# Patient Record
Sex: FEMALE | Race: WHITE | NOT HISPANIC OR LATINO | Employment: OTHER | URBAN - METROPOLITAN AREA
[De-identification: names, ages, dates, MRNs, and addresses within clinical notes are randomized per-mention and may not be internally consistent; named-entity substitution may affect disease eponyms.]

---

## 2018-10-02 ENCOUNTER — TELEPHONE (OUTPATIENT)
Dept: UROLOGY | Facility: MEDICAL CENTER | Age: 63
End: 2018-10-02

## 2019-01-10 RX ORDER — HYDROCHLOROTHIAZIDE 12.5 MG/1
12.5 TABLET ORAL EVERY MORNING
Status: ON HOLD | COMMUNITY
End: 2020-02-04 | Stop reason: CLARIF

## 2019-01-10 RX ORDER — PHENOL 1.4 %
600 AEROSOL, SPRAY (ML) MUCOUS MEMBRANE
Status: ON HOLD | COMMUNITY
End: 2020-02-04 | Stop reason: CLARIF

## 2019-01-10 RX ORDER — ERGOCALCIFEROL (VITAMIN D2) 1250 MCG
50000 CAPSULE ORAL WEEKLY
COMMUNITY
End: 2019-11-05 | Stop reason: ALTCHOICE

## 2019-01-11 ENCOUNTER — ANESTHESIA EVENT (OUTPATIENT)
Dept: PERIOP | Facility: HOSPITAL | Age: 64
End: 2019-01-11
Payer: COMMERCIAL

## 2019-01-14 ENCOUNTER — ANESTHESIA (OUTPATIENT)
Dept: PERIOP | Facility: HOSPITAL | Age: 64
End: 2019-01-14
Payer: COMMERCIAL

## 2019-01-14 ENCOUNTER — HOSPITAL ENCOUNTER (OUTPATIENT)
Facility: HOSPITAL | Age: 64
Setting detail: OUTPATIENT SURGERY
Discharge: HOME | End: 2019-01-14
Attending: PODIATRIST | Admitting: PODIATRIST
Payer: COMMERCIAL

## 2019-01-14 VITALS
RESPIRATION RATE: 13 BRPM | SYSTOLIC BLOOD PRESSURE: 133 MMHG | TEMPERATURE: 97.6 F | OXYGEN SATURATION: 98 % | WEIGHT: 270 LBS | HEART RATE: 63 BPM | BODY MASS INDEX: 47.84 KG/M2 | DIASTOLIC BLOOD PRESSURE: 83 MMHG | HEIGHT: 63 IN

## 2019-01-14 DIAGNOSIS — G57.61 LESION OF RIGHT PLANTAR NERVE: ICD-10-CM

## 2019-01-14 DIAGNOSIS — Z98.890 POST-OPERATIVE STATE: Primary | ICD-10-CM

## 2019-01-14 PROCEDURE — 88304 TISSUE EXAM BY PATHOLOGIST: CPT | Performed by: PATHOLOGY

## 2019-01-14 RX ORDER — FENTANYL CITRATE 50 UG/ML
INJECTION, SOLUTION INTRAMUSCULAR; INTRAVENOUS AS NEEDED
Status: DISCONTINUED | OUTPATIENT
Start: 2019-01-14 | End: 2019-01-14 | Stop reason: SURG

## 2019-01-14 RX ORDER — MAGNESIUM HYDROXIDE 1200 MG/15ML
LIQUID ORAL AS NEEDED
Status: DISCONTINUED | OUTPATIENT
Start: 2019-01-14 | End: 2019-01-14 | Stop reason: HOSPADM

## 2019-01-14 RX ORDER — HYDROCODONE BITARTRATE AND ACETAMINOPHEN 5; 325 MG/1; MG/1
1 TABLET ORAL EVERY 6 HOURS PRN
Status: DISCONTINUED | OUTPATIENT
Start: 2019-01-14 | End: 2019-01-14 | Stop reason: HOSPADM

## 2019-01-14 RX ORDER — BUPIVACAINE HYDROCHLORIDE 5 MG/ML
INJECTION, SOLUTION PERINEURAL AS NEEDED
Status: DISCONTINUED | OUTPATIENT
Start: 2019-01-14 | End: 2019-01-14 | Stop reason: HOSPADM

## 2019-01-14 RX ORDER — ONDANSETRON 2 MG/ML
4 INJECTION INTRAMUSCULAR; INTRAVENOUS ONCE AS NEEDED
Status: DISCONTINUED | OUTPATIENT
Start: 2019-01-14 | End: 2019-01-14 | Stop reason: HOSPADM

## 2019-01-14 RX ORDER — PROMETHAZINE HYDROCHLORIDE 25 MG/ML
12.5 INJECTION, SOLUTION INTRAMUSCULAR; INTRAVENOUS ONCE AS NEEDED
Status: DISCONTINUED | OUTPATIENT
Start: 2019-01-14 | End: 2019-01-14 | Stop reason: HOSPADM

## 2019-01-14 RX ORDER — PROPOFOL 10 MG/ML
INJECTION, EMULSION INTRAVENOUS CONTINUOUS PRN
Status: DISCONTINUED | OUTPATIENT
Start: 2019-01-14 | End: 2019-01-14 | Stop reason: SURG

## 2019-01-14 RX ORDER — MEPERIDINE HYDROCHLORIDE 25 MG/ML
12.5 INJECTION INTRAMUSCULAR; INTRAVENOUS; SUBCUTANEOUS
Status: DISCONTINUED | OUTPATIENT
Start: 2019-01-14 | End: 2019-01-14 | Stop reason: HOSPADM

## 2019-01-14 RX ORDER — HYDROMORPHONE HCL/PF 1 MG/ML
0.5 SYRINGE (ML) INJECTION
Status: DISCONTINUED | OUTPATIENT
Start: 2019-01-14 | End: 2019-01-14 | Stop reason: HOSPADM

## 2019-01-14 RX ORDER — HYDROCODONE BITARTRATE AND ACETAMINOPHEN 5; 325 MG/1; MG/1
1 TABLET ORAL EVERY 6 HOURS PRN
Qty: 20 TABLET | Refills: 0 | Status: SHIPPED | OUTPATIENT
Start: 2019-01-14 | End: 2019-01-24

## 2019-01-14 RX ORDER — SODIUM CHLORIDE, SODIUM LACTATE, POTASSIUM CHLORIDE, CALCIUM CHLORIDE 600; 310; 30; 20 MG/100ML; MG/100ML; MG/100ML; MG/100ML
50 INJECTION, SOLUTION INTRAVENOUS CONTINUOUS
Status: DISCONTINUED | OUTPATIENT
Start: 2019-01-14 | End: 2019-01-14 | Stop reason: HOSPADM

## 2019-01-14 RX ORDER — DEXAMETHASONE SODIUM PHOSPHATE 4 MG/ML
INJECTION, SOLUTION INTRA-ARTICULAR; INTRALESIONAL; INTRAMUSCULAR; INTRAVENOUS; SOFT TISSUE AS NEEDED
Status: DISCONTINUED | OUTPATIENT
Start: 2019-01-14 | End: 2019-01-14 | Stop reason: HOSPADM

## 2019-01-14 RX ORDER — LIDOCAINE HYDROCHLORIDE 10 MG/ML
INJECTION, SOLUTION INFILTRATION; PERINEURAL AS NEEDED
Status: DISCONTINUED | OUTPATIENT
Start: 2019-01-14 | End: 2019-01-14 | Stop reason: SURG

## 2019-01-14 RX ORDER — CEFAZOLIN SODIUM 1 G/50ML
1000 SOLUTION INTRAVENOUS ONCE
Status: COMPLETED | OUTPATIENT
Start: 2019-01-14 | End: 2019-01-14

## 2019-01-14 RX ORDER — CEFAZOLIN SODIUM 2 G/50ML
2000 SOLUTION INTRAVENOUS ONCE
Status: COMPLETED | OUTPATIENT
Start: 2019-01-14 | End: 2019-01-14

## 2019-01-14 RX ORDER — PROPOFOL 10 MG/ML
INJECTION, EMULSION INTRAVENOUS AS NEEDED
Status: DISCONTINUED | OUTPATIENT
Start: 2019-01-14 | End: 2019-01-14 | Stop reason: SURG

## 2019-01-14 RX ORDER — MIDAZOLAM HYDROCHLORIDE 1 MG/ML
INJECTION INTRAMUSCULAR; INTRAVENOUS AS NEEDED
Status: DISCONTINUED | OUTPATIENT
Start: 2019-01-14 | End: 2019-01-14 | Stop reason: SURG

## 2019-01-14 RX ADMIN — FENTANYL CITRATE 50 MCG: 50 INJECTION INTRAMUSCULAR; INTRAVENOUS at 12:16

## 2019-01-14 RX ADMIN — FENTANYL CITRATE 50 MCG: 50 INJECTION INTRAMUSCULAR; INTRAVENOUS at 12:23

## 2019-01-14 RX ADMIN — MIDAZOLAM HYDROCHLORIDE 2 MG: 1 INJECTION, SOLUTION INTRAMUSCULAR; INTRAVENOUS at 12:16

## 2019-01-14 RX ADMIN — HYDROCODONE BITARTRATE AND ACETAMINOPHEN 1 TABLET: 5; 325 TABLET ORAL at 14:22

## 2019-01-14 RX ADMIN — FENTANYL CITRATE 25 MCG: 50 INJECTION INTRAMUSCULAR; INTRAVENOUS at 12:51

## 2019-01-14 RX ADMIN — PROPOFOL 70 MG: 10 INJECTION, EMULSION INTRAVENOUS at 12:16

## 2019-01-14 RX ADMIN — CEFAZOLIN SODIUM 2000 MG: 2 SOLUTION INTRAVENOUS at 12:25

## 2019-01-14 RX ADMIN — LIDOCAINE HYDROCHLORIDE 40 MG: 10 INJECTION, SOLUTION INFILTRATION; PERINEURAL at 12:16

## 2019-01-14 RX ADMIN — SODIUM CHLORIDE, POTASSIUM CHLORIDE, SODIUM LACTATE AND CALCIUM CHLORIDE 50 ML/HR: 600; 310; 30; 20 INJECTION, SOLUTION INTRAVENOUS at 12:09

## 2019-01-14 RX ADMIN — PROPOFOL 70 MG: 10 INJECTION, EMULSION INTRAVENOUS at 12:46

## 2019-01-14 RX ADMIN — CEFAZOLIN SODIUM 1000 MG: 1 SOLUTION INTRAVENOUS at 12:25

## 2019-01-14 RX ADMIN — PROPOFOL 160 MCG/KG/MIN: 10 INJECTION, EMULSION INTRAVENOUS at 12:16

## 2019-01-14 NOTE — DISCHARGE SUMMARY
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

## 2019-01-14 NOTE — DISCHARGE INSTRUCTIONS
Dr Sheryle Bernard  Post-Operative Instructions    1  Take your prescribed medication as directed  2  Upon arrival at home, lie down and elevate your surgical foot on 2 pillows  3  Remain quiet, off your feet as much as possible, for the first 24-48 hours  This is when your feet first swell and may become painful  After 48 hours you may begin limited walking following these restrictions:   Weightbear as tolerated to surgical foot  4  Drink large quantities of water  Consume no alcohol  Continue a well-balanced diet  5  Report any unusual discomfort or fever to this office  6  A limited amount of discomfort and swelling is to be expected  In some cases the skin may take on a bruised appearance  The surgical solution that was applied to your foot prior to the operation is dark in color and the operation site may appear to be oozing when it actually is not  7  A slight amount of blood is to be expected, and is no cause for alarm  Do not remove the dressings  If there is active bleeding and if the bleeding persists, add additional gauze to the bandage, apply direct pressure, elevate your feet and call this office  8  Do not get the dressings wet  As regular bathing may be inconvenient, sponge baths are recommended  9  When anesthesia wears off and if any discomfort should be present, apply an ice pack directly over the operated area for 15 minute intervals for several hours or until the pain leaves  (USE IN EXCESS OF 15 MINUTES COULD CAUSE FROSTBITE)  Do not use hot water bags or electric pads  A convenient icepack can be made by placing ice cubes in a plastic bag and covering this with a towel  10  If necessary, take a mild laxative before retiring  11  Wear your special open shoes anytime you put weight on your foot, even if it is just to walk to the bathroom and back  It will probably be 2 or 3 weeks before you will be permitted to try regular shoes    12  Having performed the operation, we are interested in a prompt recovery  Please cooperate by following the above instructions  13  Please call to confirm your post-op appointment or call with any other questions

## 2019-01-14 NOTE — ANESTHESIA PREPROCEDURE EVALUATION
Review of Systems/Medical History      No history of anesthetic complications     Cardiovascular  Exercise tolerance (METS): >4,  Hypertension controlled,    Pulmonary  Negative pulmonary ROS        GI/Hepatic  Negative GI/hepatic ROS          Negative  ROS        Endo/Other  Negative endo/other ROS No diabetes ,   Obesity    GYN       Hematology  Negative hematology ROS      Musculoskeletal  Negative musculoskeletal ROS        Neurology  Negative neurology ROS      Psychology   Negative psychology ROS              Physical Exam    Airway    Mallampati score: II  TM Distance: >3 FB  Neck ROM: full     Dental       Cardiovascular  Cardiovascular exam normal    Pulmonary  Pulmonary exam normal     Other Findings        Anesthesia Plan  ASA Score- 2     Anesthesia Type- IV sedation with anesthesia with ASA Monitors  Additional Monitors:   Airway Plan:         Plan Factors-Patient not instructed to abstain from smoking on day of procedure  Patient did not smoke on day of surgery  Induction-     Postoperative Plan- Plan for postoperative opioid use  Informed Consent- Anesthetic plan and risks discussed with patient  I personally reviewed this patient with the CRNA  Discussed and agreed on the Anesthesia Plan with the CRNA             PT and INR and BMP ok

## 2019-01-14 NOTE — OP NOTE
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]    Procedure(s) (LRB):  EXCISION 2ND INTERSPACE NEUROMA (Right)    Specimen(s):  ID Type Source Tests Collected by Time Destination   1 : Neuroma Right Foot Tissue Neuroma TISSUE EXAM Peri Capone DPM 2019 1252        Estimated Blood Loss:   Minimal    Anesthesia Type:   Choice with 8 ml of 0 5% Bupivacaine and a post-op injection of 1cc of dexamethasone sodium phosphate, and 7 cc of 0 5%bupivicaine plain  Hemostasis:  Right pneumatic ankle tourniquet at 250 mm per Hg for 37 min  Materials:  4-0 Vicryl  4-0 Nylon    Operative Findings:  Consistent with diagnosis  Complications:   None    Procedure and Technique:     Under mild sedation, the patient was brought into the operating room and placed on the operating room table in the supine position  A pneumatic ankle tourniquet was then placed around the patient's right ankle with ample webril padding  A time out was performed to confirm the correct patient, procedure and site with all parties in agreement  Following IV sedation, a intermetatarsal block was performed consisting of 8 ml of 0 5% Bupivacaine  The foot was then scrubbed, prepped and draped in the usual aseptic manner  An esmarch bandage was utilized to exsangunate the patients foot and the pneumatic ankle tourniquet was then inflated  The esmarch bandage was removed and the foot was placed on the operating room table      Attention was directed to the 2nd interspace of the Right foot where a 3cm linear longitudinal incision was made beginning distally at the webspace of the intermetatarsal area and extending proximally  The incision was deepened through the subcutaneous tissues  All vital structures were identified and retracted  Dissection was then carried down into the 2nd interspace using sharp and blunt dissection  The deep transverse intermetatarsal ligament was then identified and transected  Dissection was continued until visualization of the neural mass was identified beneath the deep transverse intermetatarsal ligament  The hypertrophied neural tissue was dissected distally from the distal digital branches which were freed from soft tissue attachments and hemostats were attached distally  The distal digital branches were then transected  Using the hemostats, the neuroma was then dissected proximally and the neuroma was transected at the most proximal portion, while tension was maintained on the nerve  The entire neural mass was resected, passed from the operative field, and sent to pathology for further evaluation  The wound was then flushed with copious amounts of sterile saline  Subcutaneous closure was obtained utilizing 4-0 Vicryl in an interrupted suture technique  Skin edges were reapproximated and closure was obtained utilizing interrupted retention sutures utilizing 4-0 Nylon  The foot was then cleansed and dried  A postoperative injection consisting of 1cc of dexamethasone sodium phosphate and 7cc of 0 5% Bupivacaine was performed  The incision site was dressed with Xeroform, Dry sterile dressing  This was then covered with a Sally, and Coban  The pneumatic ankle tourniquet was deflated and normal hyperemic flush was noted to all digits  The patient tolerated the procedure and anesthesia well and was transported to the PACU with vital signs stable         SIGNATURE: Alejandra Harding DPM  DATE: January 14, 2019  TIME: 1:30 PM

## 2019-01-19 ENCOUNTER — OFFICE VISIT (OUTPATIENT)
Dept: URGENT CARE | Facility: CLINIC | Age: 64
End: 2019-01-19
Payer: COMMERCIAL

## 2019-01-19 VITALS
HEART RATE: 71 BPM | TEMPERATURE: 98 F | DIASTOLIC BLOOD PRESSURE: 80 MMHG | OXYGEN SATURATION: 99 % | WEIGHT: 270 LBS | HEIGHT: 63 IN | RESPIRATION RATE: 16 BRPM | SYSTOLIC BLOOD PRESSURE: 154 MMHG | BODY MASS INDEX: 47.84 KG/M2

## 2019-01-19 DIAGNOSIS — T81.49XA POSTOPERATIVE WOUND INFECTION: Primary | ICD-10-CM

## 2019-01-19 PROCEDURE — 99213 OFFICE O/P EST LOW 20 MIN: CPT | Performed by: NURSE PRACTITIONER

## 2019-01-19 RX ORDER — OXYCODONE HYDROCHLORIDE AND ACETAMINOPHEN 5; 325 MG/1; MG/1
1 TABLET ORAL EVERY 6 HOURS PRN
COMMUNITY
Start: 2018-12-13 | End: 2019-11-05 | Stop reason: ALTCHOICE

## 2019-01-19 RX ORDER — AMOXICILLIN 250 MG
1 CAPSULE ORAL
COMMUNITY
Start: 2018-12-13 | End: 2019-11-05 | Stop reason: ALTCHOICE

## 2019-01-19 RX ORDER — CEPHALEXIN 500 MG/1
500 CAPSULE ORAL EVERY 6 HOURS SCHEDULED
Qty: 28 CAPSULE | Refills: 0 | Status: SHIPPED | OUTPATIENT
Start: 2019-01-19 | End: 2019-01-26

## 2019-01-19 NOTE — PROGRESS NOTES
[facility]        NAME: Connor Singh is a 66 y o  female  : 1955    MRN: [id number]  DATE: 2019  TIME: 11:18 AM    Assessment and Plan   Postoperative wound infection [T81 49XA]  1  Postoperative wound infection  cephalexin (KEFLEX) 500 mg capsule         Patient Instructions       Follow up with surgeon next week  Proceed to  ER if symptoms worsen  Chief Complaint     Chief Complaint   Patient presents with    Surgical Problem Re-Evaluation     right, foot          History of Present Illness       59-year-old female with a chief complaint possible postop wound infection  She had surgery 5 days ago to remove a neuroma between her 2nd and 3rd toes  She states that since she woke up this morning she has been having some increased pain and redness at the incision site  She called her podiatrist (Dr Rusty Roy) and was told to come here to be checked  Review of Systems   Review of Systems   Constitutional: Negative  HENT: Negative  Eyes: Negative  Respiratory: Negative  Cardiovascular: Negative  Gastrointestinal: Negative  Genitourinary: Negative  Skin: Positive for wound           Current Medications       Current Outpatient Prescriptions:     oxyCODONE-acetaminophen (PERCOCET) 5-325 mg per tablet, Take 1 tablet by mouth every 6 (six) hours as needed, Disp: , Rfl:     senna-docusate sodium (MONIK-COLACE) 8 6-50 mg per tablet, Take 1 tablet by mouth, Disp: , Rfl:     calcium carbonate (OS-VENTURA) 600 MG tablet, Take 600 mg by mouth 2 (two) times a day with meals, Disp: , Rfl:     cephalexin (KEFLEX) 500 mg capsule, Take 1 capsule (500 mg total) by mouth every 6 (six) hours for 7 days, Disp: 28 capsule, Rfl: 0    ergocalciferol (ERGOCALCIFEROL) 55236 units capsule, Take 50,000 Units by mouth once a week, Disp: , Rfl:     hydrochlorothiazide (HYDRODIURIL) 12 5 mg tablet, Take 12 5 mg by mouth daily, Disp: , Rfl:     HYDROcodone-acetaminophen (Lana Camper) 5-325 mg per tablet, Take 1 tablet by mouth every 6 (six) hours as needed for pain for up to 10 days Max Daily Amount: 4 tablets, Disp: 20 tablet, Rfl: 0    Current Allergies     Allergies as of 2019    (No Known Allergies)            The following portions of the patient's history were reviewed and updated as appropriate: allergies, current medications, past family history, past medical history, past social history, past surgical history and problem list      Past Medical History:   Diagnosis Date    Hypertension     Vitamin D deficiency        Past Surgical History:   Procedure Laterality Date    ABDOMINOPLASTY      revision gastric bypass     SECTION      CHOLECYSTECTOMY      COLONOSCOPY      GASTRIC BYPASS      HERNIA REPAIR      NEUROMA EXCISION Right 2019    Procedure: EXCISION 2ND INTERSPACE NEUROMA;  Surgeon: Leilani Connor DPM;  Location: [address] [place] [address];  Service: Podiatry    ROTATOR CUFF REPAIR Left        Family History   Problem Relation Age of Onset    Stroke Mother     Heart disease Father          Medications have been verified  Objective   /80   Pulse 71   Temp 98 °F (36 7 °C)   Resp 16   Ht 5' 3" (1 6 m)   Wt 122 kg (270 lb)   SpO2 99%   BMI 47 83 kg/m²        Physical Exam     Physical Exam   Constitutional: She is oriented to person, place, and time  She appears well-developed and well-nourished  No distress  HENT:   Head: Normocephalic and atraumatic  Right Ear: External ear normal    Left Ear: External ear normal    Nose: Nose normal    Mouth/Throat: Oropharynx is clear and moist    Eyes: Pupils are equal, round, and reactive to light  Conjunctivae and EOM are normal    Neck: Normal range of motion  Neck supple  Cardiovascular: Normal rate, regular rhythm and normal heart sounds  Pulmonary/Chest: Effort normal and breath sounds normal  No respiratory distress  She has no wheezes  She has no rales  Abdominal: Soft   Bowel sounds are normal  Lymphadenopathy:     She has no cervical adenopathy  Neurological: She is alert and oriented to person, place, and time  She has normal reflexes  Skin: She is not diaphoretic  There is an incision between the 2nd and 3rd toes on the right measuring approximately 2 5 cm in length  The edges are nicely approximated  There is no drainage noted  There is minimal erythema and warmth at the incision site  Patient reports pain with palpation over the incision  Psychiatric: She has a normal mood and affect  Her behavior is normal  Judgment and thought content normal    Vitals reviewed

## 2019-01-19 NOTE — PATIENT INSTRUCTIONS
Acute Wound Care   AMBULATORY CARE:   An acute wound  is an injury that causes a break in the skin  An acute wound can happen suddenly, last a short time, and may heal on its own  Common signs and symptoms of an acute wound:   · A cut, tear, or gash in your skin    · Bleeding, swelling, pain, or trouble moving the affected area    · Dirt or foreign objects inside the wound     · Milky, yellow, green, or brown pus in the wound     · Red, tender, or warm area around the pus    · Fever  Seek care immediately if:   · You have pus or a foul odor coming from the wound  · You have sudden trouble breathing or chest pain  · Blood soaks through your bandage  Contact your healthcare provider if:   · You have muscle, joint, or body aches, sweating, or a fever  · You have more swelling, redness, or bleeding in your wound  · Your skin is itchy, swollen, or you have a rash  · You have questions or concerns about your condition or care  Treatment for an acute wound  may include any of the following:  · Cleansing  is done with soap and water to wash away germs and decrease the risk of infection  Sterile water further cleans the wound  The cleaning is done under high pressure with a catheter tip and large syringe  A solution that kills germs may also be used  · Debridement  is done to clean and remove objects, dirt, or dead tissues from the open wound  Healthcare providers may also drain the wound to clean out pus  · Closure of the wound  is done with stitches, staples, skin adhesive, or other treatments  This may be done if the wound is wide or deep  Stitches may be needed if the wound is in an area that moves a lot, such as the hands, feet, and joints  Stitches may help to keep the wound from getting infected  They may also decrease the amount of scarring you have  Some wounds may heal better without stitches    Wound care:   · If your wound was closed with thin strips of medical tape, keep them clean and dry  The strips of medical tape will fall off on their own  Do not pull them off  · Keep the bandage clean and dry  Do not remove the bandage over your wound unless your healthcare provider says it is okay  · Wash your hands before and after you take care of your wound to prevent infection  · Clean the wound as directed  If you cannot reach the wound, have someone help you  · If you have packing, make sure all the gauze used to pack the wound is taken out and replaced as directed  Keep track of how many gauze dressings are placed inside the wound  Follow up with your healthcare provider as directed:  Write down your questions so you remember to ask them during your visits  © 2016 [employer] [employer] is for End User's use only and may not be sold, redistributed or otherwise used for commercial purposes  All illustrations and images included in CareNotes® are the copyrighted property of A [employer] M [employer]  or Ashkan Paez  The above information is an  only  It is not intended as medical advice for individual conditions or treatments  Talk to your doctor, nurse or pharmacist before following any medical regimen to see if it is safe and effective for you

## 2019-05-22 ENCOUNTER — EVALUATION (OUTPATIENT)
Dept: PHYSICAL THERAPY | Facility: CLINIC | Age: 64
End: 2019-05-22
Payer: OTHER MISCELLANEOUS

## 2019-05-22 DIAGNOSIS — M54.50 LUMBAR PAIN: Primary | ICD-10-CM

## 2019-05-22 PROCEDURE — 97163 PT EVAL HIGH COMPLEX 45 MIN: CPT | Performed by: PHYSICAL THERAPIST

## 2019-05-23 ENCOUNTER — TRANSCRIBE ORDERS (OUTPATIENT)
Dept: PHYSICAL THERAPY | Facility: CLINIC | Age: 64
End: 2019-05-23

## 2019-05-23 DIAGNOSIS — M54.50 LUMBAR PAIN: Primary | ICD-10-CM

## 2019-05-28 ENCOUNTER — OFFICE VISIT (OUTPATIENT)
Dept: PHYSICAL THERAPY | Age: 64
End: 2019-05-28
Payer: OTHER MISCELLANEOUS

## 2019-05-28 VITALS — SYSTOLIC BLOOD PRESSURE: 153 MMHG | DIASTOLIC BLOOD PRESSURE: 96 MMHG | HEART RATE: 90 BPM

## 2019-05-28 DIAGNOSIS — M54.50 LUMBAR PAIN: Primary | ICD-10-CM

## 2019-05-28 PROCEDURE — 97113 AQUATIC THERAPY/EXERCISES: CPT | Performed by: PHYSICAL THERAPIST

## 2019-05-30 ENCOUNTER — OFFICE VISIT (OUTPATIENT)
Dept: PHYSICAL THERAPY | Age: 64
End: 2019-05-30
Payer: OTHER MISCELLANEOUS

## 2019-05-30 VITALS — SYSTOLIC BLOOD PRESSURE: 154 MMHG | HEART RATE: 72 BPM | DIASTOLIC BLOOD PRESSURE: 85 MMHG

## 2019-05-30 DIAGNOSIS — M54.50 LUMBAR PAIN: Primary | ICD-10-CM

## 2019-05-30 PROCEDURE — 97113 AQUATIC THERAPY/EXERCISES: CPT | Performed by: PHYSICAL THERAPIST

## 2019-06-03 ENCOUNTER — OFFICE VISIT (OUTPATIENT)
Dept: PHYSICAL THERAPY | Age: 64
End: 2019-06-03
Payer: OTHER MISCELLANEOUS

## 2019-06-03 ENCOUNTER — OFFICE VISIT (OUTPATIENT)
Dept: URGENT CARE | Facility: CLINIC | Age: 64
End: 2019-06-03
Payer: COMMERCIAL

## 2019-06-03 VITALS
TEMPERATURE: 98 F | SYSTOLIC BLOOD PRESSURE: 137 MMHG | HEART RATE: 83 BPM | DIASTOLIC BLOOD PRESSURE: 89 MMHG | BODY MASS INDEX: 47.84 KG/M2 | HEIGHT: 63 IN | RESPIRATION RATE: 20 BRPM | WEIGHT: 270 LBS | OXYGEN SATURATION: 98 %

## 2019-06-03 DIAGNOSIS — T75.3XXA MOTION SICKNESS, INITIAL ENCOUNTER: Primary | ICD-10-CM

## 2019-06-03 DIAGNOSIS — I10 ESSENTIAL HYPERTENSION: ICD-10-CM

## 2019-06-03 DIAGNOSIS — I49.9 IRREGULAR HEART BEAT: ICD-10-CM

## 2019-06-03 DIAGNOSIS — M54.50 LUMBAR PAIN: Primary | ICD-10-CM

## 2019-06-03 PROCEDURE — 99284 EMERGENCY DEPT VISIT MOD MDM: CPT | Performed by: NURSE PRACTITIONER

## 2019-06-03 PROCEDURE — 93005 ELECTROCARDIOGRAM TRACING: CPT | Performed by: NURSE PRACTITIONER

## 2019-06-03 PROCEDURE — 99214 OFFICE O/P EST MOD 30 MIN: CPT | Performed by: NURSE PRACTITIONER

## 2019-06-03 PROCEDURE — 97113 AQUATIC THERAPY/EXERCISES: CPT

## 2019-06-03 PROCEDURE — G0383 LEV 4 HOSP TYPE B ED VISIT: HCPCS | Performed by: NURSE PRACTITIONER

## 2019-06-03 RX ORDER — TIMOLOL MALEATE 2.5 MG/ML
1 SOLUTION OPHTHALMIC 2 TIMES DAILY
Status: ON HOLD | COMMUNITY
End: 2020-02-04 | Stop reason: CLARIF

## 2019-06-03 RX ORDER — TRAMADOL HYDROCHLORIDE 50 MG/1
50 TABLET ORAL 2 TIMES DAILY PRN
Status: ON HOLD | COMMUNITY
Start: 2019-02-15 | End: 2020-02-04 | Stop reason: CLARIF

## 2019-06-03 RX ORDER — MECLIZINE HCL 12.5 MG/1
12.5 TABLET ORAL EVERY 8 HOURS PRN
Qty: 30 TABLET | Refills: 0 | Status: SHIPPED | OUTPATIENT
Start: 2019-06-03 | End: 2019-11-05 | Stop reason: ALTCHOICE

## 2019-06-05 LAB
ATRIAL RATE: 77 BPM
P AXIS: 35 DEGREES
PR INTERVAL: 190 MS
QRS AXIS: -11 DEGREES
QRSD INTERVAL: 106 MS
QT INTERVAL: 396 MS
QTC INTERVAL: 448 MS
T WAVE AXIS: 24 DEGREES
VENTRICULAR RATE: 77 BPM

## 2019-06-05 PROCEDURE — 93010 ELECTROCARDIOGRAM REPORT: CPT | Performed by: INTERNAL MEDICINE

## 2019-06-06 ENCOUNTER — APPOINTMENT (OUTPATIENT)
Dept: PHYSICAL THERAPY | Age: 64
End: 2019-06-06
Payer: OTHER MISCELLANEOUS

## 2019-06-10 ENCOUNTER — OFFICE VISIT (OUTPATIENT)
Dept: PHYSICAL THERAPY | Age: 64
End: 2019-06-10
Payer: OTHER MISCELLANEOUS

## 2019-06-10 DIAGNOSIS — M54.50 LUMBAR PAIN: Primary | ICD-10-CM

## 2019-06-10 PROCEDURE — 97113 AQUATIC THERAPY/EXERCISES: CPT

## 2019-06-13 ENCOUNTER — OFFICE VISIT (OUTPATIENT)
Dept: PHYSICAL THERAPY | Age: 64
End: 2019-06-13
Payer: OTHER MISCELLANEOUS

## 2019-06-13 DIAGNOSIS — M54.50 LUMBAR PAIN: Primary | ICD-10-CM

## 2019-06-13 PROCEDURE — 97113 AQUATIC THERAPY/EXERCISES: CPT

## 2019-06-18 ENCOUNTER — OFFICE VISIT (OUTPATIENT)
Dept: PHYSICAL THERAPY | Age: 64
End: 2019-06-18
Payer: OTHER MISCELLANEOUS

## 2019-06-18 DIAGNOSIS — M54.50 LUMBAR PAIN: Primary | ICD-10-CM

## 2019-06-18 PROCEDURE — 97113 AQUATIC THERAPY/EXERCISES: CPT | Performed by: PHYSICAL THERAPIST

## 2019-06-20 ENCOUNTER — OFFICE VISIT (OUTPATIENT)
Dept: PHYSICAL THERAPY | Age: 64
End: 2019-06-20
Payer: OTHER MISCELLANEOUS

## 2019-06-20 DIAGNOSIS — M54.50 LUMBAR PAIN: Primary | ICD-10-CM

## 2019-06-20 PROCEDURE — 97113 AQUATIC THERAPY/EXERCISES: CPT

## 2019-06-24 ENCOUNTER — OFFICE VISIT (OUTPATIENT)
Dept: PHYSICAL THERAPY | Age: 64
End: 2019-06-24
Payer: OTHER MISCELLANEOUS

## 2019-06-24 DIAGNOSIS — M54.50 LUMBAR PAIN: Primary | ICD-10-CM

## 2019-06-24 PROCEDURE — 97113 AQUATIC THERAPY/EXERCISES: CPT

## 2019-06-25 ENCOUNTER — APPOINTMENT (OUTPATIENT)
Dept: PHYSICAL THERAPY | Age: 64
End: 2019-06-25
Payer: OTHER MISCELLANEOUS

## 2019-07-01 ENCOUNTER — OFFICE VISIT (OUTPATIENT)
Dept: PHYSICAL THERAPY | Age: 64
End: 2019-07-01
Payer: OTHER MISCELLANEOUS

## 2019-07-01 DIAGNOSIS — M54.50 LUMBAR PAIN: Primary | ICD-10-CM

## 2019-07-01 PROCEDURE — 97113 AQUATIC THERAPY/EXERCISES: CPT

## 2019-07-01 NOTE — PROGRESS NOTES
Daily Note     Today's date: 2019  Patient name: Valentine Villasenor  : 1955  MRN: [id number]  Referring provider: Kay Jernigan PA-C  Dx:   Encounter Diagnosis     ICD-10-CM    1  Lumbar pain M54 5        Start Time: 1000  Stop Time: 1100  Total time in clinic (min): 60 minutes    Subjective:  Pt reports LBP today 5/10  She notes the spasm in her LB is better but she continues w/ pain in L LB and L LE  She complains of difficulty w/ steps, getting in and out of the car and states she can't sleep on L side yet  Objective: See treatment diary below      Assessment: Tolerated treatment well  Slow gains overall w/ LE and core strengthening  Added ball press downs today working on core strengthening  Also added DW bike for 2 minutes  Patient exhibited good technique with therapeutic exercises      Plan: Continue per plan of care       Precautions: OA, Fear (Non Swimmer), Left Shoulder RTC repair,     Daily Treatment Diary         Exercise Diary  5/28 5/30 6/3 6/10 6/13 6/18 6/20 6/24 7    Water walking 20 20 20 15 15 15 10 10 10    Postural training 2 2 2          Gait training 3 2 2 5 5 5 5 5     Home exercise pgm/patient education             Wall: t/h raises 1 1 1 1 1 1 1 1 1    Hip abd/add 1 1 1 2 2 2 2 2 2    Marching 1 1 1 1 1 1 1 1 1    squats 1 1 1 1 1 1 1 1 1    Knee flex/ext 1 1 1 1 1 1 1 1 1    Step-ups (fwd/bkwd/ss)             SLS (eyes open/closed)       3 3 3    SLS w UE mvmt  AROM/ball toss             Weight shifting 5 3 2          UE Noodle work x 4  4 4 4 4 4 4 4 4 4    UE AROM   1 4' 4 4 4 4 nt    Resistive UE work (paddles, bells, TB)         4 open blue    Core work ball press        2 2    Sit on noodle with movement             Seated on pool bench w proper posture   1 1 1 1 1 1 1    Ankle df/pf 1 1 1 1 1 1 1 1 1    marching 1 1 1 1 1 1 1 1 1    Hip Ab/add 1 1 1 1 1 1 1 1 1    Knee flex/ext 1 1 1 1 1 1 1 1 1    Deep water mvmt        2 5    Deep water tx/stretching    10 10 10 10 10 10    Specific self - stretches wall/steps 3 3 3 3 3 3 3 3 3        Modalities  5/28 5/30 6/3 6/10 6/13 6/18 6/20 6/24 7/1    whirlpool NT 5 10 10 10 10 10 10 10

## 2019-07-03 ENCOUNTER — APPOINTMENT (OUTPATIENT)
Dept: PHYSICAL THERAPY | Age: 64
End: 2019-07-03
Payer: OTHER MISCELLANEOUS

## 2019-07-07 ENCOUNTER — HOSPITAL ENCOUNTER (EMERGENCY)
Facility: HOSPITAL | Age: 64
Discharge: HOME | End: 2019-07-07
Attending: EMERGENCY MEDICINE | Admitting: EMERGENCY MEDICINE
Payer: COMMERCIAL

## 2019-07-07 ENCOUNTER — APPOINTMENT (EMERGENCY)
Dept: RADIOLOGY | Facility: HOSPITAL | Age: 64
End: 2019-07-07
Payer: COMMERCIAL

## 2019-07-07 DIAGNOSIS — S91.209A AVULSION OF TOENAIL, INITIAL ENCOUNTER: Primary | ICD-10-CM

## 2019-07-07 PROCEDURE — 73660 X-RAY EXAM OF TOE(S): CPT

## 2019-07-07 PROCEDURE — 99283 EMERGENCY DEPT VISIT LOW MDM: CPT

## 2019-07-07 RX ORDER — HYDROCODONE BITARTRATE AND ACETAMINOPHEN 5; 325 MG/1; MG/1
1 TABLET ORAL ONCE
Status: COMPLETED | OUTPATIENT
Start: 2019-07-07 | End: 2019-07-07

## 2019-07-07 RX ORDER — HYDROCODONE BITARTRATE AND ACETAMINOPHEN 5; 325 MG/1; MG/1
1 TABLET ORAL 3 TIMES DAILY PRN
Qty: 12 TABLET | Refills: 0 | Status: SHIPPED | OUTPATIENT
Start: 2019-07-07 | End: 2019-07-17

## 2019-07-07 RX ORDER — NAPROXEN 500 MG/1
500 TABLET ORAL ONCE
Status: DISCONTINUED | OUTPATIENT
Start: 2019-07-07 | End: 2019-07-07

## 2019-07-07 RX ADMIN — HYDROCODONE BITARTRATE AND ACETAMINOPHEN 1 TABLET: 5; 325 TABLET ORAL at 07:24

## 2019-07-07 NOTE — ED PROVIDER NOTES
History  Chief Complaint   Patient presents with    Toe Injury     left great toe     Patient is a 82-year-old female stubbed her toe this morning while walking  Patient states she injured the toenail of her left great toe  She said the toenail was loosened  Patient denies any other injury  Pain is worse with walking          Prior to Admission Medications   Prescriptions Last Dose Informant Patient Reported? Taking?    Ergocalciferol (VITAMIN D2) 2000 units TABS   Yes Yes   Sig: Take 50,000 Units by mouth   calcium carbonate (OS-VENTURA) 600 MG tablet   Yes Yes   Sig: Take 600 mg by mouth 2 (two) times a day with meals   ergocalciferol (ERGOCALCIFEROL) 18627 units capsule   Yes Yes   Sig: Take 50,000 Units by mouth once a week   hydrochlorothiazide (HYDRODIURIL) 12 5 mg tablet   Yes Yes   Sig: Take 12 5 mg by mouth daily   meclizine (ANTIVERT) 12 5 MG tablet   No No   Sig: Take 1 tablet (12 5 mg total) by mouth every 8 (eight) hours as needed for dizziness or nausea for up to 10 days   oxyCODONE-acetaminophen (PERCOCET) 5-325 mg per tablet   Yes Yes   Sig: Take 1 tablet by mouth every 6 (six) hours as needed   senna-docusate sodium (MONIK-COLACE) 8 6-50 mg per tablet   Yes Yes   Sig: Take 1 tablet by mouth   timolol (TIMOPTIC-XE) 0 25 % ophthalmic gel-forming   Yes Yes   Sig: timolol maleate 0 5 % eye drops   traMADol (ULTRAM) 50 mg tablet   Yes Yes   Sig: Take 50 mg by mouth 2 (two) times a day      Facility-Administered Medications: None       Past Medical History:   Diagnosis Date    Hypertension     Vitamin D deficiency        Past Surgical History:   Procedure Laterality Date    ABDOMINOPLASTY      revision gastric bypass     SECTION      CHOLECYSTECTOMY      COLONOSCOPY      GASTRIC BYPASS      HERNIA REPAIR      NEUROMA EXCISION Right 2019    Procedure: EXCISION 2ND INTERSPACE NEUROMA;  Surgeon: Kajal Bobo DPM;  Location: [address] [place] [address];  Service: Podiatry    ROTATOR CUFF REPAIR Left Family History   Problem Relation Age of Onset    Stroke Mother     Heart disease Father      I have reviewed and agree with the history as documented  Social History     Tobacco Use    Smoking status: Never Smoker    Smokeless tobacco: Never Used   Substance Use Topics    Alcohol use: No    Drug use: No        Review of Systems   Constitutional: Negative  Musculoskeletal: Positive for arthralgias  Skin: Positive for wound  Partial nail avulsion   Allergic/Immunologic: Negative for immunocompromised state  Neurological: Negative  Hematological: Does not bruise/bleed easily  Physical Exam  Physical Exam   Constitutional: She is oriented to person, place, and time  She appears well-developed and well-nourished  HENT:   Head: Normocephalic and atraumatic  Pulmonary/Chest: Effort normal    Musculoskeletal: She exhibits tenderness  She exhibits no deformity  Neurological: She is alert and oriented to person, place, and time  Skin: Skin is warm and dry  Left 1st toenail partially raised off the nail bed  Intact in cuticle   Psychiatric: She has a normal mood and affect  Her behavior is normal    Nursing note and vitals reviewed        Vital Signs  ED Triage Vitals   Temperature Pulse Respirations Blood Pressure SpO2   07/07/19 0645 07/07/19 0625 07/07/19 0625 07/07/19 0645 07/07/19 0625   (!) 96 6 °F (35 9 °C) 85 18 148/72 99 %      Temp Source Heart Rate Source Patient Position - Orthostatic VS BP Location FiO2 (%)   07/07/19 0645 07/07/19 0625 07/07/19 0645 07/07/19 0645 --   Temporal Monitor Sitting Left arm       Pain Score       07/07/19 0625       8           Vitals:    07/07/19 0625 07/07/19 0645   BP:  148/72   Pulse: 85    Patient Position - Orthostatic VS:  Sitting         Visual Acuity      ED Medications  Medications - No data to display    Diagnostic Studies  Results Reviewed     None                 XR toe great min 2 views LEFT   ED Interpretation by Marjorie Reinoso Terry Lisa MD (07/07 0715)   neg                 Procedures  Procedures       ED Course                               MDM  Number of Diagnoses or Management Options  Diagnosis management comments: No fracture  Will bandaged toe and referred to podiatry       Amount and/or Complexity of Data Reviewed  Tests in the radiology section of CPT®: reviewed    Risk of Complications, Morbidity, and/or Mortality  Presenting problems: low  Diagnostic procedures: low  Management options: low    Patient Progress  Patient progress: stable      Disposition  Final diagnoses:   None     ED Disposition     None      Follow-up Information    None         Patient's Medications   Discharge Prescriptions    No medications on file     No discharge procedures on file      ED Provider  Electronically Signed by           Mukesh Alarcon MD  07/07/19 4811

## 2019-07-08 ENCOUNTER — APPOINTMENT (OUTPATIENT)
Dept: PHYSICAL THERAPY | Age: 64
End: 2019-07-08
Payer: OTHER MISCELLANEOUS

## 2019-07-08 VITALS
WEIGHT: 268.96 LBS | BODY MASS INDEX: 47.64 KG/M2 | TEMPERATURE: 96.6 F | HEART RATE: 85 BPM | OXYGEN SATURATION: 99 % | DIASTOLIC BLOOD PRESSURE: 72 MMHG | RESPIRATION RATE: 18 BRPM | SYSTOLIC BLOOD PRESSURE: 148 MMHG

## 2019-07-11 ENCOUNTER — APPOINTMENT (OUTPATIENT)
Dept: PHYSICAL THERAPY | Age: 64
End: 2019-07-11
Payer: OTHER MISCELLANEOUS

## 2019-07-13 ENCOUNTER — OFFICE VISIT (OUTPATIENT)
Dept: URGENT CARE | Facility: CLINIC | Age: 64
End: 2019-07-13
Payer: COMMERCIAL

## 2019-07-13 VITALS
BODY MASS INDEX: 47.48 KG/M2 | OXYGEN SATURATION: 97 % | SYSTOLIC BLOOD PRESSURE: 140 MMHG | RESPIRATION RATE: 16 BRPM | DIASTOLIC BLOOD PRESSURE: 83 MMHG | WEIGHT: 268 LBS | HEIGHT: 63 IN | HEART RATE: 91 BPM | TEMPERATURE: 98 F

## 2019-07-13 DIAGNOSIS — L03.032 INFECTION OF NAIL BED OF TOE OF LEFT FOOT: Primary | ICD-10-CM

## 2019-07-13 PROCEDURE — 99214 OFFICE O/P EST MOD 30 MIN: CPT | Performed by: NURSE PRACTITIONER

## 2019-07-13 PROCEDURE — G0383 LEV 4 HOSP TYPE B ED VISIT: HCPCS | Performed by: NURSE PRACTITIONER

## 2019-07-13 PROCEDURE — 99284 EMERGENCY DEPT VISIT MOD MDM: CPT | Performed by: NURSE PRACTITIONER

## 2019-07-13 RX ORDER — WARFARIN SODIUM 5 MG/1
TABLET ORAL
COMMUNITY
End: 2019-11-05 | Stop reason: ALTCHOICE

## 2019-07-13 RX ORDER — MELOXICAM 7.5 MG/1
TABLET ORAL
COMMUNITY
End: 2019-11-05 | Stop reason: ALTCHOICE

## 2019-07-13 RX ORDER — CEPHALEXIN 500 MG/1
500 CAPSULE ORAL 4 TIMES DAILY
Qty: 40 CAPSULE | Refills: 0 | Status: SHIPPED | OUTPATIENT
Start: 2019-07-13 | End: 2019-07-23

## 2019-07-13 NOTE — PATIENT INSTRUCTIONS
Take the keflex as ordered until completed  While on the antibiotic, it is recommended to either eat yogurt or take a probiotic to put good bacteria back in your gut  Follow up with podiatry this week as already scheduled (7/18)  Wound Infection   AMBULATORY CARE:   A wound infection  occurs when bacteria enters a break in the skin  The infection may involve just the skin, or affect deeper tissues or organs close to the wound  Signs and symptoms of a wound infection:  Your symptoms may start a few days after you get the wound, or may not occur for a month or two after the wound happens:  · Fever    · Warm, red, painful, or swollen skin near the wound    · Blood or pus coming from the wound     · A foul odor coming from the wound  Seek care immediately if:   · You feel short of breath  · Your heart is beating faster than usual      · You feel confused  · Blood soaks through your bandages  · Your wound comes apart or feels like it is ripping  · You have severe pain  · You see red streaks coming from the infected area  Contact your healthcare provider if:   · You have a fever or chills  · You have more pain, redness, or swelling near your wound  · Your symptoms do not improve  · The skin around your wound feels numb  · You have questions or concerns about your condition or care  Treatment for a wound infection  will depend on how severe the wound is, its location, and whether other areas are affected  It may also depend on your health and the length of time you have had the wound  Ask your healthcare provider about these and other treatments you may need:  · Medicine  will be given to treat the infection and decrease pain and swelling       · Wound care  may be done to clean your wound and help it heal  A wound vacuum may also be placed over your wound to help it heal      · Hyperbaric oxygen therapy  (HBO) may be used to get more oxygen to your tissues to help them heal  The pressurized oxygen is given as you sit in a pressure chamber  · Surgery  may be needed to clean the wound or remove infected or dead tissue  Surgery may also be needed to remove a foreign object  Care for your wound as directed:  Keep your wound clean and dry  You may need to cover your wound when you bathe so it does not get wet  Clean your wound as directed with soap and water or wound   Put on new, clean bandages as directed  Change your bandages when they get wet or dirty  Help your wound heal:   · Eat a variety of healthy foods  Examples include fruits, vegetables, whole-grain breads, low-fat dairy products, beans, lean meats, and fish  Healthy foods may help you heal faster  You may also need to take vitamins and minerals  Ask if you need to be on a special diet  · Manage other health conditions  Follow your healthcare provider's directions to manage health conditions that can cause slow wound healing  Examples include high blood pressure and diabetes  · Do not smoke  Nicotine and other chemicals in cigarettes and cigars can cause slow wound healing  Ask your healthcare provider for information if you currently smoke and need help to quit  E-cigarettes or smokeless tobacco still contain nicotine  Talk to your healthcare provider before you use these products  Follow up with your healthcare provider in 1 to 2 days:  Write down your questions so you remember to ask them during your visits  © 2017 [facility] Information is for End User's use only and may not be sold, redistributed or otherwise used for commercial purposes  All illustrations and images included in CareNotes® are the copyrighted property of [employer]  or Ashkan Paez  The above information is an  only  It is not intended as medical advice for individual conditions or treatments   Talk to your doctor, nurse or pharmacist before following any medical regimen to see if it is safe and effective for you

## 2019-07-13 NOTE — PROGRESS NOTES
[facility]        NAME: Zachariah Chi is a 66 y o  female  : 1955    MRN: [id number]  DATE: 2019  TIME: 4:36 PM    Assessment and Plan   Infection of nail bed of toe of left foot [L03 032]  1  Infection of nail bed of toe of left foot  cephalexin (KEFLEX) 500 mg capsule         Patient Instructions     Patient Instructions   Take the keflex as ordered until completed  While on the antibiotic, it is recommended to either eat yogurt or take a probiotic to put good bacteria back in your gut  Follow up with podiatry this week as already scheduled ()  Wound Infection   AMBULATORY CARE:   A wound infection  occurs when bacteria enters a break in the skin  The infection may involve just the skin, or affect deeper tissues or organs close to the wound  Signs and symptoms of a wound infection:  Your symptoms may start a few days after you get the wound, or may not occur for a month or two after the wound happens:  · Fever    · Warm, red, painful, or swollen skin near the wound    · Blood or pus coming from the wound     · A foul odor coming from the wound  Seek care immediately if:   · You feel short of breath  · Your heart is beating faster than usual      · You feel confused  · Blood soaks through your bandages  · Your wound comes apart or feels like it is ripping  · You have severe pain  · You see red streaks coming from the infected area  Contact your healthcare provider if:   · You have a fever or chills  · You have more pain, redness, or swelling near your wound  · Your symptoms do not improve  · The skin around your wound feels numb  · You have questions or concerns about your condition or care  Treatment for a wound infection  will depend on how severe the wound is, its location, and whether other areas are affected  It may also depend on your health and the length of time you have had the wound   Ask your healthcare provider about these and other treatments you may need:  · Medicine  will be given to treat the infection and decrease pain and swelling  · Wound care  may be done to clean your wound and help it heal  A wound vacuum may also be placed over your wound to help it heal      · Hyperbaric oxygen therapy  (HBO) may be used to get more oxygen to your tissues to help them heal  The pressurized oxygen is given as you sit in a pressure chamber  · Surgery  may be needed to clean the wound or remove infected or dead tissue  Surgery may also be needed to remove a foreign object  Care for your wound as directed:  Keep your wound clean and dry  You may need to cover your wound when you bathe so it does not get wet  Clean your wound as directed with soap and water or wound   Put on new, clean bandages as directed  Change your bandages when they get wet or dirty  Help your wound heal:   · Eat a variety of healthy foods  Examples include fruits, vegetables, whole-grain breads, low-fat dairy products, beans, lean meats, and fish  Healthy foods may help you heal faster  You may also need to take vitamins and minerals  Ask if you need to be on a special diet  · Manage other health conditions  Follow your healthcare provider's directions to manage health conditions that can cause slow wound healing  Examples include high blood pressure and diabetes  · Do not smoke  Nicotine and other chemicals in cigarettes and cigars can cause slow wound healing  Ask your healthcare provider for information if you currently smoke and need help to quit  E-cigarettes or smokeless tobacco still contain nicotine  Talk to your healthcare provider before you use these products  Follow up with your healthcare provider in 1 to 2 days:  Write down your questions so you remember to ask them during your visits  © 2017 [employer] Information is for End User's use only and may not be sold, redistributed or otherwise used for commercial purposes  All illustrations and images included in CareNotes® are the copyrighted property of [employer]  or Ashkan Paez  The above information is an  only  It is not intended as medical advice for individual conditions or treatments  Talk to your doctor, nurse or pharmacist before following any medical regimen to see if it is safe and effective for you  Follow up with PCP in 3-5 days  Proceed to  ER if symptoms worsen  Chief Complaint     Chief Complaint   Patient presents with    Toe Pain     left great         History of Present Illness       Patient sustained of left toenail injury of the great toe on July 7th and was seen in the ER  She did follow up with the podiatrist on Monday  He told her to do Epson salt soaks and to return this coming Thursday July 18th  He told her he would likely removed the nail at that time  Patient states that she has been doing the soaks as directed  She states she began experiencing a throbbing stabbing pain in the toe last night and that this morning when she removed the Band-Aid to do her morning soak, she saw purulent drainage  She notes that the skin right at the tip of the toe and around the nail is slightly red and warm to touch  Review of Systems   Review of Systems   Constitutional: Negative for chills, fatigue and fever  Musculoskeletal: Negative for myalgias  Skin: Positive for wound  All other systems reviewed and are negative          Current Medications       Current Outpatient Medications:     calcium carbonate (OS-VENTURA) 600 MG tablet, Take 600 mg by mouth 2 (two) times a day with meals, Disp: , Rfl:     ergocalciferol (ERGOCALCIFEROL) 55648 units capsule, Take 50,000 Units by mouth once a week, Disp: , Rfl:     Ergocalciferol (VITAMIN D2) 2000 units TABS, Take 50,000 Units by mouth, Disp: , Rfl:     hydrochlorothiazide (HYDRODIURIL) 12 5 mg tablet, Take 12 5 mg by mouth daily, Disp: , Rfl:     meloxicam (MOBIC) 7 5 mg tablet, meloxicam 7 5 mg tablet, Disp: , Rfl:     senna-docusate sodium (MONIK-COLACE) 8 6-50 mg per tablet, Take 1 tablet by mouth, Disp: , Rfl:     timolol (TIMOPTIC-XE) 0 25 % ophthalmic gel-forming, timolol maleate 0 5 % eye drops, Disp: , Rfl:     traMADol (ULTRAM) 50 mg tablet, Take 50 mg by mouth 2 (two) times a day, Disp: , Rfl:     cephalexin (KEFLEX) 500 mg capsule, Take 1 capsule (500 mg total) by mouth 4 (four) times a day for 10 days, Disp: 40 capsule, Rfl: 0    HYDROcodone-acetaminophen (NORCO) 5-325 mg per tablet, Take 1 tablet by mouth 3 (three) times a day as needed for pain for up to 10 daysMax Daily Amount: 3 tablets (Patient not taking: Reported on 2019), Disp: 12 tablet, Rfl: 0    meclizine (ANTIVERT) 12 5 MG tablet, Take 1 tablet (12 5 mg total) by mouth every 8 (eight) hours as needed for dizziness or nausea for up to 10 days, Disp: 30 tablet, Rfl: 0    oxyCODONE-acetaminophen (PERCOCET) 5-325 mg per tablet, Take 1 tablet by mouth every 6 (six) hours as needed, Disp: , Rfl:     warfarin (COUMADIN) 5 mg tablet, warfarin 5 mg tablet, Disp: , Rfl:     Current Allergies     Allergies as of 2019 - Reviewed 2019   Allergen Reaction Noted    Nsaids  2019            The following portions of the patient's history were reviewed and updated as appropriate: allergies, current medications, past family history, past medical history, past social history, past surgical history and problem list      Past Medical History:   Diagnosis Date    Hypertension     Vitamin D deficiency        Past Surgical History:   Procedure Laterality Date    ABDOMINOPLASTY      revision gastric bypass     SECTION      CHOLECYSTECTOMY      COLONOSCOPY      GASTRIC BYPASS      HERNIA REPAIR      NEUROMA EXCISION Right 2019    Procedure: EXCISION 2ND INTERSPACE NEUROMA;  Surgeon: Abigail Reilly DPM;  Location: [address] [place] [address];  Service: Podiatry    ROTATOR CUFF REPAIR Left Family History   Problem Relation Age of Onset    Stroke Mother     Heart disease Father          Medications have been verified  Objective   /83   Pulse 91   Temp 98 °F (36 7 °C)   Resp 16   Ht 5' 3" (1 6 m)   Wt 122 kg (268 lb)   SpO2 97%   BMI 47 47 kg/m²        Physical Exam     Physical Exam   Constitutional: She is oriented to person, place, and time  She appears well-developed and well-nourished  No distress  HENT:   Head: Normocephalic and atraumatic  Eyes: Pupils are equal, round, and reactive to light  Neck: Normal range of motion  Neck supple  Pulmonary/Chest: Effort normal  No respiratory distress  Abdominal: Soft  She exhibits no distension  Musculoskeletal: Normal range of motion  Neurological: She is alert and oriented to person, place, and time  Skin: Skin is warm and dry  Capillary refill takes less than 2 seconds  She is not diaphoretic  There is erythema  Psychiatric: She has a normal mood and affect  Her behavior is normal  Judgment and thought content normal    Nursing note and vitals reviewed

## 2019-07-16 ENCOUNTER — TRANSCRIBE ORDERS (OUTPATIENT)
Dept: PHYSICAL THERAPY | Age: 64
End: 2019-07-16

## 2019-07-16 ENCOUNTER — EVALUATION (OUTPATIENT)
Dept: PHYSICAL THERAPY | Age: 64
End: 2019-07-16
Payer: OTHER MISCELLANEOUS

## 2019-07-16 DIAGNOSIS — M54.50 LUMBAR PAIN: Primary | ICD-10-CM

## 2019-07-16 PROCEDURE — G8978 MOBILITY CURRENT STATUS: HCPCS | Performed by: PHYSICAL THERAPIST

## 2019-07-16 PROCEDURE — G8979 MOBILITY GOAL STATUS: HCPCS | Performed by: PHYSICAL THERAPIST

## 2019-07-16 PROCEDURE — 97113 AQUATIC THERAPY/EXERCISES: CPT | Performed by: PHYSICAL THERAPIST

## 2019-07-16 NOTE — PROGRESS NOTES
PT Re-Evaluation     Today's date: 2019  Patient name: Prabha Castañeda  : 1955  MRN: [id number]  Referring provider: Omaira Stringer PA-C  Dx:   Encounter Diagnosis     ICD-10-CM    1  Lumbar pain M54 5        Start Time: 0800  Stop Time: 0900  Total time in clinic (min): 60 minutes    Assessment  Assessment details: Patient making slow steady changes with increased ROM and strength noted as well as improved FOTO scores  Patient demonstrates increased standing tolerance and increased forward flexion with less pain now  Impairments: abnormal gait, abnormal muscle tone, abnormal or restricted ROM, activity intolerance, impaired balance, impaired physical strength, lacks appropriate home exercise program, pain with function, weight-bearing intolerance, poor posture  and poor body mechanics  Understanding of Dx/Px/POC: good   Prognosis: fair  Prognosis details: Increased BMI  Increased pain  Chronic condition/pain    Goals  STG  1 ;Decrease low back pain and eliminate radicular pains in 2 weeks  2  Increase [facility] ROM [facility] in 2 weeks  2  Increase trunk ROM 50% to [facility] in 2 weeks  LTG  1  Increase Core strength one grade in 4 weeks  2  Increase sitting, standing, and bending tolerance    Plan  Plan details: Continue with aquatic program and progress to land program 1x/week  Patient would benefit from: skilled physical therapy  Referral necessary: Yes  Planned modality interventions: cryotherapy, electrical stimulation/Russian stimulation, thermotherapy: hydrocollator packs and unattended electrical stimulation  Planned therapy interventions: activity modification, behavior modification, body mechanics training, aquatic therapy, flexibility, functional ROM exercises, home exercise program, IADL retraining, joint mobilization, manual therapy, neuromuscular re-education, patient education, postural training, strengthening, stretching, therapeutic activities and therapeutic exercise  Frequency: 2-3x week   Duration in weeks: 6  Plan of Care beginning date: 2019  Plan of Care expiration date: 2019  Treatment plan discussed with: patient        Subjective Evaluation    History of Present Illness  Date of onset: 2018  Mechanism of injury: Patient reports decreased pain and increased mobility with pool program  Quality of life: good    Pain  Current pain ratin  At best pain rating: 3  At worst pain ratin  Location: Left LB, hip, front of leg  Quality: sharp and knife-like (Stabbing pain)  Relieving factors: medications, heat, rest and change in position  Aggravating factors: walking, standing and stair climbing  Progression: improved    Social Support  Steps to enter house: yes  1  Lives in: [facility]  Lives with: alone    Hand dominance: right    Treatments  Previous treatment: physical therapy, injection treatment and medication  Current treatment: injection treatment and medication  Patient Goals  Patient goals for therapy: decreased pain, improved balance, increased motion, increased strength, independence with ADLs/IADLs and return to sport/leisure activities          Objective     Concurrent Complaints  Positive for history of trauma  Negative for disturbed sleep, bladder dysfunction, bowel dysfunction, cardiac problem, kidney problem, gallbladder problem, stomach problem, ulcer, spleen problem, pancreas problem, history of cancer and infection    Additional Special Questions  Unable to sleep on left side  Trauma for fall as noted above    Postural Observations  Seated posture: poor  Standing posture: poor    Additional Postural Observation Details  Increased lumbar lordosis    Tenderness     Left Hip   Tenderness in the ASIS and PSIS       Additional Tenderness Details  TTP throughout left SI joint  TTP throughout left greater trochanter  TTP left ischial tub  TTP throughout left anterior hip joint  TTP along left ITB  TTP anterior thigh/quad area with burning, aching, stabbing pain  TTP left QL  Unable to tolerate palpation to psoas or iliacus due to pain    Neurological Testing     Sensation     Lumbar   Left   Intact: light touch    Right   Intact: light touch    Active Range of Motion     Lumbar   Flexion: 60 degrees   Extension: 15 degrees   Left lateral flexion: 10 degrees    with pain  Right lateral flexion: 15 degrees  with pain  Left rotation:  with pain Restriction level: maximal  Right rotation:  with pain Restriction level: maximal    Additional Active Range of Motion Details  Requires bilateral UE support while performing lumbar AROM due to instability in standing    Strength/Myotome Testing     Left Hip   Planes of Motion   Flexion: 3+  Extension: 3  Abduction: 3  Adduction: 3    Right Hip   Planes of Motion   Flexion: 3  Extension: 3-  Abduction: 3  Adduction: 3    Left Knee   Flexion: 4-  Extension: 4    Right Knee   Flexion: 3+  Extension: 4-    Left Ankle/Foot   Dorsiflexion: 4  Plantar flexion: 4+  Inversion: 4+  Eversion: 4+    Right Ankle/Foot   Dorsiflexion: 4-  Plantar flexion: 4-  Inversion: 4  Eversion: 4-    Additional Strength Details  CORE is 3+/5    General Comments:      Lumbar Comments  Trendelenburg gait pattern bilaterally  Decreased knee flexion bilaterally with swing  Decreased stance left       Flowsheet Rows      Most Recent Value   PT/OT G-Codes   Current Score  59   Projected Score  47   Assessment Type  Re-evaluation   G code set  Mobility: Walking & Moving Around   Mobility: Walking and Moving Around Current Status ()  CJ   Mobility: Walking and Moving Around Goal Status ()  CI          Precautions: OA, Fear (Non Swimmer), Left Shoulder RTC repair,     Daily Treatment Diary         Exercise Diary  5/28 5/30 6/3 6/10 6/13 6/18 6/20 6/24 7/1 7/16   Water walking 20 20 20 15 15 15 10 10 10 10   Postural training 2 2 2          Gait training 3 2 2 5 5 5 5 5  5   Home exercise pgm/patient education             Wall: t/h raises 1 1 1 1 1 1 1 1 1 1   Hip abd/add 1 1 1 2 2 2 2 2 2 2   Marching 1 1 1 1 1 1 1 1 1 1   squats 1 1 1 1 1 1 1 1 1 1   Knee flex/ext 1 1 1 1 1 1 1 1 1 1   Step-ups (fwd/bkwd/ss)             SLS (eyes open/closed)       3 3 3 3   SLS w UE mvmt  AROM/ball toss             Weight shifting 5 3 2          UE Noodle work x 4  4 4 4 4 4 4 4 4 4 4   UE AROM   1 4' 4 4 4 4 nt    Resistive UE work (paddles, bells, TB)         4 open blue    Core work ball press        2 2 2   Sit on noodle with movement             Seated on pool bench w proper posture   1 1 1 1 1 1 1 1   Ankle df/pf 1 1 1 1 1 1 1 1 1 1   marching 1 1 1 1 1 1 1 1 1 1   Hip Ab/add 1 1 1 1 1 1 1 1 1 1   Knee flex/ext 1 1 1 1 1 1 1 1 1 1   Deep water mvmt        2 5 5   Deep water tx/stretching    10 10 10 10 10 10 10   Specific self - stretches wall/steps 3 3 3 3 3 3 3 3 3 5       Modalities  5/28 5/30 6/3 6/10 6/13 6/18 6/20 6/24 7/1 7/16   whirlpool NT 5 10 10 10 10 10 10 10 10

## 2019-07-16 NOTE — LETTER
2019    Conrado Ford PA-C  [address] [facility]  [address] [facility] [address]    Patient: Tyrone Greco   YOB: 1955   Date of Visit: 2019     Encounter Diagnosis     ICD-10-CM    1  Lumbar pain M54 5        Dear Dr Sarah Stout:    Please review the attached Plan of Care from [address] recent visit  Please verify that you agree therapy should continue by signing the attached document and sending it back to our office  If you have any questions or concerns, please don't hesitate to call  Sincerely,    Janna Lennox, PT      Referring Provider:      I certify that I have read the below Plan of Care and certify the need for these services furnished under this plan of treatment while under my care  Conrado Ford PA-C  [address] [facility]  [address]  [address] [place] [id number]  VIA Facsimile: [phone]          PT Re-Evaluation     Today's date: 2019  Patient name: Tyrone Greco  : 1955  MRN: [id number]  Referring provider: Radha Devine PA-C  Dx:   Encounter Diagnosis     ICD-10-CM    1  Lumbar pain M54 5        Start Time: 0800  Stop Time: 0900  Total time in clinic (min): 60 minutes    Assessment  Assessment details: Patient making slow steady changes with increased ROM and strength noted as well as improved FOTO scores  Patient demonstrates increased standing tolerance and increased forward flexion with less pain now  Impairments: abnormal gait, abnormal muscle tone, abnormal or restricted ROM, activity intolerance, impaired balance, impaired physical strength, lacks appropriate home exercise program, pain with function, weight-bearing intolerance, poor posture  and poor body mechanics  Understanding of Dx/Px/POC: good   Prognosis: fair  Prognosis details: Increased BMI  Increased pain  Chronic condition/pain    Goals  STG  1 ;Decrease low back pain and eliminate radicular pains in 2 weeks  2  Increase trunk ROM 75% to [facility] in 2 weeks  2  Increase trunk ROM 50% to [facility] in 2 weeks  LTG  1  Increase Core strength one grade in 4 weeks  2  Increase sitting, standing, and bending tolerance    Plan  Plan details: Continue with aquatic program and progress to land program 1x/week  Patient would benefit from: skilled physical therapy  Referral necessary: Yes  Planned modality interventions: cryotherapy, electrical stimulation/Russian stimulation, thermotherapy: hydrocollator packs and unattended electrical stimulation  Planned therapy interventions: activity modification, behavior modification, body mechanics training, aquatic therapy, flexibility, functional ROM exercises, home exercise program, IADL retraining, joint mobilization, manual therapy, neuromuscular re-education, patient education, postural training, strengthening, stretching, therapeutic activities and therapeutic exercise  Frequency: 2-3x week    Duration in weeks: 6  Plan of Care beginning date: 2019  Plan of Care expiration date: 2019  Treatment plan discussed with: patient        Subjective Evaluation    History of Present Illness  Date of onset: 2018  Mechanism of injury: Patient reports decreased pain and increased mobility with pool program  Quality of life: good    Pain  Current pain ratin  At best pain rating: 3  At worst pain ratin  Location: Left LB, hip, front of leg  Quality: sharp and knife-like (Stabbing pain)  Relieving factors: medications, heat, rest and change in position  Aggravating factors: walking, standing and stair climbing  Progression: improved    Social Support  Steps to enter house: yes  1  Lives in: [facility]  Lives with: alone    Hand dominance: right    Treatments  Previous treatment: physical therapy, injection treatment and medication  Current treatment: injection treatment and medication  Patient Goals  Patient goals for therapy: decreased pain, improved balance, increased motion, increased strength, independence with ADLs/IADLs and return to sport/leisure activities          Objective     Concurrent Complaints  Positive for history of trauma  Negative for disturbed sleep, bladder dysfunction, bowel dysfunction, cardiac problem, kidney problem, gallbladder problem, stomach problem, ulcer, spleen problem, pancreas problem, history of cancer and infection    Additional Special Questions  Unable to sleep on left side  Trauma for fall as noted above    Postural Observations  Seated posture: poor  Standing posture: poor    Additional Postural Observation Details  Increased lumbar lordosis    Tenderness     Left Hip   Tenderness in the ASIS and PSIS       Additional Tenderness Details  TTP throughout left SI joint  TTP throughout left greater trochanter  TTP left ischial tub  TTP throughout left anterior hip joint  TTP along left ITB  TTP anterior thigh/quad area with burning, aching, stabbing pain  TTP left QL  Unable to tolerate palpation to psoas or iliacus due to pain    Neurological Testing     Sensation     Lumbar   Left   Intact: light touch    Right   Intact: light touch    Active Range of Motion     Lumbar   Flexion: 60 degrees   Extension: 15 degrees   Left lateral flexion: 10 degrees    with pain  Right lateral flexion: 15 degrees  with pain  Left rotation:  with pain Restriction level: maximal  Right rotation:  with pain Restriction level: maximal    Additional Active Range of Motion Details  Requires bilateral UE support while performing lumbar AROM due to instability in standing    Strength/Myotome Testing     Left Hip   Planes of Motion   Flexion: 3+  Extension: 3  Abduction: 3  Adduction: 3    Right Hip   Planes of Motion   Flexion: 3  Extension: 3-  Abduction: 3  Adduction: 3    Left Knee   Flexion: 4-  Extension: 4    Right Knee   Flexion: 3+  Extension: 4-    Left Ankle/Foot   Dorsiflexion: 4  Plantar flexion: 4+  Inversion: 4+  Eversion: 4+    Right Ankle/Foot   Dorsiflexion: 4-  Plantar flexion: 4-  Inversion: 4  Eversion: 4-    Additional Strength Details  CORE is 3+/5    General Comments:      Lumbar Comments  Trendelenburg gait pattern bilaterally  Decreased knee flexion bilaterally with swing  Decreased stance left       Flowsheet Rows      Most Recent Value   PT/OT G-Codes   Current Score  59   Projected Score  47   Assessment Type  Re-evaluation   G code set  Mobility: Walking & Moving Around   Mobility: Walking and Moving Around Current Status ()  CJ   Mobility: Walking and Moving Around Goal Status ()  CI          Precautions: OA, Fear (Non Swimmer), Left Shoulder RTC repair,     Daily Treatment Diary         Exercise Diary  5/28 5/30 6/3 6/10 6/13 6/18 6/20 6/24 7/1 7/16   Water walking 20 20 20 15 15 15 10 10 10 10   Postural training 2 2 2          Gait training 3 2 2 5 5 5 5 5  5   Home exercise pgm/patient education             Wall: t/h raises 1 1 1 1 1 1 1 1 1 1   Hip abd/add 1 1 1 2 2 2 2 2 2 2   Marching 1 1 1 1 1 1 1 1 1 1   squats 1 1 1 1 1 1 1 1 1 1   Knee flex/ext 1 1 1 1 1 1 1 1 1 1   Step-ups (fwd/bkwd/ss)             SLS (eyes open/closed)       3 3 3 3   SLS w UE mvmt  AROM/ball toss             Weight shifting 5 3 2          UE Noodle work x 4  4 4 4 4 4 4 4 4 4 4   UE AROM   1 4' 4 4 4 4 nt    Resistive UE work (paddles, bells, TB)         4 open blue    Core work ball press        2 2 2   Sit on noodle with movement             Seated on pool bench w proper posture   1 1 1 1 1 1 1 1   Ankle df/pf 1 1 1 1 1 1 1 1 1 1   marching 1 1 1 1 1 1 1 1 1 1   Hip Ab/add 1 1 1 1 1 1 1 1 1 1   Knee flex/ext 1 1 1 1 1 1 1 1 1 1   Deep water mvmt        2 5 5   Deep water tx/stretching    10 10 10 10 10 10 10   Specific self - stretches wall/steps 3 3 3 3 3 3 3 3 3 5       Modalities  5/28 5/30 6/3 6/10 6/13 6/18 6/20 6/24 7/1 7/16   whirlpool NT 5 10 10 10 10 10 10 10 10

## 2019-07-22 ENCOUNTER — OFFICE VISIT (OUTPATIENT)
Dept: PHYSICAL THERAPY | Age: 64
End: 2019-07-22
Payer: OTHER MISCELLANEOUS

## 2019-07-22 DIAGNOSIS — M54.50 LUMBAR PAIN: Primary | ICD-10-CM

## 2019-07-22 PROCEDURE — 97113 AQUATIC THERAPY/EXERCISES: CPT

## 2019-07-22 NOTE — PROGRESS NOTES
Daily Note     Today's date: 2019  Patient name: Qing aGrza  : 1955  MRN: [id number]  Referring provider: Agueda Doyle PA-C  Dx:   Encounter Diagnosis     ICD-10-CM    1  Lumbar pain M54 5        Start Time: 1000  Stop Time: 1100  Total time in clinic (min): 60 minutes    Subjective: pt notes LBP today 4-5/10  She feels better in the water  Objective: See treatment diary below      Assessment: Tolerated treatment well  Pt progressing well w/ aquatic therapy  Progresssing pt to one land PT this week  Pt showing strong movements while in the water  Difficulty getting out of the pool still  Pt showing increased confidence while in the water  Patient would benefit from continued PT      Plan: Continue per plan of care        Precautions: OA, Fear (Non Swimmer), Left Shoulder RTC repair,     Daily Treatment Diary         Exercise Diary  7/22  6/3 6/10 6/13 6/18 6/20 6/24 7/1 7/16   Water walking 10 20 20 15 15 15 10 10 10 10   Postural training  2 2          Gait training  2 2 5 5 5 5 5  5   Home exercise pgm/patient education             Wall: t/h raises 1 1 1 1 1 1 1 1 1 1   Hip abd/add 2 1 1 2 2 2 2 2 2 2   Marching 1 1 1 1 1 1 1 1 1 1   squats 1 1 1 1 1 1 1 1 1 1   Knee flex/ext 1 1 1 1 1 1 1 1 1 1   Step-ups (fwd/bkwd/ss)             SLS (eyes open/closed) 3      3 3 3 3   SLS w UE mvmt  AROM/ball toss             Weight shifting  3 2          UE Noodle work x 4  4 4 4 4 4 4 4 4 4 4   UE AROM   1 4' 4 4 4 4 nt    Resistive UE work (paddles, bells, TB) 4        4 open blue    Core work ball press 2       2 2 2   Sit on noodle with movement             Seated on pool bench w proper posture 1  1 1 1 1 1 1 1 1   Ankle df/pf 1 1 1 1 1 1 1 1 1 1   marching 1 1 1 1 1 1 1 1 1 1   Hip Ab/add 1 1 1 1 1 1 1 1 1 1   Knee flex/ext 1 1 1 1 1 1 1 1 1 1   Deep water mvmt 5       2 5 5   Deep water tx/stretching 10   10 10 10 10 10 10 10   Specific self - stretches wall/steps 3 3 3 3 3 3 3 3 3 5 Modalities  7/22  6/3 6/10 6/13 6/18 6/20 6/24 7/1 7/16   whirlpool 10 5 10 10 10 10 10 10 10 10

## 2019-07-25 ENCOUNTER — APPOINTMENT (OUTPATIENT)
Dept: PHYSICAL THERAPY | Age: 64
End: 2019-07-25
Payer: OTHER MISCELLANEOUS

## 2019-07-26 ENCOUNTER — OFFICE VISIT (OUTPATIENT)
Dept: PHYSICAL THERAPY | Age: 64
End: 2019-07-26
Payer: OTHER MISCELLANEOUS

## 2019-07-26 DIAGNOSIS — M54.50 LUMBAR PAIN: Primary | ICD-10-CM

## 2019-07-26 PROCEDURE — 97140 MANUAL THERAPY 1/> REGIONS: CPT | Performed by: PHYSICAL THERAPIST

## 2019-07-26 PROCEDURE — 97110 THERAPEUTIC EXERCISES: CPT | Performed by: PHYSICAL THERAPIST

## 2019-07-26 NOTE — PROGRESS NOTES
Daily Note     Today's date: 2019  Patient name: Giovanni Garcia  : 1955  MRN: [id number]  Referring provider: Cony Rashid PA-C  Dx:   Encounter Diagnosis     ICD-10-CM    1  Lumbar pain M54 5                   Subjective: still sore      Objective: See treatment diary below  Precautions:     Daily Treatment Diary     Manual              MOBS GR2/3             STRETCHING LE'S/LB 10                                                       Exercise Diary              HEEL SLIDES 30x            SLANT 30"/4x            CYBEX:             HIP AB 30/30            HIP ADD ball squeeze 30x            LEG PRESS             CALF PRESS             BACK EXT             AB CRUNCH             LEG EXT             SAQ 2/30                         NITRO AB             ISOLATOR QU             ISOLATOR HM             TOTAL GYM             BIKE             NUSTEP 5 min            ELLIPTICAL                              Modalities              E/STIM             HEAT / HT 10'                             Assessment: Tolerated treatment fair  Patient demonstrated fatigue post treatment, exhibited good technique with therapeutic exercises and would benefit from continued PT      Plan: Progress treatment as tolerated

## 2019-07-29 ENCOUNTER — OFFICE VISIT (OUTPATIENT)
Dept: PHYSICAL THERAPY | Age: 64
End: 2019-07-29
Payer: OTHER MISCELLANEOUS

## 2019-07-29 DIAGNOSIS — M54.50 LUMBAR PAIN: Primary | ICD-10-CM

## 2019-07-29 PROCEDURE — 97113 AQUATIC THERAPY/EXERCISES: CPT

## 2019-07-29 NOTE — PROGRESS NOTES
Daily Note     Today's date: 2019  Patient name: Amado Martin  : 1955  MRN: [id number]  Referring provider: Mabel Feldman PA-C  Dx:   Encounter Diagnosis     ICD-10-CM    1  Lumbar pain M54 5        Start Time: 1000  Stop Time: 1100  Total time in clinic (min): 60 minutes    Subjective: pt notes LBP continues -5/10  She states the water feels good and she is able to do a lot more in the water than on land  Objective: See treatment diary below      Assessment: Tolerated treatment well  Added step ups today and  Posture rows using TB  Pt did note discomfort in L shoulder and R knee w/ new ex's  Limited reps due to pain  Patient would benefit from continued PT      Plan: Continue per plan of care        Precautions: OA, Fear (Non Swimmer), Left Shoulder RTC repair,     Daily Treatment Diary         Exercise Diary  7/22 7/29  6/10 6/13 6/18 6/20 6/24 7/1 7/16   Water walking 10 10 20 15 15 15 10 10 10 10   Postural training   2          Gait training   2 5 5 5 5 5  5   Home exercise pgm/patient education             Wall: t/h raises 1 1 1 1 1 1 1 1 1 1   Hip abd/add 2 1 1 2 2 2 2 2 2 2   Marching 1 1 1 1 1 1 1 1 1 1   squats 1 1 1 1 1 1 1 1 1 1   Knee flex/ext 1 1 1 1 1 1 1 1 1 1   Step-ups (fwd/bkwd/ss)  2           SLS (eyes open/closed) 3 3     3 3 3 3   SLS w UE mvmt  AROM/ball toss             Weight shifting   2          UE Noodle work x 4  4 4 4 4 4 4 4 4 4 4   UE AROM  1' TB red 1 4' 4 4 4 4 nt    Resistive UE work (paddles, bells, TB) 4 4       4 open blue    Core work ball press 2 2      2 2 2   Sit on noodle with movement             Seated on pool bench w proper posture 1 1 1 1 1 1 1 1 1 1   Ankle df/pf 1 1 1 1 1 1 1 1 1 1   marching 1 1 1 1 1 1 1 1 1 1   Hip Ab/add 1 1 1 1 1 1 1 1 1 1   Knee flex/ext 1 1 1 1 1 1 1 1 1 1   Deep water mvmt 5 5      2 5 5   Deep water tx/stretching 10 10  10 10 10 10 10 10 10   Specific self - stretches wall/steps 3 3 3 3 3 3 3 3 3 5       Modalities 7/22 7/29  6/10 6/13 6/18 6/20 6/24 7/1 7/16   whirlpool 10 10 10 10 10 10 10 10 10 10

## 2019-08-01 ENCOUNTER — APPOINTMENT (OUTPATIENT)
Dept: PHYSICAL THERAPY | Age: 64
End: 2019-08-01
Payer: COMMERCIAL

## 2019-08-01 NOTE — PROGRESS NOTES
Daily Note     Today's date: 2019  Patient name: Lisa Batres  : 1955  MRN: [id number]  Referring provider: Nagi Ford PA-C  Dx:   Encounter Diagnosis     ICD-10-CM    1  Lumbar pain M54 5        Start Time: 1010  Stop Time: 1100  Total time in clinic (min): 50 minutes    Subjective: patient report 5-6/10 pain left upper thigh and lower back      Objective: See treatment diary below      Assessment: Tolerated treatment fair, guarded with left LE, needed min/modA with transfers on/off table  Patient demonstrated fatigue post treatment and would benefit from continued PT      Plan: Continue per plan of care        Precautions: OA, Fear (Non Swimmer), Left Shoulder RTC repair,     Precautions:     Daily Treatment Diary     Manual             MOBS GR2/3             STRETCHING LE'S/LB 10 10                                                      Exercise Diary             HEEL SLIDES 30x 20x           SLANT 30"/4x 30"x4           CYBEX:             HIP AB 30/30 30/30           HIP ADD ball squeeze 30x 30x           LEG PRESS             CALF PRESS             BACK EXT             AB CRUNCH             LEG EXT             SAQ 2/30 2/30                        Standing abd  10x ea           ISOLATOR QU             ISOLATOR HM             TOTAL GYM             BIKE             NUSTEP 5 min 6'           ELLIPTICAL                              Modalities             E/STIM             HEAT / HT 10' 10'

## 2019-08-02 ENCOUNTER — OFFICE VISIT (OUTPATIENT)
Dept: PHYSICAL THERAPY | Age: 64
End: 2019-08-02
Payer: COMMERCIAL

## 2019-08-02 DIAGNOSIS — M54.50 LUMBAR PAIN: Primary | ICD-10-CM

## 2019-08-02 PROCEDURE — 97140 MANUAL THERAPY 1/> REGIONS: CPT

## 2019-08-02 PROCEDURE — 97530 THERAPEUTIC ACTIVITIES: CPT

## 2019-08-02 PROCEDURE — 97110 THERAPEUTIC EXERCISES: CPT

## 2019-08-05 ENCOUNTER — OFFICE VISIT (OUTPATIENT)
Dept: PHYSICAL THERAPY | Age: 64
End: 2019-08-05
Payer: COMMERCIAL

## 2019-08-05 DIAGNOSIS — M54.50 LUMBAR PAIN: Primary | ICD-10-CM

## 2019-08-05 PROCEDURE — 97113 AQUATIC THERAPY/EXERCISES: CPT | Performed by: PHYSICAL THERAPIST

## 2019-08-05 NOTE — PROGRESS NOTES
Daily Note     Today's date: 2019  Patient name: Jose Daniel Robbins  : 1955  MRN: [id number]  Referring provider: Andrés Luis PA-C  Dx:   Encounter Diagnosis     ICD-10-CM    1  Lumbar pain M54 5        Start Time: 1000  Stop Time: 1100  Total time in clinic (min): 60 minutes    Subjective: pt notes LBP continues 4-5/10  No sig change overall w/ pain level  Pt to see the Dr  On Wednesday this week  Pt states she does have decreased pain while in the water  Objective: See treatment diary below      Assessment: Tolerated treatment well  Pt showing continued confidence in the water during ex's  Slow gains w/ overall LE strength and mobility  Plan: Continue per plan of care        Precautions: OA, Fear (Non Swimmer), Left Shoulder RTC repair,     Daily Treatment Diary         Exercise Diary     Water walking 10 10 10 15 15 15 10 10 10 10   Postural training             Gait training    5 5 5 5 5  5   Home exercise pgm/patient education             Wall: t/h raises 1 1 1 1 1 1 1 1 1 1   Hip abd/add 2 1 1 2 2 2 2 2 2 2   Marching 1 1 1 1 1 1 1 1 1 1   squats 1 1 1 1 1 1 1 1 1 1   Knee flex/ext 1 1 1 1 1 1 1 1 1 1   Step-ups (fwd/bkwd/ss)  2 2          SLS (eyes open/closed) 3 3 3    3 3 3 3   SLS w UE mvmt  AROM/ball toss             Weight shifting             UE Noodle work x 4  4 4 4 4 4 4 4 4 4 4   UE AROM  1' TB red 1 4' 4 4 4 4 nt    Resistive UE work (paddles, bells, TB) 4 4 4      4 open blue    Core work ball press 2 2 2     2 2 2   Sit on noodle with movement             Seated on pool bench w proper posture 1 1 1 1 1 1 1 1 1 1   Ankle df/pf 1 1 1 1 1 1 1 1 1 1   marching 1 1 1 1 1 1 1 1 1 1   Hip Ab/add 1 1 1 1 1 1 1 1 1 1   Knee flex/ext 1 1 1 1 1 1 1 1 1 1   Deep water mvmt 5 5 5     2 5 5   Deep water tx/stretching 10 10 10 10 10 10 10 10 10 10   Specific self - stretches wall/steps 3 3 3 3 3 3 3 3 3 5       Modalities   6/18 6/20 6/24 7/1 7/16   whirlpool 10 10 10 10 10 10 10 10 10 10

## 2019-08-15 NOTE — PROGRESS NOTES
Daily Note     Today's date: 8/15/2019  Patient name: Mago Briones  : 1955  MRN: [id number]  Referring provider: Chrystal Gregg PA-C  Dx:   Encounter Diagnosis     ICD-10-CM    1  Lumbar pain M54 5                   Subjective: Patient reports sore today in lower back  Objective: See treatment diary below      Assessment: Tolerated treatment fair, guarded during manual therapy ml with left LE, occ "catching" with prom  Patient demonstrated fatigue post treatment      Plan: Continue per plan of care           Precautions: OA, Fear (Non Swimmer), Left Shoulder RTC repair,     Daily Treatment Diary     Manual            MOBS GR2/3             STRETCHING LE'S/LB 10 10 10          Left leg pull   5'                                        Exercise Diary            HEEL SLIDES 30x 20x 2x10          Heel raises    20x                       SLANT 30"/4x 30"x4 30x4          CYBEX:             HIP AB 30/30 30/30 30/30          HIP ADD ball squeeze 30x 30x 30x          LEG PRESS             CALF PRESS             BACK EXT             AB CRUNCH             LEG EXT             SAQ 2/30 2/30 2/30                       Standing abd  10x ea 10X          ISOLATOR QU             ISOLATOR HM             TOTAL GYM             BIKE             NUSTEP 5 min 6' 6'          ELLIPTICAL                              Modalities            E/STIM             HEAT / HT 10' 10' 10'

## 2019-08-16 ENCOUNTER — OFFICE VISIT (OUTPATIENT)
Dept: PHYSICAL THERAPY | Age: 64
End: 2019-08-16
Payer: COMMERCIAL

## 2019-08-16 DIAGNOSIS — M54.50 LUMBAR PAIN: Primary | ICD-10-CM

## 2019-08-16 PROCEDURE — 97140 MANUAL THERAPY 1/> REGIONS: CPT

## 2019-08-16 PROCEDURE — 97110 THERAPEUTIC EXERCISES: CPT

## 2019-08-19 ENCOUNTER — OFFICE VISIT (OUTPATIENT)
Dept: PHYSICAL THERAPY | Age: 64
End: 2019-08-19
Payer: COMMERCIAL

## 2019-08-19 DIAGNOSIS — M54.50 LUMBAR PAIN: Primary | ICD-10-CM

## 2019-08-19 PROCEDURE — 97113 AQUATIC THERAPY/EXERCISES: CPT

## 2019-08-19 NOTE — PROGRESS NOTES
Daily Note     Today's date: 2019  Patient name: Mago Briones  : 1955  MRN: [id number]  Referring provider: Chrystal Gregg PA-C  Dx:   Encounter Diagnosis     ICD-10-CM    1  Lumbar pain M54 5        Start Time:   Stop Time: 49  Total time in clinic (min): 62 minutes    Subjective: Reports L sided LB pain that travels into her L hip and thigh  States her pain is a 5/10  Objective: See treatment diary below      Assessment: Tolerated treatment fair, did not go in whirlpool today due to an upset stomach  No changes in pain post session  Patient would benefit from continued PT      Plan: Continue per plan of care        Precautions: OA, Fear (Non Swimmer), Left Shoulder RTC repair,     Daily Treatment Diary         Exercise Diary   8   Water walking 10 10 10 10 15 15 10 10 10 10   Postural training             Gait training     5 5 5 5  5   Home exercise pgm/patient education             Wall: t/h raises 1 1 1 1 1 1 1 1 1 1   Hip abd/add 2 1 1 2 2 2 2 2 2 2   Marching 1 1 1 1 1 1 1 1 1 1   squats 1 1 1 1 1 1 1 1 1 1   Knee flex/ext 1 1 1 1 1 1 1 1 1 1   Step-ups (fwd/bkwd/ss)  2 2 2         SLS (eyes open/closed) 3 3 3 3   3 3 3 3   SLS w UE mvmt  AROM/ball toss             Weight shifting             UE Noodle work x 4  4 4 4 4 4 4 4 4 4 4   UE AROM  1' TB red 1 1' 4 4 4 4 nt    Resistive UE work (paddles, bells, TB) 4 4 4 4     4 open blue    Core work ball press 2 2 2 2    2 2 2   Sit on noodle with movement             Seated on pool bench w proper posture 1 1 1 1 1 1 1 1 1 1   Ankle df/pf 1 1 1 1 1 1 1 1 1 1   marching 1 1 1 1 1 1 1 1 1 1   Hip Ab/add 1 1 1 1 1 1 1 1 1 1   Knee flex/ext 1 1 1 1 1 1 1 1 1 1   Deep water mvmt 5 5 5 5    2 5 5   Deep water tx/stretching 10 10 10 10 10 10 10 10 10 10   Specific self - stretches wall/steps 3 3 3 3 3 3 3 3 3 5       Modalities     whirlpool 10 10 10 Not feeling well 10 10 10 10 10 10

## 2019-08-23 ENCOUNTER — OFFICE VISIT (OUTPATIENT)
Dept: PHYSICAL THERAPY | Age: 64
End: 2019-08-23
Payer: COMMERCIAL

## 2019-08-23 DIAGNOSIS — M54.50 LUMBAR PAIN: Primary | ICD-10-CM

## 2019-08-23 PROCEDURE — 97110 THERAPEUTIC EXERCISES: CPT | Performed by: PHYSICAL THERAPIST

## 2019-08-23 PROCEDURE — 97140 MANUAL THERAPY 1/> REGIONS: CPT | Performed by: PHYSICAL THERAPIST

## 2019-08-23 NOTE — PROGRESS NOTES
Daily Note     Today's date: 2019  Patient name: Rashawn Su  : 1955  MRN: [id number]  Referring provider: Kami Mills PA-C  Dx:   Encounter Diagnosis     ICD-10-CM    1  Lumbar pain M54 5                   Subjective: Still sore       Objective: See treatment diary below      Precautions: OA, Fear (Non Swimmer), Left Shoulder RTC repair,     Daily Treatment Diary     Manual           MOBS GR2/3             STRETCHING LE'S/LB 10 10 10 10         Left leg pull   5' 5                                       Exercise Diary           HEEL SLIDES 30x 20x 2x10 30x         Heel raises    20x 30x                      SLANT 30"/4x 30"x4 30x4 4x         CYBEX:             HIP AB 30/30 30/30 30/30 30/30         HIP ADD ball squeeze 30x 30x 30x 30x         LEG PRESS             CALF PRESS             BACK EXT             AB CRUNCH             LEG EXT             SAQ                       Standing abd  10x ea 10X 10x         ISOLATOR QU             ISOLATOR HM             TOTAL GYM             BIKE             NUSTEP 5 min 6' 6' 6'         ELLIPTICAL                              Modalities           E/STIM             HEAT / HT 10' 10' 10' 10'                                   Assessment: Tolerated treatment well  Patient demonstrated fatigue post treatment, exhibited good technique with therapeutic exercises and would benefit from continued PT      Plan: Progress treatment as tolerated

## 2019-08-26 ENCOUNTER — OFFICE VISIT (OUTPATIENT)
Dept: PHYSICAL THERAPY | Age: 64
End: 2019-08-26
Payer: COMMERCIAL

## 2019-08-26 DIAGNOSIS — M54.50 LUMBAR PAIN: Primary | ICD-10-CM

## 2019-08-26 PROCEDURE — 97113 AQUATIC THERAPY/EXERCISES: CPT

## 2019-08-26 NOTE — PROGRESS NOTES
Daily Note     Today's date: 2019  Patient name: Rashawn Su  : 1955  MRN: [id number]  Referring provider: Kami Mills PA-C  Dx:   Encounter Diagnosis     ICD-10-CM    1  Lumbar pain M54 5        Start Time: 1030  Stop Time: 1130  Total time in clinic (min): 60 minutes    Subjective: Pt c/o LBP today 5/10  She notes it's her last day for aquatic therapy and she has one more land PT session  Objective: See treatment diary below      Assessment: Tolerated treatment well  Pt has a HEP w/ TB and has been given the handout for fitness  Pt has progressed well w/ all ex's  Pt showing improved gait, improved LE and core strength  Patient would benefit from continued PT      Plan: Potential discharge next visit   pt has one more visit then D/C to HEP and independent fitness program           Precautions: OA, Fear (Non Swimmer), Left Shoulder RTC repair,     Daily Treatment Diary         Exercise Diary     Water walking 10 10 10 10 10 15 10 10 10 10   Postural training             Gait training      5 5 5  5   Home exercise pgm/patient education     3' reviewed        Wall: t/h raises 1 1 1 1 1 1 1 1 1 1   Hip abd/add 2 1 1 2 2 2 2 2 2 2   Marching 1 1 1 1 1 1 1 1 1 1   squats 1 1 1 1 1 1 1 1 1 1   Knee flex/ext 1 1 1 1 1 1 1 1 1 1   Step-ups (fwd/bkwd/ss)  2 2 2 2        SLS (eyes open/closed) 3 3 3 3 3  3 3 3 3   SLS w UE mvmt  AROM/ball toss             Weight shifting             UE Noodle work x 4  4 4 4 4 4 4 4 4 4 4   UE AROM  1' TB red 1 1' 2 4 4 4 nt    Resistive UE work (paddles, bells, TB) 4 4 4 4 4    4 open blue    Core work ball press 2 2 2 2 2   2 2 2   Sit on noodle with movement             Seated on pool bench w proper posture 1 1 1 1 1 1 1 1 1 1   Ankle df/pf 1 1 1 1 1 1 1 1 1 1   marching 1 1 1 1 1 1 1 1 1 1   Hip Ab/add 1 1 1 1 1 1 1 1 1 1   Knee flex/ext 1 1 1 1 1 1 1 1 1 1   Deep water mvmt 5 5 5 5 5   2 5 5   Deep water tx/stretching 10 10 10 10 10 10 10 10 10 10   Specific self - stretches wall/steps 3 3 3 3 3 3 3 3 3 5       Modalities  7/22 7/29 8/5 8/19 8/26 6/20 6/24 7/1 7/16   whirlpool 10 10 10 Not feeling well 10 10 10 10 10 10

## 2019-08-29 ENCOUNTER — OFFICE VISIT (OUTPATIENT)
Dept: URGENT CARE | Facility: CLINIC | Age: 64
End: 2019-08-29
Payer: COMMERCIAL

## 2019-08-29 VITALS
OXYGEN SATURATION: 98 % | RESPIRATION RATE: 18 BRPM | WEIGHT: 268 LBS | DIASTOLIC BLOOD PRESSURE: 82 MMHG | BODY MASS INDEX: 47.48 KG/M2 | TEMPERATURE: 97.7 F | HEIGHT: 63 IN | SYSTOLIC BLOOD PRESSURE: 144 MMHG | HEART RATE: 76 BPM

## 2019-08-29 DIAGNOSIS — H93.11 TINNITUS OF RIGHT EAR: Primary | ICD-10-CM

## 2019-08-29 LAB — GLUCOSE SERPL-MCNC: 119 MG/DL (ref 65–140)

## 2019-08-29 PROCEDURE — 99213 OFFICE O/P EST LOW 20 MIN: CPT | Performed by: NURSE PRACTITIONER

## 2019-08-29 PROCEDURE — G0382 LEV 3 HOSP TYPE B ED VISIT: HCPCS | Performed by: NURSE PRACTITIONER

## 2019-08-29 PROCEDURE — 99283 EMERGENCY DEPT VISIT LOW MDM: CPT | Performed by: NURSE PRACTITIONER

## 2019-08-29 PROCEDURE — 82948 REAGENT STRIP/BLOOD GLUCOSE: CPT | Performed by: NURSE PRACTITIONER

## 2019-08-29 NOTE — PATIENT INSTRUCTIONS
There is no sign of infection ear  Your blood sugar is normal   I would recommend he follow up with family doctor or ENT for evaluation of ringing in her ears  If he develops any dizziness, lightheadedness, headaches, nausea, vomiting, shortness of breath or chest pain recommend he go directly to the ER for full evaluation

## 2019-08-29 NOTE — PROGRESS NOTES
[address] [facility]        NAME: Kalyn Villegas is a 66 y o  female  : 1955    MRN: [id number]  DATE: 2019  TIME: 9:50 AM    Assessment and Plan   Tinnitus of right ear [H93 11]  1  Tinnitus of right ear           Patient Instructions     Patient Instructions   There is no sign of infection ear  Your blood sugar is normal   I would recommend he follow up with family doctor or ENT for evaluation of ringing in her ears  If he develops any dizziness, lightheadedness, headaches, nausea, vomiting, shortness of breath or chest pain recommend he go directly to the ER for full evaluation  Chief Complaint     Chief Complaint   Patient presents with    Earache     Pt c/o right ear ringing that started last night  History of Present Illness   Kalyn Villegas presents to the clinic c/o    This is a 59-year-old female here today with complaints ringing in her ears  She states primarily on her right side  She states this started last night  She describes this as a humming  She denies any headache, nausea, vomiting  She denies any change in her hearing  She denies any URI symptoms such as cough or congestion  No fevers body aches or chills  She does state while sitting in exam room she felt slightly lightheaded  No dizziness or lightheaded at home  She states this feeling was resolved  She is requesting to have her blood sugar checked  At end of visit ringing in ears was resolved  Review of Systems   Review of Systems   Constitutional: Negative  Negative for chills, fatigue and fever  HENT:        Ringing in ear  Respiratory: Negative  Cardiovascular: Negative  Gastrointestinal: Negative  Neurological: Positive for light-headedness  Negative for dizziness, weakness and headaches  Psychiatric/Behavioral: Negative            Current Medications     Long-Term Medications   Medication Sig Dispense Refill    calcium carbonate (OS-VENTURA) 600 MG tablet Take 600 mg by mouth 2 (two) times a day with meals      ergocalciferol (ERGOCALCIFEROL) 13448 units capsule Take 50,000 Units by mouth once a week      Ergocalciferol (VITAMIN D2) 2000 units TABS Take 50,000 Units by mouth      hydrochlorothiazide (HYDRODIURIL) 12 5 mg tablet Take 12 5 mg by mouth daily      meclizine (ANTIVERT) 12 5 MG tablet Take 1 tablet (12 5 mg total) by mouth every 8 (eight) hours as needed for dizziness or nausea for up to 10 days 30 tablet 0    meloxicam (MOBIC) 7 5 mg tablet meloxicam 7 5 mg tablet      senna-docusate sodium (MONIK-COLACE) 8 6-50 mg per tablet Take 1 tablet by mouth      timolol (TIMOPTIC-XE) 0 25 % ophthalmic gel-forming timolol maleate 0 5 % eye drops      warfarin (COUMADIN) 5 mg tablet warfarin 5 mg tablet         Current Allergies     Allergies as of 08/29/2019 - Reviewed 08/29/2019   Allergen Reaction Noted    Nsaids  07/07/2019            The following portions of the patient's history were reviewed and updated as appropriate: allergies, current medications, past family history, past medical history, past social history, past surgical history and problem list     Objective   /82   Pulse 76   Temp 97 7 °F (36 5 °C)   Resp 18   Ht 5' 3" (1 6 m)   Wt 122 kg (268 lb)   SpO2 98%   BMI 47 47 kg/m²        Physical Exam     Physical Exam   Constitutional: She appears well-developed and well-nourished  HENT:   Right Ear: External ear normal    Left Ear: External ear normal    Neck: Normal range of motion  Neck supple  Cardiovascular: Normal rate and regular rhythm  Pulmonary/Chest: Effort normal and breath sounds normal    Psychiatric: She has a normal mood and affect  Her behavior is normal    Nursing note and vitals reviewed

## 2019-08-30 ENCOUNTER — EVALUATION (OUTPATIENT)
Dept: PHYSICAL THERAPY | Age: 64
End: 2019-08-30
Payer: COMMERCIAL

## 2019-08-30 DIAGNOSIS — M54.50 LUMBAR PAIN: Primary | ICD-10-CM

## 2019-08-30 PROCEDURE — G8979 MOBILITY GOAL STATUS: HCPCS | Performed by: PHYSICAL THERAPIST

## 2019-08-30 PROCEDURE — G8980 MOBILITY D/C STATUS: HCPCS | Performed by: PHYSICAL THERAPIST

## 2019-08-30 PROCEDURE — 97140 MANUAL THERAPY 1/> REGIONS: CPT | Performed by: PHYSICAL THERAPIST

## 2019-08-30 PROCEDURE — 97110 THERAPEUTIC EXERCISES: CPT | Performed by: PHYSICAL THERAPIST

## 2019-08-30 NOTE — PROGRESS NOTES
DISCHARGE    Today's date: 2019  Patient name: Moriah Waterman  : 1955  MRN: [id number]  Referring provider: Padmini Todd PA-C  Dx:   Encounter Diagnosis     ICD-10-CM    1  Lumbar pain M54 5        Start Time: 1110  Stop Time: 1205  Total time in clinic (min): 55 minutes    Subjective: Some improvement but still limping      Objective: See treatment diary below      Precautions: OA, Fear (Non Swimmer), Left Shoulder RTC repair,     Daily Treatment Diary     Manual          MOBS GR2/3             STRETCHING LE'S/LB 10 10 10 10 10        Left leg pull   5' 5 5                                      Exercise Diary          HEEL SLIDES 30x 20x 2x10 30x 30x        Heel raises    20x 30x 30x                     SLANT 30"/4x 30"x4 30x4 4x 4x        CYBEX:             HIP AB 30/ 30/30 30/30 30/30 30/30        HIP ADD ball squeeze 30x 30x 30x 30x 30x        LEG PRESS             CALF PRESS             BACK EXT             AB CRUNCH             LEG EXT             SAQ                      Standing abd  10x ea 10X 10x 10x        ISOLATOR QU             ISOLATOR HM             TOTAL GYM             BIKE             NUSTEP 5 min 6' 6' 6' 6'        ELLIPTICAL                              Modalities          E/STIM             HEAT / HT 10' 10' 10' 10' 10                           Assessment: Tolerated treatment fair  Patient exhibited good technique with therapeutic exercises  At this time, patient has achieved their maximum functional benefit from skilled physical therapy services and will be discharged to their [facility]  Patient is in agreement with the plan of care  As a result, patient is discharged from physical therapy    Patient continues to ambulate with left antalgic Gait pattern but reports temporary relief at best       Plan: D/C to [facility]

## 2019-09-17 ENCOUNTER — OFFICE VISIT (OUTPATIENT)
Dept: OTOLARYNGOLOGY | Facility: CLINIC | Age: 64
End: 2019-09-17
Payer: COMMERCIAL

## 2019-09-17 VITALS
SYSTOLIC BLOOD PRESSURE: 140 MMHG | DIASTOLIC BLOOD PRESSURE: 82 MMHG | HEART RATE: 66 BPM | OXYGEN SATURATION: 98 % | WEIGHT: 278 LBS | BODY MASS INDEX: 49.25 KG/M2

## 2019-09-17 DIAGNOSIS — H91.93 BILATERAL HEARING LOSS, UNSPECIFIED HEARING LOSS TYPE: ICD-10-CM

## 2019-09-17 DIAGNOSIS — H93.13 TINNITUS OF BOTH EARS: Primary | ICD-10-CM

## 2019-09-17 PROCEDURE — 99243 OFF/OP CNSLTJ NEW/EST LOW 30: CPT | Performed by: OTOLARYNGOLOGY

## 2019-09-17 NOTE — PROGRESS NOTES
Consultation - Otolaryngology - Head and Neck Surgery  Facial Plastic and Reconstructive Surgery  Alin Leblanc 66 y o  female MRN: [id number]  Encounter: [id number]        Assessment/Plan:  1  Tinnitus of both ears  Ambulatory referral to Audiology   2  Bilateral hearing loss, unspecified hearing loss type         We discussed that her symptoms of intermittent tinnitus may have been due to some sudden sensorineural hearing loss  However, she feels her hearing has returned to baseline at this time  On exam, the tuning fork did not lateralize to either side  Bilateral air conduction is greater than bone conduction  I recommend she have an audiogram to evaluate for any hearing loss  I recommend she have this done as soon as possible  Follow-up after audiogram to review results  History of Present Illness   Physician Requesting Consult: Austen Berry DO  Reason for Consult / Principal Problem:  Tinnitus  HPI: Alin Leblanc is a 66y o  year old female who presents with bilateral tenderness  She states this initially began a few weeks ago without any inciting events including noise tremor, head trauma, ear infections  Was seen at an urgent care though while being seen there at tinnitus had resolved  Since then, she states she had another episode of right-sided tenderness which she described as a humming  This did not last very long and resolved again  At this time, she feels her hearing has returned to baseline  She denies any humming today  She denies any history of recurrent ear infections, or ear surgeries  She denies any head trauma  She has a history of loud noise exposure of the work place though she is not currently employed  No recent audiogram   She denies any associated vertigo or dizziness with the episodes of tinnitus  Review of systems:  ROS was performed by the MA and documented in the attached note  This was reviewed personally      Historical Information   Past Medical History: Diagnosis Date    Hypertension     Vitamin D deficiency      Past Surgical History:   Procedure Laterality Date    ABDOMINOPLASTY      revision gastric bypass     SECTION      CHOLECYSTECTOMY      COLONOSCOPY      GASTRIC BYPASS      HERNIA REPAIR      NEUROMA EXCISION Right 2019    Procedure: EXCISION 2ND INTERSPACE NEUROMA;  Surgeon: Ekta Hernandez DPM;  Location: [address] [place] [address] OR;  Service: Podiatry    ROTATOR CUFF REPAIR Left      Social History   Social History     Substance and Sexual Activity   Alcohol Use No     Social History     Substance and Sexual Activity   Drug Use No     Social History     Tobacco Use   Smoking Status Never Smoker   Smokeless Tobacco Never Used     Family History:   Family History   Problem Relation Age of Onset    Stroke Mother     Heart disease Father        Current Outpatient Medications on File Prior to Visit   Medication Sig    calcium carbonate (OS-VENTURA) 600 MG tablet Take 600 mg by mouth 2 (two) times a day with meals    ergocalciferol (ERGOCALCIFEROL) 52404 units capsule Take 50,000 Units by mouth once a week    hydrochlorothiazide (HYDRODIURIL) 12 5 mg tablet Take 12 5 mg by mouth daily    timolol (TIMOPTIC-XE) 0 25 % ophthalmic gel-forming timolol maleate 0 5 % eye drops    traMADol (ULTRAM) 50 mg tablet Take 50 mg by mouth 2 (two) times a day    Ergocalciferol (VITAMIN D2) 2000 units TABS Take 50,000 Units by mouth    meclizine (ANTIVERT) 12 5 MG tablet Take 1 tablet (12 5 mg total) by mouth every 8 (eight) hours as needed for dizziness or nausea for up to 10 days    meloxicam (MOBIC) 7 5 mg tablet meloxicam 7 5 mg tablet    oxyCODONE-acetaminophen (PERCOCET) 5-325 mg per tablet Take 1 tablet by mouth every 6 (six) hours as needed    senna-docusate sodium (MONIK-COLACE) 8 6-50 mg per tablet Take 1 tablet by mouth    warfarin (COUMADIN) 5 mg tablet warfarin 5 mg tablet     No current facility-administered medications on file prior to visit  Allergies   Allergen Reactions    Nsaids        Vitals:    09/17/19 0800   BP: 140/82   Pulse: 66   SpO2: 98%       Physical Exam   Constitutional: Oriented to person, place, and time  Well-developed and well-nourished, no apparent distress, non-toxic appearance  Cooperative, able to hear and answer questions without difficulty  Voice: Normal voice quality  Head: Normocephalic, atraumatic  No scars, masses or lesions  Face: Symmetric, no edema, no sinus tenderness  Eyes: Vision grossly intact, extra-ocular movement intact  Ears: External ears normal  Tympanic membranes intact with intact normal landmarks  No post-auricular erythema or tenderness  512 hertz tuning fork does not lateralize  Bilateral air conduction greater than bone conduction  Nose: Septum intact, nares clear  Mucosa moist, turbinates well appearing  No crusting, polyps or discharge evident  Oral cavity:  Full upper dentures  Mucosa moist, lips without lesions or masses  Tongue mobile, floor of mouth soft and flat  Hard palate intact  No masses or lesions  Oropharynx: Uvula is midline, soft palate intact without lesion or mass  Oropharyngeal inlet without obstruction  Tonsils unremarkable  Posterior pharyngeal wall clear  No masses or lesions  Salivary glands:  Parotid glands and submandibular glands symmetric, no enlargement or tenderness  Neck: Normal laryngeal elevation with swallow  Trachea midline  No masses or lesions  No palpable adenopathy  Thyroid: Without tenderness or palpable nodules  Pulmonary/Chest: Normal effort and rate  No respiratory distress  No stertor or stridor  Musculoskeletal: Normal range of motion  Neurological: Cranial nerves 2-12 intact  Skin: Skin is warm and dry  Psychiatric: Normal mood and affect  Imaging Studies: I have personally reviewed pertinent reports  Lab Results: I have personally reviewed pertinent lab results        Greater than 40 minutes were spent in consultation  More than 1/2 of that time was spent in counselling and coordination of care

## 2019-09-17 NOTE — PROGRESS NOTES
Review of Systems   Constitutional: Negative  HENT: Positive for sinus pressure and tinnitus  Eyes: Negative  Respiratory: Negative  Cardiovascular: Negative  Gastrointestinal: Negative  Endocrine: Negative  Genitourinary: Negative  Musculoskeletal: Positive for arthralgias, back pain and neck pain  Skin: Negative  Allergic/Immunologic: Negative  Neurological: Negative  Hematological: Bruises/bleeds easily  Psychiatric/Behavioral: Negative

## 2019-09-25 ENCOUNTER — EVALUATION (OUTPATIENT)
Dept: PHYSICAL THERAPY | Facility: CLINIC | Age: 64
End: 2019-09-25
Payer: OTHER MISCELLANEOUS

## 2019-09-25 ENCOUNTER — TRANSCRIBE ORDERS (OUTPATIENT)
Dept: PHYSICAL THERAPY | Facility: CLINIC | Age: 64
End: 2019-09-25

## 2019-09-25 DIAGNOSIS — M25.552 LEFT HIP PAIN: Primary | ICD-10-CM

## 2019-09-25 PROCEDURE — 97163 PT EVAL HIGH COMPLEX 45 MIN: CPT

## 2019-09-25 NOTE — PROGRESS NOTES
PT Evaluation     Today's date: 2019  Patient name: Vanessa Eubanks  : 1955  MRN: [id number]  Referring provider: Josephine Gayle PA-C  Dx:   Encounter Diagnosis     ICD-10-CM    1  Left hip pain M25 552                   Assessment  Assessment details: Vanessa Eubanks is an obese  60 y o  female presenting to outpatient physical therapy with diagnosis of Left hip pain  (primary encounter diagnosis)  Patient's current impairments include L low back and L hip  pain, impaired soft tissue mobility, reduced L hip and trunk  range of motion, reduced LLE and trunk  strength, reduced postural awareness, and reduced activity tolerance  Patient's present functional limitations include difficulty with ADLs, reliance on medication and/or modalities for pain relief, poor tolerance for functional mobility and activity, and difficulty completing HH/community responsibilities  Patient to benefit from skilled outpatient physical therapy 2x/week for  weeks in order to reduce pain, maximize pain free range of motion, increase strength and stability, and improve functional mobility/functional activity in order to maximize return to prior level of function with reduced limitations  Thank you for your referral     Impairments: abnormal gait, abnormal or restricted ROM, activity intolerance, impaired balance, impaired physical strength, lacks appropriate home exercise program and pain with function  Barriers to therapy: Obesity,chronicity of pain  Understanding of Dx/Px/POC: good   Prognosis: fair    Goals  STGs to be achieved in 4 weeks:  1  Pt to demonstrate reduced subjective pain rating "at worst" by at least 2-3 points from Initial Eval in order to allow for reduced pain with ADLs and improved functional activity tolerance     2  Pt to demonstrate increased AROM of L hip  by at least 5-10 degrees and trunk ROM restriction of min for flex and lat flex  in order to allow for greater ease and independence with ADLs and functional mobility  3  Pt to demonstrate full PROM of L hip in order to maximize joint mobility and function and allow for progression of exercise program and achievement of goals  4  Pt to demonstrate increased MMT of LLE  by at least 1/2-1 grade in order to improve safety and stability with ADLs and functional mobility  LTGs to be achieved in 6-8 weeks:  1  Pt will be I with HEP in order to continue to improve quality of life and independence and reduce risk for re-injury  2  Pt to demonstrate return to  [facility] chores and entering/exiting shower  without limitations or restrictions  3  Pt to demonstrate improved function as noted by achieving or exceeding predicted score on FOTO outcomes assessment tool  Plan  Patient would benefit from: skilled physical therapy  Planned therapy interventions: home exercise program, therapeutic exercise, stretching, strengthening and manual therapy  Frequency: 2x week  Duration in weeks: 6  Plan of Care beginning date: 2019  Treatment plan discussed with: patient        Subjective Evaluation    History of Present Illness  Mechanism of injury: Pt fell at work  and fell backwd onto L side and has not been right ever since  Current sx LLBP, L hip pain, L upper thigh pain, L rot cuff tear w/repair Aug 2018  Pt attended PT -2019 for aquatic therapy and land therapy  Pt states this helped build her standing juan alberto to approx  15 min  Current c/o pain in L  low back, L hip and upper thigh  Pt reports diff climbing stairs, walking and getting up from low seated position  Diff getting in and out of shower, in and out of car  Must assist LLE to lift it into car  Recurrent probem    Quality of life: fair    Pain  Current pain ratin  At best pain ratin  At worst pain ratin  Location: LLB and L hip  Quality: dull ache and sharp  Aggravating factors: walking and standing  Symptom course: overall improved somewhat      Social Support  Steps to enter house: yes (2)  Stairs in house: no   Lives in: apartment    Employment status: not working  Hand dominance: right    Treatments  Previous treatment: physical therapy, medication and injection treatment  Current treatment: medication and physical therapy  Patient Goals  Patient goals for therapy: decreased pain, improved balance, increased motion and increased strength  Patient goal: improve gait        Objective     Concurrent Complaints  Positive for disturbed sleep and history of trauma  Negative for bladder dysfunction, bowel dysfunction, cardiac problem, kidney problem, gallbladder problem and stomach problem    Postural Observations  Seated posture: poor  Standing posture: poor    Additional Postural Observation Details  Pt standing with 20* of trunk flex, atrophy L shld musculature, R shld elevated    Palpation   Left   Tenderness of the erector spinae, iliopsoas, lumbar paraspinals and quadratus lumborum  Right   Tenderness of the erector spinae and lumbar paraspinals       Neurological Testing     Sensation     Lumbar   Left   Intact: light touch and proprioception    Right   Intact: light touch and proprioception    Active Range of Motion     Lumbar   Flexion:  Restriction level: moderate  Extension: -15 degrees  Restriction level: maximal  Left lateral flexion:  Restriction level: moderate  Right lateral flexion:  Restriction level: moderate  Left rotation:  Restriction level: maximal  Right rotation:  Restriction level: maximal  Left Hip   Flexion: 60 degrees   Abduction: 18 degrees     Right Hip   Flexion: 90 degrees   Abduction: 25 degrees     Passive Range of Motion   Left Hip   Flexion: 80 degrees   Abduction: 20 degrees     Strength/Myotome Testing     Left Hip   Planes of Motion   Flexion: 3  Abduction: 4-  Adduction: 4+    Right Hip   Planes of Motion   Flexion: 4  Abduction: 4+  Adduction: 4+    Left Knee   Flexion: 4+  Extension: 4    Right Knee   Flexion: 5  Extension: 4+    Left Ankle/Foot   Dorsiflexion: 4    Right Ankle/Foot   Dorsiflexion: 5    Tests     Lumbar     Left   Positive passive SLR (back pain but no rad pain)  Ambulation     Ambulation: Level Surfaces   Ambulation without assistive device: independent    Ambulation: Stairs   Ascend stairs: independent  Pattern: non-reciprocal  Railings: two rails  Descend stairs: independent  Pattern: non-reciprocal  Railings: two rails    Observational Gait   Gait: asymmetric   Decreased walking speed, stride length, left swing time, right swing time, left step length and right step length  Left foot contact pattern: foot flat  Right foot contact pattern: heel to toe    Additional Observational Gait Details  Pt amb with waddle gt more pronounced to the R    Functional Assessment      Squat    Sitting toward right side       General Comments:    Lower quarter screen   Foot/ankle: unremarkable    Hip Comments   Limited flex in sitting L>R    Knee Comments  Cortisone inj R kneee every 3-4 mo             Precautions: fall risk due to impaired gt and balance, R knee pain    Re-eval Date: 11/6/19    Date 9/25       Visit Count 1       FOTO completed       Pain In        Pain Out                Manual  9/25            L hip PROM             L ham stretch             L SI oscillations                                           Exercise Diary  9/25            Nustep             SKTC  L             LTRs             Bridges w/ ball             Bridge w/ TB             Knee ext mach             Knee flex mach             Leg press             Standing march,Abd,ext             Step ups   Up L, down R              Mini squats                                                                                                                                      Modalities

## 2019-09-25 NOTE — LETTER
2019    Vickey Hardy PA-C  [facility]  [address]    Patient: Prabha Castañeda   YOB: 1955   Date of Visit: 2019     Encounter Diagnosis     ICD-10-CM    1  Left hip pain M25 552        Dear Dr Mott Plant: Thank you for your recent referral of Prabha Castañeda  Please review the attached evaluation summary from Amrita's recent visit  Please verify that you agree with the plan of care by signing the attached order  If you have any questions or concerns, please do not hesitate to call  I sincerely appreciate the opportunity to share in the care of one of your patients and hope to have another opportunity to work with you in the near future  Sincerely,    Heather Rogers, PT      Referring Provider:      I certify that I have read the below Plan of Care and certify the need for these services furnished under this plan of treatment while under my care  Vickey Hardy PA-C  [facility]  [address]  [address] [place] [address]  VIA Facsimile: [phone]          PT Evaluation     Today's date: 2019  Patient name: Prabha Castañeda  : 1955  MRN: [id number]  Referring provider: Omaira Stringer PA-C  Dx:   Encounter Diagnosis     ICD-10-CM    1  Left hip pain M25 552                   Assessment  Assessment details: Prabha Castañeda is an obese  60 y o  female presenting to outpatient physical therapy with diagnosis of Left hip pain  (primary encounter diagnosis)  Patient's current impairments include L low back and L hip  pain, impaired soft tissue mobility, reduced L hip and trunk  range of motion, reduced LLE and trunk  strength, reduced postural awareness, and reduced activity tolerance   Patient's present functional limitations include difficulty with ADLs, reliance on medication and/or modalities for pain relief, poor tolerance for functional mobility and activity, and difficulty completing /community responsibilities  Patient to benefit from skilled outpatient physical therapy 2x/week for  weeks in order to reduce pain, maximize pain free range of motion, increase strength and stability, and improve functional mobility/functional activity in order to maximize return to prior level of function with reduced limitations  Thank you for your referral     Impairments: abnormal gait, abnormal or restricted ROM, activity intolerance, impaired balance, impaired physical strength, lacks appropriate home exercise program and pain with function  Barriers to therapy: Obesity,chronicity of pain  Understanding of Dx/Px/POC: good   Prognosis: fair    Goals  STGs to be achieved in 4 weeks:  1  Pt to demonstrate reduced subjective pain rating "at worst" by at least 2-3 points from Initial Eval in order to allow for reduced pain with ADLs and improved functional activity tolerance  2  Pt to demonstrate increased AROM of L hip  by at least 5-10 degrees and trunk ROM restriction of min for flex and lat flex  in order to allow for greater ease and independence with ADLs and functional mobility  3  Pt to demonstrate full PROM of L hip in order to maximize joint mobility and function and allow for progression of exercise program and achievement of goals  4  Pt to demonstrate increased MMT of LLE  by at least 1/2-1 grade in order to improve safety and stability with ADLs and functional mobility  LTGs to be achieved in 6-8 weeks:  1  Pt will be I with HEP in order to continue to improve quality of life and independence and reduce risk for re-injury  2  Pt to demonstrate return to  [facility] chores and entering/exiting shower  without limitations or restrictions  3  Pt to demonstrate improved function as noted by achieving or exceeding predicted score on FOTO outcomes assessment tool         Plan  Patient would benefit from: skilled physical therapy  Planned therapy interventions: home exercise program, therapeutic exercise, stretching, strengthening and manual therapy  Frequency: 2x week  Duration in weeks: 6  Plan of Care beginning date: 2019  Treatment plan discussed with: patient        Subjective Evaluation    History of Present Illness  Mechanism of injury: Pt fell at work  and fell backwd onto L side and has not been right ever since  Current sx LLBP, L hip pain, L upper thigh pain, L rot cuff tear w/repair Aug 2018  Pt attended PT -2019 for aquatic therapy and land therapy  Pt states this helped build her standing juan alberto to approx  15 min  Current c/o pain in L  low back, L hip and upper thigh  Pt reports diff climbing stairs, walking and getting up from low seated position  Diff getting in and out of shower, in and out of car  Must assist LLE to lift it into car  Recurrent probem    Quality of life: fair    Pain  Current pain ratin  At best pain ratin  At worst pain ratin  Location: LLB and L hip  Quality: dull ache and sharp  Aggravating factors: walking and standing  Symptom course: overall improved somewhat  Social Support  Steps to enter house: yes (2)  Stairs in house: no   Lives in: apartment    Employment status: not working  Hand dominance: right    Treatments  Previous treatment: physical therapy, medication and injection treatment  Current treatment: medication and physical therapy  Patient Goals  Patient goals for therapy: decreased pain, improved balance, increased motion and increased strength  Patient goal: improve gait        Objective     Concurrent Complaints  Positive for disturbed sleep and history of trauma   Negative for bladder dysfunction, bowel dysfunction, cardiac problem, kidney problem, gallbladder problem and stomach problem    Postural Observations  Seated posture: poor  Standing posture: poor    Additional Postural Observation Details  Pt standing with 20* of trunk flex, atrophy L shld musculature, R shld elevated    Palpation   Left   Tenderness of the erector spinae, iliopsoas, lumbar paraspinals and quadratus lumborum  Right   Tenderness of the erector spinae and lumbar paraspinals  Neurological Testing     Sensation     Lumbar   Left   Intact: light touch and proprioception    Right   Intact: light touch and proprioception    Active Range of Motion     Lumbar   Flexion:  Restriction level: moderate  Extension: -15 degrees  Restriction level: maximal  Left lateral flexion:  Restriction level: moderate  Right lateral flexion:  Restriction level: moderate  Left rotation:  Restriction level: maximal  Right rotation:  Restriction level: maximal  Left Hip   Flexion: 60 degrees   Abduction: 18 degrees     Right Hip   Flexion: 90 degrees   Abduction: 25 degrees     Passive Range of Motion   Left Hip   Flexion: 80 degrees   Abduction: 20 degrees     Strength/Myotome Testing     Left Hip   Planes of Motion   Flexion: 3  Abduction: 4-  Adduction: 4+    Right Hip   Planes of Motion   Flexion: 4  Abduction: 4+  Adduction: 4+    Left Knee   Flexion: 4+  Extension: 4    Right Knee   Flexion: 5  Extension: 4+    Left Ankle/Foot   Dorsiflexion: 4    Right Ankle/Foot   Dorsiflexion: 5    Tests     Lumbar     Left   Positive passive SLR (back pain but no rad pain)  Ambulation     Ambulation: Level Surfaces   Ambulation without assistive device: independent    Ambulation: Stairs   Ascend stairs: independent  Pattern: non-reciprocal  Railings: two rails  Descend stairs: independent  Pattern: non-reciprocal  Railings: two rails    Observational Gait   Gait: asymmetric   Decreased walking speed, stride length, left swing time, right swing time, left step length and right step length  Left foot contact pattern: foot flat  Right foot contact pattern: heel to toe    Additional Observational Gait Details  Pt amb with waddle gt more pronounced to the R    Functional Assessment      Squat    Sitting toward right side       General Comments:    Lower quarter screen Foot/ankle: unremarkable    Hip Comments   Limited flex in sitting L>R    Knee Comments  Cortisone inj R kneee every 3-4 mo             Precautions: fall risk due to impaired gt and balance, R knee pain    Re-eval Date: 11/6/19    Date 9/25       Visit Count 1       FOTO completed       Pain In        Pain Out                Manual  9/25            L hip PROM             L ham stretch             L SI oscillations                                           Exercise Diary  9/25            Nustep             SKTC  L             LTRs             Bridges w/ ball             Bridge w/ TB             Knee ext mach             Knee flex mach             Leg press             Standing march,Abd,ext             Step ups   Up L, down R              Mini squats                                                                                                                                      Modalities

## 2019-10-02 ENCOUNTER — OFFICE VISIT (OUTPATIENT)
Dept: PHYSICAL THERAPY | Facility: CLINIC | Age: 64
End: 2019-10-02
Payer: COMMERCIAL

## 2019-10-02 DIAGNOSIS — M25.552 LEFT HIP PAIN: Primary | ICD-10-CM

## 2019-10-02 PROCEDURE — 97110 THERAPEUTIC EXERCISES: CPT

## 2019-10-02 PROCEDURE — 97140 MANUAL THERAPY 1/> REGIONS: CPT

## 2019-10-02 NOTE — PROGRESS NOTES
Daily Note     Today's date: 10/2/2019  Patient name: Mihir Schwarz  : 1955  MRN: [id number]  Referring provider: Ines Rebollar PA-C  Dx:   Encounter Diagnosis     ICD-10-CM    1  Left hip pain M25 552                   Subjective:  Pt  States her LB, L Hip, and L Quad are the most bothersome areas  Rates the discomfort/pain to = "5"/10 for all stated areas  Objective: See treatment diary below      Assessment: Tolerated treatment fair  Patient would benefit from continued PT      Plan: Progress treatment as tolerated  Con't services 2x/week  Precautions: fall risk due to impaired gt and balance, R knee pain    Re-eval Date: 19    Date  10 2 19      Visit Count 1 2      FOTO completed       Pain In  5/10   LB, L Hip, L Quad      Pain Out  "Same"              Manual   10 2 19      L hip PROM  *Light, gentle   10 min      L ham stretch  *Light , gentle   3x/20"      L SI oscillations  *5 min      L Quad stretch  *Light , gentle   3x/20"        20 min total MT          Exercise Diary   10 2 19      Nustep  *L1 x 6 min        SKTC  L  *3x/10"        LTRs  *5 reps,   5 sec hold ea side        Bridges w/ ball        Bridge w/ TB          Knee ext mach        Knee flex mach        Leg press          Standing march,Abd,ext        Step ups   Up L, down R         Mini squats          Seated -March  -LAQ   -TR/HR    *-30x  *-20x short range  *-30x                                                                          Modalities   10  2 19      Moist heat  LB x 10 min @ end of treatment  **extra toweling applied      CP  L hip x 10 min @ end of treatment

## 2019-10-09 ENCOUNTER — OFFICE VISIT (OUTPATIENT)
Dept: PHYSICAL THERAPY | Facility: CLINIC | Age: 64
End: 2019-10-09
Payer: COMMERCIAL

## 2019-10-09 DIAGNOSIS — M25.552 LEFT HIP PAIN: Primary | ICD-10-CM

## 2019-10-09 PROCEDURE — 97110 THERAPEUTIC EXERCISES: CPT

## 2019-10-09 PROCEDURE — 97140 MANUAL THERAPY 1/> REGIONS: CPT

## 2019-10-09 NOTE — PROGRESS NOTES
Daily Note     Today's date: 10/9/2019  Patient name: Gagan Simms  : 1955  MRN: [id number]  Referring provider: Elis Barrios PA-C  Dx:   Encounter Diagnosis     ICD-10-CM    1  Left hip pain M25 552                   Subjective: Pt states she is affected by the damp cool weather and rates pain at 5/10 in R hip and L low back  Objective: See treatment diary below      Assessment: Tolerated treatment fair- at best  Patient demonstrated fatigue post treatment and would benefit from continued PT  Low motivation for treatment  Unable to tolerate any pressure to L low back   Low juan alberto to ex  Plan: Progress treatment as tolerated  Precautions: fall risk due to impaired gt and balance, R knee pain    Re-eval Date: 19    Date  10 2 19 10/9/19     Visit Count 1 2 3     FOTO completed       Pain In  5/10   LB, L Hip, L Quad 5/10     Pain Out  "Same"              Manual   10 2 19 10/9     L hip PROM  *Light, gentle   10 min Gentle 5 min     L ham stretch  *Light , gentle   3x/20" 4x30"     L SI oscillations  *5 min Unable to tolerate     L Quad stretch  *Light , gentle   3x/20"        20 min total MT 10 min total         Exercise Diary   10 2 19 10/9/19     Nustep  *L1 x 6 min   L2 x 10 min     SKTC  L  *3x/10"   5x10"   w/asst     LTRs  *5 reps,   5 sec hold ea side   10 x 5"     Bridges w/ ball   10x5"     Bridge w/ TB     Green 10x5'     Knee ext mach        Knee flex mach        Leg press          Standing march,Abd,ext        Step ups   Up L, down R         Mini squats     20     Seated -March  -LAQ   -TR/HR    *-30x  *-20x short range  *-30x LAQ    20  TRs/HRs  20     L heel slides   10     L sup abd/add   10                                                         Modalities   10  2 19 10/9     Moist heat  LB x 10 min @ end of treatment  **extra toweling applied Declined HP or CP today     CP  L hip x 10 min @ end of treatment

## 2019-10-11 ENCOUNTER — OFFICE VISIT (OUTPATIENT)
Dept: PHYSICAL THERAPY | Facility: CLINIC | Age: 64
End: 2019-10-11
Payer: COMMERCIAL

## 2019-10-11 DIAGNOSIS — M25.552 LEFT HIP PAIN: Primary | ICD-10-CM

## 2019-10-11 PROCEDURE — 97140 MANUAL THERAPY 1/> REGIONS: CPT

## 2019-10-11 PROCEDURE — 97110 THERAPEUTIC EXERCISES: CPT

## 2019-10-11 NOTE — PROGRESS NOTES
Daily Note     Today's date: 10/11/2019  Patient name: Ramiro Brewster  : 1955  MRN: [id number]  Referring provider: Lu Valencia PA-C  Dx:   Encounter Diagnosis     ICD-10-CM    1  Left hip pain M25 552                   Subjective:  Pt  States her L hip, L quad region, and LB are sore again today  Objective: See treatment diary below      Assessment: Tolerated treatment Fair+ to Fairly Well overall with performance and tolerance with ther exer and Manual therapy    Patient would benefit from continued PT      Plan: Progress treatment as tolerated  Con't services 2x/week  Precautions: fall risk due to impaired gt and balance, R knee pain    Re-eval Date: 19    Date  10 2 19 10/9/19 10 11 19    Visit Count 1 2 3 4    FOTO completed       Pain In  5/10   LB, L Hip, L Quad 5/10 4/10    Pain Out  "Same"  3-4/10            Manual   10 2 19 10/9 10  19    L hip PROM  *Light, gentle   10 min Gentle 5 min Light, gentle 5 min    L ham stretch  *Light , gentle   3x/20" 4x30" 5x30"  Light, gentle    L SI oscillations  *5 min Unable to tolerate n/a    L Quad stretch  *Light , gentle   3x/20"  Light , gentle   4x/20"      20 min total MT 10 min total 10 min total        Exercise Diary   10 2 19 10/9/19 10 11 19    Nustep  *L1 x 6 min   L2 x 10 min L2  x10'    SKTC  L  *3x/10"   5x10"   w/asst 5 x10"   w/asst    LTRs  *5 reps,   5 sec hold ea side   10 x 5" 15 x 5"    Bridges w/ ball   10x5" 15x5"    Bridge w/ TB     Green 10x5' Green 15x5"    Knee ext mach        Knee flex mach        Leg press          Standing march,Abd,ext     **   Step ups   Up L, down R      **   Mini squats     20 Cont NV    Seated -March  -LAQ   -TR/HR    *-30x  *-20x short range  *-30x LAQ    TRs/HRs  20   -30x  -25x short range  -30x        L heel slides   10   15    L sup abd/add   10 15    L SLRs    *5 indep  *10 w/asst                                                Modalities   10  2 19 10/9 10 11 19    Moist heat  LB x 10 min @ end of treatment  **extra toweling applied Declined HP or CP today LB x 10 min @ beginning  of treatment while on NuStep  **extra toweling applied    CP  L hip x 10 min @ end of treatment

## 2019-10-14 ENCOUNTER — OFFICE VISIT (OUTPATIENT)
Dept: PHYSICAL THERAPY | Facility: CLINIC | Age: 64
End: 2019-10-14
Payer: COMMERCIAL

## 2019-10-14 DIAGNOSIS — M25.552 LEFT HIP PAIN: Primary | ICD-10-CM

## 2019-10-14 PROCEDURE — 97110 THERAPEUTIC EXERCISES: CPT

## 2019-10-14 PROCEDURE — 97140 MANUAL THERAPY 1/> REGIONS: CPT

## 2019-10-14 NOTE — PROGRESS NOTES
Daily Note     Today's date: 10/14/2019  Patient name: Jossy Ryan  : 1955  MRN: [id number]  Referring provider: Dheeraj Downs PA-C  Dx:   Encounter Diagnosis     ICD-10-CM    1  Left hip pain M25 552                   Subjective:  No new c/o voiced by pt  today re: L hip/LB regions  Pt  only says she wants to get her B Legs strong  Objective: See treatment diary below      Assessment: Tolerated treatment Fairly Well overall with performance and tolerance to ther  exer  Plan: Progress treatment as tolerated  Con't services 2x/week as per POC/Goals  Precautions: fall risk due to impaired gt and balance, R knee pain    Re-eval Date: 19    Date  10 2 19 10/9/19 10 11 19 10 14 19   Visit Count 1 2 3 4 5   FOTO completed       Pain In  5/10   LB, L Hip, L Quad 5/10 4/10 4/10   Pain Out  "Same"  3-4/10 4/10           Manual   10 2 19 10/9 10 11 19 10 14 19   L hip PROM  *Light, gentle   10 min Gentle 5 min Light, gentle 5 min Light, gentle 5 min   L ham stretch  *Light , gentle   3x/20" 4x30" 5x30"  Light, gentle 5x30"  Light, gentle  seated   L SI oscillations  *5 min Unable to tolerate n/a    L Quad stretch  *Light , gentle   3x/20"  Light , gentle   4x/20" Light , gentle   4x/20"     20 min total MT 10 min total 10 min total 10 min       Exercise Diary   10 2 19 10/9/19 10 11 19 10 14 19   Nustep  *L1 x 6 min   L2 x 10 min L2  x10' L2  x11'  w/MHP to LB   SKTC  L  *3x/10"   5x10"   w/asst 5 x10"   w/asst 6 x10"   w/asst   LTRs  *5 reps,   5 sec hold ea side   10 x 5" 15 x 5" 15 x 5"   Bridges w/ ball   10x5" 15x5" 15x5"   Bridge w/ TB     Green 10x5' Green 15x5" Green 15x5"   Knee ext mach        Knee flex mach        Leg press          Standing march,Abd,ext     **1x15 ea   Step ups   Up L, down R      **1x10 Alt     Mini squats     20 Cont NV 20x   Seated -March  -LAQ   -TR/HR    *-30x  *-20x short range  *-30x LAQ    20  TRs/HRs  20   -30x  -25x short range  -30x   -30x  -25x short range  -40x     L heel slides   10   15 Seated 20x   L sup abd/add   10 15 Seated 20x   L SLRs    *5 indep  *10 w/asst *5 indep  *10 w/asst                                               Modalities   10  2 19 10/9 10 11 19 10 14 19   Moist heat  LB x 10 min @ end of treatment  **extra toweling applied Declined HP or CP today LB x 10 min @ beginning  of treatment while on NuStep  **extra toweling applied LB x 10 min @ beginning  of treatment while on NuStep  **extra toweling applied   CP  L hip x 10 min @ end of treatment

## 2019-10-15 ENCOUNTER — OFFICE VISIT (OUTPATIENT)
Dept: OTOLARYNGOLOGY | Facility: CLINIC | Age: 64
End: 2019-10-15
Payer: COMMERCIAL

## 2019-10-15 VITALS
SYSTOLIC BLOOD PRESSURE: 142 MMHG | BODY MASS INDEX: 49.08 KG/M2 | WEIGHT: 277 LBS | HEART RATE: 77 BPM | DIASTOLIC BLOOD PRESSURE: 84 MMHG | HEIGHT: 63 IN

## 2019-10-15 DIAGNOSIS — Z01.10 NORMAL HEARING EXAM: ICD-10-CM

## 2019-10-15 DIAGNOSIS — H93.13 TINNITUS OF BOTH EARS: Primary | ICD-10-CM

## 2019-10-15 PROCEDURE — 99213 OFFICE O/P EST LOW 20 MIN: CPT | Performed by: OTOLARYNGOLOGY

## 2019-10-15 NOTE — PROGRESS NOTES
Waldo Silva is a 60 y  o female who presents for re-evaluation of tinnitus  She states that since her prior visit she did not have any further episodes of tinnitus and hearing loss  She was able to have a hearing test done which showed bilateral normal hearing with bilateral type a tympanograms  No ear pain or drainage  She does feel that her right ear is sometimes itchy  She admits to using Q-Tips regularly  Past Medical History:   Diagnosis Date    Hypertension     Vitamin D deficiency        /84 (BP Location: Left arm, Patient Position: Sitting)   Pulse 77   Ht 5' 3" (1 6 m)   Wt 126 kg (277 lb)   BMI 49 07 kg/m²       Physical Exam   Constitutional: Oriented to person, place, and time  Well-developed and well-nourished, no apparent distress, non-toxic appearance  Cooperative, able to hear and answer questions without difficulty  Voice: Normal voice quality  Head: Normocephalic, atraumatic  No scars, masses or lesions  Face: Symmetric, no edema, no sinus tenderness  Eyes: Vision grossly intact, extra-ocular movement intact  Ears: External ear normal   Dried wax in distal right EAC  Bilateral tympanic membranes are intact with intact normal landmarks  No post-auricular erythema or tenderness  Nose: Septum midline, nares clear  Mucosa moist, turbinates well appearing  No crusting, polyps or discharge evident  Oral cavity: Dentition intact  Mucosa moist, lips normal   Tongue mobile, floor of mouth normal   Hard palate unremarkable  No masses or lesions  Oropharynx: Uvula is midline, soft palate normal   Unremarkable oropharyngeal inlet  Tonsils unremarkable  Posterior pharyngeal wall clear  No masses or lesions  Salivary glands:  Parotid glands and submandibular glands symmetric, no enlargement or tenderness  Neck: Normal laryngeal elevation with swallow  Trachea midline  No masses or lesions  No palpable adenopathy    Thyroid: normal in size, unremarkable without tenderness or palpable nodules  Pulmonary/Chest: Normal effort and rate  No respiratory distress  Musculoskeletal: Normal range of motion  Neurological: Cranial nerves 2-12 intact  Skin: Skin is warm and dry  Psychiatric: Normal mood and affect  A/P:  Reviewed hearing test showing bilateral normal hearing with bilateral type a tympanograms  I suspect that she may have been experiencing some brief episodes of sudden sensorineural hearing loss which she described as right-sided tenderness  However, her hearing has returned to baseline and is confirmed by audiogram   We discussed the nature of sudden sensorineural hearing loss  Discussed importance of follow-up if she notices a decrease in her hearing that does not resolve within 24 hours  She agrees to follow up as needed if symptoms worsen

## 2019-10-18 ENCOUNTER — APPOINTMENT (OUTPATIENT)
Dept: PHYSICAL THERAPY | Facility: CLINIC | Age: 64
End: 2019-10-18
Payer: COMMERCIAL

## 2019-10-21 ENCOUNTER — OFFICE VISIT (OUTPATIENT)
Dept: PHYSICAL THERAPY | Facility: CLINIC | Age: 64
End: 2019-10-21
Payer: COMMERCIAL

## 2019-10-21 DIAGNOSIS — M25.552 LEFT HIP PAIN: Primary | ICD-10-CM

## 2019-10-21 PROCEDURE — 97110 THERAPEUTIC EXERCISES: CPT

## 2019-10-21 NOTE — PROGRESS NOTES
Daily Note     Today's date: 10/21/2019  Patient name: Arina Bowles  : 1955  MRN: [id number]  Referring provider: Joseph Smith PA-C  Dx:   Encounter Diagnosis     ICD-10-CM    1  Left hip pain M25 552                   Subjective:  Pt  States she's feeling much better today as compared to last week  Says she had pretty bad stomach pain, which is why she cancelled appt  last week  Objective: See treatment diary below      Assessment: Tolerated treatment Fair to Fair+ overall with performance and tolerance to ther exer    Patient would benefit from continued PT  Pain level persists @ "4" re: L Hip  Plan: Progress treatment as tolerated  Con't services 2x/week as per POC/Goals         Precautions: fall risk due to impaired gt and balance, R knee pain    Re-eval Date: 19    Date 10 21 19 10 2 19 10/9/19 10 11 19 10 14 19   Visit Count 6 2 3 4 5   FOTO Due NV **      Pain In 4/10 5/10   LB, L Hip, L Quad 5/10 4/10 4/10   Pain Out 3-4/10 "Same"  3-4/10 4/10           Manual  10  10 2 19 10/9 10 11 19 10 14 19   L hip PROM Light, gentle 5 min *Light, gentle   10 min Gentle 5 min Light, gentle 5 min Light, gentle 5 min   L ham stretch 5x30"  Light, gentle  seated *Light , gentle   3x/20" 4x30" 5x30"  Light, gentle 5x30"  Light, gentle  seated   L SI oscillations  *5 min Unable to tolerate n/a    L Quad stretch Light , gentle   4x/20" *Light , gentle   3x/20"  Light , gentle   4x/20" Light , gentle   4x/20"    10 min total 20 min total MT 10 min total 10 min total 10 min       Exercise Diary  10 21 19 10 2 19 10/9/19 10 11 19 10 14 19   Nustep L2  x11' *L1 x 6 min   L2 x 10 min L2  x10' L2  x11'  w/MHP to LB   SKTC  L 6 x10"   w/asst *3x/10"   5x10"   w/asst 5 x10"   w/asst 6 x10"   w/asst   LTRs 2x10/ 5" *5 reps,   5 sec hold ea side   10 x 5" 15 x 5" 15 x 5"   Bridges w/ ball 2x10/3"  10x5" 15x5" 15x5"   Bridge w/ TB Green 20x5"    Green 10x5' Green 15x5" Green 15x5"   Knee ext [facility] Knee flex mach        Leg press          Standing march,Abd,ext 1x20 ea    **1x15 ea   Step ups   Up L, down R  1x10 Alt    **1x10 Alt     Mini squats 25x    20 Cont NV 20x   Seated -March  -LAQ   -TR/HR   -30x  -30x short range  -40x     *-30x  *-20x short range  *-30x LAQ  20  March  20  TRs/HRs  20   -30x  -25x short range  -30x   -30x  -25x short range  -40x     L heel slides Supine   3x10  10   15 Seated 20x   L sup abd/add Not able  10 15 Seated 20x   L SLRs 2x10 w/asst   *5 indep  *10 w/asst *5 indep  *10 w/asst                                               Modalities  10 21 19 10 2 19 10/9 10 11 19 10 14 19   Moist heat *Deferred LB x 10 min @ end of treatment  **extra toweling applied Declined HP or CP today LB x 10 min @ beginning  of treatment while on NuStep  **extra toweling applied LB x 10 min @ beginning  of treatment while on NuStep  **extra toweling applied   CP *Deferred L hip x 10 min @ end of treatment

## 2019-10-25 ENCOUNTER — OFFICE VISIT (OUTPATIENT)
Dept: PHYSICAL THERAPY | Facility: CLINIC | Age: 64
End: 2019-10-25
Payer: COMMERCIAL

## 2019-10-25 DIAGNOSIS — M25.552 LEFT HIP PAIN: Primary | ICD-10-CM

## 2019-10-25 PROCEDURE — 97140 MANUAL THERAPY 1/> REGIONS: CPT

## 2019-10-25 PROCEDURE — 97110 THERAPEUTIC EXERCISES: CPT

## 2019-10-25 NOTE — PROGRESS NOTES
Daily Note     Today's date: 10/25/2019  Patient name: Shante Clayton  : 1955  MRN: [id number]  Referring provider: Jazmin Villavicencio PA-C  Dx:   Encounter Diagnosis     ICD-10-CM    1  Left hip pain M25 552                   Subjective:  Pt  states her LB and L hip is hurting today, which she attributes to having had a terrible night's sleep last night  Says she was tossing and turning  Objective: See treatment diary below      Assessment: Tolerated treatment Fair+ overall with performance and tolerance to ther exer  Plan: Progress treatment as tolerated  Con't services 2x/week            Precautions: fall risk due to impaired gt and balance, R knee pain    Re-eval Date: 19    Date 10 21 19 10 25 19 10/9/19 10  10 14 19   Visit Count 6 7 3 4 5   FOTO Due NV **completed      Pain In 4/10 5/10   LB, L Hip, L Quad 5/10 4/10 4/10   Pain Out 3-4/10 "Same"  3-4/10 4/10           Manual  10 21 19 10 25 19 10/9 10  10 14 19   L hip PROM Light, gentle 5 min Light, gentle   5 min  5x/30" Gentle 5 min Light, gentle 5 min Light, gentle 5 min   L ham stretch 5x30"  Light, gentle  seated Light , gentle   5x/30" 4x30" 5x30"  Light, gentle 5x30"  Light, gentle  seated   L SI oscillations   Unable to tolerate n/a    L Quad stretch Light , gentle   4x/20" *Light , gentle   4x/20"  Light , gentle   4x/20" Light , gentle   4x/20"    10 min total 15 min total MT 10 min total 10 min total 10 min       Exercise Diary  10 21 19 10 25 19 10/9/19 10 11 19 10 14 19   Nustep L2  x11' L2 x 12 min   L2 x 10 min L2  x10' L2  x11'  w/MHP to LB   SKTC  L 6 x10"   w/asst 7x/10"  w/sheet asst 5x10"   w/asst 5 x10"   w/asst 6 x10"   w/asst   LTRs 2x10/ 5" 2x10/ 5"   10 x 5" 15 x 5" 15 x 5"   Bridges w/ ball 2x10/3" 2x10/ 5" 10x5" 15x5" 15x5"   Bridge w/ TB Green 20x5" Green 20x5"   Green 10x5' Green 15x5" Green 15x5"   Knee ext mach        Knee flex mach        Leg press          Standing march,Abd,ext 1x20 ea 1x20 ea   **1x15 ea   Step ups   Up L, down R  1x10 Alt 1x15 Alt   **1x10 Alt     Mini squats 25x 25x   20 Cont NV 20x   Seated -March  -LAQ   -TR/HR   -30x  -30x short range  -40x     -40x  -40x short range  -45x LAQ  20 March  20  TRs/HRs  20   -30x  -25x short range  -30x   -30x  -25x short range  -40x     L heel slides Supine   3x10 Supine   4x10 10   15 Seated 20x   L sup abd/add Not able  10 15 Seated 20x   L SLRs 2x10 w/asst 2x10 w/asst  *5 indep  *10 w/asst *5 indep  *10 w/asst                                               Modalities  10 21 19 10 25 19 10/9 10 11 19 10 14 19   Moist heat *Deferred LB x 10 min @ beginning  of treatment while on NuStep  **extra toweling applied Declined HP or CP today LB x 10 min @ beginning  of treatment while on NuStep  **extra toweling applied LB x 10 min @ beginning  of treatment while on NuStep  **extra toweling applied   CP *Deferred

## 2019-10-28 ENCOUNTER — OFFICE VISIT (OUTPATIENT)
Dept: PHYSICAL THERAPY | Facility: CLINIC | Age: 64
End: 2019-10-28
Payer: COMMERCIAL

## 2019-10-28 DIAGNOSIS — M25.552 LEFT HIP PAIN: Primary | ICD-10-CM

## 2019-10-28 PROCEDURE — 97140 MANUAL THERAPY 1/> REGIONS: CPT

## 2019-10-28 PROCEDURE — 97110 THERAPEUTIC EXERCISES: CPT

## 2019-10-28 NOTE — PROGRESS NOTES
Daily Note     Today's date: 10/28/2019  Patient name: Rigo Hemphill  : 1955  MRN: [id number]  Referring provider: Brianna Alvarenga PA-C  Dx:   Encounter Diagnosis     ICD-10-CM    1  Left hip pain M25 552                   Subjective:  "Pretty decent" is pt's  response to her current status today  Objective: See treatment diary below      Assessment: Tolerated treatment Fairly Well overall with performance and tolerance to ther exer  , and Manual Stretch  Patient would benefit from continued PT    Pt  Able to get up from armless chair better than she had before, as per her feedback  Plan: Progress treatment as tolerated  Con't services 2x/week          Precautions: fall risk due to impaired gt and balance, R knee pain    Re-eval Date: 19    Date 10 21 19 10 25 19 10 28 19 10 11 19 10 14 19   Visit Count 6 7 8 4 5   FOTO Due NV **completed      Pain In 4/10 5/10   LB, L Hip, L Quad 4/10 4/10 4/10   Pain Out 3-4/10 "Same" 3/10 3-4/10 4/10           Manual  10  10 25 19 10 28 19 10 11 19 10 14 19   L hip PROM Light, gentle 5 min Light, gentle   5 min  5x/30"  5 min Light, gentle 5 min Light, gentle 5 min   L ham stretch 5x30"  Light, gentle  seated Light , gentle   5x/30" 5x30" 5x30"  Light, gentle 5x30"  Light, gentle  seated   L SI oscillations    n/a    L Quad stretch Light , gentle   4x/20" *Light , gentle   4x/20" 5x/20" Light , gentle   4x/20" Light , gentle   4x/20"    10 min total 15 min total MT 10 min total 10 min total 10 min       Exercise Diary  10 21 19 10 25 19 10 28 19 10 11 19 10 14 19   Nustep L2  x11' L2 x 12 min   L3 x 12 min L2  x10' L2  x11'  w/MHP to LB   SKTC  L 6 x10"   w/asst 7x/10"  w/sheet asst 7x10"   w/asst 5 x10"   w/asst 6 x10"   w/asst   LTRs 2x10/ 5" 2x10/ 5"   2x10/5" 15 x 5" 15 x 5"   Bridges w/ ball 2x10/3" 2x10/ 5" 25x/5" 15x5" 15x5"   Bridge w/ TB Green 20x5" Green 20x5"   Green 25x5" Green 15x5" Green 15x5"   Knee ext mach        Knee flex [facility] Leg press          Standing march,Abd,ext 1x20 ea 1x20 ea 1x30 ea  **1x15 ea   Step ups   Up L, down R  1x10 Alt 1x15 Alt 1x15 Alt  **1x10 Alt     Mini squats 25x 25x   30 Cont NV 20x   Seated -March  -LAQ   -TR/HR   -30x  -30x short range  -40x     -40x  -40x short range  -45x   -40x  -40x short range  -45x   -30x  -25x short range  -30x   -30x  -25x short range  -40x     L heel slides Supine   3x10 Supine   4x10 Supine   4x10   15 Seated 20x   L sup abd/add Not able   15 Seated 20x   L SLRs 2x10 w/asst 2x10 w/asst 2x10 w/asst *5 indep  *10 w/asst *5 indep  *10 w/asst   Sit-stand transfers from chair (No arms)   *2x 5 trials from armless chair                                         Modalities  10 21 19 10 25 19 10 28 19 10 11 19 10 14 19   Moist heat *Deferred LB x 10 min @ beginning  of treatment while on NuStep  **extra toweling applied Declined HP or CP today LB x 10 min @ beginning  of treatment while on NuStep  **extra toweling applied LB x 10 min @ beginning  of treatment while on NuStep  **extra toweling applied   CP *Deferred

## 2019-10-29 ENCOUNTER — OFFICE VISIT (OUTPATIENT)
Dept: URGENT CARE | Facility: CLINIC | Age: 64
End: 2019-10-29
Payer: COMMERCIAL

## 2019-10-29 VITALS
RESPIRATION RATE: 16 BRPM | TEMPERATURE: 97.7 F | OXYGEN SATURATION: 98 % | SYSTOLIC BLOOD PRESSURE: 160 MMHG | DIASTOLIC BLOOD PRESSURE: 92 MMHG | HEART RATE: 81 BPM

## 2019-10-29 DIAGNOSIS — R51.9 ACUTE NONINTRACTABLE HEADACHE, UNSPECIFIED HEADACHE TYPE: Primary | ICD-10-CM

## 2019-10-29 PROCEDURE — G0382 LEV 3 HOSP TYPE B ED VISIT: HCPCS | Performed by: PHYSICIAN ASSISTANT

## 2019-10-29 PROCEDURE — 99213 OFFICE O/P EST LOW 20 MIN: CPT | Performed by: PHYSICIAN ASSISTANT

## 2019-10-29 PROCEDURE — 99283 EMERGENCY DEPT VISIT LOW MDM: CPT | Performed by: PHYSICIAN ASSISTANT

## 2019-10-29 NOTE — PROGRESS NOTES
[facility]        NAME: Bogdan Anne is a 60 y o  female  : 1955    MRN: [id number]  DATE: 2019  TIME: 11:13 AM    Assessment and Plan   No primary diagnosis found  No diagnosis found  Patient Instructions     Go directly to the emergency room for further evaluation  Chief Complaint     Chief Complaint   Patient presents with    Headache     Pt c/o a headache for three days  History of Present Illness       Patient presents with the worst headache of her life  She states she never has headaches until present  Headache is located in the frontal aspect  She does admit to sinus congestion  No paresthesias slurred speech muscle weakness  Review of Systems   Review of Systems   Constitutional: Negative for chills and fever  HENT: Positive for congestion  Neurological: Positive for headaches  Negative for dizziness, facial asymmetry, speech difficulty, weakness, light-headedness and numbness           Current Medications       Current Outpatient Medications:     calcium carbonate (OS-VENTURA) 600 MG tablet, Take 600 mg by mouth 2 (two) times a day with meals, Disp: , Rfl:     ergocalciferol (ERGOCALCIFEROL) 28177 units capsule, Take 50,000 Units by mouth once a week, Disp: , Rfl:     Ergocalciferol (VITAMIN D2) 2000 units TABS, Take 50,000 Units by mouth, Disp: , Rfl:     hydrochlorothiazide (HYDRODIURIL) 12 5 mg tablet, Take 12 5 mg by mouth daily, Disp: , Rfl:     meclizine (ANTIVERT) 12 5 MG tablet, Take 1 tablet (12 5 mg total) by mouth every 8 (eight) hours as needed for dizziness or nausea for up to 10 days (Patient not taking: Reported on 10/15/2019), Disp: 30 tablet, Rfl: 0    meloxicam (MOBIC) 7 5 mg tablet, meloxicam 7 5 mg tablet, Disp: , Rfl:     oxyCODONE-acetaminophen (PERCOCET) 5-325 mg per tablet, Take 1 tablet by mouth every 6 (six) hours as needed, Disp: , Rfl:     senna-docusate sodium (MONIK-COLACE) 8 6-50 mg per tablet, Take 1 tablet by mouth, Disp: , Rfl:     timolol (TIMOPTIC-XE) 0 25 % ophthalmic gel-forming, timolol maleate 0 5 % eye drops, Disp: , Rfl:     traMADol (ULTRAM) 50 mg tablet, Take 50 mg by mouth 2 (two) times a day, Disp: , Rfl:     warfarin (COUMADIN) 5 mg tablet, warfarin 5 mg tablet, Disp: , Rfl:     Current Allergies     Allergies as of 10/29/2019 - Reviewed 10/29/2019   Allergen Reaction Noted    Nsaids  2019            The following portions of the patient's history were reviewed and updated as appropriate: allergies, current medications, past family history, past medical history, past social history, past surgical history and problem list      Past Medical History:   Diagnosis Date    Hypertension     Vitamin D deficiency        Past Surgical History:   Procedure Laterality Date    ABDOMINOPLASTY      revision gastric bypass     SECTION      CHOLECYSTECTOMY      COLONOSCOPY      GASTRIC BYPASS      HERNIA REPAIR      NEUROMA EXCISION Right 2019    Procedure: EXCISION 2ND INTERSPACE NEUROMA;  Surgeon: Sim Meyer DPM;  Location: [facility] MAIN OR;  Service: Podiatry    ROTATOR CUFF REPAIR Left        Family History   Problem Relation Age of Onset    Stroke Mother     Heart disease Father          Medications have been verified  Objective   /92   Pulse 81   Temp 97 7 °F (36 5 °C)   Resp 16   SpO2 98%        Physical Exam     Physical Exam   Constitutional: She is oriented to person, place, and time  She appears well-developed and well-nourished  HENT:   Head: Normocephalic and atraumatic  Neurological: She is alert and oriented to person, place, and time  Nursing note and vitals reviewed

## 2019-11-01 ENCOUNTER — OFFICE VISIT (OUTPATIENT)
Dept: PHYSICAL THERAPY | Facility: CLINIC | Age: 64
End: 2019-11-01
Payer: OTHER MISCELLANEOUS

## 2019-11-01 DIAGNOSIS — M25.552 LEFT HIP PAIN: Primary | ICD-10-CM

## 2019-11-01 PROCEDURE — 97140 MANUAL THERAPY 1/> REGIONS: CPT

## 2019-11-01 PROCEDURE — 97110 THERAPEUTIC EXERCISES: CPT

## 2019-11-01 NOTE — PROGRESS NOTES
Daily Note     Today's date: 2019  Patient name: Frank Espinal  : 1955  MRN: [id number]  Referring provider: Kindra Zamudio PA-C  Dx:   Encounter Diagnosis     ICD-10-CM    1  Left hip pain M25 552                   Subjective:  Pt  States her L hip and LB is very bothersome today, which she attributes to "the weather", and also from sitting in a small metal chair during [id number] last night  Objective: See treatment diary below      Assessment: Tolerated treatment Fair+ to Fairly Well overall with performance and tolerance to ther exer and manual stretch  Patient progressing consistently thus far  Plan: Re-Eval due upon next treatment session on 19   Continue per plan of care             Precautions: fall risk due to impaired gt and balance, R knee pain    Re-eval Date: 19    Date 10  19 10 25 19 10 28 19 11 1 19 10 14 19   Visit Count 6 7 8 9 5   FOTO Due NV **completed      Pain In 4/10 5/10   LB, L Hip, L Quad 4/10 5/10 4/10   Pain Out 3-4/10 "Same" 3/10 3-/10 4/10           Manual  10 21 19 10 25 19 10 28 19 11 1 19 10 14 19   L hip PROM Light, gentle 5 min Light, gentle   5 min  5x/30"  5 min  5 min Light, gentle 5 min   L ham stretch 5x30"  Light, gentle  seated Light , gentle   5x/30" 5x30" 5x30"   5x30"  Light, gentle  seated   L SI oscillations    n/a    L Quad stretch Light , gentle   4x/20" *Light , gentle   4x/20" 5x/20" 5x/20" Light , gentle   4x/20"    10 min total 15 min total MT 10 min total 10 min total 10 min       Exercise Diary  10  19 10 25 19 10 28 19 11 1 19 10 14 19   Nustep L2  x11' L2 x 12 min   L3 x 12 min L2  x12' L2  x11'  w/MHP to LB   SKTC  L 6 x10"   w/asst 7x/10"  w/sheet asst 7x10"   w/asst 7 x10"   w/asst 6 x10"   w/asst   LTRs 2x10/ 5" 2x10/ 5"   2x10/5" 25 x 5" 15 x 5"   Bridges w/ ball 2x10/3" 2x10/ 5" 25x/5" 25x5" 15x5"   Bridge w/ TB Green 20x5" Green 20x5"   Green 25x5" Green 25x5" Green 15x5"   Knee ext mach        Knee flex [facility] Leg press          Standing march,Abd,ext 1x20 ea 1x20 ea 1x30 ea 1x30 ea **1x15 ea   Step ups   Up L, down R  1x10 Alt 1x15 Alt 1x15 Alt 1x20 ea **1x10 Alt     Mini squats 25x 25x   30 30x 20x   Seated -March  -LAQ   -TR/HR   -30x  -30x short range  -40x     -40x  -40x short range  -45x   -40x  -40x short range  -45x   -40x  -40x short range  -45x   -30x  -25x short range  -40x     L heel slides Supine   3x10 Supine   4x10 Supine   4x10   3x15 Seated 20x   L sup abd/add Not able    Seated 20x   L SLRs 2x10 w/asst 2x10 w/asst 2x10 w/asst 2x15 w/asst *5 indep  *10 w/asst   Sit-stand transfers from chair (No arms)   *2x 5 trials from armless chair 2x 5 trials from armless chair                                        Modalities  10 21 19 10 25 19 10 28 19 11 1 19 10 14 19   Moist heat *Deferred LB x 10 min @ beginning  of treatment while on NuStep  **extra toweling applied Declined HP or CP today LB x 10 min @ beginning  of treatment while on NuStep  **extra toweling applied LB x 10 min @ beginning  of treatment while on NuStep  **extra toweling applied   CP *Deferred

## 2019-11-05 ENCOUNTER — EVALUATION (OUTPATIENT)
Dept: PHYSICAL THERAPY | Facility: CLINIC | Age: 64
End: 2019-11-05
Payer: OTHER MISCELLANEOUS

## 2019-11-05 DIAGNOSIS — M25.552 LEFT HIP PAIN: Primary | ICD-10-CM

## 2019-11-05 PROCEDURE — 97110 THERAPEUTIC EXERCISES: CPT

## 2019-11-05 PROCEDURE — 97164 PT RE-EVAL EST PLAN CARE: CPT

## 2019-11-05 NOTE — PROGRESS NOTES
PT Evaluation     Today's date: 2019  Patient name: Charly Tello  : 1955  MRN: [id number]  Referring provider: Tico Harrison PA-C  Dx:   Encounter Diagnosis     ICD-10-CM    1  Left hip pain M25 552                   Assessment  Assessment details: Charly Tello is an obese  60 y o  female presenting to outpatient physical therapy with diagnosis of Left hip pain  (primary encounter diagnosis)  Patient's current impairments include L low back and L hip  pain, impaired soft tissue mobility, reduced L hip and trunk  range of motion, reduced LLE and trunk  strength, reduced postural awareness, and reduced activity tolerance  Patient's present functional limitations include difficulty with ADLs, reliance on medication and/or modalities for pain relief, poor tolerance for functional mobility and activity, and difficulty completing HH/community responsibilities  Patient to benefit from skilled outpatient physical therapy 2x/week for  weeks in order to reduce pain, maximize pain free range of motion, increase strength and stability, and improve functional mobility/functional activity in order to maximize return to prior level of function with reduced limitations  Thank you for your referral    UPDATE: Pt notes 20% overall  improvement since IE  Pt has shown gains in LLE strength and flexibility and improved ability to amb  Pt cont to exhibit impaired trunk and LLE strength with diff completing [facility] activities  Pt should cont to benefit from PT to progress toward achievement of goals  Impairments: abnormal gait, abnormal or restricted ROM, activity intolerance, impaired balance, impaired physical strength, lacks appropriate home exercise program and pain with function  Barriers to therapy: Obesity,chronicity of pain  Understanding of Dx/Px/POC: good   Prognosis: fair    Goals  STGs to be achieved in 4 weeks:  1   Pt to demonstrate reduced subjective pain rating "at worst" by at least 2-3 points from Initial Eval in order to allow for reduced pain with ADLs and improved functional activity tolerance  Goal cont'd  2  Pt to demonstrate increased AROM of L hip  by at least 5-10 degrees and trunk ROM restriction of min for flex and lat flex  in order to allow for greater ease and independence with ADLs and functional mobility  Goal cont'd  3  Pt to demonstrate full PROM of L hip in order to maximize joint mobility and function and allow for progression of exercise program and achievement of goals  Goal met  4  Pt to demonstrate increased MMT of LLE  by at least 1/2-1 grade in order to improve safety and stability with ADLs and functional mobility  Goal partially met and ongoing  LTGs to be achieved in 6-8 weeks:  1  Pt will be I with HEP in order to continue to improve quality of life and independence and reduce risk for re-injury  Goal cont'd  2  Pt to demonstrate return to  [facility] chores and entering/exiting shower  without limitations or restrictions  Goal cont'd  3  Pt to demonstrate improved function as noted by achieving or exceeding predicted score on FOTO outcomes assessment tool  Goal cont'd      Plan  Patient would benefit from: skilled physical therapy  Planned therapy interventions: home exercise program, therapeutic exercise, stretching, strengthening and manual therapy  Frequency: 2x week  Duration in weeks: 6  Plan of Care beginning date: 11/5/2019  Plan of Care expiration date: 12/17/2019  Treatment plan discussed with: patient        Subjective Evaluation    History of Present Illness  Mechanism of injury: Pt fell at work April 24,2018 and fell backwd onto L side and has not been right ever since  Current sx LLBP, L hip pain, L upper thigh pain, L rot cuff tear w/repair Aug 2018  Pt attended PT 5/22-8/30/2019 for aquatic therapy and land therapy  Pt states this helped build her standing juan alberto to approx  15 min  Current c/o pain in L  low back, L hip and upper thigh   Pt reports diff climbing stairs, walking and getting up from low seated position  Diff getting in and out of shower, in and out of car  Must assist LLE to lift it into car  19 UPDATE: Pt states she feels more comfortable rising from armless chairs and improved flexibility in LLE  Pt states she feels approx 20% improved from IE and notes improved strength  Pain persists, ml noted w/o pain meds  Pt states  she didn't realize how much pain she had until she stopped her meds for 3 days for a procedure  Pain escalated significantly w/o meds  Recurrent probem    Quality of life: fair    Pain  Current pain ratin  At best pain ratin  At worst pain ratin (w/o pain meds)  Location: LLB and L hip/thigh  Quality: dull ache and sharp  Relieving factors: medications and change in position  Aggravating factors: walking and standing (prolonged sitting)  Progression: improved (overall improved somewhat)    Social Support  Steps to enter house: yes (2)  Stairs in house: no   Lives in: apartment    Employment status: not working  Hand dominance: right    Treatments  Previous treatment: physical therapy, medication and injection treatment  Current treatment: medication and physical therapy  Patient Goals  Patient goals for therapy: decreased pain, improved balance, increased motion and increased strength  Patient goal: improve gait        Objective     Concurrent Complaints  Positive for disturbed sleep (cannot sleep on L side or back) and history of trauma  Negative for bladder dysfunction, bowel dysfunction, cardiac problem, kidney problem, gallbladder problem and stomach problem    Postural Observations  Seated posture: poor  Standing posture: poor    Additional Postural Observation Details  Pt standing with 20* of trunk flex, atrophy L shld musculature, R shld elevated    Palpation   Left   Tenderness of the erector spinae, iliopsoas, lumbar paraspinals and quadratus lumborum  Right   Tenderness of the erector spinae  Neurological Testing     Sensation     Lumbar   Left   Intact: light touch and proprioception    Right   Intact: light touch and proprioception    Active Range of Motion     Lumbar   Flexion:  Restriction level: minimal  Extension: -15 degrees  Restriction level: maximal  Left lateral flexion:  Restriction level: moderate  Right lateral flexion:  Restriction level: moderate  Left rotation:  Restriction level: maximal  Right rotation:  Restriction level: maximal  Left Hip   Flexion: 60 degrees   Abduction: 18 degrees     Right Hip   Flexion: 90 degrees   Abduction: 25 degrees     Passive Range of Motion   Left Hip   Flexion: 80 degrees   Abduction: 20 degrees     Strength/Myotome Testing     Left Hip   Planes of Motion   Flexion: 3  Abduction: 4  Adduction: 4+    Right Hip   Planes of Motion   Flexion: 4  Abduction: 4+  Adduction: 4+    Left Knee   Flexion: 4+  Extension: 4    Right Knee   Flexion: 5  Extension: 5    Left Ankle/Foot   Dorsiflexion: 4+    Right Ankle/Foot   Dorsiflexion: 5    Tests     Lumbar     Left   Positive passive SLR (back pain but no rad pain)  Ambulation     Ambulation: Level Surfaces   Ambulation without assistive device: independent    Ambulation: Stairs   Ascend stairs: independent  Pattern: non-reciprocal  Railings: two rails  Descend stairs: independent  Pattern: non-reciprocal  Railings: two rails    Observational Gait   Gait: asymmetric   Decreased walking speed, stride length, left swing time, right swing time, left step length and right step length  Left foot contact pattern: foot flat  Right foot contact pattern: heel to toe    Additional Observational Gait Details  Pt amb with waddle gt more pronounced to the R    Functional Assessment      Squat    Sitting toward right side       General Comments:    Lower quarter screen   Foot/ankle: unremarkable    Hip Comments   Limited flex in sitting L>R    Knee Comments  Cortisone inj R kneee every 3-4 mo             Precautions: fall risk due to impaired gt and balance, R knee pain    Re-eval Date: 12/17/19        Date 10 21 19 10 25 19 10 28 19 11 1 19 11/5   Visit Count 6 7 8 9 10   FOTO Due NV **completed      Pain In 4/10 5/10   LB, L Hip, L Quad 4/10 5/10 4   Pain Out 3-4/10 "Same" 3/10 3-/10 3           Manual  10 21 19 10 25 19 10 28 19 11 1 19 11/5   L hip PROM Light, gentle 5 min Light, gentle   5 min  5x/30"  5 min  5 min    L ham stretch 5x30"  Light, gentle  seated Light , gentle   5x/30" 5x30" 5x30"      L SI oscillations    n/a    L Quad stretch Light , gentle   4x/20" *Light , gentle   4x/20" 5x/20" 5x/20"     10 min total 15 min total MT 10 min total 10 min total        Exercise Diary  10 21 19 10 25 19 10 28 19 11 1 19 11/5/19   Nustep L2  x11' L2 x 12 min   L3 x 12 min L2  x12' L3  x12'  w/MHP to LB   SKTC  L 6 x10"   w/asst 7x/10"  w/sheet asst 7x10"   w/asst 7 x10"   w/asst 7 x10"   w/asst   LTRs 2x10/ 5" 2x10/ 5"   2x10/5" 25 x 5" 25x5"   Bridges w/ ball 2x10/3" 2x10/ 5" 25x/5" 25x5" 25x5"   Bridge w/ TB Green 20x5" Green 20x5"   Green 25x5" Green 25x5"    Knee ext mach     11#  20x   Knee flex mach     11#  20x   Leg press       50# 30x   Standing march,Abd,ext 1x20 ea 1x20 ea 1x30 ea 1x30 ea 30 ea   Step ups   Up L, down R  1x10 Alt 1x15 Alt 1x15 Alt 1x20 ea    Mini squats 25x 25x   30 30x    Seated -March  -LAQ   -TR/HR   -30x  -30x short range  -40x     -40x  -40x short range  -45x   -40x  -40x short range  -45x   -40x  -40x short range  -45x    L heel slides Supine   3x10 Supine   4x10 Supine   4x10   3x15    L sup abd/add Not able       L SLRs 2x10 w/asst 2x10 w/asst 2x10 w/asst 2x15 w/asst    Sit-stand transfers from chair (No arms)   *2x 5 trials from armless chair 2x 5 trials from armless chair                                        Modalities  10 21 19 10 25 19 10 28 19 11 1 19 11/5   Moist heat *Deferred LB x 10 min @ beginning  of treatment while on NuStep  **extra toweling applied Declined HP or CP today LB x 10 min @ beginning  of treatment while on NuStep  **extra toweling applied LB x 10 min @ beginning  of treatment while on NuStep   CP *Deferred

## 2019-11-05 NOTE — LETTER
2019    Adan Hamilton PA-C  [facility]  [address]    Patient: Elvira Espinoza   YOB: 1955   Date of Visit: 2019     Encounter Diagnosis     ICD-10-CM    1  Left hip pain M25 552        Dear Dr Montemayor Kinds: Thank you for your recent referral of Elvira Espinoza  Please review the attached evaluation summary from Amrita's recent visit  Please verify that you agree with the plan of care by signing the attached order  If you have any questions or concerns, please do not hesitate to call  I sincerely appreciate the opportunity to share in the care of one of your patients and hope to have another opportunity to work with you in the near future  Sincerely,    Selam Matt, PT      Referring Provider:      I certify that I have read the below Plan of Care and certify the need for these services furnished under this plan of treatment while under my care  Adan Hamilton PA-C  [facility]  [address] [place] [address]  VIA Facsimile: [phone]          PT Evaluation     Today's date: 2019  Patient name: Elvira Espinoza  : 1955  MRN: [id number]  Referring provider: Robbie Brewster PA-C  Dx:   Encounter Diagnosis     ICD-10-CM    1  Left hip pain M25 552                   Assessment  Assessment details: Elvira Espinoza is an obese  60 y o  female presenting to outpatient physical therapy with diagnosis of Left hip pain  (primary encounter diagnosis)  Patient's current impairments include L low back and L hip  pain, impaired soft tissue mobility, reduced L hip and trunk  range of motion, reduced LLE and trunk  strength, reduced postural awareness, and reduced activity tolerance   Patient's present functional limitations include difficulty with ADLs, reliance on medication and/or modalities for pain relief, poor tolerance for functional mobility and activity, and difficulty completing /community responsibilities  Patient to benefit from skilled outpatient physical therapy 2x/week for  weeks in order to reduce pain, maximize pain free range of motion, increase strength and stability, and improve functional mobility/functional activity in order to maximize return to prior level of function with reduced limitations  Thank you for your referral   11/5 UPDATE: Pt notes 20% overall  improvement since IE  Pt has shown gains in LLE strength and flexibility and improved ability to amb  Pt cont to exhibit impaired trunk and LLE strength with diff completing [place] activities  Pt should cont to benefit from PT to progress toward achievement of goals  Impairments: abnormal gait, abnormal or restricted ROM, activity intolerance, impaired balance, impaired physical strength, lacks appropriate home exercise program and pain with function  Barriers to therapy: Obesity,chronicity of pain  Understanding of Dx/Px/POC: good   Prognosis: fair    Goals  STGs to be achieved in 4 weeks:  1  Pt to demonstrate reduced subjective pain rating "at worst" by at least 2-3 points from Initial Eval in order to allow for reduced pain with ADLs and improved functional activity tolerance  Goal cont'd  2  Pt to demonstrate increased AROM of L hip  by at least 5-10 degrees and trunk ROM restriction of min for flex and lat flex  in order to allow for greater ease and independence with ADLs and functional mobility  Goal cont'd  3  Pt to demonstrate full PROM of L hip in order to maximize joint mobility and function and allow for progression of exercise program and achievement of goals  Goal met  4  Pt to demonstrate increased MMT of LLE  by at least 1/2-1 grade in order to improve safety and stability with ADLs and functional mobility  Goal partially met and ongoing  LTGs to be achieved in 6-8 weeks:  1  Pt will be I with HEP in order to continue to improve quality of life and independence and reduce risk for re-injury  Goal cont'd  2   Pt to demonstrate return to  [facility] chores and entering/exiting shower  without limitations or restrictions  Goal cont'd  3  Pt to demonstrate improved function as noted by achieving or exceeding predicted score on FOTO outcomes assessment tool  Goal cont'd      Plan  Patient would benefit from: skilled physical therapy  Planned therapy interventions: home exercise program, therapeutic exercise, stretching, strengthening and manual therapy  Frequency: 2x week  Duration in weeks: 6  Plan of Care beginning date: 2019  Plan of Care expiration date: 2019  Treatment plan discussed with: patient        Subjective Evaluation    History of Present Illness  Mechanism of injury: Pt fell at work  and fell backwd onto L side and has not been right ever since  Current sx LLBP, L hip pain, L upper thigh pain, L rot cuff tear w/repair Aug 2018  Pt attended PT -2019 for aquatic therapy and land therapy  Pt states this helped build her standing juan alberto to approx  15 min  Current c/o pain in L  low back, L hip and upper thigh  Pt reports diff climbing stairs, walking and getting up from low seated position  Diff getting in and out of shower, in and out of car  Must assist LLE to lift it into car  19 UPDATE: Pt states she feels more comfortable rising from armless chairs and improved flexibility in LLE  Pt states she feels approx 20% improved from IE and notes improved strength  Pain persists, ml noted w/o pain meds  Pt states  she didn't realize how much pain she had until she stopped her meds for 3 days for a procedure  Pain escalated significantly w/o meds            Recurrent probem    Quality of life: fair    Pain  Current pain ratin  At best pain ratin  At worst pain ratin (w/o pain meds)  Location: LLB and L hip/thigh  Quality: dull ache and sharp  Relieving factors: medications and change in position  Aggravating factors: walking and standing (prolonged sitting)  Progression: improved (overall improved somewhat)    Social Support  Steps to enter house: yes (2)  Stairs in house: no   Lives in: apartment    Employment status: not working  Hand dominance: right    Treatments  Previous treatment: physical therapy, medication and injection treatment  Current treatment: medication and physical therapy  Patient Goals  Patient goals for therapy: decreased pain, improved balance, increased motion and increased strength  Patient goal: improve gait        Objective     Concurrent Complaints  Positive for disturbed sleep (cannot sleep on L side or back) and history of trauma  Negative for bladder dysfunction, bowel dysfunction, cardiac problem, kidney problem, gallbladder problem and stomach problem    Postural Observations  Seated posture: poor  Standing posture: poor    Additional Postural Observation Details  Pt standing with 20* of trunk flex, atrophy L shld musculature, R shld elevated    Palpation   Left   Tenderness of the erector spinae, iliopsoas, lumbar paraspinals and quadratus lumborum  Right   Tenderness of the erector spinae       Neurological Testing     Sensation     Lumbar   Left   Intact: light touch and proprioception    Right   Intact: light touch and proprioception    Active Range of Motion     Lumbar   Flexion:  Restriction level: minimal  Extension: -15 degrees  Restriction level: maximal  Left lateral flexion:  Restriction level: moderate  Right lateral flexion:  Restriction level: moderate  Left rotation:  Restriction level: maximal  Right rotation:  Restriction level: maximal  Left Hip   Flexion: 60 degrees   Abduction: 18 degrees     Right Hip   Flexion: 90 degrees   Abduction: 25 degrees     Passive Range of Motion   Left Hip   Flexion: 80 degrees   Abduction: 20 degrees     Strength/Myotome Testing     Left Hip   Planes of Motion   Flexion: 3  Abduction: 4  Adduction: 4+    Right Hip   Planes of Motion   Flexion: 4  Abduction: 4+  Adduction: 4+    Left Knee   Flexion: 4+  Extension: 4    Right Knee   Flexion: 5  Extension: 5    Left Ankle/Foot   Dorsiflexion: 4+    Right Ankle/Foot   Dorsiflexion: 5    Tests     Lumbar     Left   Positive passive SLR (back pain but no rad pain)  Ambulation     Ambulation: Level Surfaces   Ambulation without assistive device: independent    Ambulation: Stairs   Ascend stairs: independent  Pattern: non-reciprocal  Railings: two rails  Descend stairs: independent  Pattern: non-reciprocal  Railings: two rails    Observational Gait   Gait: asymmetric   Decreased walking speed, stride length, left swing time, right swing time, left step length and right step length  Left foot contact pattern: foot flat  Right foot contact pattern: heel to toe    Additional Observational Gait Details  Pt amb with waddle gt more pronounced to the R    Functional Assessment      Squat    Sitting toward right side       General Comments:    Lower quarter screen   Foot/ankle: unremarkable    Hip Comments   Limited flex in sitting L>R    Knee Comments  Cortisone inj R kneee every 3-4 mo             Precautions: fall risk due to impaired gt and balance, R knee pain    Re-eval Date: 12/17/19        Date 10 21 19 10 25 19 10 28 19 11 1 19 11/5   Visit Count 6 7 8 9 10   FOTO Due NV **completed      Pain In 4/10 5/10   LB, L Hip, L Quad 4/10 5/10 4   Pain Out 3-4/10 "Same" 3/10 3-/10 3           Manual  10 21 19 10 25 19 10 28 19 11 1 19 11/5   L hip PROM Light, gentle 5 min Light, gentle   5 min  5x/30"  5 min  5 min    L ham stretch 5x30"  Light, gentle  seated Light , gentle   5x/30" 5x30" 5x30"      L SI oscillations    n/a    L Quad stretch Light , gentle   4x/20" *Light , gentle   4x/20" 5x/20" 5x/20"     10 min total 15 min total MT 10 min total 10 min total        Exercise Diary  10 21 19 10 25 19 10 28 19 11 1 19 11/5/19   Nustep L2  x11' L2 x 12 min   L3 x 12 min L2  x12' L3  x12'  w/MHP to LB   SKTC  L 6 x10"   w/asst 7x/10"  w/sheet asst 7x10"   w/asst 7 x10" w/asst 7 x10"   w/asst   LTRs 2x10/ 5" 2x10/ 5"   2x10/5" 25 x 5" 25x5"   Bridges w/ ball 2x10/3" 2x10/ 5" 25x/5" 25x5" 25x5"   Bridge w/ TB Green 20x5" Green 20x5"   Green 25x5" Green 25x5"    Knee ext mach     11#  20x   Knee flex mach     11#  20x   Leg press       50# 30x   Standing march,Abd,ext 1x20 ea 1x20 ea 1x30 ea 1x30 ea 30 ea   Step ups   Up L, down R  1x10 Alt 1x15 Alt 1x15 Alt 1x20 ea    Mini squats 25x 25x   30 30x    Seated -March  -LAQ   -TR/HR   -30x  -30x short range  -40x     -40x  -40x short range  -45x   -40x  -40x short range  -45x   -40x  -40x short range  -45x    L heel slides Supine   3x10 Supine   4x10 Supine   4x10   3x15    L sup abd/add Not able       L SLRs 2x10 w/asst 2x10 w/asst 2x10 w/asst 2x15 w/asst    Sit-stand transfers from chair (No arms)   *2x 5 trials from armless chair 2x 5 trials from armless chair                                        Modalities  10 21 19 10 25 19 10 28 19 11 1 19 11/5   Moist heat *Deferred LB x 10 min @ beginning  of treatment while on NuStep  **extra toweling applied Declined HP or CP today LB x 10 min @ beginning  of treatment while on NuStep  **extra toweling applied LB x 10 min @ beginning  of treatment while on NuStep   CP *Deferred

## 2019-11-06 ENCOUNTER — ANESTHESIA EVENT (OUTPATIENT)
Dept: GASTROENTEROLOGY | Facility: HOSPITAL | Age: 64
End: 2019-11-06

## 2019-11-06 ENCOUNTER — ANESTHESIA (OUTPATIENT)
Dept: GASTROENTEROLOGY | Facility: HOSPITAL | Age: 64
End: 2019-11-06

## 2019-11-06 ENCOUNTER — HOSPITAL ENCOUNTER (OUTPATIENT)
Dept: GASTROENTEROLOGY | Facility: HOSPITAL | Age: 64
Setting detail: OUTPATIENT SURGERY
Discharge: HOME | End: 2019-11-06
Attending: INTERNAL MEDICINE | Admitting: INTERNAL MEDICINE
Payer: COMMERCIAL

## 2019-11-06 VITALS
TEMPERATURE: 97.8 F | SYSTOLIC BLOOD PRESSURE: 145 MMHG | DIASTOLIC BLOOD PRESSURE: 83 MMHG | RESPIRATION RATE: 18 BRPM | OXYGEN SATURATION: 96 % | HEART RATE: 82 BPM

## 2019-11-06 DIAGNOSIS — D64.9 ANEMIA, UNSPECIFIED: ICD-10-CM

## 2019-11-06 DIAGNOSIS — R10.11 RIGHT UPPER QUADRANT PAIN: ICD-10-CM

## 2019-11-06 RX ORDER — PROPOFOL 10 MG/ML
INJECTION, EMULSION INTRAVENOUS AS NEEDED
Status: DISCONTINUED | OUTPATIENT
Start: 2019-11-06 | End: 2019-11-06 | Stop reason: SURG

## 2019-11-06 RX ORDER — LIDOCAINE HYDROCHLORIDE 20 MG/ML
INJECTION, SOLUTION EPIDURAL; INFILTRATION; INTRACAUDAL; PERINEURAL AS NEEDED
Status: DISCONTINUED | OUTPATIENT
Start: 2019-11-06 | End: 2019-11-06 | Stop reason: SURG

## 2019-11-06 RX ORDER — SODIUM CHLORIDE, SODIUM LACTATE, POTASSIUM CHLORIDE, CALCIUM CHLORIDE 600; 310; 30; 20 MG/100ML; MG/100ML; MG/100ML; MG/100ML
125 INJECTION, SOLUTION INTRAVENOUS CONTINUOUS
Status: DISCONTINUED | OUTPATIENT
Start: 2019-11-06 | End: 2019-11-10 | Stop reason: HOSPADM

## 2019-11-06 RX ADMIN — PROPOFOL 140 MG: 10 INJECTION, EMULSION INTRAVENOUS at 10:06

## 2019-11-06 RX ADMIN — SODIUM CHLORIDE, POTASSIUM CHLORIDE, SODIUM LACTATE AND CALCIUM CHLORIDE 125 ML/HR: 600; 310; 30; 20 INJECTION, SOLUTION INTRAVENOUS at 08:33

## 2019-11-06 RX ADMIN — LIDOCAINE HYDROCHLORIDE 100 MG: 20 INJECTION, SOLUTION EPIDURAL; INFILTRATION; INTRACAUDAL; PERINEURAL at 10:06

## 2019-11-06 NOTE — H&P
History and Physical -  [facility]  Perfecto De La Rosa 60 y o  female MRN: [id number]                  HPI: Perfecto De La Rosa is a 60y o  year old female who presents for EGD for anemia and history of ulcer  REVIEW OF SYSTEMS: Per the HPI, and otherwise unremarkable  Historical Information   Past Medical History:   Diagnosis Date    Hypertension     PUD (peptic ulcer disease)     Vitamin D deficiency      Past Surgical History:   Procedure Laterality Date    ABDOMINOPLASTY      revision gastric bypass     SECTION      CHOLECYSTECTOMY      COLONOSCOPY      GASTRIC BYPASS      HERNIA REPAIR      NEUROMA EXCISION Right 2019    Procedure: EXCISION 2ND INTERSPACE NEUROMA;  Surgeon: Angie Birch DPM;  Location[facility] MAIN OR;  Service: Podiatry    ROTATOR CUFF REPAIR Left      Social History   Social History     Substance and Sexual Activity   Alcohol Use No     Social History     Substance and Sexual Activity   Drug Use No     Social History     Tobacco Use   Smoking Status Never Smoker   Smokeless Tobacco Never Used     Family History   Problem Relation Age of Onset    Stroke Mother     Heart disease Father        Meds/Allergies       (Not in a hospital admission)    Allergies   Allergen Reactions    Nsaids      Due to hx gastric bypass         Objective     BP (!) 182/84   Pulse 80   Temp (!) 96 8 °F (36 °C) (Temporal)   Resp 18   SpO2 97%       PHYSICAL EXAM    Gen: NAD  CV: RRR  CHEST: Clear  ABD: soft, NT/ND  EXT: no edema      ASSESSMENT/PLAN:  This is a 60y o  year old female here for EGD, and she is stable and optimized for her procedure

## 2019-11-06 NOTE — ANESTHESIA PREPROCEDURE EVALUATION
Review of Systems/Medical History  Patient summary reviewed  Chart reviewed      Cardiovascular  Hypertension ,    Pulmonary       GI/Hepatic    PUD,             Endo/Other     GYN       Hematology   Musculoskeletal       Neurology   Psychology           Physical Exam    Airway    Mallampati score: III  TM Distance: >3 FB  Neck ROM: full     Dental   upper dentures,     Cardiovascular  Cardiovascular exam normal    Pulmonary  Pulmonary exam normal     Other Findings        Anesthesia Plan  ASA Score- 3     Anesthesia Type- IV sedation with anesthesia with ASA Monitors  Additional Monitors:   Airway Plan:     Comment: Plan discussed is MAC with GA as backup  I personally discussed risks and benefits to this anesthetic  All patient questions were answered  Pt agrees with anesthesia plan        Plan Factors-    Induction- intravenous  Postoperative Plan-     Informed Consent- Anesthetic plan and risks discussed with patient

## 2019-11-06 NOTE — DISCHARGE INSTRUCTIONS
Upper Endoscopy   WHAT YOU NEED TO KNOW:   An upper endoscopy is also called an upper gastrointestinal (GI) endoscopy, or an esophagogastroduodenoscopy (EGD)  You may feel bloated, gassy, or have some abdominal discomfort after your procedure  Your throat may be sore for 24 to 36 hours  You may burp or pass gas from air that is still inside your body  DISCHARGE INSTRUCTIONS:   Call 911 if:   · You have sudden chest pain or trouble breathing  Seek care immediately if:   · You feel dizzy or faint  · You have trouble swallowing  · You have severe throat pain  · Your bowel movements are very dark or black  · Your abdomen is hard and firm and you have severe pain  · You vomit blood  Contact your healthcare provider if:   · You feel full or bloated and cannot burp or pass gas  · You have not had a bowel movement for 3 days after your procedure  · You have neck pain  · You have a fever or chills  · You have nausea or are vomiting  · You have a rash or hives  · You have questions or concerns about your endoscopy  Relieve a sore throat:  Suck on throat lozenges or crushed ice  Gargle with a small amount of warm salt water  Mix 1 teaspoon of salt and 1 cup of warm water to make salt water  Relieve gas and discomfort from bloating:  Lie on your right side with a heating pad on your abdomen  Take short walks to help pass gas  Eat small meals until bloating is relieved  Rest after your procedure:  Do not drive or make important decisions until the day after your procedure  Return to your normal activity as directed  You can usually return to work the day after your procedure  Follow up with your healthcare provider as directed:  Write down your questions so you remember to ask them during your visits  © 2017 [employer]  Information is for End User's use only and may not be sold, redistributed or otherwise used for commercial purposes   All illustrations and images included in [employer] are the copyrighted property of A D A "[employer] , [employer]  or Ashkan Paez  The above information is an  only  It is not intended as medical advice for individual conditions or treatments  Talk to your doctor, nurse or pharmacist before following any medical regimen to see if it is safe and effective for you  yes

## 2019-11-06 NOTE — ANESTHESIA POSTPROCEDURE EVALUATION
Post-Op Assessment Note    CV Status:  Stable  Pain Score: 0    Pain management: adequate     Mental Status:  Alert   Hydration Status:  Stable   PONV Controlled:  None   Airway Patency:  Patent   Post Op Vitals Reviewed: Yes      Staff: Anesthesiologist           BP  145/83   Temp  97 ,8   Pulse  74   Resp   18   SpO2   97

## 2019-11-08 ENCOUNTER — OFFICE VISIT (OUTPATIENT)
Dept: PHYSICAL THERAPY | Facility: CLINIC | Age: 64
End: 2019-11-08
Payer: OTHER MISCELLANEOUS

## 2019-11-08 DIAGNOSIS — M25.552 LEFT HIP PAIN: Primary | ICD-10-CM

## 2019-11-08 PROCEDURE — 97110 THERAPEUTIC EXERCISES: CPT

## 2019-11-08 NOTE — PROGRESS NOTES
Daily Note     Today's date: 2019  Patient name: Shante Clayton  : 1955  MRN: [id number]  Referring provider: Jazmin Villavicencio PA-C  Dx:   Encounter Diagnosis     ICD-10-CM    1  Left hip pain M25 552                   Subjective:  Pt  States she is able to stand a little longer, and she is also continuing to improve with sit-stand transfers  Objective: See treatment diary below      Assessment: Tolerated treatment Fair+ overall with performance and tolerance to Ther  Exer  and Manual Stretch          Plan: Progress treatment as tolerated  Con't services 2x/week           Precautions: fall risk due to impaired gt and balance, R knee pain    Re-eval Date: 19        Date 11 8  10 25 19 10 28 19 11 1 19    Visit Count 11 7 8 9 10   FOTO  **completed      Pain In 4/10 5/10   LB, L Hip, L Quad 4/10 5/10 4   Pain Out 3/10 "Same" 3/10 3-/10 3           Manual  11 8 19 10 25 19 10 28 19 11 1 19    L hip PROM  5 min Light, gentle   5 min  5x/30"  5 min  5 min    L ham stretch 5x30"   Light , gentle   5x/30" 5x30" 5x30"      L SI oscillations    n/a    L Quad stretch   4x/20" *Light , gentle   4x/20" 5x/20" 5x/20"     10 min total 15 min total MT 10 min total 10 min total        Exercise Diary  11 8 19 10 25 19 10 28 19 11 1 19 19   Nustep L3  x12'  w/MHP to LB L2 x 12 min   L3 x 12 min L2  x12' L3  x12'  w/MHP to LB   SKTC  L 7 x10"   w/asst 7x/10"  w/sheet asst 7x10"   w/asst 7 x10"   w/asst 7 x10"   w/asst   LTRs 3x10/ 5" 2x10/ 5"   2x10/5" 25 x 5" 25x5"   Bridges w/ ball 3x10/3" 2x10/ 5" 25x/5" 25x5" 25x5"   Bridge w/ TB Green 30x5" Green 20x5"   Green 25x5" Green 25x5"    Knee ext mach     11#  20x   Knee flex mach     11#  20x   Leg press       50# 30x   Standing march,Abd,ext 1x40 ea 1x20 ea 1x30 ea 1x30 ea 30 ea   Step ups   Up L, down R  1x20 Alt 1x15 Alt 1x15 Alt 1x20 ea    Mini squats 2x20 25x   30 30x    Seated -March  -LAQ   -TR/HR   -45x  -45x short range  -50x -40x  -40x short range  -45x   -40x  -40x short range  -45x   -40x  -40x short range  -45x    L heel slides Supine   3x15 Supine   4x10 Supine   4x10   3x15    L sup abd/add        L SLRs 3x10 Indep on R  *w/asst on L LE   2x10 w/asst 2x10 w/asst 2x15 w/asst    Sit-stand transfers from chair (No arms) 3x 5 trials from armless ruben  *2x 5 trials from armless chair 2x 5 trials from armless chair                                        Modalities  11 8 19 10 25 19 10 28 19 11 1 19 11/5   Moist heat LB x 10 min @ beginning  of treatment while on NuStep  **extra toweling applied LB x 10 min @ beginning  of treatment while on NuStep  **extra toweling applied Declined HP or CP today LB x 10 min @ beginning  of treatment while on NuStep  **extra toweling applied LB x 10 min @ beginning  of treatment while on NuStep   CP *Deferred

## 2019-11-11 ENCOUNTER — APPOINTMENT (OUTPATIENT)
Dept: PHYSICAL THERAPY | Facility: CLINIC | Age: 64
End: 2019-11-11
Payer: OTHER MISCELLANEOUS

## 2019-11-11 ENCOUNTER — TRANSCRIBE ORDERS (OUTPATIENT)
Dept: PHYSICAL THERAPY | Facility: CLINIC | Age: 64
End: 2019-11-11

## 2019-11-11 DIAGNOSIS — M25.552 LEFT HIP PAIN: Primary | ICD-10-CM

## 2019-11-12 ENCOUNTER — OFFICE VISIT (OUTPATIENT)
Dept: PHYSICAL THERAPY | Facility: CLINIC | Age: 64
End: 2019-11-12
Payer: OTHER MISCELLANEOUS

## 2019-11-12 DIAGNOSIS — M25.552 LEFT HIP PAIN: Primary | ICD-10-CM

## 2019-11-12 PROCEDURE — 97140 MANUAL THERAPY 1/> REGIONS: CPT

## 2019-11-12 PROCEDURE — 97110 THERAPEUTIC EXERCISES: CPT

## 2019-11-12 NOTE — PROGRESS NOTES
Daily Note     Today's date: 2019  Patient name: Waldo Silva  : 1955  MRN: [id number]  Referring provider: Sangeeta Escobedo PA-C  Dx:   Encounter Diagnosis     ICD-10-CM    1  Left hip pain M25 552                   Subjective:  Pt  States her pain level is down a notch today from /10 to 10  Objective: See treatment diary below      Assessment: Tolerated treatment Fair+ overall with performance and tolerance to Ther Exer and manual therapy    Patient able to walk for longer distance, and stand for more prolonged time period, however only more minimal for both activities  Plan: Progress treatment as tolerated  Con't services 2x/week            Precautions: fall risk due to impaired gt and balance, R knee pain    Re-eval Date: 19        Date 11 8 19 11 12 19 10 28 19 11 1 19    Visit Count 11 12 8 9 10   FOTO  **completed      Pain In 4/10 4/10   LB, L Hip, L Quad 4/10 5/10 4   Pain Out 3/10 3/10 3/10 3-/10 3     Nov 27              Manual  11 8 19 11 12 19 10 28 19 11 1 19 11   L hip PROM  5 min 5 min    5 min  5 min    L ham stretch 5x30"    5x/30" 5x30" 5x30"      L SI oscillations    n/a    L Quad stretch   4x/20" Light , gentle   4x/20" 5x/20" 5x/20"     10 min total 15 min total MT 10 min total 10 min total        Exercise Diary  11 8 19 11 12 19 10 28 19 11 1 19 19   Nustep L3  x12'  w/MHP to LB L3 x 13 min  w/MHP to LB   L3 x 12 min L2  x12' L3  x12'  w/MHP to LB   SKTC  L 7 x10"   w/asst 8x/10"  w/sheet asst 7x10"   w/asst 7 x10"   w/asst 7 x10"   w/asst   LTRs 3x10/ 5" 3x10/ 5"   2x10/5" 25 x 5" 25x5"   Bridges w/ ball 3x10/3" 3x10/ 5" 25x/5" 25x5" 25x5"   Bridge w/ TB Green 30x5" Green 30x5"   Green 25x5" Green 25x5"    Knee ext mach  *deferred   11#  20x   Knee flex mach  *deferred   11#  20x   Leg press  *deferred     50# 30x   Standing march,Abd,ext 1x40 ea 1x40 ea 1x30 ea 1x30 ea 30 ea   Step ups   Up L, down R  1x20 Alt 1x25 Alt 1x15 Alt 1x20 ea    Mini squats 2x20 2x20   30 30x    Seated -March  -LAQ   -TR/HR   -45x  -45x short range  -50x     -45x  -45x short range  -50x   -40x  -40x short range  -45x   -40x  -40x short range  -45x    L heel slides Supine   3x15 Supine   4x10 Supine   4x10   3x15    L sup abd/add        L SLRs 3x10 Indep on R  *w/asst on L LE   3x10 Indep on R  *w/asst on L LE 2x10 w/asst 2x15 w/asst    Sit-stand transfers from chair (No arms) 3x 5 trials from armless chair 3x 5 trials from armless chair *2x 5 trials from armless chair 2x 5 trials from armless chair                                        Modalities  11 8 19 11 12 19 10 28 19 11 1 19 11/5   Moist heat LB x 10 min @ beginning  of treatment while on NuStep  **extra toweling applied LB x 10 min @ beginning  of treatment while on NuStep  **extra toweling applied Declined HP or CP today LB x 10 min @ beginning  of treatment while on NuStep  **extra toweling applied LB x 10 min @ beginning  of treatment while on NuStep   CP *Deferred

## 2019-11-15 ENCOUNTER — OFFICE VISIT (OUTPATIENT)
Dept: PHYSICAL THERAPY | Facility: CLINIC | Age: 64
End: 2019-11-15
Payer: OTHER MISCELLANEOUS

## 2019-11-15 DIAGNOSIS — M25.552 LEFT HIP PAIN: Primary | ICD-10-CM

## 2019-11-15 PROCEDURE — 97110 THERAPEUTIC EXERCISES: CPT

## 2019-11-15 NOTE — PROGRESS NOTES
Daily Note     Today's date: 11/15/2019  Patient name: Dayanna Stallings  : 1955  MRN: [id number]  Referring provider: Lisa Garces PA-C  Dx:   Encounter Diagnosis     ICD-10-CM    1  Left hip pain M25 552                   Subjective:  No c/o initially upon arriving for therapy, however while on NuStep, pt  stated she didnt feel well with an upset stomach  Says she had a procedure (endoscopy) last week and feels everything may still be irritated in her esophagus and abdomen  Objective: See treatment diary below      Assessment: Pt  decided to leave therapy early and not con't with full exercise program, due to not feeling well  (See S: above)  Plan:  Will continue services next week with plan to resume full program         Precautions: fall risk due to impaired gt and balance, R knee pain    Re-eval Date: 19        Date 11 8 19 11 12 19 11 15 19 11 1 19    Visit Count 11 12 13 9 10   FOTO  **completed      Pain In /10 4/10   LB, L Hip, L Quad 3-4/10   LB, L Hip, L Quad 5/10 4   Pain Out 3/10 3/10 3/10 3-/10 3     Nov 27 Nov 27             Manual  11 8 19 11 12 19 11 15 19 11 1 19    L hip PROM  5 min 5 min   **Hold this visit   5 min    L ham stretch 5x30"    5x/30" **Hold this visit  5x30"      L SI oscillations    n/a    L Quad stretch   4x/20" Light , gentle   4x/20" **Hold this visit  5x/20"     10 min total 15 min total MT  10 min total        Exercise Diary  11 8 19 11 12 19 11 15 19 11 1 19 19   Nustep L3  x12'  w/MHP to LB L3 x 13 min  w/MHP to LB   L3 x 13 min L2  x12' L3  x12'  w/MHP to LB   SKTC  L 7 x10"   w/asst 8x/10"  w/sheet asst **Hold this visit 7 x10"   w/asst 7 x10"   w/asst   LTRs 3x10/ 5" 3x10/ 5"   **Hold this visit 25 x 5" 25x5"   Bridges w/ ball 3x10/3" 3x10/ 5" **Hold this visit 25x5" 25x5"   Bridge w/ TB Green 30x5" Green 30x5"   **Hold this visit Green 25x5"    Knee ext mach  *deferred **Hold this visit  11#  20x   Knee flex mach  *deferred **Hold this visit  11#  20x   Leg press  *deferred   **Hold this visit  50# 30x   Standing march,Abd,ext 1x40 ea 1x40 ea **Hold this visit 1x30 ea 30 ea   Step ups   Up L, down R  1x20 Alt 1x25 Alt **Hold this visit 1x20 ea    Mini squats 2x20 2x20   **Hold this visit 30x    Seated -March  -LAQ   -TR/HR   -45x  -45x short range  -50x     -45x  -45x short range  -50x **Hold this visit   -40x  -40x short range  -45x    L heel slides Supine   3x15 Supine   4x10 **Hold this visit   3x15    L sup abd/add        L SLRs 3x10 Indep on R  *w/asst on L LE   3x10 Indep on R  *w/asst on L LE **Hold this visit 2x15 w/asst    Sit-stand transfers from chair (No arms) 3x 5 trials from armless chair 3x 5 trials from armless chair **Hold this visit 2x 5 trials from armless chair                                        Modalities  11 8 19 11 12 19 11 15 19 11 1 19 11/5   Moist heat LB x 10 min @ beginning  of treatment while on NuStep  **extra toweling applied LB x 10 min @ beginning  of treatment while on NuStep  **extra toweling applied Declined HP  LB x 10 min @ beginning  of treatment while on NuStep  **extra toweling applied LB x 10 min @ beginning  of treatment while on NuStep   CP *Deferred

## 2019-11-18 ENCOUNTER — OFFICE VISIT (OUTPATIENT)
Dept: PHYSICAL THERAPY | Facility: CLINIC | Age: 64
End: 2019-11-18
Payer: OTHER MISCELLANEOUS

## 2019-11-18 DIAGNOSIS — M25.552 LEFT HIP PAIN: Primary | ICD-10-CM

## 2019-11-18 PROCEDURE — 97140 MANUAL THERAPY 1/> REGIONS: CPT

## 2019-11-18 PROCEDURE — 97110 THERAPEUTIC EXERCISES: CPT

## 2019-11-18 NOTE — PROGRESS NOTES
Daily Note     Today's date: 2019  Patient name: Shante Clayton  : 1955  MRN: [id number]  Referring provider: Jazmin Villavicencio PA-C  Dx:   Encounter Diagnosis     ICD-10-CM    1  Left hip pain M25 552                   Subjective:  Pt  States she feels much better today than she did last Friday  Says she believes she had an upset stomach due to taking meds  on an empty stomach  Objective: See treatment diary below      Assessment: Tolerated treatment Fair+ to Fairly Well overall with performance and tolerance to Ther Exer and Manual Therapy    Patient able to complete full exercise program today  Plan: Progress treatment as tolerated  Con't services 2x/week            Precautions: fall risk due to impaired gt and balance, R knee pain    Re-eval Date: 19        Date 11 8 19 11 12 19 11 15 19 11 18 19    Visit Count 11 12 13 14 10   FOTO  **completed      Pain In /10 4/10   LB, L Hip, L Quad 3-4/10   LB, L Hip, L Quad 4/10 LB 4   Pain Out 3/10 310 3/10 3 5/10 3     Nov 27 Nov 27 Nov 27            Manual  11 8 19 11 12 19 11 15 19 11 18 19    L hip PROM  5 min 5 min   **Hold this visit   5 min    L ham stretch 5x30"    5x/30" **Hold this visit  5x30"      L SI oscillations    n/a    L Quad stretch   4x/20" Light , gentle   4x/20" **Hold this visit  5x/20"     10 min total 15 min total MT  15 min total        Exercise Diary  11 8 19 11 12 19 11 15 19 11 18 19 19   Nustep L3  x12'  w/MHP to LB L3 x 13 min  w/MHP to LB   L3 x 13 min L3  x13'  w/MHP to LB L3  x12'  w/MHP to LB   SKTC  L 7 x10"   w/asst 8x/10"  w/sheet asst **Hold this visit 8 x10"   w/asst 7 x10"   w/asst   LTRs 3x10/ 5" 3x10/ 5"   **Hold this visit 3x10/5" 25x5"   Bridges w/ ball 3x10/3" 3x10/ 5" **Hold this visit 3x10/5" 25x5"   Bridge w/ TB Green 30x5" Green 30x5"   **Hold this visit Green 30x5"    Knee ext mach  *deferred **Hold this visit *deferred 11#  20x   Knee flex mach  *deferred **Hold this visit *deferred 11#  20x   Leg press  *deferred   **Hold this visit *40# 30x 50# 30x   Standing march,Abd,ext 1x40 ea 1x40 ea **Hold this visit 1x45 ea 30 ea   Step ups   Up L, down R  1x20 Alt 1x25 Alt **Hold this visit 1x30 ea    Mini squats 2x20 2x20   **Hold this visit 40x    Seated -March  -LAQ   -TR/HR   -45x  -45x short range  -50x     -45x  -45x short range  -50x **Hold this visit   -45x  -45x short range  -50x    L heel slides Supine   3x15 Supine   4x10 **Hold this visit   4x10    L sup abd/add        L SLRs 3x10 Indep on R  *w/asst on L LE   3x10 Indep on R  *w/asst on L LE **Hold this visit 3x10 Indep on R  *w/asst on L LE    Sit-stand transfers from chair (No arms) 3x 5 trials from armless chair 3x 5 trials from armless chair **Hold this visit 3 x 5 trials from armless chair                                        Modalities  11 8 19 11 12 19 11 15 19 11 18 19 11/5   Moist heat LB x 10 min @ beginning  of treatment while on NuStep  **extra toweling applied LB x 10 min @ beginning  of treatment while on NuStep  **extra toweling applied Declined HP  LB x 10 min @ beginning  of treatment while on NuStep  **extra toweling applied LB x 10 min @ beginning  of treatment while on NuStep   CP *Deferred

## 2019-11-22 ENCOUNTER — OFFICE VISIT (OUTPATIENT)
Dept: PHYSICAL THERAPY | Facility: CLINIC | Age: 64
End: 2019-11-22
Payer: OTHER MISCELLANEOUS

## 2019-11-22 DIAGNOSIS — M25.552 LEFT HIP PAIN: Primary | ICD-10-CM

## 2019-11-22 PROCEDURE — 97110 THERAPEUTIC EXERCISES: CPT

## 2019-11-22 PROCEDURE — 97140 MANUAL THERAPY 1/> REGIONS: CPT

## 2019-11-22 NOTE — PROGRESS NOTES
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 yesterday      Objective: See treatment diary below      Assessment: Tolerated treatment Fair+ to Fairly Well overall  Plan: Continue per plan of care             Precautions: fall risk due to impaired gt and balance, R knee pain    Re-eval Date: 19        Date 11 8 19 11 12 19 11 15 19 11 18 19 11 22 19   Visit Count 11 12 13 14 15   FOTO  **completed      Pain In 4/10 4/10   LB, L Hip, L Quad 3-4/10   LB, L Hip, L Quad 4/10 LB 4/10   Pain Out 3/10 3/10 3/10 3 5/10 3/10                Manual  11 8 19 11 12 19 11 15 19 11 18 19 11 22 19   L hip PROM  5 min 5 min   **Hold this visit   5 min 5 min   L ham stretch 5x30"    5x/30" **Hold this visit  5x30"   5x30"   L SI oscillations    n/a    L Quad stretch   4x/20" Light , gentle   4x/20" **Hold this visit  5x/20" 5x20"    10 min total 15 min total MT  15 min total 15 min total       Exercise Diary  11 8 19 11 12 19 11 15 19 11 18 19 11 22 19   Nustep L3  x12'  w/MHP to LB L3 x 13 min  w/MHP to LB   L3 x 13 min L3  x13'  w/MHP to LB L3 x12'  w/MHP to LB   SKTC  L 7 x10"   w/asst 8x/10"  w/sheet asst **Hold this visit 8 x10"   w/asst 8 x10"   w/asst   LTRs 3x10/ 5" 3x10/ 5"   **Hold this visit 3x10/5" 3x15/5"   Bridges w/ ball 3x10/3" 3x10/ 5" **Hold this visit 3x10/5"    Bridge w/ TB Green 30x5" Green 30x5"   **Hold this visit Green 30x5"    Knee ext mach  *deferred **Hold this visit *deferred 11#  20x   Knee flex mach  *deferred **Hold this visit *deferred 11#  20x   Leg press  *deferred   **Hold this visit *40# 30x 40# 30x   Standing march,Abd,ext 1x40 ea 1x40 ea **Hold this visit 1x45 ea 45x ea   Step ups   Up L, down R  1x20 Alt 1x25 Alt **Hold this visit 1x30 ea 1x30 ea   Mini squats 2x20 2x20   **Hold this visit 40x 40x   Seated -March  -LAQ   -TR/HR   -45x  -45x short range  -50x     -45x  -45x short range  -50x **Hold this visit   -45x  -45x short range  -50x   -45x  -45x short range  -50x   L heel slides Supine   3x15 Supine   4x10 **Hold this visit   4x10 4x10   L sup abd/add        L SLRs 3x10 Indep on R  *w/asst on L LE   3x10 Indep on R  *w/asst on L LE **Hold this visit 3x10 Indep on R  *w/asst on L LE 3x15 Indep on R  *w/asst on L LE   Sit-stand transfers from chair (No arms) 3x 5 trials from armless chair 3x 5 trials from armless chair **Hold this visit 3 x 5 trials from armless chair 4 x 5 trials from armless chair                   +                    Modalities  11 8 19 11 12 19 11 15 19 11 18 19 11 22 19   Moist heat LB x 10 min @ beginning  of treatment while on NuStep  **extra toweling applied LB x 10 min @ beginning  of treatment while on NuStep  **extra toweling applied Declined HP  LB x 10 min @ beginning  of treatment while on NuStep  **extra toweling applied LB x 10 min @ beginning  of treatment while on NuStep  **extra toweling applied     CP *Deferred

## 2019-11-25 ENCOUNTER — APPOINTMENT (OUTPATIENT)
Dept: PHYSICAL THERAPY | Facility: CLINIC | Age: 64
End: 2019-11-25
Payer: OTHER MISCELLANEOUS

## 2019-12-11 ENCOUNTER — APPOINTMENT (OUTPATIENT)
Dept: PHYSICAL THERAPY | Facility: CLINIC | Age: 64
End: 2019-12-11
Payer: OTHER MISCELLANEOUS

## 2019-12-13 ENCOUNTER — OFFICE VISIT (OUTPATIENT)
Dept: PHYSICAL THERAPY | Facility: CLINIC | Age: 64
End: 2019-12-13
Payer: OTHER MISCELLANEOUS

## 2019-12-13 DIAGNOSIS — M25.552 LEFT HIP PAIN: Primary | ICD-10-CM

## 2019-12-13 PROCEDURE — 97110 THERAPEUTIC EXERCISES: CPT

## 2019-12-13 PROCEDURE — 97140 MANUAL THERAPY 1/> REGIONS: CPT

## 2019-12-13 NOTE — PROGRESS NOTES
Daily Note     Today's date: 2019  Patient name: Dayanna Stallings  : 1955  MRN: [id number]  Referring provider: Lisa Garces PA-C  Dx:   Encounter Diagnosis     ICD-10-CM    1  Left hip pain M25 552                   Subjective:  Pt  States her pain level is still about a "4" for her LB/L Hip regions  Says she's a little bit more loose and she can do some things better, but that's not always the case  Says also, she needs to look into getting a knee replacement, because her L knee is "shot"  Objective: See treatment diary below      Assessment: Tolerated treatment Fair to Fair+ overall with performance and tolerance to ther exer  and manual stretch  Patient would benefit from continued PT      Plan: Continue per plan of care             Precautions: fall risk due to impaired gt and balance, R knee pain    Re-eval Date: 19        Date 12 13 19 11 12 19 11 15 19 11 18 19 11 22 19   Visit Count 16 12 13 14 15   FOTO  **completed      Pain In 4/10   LB, L Hip, L Quad 4/10   LB, L Hip, L Quad 3-4/10   LB, L Hip, L Quad 4/10 LB 4/10   Pain Out 3-4/10 3/10 3/10 3 5/10 3/10    Dec 24 Nov 27 Nov 27 Nov 27 Nov 27           Manual  12 13 19 11 12 19 11 15 19 11 18 19 11 22 19   L hip PROM  5 min 5 min   **Hold this visit   5 min 5 min   L ham stretch 5x30"    5x/30" **Hold this visit  5x30"   5x30"   L SI oscillations    n/a    L Quad stretch 4x/20" Light , gentle   4x/20" **Hold this visit  5x/20" 5x20"    10 min total 15 min total MT  15 min total 15 min total       Exercise Diary  12 13 19 11 12 19 11 15 19 11 18 19 11 22 19   Nustep L3  x13'   L3 x 13 min  w/MHP to LB   L3 x 13 min L3  x13'  w/MHP to LB L3 x12'  w/MHP to LB   SKTC  L 5 x10"   w/asst 8x/10"  w/sheet asst **Hold this visit 8 x10"   w/asst 8 x10"   w/asst   LTRs 3x10/ 5" 3x10/ 5"   **Hold this visit 3x10/5" 3x15/5"   Bridges w/ ball  3x10/ 5" **Hold this visit 3x10/5"    Bridge w/ TB  Green 30x5"   **Hold this visit Green 30x5" Knee ext mach  *deferred **Hold this visit *deferred 11#  20x   Knee flex mach  *deferred **Hold this visit *deferred 11#  20x   Leg press  *deferred   **Hold this visit *40# 30x 40# 30x   Standing march,Abd,ext 1x30 ea 1x40 ea **Hold this visit 1x45 ea 45x ea   Step ups   Up L, down R  1x30 Alt 1x25 Alt **Hold this visit 1x30 ea 1x30 ea   Mini squats 2x20 2x20   **Hold this visit 40x 40x   Seated -March  -LAQ   -TR/HR   -45x  -45x short range  -45x     -45x  -45x short range  -50x **Hold this visit   -45x  -45x short range  -50x   -45x  -45x short range  -50x   L heel slides Supine   3x15 Supine   4x10 **Hold this visit   4x10 4x10   L sup abd/add        L SLRs 2x10 Indep on R  *w/asst on L LE 1x10   3x10 Indep on R  *w/asst on L LE **Hold this visit 3x10 Indep on R  *w/asst on L LE 3x15 Indep on R  *w/asst on L LE   Sit-stand transfers from chair (No arms)  3x 5 trials from armless chair **Hold this visit 3 x 5 trials from armless chair 4 x 5 trials from armless chair                   +                    Modalities  11 8 19 11 12 19 11 15 19 11 18 19 11 22 19   Moist heat *declined offer for MHP applic LB x 10 min @ beginning  of treatment while on NuStep  **extra toweling applied Declined HP  LB x 10 min @ beginning  of treatment while on NuStep  **extra toweling applied LB x 10 min @ beginning  of treatment while on NuStep  **extra toweling applied     CP *Declined

## 2019-12-16 ENCOUNTER — APPOINTMENT (OUTPATIENT)
Dept: PHYSICAL THERAPY | Facility: CLINIC | Age: 64
End: 2019-12-16
Payer: OTHER MISCELLANEOUS

## 2019-12-19 ENCOUNTER — OFFICE VISIT (OUTPATIENT)
Dept: PHYSICAL THERAPY | Facility: CLINIC | Age: 64
End: 2019-12-19
Payer: OTHER MISCELLANEOUS

## 2019-12-19 DIAGNOSIS — M25.552 LEFT HIP PAIN: Primary | ICD-10-CM

## 2019-12-19 PROCEDURE — 97110 THERAPEUTIC EXERCISES: CPT

## 2019-12-19 PROCEDURE — 97140 MANUAL THERAPY 1/> REGIONS: CPT

## 2019-12-19 NOTE — PROGRESS NOTES
Daily Note     Today's date: 2019  Patient name: Karis Lion  : 1955  MRN: [id number]  Referring provider: Gulshan Ramirez PA-C  Dx:   Encounter Diagnosis     ICD-10-CM    1  Left hip pain M25 552        Start Time: 1100  Stop Time: 1200  Total time in clinic (min): 60 minutes    Subjective:  Pt  States she feels "pretty decent" today  Objective: See treatment diary below      Assessment: Tolerated treatment Fairly Well to Well overall with performance of ther exer and tolerance to manual therapy  Plan: Pt  will follow-up with MD on 19  Says she will call to schedule more therapy visits after she talk with the Doctor  Will continue as per MD recommendation       Precautions: fall risk due to impaired gt and balance, R knee pain    Re-eval Date: 19        Date 12 13 19 12 19 19 11 15 19 11 18 19 11 22 19   Visit Count 16 17 13 14 15   FOTO        Pain In 4/10   LB, L Hip, L Quad 2/10   LB, L Hip, L Quad 3-4/10   LB, L Hip, L Quad 4/10 LB 4/10   Pain Out 3-4/10 2/10 3/10 3 5/10 3/10    Dec 24 Dec 24 Nov 27 Nov 27 Nov 27           Manual  12 13 19 12 19 19 11 15 19 11 18 19 11 22 19   L hip PROM  5 min 5 min   **Hold this visit   5 min 5 min   L ham stretch 5x30"    5x/30" **Hold this visit  5x30"   5x30"   L SI oscillations    n/a    L Quad stretch 4x/20" Light , gentle   5x/20" **Hold this visit  5x/20" 5x20"    10 min total 10 min total MT  15 min total 15 min total       Exercise Diary  12 13 19 12 19 19 11 15 19 11 18 19 11 22 19   Nustep L3  x13'   L3 x 13 min     L3 x 13 min L3  x13'  w/MHP to LB L3 x12'  w/MHP to LB   SKTC  L 5 x10"   w/asst 6x/10"  w/sheet asst **Hold this visit 8 x10"   w/asst 8 x10"   w/asst   LTRs 3x10/ 5" 3x15/ 5"   **Hold this visit 3x10/5" 3x15/5"   Bridges w/ ball  3x10/ 5" **Hold this visit 3x10/5"    Bridge w/ TB  Green 30x5"   **Hold this visit Green 30x5"    Knee ext mach  resume **Hold this visit *deferred 11#  20x   Knee flex mach  resume **Hold this visit *deferred 11#  20x   Leg press  resume   **Hold this visit *40# 30x 40# 30x   Standing march,Abd,ext 1x30 ea 1x40 ea **Hold this visit 1x45 ea 45x ea   Step ups   Up L, down R  1x30 Alt 1x40 Alt **Hold this visit 1x30 ea 1x30 ea   Mini squats 40x 2x20   **Hold this visit 40x 40x   Seated -March  -LAQ   -TR/HR   -45x  -45x short range  -45x     -45x  -45x short range  -50x **Hold this visit   -45x  -45x short range  -50x   -45x  -45x short range  -50x   L heel slides Supine   3x15 Supine   3x15 **Hold this visit   4x10 4x10   L sup abd/add        L SLRs 3x15 Indep on R  *w/asst on L LE   3x15 Indep on R  *w/asst on L LE **Hold this visit 3x10 Indep on R  *w/asst on L LE 3x15 Indep on R  *w/asst on L LE   Sit-stand transfers from chair (No arms)   **Hold this visit 3 x 5 trials from armless chair 4 x 5 trials from armless chair                   +                    Modalities  12 13 19 12 19 19 11 15 19 11 18 19 11 22 19   Moist heat *declined offer for MHP applic LB x 10 min @ beginning  of treatment while on NuStep  **extra toweling applied Declined HP  LB x 10 min @ beginning  of treatment while on NuStep  **extra toweling applied LB x 10 min @ beginning  of treatment while on NuStep  **extra toweling applied     CP *Declined

## 2019-12-27 ENCOUNTER — APPOINTMENT (OUTPATIENT)
Dept: PHYSICAL THERAPY | Facility: CLINIC | Age: 64
End: 2019-12-27
Payer: OTHER MISCELLANEOUS

## 2020-01-06 ENCOUNTER — OFFICE VISIT (OUTPATIENT)
Dept: PHYSICAL THERAPY | Facility: CLINIC | Age: 65
End: 2020-01-06
Payer: OTHER MISCELLANEOUS

## 2020-01-06 DIAGNOSIS — M25.552 LEFT HIP PAIN: Primary | ICD-10-CM

## 2020-01-06 PROCEDURE — 97140 MANUAL THERAPY 1/> REGIONS: CPT | Performed by: PHYSICAL THERAPIST

## 2020-01-06 PROCEDURE — 97110 THERAPEUTIC EXERCISES: CPT | Performed by: PHYSICAL THERAPIST

## 2020-01-06 NOTE — PROGRESS NOTES
Daily Note     Today's date: 2020  Patient name: Frank Espinal  : 1955  MRN: [id number]  Referring provider: Kindra Zamudio PA-C  Dx:   Encounter Diagnosis     ICD-10-CM    1  Left hip pain M25 552                   Subjective: Pt reporting that she feels pretty good today  Objective: See treatment diary below      Assessment: Pt continues to need assistance to lift the L LE from sitting position to supine position and during supine SLR exercise  Flexibility improving but remains with tightness and mild discomfort  Pt declined any modalities to end, stating no change in pain throughout session  Plan: Continue per plan of care        Precautions: fall risk due to impaired gt and balance, R knee pain  Re-eval Date: 19        Date 12 13 19 12 19 19 20 11 18 19 11 22 19   Visit Count 16 17 1 14 15   FOTO        Pain In 4/10   LB, L Hip, L Quad 2/10   LB, L Hip, L Quad 2/10   LB, L Hip, L Quad 4/10 LB 4/10   Pain Out 3-4/10 2/10  3 5/10 3/10    Dec 24 Dec 24  Nov 27 Nov 27           Manual  12 13 19 12 19 19 20 11 18 19 11 22 19   L hip PROM  5 min 5 min     5 min 5 min   L ham stretch 5x30"    5x/30" 8 minutes  5x30"   5x30"   L SI oscillations    n/a    L Quad stretch 4x/20" Light , gentle   5x/20"  5x/20" 5x20"    10 min total 10 min total MT  15 min total 15 min total       Exercise Diary  12 13 19 12 19 19 20 11 18 19 11 22 19   Nustep L3  x13'   L3 x 13 min     L3 x 12 min L3  x13'  w/MHP to LB L3 x12'  w/MHP to LB   SKTC  L 5 x10"   w/asst 6x/10"  w/sheet asst  8 x10"   w/asst 8 x10"   w/asst   LTRs 3x10/ 5" 3x15/ 5"   15" x 5 gabriel  3x10/5" 3x15/5"   Bridges w/ ball  3x10/ 5" 5" x 30  3x10/5"    Bridge w/ TB  Green 30x5"   Green   5" x 30  Green 30x5"    Knee ext mach  resume  *deferred 11#  20x   Knee flex mach  resume  *deferred 11#  20x   Leg press  resume    *40# 30x 40# 30x   Standing march,Abd,ext 1x30 ea 1x40 ea 1 x 40 ea  1x45 ea 45x ea   Step ups   Up L, down R 1x30 Alt 1x40 Alt 1 x 40 Alt  1x30 ea 1x30 ea   Mini squats 40x 2x20   2 x 20  40x 40x   Seated -March  -LAQ   -TR/HR   -45x  -45x short range  -45x     -45x  -45x short range  -50x Seated   - 45 x   - 45 x short range   - 45 x    -45x  -45x short range  -50x   -45x  -45x short range  -50x   L heel slides Supine   3x15 Supine   3x15  4x10 4x10   L sup abd/add        L SLRs 3x15 Indep on R  *w/asst on L LE   3x15 Indep on R  *w/asst on L LE 3 x 10 AROM R   2 x 10 AAROM L  3x10 Indep on R  *w/asst on L LE 3x15 Indep on R  *w/asst on L LE   Sit-stand transfers from chair (No arms)   3 x 5 trials from armless chair 3 x 5 trials from armless chair 4 x 5 trials from armless chair                   +                    Modalities  12 13 19 12 19 19 1/6/20 11 18 19 11 22 19   Moist heat *declined offer for MHP applic LB x 10 min @ beginning  of treatment while on NuStep  **extra toweling applied Declined HP  LB x 10 min @ beginning  of treatment while on NuStep  **extra toweling applied LB x 10 min @ beginning  of treatment while on NuStep  **extra toweling applied     CP *Declined

## 2020-01-10 ENCOUNTER — TRANSCRIBE ORDERS (OUTPATIENT)
Dept: RADIOLOGY | Facility: CLINIC | Age: 65
End: 2020-01-10

## 2020-01-10 ENCOUNTER — OFFICE VISIT (OUTPATIENT)
Dept: PHYSICAL THERAPY | Facility: CLINIC | Age: 65
End: 2020-01-10
Payer: OTHER MISCELLANEOUS

## 2020-01-10 ENCOUNTER — APPOINTMENT (OUTPATIENT)
Dept: RADIOLOGY | Facility: CLINIC | Age: 65
End: 2020-01-10
Payer: COMMERCIAL

## 2020-01-10 DIAGNOSIS — M79.671 RIGHT FOOT PAIN: Primary | ICD-10-CM

## 2020-01-10 DIAGNOSIS — M25.552 LEFT HIP PAIN: Primary | ICD-10-CM

## 2020-01-10 DIAGNOSIS — M79.671 RIGHT FOOT PAIN: ICD-10-CM

## 2020-01-10 PROCEDURE — 97110 THERAPEUTIC EXERCISES: CPT

## 2020-01-10 PROCEDURE — 73630 X-RAY EXAM OF FOOT: CPT

## 2020-01-10 PROCEDURE — 97140 MANUAL THERAPY 1/> REGIONS: CPT

## 2020-01-10 NOTE — PROGRESS NOTES
Daily Note     Today's date: 1/10/2020  Patient name: Dot Barrientos  : 1955  MRN: [id number]  Referring provider: Rigoberto Roa PA-C  Dx:   Encounter Diagnosis     ICD-10-CM    1  Left hip pain M25 552                   Subjective:  No new complaints or concerns voiced by pt re: status of L Hip/LE      Objective: See treatment diary below      Assessment: Tolerated treatment Fair+ with performance and tolerance to ther exer, and Fairly well with tolerance to manual therapy         Plan: Continue per plan of care           Precautions: fall risk due to impaired gt and balance, R knee pain  Re-eval Date: 19        Date 12 13 19 12 19 19 20 1 10 20 11 22 19   Visit Count 16 17 1 2 15   FOTO        Pain In 4/10   LB, L Hip, L Quad 2/10   LB, L Hip, L Quad 2/10   LB, L Hip, L Quad 4/10 LB/L hip 4/10   Pain Out 3-4/10 2/10  3/10 3/10    Dec 24 Dec 24   Nov 27           Manual  12 13 19 12 19 19 20 1 10 20 11 22 19   L hip PROM  5 min 5 min     5 min 5 min   L ham stretch 5x30"    5x/30" 8 minutes  6x30"   5x30"   L SI oscillations    n/a    L Quad stretch 4x/20" Light , gentle   5x/20"  5x/20" 5x20"    10 min total 10 min total MT  15 min total 15 min total       Exercise Diary  12 13 19 12 19 19 20 1 10 20 11 22 19   Nustep L3  x13'   L3 x 13 min     L3 x 12 min L3  x13'   L3 x12'  w/MHP to LB   SKTC  L 5 x10"   w/asst 6x/10"  w/sheet asst  8 x10"   W/sheet asst 8 x10"   w/asst   LTRs 3x10/ 5" 3x15/ 5"   15" x 5 gabriel  2x15/5" 3x15/5"   Bridges w/ ball  3x10/ 5" 5" x 30  3x10/5"    Bridge w/ TB  Green 30x5"   Green   5" x 30  Green 30x5"    Knee ext mach  resume  deferred 11#  20x   Knee flex mach  resume  *deferred 11#  20x   Leg press  resume    40# 30x 40# 30x   Standing march,Abd,ext 1x30 ea 1x40 ea 1 x 40 ea  1x45 ea 45x ea   Step ups   Up L, down R  1x30 Alt 1x40 Alt 1 x 40 Alt  1x40 ea 1x30 ea   Mini squats 40x 2x20   2 x 20  2x20 40x   Seated -March  -LAQ   -TR/HR   -45x  -45x short range  -45x     -45x  -45x short range  -50x Seated   - 45 x   - 45 x short range   - 45 x  seated  -45x  -45x short range  -50x   -45x  -45x short range  -50x   L heel slides Supine   3x15 Supine   3x15  Supine  4x10 4x10   L sup abd/add        L SLRs 3x15 Indep on R  *w/asst on L LE   3x15 Indep on R  *w/asst on L LE 3 x 10 AROM R   2 x 10 AAROM L  3x10 Indep on R  *w/asst on L LE 3x15 Indep on R  *w/asst on L LE   Sit-stand transfers from chair (No arms)   3 x 5 trials from armless chair  4 x 5 trials from armless chair                   +                    Modalities  12 13 19 12 19 19 1/6/20 1 10 20 11 22 19   Moist heat *declined offer for MHP applic LB x 10 min @ beginning  of treatment while on NuStep  **extra toweling applied Declined HP  To LB during 1st 10 min while on mat table performing supine exer  **extra toweling applied LB x 10 min @ beginning  of treatment while on NuStep  **extra toweling applied     CP *Declined

## 2020-01-13 ENCOUNTER — APPOINTMENT (OUTPATIENT)
Dept: PHYSICAL THERAPY | Facility: CLINIC | Age: 65
End: 2020-01-13
Payer: OTHER MISCELLANEOUS

## 2020-01-17 ENCOUNTER — APPOINTMENT (EMERGENCY)
Dept: CT IMAGING | Facility: HOSPITAL | Age: 65
DRG: 045 | End: 2020-01-17
Payer: COMMERCIAL

## 2020-01-17 ENCOUNTER — APPOINTMENT (INPATIENT)
Dept: NON INVASIVE DIAGNOSTICS | Facility: HOSPITAL | Age: 65
DRG: 045 | End: 2020-01-17
Payer: COMMERCIAL

## 2020-01-17 ENCOUNTER — APPOINTMENT (EMERGENCY)
Dept: RADIOLOGY | Facility: HOSPITAL | Age: 65
DRG: 045 | End: 2020-01-17
Payer: COMMERCIAL

## 2020-01-17 ENCOUNTER — HOSPITAL ENCOUNTER (INPATIENT)
Facility: HOSPITAL | Age: 65
LOS: 6 days | DRG: 045 | End: 2020-01-23
Attending: EMERGENCY MEDICINE | Admitting: HOSPITALIST
Payer: COMMERCIAL

## 2020-01-17 ENCOUNTER — APPOINTMENT (INPATIENT)
Dept: MRI IMAGING | Facility: HOSPITAL | Age: 65
DRG: 045 | End: 2020-01-17
Payer: COMMERCIAL

## 2020-01-17 DIAGNOSIS — I42.9 CARDIOMYOPATHY (HCC): ICD-10-CM

## 2020-01-17 DIAGNOSIS — I48.91 ATRIAL FIBRILLATION WITH RVR (HCC): ICD-10-CM

## 2020-01-17 DIAGNOSIS — I63.9 CVA (CEREBRAL VASCULAR ACCIDENT) (HCC): Primary | ICD-10-CM

## 2020-01-17 PROBLEM — N17.9 AKI (ACUTE KIDNEY INJURY) (HCC): Status: ACTIVE | Noted: 2020-01-17

## 2020-01-17 PROBLEM — R77.8 ELEVATED TROPONIN I LEVEL: Status: ACTIVE | Noted: 2020-01-17

## 2020-01-17 PROBLEM — D72.829 LEUKOCYTOSIS: Status: ACTIVE | Noted: 2020-01-17

## 2020-01-17 PROBLEM — R79.89 ELEVATED TROPONIN I LEVEL: Status: ACTIVE | Noted: 2020-01-17

## 2020-01-17 LAB
ANION GAP SERPL CALCULATED.3IONS-SCNC: 11 MMOL/L (ref 4–13)
ANISOCYTOSIS BLD QL SMEAR: PRESENT
APTT PPP: 28 SECONDS (ref 23–37)
BUN SERPL-MCNC: 34 MG/DL (ref 7–25)
BURR CELLS BLD QL SMEAR: PRESENT
CALCIUM SERPL-MCNC: 9.1 MG/DL (ref 8.6–10.5)
CHLORIDE SERPL-SCNC: 103 MMOL/L (ref 98–107)
CO2 SERPL-SCNC: 24 MMOL/L (ref 21–31)
CREAT SERPL-MCNC: 1.24 MG/DL (ref 0.6–1.2)
ERYTHROCYTE [DISTWIDTH] IN BLOOD BY AUTOMATED COUNT: 21.5 % (ref 11.5–14.5)
GFR SERPL CREATININE-BSD FRML MDRD: 47 ML/MIN/1.73SQ M
GLUCOSE SERPL-MCNC: 107 MG/DL (ref 65–140)
GLUCOSE SERPL-MCNC: 121 MG/DL (ref 65–140)
GLUCOSE SERPL-MCNC: 169 MG/DL (ref 65–99)
GLUCOSE SERPL-MCNC: 180 MG/DL (ref 65–140)
HCT VFR BLD AUTO: 39 % (ref 42–47)
HGB BLD-MCNC: 11.6 G/DL (ref 12–16)
HYPERCHROMIA BLD QL SMEAR: PRESENT
INR PPP: 1.02 (ref 0.9–1.5)
MCH RBC QN AUTO: 21.9 PG (ref 26–34)
MCHC RBC AUTO-ENTMCNC: 29.7 G/DL (ref 31–37)
MCV RBC AUTO: 74 FL (ref 81–99)
PLATELET # BLD AUTO: 356 THOUSANDS/UL (ref 149–390)
PLATELET BLD QL SMEAR: ADEQUATE
PMV BLD AUTO: 8.4 FL (ref 8.6–11.7)
POTASSIUM SERPL-SCNC: 3.6 MMOL/L (ref 3.5–5.5)
PROCALCITONIN SERPL-MCNC: <0.05 NG/ML
PROTHROMBIN TIME: 11.8 SECONDS (ref 10.2–13)
RBC # BLD AUTO: 5.29 MILLION/UL (ref 3.9–5.2)
RBC MORPH BLD: NORMAL
SODIUM SERPL-SCNC: 138 MMOL/L (ref 134–143)
TROPONIN I SERPL-MCNC: 0.06 NG/ML
TROPONIN I SERPL-MCNC: 0.06 NG/ML
TROPONIN I SERPL-MCNC: 0.07 NG/ML
WBC # BLD AUTO: 15.6 THOUSAND/UL (ref 4.8–10.8)

## 2020-01-17 PROCEDURE — 70498 CT ANGIOGRAPHY NECK: CPT

## 2020-01-17 PROCEDURE — 84484 ASSAY OF TROPONIN QUANT: CPT | Performed by: NURSE PRACTITIONER

## 2020-01-17 PROCEDURE — 93005 ELECTROCARDIOGRAM TRACING: CPT

## 2020-01-17 PROCEDURE — 36569 INSJ PICC 5 YR+ W/O IMAGING: CPT | Performed by: EMERGENCY MEDICINE

## 2020-01-17 PROCEDURE — 84484 ASSAY OF TROPONIN QUANT: CPT | Performed by: EMERGENCY MEDICINE

## 2020-01-17 PROCEDURE — 85730 THROMBOPLASTIN TIME PARTIAL: CPT | Performed by: EMERGENCY MEDICINE

## 2020-01-17 PROCEDURE — 70551 MRI BRAIN STEM W/O DYE: CPT

## 2020-01-17 PROCEDURE — G0425 INPT/ED TELECONSULT30: HCPCS | Performed by: PSYCHIATRY & NEUROLOGY

## 2020-01-17 PROCEDURE — 70496 CT ANGIOGRAPHY HEAD: CPT

## 2020-01-17 PROCEDURE — 82948 REAGENT STRIP/BLOOD GLUCOSE: CPT

## 2020-01-17 PROCEDURE — 84145 PROCALCITONIN (PCT): CPT | Performed by: NURSE PRACTITIONER

## 2020-01-17 PROCEDURE — 85610 PROTHROMBIN TIME: CPT | Performed by: EMERGENCY MEDICINE

## 2020-01-17 PROCEDURE — 93306 TTE W/DOPPLER COMPLETE: CPT | Performed by: INTERNAL MEDICINE

## 2020-01-17 PROCEDURE — 85027 COMPLETE CBC AUTOMATED: CPT | Performed by: EMERGENCY MEDICINE

## 2020-01-17 PROCEDURE — 99223 1ST HOSP IP/OBS HIGH 75: CPT | Performed by: STUDENT IN AN ORGANIZED HEALTH CARE EDUCATION/TRAINING PROGRAM

## 2020-01-17 PROCEDURE — 99285 EMERGENCY DEPT VISIT HI MDM: CPT | Performed by: EMERGENCY MEDICINE

## 2020-01-17 PROCEDURE — 99285 EMERGENCY DEPT VISIT HI MDM: CPT

## 2020-01-17 PROCEDURE — 71045 X-RAY EXAM CHEST 1 VIEW: CPT

## 2020-01-17 PROCEDURE — 80048 BASIC METABOLIC PNL TOTAL CA: CPT | Performed by: EMERGENCY MEDICINE

## 2020-01-17 PROCEDURE — 36415 COLL VENOUS BLD VENIPUNCTURE: CPT | Performed by: EMERGENCY MEDICINE

## 2020-01-17 PROCEDURE — 93306 TTE W/DOPPLER COMPLETE: CPT

## 2020-01-17 RX ORDER — ACETAMINOPHEN 325 MG/1
650 TABLET ORAL EVERY 4 HOURS PRN
Status: DISCONTINUED | OUTPATIENT
Start: 2020-01-17 | End: 2020-01-23 | Stop reason: HOSPADM

## 2020-01-17 RX ORDER — LORAZEPAM 2 MG/ML
0.5 INJECTION INTRAMUSCULAR ONCE
Status: COMPLETED | OUTPATIENT
Start: 2020-01-17 | End: 2020-01-17

## 2020-01-17 RX ORDER — SODIUM CHLORIDE 9 MG/ML
75 INJECTION, SOLUTION INTRAVENOUS CONTINUOUS
Status: DISCONTINUED | OUTPATIENT
Start: 2020-01-17 | End: 2020-01-17

## 2020-01-17 RX ORDER — SODIUM CHLORIDE 9 MG/ML
125 INJECTION, SOLUTION INTRAVENOUS CONTINUOUS
Status: DISCONTINUED | OUTPATIENT
Start: 2020-01-17 | End: 2020-01-18

## 2020-01-17 RX ORDER — DILTIAZEM HYDROCHLORIDE 5 MG/ML
15 INJECTION INTRAVENOUS ONCE
Status: COMPLETED | OUTPATIENT
Start: 2020-01-17 | End: 2020-01-17

## 2020-01-17 RX ORDER — METOPROLOL TARTRATE 5 MG/5ML
5 INJECTION INTRAVENOUS ONCE
Status: COMPLETED | OUTPATIENT
Start: 2020-01-17 | End: 2020-01-17

## 2020-01-17 RX ORDER — GLIMEPIRIDE 2 MG/1
1 TABLET ORAL 2 TIMES DAILY
Status: DISCONTINUED | OUTPATIENT
Start: 2020-01-17 | End: 2020-01-23 | Stop reason: HOSPADM

## 2020-01-17 RX ORDER — ASPIRIN 81 MG/1
81 TABLET, CHEWABLE ORAL DAILY
Status: DISCONTINUED | OUTPATIENT
Start: 2020-01-17 | End: 2020-01-18

## 2020-01-17 RX ORDER — SODIUM CHLORIDE 9 MG/ML
125 INJECTION, SOLUTION INTRAVENOUS CONTINUOUS
Status: DISCONTINUED | OUTPATIENT
Start: 2020-01-18 | End: 2020-01-18

## 2020-01-17 RX ORDER — ASPIRIN 300 MG/1
300 SUPPOSITORY RECTAL ONCE
Status: COMPLETED | OUTPATIENT
Start: 2020-01-17 | End: 2020-01-17

## 2020-01-17 RX ORDER — ATORVASTATIN CALCIUM 40 MG/1
40 TABLET, FILM COATED ORAL EVERY EVENING
Status: DISCONTINUED | OUTPATIENT
Start: 2020-01-17 | End: 2020-01-23 | Stop reason: HOSPADM

## 2020-01-17 RX ADMIN — METOPROLOL TARTRATE 5 MG: 5 INJECTION INTRAVENOUS at 15:18

## 2020-01-17 RX ADMIN — DILTIAZEM HYDROCHLORIDE 5 MG/HR: 5 INJECTION INTRAVENOUS at 13:33

## 2020-01-17 RX ADMIN — SODIUM CHLORIDE 125 ML/HR: 9 INJECTION, SOLUTION INTRAVENOUS at 17:26

## 2020-01-17 RX ADMIN — DILTIAZEM HYDROCHLORIDE 5 MG: 5 INJECTION INTRAVENOUS at 15:35

## 2020-01-17 RX ADMIN — IOHEXOL 85 ML: 350 INJECTION, SOLUTION INTRAVENOUS at 12:49

## 2020-01-17 RX ADMIN — LORAZEPAM 0.5 MG: 2 INJECTION INTRAMUSCULAR; INTRAVENOUS at 17:33

## 2020-01-17 RX ADMIN — SODIUM CHLORIDE 250 ML: 9 INJECTION, SOLUTION INTRAVENOUS at 19:30

## 2020-01-17 RX ADMIN — SODIUM CHLORIDE 125 ML/HR: 9 INJECTION, SOLUTION INTRAVENOUS at 20:22

## 2020-01-17 RX ADMIN — TIMOLOL MALEATE 1 DROP: 2.5 SOLUTION/ DROPS OPHTHALMIC at 20:12

## 2020-01-17 RX ADMIN — ASPIRIN 300 MG: 300 SUPPOSITORY RECTAL at 17:18

## 2020-01-17 NOTE — NURSING NOTE
This 72-year-old female received from ER via stretcher, to ICU, bed #6  Patient alert, awake, follows commands  Cries and moans at times  Expressive aphasia persists, though  Patient occasional says clear, appropriate words  Slight right-sided facial droop persists  Echo completed at bedside  Tele-Neuro consult completed at 1500  Hospitalist in to assess patient and spoke to family concerning patient  Patient in rapid A-Fib/ flutter upon arrival from ER  's  Cardizem drip continued from ER  (5mg/hr from ER, then titrated up) Lopressor 5 mg given , then Cardizem 5 mg IV push , as ordered by Mark Anthony Miranda NP  Patient noted to be in and out of A-Fib,  with SR noted at times,

## 2020-01-17 NOTE — TELEMEDICINE
TeleConsultation - Stroke   Lencho Stovall 60 y o  female MRN: [id number]   Encounter: [id number]      REQUIRED DOCUMENTATION:     1  This service was provided via Telemedicine  2  Provider located at home  3  TeleMed provider: Gal Engel MD   4  Identify all parties in room with patient during tele consult:  RN  5  After connecting through [employer], patient was identified by name and date of birth and assistant checked wristband  Patient was then informed that this was a Telemedicine visit and that the exam was being conducted confidentially over secure lines  My office door was closed  No one else was in the room  Patient could not consent due to aphasia       Assessment/Plan   Assessment: Acute left MCA infarct    TPA Decision: Patient not a TPA candidate  Unclear time of onset outside appropriate time window  CT head shows evolving hypo-densities in left MCA territory  CTA shows no LVO or significant stenosis    Plan:   Stroke work-up including MRI, TTE and lipid panel  A-fib was diagnosed on admission, and it is most definitely the etiology behind the infarct  We suggest rate control at this time, with augmentation of BP to SBP of 140-180 mmHg  No anticoagulation yet due to risk of hemorrhagic transformation  TTE as soon as possible to look for intra-atrial thrombi  Aspirin 362 mg once then 81 mg daily  PT/OT and speech therapy     History of Present Illness     Reason for Consult / Principal Problem: stroke  Hx and PE limited by: aphasia  Patient last known well: last night at 07:30 pm  Stroke alert called: 12:39 pm  Neurology time of arrival: same time  HPI: Lencho Stovall is a 60 y o  female who was last seen normal last night at 07:30 pm  This morning, she called her brother around 11:30 am and was talking incoherently  She was brought to ED where she was found to have complete aphasia and right hemiplegia  Symptoms gradually improved in regards to weakness, yet speech remained the same  Inpatient consult to Neurology  Consult performed by: Kemar Campbell MD  Consult ordered by: LEW Skaggs        Review of Systems  Complete ROS was done and is negative other than what is mentioned in HPI    Historical Information   Past Medical History:   Diagnosis Date    Hypertension     PUD (peptic ulcer disease)     Vitamin D deficiency      Past Surgical History:   Procedure Laterality Date    ABDOMINOPLASTY      revision gastric bypass     SECTION      CHOLECYSTECTOMY      COLONOSCOPY      GASTRIC BYPASS      HERNIA REPAIR      NEUROMA EXCISION Right 2019    Procedure: EXCISION 2ND INTERSPACE NEUROMA;  Surgeon: Lucas Aase, DPM;  Location: [address] [place] [address];  Service: Podiatry    ROTATOR CUFF REPAIR Left      Social History   Social History     Substance and Sexual Activity   Alcohol Use No     Social History     Substance and Sexual Activity   Drug Use No     Social History     Tobacco Use   Smoking Status Never Smoker   Smokeless Tobacco Never Used     Family History: Could not review due to aphasia    Review of previous medical records was completed  Meds/Allergies   PTA meds:   Prior to Admission Medications   Prescriptions Last Dose Informant Patient Reported? Taking?    Ergocalciferol (VITAMIN D2 PO)   Yes No   Sig: Take 50,000 Units by mouth once a week Takes on    calcium carbonate (OS-VENTURA) 600 MG tablet   Yes No   Sig: Take 600 mg by mouth 3 (three) times a day with meals    hydrochlorothiazide (HYDRODIURIL) 12 5 mg tablet   Yes No   Sig: Take 12 5 mg by mouth every morning    timolol (TIMOPTIC-XE) 0 25 % ophthalmic gel-forming   Yes No   Sig: Administer 1 drop to both eyes 2 (two) times a day    traMADol (ULTRAM) 50 mg tablet   Yes No   Sig: Take 50 mg by mouth 2 (two) times a day as needed       Facility-Administered Medications: None       Allergies   Allergen Reactions    Nsaids      Due to hx gastric bypass         Objective   Vitals:Blood pressure 116/80, pulse (!) 154, temperature 98 5 °F (36 9 °C), temperature source Temporal, resp  rate (!) 27, weight 124 kg (273 lb 1 6 oz), SpO2 99 %  ,Body mass index is 48 38 kg/m²  No intake or output data in the 24 hours ending 20 1505    Invasive Devices: Invasive Devices     Peripheral Intravenous Line            Peripheral IV 20 Left Antecubital less than 1 day                Physical Exam NAD  Skin: no seen lesions  HEENT: ATNC  Chest: breathing comfortably on room air  GI: no apparent distension  Neurologic Exam  Alert, and can follow commands by mimicking  Global aphasia  Maybe neglect to the right or right hemianopsia (difficult to ascertain on tele)  Right facial weakness moderate  Able to lift both arms and legs  There is pronator drift without fall of right arm  There is a drift of right leg  Can feel painful sensation bilaterally  NIHSS:  1a Level of Consciousness: 0 = Alert   1b  LOC Questions: 2 = Answers neither correctly   1c  LOC Commands: 2 = Obeys neither correctly   2  Best Gaze: 0 = Normal   3  Visual: 0 = No visual field loss   4  Facial Palsy: 2=Partial paralysis (total or near total paralysis of the lower face)   5a  Motor Right Arm: 0=No drift, limb holds 90 (or 45) degrees for full 10 seconds   5b  Motor Left Arm: 0=No drift, limb holds 90 (or 45) degrees for full 10 seconds   6a  Motor Right Le=Drift, limb holds 90 (or 45) degrees but drifts down before full 10 seconds: does not hit bed   6b  Motor Left Le=No drift, limb holds 90 (or 45) degrees for full 10 seconds   7  Limb Ataxia:  0=Absent   8  Sensory: 0=Normal; no sensory loss   9  Best Language:  3=Mute, global aphasia; no usable speech or auditory comprehension   10  Dysarthria: 2=Severe; patient speech is so slurred as to be unintelligible in the absence of or our of proportion to any dysphagia, or is mute/anarthric   11   Extinction and Inattention (formerly Neglect): 1=inattention   Total Score: 13     Time NIHSS was completed: 3:00 pm    Lab Results:   CBC:   Results from last 7 days   Lab Units 01/17/20  1247   WBC Thousand/uL 15 60*   RBC Million/uL 5 29*   HEMOGLOBIN g/dL 11 6*   HEMATOCRIT % 39 0*   MCV fL 74*   PLATELETS Thousands/uL 356   , BMP/CMP:   Results from last 7 days   Lab Units 01/17/20  1247   SODIUM mmol/L 138   POTASSIUM mmol/L 3 6   CHLORIDE mmol/L 103   CO2 mmol/L 24   BUN mg/dL 34*   CREATININE mg/dL 1 24*   CALCIUM mg/dL 9 1   EGFR ml/min/1 73sq m 47   , HgBA1C:   , TSH:   , Coagulation:   Results from last 7 days   Lab Units 01/17/20  1247   INR  1 02   , Lipid Profile:     Imaging Studies: I have personally reviewed pertinent films in PACS  EKG, Pathology, and Other Studies: I have personally reviewed pertinent reports      VTE Prophylaxis: Enoxaparin (Lovenox)    Code Status: Level 1 - Full Code  Advance Directive and Living Will:      Power of :    POLST:      Counseling / Coordination of Care  N/A

## 2020-01-17 NOTE — ASSESSMENT & PLAN NOTE
· The patient presented with aphasia, right-sided weakness  · Suspected to be related cardiac emboli due to afib  · Not a candidate for tPA due to last well time is unknown   · Neurology input appreciated   · Will continue with stroke pathway   · ASA and statin   · Obtain MRI brain   · Check echocardiogram

## 2020-01-17 NOTE — ASSESSMENT & PLAN NOTE
· With what appears to be SVT initially -160s   · Patient did not receive any Cardizem bolus in the ED due to concern for hypotension   · Upon arrival on the floor the patient, -180s- metoprolol 5 mg IV given followed by 5 mg cardizem  cardizem drip increased to 15 mg  HR appears much improved with periods of conversion to NSR  · Will continue with cardizem drip for now titrate to keep heart rate less than 100  · Will have to monitor blood pressure very closely- ideally to keep SBP between 140-180  · Cardiology input appreciated   · Recommend to continue with diltiazem drip for now; IVF   · Will hold off on starting anticoagulation until MRI obtained due to risk of hemorrhagic transformation

## 2020-01-17 NOTE — PLAN OF CARE
Problem: Communication Impairment  Goal: Ability to express needs and understand communication  Description  Assess patient's communication skills and ability to understand information  Patient will demonstrate use of effective communication techniques, alternative methods of communication and understanding even if not able to speak  - Encourage communication and provide alternate methods of communication as needed  - Collaborate with case management/ for discharge needs  - Include patient/family/caregiver in decisions related to communication  Outcome: Not Progressing     Problem: Neurological Deficit  Goal: Neurological status is stable or improving  Description  Interventions:  - Monitor and assess patient's level of consciousness, motor function, sensory function, and level of assistance needed for ADLs  - Monitor and report changes from baseline  Collaborate with interdisciplinary team to initiate plan and implement interventions as ordered  - Provide and maintain a safe environment  - Consider seizure precautions  - Consider fall precautions  - Consider aspiration precautions  - Consider bleeding precautions    Outcome: Progressing

## 2020-01-17 NOTE — ASSESSMENT & PLAN NOTE
· Unclear etiology suspected to be reactive  · The patient is afebrile  · Will check procalcitonin  · Chest x-ray shows no acute infiltrate  · Check urinalysis  · Monitor CBC closely

## 2020-01-17 NOTE — ASSESSMENT & PLAN NOTE
· Likely due to volume depletion   · Will give gentle IVF   · Check PVR   · If renal function dose not improve will consult nephrology

## 2020-01-17 NOTE — ED PROVIDER NOTES
History  Chief Complaint   Patient presents with   [address]     altered mental status, expressive aphasia, right sided drift     Last known well was at 7:30 p m  No one was at home with the patient  This morning she called her brother but he could understand what she was saying is her speech was garbled  Unable to communicate symptoms to EMS  When they 1st picked her up she was able to lift up both arms and both legs without drift  However she has progressed during transport and her time in the ED and is currently only inconsistently following commands and there is obvious right facial droop and right arm weakness          Prior to Admission Medications   Prescriptions Last Dose Informant Patient Reported? Taking?    Ergocalciferol (VITAMIN D2 PO)   Yes No   Sig: Take 50,000 Units by mouth once a week Takes on    calcium carbonate (OS-VENTURA) 600 MG tablet   Yes No   Sig: Take 600 mg by mouth 3 (three) times a day with meals    hydrochlorothiazide (HYDRODIURIL) 12 5 mg tablet   Yes No   Sig: Take 12 5 mg by mouth every morning    timolol (TIMOPTIC-XE) 0 25 % ophthalmic gel-forming   Yes No   Sig: Administer 1 drop to both eyes 2 (two) times a day    traMADol (ULTRAM) 50 mg tablet   Yes No   Sig: Take 50 mg by mouth 2 (two) times a day as needed       Facility-Administered Medications: None       Past Medical History:   Diagnosis Date    Diabetes mellitus ([address] )     Hypertension     PUD (peptic ulcer disease)     Vitamin D deficiency        Past Surgical History:   Procedure Laterality Date    ABDOMINOPLASTY      revision gastric bypass     SECTION      CHOLECYSTECTOMY      COLONOSCOPY      GASTRIC BYPASS      HERNIA REPAIR      NEUROMA EXCISION Right 2019    Procedure: EXCISION 2ND INTERSPACE NEUROMA;  Surgeon: Sim Meyer DPM;  Location: [address] [place] [address];  Service: Podiatry    ROTATOR CUFF REPAIR Left        Family History   Problem Relation Age of Onset    Stroke Mother    Shelia Hamilton Heart disease Father      I have reviewed and agree with the history as documented  Social History     Tobacco Use    Smoking status: Never Smoker    Smokeless tobacco: Never Used   Substance Use Topics    Alcohol use: No     Comment: none    Drug use: No     Comment: none        Review of Systems   Unable to perform ROS: Patient nonverbal       Physical Exam  Physical Exam   Constitutional: She appears well-nourished  No distress  HENT:   Head: Normocephalic and atraumatic  Eyes: Conjunctivae are normal    Neck: Normal range of motion  Neck supple  Cardiovascular: Regular rhythm and normal heart sounds  Pulmonary/Chest: Effort normal and breath sounds normal    Abdominal: Soft  Bowel sounds are normal  She exhibits no mass  There is no tenderness  There is no guarding  Musculoskeletal: She exhibits no edema  Neurological: She is alert  Skin: Skin is warm and dry         Vital Signs  ED Triage Vitals   Temperature Pulse Respirations Blood Pressure SpO2   01/17/20 1256 01/17/20 1255 01/17/20 1255 01/17/20 1256 01/17/20 1256   97 7 °F (36 5 °C) (!) 157 (!) 27 125/82 97 %      Temp Source Heart Rate Source Patient Position - Orthostatic VS BP Location FiO2 (%)   01/17/20 1256 01/17/20 1256 01/17/20 1256 01/17/20 1256 --   Tympanic Monitor Lying Right arm       Pain Score       01/17/20 1256       No Pain           Vitals:    01/17/20 1800 01/17/20 1815 01/17/20 1830 01/17/20 1845   BP: 111/55 (!) 86/51 108/55 108/57   Pulse: 68 69 70 72   Patient Position - Orthostatic VS:  Lying Lying          Visual Acuity  Visual Acuity      Most Recent Value   L Pupil Size (mm)  3   R Pupil Size (mm)  3   L Pupil Shape  Round   R Pupil Shape  Round          ED Medications  Medications   timolol (TIMOPTIC) 0 25 % ophthalmic solution 1 drop (has no administration in time range)   acetaminophen (TYLENOL) tablet 650 mg (has no administration in time range)   atorvastatin (LIPITOR) tablet 40 mg (40 mg Oral Not Given 1/17/20 1718)   aspirin chewable tablet 81 mg (81 mg Oral Not Given 1/17/20 1538)   diltiazem (CARDIZEM) 125 mg in sodium chloride 0 9 % 125 mL infusion (5 mg/hr Intravenous Rate/Dose Change 1/17/20 1737)   sodium chloride 0 9 % infusion (125 mL/hr Intravenous New Bag 1/17/20 1726)     Followed by   sodium chloride 0 9 % infusion (has no administration in time range)   iohexol (OMNIPAQUE) 350 MG/ML injection (MULTI-DOSE) 85 mL (85 mL Intravenous Given 1/17/20 1249)   diltiazem (CARDIZEM) 125 mg in sodium chloride 0 9 % 125 mL infusion (5 mg/hr Intravenous New Bag 1/17/20 1333)   metoprolol (LOPRESSOR) injection 5 mg (5 mg Intravenous Given 1/17/20 1518)   diltiazem (CARDIZEM) injection 15 mg (5 mg Intravenous Given 1/17/20 1535)   aspirin rectal suppository 300 mg (300 mg Rectal Given 1/17/20 1718)   LORazepam (ATIVAN) 2 mg/mL injection 0 5 mg (0 5 mg Intravenous Given 1/17/20 1733)       Diagnostic Studies  Results Reviewed     Procedure Component Value Units Date/Time    Troponin I [[id number]]  (Abnormal) Collected:  01/17/20 1247    Lab Status:  Final result Specimen:  Blood from Arm, Left Updated:  01/17/20 1326     Troponin I 0 06 ng/mL     Basic metabolic panel [[id number]]  (Abnormal) Collected:  01/17/20 1247    Lab Status:  Final result Specimen:  Blood from Arm, Left Updated:  01/17/20 1325     Sodium 138 mmol/L      Potassium 3 6 mmol/L      Chloride 103 mmol/L      CO2 24 mmol/L      ANION GAP 11 mmol/L      BUN 34 mg/dL      Creatinine 1 24 mg/dL      Glucose 169 mg/dL      Calcium 9 1 mg/dL      eGFR 47 ml/min/1 73sq m     Narrative:       Carisa guidelines for Chronic Kidney Disease (CKD):     Stage 1 with normal or high GFR (GFR > 90 mL/min/1 73 square meters)    Stage 2 Mild CKD (GFR = 60-89 mL/min/1 73 square meters)    Stage 3A Moderate CKD (GFR = 45-59 mL/min/1 73 square meters)    Stage 3B Moderate CKD (GFR = 30-44 mL/min/1 73 square meters)    Stage 4 Severe CKD (GFR = 15-29 mL/min/1 73 square meters)    Stage 5 End Stage CKD (GFR <15 mL/min/1 73 square meters)  Note: GFR calculation is accurate only with a steady state creatinine    Protime-INR [[id number]]  (Normal) Collected:  01/17/20 1247    Lab Status:  Final result Specimen:  Blood from Arm, Left Updated:  01/17/20 1320     Protime 11 8 seconds      INR 1 02    APTT [[id number]]  (Normal) Collected:  01/17/20 1247    Lab Status:  Final result Specimen:  Blood from Arm, Left Updated:  01/17/20 1320     PTT 28 seconds     CBC and Platelet [[id number]]  (Abnormal) Collected:  01/17/20 1247    Lab Status:  Final result Specimen:  Blood from Arm, Left Updated:  01/17/20 1310     WBC 15 60 Thousand/uL      RBC 5 29 Million/uL      Hemoglobin 11 6 g/dL      Hematocrit 39 0 %      MCV 74 fL      MCH 21 9 pg      MCHC 29 7 g/dL      RDW 21 5 %      Platelets 653 Thousands/uL      MPV 8 4 fL     Fingerstick Glucose (POCT) [[id number]]  (Abnormal) Collected:  01/17/20 1237    Lab Status:  Final result Updated:  01/17/20 1245     POC Glucose 180 mg/dl                  X-ray chest 1 view portable   Final Result by Rigo Cha MD (01/17 1817)      No acute cardiopulmonary disease  Findings are stable            Workstation performed: [id number]         CTA stroke alert (head/neck)   Final Result by Dustin Bryan MD (01/17 1881)      No large vessel flow restrictive disease within the head or neck  No large vessel intracranial occlusion  Findings were directly discussed with SYDNEE HERNANDEZ on 1/17/2020 1:08 PM                      Workstation performed: [id number]         CT stroke alert brain   Final Result by Dustin Bryan MD (01/17 5084)      Recent bland ischemic edema, multifocal within the left MCA distribution  Minimal local mass effect  Event estimated to be at least several hours of age        Findings were directly discussed with SYDNEE HERNANDEZ on 1/17/2020 12:46 PM       [employer] performed: [id number]         MRI brain wo contrast    (Results Pending)              Procedures  Procedures         ED Course             Stroke Assessment     Row Name 01/17/20 1238             NIH Stroke Scale    Interval  Baseline      Level of Consciousness (1a )  0      LOC Questions (1b )  0      LOC Commands (1c )  0      Best Gaze (2 )        Visual (3 )        Facial Palsy (4 )  2      Motor Arm, Left (5a )  0      Motor Arm, Right (5b )  1      Motor Leg, Left (6a )        Motor Leg, Right (6b )        Limb Ataxia (7 )        Sensory (8 )        Best Language (9 )        Dysarthria (10 )        Extinction and Inattention (11 ) (Formerly Neglect)        Total                            MDM      Disposition  Final diagnoses:   CVA (cerebral vascular accident) ([facility] )   Atrial fibrillation with RVR ([facility] )     Time reflects when diagnosis was documented in both MDM as applicable and the Disposition within this note     Time User Action Codes Description Comment    1/17/2020  1:23 PM Nannie Sings Add [I63 9] CVA (cerebral vascular accident) ([facility] )     1/17/2020  1:23 PM Nannie Sings Add [I48 91] Atrial fibrillation with RVR ([facility] )     1/17/2020  2:23 PM [place] Milka Modify [I63 9] CVA (cerebral vascular accident) ([facility] )     1/17/2020  2:23 PM Paulino Milka Modify [I48 91] Atrial fibrillation with RVR ([facility] )     1/17/2020  2:25 PM [place] Milka Modify [I63 9] CVA (cerebral vascular accident) ([facility] )     1/17/2020  2:25 PM Paulino Milka Modify [I48 91] Atrial fibrillation with RVR ([facility] )     1/17/2020  2:25 PM Paulino Milka Add [I10] Essential hypertension     1/17/2020  2:25 PM [place] Milka Modify [I63 9] CVA (cerebral vascular accident) ([facility] )     1/17/2020  2:25 PM Paulino Milka Modify [I48 91] Atrial fibrillation with RVR ([facility] )     1/17/2020  2:25 PM Paulino Milka Modify [I63 9] CVA (cerebral vascular accident) ([facility] )     1/17/2020  2:25 PM Chaim Smith [I48 91] Atrial fibrillation with RVR ([facility] )     1/17/2020  2:25 PM Sherral Cowper Remove [I10] Essential hypertension     1/17/2020  2:25 PM Sherral Cowper Modify [I63 9] CVA (cerebral vascular accident) ([facility] )     1/17/2020  2:25 PM Sherral Cowper Modify [I48 91] Atrial fibrillation with RVR ([facility] )     1/17/2020  2:25 PM Sherral Cowper Modify [I63 9] CVA (cerebral vascular accident) ([facility] )     1/17/2020  2:25 PM Sherral Cowper Modify [I48 91] Atrial fibrillation with RVR [facility])       ED Disposition     ED Disposition Condition Date/Time Comment    Admit Stable Fri Jan 17, 2020  1:23 PM Case was discussed with alejandro and the patient's admission status was agreed to be Admission Status: inpatient status to the service of Dr Nga Maxwell   Follow-up Information    None         Current Discharge Medication List      CONTINUE these medications which have NOT CHANGED    Details   calcium carbonate (OS-VENTURA) 600 MG tablet Take 600 mg by mouth 3 (three) times a day with meals       Ergocalciferol (VITAMIN D2 PO) Take 50,000 Units by mouth once a week Takes on Sunday      hydrochlorothiazide (HYDRODIURIL) 12 5 mg tablet Take 12 5 mg by mouth every morning       timolol (TIMOPTIC-XE) 0 25 % ophthalmic gel-forming Administer 1 drop to both eyes 2 (two) times a day       traMADol (ULTRAM) 50 mg tablet Take 50 mg by mouth 2 (two) times a day as needed            No discharge procedures on file      ED Provider  Electronically Signed by           Prabha Coker MD  01/17/20 0063

## 2020-01-17 NOTE — ASSESSMENT & PLAN NOTE
· Likely due to rapid rate   · Will continue to trend troponin   · echocardiogram   · Cardiology consult   · Monitor on telemetry

## 2020-01-17 NOTE — H&P
H&P- Dayanna Stallings 1955, 60 y o  female MRN: [id number]    Unit/Bed#: ICU 06 Encounter: [id number]    Primary Care Provider: Nilsa Farfan DO   Date and time admitted to hospital: 1/17/2020 12:37 PM        * CVA (cerebral vascular accident) [facility])  Assessment & Plan  · The patient presented with aphasia, right-sided weakness  · Suspected to be related cardiac emboli due to afib  · Not a candidate for tPA due to last well time is unknown   · Neurology input appreciated   · Will continue with stroke pathway   · ASA and statin   · Obtain MRI brain   · Check echocardiogram    Atrial fibrillation with RVR ([facility] [address] )  Assessment & Plan  · With what appears to be SVT initially -160s   · Patient did not receive any Cardizem bolus in the ED due to concern for hypotension   · Upon arrival on the floor the patient, -180s- metoprolol 5 mg IV given followed by 5 mg cardizem  cardizem drip increased to 15 mg  HR appears much improved with periods of conversion to NSR  · Will continue with cardizem drip for now titrate to keep heart rate less than 100  · Will have to monitor blood pressure very closely- ideally to keep SBP between 140-180  · Cardiology input appreciated   · Recommend to continue with diltiazem drip for now; IVF   · Will hold off on starting anticoagulation until MRI obtained due to risk of hemorrhagic transformation        Leukocytosis  Assessment & Plan  · Unclear etiology suspected to be reactive  · The patient is afebrile  · Will check procalcitonin  · Chest x-ray shows no acute infiltrate  · Check urinalysis  · Monitor CBC closely    Elevated troponin I level  Assessment & Plan  · Likely due to rapid rate   · Will continue to trend troponin   · echocardiogram   · Cardiology consult   · Monitor on telemetry    SANDRINE (acute kidney injury) ([facility] [address] )  Assessment & Plan  · Likely due to volume depletion   · Will give gentle IVF   · Check PVR   · If renal function dose not improve will consult nephrology    Essential hypertension  Assessment & Plan  · Patient was on HCTZ at home   · Will hold off for now   · Monitor BP closely     VTE Prophylaxis: SCDs  Code Status: Full Code   POLST: POLST is not applicable to this patient  Discussion with family: son, brother, and grandson     Anticipated Length of Stay:  Patient will be admitted on an Inpatient basis with an anticipated length of stay of  > 2 midnights  Justification for Hospital Stay: CVA     Chief Complaint:   Aphasia and right-sided weakness    History of Present Illness:    Arina Bowles is a 60 y o  female who presents with aphasia and right-sided weakness  The patient does have profound expressive aphasia and information was obtained from her family and ED staff  The patient was last seen normal last night at 7:30 p m  Viral Minor She called her brother around 11:30 a m  And was talking incoherently and subsequently was brought into the ED for further evaluation  It was noted that the patient has complete aphasia with right hemiplegia  Stroke alert was called in the ED and neurology evaluation was done  Additionally the patient was noted to have atrial fibrillation with rapid ventricular response  Review of Systems:  Review of Systems   Unable to perform ROS: Acuity of condition       Past Medical and Surgical History:   Past Medical History:   Diagnosis Date    Hypertension     PUD (peptic ulcer disease)     Vitamin D deficiency        Past Surgical History:   Procedure Laterality Date    ABDOMINOPLASTY      revision gastric bypass     SECTION      CHOLECYSTECTOMY      COLONOSCOPY      GASTRIC BYPASS      HERNIA REPAIR      NEUROMA EXCISION Right 2019    Procedure: EXCISION 2ND INTERSPACE NEUROMA;  Surgeon: Lucas Aase, DPM;  Location: [address] [place] [address];  Service: Podiatry    ROTATOR CUFF REPAIR Left        Meds/Allergies:  Prior to Admission medications    Medication Sig Start Date End Date Taking?  Authorizing Provider calcium carbonate (OS-VENTURA) 600 MG tablet Take 600 mg by mouth 3 (three) times a day with meals     Historical Provider, MD   Ergocalciferol (VITAMIN D2 PO) Take 50,000 Units by mouth once a week Takes on Sunday 5/28/19   Historical Provider, MD   hydrochlorothiazide (HYDRODIURIL) 12 5 mg tablet Take 12 5 mg by mouth every morning     Historical Provider, MD   timolol (TIMOPTIC-XE) 0 25 % ophthalmic gel-forming Administer 1 drop to both eyes 2 (two) times a day     Historical Provider, MD   traMADol (ULTRAM) 50 mg tablet Take 50 mg by mouth 2 (two) times a day as needed  2/15/19   Historical Provider, MD     I have reviewed home medications with patient family member  Allergies: Allergies   Allergen Reactions    Nsaids      Due to hx gastric bypass         Social History:  Marital Status: Single   Occupation: n/a   Patient Pre-hospital Living Situation: alone   Patient Pre-hospital Level of Mobility: independent   Patient Pre-hospital Diet Restrictions: none   Substance Use History:     Social History     Substance and Sexual Activity   Alcohol Use No     Social History     Tobacco Use   Smoking Status Never Smoker   Smokeless Tobacco Never Used     Social History     Substance and Sexual Activity   Drug Use No       Family History:  I have reviewed the patients family history    Physical Exam:   Vitals:   Blood Pressure: 117/72 (01/17/20 1600)  Pulse: 105 (01/17/20 1600)  Temperature: 98 5 °F (36 9 °C) (01/17/20 1430)  Temp Source: Temporal (01/17/20 1430)  Respirations: (!) 23 (01/17/20 1600)  Height: 5' 6" (167 6 cm) (01/17/20 1430)  Weight - Scale: 119 kg (262 lb 5 6 oz) (01/17/20 1430)  SpO2: 96 % (01/17/20 1600)    Physical Exam   Constitutional: She appears well-developed and well-nourished  She appears distressed (mild distress due to unable to communicate)  HENT:   Head: Normocephalic and atraumatic     Mouth/Throat: Oropharynx is clear and moist    Eyes: Pupils are equal, round, and reactive to light  Conjunctivae and EOM are normal    Neck: Normal range of motion  Neck supple  No thyromegaly present  Cardiovascular: Normal heart sounds  Exam reveals no gallop and no friction rub  No murmur heard  Irregularly irregular    Pulmonary/Chest: Effort normal  She has no wheezes  She has no rales  Decreased in bases     Abdominal: Soft  Bowel sounds are normal  She exhibits no distension  There is no tenderness  There is no rebound  Musculoskeletal: Normal range of motion  She exhibits no edema, tenderness or deformity  Neurological: She is alert  No sensory deficit  GCS eye subscore is 4  GCS verbal subscore is 4  GCS motor subscore is 6    +right sided facial droop, +expressive and receptive aphasia, strength RUE +3/5, LUE +4/5, BLEs strength +4/5    Skin: Skin is warm and dry  No rash noted  No erythema  Psychiatric:   Unable assess     Vitals reviewed  Additional Data:   Lab Results: I have personally reviewed pertinent reports  Results from last 7 days   Lab Units 01/17/20  1247   WBC Thousand/uL 15 60*   HEMOGLOBIN g/dL 11 6*   HEMATOCRIT % 39 0*   PLATELETS Thousands/uL 356     Results from last 7 days   Lab Units 01/17/20  1247   POTASSIUM mmol/L 3 6   CHLORIDE mmol/L 103   CO2 mmol/L 24   BUN mg/dL 34*   CREATININE mg/dL 1 24*   CALCIUM mg/dL 9 1     Results from last 7 days   Lab Units 01/17/20  1247   INR  1 02     Results from last 7 days   Lab Units 01/17/20  1237   POC GLUCOSE mg/dl 180*           Imaging: I have personally reviewed pertinent reports  X-ray chest 1 view portable   Final Result by Dee Phelan MD (01/17 7756)      No acute cardiopulmonary disease  Findings are stable            Workstation performed: [id number]         CTA stroke alert (head/neck)   Final Result by Geraldo Bhatia MD (01/17 9880)      No large vessel flow restrictive disease within the head or neck  No large vessel intracranial occlusion              Findings were directly discussed with SYDNEE HERNANDEZ on 1/17/2020 1:08 PM                      Workstation performed: [id number]         CT stroke alert brain   Final Result by Ciarra Augustine MD (01/17 1248)      Recent bland ischemic edema, multifocal within the left MCA distribution  Minimal local mass effect  Event estimated to be at least several hours of age  Findings were directly discussed with SYDNEE HERNANDEZ on 1/17/2020 12:46 PM       Workstation performed: [id number]         MRI Inpatient Order    (Results Pending)       EKG, Pathology, and Other Studies Reviewed on Admission:   · EKG: SVT      NetAccess/Epic Records Reviewed: Yes     ** Please Note: This note has been constructed using a voice recognition system   **

## 2020-01-18 ENCOUNTER — APPOINTMENT (INPATIENT)
Dept: RADIOLOGY | Facility: HOSPITAL | Age: 65
DRG: 045 | End: 2020-01-18
Payer: COMMERCIAL

## 2020-01-18 PROBLEM — E66.01 MORBID OBESITY WITH BODY MASS INDEX (BMI) OF 40.0 TO 44.9 IN ADULT (HCC): Status: ACTIVE | Noted: 2020-01-18

## 2020-01-18 LAB
ALBUMIN SERPL BCP-MCNC: 3.2 G/DL (ref 3.5–5.7)
ALP SERPL-CCNC: 62 U/L (ref 55–165)
ALT SERPL W P-5'-P-CCNC: 11 U/L (ref 7–52)
ANION GAP SERPL CALCULATED.3IONS-SCNC: 9 MMOL/L (ref 4–13)
APTT PPP: 26 SECONDS (ref 23–37)
APTT PPP: 31 SECONDS (ref 23–37)
AST SERPL W P-5'-P-CCNC: 15 U/L (ref 13–39)
BILIRUB SERPL-MCNC: 0.6 MG/DL (ref 0.2–1)
BILIRUB UR QL STRIP: NEGATIVE
BUN SERPL-MCNC: 24 MG/DL (ref 7–25)
CALCIUM SERPL-MCNC: 7.7 MG/DL (ref 8.6–10.5)
CHLORIDE SERPL-SCNC: 109 MMOL/L (ref 98–107)
CHOLEST SERPL-MCNC: 172 MG/DL (ref 0–200)
CLARITY UR: CLEAR
CO2 SERPL-SCNC: 21 MMOL/L (ref 21–31)
COLOR UR: YELLOW
CREAT SERPL-MCNC: 0.9 MG/DL (ref 0.6–1.2)
ERYTHROCYTE [DISTWIDTH] IN BLOOD BY AUTOMATED COUNT: 21.2 % (ref 11.5–14.5)
EST. AVERAGE GLUCOSE BLD GHB EST-MCNC: 126 MG/DL
GFR SERPL CREATININE-BSD FRML MDRD: 69 ML/MIN/1.73SQ M
GLUCOSE SERPL-MCNC: 101 MG/DL (ref 65–99)
GLUCOSE SERPL-MCNC: 86 MG/DL (ref 65–140)
GLUCOSE UR STRIP-MCNC: NEGATIVE MG/DL
HBA1C MFR BLD: 6 % (ref 4.2–6.3)
HCT VFR BLD AUTO: 32.4 % (ref 42–47)
HDLC SERPL-MCNC: 44 MG/DL
HGB BLD-MCNC: 9.7 G/DL (ref 12–16)
HGB UR QL STRIP.AUTO: NEGATIVE
INR PPP: 1.08 (ref 0.9–1.5)
KETONES UR STRIP-MCNC: NEGATIVE MG/DL
LDLC SERPL CALC-MCNC: 109 MG/DL (ref 0–100)
LEUKOCYTE ESTERASE UR QL STRIP: NEGATIVE
MAGNESIUM SERPL-MCNC: 1.8 MG/DL (ref 1.9–2.7)
MCH RBC QN AUTO: 22.1 PG (ref 26–34)
MCHC RBC AUTO-ENTMCNC: 29.9 G/DL (ref 31–37)
MCV RBC AUTO: 74 FL (ref 81–99)
NITRITE UR QL STRIP: NEGATIVE
PH UR STRIP.AUTO: 6.5 [PH]
PLATELET # BLD AUTO: 293 THOUSANDS/UL (ref 149–390)
PMV BLD AUTO: 8.2 FL (ref 8.6–11.7)
POTASSIUM SERPL-SCNC: 3.8 MMOL/L (ref 3.5–5.5)
PROT SERPL-MCNC: 5.8 G/DL (ref 6.4–8.9)
PROT UR STRIP-MCNC: NEGATIVE MG/DL
PROTHROMBIN TIME: 12.5 SECONDS (ref 10.2–13)
RBC # BLD AUTO: 4.38 MILLION/UL (ref 3.9–5.2)
SODIUM SERPL-SCNC: 139 MMOL/L (ref 134–143)
SP GR UR STRIP.AUTO: 1.01 (ref 1–1.03)
TRIGL SERPL-MCNC: 93 MG/DL (ref 44–166)
UROBILINOGEN UR QL STRIP.AUTO: 0.2 E.U./DL
WBC # BLD AUTO: 8.4 THOUSAND/UL (ref 4.8–10.8)

## 2020-01-18 PROCEDURE — 85610 PROTHROMBIN TIME: CPT | Performed by: INTERNAL MEDICINE

## 2020-01-18 PROCEDURE — 99255 IP/OBS CONSLTJ NEW/EST HI 80: CPT | Performed by: PHYSICAL MEDICINE & REHABILITATION

## 2020-01-18 PROCEDURE — 80053 COMPREHEN METABOLIC PANEL: CPT | Performed by: NURSE PRACTITIONER

## 2020-01-18 PROCEDURE — 99233 SBSQ HOSP IP/OBS HIGH 50: CPT | Performed by: HOSPITALIST

## 2020-01-18 PROCEDURE — 82948 REAGENT STRIP/BLOOD GLUCOSE: CPT

## 2020-01-18 PROCEDURE — 02HV33Z INSERTION OF INFUSION DEVICE INTO SUPERIOR VENA CAVA, PERCUTANEOUS APPROACH: ICD-10-PCS | Performed by: EMERGENCY MEDICINE

## 2020-01-18 PROCEDURE — 97167 OT EVAL HIGH COMPLEX 60 MIN: CPT

## 2020-01-18 PROCEDURE — 97530 THERAPEUTIC ACTIVITIES: CPT

## 2020-01-18 PROCEDURE — 80061 LIPID PANEL: CPT | Performed by: NURSE PRACTITIONER

## 2020-01-18 PROCEDURE — 85730 THROMBOPLASTIN TIME PARTIAL: CPT | Performed by: HOSPITALIST

## 2020-01-18 PROCEDURE — 85730 THROMBOPLASTIN TIME PARTIAL: CPT | Performed by: INTERNAL MEDICINE

## 2020-01-18 PROCEDURE — 83735 ASSAY OF MAGNESIUM: CPT | Performed by: NURSE PRACTITIONER

## 2020-01-18 PROCEDURE — 71045 X-RAY EXAM CHEST 1 VIEW: CPT

## 2020-01-18 PROCEDURE — 83036 HEMOGLOBIN GLYCOSYLATED A1C: CPT | Performed by: NURSE PRACTITIONER

## 2020-01-18 PROCEDURE — 85027 COMPLETE CBC AUTOMATED: CPT | Performed by: NURSE PRACTITIONER

## 2020-01-18 PROCEDURE — 97163 PT EVAL HIGH COMPLEX 45 MIN: CPT

## 2020-01-18 PROCEDURE — 92610 EVALUATE SWALLOWING FUNCTION: CPT

## 2020-01-18 PROCEDURE — 99253 IP/OBS CNSLTJ NEW/EST LOW 45: CPT | Performed by: INTERNAL MEDICINE

## 2020-01-18 PROCEDURE — 93005 ELECTROCARDIOGRAM TRACING: CPT

## 2020-01-18 PROCEDURE — 81003 URINALYSIS AUTO W/O SCOPE: CPT | Performed by: NURSE PRACTITIONER

## 2020-01-18 RX ORDER — LORAZEPAM 0.5 MG/1
0.5 TABLET ORAL EVERY 8 HOURS PRN
Status: DISCONTINUED | OUTPATIENT
Start: 2020-01-18 | End: 2020-01-23 | Stop reason: HOSPADM

## 2020-01-18 RX ORDER — METOPROLOL TARTRATE 5 MG/5ML
5 INJECTION INTRAVENOUS EVERY 6 HOURS
Status: DISCONTINUED | OUTPATIENT
Start: 2020-01-18 | End: 2020-01-19

## 2020-01-18 RX ORDER — HEPARIN SODIUM 1000 [USP'U]/ML
4000 INJECTION, SOLUTION INTRAVENOUS; SUBCUTANEOUS AS NEEDED
Status: DISCONTINUED | OUTPATIENT
Start: 2020-01-18 | End: 2020-01-20

## 2020-01-18 RX ORDER — ASPIRIN 300 MG/1
300 SUPPOSITORY RECTAL ONCE
Status: COMPLETED | OUTPATIENT
Start: 2020-01-18 | End: 2020-01-18

## 2020-01-18 RX ORDER — HEPARIN SODIUM 1000 [USP'U]/ML
2000 INJECTION, SOLUTION INTRAVENOUS; SUBCUTANEOUS AS NEEDED
Status: DISCONTINUED | OUTPATIENT
Start: 2020-01-18 | End: 2020-01-20

## 2020-01-18 RX ORDER — HEPARIN SODIUM 10000 [USP'U]/100ML
3-20 INJECTION, SOLUTION INTRAVENOUS
Status: DISCONTINUED | OUTPATIENT
Start: 2020-01-18 | End: 2020-01-20

## 2020-01-18 RX ADMIN — TIMOLOL MALEATE 1 DROP: 2.5 SOLUTION/ DROPS OPHTHALMIC at 17:06

## 2020-01-18 RX ADMIN — METOPROLOL TARTRATE 5 MG: 5 INJECTION, SOLUTION INTRAVENOUS at 23:01

## 2020-01-18 RX ADMIN — LORAZEPAM 0.5 MG: 0.5 TABLET ORAL at 14:34

## 2020-01-18 RX ADMIN — TIMOLOL MALEATE 1 DROP: 2.5 SOLUTION/ DROPS OPHTHALMIC at 08:36

## 2020-01-18 RX ADMIN — METOPROLOL TARTRATE 5 MG: 5 INJECTION, SOLUTION INTRAVENOUS at 11:12

## 2020-01-18 RX ADMIN — HEPARIN SODIUM 4000 UNITS: 1000 INJECTION INTRAVENOUS; SUBCUTANEOUS at 21:53

## 2020-01-18 RX ADMIN — DEXTROSE 150 MG: 50 INJECTION, SOLUTION INTRAVENOUS at 13:22

## 2020-01-18 RX ADMIN — DILTIAZEM HYDROCHLORIDE 2.5 MG/HR: 5 INJECTION INTRAVENOUS at 10:54

## 2020-01-18 RX ADMIN — HEPARIN SODIUM 4000 UNITS: 1000 INJECTION INTRAVENOUS; SUBCUTANEOUS at 15:00

## 2020-01-18 RX ADMIN — AMIODARONE HYDROCHLORIDE 1 MG/MIN: 50 INJECTION, SOLUTION INTRAVENOUS at 13:33

## 2020-01-18 RX ADMIN — ATORVASTATIN CALCIUM 40 MG: 40 TABLET, FILM COATED ORAL at 17:06

## 2020-01-18 RX ADMIN — SODIUM CHLORIDE 75 ML/HR: 9 INJECTION, SOLUTION INTRAVENOUS at 02:04

## 2020-01-18 RX ADMIN — ASPIRIN 300 MG: 300 SUPPOSITORY RECTAL at 10:54

## 2020-01-18 RX ADMIN — HEPARIN SODIUM AND DEXTROSE 15.1 UNITS/KG/HR: 10000; 5 INJECTION INTRAVENOUS at 21:15

## 2020-01-18 RX ADMIN — SODIUM CHLORIDE 125 ML/HR: 9 INJECTION, SOLUTION INTRAVENOUS at 10:54

## 2020-01-18 RX ADMIN — LORAZEPAM 0.5 MG: 0.5 TABLET ORAL at 23:46

## 2020-01-18 RX ADMIN — AMIODARONE HYDROCHLORIDE 1 MG/MIN: 50 INJECTION, SOLUTION INTRAVENOUS at 21:59

## 2020-01-18 RX ADMIN — METOPROLOL TARTRATE 5 MG: 5 INJECTION, SOLUTION INTRAVENOUS at 16:37

## 2020-01-18 RX ADMIN — HEPARIN SODIUM AND DEXTROSE 15.1 UNITS/KG/HR: 10000; 5 INJECTION INTRAVENOUS at 15:00

## 2020-01-18 NOTE — NURSING NOTE
#24 iv placed to left hand with much difficulty  Iv placement was attempted 4x  Dr made aware of same and nurse requested central line placement  ptt drawn and result = 26  Heparin bolus given per protocol and heparin gtt started at 15 1 u/kg/hr  Verified with Yady Flor in pharmacy and Kd Starkey RN  Next ptt at 2100 tonight  NIH scale from 1423= 11  Pt remains alert and awake  Assessment as noted in computer charting  Follows commands  Right arm and leg weakness remain  Right facial droop remains  Speech aphasic/garbled  Pt frustrated and tearful  Angry at times  Dr made aware of same  Medicated for anxiety, and will observe closely

## 2020-01-18 NOTE — SOCIAL WORK
CM spoke with pt brother Mazin Jean Baptiste regarding discharge planing  Pt lives alone in a 1st floor apartment; [address]  She does not use any DME  Pt was completely independent with ADLs prior to admit  She was active and continued to drive  Pt PCP is Dr Deb Munoz  Pt uses [facility] for medications and no barrriers to obtaining medications were reported  Pt has no history of HHC, STR, MH or DA& treatment  STR recommended  CM discussed same with pt brother Devante Love  A post acute care recommendation was made by your care team for STR  Discussed Freedom of Choice with caregiver  List of facilities given to caregiver via in person  caregiver aware the list is custom filtered for them by preference  and that [facility] post acute providers are designated  [facility], [place] and [facility] were chosen  Referrals sent to same Cm will continue to follow  CM reviewed d/c planning process including the following: identifying help at home, patient preference for d/c planning needs, availability of treatment team to discuss questions or concerns patient and/or family may have regarding understanding medications and recognizing signs and symptoms once discharged  CM also encouraged patient to follow up with all recommended appointments after discharge  Patient advised of importance for patient and family to participate in managing patients medical well being

## 2020-01-18 NOTE — SPEECH THERAPY NOTE
Speech-Language Pathology Bedside Swallow Evaluation    Patient Name: Burt Baires    EHDHA'T Date: 2020     Problem List  Principal Problem:    CVA (cerebral vascular accident) [facility])  Active Problems:    Essential hypertension    Atrial fibrillation with RVR ([facility] [address] )    [facility]    Elevated troponin I level    Leukocytosis    Morbid obesity with body mass index (BMI) of 40 0 to 44 9 in adult [facility])      Past Medical History  Past Medical History:   Diagnosis Date    Diabetes mellitus ([facility] )     Hypertension     PUD (peptic ulcer disease)     Vitamin D deficiency        Past Surgical History  Past Surgical History:   Procedure Laterality Date    ABDOMINOPLASTY      revision gastric bypass     SECTION      CHOLECYSTECTOMY      COLONOSCOPY      GASTRIC BYPASS      HERNIA REPAIR      NEUROMA EXCISION Right 2019    Procedure: EXCISION 2ND INTERSPACE NEUROMA;  Surgeon: Yaneli Townsend DPM;  Location: [address] [place] [address];  Service: Podiatry    ROTATOR CUFF REPAIR Left        Summary   Pt presented with s/s suggestive of moderate-severe oral and suspected mild pharyngeal dysphagia  Symptoms or concerns included decreased mastication of soft solid, decreased bolus formation, suspected mildly decreased control of liquids and R sided pocketing with soft solids as well as suspected reduced coordination of pharyngeal swallow  Additionally, pt presented with severe aphasia (suspect expressive worse than receptive) as well as possible apraxia  Occasional spontaneous phrases elicited (I e  "I don't like it") however limited volitional verbal responsiveness noted  ST will f/u for dysphagia tx, and plan for complete speech/language evaluation Monday  Pt may benefit from communication board  Risk/s for Aspiration: Suspect low, deficits are primarily in oral phase of swallow      Recommended Diet: puree/level 1 diet and thin liquids   Recommended Form of Meds: crushed with puree   Aspiration precautions and swallowing strategies: upright posture, only feed when fully alert, slow rate of feeding, small bites/sips and alternating bites and sips:   Other Recommendations/Considerations: Simple, direct cues due to language deficits      Current Medical Status per Sixto Clink H&P 1/17/2020  Abbe Seymour is a 60 y o  female who presents with aphasia and right-sided weakness  The patient does have profound expressive aphasia and information was obtained from her family and ED staff  The patient was last seen normal last night at 7:30 p m  Renetta Jones She called her brother around 11:30 a m  And was talking incoherently and subsequently was brought into the ED for further evaluation  It was noted that the patient has complete aphasia with right hemiplegia  Stroke alert was called in the ED and neurology evaluation was done  Additionally the patient was noted to have atrial fibrillation with rapid ventricular response  Current Precautions:  Fall      Special Studies:  MRI 1/17/2020 IMPRESSION:  Stable late acute to early subacute left MCA territory infarct  No evidence of increase in extent of the infarct, hemorrhagic conversion or significant mass effect  CXR 1/17/2020 IMPRESSION:  No acute cardiopulmonary disease  Findings are stable    Social/Education/Vocational Hx:  Pt lives alone    Swallow Information   Current Risks for Dysphagia & Aspiration: CVA  Current Diet: NPO   Baseline Diet: regular diet and thin liquids      Baseline Assessment   Behavior/Cognition: alert and tearful  Speech/Language Status: limited verbal output and limited following commands  Patient Positioning: upright in bed  Pain Status/Interventions/Response to Interventions:  No report of or nonverbal indications of pain         Swallow Mechanism Exam  Facial: symmetrical at rest  Labial: decreased ROM right side  Lingual: unable to test 2/2 limited command following, suspect limited volitional lingual movement/apraxia? Velum: unable to visualize  Mandible: adequate ROM  Dentition: upper dentures  Vocal quality:clear/adequate   Volitional Cough: unable to initiate volitional cough   Respiratory Status: on RA, stable O2 sats throughout PO trials    Consistencies Assessed and Performance   Consistencies Administered: ice chips, thin liquids, puree and mechanical soft solids  Materials administered included ice, water, soda, pudding, apple sauce, banana    Oral Stage: moderate-severe  Delayed oral initiation with ice chips, however she did eventually begin to slowly masticate the ice  Slightly decreased bolus acceptance for thin via cup and straw initially, suspect some apraxia, but pt was ultimately able to adequately retrieve bolus with both  Simple, direct cues provided as well as assistance  Adequate frontal bite with banana  Significant R side pocketing noted with ~ 1/2 each banana bolus (x2 trials) which had to be removed with spoon, suspect poor sensation to pocketed material  Slow but adequate transfer noted with puree, and no pocketing noted with puree trials  Pharyngeal Stage: mild-moderate  Swallow Mechanics: Swallowing initiation appeared mildly delayed, possible reduced coordination due to impaired oral phase  Laryngeal rise was palpated and judged to be within functional limits  No coughing, throat clearing, change in vocal quality or respiratory status noted today  Esophageal Concerns: none reported    Strategies and Efficacy: full feed for now    Summary and Recommendations (see above)    Results Reviewed with: patient, RN and MD     Treatment Recommended: ST f/u for dysphagia tx, as well as speech language assessment and treatment    Frequency of treatment: 5x/week    Dysphagia LTG per SLP  -Patient will demonstrate optimum safety and efficacy of oral intake and swallowing function without overt s/sx of penetration or aspiration for the highest appropriate diet level       Short Term Goals: -Pt will tolerate Dysphagia 1/pureed diet and thin liquid with no significant s/s oral or pharyngeal dysphagia across 1-3 diagnostic sessions     -Patient will tolerate trials of upgraded food texture with no significant s/s of oral or pharyngeal dysphagia including aspiration across 1-3 diagnostic sessions

## 2020-01-18 NOTE — ASSESSMENT & PLAN NOTE
· Patient was on HCTZ at home   · Will hold off for now - okay for permissive hypertension  · Monitor BP closely

## 2020-01-18 NOTE — NURSING NOTE
Pt hr remains elevated in 150's, more frequently  cardizem gtt restarted at 2 5 mg/hr  Then increased to 5 mg/hr at noon  Bladder scan complete at 4125=505 ml  Pt voided 400 ml yellow, foul urine after bladder scan   aware of same

## 2020-01-18 NOTE — ASSESSMENT & PLAN NOTE
· Likely due to rapid rate   · No true rise or fall in troponins, not indicative of ACS  · Status post 2D echocardiogram testing with the following results:Systolic function was markedly reduced  Ejection fraction was estimated in the range of 20 % to 30 %  There is global dysfunction but the apical portion of the inferior wall is akinetic  There is significant tachycardia which may be transiently depressing the ejection fraction    · Once again await further Cardiology input

## 2020-01-18 NOTE — DISCHARGE INSTR - DIET
Dysphagia 2/Mechanically altered diet (soft cooked, moist and minced foods), thin liquid  Crush meds  1:1 supervision/assist with meals

## 2020-01-18 NOTE — ASSESSMENT & PLAN NOTE
· With residual right-sided weakness and dysphasia  · MRI results as follows:Stable late acute to early subacute left MCA territory infarct   No evidence of increase in extent of the infarct, hemorrhagic conversion or significant mass effect  · Most likely secondary to atrial fibrillation rapid ventricular response  · Workup thus far has included CT scan of head as as 2D echocardiogram  · Status post a tele neurology evaluation in the ED  · Continue aspirin and Lipitor for secondary prevention  · Will check a lipid panel and a glycosylated hemoglobin level  · Formal speech eval pending  · Continue PT OT and speech rehab  · Will need rehab at time discharge

## 2020-01-18 NOTE — ASSESSMENT & PLAN NOTE
· Heart rate ranges between 70 and 150  · Continue Cardizem drip  · Will start Eliquis for anticoagulation  · Await formal cardiology input for further medication optimization

## 2020-01-18 NOTE — UTILIZATION REVIEW
Initial Clinical Review    Admission: Date/Time/Statement: Inpatient Admission Orders (From admission, onward)     Ordered        01/17/20 1324  Inpatient Admission  Once                   Orders Placed This Encounter   Procedures    Inpatient Admission     Standing Status:   Standing     Number of Occurrences:   1     Order Specific Question:   Admitting Physician     Answer:   Edyta Couch [[id number]]     Order Specific Question:   Level of Care     Answer:   Level 2 Stepdown / HOT [14]     Order Specific Question:   Estimated length of stay     Answer:   More than 2 Midnights     Order Specific Question:   Certification     Answer:   I certify that inpatient services are medically necessary for this patient for a duration of greater than two midnights  See H&P and MD Progress Notes for additional information about the patient's course of treatment  ED Arrival Information     Expected Arrival Acuity Means of Arrival Escorted By Service Admission Type    - 1/17/2020 12:30 Emergent Ambulance [address] [facility] Hospitalist Emergency    Arrival Complaint    Confused        Chief Complaint   Patient presents with   [address]     altered mental status, expressive aphasia, right sided drift     Assessment/Plan: 60year old female to the ED via EMS with complaints of change in mental status, expressive aphagia which started over 12 hours prior to her arrival   Admitted to inpatient ICU step down for CVA, afib with RVR, leukocytosis  En route, for EMS, patient was moving both arms and legs, but upon arrival, she is incosistently following commands, has right facial droop and right arm weakness  A stroke alert was called     Cards consult  Neuro consult in ED 1/17:  CT head shows evolving hypo-densities in left MCA territory  NO TPA due to unclear onset of symptoms  New onset afib found on EKG, which is most definitely the etiology behind the infarct  Rate not controlled  IV Cardizem initiated    Check MRI, TTE, PT/OT /Speech  NIHSS 13  UPdate 1/17:  Heart rate better controlled with intermittent episodes of NSR seen  Hold off on anticoag until MRI done  Suspect leukocytosiss is reactive  CXR normal   Check UA  VOD=957   +right sided facial droop, with expressive and receptive aphagia  Strength RUE +3/5, LUE +4/5, BLEs strength +4/5  Appears distressed due to the inability to communicate  UPdate 1/18: Will need rehab upon dc  Formal speech eval pending  MRI results as follows:Stable late acute to early subacute left MCA territory infarct   No evidence of increase in extent of the infarct, hemorrhagic conversion or significant mass effect  Continue cardizem     ED Triage Vitals   Temperature Pulse Respirations Blood Pressure SpO2   01/17/20 1256 01/17/20 1255 01/17/20 1255 01/17/20 1256 01/17/20 1256   97 7 °F (36 5 °C) (!) 157 (!) 27 125/82 97 %      Temp Source Heart Rate Source Patient Position - Orthostatic VS BP Location FiO2 (%)   01/17/20 1256 01/17/20 1256 01/17/20 1256 01/17/20 1256 --   Tympanic Monitor Lying Right arm       Pain Score       01/17/20 1256       No Pain        Wt Readings from Last 1 Encounters:   01/18/20 118 kg (259 lb 4 2 oz)     Additional Vital Signs:   Date/Time Temp Pulse Resp BP MAP (mmHg) SpO2 O2 Device Patient Position - Orthostatic VS   01/18/20 0800  126Abnormal  25Abnormal  144/95 113 98 %     01/18/20 0700  71 22 119/60 81 96 %     01/18/20 0600  67 22 119/58 83 98 %     01/18/20 0500  66 21 103/53 74 96 %     01/18/20 0430 98 3 °F (36 8 °C)  22    None (Room air)    01/18/20 0400  65 22 99/55 72 96 %  Lying   01/18/20 0300  145Abnormal  23Abnormal  76/42Abnormal  55 95 %     01/18/20 0200  143Abnormal  20 112/59 75 98 %     01/18/20 0100  70 22 104/35Abnormal  51 97 %     01/17/20 2000  75 24Abnormal  95/53 68 100 %     01/17/20 1930  77 24Abnormal  118/56 76 100 %     01/17/20 1915  81 24Abnormal  99/50 70 99 %  Lying   01/17/20 1900  81 23Abnormal  96/51 71 99 %     01/17/20 1845  72 24Abnormal  108/57 77 99 %     01/17/20 1830  70 24Abnormal  108/55 76 99 %  Lying   01/17/20 1815  69 22 86/51Abnormal  64 96 %  Lying   01/17/20 1800  68 24Abnormal  111/55 77 98 %     01/17/20 1745  69 24Abnormal  112/56 81 99 %     01/17/20 1730  69 25Abnormal  120/56 80 98 %     01/17/20 1715  71 23Abnormal  122/66 89 97 %     01/17/20 1545  116Abnormal  24Abnormal  122/56 78 96 %     01/17/20 1530  152Abnormal  26Abnormal  134/93 108 97 % None (Room air) Lying   01/17/20 1515  170Abnormal  26Abnormal  128/82 100 96 %  Lying   01/17/20 1500  159Abnormal  24Abnormal  136/98 107 99 %  Lying   01/17/20 1445  155Abnormal  26Abnormal  117/79 92 99 %     01/17/20 1310  155Abnormal  31Abnormal    98 %     01/17/20 1305  150Abnormal  28Abnormal    99 %     01/17/20 1300  153Abnormal  29Abnormal  122/83  97 %     01/17/20 1256 97 7 °F (36 5 °C) 155Abnormal  18 125/82         Pertinent Labs/Diagnostic Test Results:  EKG:  EKG: SVT    1/17 ECHO:  LEFT VENTRICLE: Size was normal  Systolic function was markedly reduced  Ejection fraction was estimated in the range of 20 % to 30 %  There is global dysfunction but the apical portion of the inferior wall is akinetic  There is significant tachycardia which may be transiently depressing the ejection fraction  Wall thickness was normal  No evidence of apical thrombus  DOPPLER: Transmitral flow pattern: atrial fibrillation  The study was not technically  sufficient to allow evaluation of LV diastolic function      RIGHT VENTRICLE: The size was normal  Systolic function was normal  Wall thickness was normal      LEFT ATRIUM: Size was normal      RIGHT ATRIUM: Size was normal      MITRAL VALVE: Valve structure was normal  There was normal leaflet separation  DOPPLER: The transmitral velocity was within the normal range  There was no evidence for stenosis   There was no significant regurgitation      AORTIC VALVE: The valve was possibly bicuspid  Leaflets exhibited mild to moderate calcification and normal cuspal separation  DOPPLER: Transaortic velocity was within the normal range  There was no evidence for stenosis  There was no  significant regurgitation      TRICUSPID VALVE: The valve structure was normal  There was normal leaflet separation  DOPPLER: The transtricuspid velocity was within the normal range  There was no evidence for stenosis  There was trace regurgitation      PULMONIC VALVE: Leaflets exhibited normal thickness, no calcification, and normal cuspal separation  DOPPLER: The transpulmonic velocity was within the normal range  There was no significant regurgitation      PERICARDIUM: There was no pericardial effusion  The pericardium was normal in appearance      AORTA: The root exhibited normal size      SYSTEMIC VEINS: IVC: The inferior vena cava was normal in size  1/17 MRI brain:  Stable late acute to early subacute left MCA territory infarct   No evidence of increase in extent of the infarct, hemorrhagic conversion or significant mass effect  1/17 CXR :No acute cardiopulmonary disease  1/17 CTA head/neck: No large vessel flow restrictive disease within the head or neck     No large vessel intracranial occlusion  1/17 CT head:  Recent bland ischemic edema, multifocal within the left MCA distribution   Minimal local mass effect   Event estimated to be at least several hours of age    Results from last 7 days   Lab Units 01/18/20  0528 01/17/20  1247   WBC Thousand/uL 8 40 15 60*   HEMOGLOBIN g/dL 9 7* 11 6*   HEMATOCRIT % 32 4* 39 0*   PLATELETS Thousands/uL 293 356         Results from last 7 days   Lab Units 01/17/20  1247   SODIUM mmol/L 138   POTASSIUM mmol/L 3 6   CHLORIDE mmol/L 103   CO2 mmol/L 24   ANION GAP mmol/L 11   BUN mg/dL 34*   CREATININE mg/dL 1 24*   EGFR ml/min/1 73sq m 47   CALCIUM mg/dL 9 1         Results from last 7 days   Lab Units 01/18/20  0728 01/17/20  2229 01/17/20  1715 01/17/20  1237   POC GLUCOSE mg/dl 86 107 121 180*     Results from last 7 days   Lab Units 01/17/20  1247   GLUCOSE RANDOM mg/dL 169*     Results from last 7 days   Lab Units 01/17/20  1955 01/17/20  1712 01/17/20  1247   TROPONIN I ng/mL 0 07* 0 06* 0 06*     Results from last 7 days   Lab Units 01/17/20  1247   PROTIME seconds 11 8   INR  1 02   PTT seconds 28         Results from last 7 days   Lab Units 01/17/20  1712   PROCALCITONIN ng/ml <0 05     Results from last 7 days   Lab Units 01/18/20  0034   CLARITY UA  Clear   COLOR UA  Yellow   SPEC GRAV UA  1 010   PH UA  6 5   GLUCOSE UA mg/dl Negative   KETONES UA mg/dl Negative   BLOOD UA  Negative   PROTEIN UA mg/dl Negative   NITRITE UA  Negative   BILIRUBIN UA  Negative   UROBILINOGEN UA E U /dl 0 2   LEUKOCYTES UA  Negative     ED Treatment:   Medication Administration from 01/17/2020 1230 to 01/17/2020 1413       Date/Time Order Dose Route Action     01/17/2020 1333 diltiazem (CARDIZEM) 125 mg in sodium chloride 0 9 % 125 mL infusion 5 mg/hr Intravenous New Bag        Past Medical History:   Diagnosis Date    Diabetes mellitus ([facility] )     Hypertension     PUD (peptic ulcer disease)     Vitamin D deficiency      Admitting Diagnosis: CVA (cerebral vascular accident) ([facility] ) [I63 9]  Atrial fibrillation with RVR ([facility] ) [I48 91]  [facility] ([facility]) Stroke Scale level of consciousness score 0, alert; keenly responsive [Z78 9]  Age/Sex: 60 y o  female  Admission Orders:    Scheduled Medications:    Medications:  aspirin 300 mg Rectal Once   atorvastatin 40 mg Oral QPM   timolol 1 drop Both Eyes BID     Continuous IV Infusions:    diltiazem 1-15 mg/hr Intravenous Titrated   sodium chloride 125 mL/hr Intravenous Continuous     PRN Meds:    acetaminophen 650 mg Oral Q4H PRN       IP CONSULT TO PHYSICAL MEDICINE REHAB  IP CONSULT TO NEUROLOGY  IP CONSULT TO CASE MANAGEMENT  IP CONSULT TO NUTRITION SERVICES  IP CONSULT TO CARDIOLOGY  IP CONSULT TO NEUROLOGY    Network Utilization Review Department  [email or web address]  ATTENTION: Please call with any questions or concerns to [phone] and carefully listen to the prompts so that you are directed to the right person  All voicemails are confidential   Mojgan Du all requests for admission clinical reviews, approved or denied determinations and any other requests to dedicated fax number below belonging to the campus where the patient is receiving treatment   List of dedicated fax numbers for the Facilities:  [address] DENIALS (Administrative/Medical Necessity) [phone]   [address] (Maternity/NICU/Pediatrics) [phone]   Daisha Sofia [phone]   Nickie [facility] [phone]   [facility] [phone]   [facility] [phone]   [address] [place] [address] [phone]   [facility]  [phone]   [address] [facility], [address]  [address] [facility] [address] [facility] [phone]

## 2020-01-18 NOTE — PLAN OF CARE
Problem: OCCUPATIONAL THERAPY ADULT  Goal: Performs self-care activities at highest level of function for planned discharge setting  See evaluation for individualized goals  Description  Treatment Interventions: Neuromuscular reeducation, Fine motor coordination activities, Compensatory technique education, Activityengagement, ADL retraining, Functional transfer training, UE strengthening/ROM, Endurance training, Patient/family training, Equipment evaluation/education, Energy conservation          See flowsheet documentation for full assessment, interventions and recommendations  Note:   Limitation: Decreased ADL status, Decreased UE ROM, Decreased UE strength, Decreased Safe judgement during ADL, Decreased endurance, Decreased self-care trans, Decreased high-level ADLs  Prognosis: Guarded  Assessment: Pt is a 60 y o  female seen for OT evaluation s/p admit to [facility] on 1/17/2020 w/ CVA (cerebral vascular accident) ([facility] [address] )  Comorbidities affecting pt's functional performance at time of assessment include: HTN and A Fib, SANDRINE, Leukocytosis, Elevated Troponin Level  Personal factors affecting pt at time of IE include:limited home support, difficulty performing ADLS and difficulty performing IADLS   Prior to admission, pt was I with self care ADL's, IADL's, Ambulation, Driving  Upon evaluation: Pt requires an increased  Level of assistance with self are ADL's, IADL's, functional transfers/toileting/mobility r/t the following deficits impacting occupational performance: weakness, decreased ROM, decreased strength, decreased balance, decreased tolerance and decreased safety awareness  Pt to benefit from continued skilled OT tx while in the hospital to address deficits as defined above and maximize level of functional independence w ADL's and functional mobility   Occupational Performance areas to address include: grooming, bathing/shower, toilet hygiene, dressing, functional mobility and possibly eating following ST eval and recommendations  From OT standpoint, recommendation at time of d/c would be STR         OT Discharge Recommendation: Short Term Rehab  OT - OK to Discharge: Yes(when medically stable)

## 2020-01-18 NOTE — PLAN OF CARE
Problem: PHYSICAL THERAPY ADULT  Goal: Performs mobility at highest level of function for planned discharge setting  See evaluation for individualized goals  Description  Treatment/Interventions: Functional transfer training, LE strengthening/ROM, Therapeutic exercise, Endurance training, Patient/family training, Equipment eval/education, Bed mobility, Gait training, Spoke to nursing, OT, Family  Equipment Recommended: Aron Mar       See flowsheet documentation for full assessment, interventions and recommendations  Note:   Prognosis: Good  Problem List: Decreased strength, Decreased endurance, Impaired balance, Decreased mobility, Decreased coordination, Impaired judgement, Decreased safety awareness, Obesity  Assessment: Pt is 60 y o  female seen for PT evaluation s/p admit to [address] [place] [address] on 1/17/2020 w/ CVA (cerebral vascular accident) ([facility] )  PT consulted to assess pt's functional mobility and d/c needs  Order placed for PT eval and tx, w/ activity as tolerated order  Comorbidities affecting pt's physical performance at time of assessment include: CVA w/ R sided weakness and expressive aphasia, HTN, A-fib w/ RVR, SANDRINE, leukocytosis  PTA, pt was independent w/ all functional mobility w/ o AD  Personal factors affecting pt at time of IE include: communication issues, inability to ambulate household distances, inability to navigate community distances, unable to perform dynamic tasks in community, unable to perform physical activity, inability to perform IADLs, inability to perform ADLs and inability to live alone  Please find objective findings from PT assessment regarding body systems outlined above with impairments and limitations including weakness, impaired balance, decreased endurance, impaired coordination, gait deviations, decreased activity tolerance, decreased functional mobility tolerance, altered sensation, decreased safety awareness, impaired judgement and fall risk   The following objective measures performed on IE also reveal limitations: Barthel Index: 15/100  Pt's clinical presentation is currently unstable/unpredictable  Pt to benefit from continued PT tx to address deficits as defined above and maximize level of functional independent mobility and consistency  From PT/mobility standpoint, recommendation at time of d/c would be STR pending progress in order to facilitate return to PLOF  Barriers to Discharge: Decreased caregiver support     Recommendation: Short-term skilled PT     PT - OK to Discharge: No    See flowsheet documentation for full assessment

## 2020-01-18 NOTE — NURSING NOTE
Pt wakes easily to voice  Continues to follow commands  Frustrated that she cannot express needs easily  Tearful and agitated at times  Refused dinner  Pt is taking po fluids  Reassurance and encouragement given  Safety in place

## 2020-01-18 NOTE — CONSULTS
PHYSICAL MEDICINE AND REHABILITATION CONSULT NOTE  Rosalba Maldonado 60 y o  female MRN: [id number]  Unit/Bed#: ICU 06 Encounter: [id number]    Requested by (Physician/Service): Paulette Hutson MD  Reason for Consultation:  Assessment of rehabilitation needs  Reason for Hospitalization:  CVA (cerebral vascular accident) [facility]) [I63 9]  Atrial fibrillation with RVR ([facility] ) [I48 91]  [facility] ([facility]) Stroke Scale level of consciousness score 0, alert; keenly responsive [Z78 9]  Rehabilitation Diagnosis: cva    CC: CVA  History of Present Illness:  Rosalba Maldonado is a 60 y o  female who  has a past medical history of Diabetes mellitus ([facility] ), Hypertension, PUD (peptic ulcer disease), and Vitamin D deficiency  who presented to the [address] [place] [address] with aphasia and right sided weakness and was found to have a L MCA territory infarct     Location: right body  Quality: weakness  Severity: see PE section   Duration: presented on   Timing: acute   Context: stroke   Modifying factors: none   Associating signs & symptoms: aphasia       PM&R consulted for rehabilitation recommendations          Hospital Complications/Comorbidities:   Complications: As above  Comorbidities: As above        Functional History:     Prior to Admission:     I PTA      Present:  Physical Therapy Occupational Therapy   Mod bed mobility, mod transfers (unable to assess gait per PT eval)  Mod grooming and UB bathing (unable to assess remainder of ADLs per OT eval)      Past Medical History:   Past Surgical History:   Family History:     Past Medical History:   Diagnosis Date    Diabetes mellitus ([facility] )     Hypertension     PUD (peptic ulcer disease)     Vitamin D deficiency     Past Surgical History:   Procedure Laterality Date    ABDOMINOPLASTY      revision gastric bypass     SECTION      CHOLECYSTECTOMY      COLONOSCOPY      GASTRIC BYPASS      HERNIA REPAIR      NEUROMA EXCISION Right 1/14/2019    Procedure: EXCISION 2ND INTERSPACE NEUROMA;  Surgeon: Carol Chawla DPM;  Location: [address] [place] [address] OR;  Service: Podiatry    ROTATOR CUFF REPAIR Left      Family History   Problem Relation Age of Onset    Stroke Mother     Heart disease Father            Medications:    Current Facility-Administered Medications:     acetaminophen (TYLENOL) tablet 650 mg, 650 mg, Oral, Q4H PRN, LEW Wilson    amiodarone (CORDARONE) 900 mg in dextrose 5 % 500 mL infusion, 1 mg/min, Intravenous, Continuous **FOLLOWED BY** amiodarone (CORDARONE) 900 mg in dextrose 5 % 500 mL infusion, 0 5 mg/min, Intravenous, Continuous, Pardeep Saenz Mai, MD    amiodarone 150 mg in dextrose 5 % 100 mL IV bolus, 150 mg, Intravenous, Once, Jono Nguyen MD    atorvastatin (LIPITOR) tablet 40 mg, 40 mg, Oral, QPM, LEW Wilson    heparin (porcine) 25,000 units in 250 mL infusion (premix), 3-20 Units/kg/hr (Order-Specific), Intravenous, Titrated, Jono Nguyen MD    heparin (porcine) injection 2,000 Units, 2,000 Units, Intravenous, PRN, KASSIEEXSHIRA Saenz Mai, MD    heparin (porcine) injection 4,000 Units, 4,000 Units, Intravenous, PRN, YEVGENIY Saenz Mai, MD    metoprolol (LOPRESSOR) injection 5 mg, 5 mg, Intravenous, Q6H, Pardeep Saenz Mai, MD, 5 mg at 01/18/20 1112    timolol (TIMOPTIC) 0 25 % ophthalmic solution 1 drop, 1 drop, Both Eyes, BID, LEW Wilson, 1 drop at 01/18/20 0836    Allergies:    Allergies   Allergen Reactions    Nsaids      Due to hx gastric bypass          Social History:    Social History     Socioeconomic History    Marital status: Single     Spouse name: None    Number of children: None    Years of education: None    Highest education level: None   Occupational History    None   Social Needs    Financial resource strain: None    Food insecurity:     Worry: None     Inability: None    Transportation needs:     Medical: None Non-medical: None   Tobacco Use    Smoking status: Never Smoker    Smokeless tobacco: Never Used   Substance and Sexual Activity    Alcohol use: No     Comment: none    Drug use: No     Comment: none    Sexual activity: Not Currently     Birth control/protection: Post-menopausal   Lifestyle    Physical activity:     Days per week: None     Minutes per session: None    Stress: None   Relationships    Social connections:     Talks on phone: None     Gets together: None     Attends Jew service: None     Active member of club or organization: None     Attends meetings of clubs or organizations: None     Relationship status: None    Intimate partner violence:     Fear of current or ex partner: None     Emotionally abused: None     Physically abused: None     Forced sexual activity: None   Other Topics Concern    None   Social History Narrative    None        Ramiro Brewster Lives with: lives alone  She lives in [facility]) single family home  The living area: can live on one level  There No steps to enter the home  The patient will not have 24 hour supervision available upon discharge  Review of Systems: A 10-point review of systems was performed  Negative except as listed above       Physical Exam:  Vital Signs:      Temp:  [97 7 °F (36 5 °C)-99 5 °F (37 5 °C)] 99 5 °F (37 5 °C)  HR:  [] 154  Resp:  [12-34] 27  BP: ()/() 123/75   Intake/Output Summary (Last 24 hours) at 1/18/2020 1238  Last data filed at 1/18/2020 1120  Gross per 24 hour   Intake 1594 59 ml   Output 1000 ml   Net 594 59 ml        Laboratory:      Lab Results   Component Value Date    HGB 9 7 (L) 01/18/2020    HCT 32 4 (L) 01/18/2020    WBC 8 40 01/18/2020     Lab Results   Component Value Date    BUN 24 01/18/2020    K 3 8 01/18/2020     (H) 01/18/2020    CREATININE 0 90 01/18/2020     Lab Results   Component Value Date    PROTIME 11 8 01/17/2020    INR 1 02 01/17/2020        General: no apparent distress and comfortable  HEENT: Normal, normocephalic, atraumatic  Pulmonary: respirations unlabored  Cardiovascular: +S1/2  Abdomen: soft NT  Extremity: volume status currently stable   Neurologic: significant aphasia, inconsistent yes/no responses, unable to name objects, inconsistently follows 1 step commands, EOMI, 4/5 LUE, 3 to 4-/5 RUE, 4-/5 B/L planter/dorsiflexion, 2/5 B/L hip flexion   Psych: patient aphasic but appears calm       Imaging: Reviewed  X-ray Chest 1 View Portable    Result Date: 1/17/2020  Impression: No acute cardiopulmonary disease  Findings are stable Workstation performed: [id number]     Mri Brain Wo Contrast    Result Date: 1/17/2020  Impression: Stable late acute to early subacute left MCA territory infarct  No evidence of increase in extent of the infarct, hemorrhagic conversion or significant mass effect  Workstation performed: [id number]     Ct Stroke Alert Brain    Result Date: 1/17/2020  Impression: Recent bland ischemic edema, multifocal within the left MCA distribution  Minimal local mass effect  Event estimated to be at least several hours of age  Findings were directly discussed with SYDNEE HERNANDEZ on 1/17/2020 12:46 PM  Workstation performed: [id number]     Cta Stroke Alert (head/neck)    Result Date: 1/17/2020  Impression: No large vessel flow restrictive disease within the head or neck  No large vessel intracranial occlusion  Findings were directly discussed with SYDNEE HERNANDEZ on 1/17/2020 1:08 PM  Workstation performed: [id number]       Assessment and Recommendations:  Patient is a 59 yo female who presented to the [facility] [address] with aphasia and right sided weakness and was found to have a L MCA territory infarct  PM&R consulted for rehabilitation recommendations  Impairments:  Impaired functional mobility and ability to perform ADL's      Recommendations:      - Continue PT/OT/SLP while inpatient      - inconsistent command following and aphasia will be a significant barrier to rehab   - will make final rehab recommendation as patient approaches dc from acute care       Thank you for allowing the PM&R service to participate in the care of this patient  We will continue to follow Siria Harper's progress with you   Please do not hesitate to call with questions or concerns

## 2020-01-18 NOTE — PHYSICAL THERAPY NOTE
Physical Therapy Evaluation     Patient's Name: Poonam Morfin    Admitting Diagnosis  CVA (cerebral vascular accident) [facility]) [I63 9]  Atrial fibrillation with RVR ([facility] ) [I48 91]  [facility] Stroke Scale level of consciousness score 0, alert; keenly responsive [Z78 9]    Problem List  Patient Active Problem List   Diagnosis    Essential hypertension    Atrial fibrillation with RVR ([facility] )    CVA (cerebral vascular accident) ([facility] )    SANDRINE (acute kidney injury) ([facility] )    Elevated troponin I level    Leukocytosis       Past Medical History  Past Medical History:   Diagnosis Date    Diabetes mellitus ([facility] )     Hypertension     PUD (peptic ulcer disease)     Vitamin D deficiency        Past Surgical History  Past Surgical History:   Procedure Laterality Date    ABDOMINOPLASTY      revision gastric bypass     SECTION      CHOLECYSTECTOMY      COLONOSCOPY      GASTRIC BYPASS      HERNIA REPAIR      NEUROMA EXCISION Right 2019    Procedure: EXCISION 2ND INTERSPACE NEUROMA;  Surgeon: Reyna Love DPM;  Location: [facility];  Service: Podiatry    ROTATOR CUFF REPAIR Left         20 0510   Note Type   Note type Eval/Treat   Pain Assessment   Pain Assessment FLACC   Pain Rating: FLACC (Rest) - Face 0   Pain Rating: FLACC (Rest) - Legs 0   Pain Rating: FLACC (Rest) - Activity 0   Pain Rating: FLACC (Rest) - Cry 0   Pain Rating: FLACC (Rest) - Consolability 1   Score: FLACC (Rest) 1   Pain Rating: FLACC (Activity) - Face 1   Pain Rating: FLACC (Activity) - Legs 0   Pain Rating: FLACC (Activity) - Activity 0   Pain Rating: FLACC (Activity) - Cry 1   Pain Rating: FLACC (Activity) - Consolability 0   Score: FLACC (Activity) 2   Home Living   Type of Home Apartment   Home Layout One level;Performs ADLs on one level; Able to live on main level with bedroom/bathroom; Access   Bathroom Shower/Tub Tub/shower unit   Bathroom Toilet Standard   Bathroom Accessibility Accessible Home Equipment   (PTA, pt did not utilize an AD or DME )   Prior Function   Level of [place] Independent with ADLs and functional mobility   Lives With Alone   ADL Assistance Independent   IADLs Independent   Falls in the last 6 months 0   Comments PLOF & home environment obtained from family at beside,pt  could not provide re:aphasia   Restrictions/Precautions   Weight Bearing Precautions Per Order No   Other Precautions Fall Risk;Telemetry;Multiple lines; Bed Alarm   General   Family/Caregiver Present Yes  (son, 2 brothers)   Cognition   Overall Cognitive Status Unable to assess  (2/2 expressive aphasia)   Arousal/Participation Alert   Orientation Level Unable to assess   Memory Unable to assess   Following Commands Follows one step commands with increased time or repetition   RUE Assessment   RUE Assessment   (Please see OT assessment )   LUE Assessment   LUE Assessment   (Please see OT assessment )   RLE Assessment   RLE Assessment X   Strength RLE   R Hip Flexion 3-/5   R Knee Extension 3-/5   R Ankle Dorsiflexion 2+/5   R Ankle Plantar Flexion 2+/5   LLE Assessment   LLE Assessment X  (3+/5 gross musculature)   Coordination   Movements are Fluid and Coordinated 0   Coordination and Movement Description Pt  required increased time & tactile cues of 2 throughout session, to process instruction  Light Touch   RLE Light Touch Grossly intact   LLE Light Touch Grossly intact   Sharp/Dull   RLE Sharp/Dull Impaired   LLE Sharp/Dull Grossly intact   Bed Mobility   Supine to Sit 3  Moderate assistance   Additional items Assist x 2;HOB elevated; Bedrails; Increased time required;LE management;Verbal cues   Sit to Supine 3  Moderate assistance   Additional items Assist x 2;Bedrails; Increased time required;Verbal cues;LE management   Transfers   Sit to Stand 3  Moderate assistance   Additional items Assist x 2;Bedrails; Increased time required;Verbal cues   Stand to Sit 3  Moderate assistance   Additional items Assist x 2;Bedrails; Increased time required;Verbal cues   Stand pivot Unable to assess  (2/2 safety concerns, inability to translate RLE toward [facility])   Ambulation/Elevation   Gait pattern Not tested; Not appropriate  (intiated toward [facility], however pt  could not mobilize RLE)   Balance   Static Sitting Fair   Dynamic Sitting Fair -   Static Standing Poor +   Dynamic Standing Poor +, w/noted R LAT lean requiring UE support   Ambulatory   (unable)   Endurance Deficit   Endurance Deficit Yes   Endurance Deficit Description /98->145/95   ->170 BPM   O2 at 97% RA   Activity Tolerance   Activity Tolerance Patient limited by fatigue;Treatment limited secondary to medical complications (Comment)   Nurse Made Aware yes, Marilu RN   Assessment   Prognosis Good   Problem List Decreased strength;Decreased endurance; Impaired balance;Decreased mobility; Decreased coordination; Impaired judgement;Decreased safety awareness; Obesity   Assessment Pt is 60 y o  female seen for PT evaluation s/p admit to [address] on 1/17/2020 w/ CVA (cerebral vascular accident) ([facility] )  PT consulted to assess pt's functional mobility and d/c needs  Order placed for PT eval and tx, w/ activity as tolerated order  Comorbidities affecting pt's physical performance at time of assessment include: CVA w/ R sided weakness and expressive aphasia, HTN, A-fib w/ RVR, SANDRINE, leukocytosis  PTA, pt was independent w/ all functional mobility w/ o AD  Personal factors affecting pt at time of IE include: communication issues, inability to ambulate household distances, inability to navigate community distances, unable to perform dynamic tasks in community, unable to perform physical activity, inability to perform IADLs, inability to perform ADLs and inability to live alone   Please find objective findings from PT assessment regarding body systems outlined above with impairments and limitations including weakness, impaired balance, decreased endurance, impaired coordination, gait deviations, decreased activity tolerance, decreased functional mobility tolerance, altered sensation, decreased safety awareness, impaired judgement and fall risk  The following objective measures performed on IE also reveal limitations: Barthel Index: 15/100  Pt's clinical presentation is currently unstable/unpredictable  Pt to benefit from continued PT tx to address deficits as defined above and maximize level of functional independent mobility and consistency  From PT/mobility standpoint, recommendation at time of d/c would be STR pending progress in order to facilitate return to PLOF  Barriers to Discharge Decreased caregiver support   Goals   Patient Goals pt  could not provide re:aphasia   LTG Expiration Date 01/28/20   Long Term Goal #1 1 )Patient will complete bed mobility with min A  of 1 for decrease need for caregiver assistance, decrease burden of care  2 ) Patient will complete transfers with min A of 1 to decrease risk of falls, facilitate upright standing posture  3 ) RLE strength to greater than/equal to 3/5 gross musculature to increase ability to safely transfer, control descent to chair  4 ) Patient will exhibit increase static standing balance to Fair , for 1-3 minutes without LOB and min A  of 1 to improve activity tolerance & endurance  5 ) Patient will exhibit increase dynamic ambulatory balance to Fair for 25-75 feet w/RW min A of 2 to improve ability to mobilize to toilet, chair and decrease risk for additional medical complications  6 ) Patient will exhibit good self monitoring and ability to follow 2 step commands to increase complexity of tasks and resume ADL's without LOB   PT Treatment Day 1   Plan   Treatment/Interventions Functional transfer training;LE strengthening/ROM; Therapeutic exercise; Endurance training;Patient/family training;Equipment eval/education; Bed mobility;Gait training;Spoke to nursing;OT;Family   PT Frequency 7x/wk   Recommendation Recommendation Short-term skilled PT   Equipment Recommended Walker   PT - OK to Discharge No   Additional Comments Upon conclusion, pt  was resting in bed w/family at bedside, SCD's active, & all needs within reach  Carrie Hale was informed of assessment outcome  Barthel Index   Feeding 0   Bathing 0   Grooming Score 0   Dressing Score 0   Bladder Score 5   Bowels Score 5   Toilet Use Score 0   Transfers (Bed/Chair) Score 5   Mobility (Level Surface) Score 0   Stairs Score 0   Barthel Index Score 15     Additional skilled interventions: Therapeutic Activity x 25 minutes    S: FLACC score of 2,pt  Nodded in agreement for assessment  O: therapeutic activity x 25 minutes including mobilization education: bed mobility, supine<->sit, static/dynamic sitting balance w/ postural facilitation, sit<->stand transfers, static/dynamic standing balance w/ postural facilitation, and continued safety training for increased awareness  A: Patient exhibited severe R sided weakness, impaired balance, gait dysfunction, decreased endurance, activity intolerance, and decline from PLOF to benefit from continued interventions  P: Continue PT tx with progression of functional tasks as patient can safely tolerate, 7x/week        Keyonna Shah, PT

## 2020-01-18 NOTE — NURSING NOTE
Pt conts with intermittent a-fib  Conts with expressive aphasia - can say some words that are understandable and does occasionally shake her head yes/no  Does follow commands but not all the time  Conts to deny pain  Does get weepy when trying to communicate and unable to do so

## 2020-01-18 NOTE — OCCUPATIONAL THERAPY NOTE
Occupational Therapy Evaluation      Lencho Daltones    2020    Principal Problem:    CVA (cerebral vascular accident) [facility])  Active Problems:    Essential hypertension    Atrial fibrillation with RVR (HCC)    SANDRINE (acute kidney injury) ([facility] [address] )    Elevated troponin I level    Leukocytosis    Morbid obesity with body mass index (BMI) of 40 0 to 44 9 in adult [facility])      Past Medical History:   Diagnosis Date    [facility]     Hypertension     PUD (peptic ulcer disease)     Vitamin D deficiency        Past Surgical History:   Procedure Laterality Date    ABDOMINOPLASTY      revision gastric bypass     SECTION      CHOLECYSTECTOMY      COLONOSCOPY      GASTRIC BYPASS      HERNIA REPAIR      NEUROMA EXCISION Right 2019    Procedure: EXCISION 2ND INTERSPACE NEUROMA;  Surgeon: Harley Alaniz DPM;  Location: [facility] MAIN OR;  Service: Podiatry    ROTATOR CUFF REPAIR Left         20 0751   Note Type   Note type Eval/Treat   Restrictions/Precautions   Weight Bearing Precautions Per Order No   Other Precautions Fall Risk;Telemetry;Multiple lines; Bed Alarm   Pain Assessment   Pain Assessment No/denies pain   Pain Score No Pain   Home Living   Type of Home Apartment   Home Layout One level;Performs ADLs on one level; Able to live on main level with bedroom/bathroom; Access  (no JEREL)   Bathroom Shower/Tub Tub/shower unit   Bathroom Toilet Standard   Bathroom Accessibility Accessible   Home Equipment   (n/a)   Additional Comments ambulatory w/o AD   Prior Function   Level of [place] Independent with ADLs and functional mobility   Lives With Alone   ADL Assistance Independent   IADLs Independent   Falls in the last 6 months 0   Comments family present and provided info (brothers/son)   Psychosocial   Psychosocial (WDL) X   Patient Behaviors/Mood Anxious; Labile   Subjective   Subjective expressive aphasia, shakes head yes/no, understands/follows directions   ADL   Eating Assistance Unable to assess   Eating Deficit NPO   Grooming Assistance 3  Moderate Assistance   UB Bathing Assistance 3  Moderate Assistance   LB Bathing Assistance Unable to assess   LB Dressing Assistance Unable to assess   Toileting Assistance  Unable to assess   Bed Mobility   Supine to Sit 3  Moderate assistance   Additional items Assist x 2;HOB elevated; Increased time required;Verbal cues;LE management   Sit to Supine 3  Moderate assistance   Additional items Assist x 2;Bedrails; Increased time required;Verbal cues;LE management   Transfers   Sit to Stand 3  Moderate assistance   Additional items Assist x 2;Bedrails; Increased time required;Verbal cues   Stand to Sit 3  Moderate assistance   Additional items Assist x 2; Increased time required;Verbal cues; Bedrails   Toilet transfer Unable to assess   Balance   Static Sitting Fair   Dynamic Sitting Fair -   Static Standing Poor +   Dynamic Standing Poor +   Ambulatory   (unable)   Activity Tolerance   Activity Tolerance Patient limited by fatigue;Treatment limited secondary to medical complications (Comment)   Nurse Made Aware yes   RUE Assessment   RUE Assessment   (AROM shld 1/4 range, elbow 1/2 range, initiating hand/wrist)   LUE Assessment   LUE Assessment WFL   Hand Function   Gross Motor Coordination   (RUE mod impaired)   Fine Motor Coordination   (Rt hand max impaired, initiating slight movement)   Cognition   Overall Cognitive Status Unable to assess   Arousal/Participation Alert; Cooperative  (labile at times)   Attention Attends with cues to redirect   Orientation Level Unable to assess   Memory Unable to assess   Following Commands Follows one step commands with increased time or repetition   Assessment   Limitation Decreased ADL status; Decreased UE ROM; Decreased UE strength;Decreased Safe judgement during ADL;Decreased endurance;Decreased self-care trans;Decreased high-level ADLs   Prognosis Guarded   Assessment Pt is a 60 y o  female seen for OT evaluation s/p admit to [facility] on 1/17/2020 w/ CVA (cerebral vascular accident) ([facility] [address] )  Comorbidities affecting pt's functional performance at time of assessment include: HTN and A Fib, SANDRINE, Leukocytosis, Elevated Troponin Level  Personal factors affecting pt at time of IE include:limited home support, difficulty performing ADLS and difficulty performing IADLS   Prior to admission, pt was I with self care ADL's, IADL's, Ambulation, Driving  Upon evaluation: Pt requires an increased  Level of assistance with self are ADL's, IADL's, functional transfers/toileting/mobility r/t the following deficits impacting occupational performance: weakness, decreased ROM, decreased strength, decreased balance, decreased tolerance and decreased safety awareness  Pt to benefit from continued skilled OT tx while in the hospital to address deficits as defined above and maximize level of functional independence w ADL's and functional mobility  Occupational Performance areas to address include: grooming, bathing/shower, toilet hygiene, dressing, functional mobility and possibly eating following ST eval and recommendations  From OT standpoint, recommendation at time of d/c would be STR  Goals   Patient Goals unable to state   STG Time Frame 3-5   Short Term Goal #1 increase RUE AROM to WFL's,   (pt will be able to complete self ROM)   Short Term Goal #2 increase light UB self care to SBA   Short Term Goal  increase knowledge re: adaptations to good  to increase ability to participate in self care    LTG Time Frame 7-10   Long Term Goal #1 increase RUE strength to F+   Long Term Goal #2 increase FM/GM coordination to Min    Long Term Goal increase bed mobility to min A X 1 for self care participation, progress with OOB functional transfers for self toileting   Plan   Treatment Interventions Neuromuscular reeducation; Fine motor coordination activities; Compensatory technique education; Activityengagement;ADL retraining;Functional transfer training;UE strengthening/ROM; Endurance training;Patient/family training;Equipment evaluation/education; Energy conservation   Goal Expiration Date 01/28/20   OT Treatment Day 0   OT Frequency 5x/wk   Additional Treatment Session   Start Time 0800   End Time 0814   Treatment Assessment Pt willing to participate in tx session  Pt s/w labile at times, frustrated re: inability to express self  Bed mobility with Mod A X 2, side sit EOB with Mod A X 2  Pt able to maintain sitting with use of RUE on bed to support self  Uses RUE as gross functional assist   Slight lean to right w/o support  Stood EOB w/ Mod A X 2   Educated pt/family re: POC/goals and RE: RUE movement/initial HEP  Pt returned to bed as HR increased while OOB to 157       Recommendation   OT Discharge Recommendation Short Term Rehab   OT - OK to Discharge Yes  (when medically stable)   Barthel Index   Feeding 0   Bathing 0   Grooming Score 0   Dressing Score 0   Bladder Score 5   Bowels Score 5   Toilet Use Score 0   Transfers (Bed/Chair) Score 5   Mobility (Level Surface) Score 0   Stairs Score 0   Barthel Index Score 15   Cheri Hilliard, OT

## 2020-01-18 NOTE — NURSING NOTE
Carla Gore continues to be elevated to the 150s intermittently  cardizem gtt d/cd, amio bolus given and amio gtt started per dr ardon

## 2020-01-18 NOTE — NURSING NOTE
Pt wakes easily to voice  Speech garbled, but will use yes/no answers  Tearful  Neuro assessment as noted in computer charting  Right facial droop and right sided weakness present  Pt unable to stick out tongue at this time  Remains npo at this time  Speech to see pt today  Accucheck = 86  Hr 70's to 160's intermittently   made aware of same  Repos for comfort, safety in place, call bell at side

## 2020-01-18 NOTE — ASSESSMENT & PLAN NOTE
· Resolved, procalcitonin also within normal limits  · In retrospect was most likely just reactive  · No further workup

## 2020-01-18 NOTE — CONSULTS
[address]  [place], [address]  [phone]     Tax ID: [id number]      NPI: [id number]                                                Cardiology Consult  Abbe Seymour 60 y o  female   YOB: 1955 MRN: [id number]  Unit/Bed#: ICU 06 Encounter: [id number]      Physician Requesting Consult: Chapincito Leslie MD  Reason for Consult / Principal Problem: Tachyarrhythmia    Assessment and Plan  1  Narrow complex tachycardia - SVT/Atrial tachycardia v atrial flutter  2  New-onset Cardiomyopathy - possibly takotsubo cardiomyopathy  3  Acute CVA  · MRI brain - subacute late acute to early subacute left MCA territory infarct  · Multifocal within left MCA distribution  4  Hypertension  5  Diabetes Mellitus Type 2   6  S/p gastric bypass  7  Obesity, Body mass index is 41 85 kg/m²       Plan  Narrow complex tachycardia  · Patient has been having multiple short runs of tachycardia in the 150s last night and this morning, which appear to be atrial tachycardia, but atypical atrial flutter cannot be ruled out  · Patient has stroke related dysphagia, and is currently unable to tolerate PO  · Initially started on cardizem drip, which was discontinued overnight and restarted this morning, but patient continues to have frequent runs with intermittent hypotension  · Start IV metoprolol 5 q6  · If able to receive anticoagulation from the stroke standpoint, then recommend starting IV heparin drip with IV amiodarone infusion, in order to further rhythm control these episodes  · Discussed with primary service Viridiana Barcenas, who confirms that patient is able to get heparin infusion from stroke standpoint    New-onset cardiomyopathy  · Likely related to tachyarrhythmia  · Troponin 0 06 --> 0 06 --> 0 07  · Echo done while patient's heart rate was 150s, and as a result the tachycardia itself is likely causing temporarily reduced LVEF  · There was additional clear apical akinesis, in takotsubo pattern, which maybe related to the stroke  · Repeat limited echo with echocontrast agent when heart rate is improved  · No evidence of chest pain or volume overload    Non-MI troponin elevation  · Secondary to stroke and tachyarrhythmia  · Repeat echo when HR controlled      History of Present Illness   HPI: Ramiro Brewster is a 60y o  year old female who presents with confusion, talking incoherently and right sided weakness  Patient is aphasic and unable to provide her own history currently  She was apparently in her usual state of health until 7:30 om the night before presentation  Next morning, she called her brother and was speaking incoherently  As a result, EMS was called and she was brought in to the ED for evaluation  She was not a candidate for tPA, and was found to have "afib" on presentation in the ED   She has continued to have multiple intermittent episodes of rapid heart rates overnight, and as a result cardiology was consulted for further evaluation       Past Medical History:   Diagnosis Date    Diabetes mellitus ([address] )     Hypertension     PUD (peptic ulcer disease)     Vitamin D deficiency      Past Surgical History:   Procedure Laterality Date    ABDOMINOPLASTY      revision gastric bypass     SECTION      CHOLECYSTECTOMY      COLONOSCOPY      GASTRIC BYPASS      HERNIA REPAIR      NEUROMA EXCISION Right 2019    Procedure: EXCISION 2ND INTERSPACE NEUROMA;  Surgeon: Marianne López DPM;  Location: [address] [place] [address];  Service: Podiatry    ROTATOR CUFF REPAIR Left      Family History   Problem Relation Age of Onset    Stroke Mother     Heart disease Father      Meds/Allergies   all current active meds have been reviewed and current meds:   Current Facility-Administered Medications   Medication Dose Route Frequency    acetaminophen (TYLENOL) tablet 650 mg  650 mg Oral Q4H PRN    aspirin rectal suppository 300 mg  300 mg Rectal Once    atorvastatin (LIPITOR) tablet 40 mg  40 mg Oral QPM    diltiazem (CARDIZEM) 125 mg in sodium chloride 0 9 % 125 mL infusion  1-15 mg/hr Intravenous Titrated    sodium chloride 0 9 % infusion  125 mL/hr Intravenous Continuous    timolol (TIMOPTIC) 0 25 % ophthalmic solution 1 drop  1 drop Both Eyes BID     Medications Prior to Admission   Medication    calcium carbonate (OS-VENTURA) 600 MG tablet    Ergocalciferol (VITAMIN D2 PO)    hydrochlorothiazide (HYDRODIURIL) 12 5 mg tablet    timolol (TIMOPTIC-XE) 0 25 % ophthalmic gel-forming    traMADol (ULTRAM) 50 mg tablet     Allergies   Allergen Reactions    Nsaids      Due to hx gastric bypass       Social History     Socioeconomic History    Marital status: Single     Spouse name: None    Number of children: None    Years of education: None    Highest education level: None   Occupational History    None   Social Needs    Financial resource strain: None    Food insecurity:     Worry: None     Inability: None    Transportation needs:     Medical: None     Non-medical: None   Tobacco Use    Smoking status: Never Smoker    Smokeless tobacco: Never Used   Substance and Sexual Activity    Alcohol use: No     Comment: none    Drug use: No     Comment: none    Sexual activity: Not Currently     Birth control/protection: Post-menopausal   Lifestyle    Physical activity:     Days per week: None     Minutes per session: None    Stress: None   Relationships    Social connections:     Talks on phone: None     Gets together: None     Attends Holiness service: None     Active member of club or organization: None     Attends meetings of clubs or organizations: None     Relationship status: None    Intimate partner violence:     Fear of current or ex partner: None     Emotionally abused: None     Physically abused: None     Forced sexual activity: None   Other Topics Concern    None   Social History Narrative    None         Review of Systems   Unable to perform ROS: Mental status change Vitals:    01/18/20 0500 01/18/20 0600 01/18/20 0700 01/18/20 0800   BP: 103/53 119/58 119/60 144/95   BP Location:       Pulse: 66 67 71 (!) 126   Resp: 21 22 22 (!) 25   Temp:       TempSrc:       SpO2: 96% 98% 96% 98%   Weight:  118 kg (259 lb 4 2 oz)     Height:         Orthostatic Blood Pressures      Most Recent Value   Blood Pressure  144/95 filed at 01/18/2020 0800   Patient Position - Orthostatic VS  Lying filed at 01/18/2020 0400        Body mass index is 41 85 kg/m²  Wt Readings from Last 5 Encounters:   01/18/20 118 kg (259 lb 4 2 oz)   01/17/20 119 kg (262 lb 5 6 oz)   10/15/19 126 kg (277 lb)   09/17/19 126 kg (278 lb)   08/29/19 122 kg (268 lb)     I/O last 3 completed shifts: In: 1594 6 [I V :1344 6; IV Piggyback:250]  Out: 600 [Urine:600]      Physical Exam   Constitutional: She appears well-developed and well-nourished  No distress  HENT:   Head: Normocephalic  Eyes: No scleral icterus  Neck: No JVD present  Cardiovascular: Normal rate, regular rhythm and normal heart sounds  Exam reveals no gallop and no friction rub  No murmur heard  Pulmonary/Chest: Effort normal and breath sounds normal  No respiratory distress  She has no rales  Abdominal: Soft  She exhibits no distension  There is no tenderness  Neurological:   Awake, aphasia+   Skin: Skin is warm  Nursing note and vitals reviewed              Labs:  Results from last 7 days   Lab Units 01/18/20  0528 01/17/20  1247   WBC Thousand/uL 8 40 15 60*   HEMOGLOBIN g/dL 9 7* 11 6*   HEMATOCRIT % 32 4* 39 0*   RDW % 21 2* 21 5*   PLATELETS Thousands/uL 293 356     Results from last 7 days   Lab Units 01/17/20  1247   POTASSIUM mmol/L 3 6   CHLORIDE mmol/L 103   CO2 mmol/L 24   BUN mg/dL 34*   CREATININE mg/dL 1 24*   CALCIUM mg/dL 9 1     Results from last 7 days   Lab Units 01/17/20  1955 01/17/20  1712 01/17/20  1247   TROPONIN I ng/mL 0 07* 0 06* 0 06*         Results from last 7 days   Lab Units 01/17/20  1247   INR  1 02 Telemetry: Frequent short episodes of atrial tachycardia/atrial flutter with HR of 150  Imaging: X-ray Chest 1 View Portable    Result Date: 1/17/2020  Narrative: CHEST INDICATION: Acute mental status change COMPARISON:  6/13/2017 EXAM PERFORMED/VIEWS:  XR CHEST PORTABLE Single portable view FINDINGS: Cardiomediastinal silhouette appears unremarkable  The lungs are clear  No pneumothorax or pleural effusion  Osseous structures appear within normal limits for patient age  Impression: No acute cardiopulmonary disease  Findings are stable Workstation performed: [id number]     Xr Foot 3+ Vw Right    Result Date: 1/15/2020  Narrative: RIGHT FOOT INDICATION:   M79 671: Pain in right foot  COMPARISON:  None VIEWS:  XR FOOT 3+ VW RIGHT FINDINGS: There is no acute fracture or dislocation  Medial bipartite sesamoid, 2nd and 3rd hammertoes, vascular calcifications and calcaneal spurs  No significant degenerative changes  No lytic or blastic lesions seen  Soft tissues are unremarkable  Impression: No acute osseous abnormality  Workstation performed: [id number]     Mri Brain Wo Contrast    Result Date: 1/17/2020  Narrative: MRI BRAIN WITHOUT CONTRAST INDICATION: Dysarthria, right facial droop and upper extremity weakness  COMPARISON:   1/17/2020  TECHNIQUE:  Sagittal T1, axial T2, axial FLAIR, axial T1, axial [place] and axial diffusion imaging  IMAGE QUALITY:  Patient motion artifact limits evaluation  FINDINGS: BRAIN PARENCHYMA: Foci of restricted diffusion corresponding to areas of hypoattenuation in the left MCA territory on the prior head CT, involving the left frontoparietal and temporal lobes  No interval increase in extent of the infarct  Stable mild gyral swelling within the areas of infarct  No evidence of hemorrhagic conversion  No midline shift  Intact basal cisterns  Periventricular and subcortical T2/FLAIR hyperintense foci consistent with microangiopathic disease   VENTRICLES:  No hydrocephalus or extra-axial collection  SELLA AND PITUITARY GLAND:  Normal  ORBITS:  Grossly intact globes and orbits  PARANASAL SINUSES:  No significant paranasal sinus disease  VASCULATURE:  Flow was not well evaluated due to motion artifact  CALVARIUM AND SKULL BASE:  Limited evaluation  EXTRACRANIAL SOFT TISSUES:  Limited evaluation  Impression: Stable late acute to early subacute left MCA territory infarct  No evidence of increase in extent of the infarct, hemorrhagic conversion or significant mass effect  Workstation performed: [id number]     Ct Stroke Alert Brain    Result Date: 1/17/2020  Narrative: CT BRAIN - STROKE ALERT PROTOCOL INDICATION:   Neuro deficit, acute, stroke suspected  COMPARISON:  None  TECHNIQUE:  CT examination of the brain was performed  In addition to axial images, coronal reformatted images were created and submitted for interpretation  Radiation dose length product (DLP) for this visit:  793 3 mGy-cm   This examination, like all CT scans performed in the [facility], was performed utilizing techniques to minimize radiation dose exposure, including the use of iterative reconstruction and automated exposure control  IMAGE QUALITY:  Diagnostic  FINDINGS:  PARENCHYMA:  Multifocal low density ischemic edema within the left MCA distribution with distortion of the gray-white junction  This is noted within the left anterolateral temporal lobe, posterior left parietal lobe, and also at the posterior left parietal vertex  Findings consistent with recent ischemic disease  No overt hemorrhage  Local mass effect  Chronic small vessel disease  Normal intracranial vasculature  VENTRICLES AND EXTRA-AXIAL SPACES:  Normal for patient's age  VISUALIZED ORBITS AND PARANASAL SINUSES:  Unremarkable  CALVARIUM AND EXTRACRANIAL SOFT TISSUES:   Normal      Impression: Recent bland ischemic edema, multifocal within the left MCA distribution  Minimal local mass effect    Event estimated to be at least several hours of age  Findings were directly discussed with SYDNEE HERNANDEZ on 1/17/2020 12:46 PM  Workstation performed: [id number]     Cta Stroke Alert (head/neck)    Result Date: 1/17/2020  Narrative: CTA NECK AND BRAIN WITH CONTRAST INDICATION: Neuro deficit, acute, stroke suspected COMPARISON:   Contemporary CT TECHNIQUE:   Post contrast imaging was performed after administration of iodinated contrast through the neck and brain  Post contrast axial 0 625 mm images timed to opacify the arterial system  3D rendering was performed on an independent workstation  MIP reconstructions performed  Coronal reconstructions were performed of the noncontrast portion of the brain  Radiation dose length product (DLP) for this visit:  292 8 mGy-cm   This examination, like all CT scans performed in the [facility], was performed utilizing techniques to minimize radiation dose exposure, including the use of iterative reconstruction and automated exposure control  IV Contrast:  IMAGE QUALITY:   Diagnostic FINDINGS: CERVICAL VASCULATURE AORTIC ARCH AND GREAT VESSELS:  Mild athersclerotic disease of the arch, proximal great vessels and visualized subclavian vessels  No significant stenosis  RIGHT VERTEBRAL ARTERY CERVICAL SEGMENT:  Normal origin  The vessel is normal in caliber throughout the neck  LEFT VERTEBRAL ARTERY CERVICAL SEGMENT:  Normal origin  The vessel is normal in caliber throughout the neck  RIGHT EXTRACRANIAL CAROTID SEGMENT:  Normal caliber common carotid artery  Normal bifurcation and cervical internal carotid artery  No stenosis or dissection  LEFT EXTRACRANIAL CAROTID SEGMENT:  Normal caliber common carotid artery  Normal bifurcation and cervical internal carotid artery  No stenosis or dissection  NASCET criteria was used to determine the degree of internal carotid artery diameter stenosis   INTRACRANIAL VASCULATURE INTERNAL CAROTID ARTERIES:  Normal enhancement of the intracranial portions of the internal carotid arteries  Normal ophthalmic artery origins  Normal ICA terminus  Cavernous carotid arteries are ectatic, contour distorted somewhat by venous contrast within the cavernous sinus  ANTERIOR CIRCULATION:  Symmetric A1 segments and anterior cerebral arteries with normal enhancement  Normal anterior communicating artery  MIDDLE CEREBRAL ARTERY CIRCULATION:  M1 segment and middle cerebral artery branches demonstrate normal enhancement bilaterally  DISTAL VERTEBRAL ARTERIES:  Normal distal vertebral arteries  Right Posterior inferior cerebellar artery origins are normal   Left PICA origin is not clearly seen     Normal vertebral basilar junction  BASILAR ARTERY:  Basilar artery is normal in caliber  Normal superior cerebellar arteries  POSTERIOR CEREBRAL ARTERIES: Both posterior cerebral arteries demonstrate normal enhancement  DURAL VENOUS SINUSES:  Normal  NON VASCULAR ANATOMY BONY STRUCTURES:  No acute osseous abnormality  SOFT TISSUES OF THE NECK:  Unremarkable  THORACIC INLET:  Scarring in the right upper lung field     Impression: No large vessel flow restrictive disease within the head or neck  No large vessel intracranial occlusion  Findings were directly discussed with SYDNEE HERNANDEZ on 2020 1:08 PM  Workstation performed: [id number]       Cardiac testing:   Results for orders placed during the hospital encounter of 20   Echo complete with contrast if indicated    Narrative [address] [place] [address]    Transthoracic Echocardiogram  2D, M-mode, Doppler, and Color Doppler    Study date:  2020    Patient: Josie Baum  MR number: [id number]  Account number: [de-identified]  : 1955  Age: 60 years  Gender: Female  Status: Inpatient  [facility]   Height: 63 in  Weight: 272 4 lb  BP: 116/ 80 mmHg    Indications: Atrial fibrillation  CVA      Diagnoses: I48 0 - Atrial fibrillation, I63 9 - Cerebral infarction, unspecified    Sonographer:  Clarissa Bonner RDCS  Referring Physician:  LEW Bernabe  Group:  [id number]  Interpreting Physician:  Moisés Sanchez MD    SUMMARY    LEFT VENTRICLE:  Systolic function was markedly reduced  Ejection fraction was estimated in the range of 20 % to 30 %  There is global dysfunction but the apical portion of the inferior wall is akinetic  There is significant tachycardia which may be transiently depressing the ejection fraction  HISTORY: Consistent with transient ischemic attack or stroke  PRIOR HISTORY: Risk factors: hypertension and morbid obesity  PROCEDURE: The study was performed in the [facility]  This was a routine study  The transthoracic approach was used  The study included complete 2D imaging, M-mode, complete spectral Doppler, and color Doppler  The  heart rate was 154 bpm, at the start of the study  Images were obtained from the parasternal, apical, subcostal, and suprasternal notch acoustic windows  Echocardiographic views were limited due to restricted patient mobility, poor  acoustic window availability, decreased penetration, and lung interference  This was a technically difficult study  LEFT VENTRICLE: Size was normal  Systolic function was markedly reduced  Ejection fraction was estimated in the range of 20 % to 30 %  There is global dysfunction but the apical portion of the inferior wall is akinetic  There is significant tachycardia which may be transiently depressing the ejection fraction  Wall thickness was normal  No evidence of apical thrombus  DOPPLER: Transmitral flow pattern: atrial fibrillation  The study was not technically  sufficient to allow evaluation of LV diastolic function      RIGHT VENTRICLE: The size was normal  Systolic function was normal  Wall thickness was normal     LEFT ATRIUM: Size was normal     RIGHT ATRIUM: Size was normal     MITRAL VALVE: Valve structure was normal  There was normal leaflet separation  DOPPLER: The transmitral velocity was within the normal range  There was no evidence for stenosis  There was no significant regurgitation  AORTIC VALVE: The valve was possibly bicuspid  Leaflets exhibited mild to moderate calcification and normal cuspal separation  DOPPLER: Transaortic velocity was within the normal range  There was no evidence for stenosis  There was no  significant regurgitation  TRICUSPID VALVE: The valve structure was normal  There was normal leaflet separation  DOPPLER: The transtricuspid velocity was within the normal range  There was no evidence for stenosis  There was trace regurgitation  PULMONIC VALVE: Leaflets exhibited normal thickness, no calcification, and normal cuspal separation  DOPPLER: The transpulmonic velocity was within the normal range  There was no significant regurgitation  PERICARDIUM: There was no pericardial effusion  The pericardium was normal in appearance  AORTA: The root exhibited normal size  SYSTEMIC VEINS: IVC: The inferior vena cava was normal in size  SYSTEM MEASUREMENT TABLES    2D  %FS: 12 42 %  AV Diam: 3 35 cm  Ao asc: 3 14 cm  EDV(Teich): 112 78 ml  EF(Teich): 26 72 %  ESV(Teich): 82 64 ml  IVSd: 1 28 cm  LA Area: 23 66 cm2  LA Diam: 3 86 cm  LVEDV MOD A4C: 88 01 ml  LVEF MOD A4C: 24 42 %  LVESV MOD A4C: 66 52 ml  LVIDd: 4 9 cm  LVIDs: 4 29 cm  LVLd A4C: 7 91 cm  LVLs A4C: 7 09 cm  LVOT Diam: 2 04 cm  LVPWd: 1 12 cm  RA Area: 10 28 cm2  RV Diam : 1 85 cm  SI(Teich): 13 64 ml/m2  SV MOD A4C: 21 49 ml  SV(Cube): 38 61 ml  SV(Teich): 30 14 ml    CW  AV Env  Ti: 184 84 ms  AV VTI: 26 2 cm  AV Vmax: 1 86 m/s  AV Vmean: 1 42 m/s  AV maxP 81 mmHg  AV meanP 6 mmHg  TR Vmax: 2 5 m/s  TR maxP 32 mmHg    MM  TAPSE: 1 99 cm    PW  ADORE (VTI): 1 96 cm2  ADORE Vmax: 1 66 cm2  LVOT Env  Ti: 266 17 ms  LVOT VTI: 15 72 cm  LVOT Vmax: 0 94 m/s  LVOT Vmean: 0 59 m/s  LVOT maxPG: 3 57 mmHg  LVOT meanP 8 mmHg    [facility] Accredited Echocardiography Laboratory    Prepared and electronically signed by    Dwight Barnett MD  Signed 2020 17:00:55       No results found for this or any previous visit  No results found for this or any previous visit  No results found for this or any previous visit        Late signature - patient seen for initial consultation on 20

## 2020-01-18 NOTE — PROGRESS NOTES
Progress Note - Arina Bowles 1955, 60 y o  female MRN: [id number]    Unit/Bed#: ICU 06 Encounter: [id number]    Primary Care Provider: Gerald Hodgkins, DO   Date and time admitted to hospital: 1/17/2020 12:37 PM        * CVA (cerebral vascular accident) [facility])  Assessment & Plan  · With residual right-sided weakness and dysphasia  · MRI results as follows:Stable late acute to early subacute left MCA territory infarct   No evidence of increase in extent of the infarct, hemorrhagic conversion or significant mass effect  · Most likely secondary to atrial fibrillation rapid ventricular response  · Workup thus far has included CT scan of head as as 2D echocardiogram  · Status post a tele neurology evaluation in the ED  · Continue aspirin and Lipitor for secondary prevention  · Will check a lipid panel and a glycosylated hemoglobin level  · Formal speech eval pending  · Continue PT OT and speech rehab  · Will need rehab at time discharge    Atrial fibrillation with RVR (HCC)  Assessment & Plan  · Heart rate ranges between 70 and 150  · Continue Cardizem drip  · Will start Eliquis for anticoagulation  · Await formal cardiology input for further medication optimization    Elevated troponin I level  Assessment & Plan  · Likely due to rapid rate   · No true rise or fall in troponins, not indicative of ACS  · Status post 2D echocardiogram testing with the following results:Systolic function was markedly reduced  Ejection fraction was estimated in the range of 20 % to 30 %  There is global dysfunction but the apical portion of the inferior wall is akinetic  There is significant tachycardia which may be transiently depressing the ejection fraction    · Once again await further Cardiology input    SANDRINE (acute kidney injury) ([facility] [address] )  Assessment & Plan  · Repeat BMP is pending  · Continue to monitor    Essential hypertension  Assessment & Plan  · Patient was on HCTZ at home   · Will hold off for now - okay for permissive hypertension  · Monitor BP closely     Leukocytosis  Assessment & Plan  · Resolved, procalcitonin also within normal limits  · In retrospect was most likely just reactive  · No further workup    Morbid obesity with body mass index (BMI) of 40 0 to 44 9 in adult [facility])  Assessment & Plan  · Dietary, lifestyle modification counseling be provided in future      VTE Pharmacologic Prophylaxis: Pharmacologic: Enoxaparin (Lovenox)    Patient Centered Rounds: I have performed bedside rounds with nursing staff today  Discussions with Specialists or Other Care Team Provider:  Cardiology, neurology, case management, nursing and pharmacy  Education and Discussions with Family / Patient:  The patient's brothers and son, Dalton Darnell and Jhonny Mosley were bedside at the time of my evaluation, all questions answered to their collective satisfaction    Current Length of Stay: 1 day(s)    Current Patient Status: Inpatient   Certification Statement: The patient will continue to require additional inpatient hospital stay due to Further management of an acute CVA    Discharge Plan:  Eventual discharge planning for rehab    Code Status: Level 1 - Full Code    Subjective:   Patient seen and examined  Appears comfortable  Dysphasia persists  Patient appears a little frustrated because she cannot get the words out that she wants to    Objective:     Vitals:   Temp (24hrs), Av °F (36 7 °C), Min:97 7 °F (36 5 °C), Max:98 5 °F (36 9 °C)    Temp:  [97 7 °F (36 5 °C)-98 5 °F (36 9 °C)] 98 3 °F (36 8 °C)  HR:  [] 126  Resp:  [18-34] 25  BP: ()/() 144/95  SpO2:  [95 %-100 %] 98 %  Body mass index is 41 85 kg/m²  Input and Output Summary (last 24 hours): Intake/Output Summary (Last 24 hours) at 2020 1008  Last data filed at 2020 0601  Gross per 24 hour   Intake 1594 59 ml   Output 600 ml   Net 994 59 ml       Physical Exam:     Physical Exam   Constitutional: She appears well-developed and well-nourished  HENT:   Head: Normocephalic and atraumatic  Nose: Nose normal    Mouth/Throat: Oropharynx is clear and moist    Eyes: Pupils are equal, round, and reactive to light  Conjunctivae and EOM are normal    Neck: Normal range of motion  Neck supple  No JVD present  No thyromegaly present  Cardiovascular: Intact distal pulses  Exam reveals no gallop and no friction rub  No murmur heard  S1 plus S2, irregularly irregular, tachycardic   Pulmonary/Chest: Effort normal and breath sounds normal  No respiratory distress  Abdominal: Soft  Bowel sounds are normal  She exhibits no distension and no mass  There is no tenderness  There is no guarding  Musculoskeletal: Normal range of motion  She exhibits no edema  Lymphadenopathy:     She has no cervical adenopathy  Neurological: She is alert  No cranial nerve deficit  Alert awake  Follow simple 1 step commands  Motor 5/5 in the left upper and left lower extremity  Motor 4/5 in the right upper and right lower extremity  Dysphasia persists   Skin: Skin is warm  No rash noted  No erythema  Psychiatric: She has a normal mood and affect  Her behavior is normal    Vitals reviewed  Additional Data:     Labs:    Results from last 7 days   Lab Units 01/18/20  0528   WBC Thousand/uL 8 40   HEMOGLOBIN g/dL 9 7*   HEMATOCRIT % 32 4*   PLATELETS Thousands/uL 293     Results from last 7 days   Lab Units 01/17/20  1247   POTASSIUM mmol/L 3 6   CHLORIDE mmol/L 103   CO2 mmol/L 24   BUN mg/dL 34*   CREATININE mg/dL 1 24*   CALCIUM mg/dL 9 1     Results from last 7 days   Lab Units 01/17/20  1247   INR  1 02     Results from last 7 days   Lab Units 01/18/20  0728 01/17/20  2229 01/17/20  1715 01/17/20  1237   POC GLUCOSE mg/dl 86 107 121 180*           * I Have Reviewed All Lab Data Listed Above  * Additional Pertinent Lab Tests Reviewed:  All Labs For Current Hospital Admission  Reviewed    Imaging:  Imaging Reports Reviewed Today Include:  MRI brain-see the results as outlined    Recent Cultures (last 7 days):           Last 24 Hours Medication List:     Current Facility-Administered Medications:  acetaminophen 650 mg Oral Q4H PRN LEW Cazares    aspirin 81 mg Oral Daily LEW Cazares    atorvastatin 40 mg Oral QPM LEW Cazares    diltiazem 1-15 mg/hr Intravenous Titrated LEW Cazares Last Rate: Stopped (01/17/20 1845)   sodium chloride 125 mL/hr Intravenous Continuous Gaviota RIANA Ramires Last Rate: 125 mL/hr (01/18/20 0601)   timolol 1 drop Both Eyes BID LEW Cazares         Today, Patient Was Seen By: Leti Schumacher MD    ** Please Note: Dictation voice to text software may have been used in the creation of this document   **

## 2020-01-19 ENCOUNTER — APPOINTMENT (INPATIENT)
Dept: CT IMAGING | Facility: HOSPITAL | Age: 65
DRG: 045 | End: 2020-01-19
Payer: COMMERCIAL

## 2020-01-19 LAB
ANION GAP SERPL CALCULATED.3IONS-SCNC: 8 MMOL/L (ref 4–13)
ANISOCYTOSIS BLD QL SMEAR: PRESENT
APTT PPP: 107 SECONDS (ref 23–37)
APTT PPP: 124 SECONDS (ref 23–37)
APTT PPP: >210 SECONDS (ref 23–37)
BASOPHILS # BLD AUTO: 0 THOUSANDS/ΜL (ref 0–0.1)
BASOPHILS NFR BLD AUTO: 0 % (ref 0–2)
BUN SERPL-MCNC: 18 MG/DL (ref 7–25)
CALCIUM SERPL-MCNC: 7.5 MG/DL (ref 8.6–10.5)
CHLORIDE SERPL-SCNC: 107 MMOL/L (ref 98–107)
CO2 SERPL-SCNC: 22 MMOL/L (ref 21–31)
CREAT SERPL-MCNC: 0.88 MG/DL (ref 0.6–1.2)
EOSINOPHIL # BLD AUTO: 0 THOUSAND/ΜL (ref 0–0.61)
EOSINOPHIL NFR BLD AUTO: 0 % (ref 0–5)
ERYTHROCYTE [DISTWIDTH] IN BLOOD BY AUTOMATED COUNT: 21.3 % (ref 11.5–14.5)
GFR SERPL CREATININE-BSD FRML MDRD: 71 ML/MIN/1.73SQ M
GLUCOSE SERPL-MCNC: 121 MG/DL (ref 65–99)
HCT VFR BLD AUTO: 33.9 % (ref 42–47)
HGB BLD-MCNC: 10 G/DL (ref 12–16)
LYMPHOCYTES # BLD AUTO: 0.9 THOUSANDS/ΜL (ref 0.6–4.47)
LYMPHOCYTES NFR BLD AUTO: 8 % (ref 21–51)
MCH RBC QN AUTO: 21.7 PG (ref 26–34)
MCHC RBC AUTO-ENTMCNC: 29.4 G/DL (ref 31–37)
MCV RBC AUTO: 74 FL (ref 81–99)
MICROCYTES BLD QL AUTO: PRESENT
MONOCYTES # BLD AUTO: 1.1 THOUSAND/ΜL (ref 0.17–1.22)
MONOCYTES NFR BLD AUTO: 10 % (ref 2–12)
NEUTROPHILS # BLD AUTO: 9.4 THOUSANDS/ΜL (ref 1.4–6.5)
NEUTS SEG NFR BLD AUTO: 82 % (ref 42–75)
PLATELET # BLD AUTO: 271 THOUSANDS/UL (ref 149–390)
PLATELET BLD QL SMEAR: ADEQUATE
PMV BLD AUTO: 8.8 FL (ref 8.6–11.7)
POTASSIUM SERPL-SCNC: 3.8 MMOL/L (ref 3.5–5.5)
RBC # BLD AUTO: 4.6 MILLION/UL (ref 3.9–5.2)
RBC MORPH BLD: NORMAL
SODIUM SERPL-SCNC: 137 MMOL/L (ref 134–143)
WBC # BLD AUTO: 11.6 THOUSAND/UL (ref 4.8–10.8)

## 2020-01-19 PROCEDURE — 85730 THROMBOPLASTIN TIME PARTIAL: CPT | Performed by: HOSPITALIST

## 2020-01-19 PROCEDURE — 97116 GAIT TRAINING THERAPY: CPT

## 2020-01-19 PROCEDURE — 85730 THROMBOPLASTIN TIME PARTIAL: CPT | Performed by: PHYSICIAN ASSISTANT

## 2020-01-19 PROCEDURE — 85025 COMPLETE CBC W/AUTO DIFF WBC: CPT | Performed by: HOSPITALIST

## 2020-01-19 PROCEDURE — 97530 THERAPEUTIC ACTIVITIES: CPT

## 2020-01-19 PROCEDURE — 70450 CT HEAD/BRAIN W/O DYE: CPT

## 2020-01-19 PROCEDURE — 99232 SBSQ HOSP IP/OBS MODERATE 35: CPT | Performed by: INTERNAL MEDICINE

## 2020-01-19 PROCEDURE — 99233 SBSQ HOSP IP/OBS HIGH 50: CPT | Performed by: HOSPITALIST

## 2020-01-19 PROCEDURE — 80048 BASIC METABOLIC PNL TOTAL CA: CPT | Performed by: HOSPITALIST

## 2020-01-19 PROCEDURE — 97535 SELF CARE MNGMENT TRAINING: CPT

## 2020-01-19 RX ORDER — AMIODARONE HYDROCHLORIDE 100 MG/1
200 TABLET ORAL
Status: DISCONTINUED | OUTPATIENT
Start: 2020-01-19 | End: 2020-01-23 | Stop reason: HOSPADM

## 2020-01-19 RX ORDER — AMIODARONE HYDROCHLORIDE 100 MG/1
200 TABLET ORAL
Status: DISCONTINUED | OUTPATIENT
Start: 2020-02-03 | End: 2020-01-23 | Stop reason: HOSPADM

## 2020-01-19 RX ORDER — METOPROLOL TARTRATE 5 MG/5ML
5 INJECTION INTRAVENOUS EVERY 6 HOURS PRN
Status: DISCONTINUED | OUTPATIENT
Start: 2020-01-19 | End: 2020-01-23 | Stop reason: HOSPADM

## 2020-01-19 RX ORDER — AMIODARONE HYDROCHLORIDE 100 MG/1
200 TABLET ORAL 2 TIMES DAILY WITH MEALS
Status: DISCONTINUED | OUTPATIENT
Start: 2020-01-26 | End: 2020-01-23 | Stop reason: HOSPADM

## 2020-01-19 RX ORDER — METOPROLOL SUCCINATE 25 MG/1
25 TABLET, EXTENDED RELEASE ORAL EVERY 12 HOURS
Status: DISCONTINUED | OUTPATIENT
Start: 2020-01-19 | End: 2020-01-20

## 2020-01-19 RX ADMIN — AMIODARONE HYDROCHLORIDE 200 MG: 100 TABLET ORAL at 16:32

## 2020-01-19 RX ADMIN — METOPROLOL TARTRATE 5 MG: 5 INJECTION, SOLUTION INTRAVENOUS at 11:29

## 2020-01-19 RX ADMIN — HEPARIN SODIUM AND DEXTROSE 16.1 UNITS/KG/HR: 10000; 5 INJECTION INTRAVENOUS at 11:31

## 2020-01-19 RX ADMIN — ATORVASTATIN CALCIUM 40 MG: 40 TABLET, FILM COATED ORAL at 17:07

## 2020-01-19 RX ADMIN — METOPROLOL SUCCINATE 25 MG: 25 TABLET, EXTENDED RELEASE ORAL at 16:32

## 2020-01-19 RX ADMIN — TIMOLOL MALEATE 1 DROP: 2.5 SOLUTION/ DROPS OPHTHALMIC at 08:40

## 2020-01-19 RX ADMIN — TIMOLOL MALEATE 1 DROP: 2.5 SOLUTION/ DROPS OPHTHALMIC at 17:07

## 2020-01-19 RX ADMIN — METOPROLOL TARTRATE 5 MG: 5 INJECTION, SOLUTION INTRAVENOUS at 05:14

## 2020-01-19 NOTE — PLAN OF CARE
Problem: OCCUPATIONAL THERAPY ADULT  Goal: Performs self-care activities at highest level of function for planned discharge setting  See evaluation for individualized goals  Description  Treatment Interventions: Neuromuscular reeducation, Fine motor coordination activities, Compensatory technique education, Activityengagement, ADL retraining, Functional transfer training, UE strengthening/ROM, Endurance training, Patient/family training, Equipment evaluation/education, Energy conservation          See flowsheet documentation for full assessment, interventions and recommendations  Outcome: Progressing  Note:   Limitation: Decreased ADL status, Decreased UE ROM, Decreased UE strength, Decreased Safe judgement during ADL, Decreased endurance, Decreased self-care trans, Decreased high-level ADLs  Prognosis: Guarded  Assessment: Patient participated in Skilled OT session this date with interventions consisting of ADL re training with the use of correct body mechnaics, Energy Conservation techniques, safety awareness and fall prevention techniques and increase OOB/ sitting tolerance   Patient agreeable to OT treatment session, upon arrival patient was found seated OOB to Recliner  In comparison to previous session, patient with improvements in functional mobility  Patient requiring frequent re direction, verbal cues for safety and verbal cues for pacing thru activity steps  Patient continues to be functioning below baseline level, occupational performance remains limited secondary to factors listed above and increased risk for falls and injury  From OT standpoint, recommendation at time of d/c would be Short Term Rehab  Patient to benefit from continued Occupational Therapy treatment while in the hospital to address deficits as defined above and maximize level of functional independence with ADLs and functional mobility       OT Discharge Recommendation: Short Term Rehab  OT - OK to Discharge: Yes(when medically stable)    Farooq Zelaya, MS, OTR/L

## 2020-01-19 NOTE — PROGRESS NOTES
Cardiology Progress Note - Rigo Hemphill 60 y o  female MRN: [id number]    Unit/Bed#: ICU 06 Encounter: [id number]      Assessment:  Assessment and Plan  1  Narrow complex tachycardia - SVT/Atrial tachycardia v atrial flutter  2  New-onset Cardiomyopathy - possibly takotsubo cardiomyopathy  3  Acute CVA  · MRI brain - subacute late acute to early subacute left MCA territory infarct  · Multifocal within left MCA distribution  4  Hypertension  5  Diabetes Mellitus Type 2   6  S/p gastric bypass  7  Obesity, Body mass index is 41 85 kg/m²       Plan  Narrow complex tachycardia  · Patient has been having multiple short runs of tachycardia in the 150s last night and this morning, which appear to be atrial tachycardia, but atypical atrial flutter cannot be ruled out  · Started on IV heparin infusion yesterday alongwith IV amiodarone infusion yesterday  · Has remained in NSR today morning  · Getting repeat CT head today to evaluate for hemorrhagic conversion  · If no concern for bleeding while on heparin, then can plan to switch over to eliquis for anticoagulation  · She has tolerated oral diet today, and is overall doing much better today  · Switch IV amiodarone to oral amiodarone 200 q8h x 7 days + 200 bid x 7 days + 200 daily  · Will plan to continue on amiodarone for short term 3-6 months, and this will be re-evaluated outpatient  · Switch  IV metoprolol to metoprolol XL 25 bid, and plan to uptitrate  · Keep IV metoprolol 5q6h PRN for persistent HR >130       New-onset cardiomyopathy  · Likely related to tachyarrhythmia  · Troponin 0 06 --> 0 06 --> 0 07  · Echo done while patient's heart rate was 150s, and as a result the tachycardia itself is likely causing temporarily reduced LVEF, with apical akinesis, in takotsubo pattern, which maybe related to the stroke  · Repeat limited echo tomorrow while HR is improved   Please use echocontrast agent to rule out apical thrombus  · On IV metoprolol 5 q6h --> changed to metoprolol XL 25 bid  · Eventually add ACEI/ARB, based on BP response     Non-MI troponin elevation  · Secondary to stroke and tachyarrhythmia  · Repeat echo when HR controlled      Subjective:   She is doing much better today  She has significant movement on the right side of her body, and is able to talk in few short words or sentences  No complains of chest pain, shortness of breath  She continued to have frequent tachyarrhythmia yesterday afternoon, which was initially improving with IV amiodarone, but then amiodarone needed to be held due to lack of an IV and then she had frequent recurrences at that time  Eventually her tachyarrhythmia has been controlled overnight and this morning on amiodarone  Review of Systems  All other systems negative, except as noted in HPI    Telemetry Review:  Normal sinus rhythm 60s to 80s  Multiple frequent short bursts of tachyarrhythmia narrow complex with heart rate in the 140s to 150s    Objective:   Vitals: Blood pressure 125/79, pulse 68, temperature 98 3 °F (36 8 °C), temperature source Tympanic, resp  rate (!) 31, height 5' 6" (1 676 m), weight 118 kg (259 lb 4 2 oz), SpO2 92 %  , Body mass index is 41 85 kg/m² , Orthostatic Blood Pressures      Most Recent Value   Blood Pressure  125/79 filed at 01/19/2020 1200   Patient Position - Orthostatic VS  Sitting filed at 01/19/2020 1266         Systolic (47VDO), GZA:833 , Min:102 , WKI:206     Diastolic (27YZU), VJS:28, Min:54, Max:96    Wt Readings from Last 5 Encounters:   01/18/20 118 kg (259 lb 4 2 oz)   01/17/20 119 kg (262 lb 5 6 oz)   10/15/19 126 kg (277 lb)   09/17/19 126 kg (278 lb)   08/29/19 122 kg (268 lb)     I/O       01/17 0701 - 01/18 0700 01/18 0701 - 01/19 0700 01/19 0701 - 01/20 0700    P  O   250 120    I V  (mL/kg) 1344 6 (11 4) 1403 8 (11 9)     IV Piggyback 250 980     Total Intake(mL/kg) 1594 6 (13 5) 2633 8 (22 3) 120 (1)    Urine (mL/kg/hr) 600 1200 (0 4) 175 (0 2)    Stool   0    Total Output 600 1200 175    Net +994 6 +1433 8 -55           Unmeasured Stool Occurrence   1 x              Physical Exam   Constitutional: She appears well-developed and well-nourished  No distress  HENT:   Head: Normocephalic  Eyes: No scleral icterus  Neck: No JVD present  Cardiovascular: Normal rate, regular rhythm and normal heart sounds  Exam reveals no gallop and no friction rub  No murmur heard  Pulmonary/Chest: Effort normal and breath sounds normal  No respiratory distress  She has no rales  Abdominal: Soft  She exhibits no distension  There is no tenderness  Musculoskeletal: She exhibits no edema  Neurological: She is alert  Awake, talks in short sentences, right-sided weakness is much improved, although still does have some weakness there  Skin: Skin is warm  Psychiatric: She has a normal mood and affect  Nursing note and vitals reviewed            Laboratory Results: personally reviewed  Results from last 7 days   Lab Units 01/17/20  1955 01/17/20  1712 01/17/20  1247   TROPONIN I ng/mL 0 07* 0 06* 0 06*       CBC with diff:   Results from last 7 days   Lab Units 01/19/20  1006 01/18/20  0528 01/17/20  1247   WBC Thousand/uL 11 60* 8 40 15 60*   HEMOGLOBIN g/dL 10 0* 9 7* 11 6*   HEMATOCRIT % 33 9* 32 4* 39 0*   MCV fL 74* 74* 74*   PLATELETS Thousands/uL 271 293 356   MCH pg 21 7* 22 1* 21 9*   MCHC g/dL 29 4* 29 9* 29 7*   RDW % 21 3* 21 2* 21 5*   MPV fL 8 8 8 2* 8 4*         CMP:  Results from last 7 days   Lab Units 01/19/20  1006 01/18/20  0956 01/17/20  1247   POTASSIUM mmol/L 3 8 3 8 3 6   CHLORIDE mmol/L 107 109* 103   CO2 mmol/L 22 21 24   BUN mg/dL 18 24 34*   CREATININE mg/dL 0 88 0 90 1 24*   CALCIUM mg/dL 7 5* 7 7* 9 1   AST U/L  --  15  --    ALT U/L  --  11  --    ALK PHOS U/L  --  62  --    EGFR ml/min/1 73sq m 71 69 47         BMP:  Results from last 7 days   Lab Units 01/19/20  1006 01/18/20  0956 01/17/20  1247   POTASSIUM mmol/L 3 8 3 8 3 6   CHLORIDE mmol/L 107 109* 103 CO2 mmol/L 22 21 24   BUN mg/dL 18 24 34*   CREATININE mg/dL 0 88 0 90 1 24*   CALCIUM mg/dL 7 5* 7 7* 9 1       BNP: No results for input(s): BNP in the last 72 hours      Magnesium:   Results from last 7 days   Lab Units 01/18/20  0956   MAGNESIUM mg/dL 1 8*       Coags:   Results from last 7 days   Lab Units 01/19/20  1135 01/19/20  0405 01/18/20  2124 01/18/20  1405 01/17/20  1247   PTT seconds 124* >210* 31 26 28   INR   --   --   --  1 08 1 02       TSH:        Hemoglobin A1C   Results from last 7 days   Lab Units 01/18/20  0528   HEMOGLOBIN A1C % 6 0       Lipid Profile: Results from last 7 days   Lab Units 01/18/20  0956   TRIGLYCERIDES mg/dL 93   HDL mg/dL 44       Meds/Allergies   all current active meds have been reviewed and current meds: Current Facility-Administered Medications   Medication Dose Route Frequency    acetaminophen (TYLENOL) tablet 650 mg  650 mg Oral Q4H PRN    amiodarone (CORDARONE) 900 mg in dextrose 5 % 500 mL infusion  0 5 mg/min Intravenous Continuous    atorvastatin (LIPITOR) tablet 40 mg  40 mg Oral QPM    heparin (porcine) 25,000 units in 250 mL infusion (premix)  3-20 Units/kg/hr (Order-Specific) Intravenous Titrated    heparin (porcine) injection 2,000 Units  2,000 Units Intravenous PRN    heparin (porcine) injection 4,000 Units  4,000 Units Intravenous PRN    LORazepam (ATIVAN) tablet 0 5 mg  0 5 mg Oral Q8H PRN    metoprolol (LOPRESSOR) injection 5 mg  5 mg Intravenous Q6H    timolol (TIMOPTIC) 0 25 % ophthalmic solution 1 drop  1 drop Both Eyes BID     Medications Prior to Admission   Medication    calcium carbonate (OS-VENTURA) 600 MG tablet    Ergocalciferol (VITAMIN D2 PO)    hydrochlorothiazide (HYDRODIURIL) 12 5 mg tablet    timolol (TIMOPTIC-XE) 0 25 % ophthalmic gel-forming    traMADol (ULTRAM) 50 mg tablet       amiodarone 0 5 mg/min Last Rate: 0 5 mg/min (01/19/20 0601)   heparin (porcine) 3-20 Units/kg/hr (Order-Specific) Last Rate: Stopped (01/19/20 1240)         Cardiac testing: reviewed  Results for orders placed during the hospital encounter of 20   Echo complete with contrast if indicated    Narrative [address]  [facility] [place], [address] [place] [address]    Transthoracic Echocardiogram  2D, M-mode, Doppler, and Color Doppler    Study date:  2020    Patient: Kavon Guthrie  MR number: [id number]  Account number: [de-identified]  : 1955  Age: 60 years  Gender: Female  Status: Inpatient  Location: [facility]   Height: 63 in  Weight: 272 4 lb  BP: 116/ 80 mmHg    Indications: Atrial fibrillation  CVA  Diagnoses: I48 0 - Atrial fibrillation, I63 9 - Cerebral infarction, unspecified    Sonographer:  Carol Naidu RDCS  Referring Physician:  LEW Baum  Group:  [facility]  Interpreting Physician:  Velasquez Stevenson MD    SUMMARY    LEFT VENTRICLE:  Systolic function was markedly reduced  Ejection fraction was estimated in the range of 20 % to 30 %  There is global dysfunction but the apical portion of the inferior wall is akinetic  There is significant tachycardia which may be transiently depressing the ejection fraction  HISTORY: Consistent with transient ischemic attack or stroke  PRIOR HISTORY: Risk factors: hypertension and morbid obesity  PROCEDURE: The study was performed in the [facility]  This was a routine study  The transthoracic approach was used  The study included complete 2D imaging, M-mode, complete spectral Doppler, and color Doppler  The  heart rate was 154 bpm, at the start of the study  Images were obtained from the parasternal, apical, subcostal, and suprasternal notch acoustic windows  Echocardiographic views were limited due to restricted patient mobility, poor  acoustic window availability, decreased penetration, and lung interference  This was a technically difficult study      LEFT VENTRICLE: Size was normal  Systolic function was markedly reduced  Ejection fraction was estimated in the range of 20 % to 30 %  There is global dysfunction but the apical portion of the inferior wall is akinetic  There is significant tachycardia which may be transiently depressing the ejection fraction  Wall thickness was normal  No evidence of apical thrombus  DOPPLER: Transmitral flow pattern: atrial fibrillation  The study was not technically  sufficient to allow evaluation of LV diastolic function  RIGHT VENTRICLE: The size was normal  Systolic function was normal  Wall thickness was normal     LEFT ATRIUM: Size was normal     RIGHT ATRIUM: Size was normal     MITRAL VALVE: Valve structure was normal  There was normal leaflet separation  DOPPLER: The transmitral velocity was within the normal range  There was no evidence for stenosis  There was no significant regurgitation  AORTIC VALVE: The valve was possibly bicuspid  Leaflets exhibited mild to moderate calcification and normal cuspal separation  DOPPLER: Transaortic velocity was within the normal range  There was no evidence for stenosis  There was no  significant regurgitation  TRICUSPID VALVE: The valve structure was normal  There was normal leaflet separation  DOPPLER: The transtricuspid velocity was within the normal range  There was no evidence for stenosis  There was trace regurgitation  PULMONIC VALVE: Leaflets exhibited normal thickness, no calcification, and normal cuspal separation  DOPPLER: The transpulmonic velocity was within the normal range  There was no significant regurgitation  PERICARDIUM: There was no pericardial effusion  The pericardium was normal in appearance  AORTA: The root exhibited normal size  SYSTEMIC VEINS: IVC: The inferior vena cava was normal in size      SYSTEM MEASUREMENT TABLES    2D  %FS: 12 42 %  AV Diam: 3 35 cm  Ao asc: 3 14 cm  EDV(Teich): 112 78 ml  EF(Teich): 26 72 %  ESV(Teich): 82 64 ml  IVSd: 1 28 cm  LA Area: 23 66 cm2  LA Diam: 3 86 cm  LVEDV MOD A4C: 88 01 ml  LVEF MOD A4C: 24 42 %  LVESV MOD A4C: 66 52 ml  LVIDd: 4 9 cm  LVIDs: 4 29 cm  LVLd A4C: 7 91 cm  LVLs A4C: 7 09 cm  LVOT Diam: 2 04 cm  LVPWd: 1 12 cm  RA Area: 10 28 cm2  RV Diam : 1 85 cm  SI(Teich): 13 64 ml/m2  SV MOD A4C: 21 49 ml  SV(Cube): 38 61 ml  SV(Teich): 30 14 ml    CW  AV Env  Ti: 184 84 ms  AV VTI: 26 2 cm  AV Vmax: 1 86 m/s  AV Vmean: 1 42 m/s  AV maxP 81 mmHg  AV meanP 6 mmHg  TR Vmax: 2 5 m/s  TR maxP 32 mmHg    MM  TAPSE: 1 99 cm    PW  ADORE (VTI): 1 96 cm2  ADORE Vmax: 1 66 cm2  LVOT Env  Ti: 266 17 ms  LVOT VTI: 15 72 cm  LVOT Vmax: 0 94 m/s  LVOT Vmean: 0 59 m/s  LVOT maxPG: 3 57 mmHg  LVOT meanP 8 mmHg    [facility]    Prepared and electronically signed by    Noah Sanders MD  Signed 2020 17:00:55       No results found for this or any previous visit  No results found for this or any previous visit  No results found for this or any previous visit

## 2020-01-19 NOTE — PROGRESS NOTES
Progress Note - Rosalba Maldonado 1955, 60 y o  female MRN: [id number]    Unit/Bed#: ICU 06 Encounter: [id number]    Primary Care Provider: Rolan Mukherjee DO   Date and time admitted to hospital: 1/17/2020 12:37 PM        * CVA (cerebral vascular accident) [facility])  Assessment & Plan  · With residual right-sided weakness and dysphasia - symptoms are slowly improving  · CT scan showed an acute left MCA distribution infarct which was confirmed on MRI testing, patient was never a candidate for tPA  · Status post a neurology and cardiology evaluation  · Continue aspirin and Lipitor for secondary prevention  · Patient is scheduled for a repeat CT scan of the brain today to exclude hemorrhagic conversion in view of the patient being started on a heparin drip because of her cardiac arrhythmias  · Status post an echocardiogram, status post PT and OT eval, status post physiatry evaluation  · Continue a pureed diet with thin liquids    Atrial fibrillation with RVR ([address] )  Assessment & Plan  · Heart rate ranges between 70 and 150  · Status post a Cardiology evaluation, patient with the following conditions  · Narrow complex tachycardia - SVT/Atrial tachycardia v atrial flutter - amiodarone drip has been started, also a heparin drip has been started - patient to go for repeat CT scan later today to assess for any type of hemorrhagic conversion  · Patient also has a New-onset Cardiomyopathy - possibly takotsubo cardiomyopathy  · Management as per Cardiology    Elevated troponin I level  Assessment & Plan  · Likely due to rapid rate   · No true rise or fall in troponins, not indicative of ACS  · Status post 2D echocardiogram testing with the following results:Systolic function was markedly reduced  Ejection fraction was estimated in the range of 20 % to 30 %  There is global dysfunction but the apical portion of the inferior wall is akinetic  There is significant tachycardia which may be transiently depressing the ejection fraction  · Further management as per Cardiology    SANDRINE (acute kidney injury) [facility])  Assessment & Plan  · Pre renal, resolved with IV fluids    Essential hypertension  Assessment & Plan  · Patient was on HCTZ at home   · Will hold off for now - okay for permissive hypertension  · Monitor BP closely - blood pressures are stable    Leukocytosis  Assessment & Plan  · Resolved, procalcitonin also within normal limits  · In retrospect was most likely just reactive  · No further workup    Morbid obesity with body mass index (BMI) of 40 0 to 44 9 in adult [facility])  Assessment & Plan  · Dietary, lifestyle modification counseling be provided in future        VTE Pharmacologic Prophylaxis: Pharmacologic: Currently on a heparin drip    Patient Centered Rounds: I have performed bedside rounds with nursing staff today  Discussions with Specialists or Other Care Team Provider:  Neurology, Cardiology, case management, nursing, pharmacy  Education and Discussions with Family / Patient:  Patient's brother was brought up to par at the time of my bedside evaluation, all questions answered to his satisfaction    Current Length of Stay: 2 day(s)    Current Patient Status: Inpatient   Certification Statement: The patient will continue to require additional inpatient hospital stay due to The need for the continued IV heparin drip as well as IV amiodarone drip    Discharge Plan:  Discharge planning to rehab once medically stable    Code Status: Level 1 - Full Code    Subjective:   Patient seen and examined, looks and feels a little bit better than yesterday  She is slowly regaining the ability to put some words together    She remains frustrated because she cannot get her thought process out into words for people to understand    Objective:     Vitals:   Temp (24hrs), Av 3 °F (37 4 °C), Min:98 9 °F (37 2 °C), Max:99 6 °F (37 6 °C)    Temp:  [98 9 °F (37 2 °C)-99 6 °F (37 6 °C)] 98 9 °F (37 2 °C)  HR:  [] 65  Resp:  [19-56] 22  BP: (102-162)/(54-96) 121/63  SpO2:  [94 %-99 %] 99 %  Body mass index is 41 85 kg/m²  Input and Output Summary (last 24 hours): Intake/Output Summary (Last 24 hours) at 1/19/2020 0835  Last data filed at 1/19/2020 0601  Gross per 24 hour   Intake 2633 76 ml   Output 1200 ml   Net 1433 76 ml       Physical Exam:     Physical Exam   Constitutional: She is oriented to person, place, and time  She appears well-developed and well-nourished  HENT:   Head: Normocephalic and atraumatic  Nose: Nose normal    Mouth/Throat: Oropharynx is clear and moist    Right-sided IJ line is in place   Eyes: Pupils are equal, round, and reactive to light  Conjunctivae and EOM are normal    Neck: Normal range of motion  Neck supple  No JVD present  No thyromegaly present  Cardiovascular: Normal rate, regular rhythm and intact distal pulses  Exam reveals no gallop and no friction rub  No murmur heard  Pulmonary/Chest: Effort normal and breath sounds normal  No respiratory distress  Abdominal: Soft  Bowel sounds are normal  She exhibits no distension and no mass  There is no tenderness  There is no guarding  Musculoskeletal: Normal range of motion  She exhibits no edema  Lymphadenopathy:     She has no cervical adenopathy  Neurological: She is alert and oriented to person, place, and time  No cranial nerve deficit  Patient remains dysphasic  Alert awake  Follow simple 1 step commands  Motor 5/5 in the left upper and left lower extremity  Motor 4/5 in the right lower and right upper extremity  Sensations are intact   Skin: Skin is warm  No rash noted  No erythema  Psychiatric: She has a normal mood and affect  Her behavior is normal    Vitals reviewed        Additional Data:     Labs:    Results from last 7 days   Lab Units 01/18/20  0528   WBC Thousand/uL 8 40   HEMOGLOBIN g/dL 9 7*   HEMATOCRIT % 32 4*   PLATELETS Thousands/uL 293     Results from last 7 days   Lab Units 01/18/20  0956   POTASSIUM mmol/L 3  8   CHLORIDE mmol/L 109*   CO2 mmol/L 21   BUN mg/dL 24   CREATININE mg/dL 0 90   CALCIUM mg/dL 7 7*   ALK PHOS U/L 62   ALT U/L 11   AST U/L 15     Results from last 7 days   Lab Units 01/18/20  1405   INR  1 08     Results from last 7 days   Lab Units 01/18/20  0728 01/17/20  2229 01/17/20  1715 01/17/20  1237   POC GLUCOSE mg/dl 86 107 121 180*     Results from last 7 days   Lab Units 01/18/20  0528   HEMOGLOBIN A1C % 6 0       * I Have Reviewed All Lab Data Listed Above  * Additional Pertinent Lab Tests Reviewed: Naif [address] Admission  Reviewed    Imaging:  Imaging Reports Reviewed Today Include:  None    Recent Cultures (last 7 days):           Last 24 Hours Medication List:     Current Facility-Administered Medications:  acetaminophen 650 mg Oral Q4H PRN LEW Patiño    amiodarone 0 5 mg/min Intravenous Continuous Pardeep Kincaid MD Last Rate: 0 5 mg/min (01/19/20 0601)   atorvastatin 40 mg Oral QPM LEW Redmond    heparin (porcine) 3-20 Units/kg/hr (Order-Specific) Intravenous Titrated Lyudmila Meza MD Last Rate: 16 1 Units/kg/hr (01/19/20 0601)   heparin (porcine) 2,000 Units Intravenous PRN Lyudmila Meza MD    heparin (porcine) 4,000 Units Intravenous PRN Bev Kincaid MD    LORazepam 0 5 mg Oral Q8H PRN LEW Patiño    metoprolol 5 mg Intravenous Q6H Pardeep Kincaid MD    timolol 1 drop Both Eyes BID LEW Patiño         Today, Patient Was Seen By: Chapincito Leslie MD    ** Please Note: Dictation voice to text software may have been used in the creation of this document   **

## 2020-01-19 NOTE — ASSESSMENT & PLAN NOTE
· Likely due to rapid rate   · No true rise or fall in troponins, not indicative of ACS  · Status post 2D echocardiogram testing with the following results:Systolic function was markedly reduced  Ejection fraction was estimated in the range of 20 % to 30 %  There is global dysfunction but the apical portion of the inferior wall is akinetic  There is significant tachycardia which may be transiently depressing the ejection fraction    · Further management as per Cardiology

## 2020-01-19 NOTE — PROCEDURES
Was called from the ER to the ICU to insert a central line for frequent blood draws as well as fluid and medication delivery  Performed a right IJ insertion  Full aseptic technique was used  A time-out was observed  Sterile prep chlorhexidine  Ultrasound guidance was used throughout the procedure  The IJ was cannulated on the 1st pass, however the wire would not thread so this was repeated  Each time blood was easily withdrawn from the syringe but the wire would not thread  On the 3rd possible however the wire passed easily with no resistance and after that the rest of the procedure was uncomplicated  The chest x-ray is pending for confirmation of placement  The triple-lumen was positive a depth of 17 cm based on surface anatomy

## 2020-01-19 NOTE — PLAN OF CARE
Pt indicates understanding of potential fall risk and importance of assistance to stand/walk at present  Pt does understand when spoken to but expressive aphasia conts    Becomes easily frustrated when unable to express needs/concerns - needs time and encouragement

## 2020-01-19 NOTE — NURSING NOTE
oob in chair for dinner  Awake and alert  Denies pain or sob  Assessment as noted in computer charting  Safety in place  Ct head complete

## 2020-01-19 NOTE — ASSESSMENT & PLAN NOTE
· With residual right-sided weakness and dysphasia - symptoms are slowly improving  · CT scan showed an acute left MCA distribution infarct which was confirmed on MRI testing, patient was never a candidate for tPA  · Status post a neurology and cardiology evaluation  · Continue aspirin and Lipitor for secondary prevention  · Patient is scheduled for a repeat CT scan of the brain today to exclude hemorrhagic conversion in view of the patient being started on a heparin drip because of her cardiac arrhythmias  · Status post an echocardiogram, status post PT and OT eval, status post physiatry evaluation  · Continue a pureed diet with thin liquids

## 2020-01-19 NOTE — ASSESSMENT & PLAN NOTE
· Patient was on HCTZ at home   · Will hold off for now - okay for permissive hypertension  · Monitor BP closely - blood pressures are stable

## 2020-01-19 NOTE — ASSESSMENT & PLAN NOTE
· Heart rate ranges between 70 and 150  · Status post a Cardiology evaluation, patient with the following conditions  · Narrow complex tachycardia - SVT/Atrial tachycardia v atrial flutter - amiodarone drip has been started, also a heparin drip has been started - patient to go for repeat CT scan later today to assess for any type of hemorrhagic conversion  · Patient also has a New-onset Cardiomyopathy - possibly takotsubo cardiomyopathy  · Management as per Cardiology

## 2020-01-19 NOTE — NURSING NOTE
Left message on case management's voicemail (sergei) in regard to questions family had in regard to poa and living will

## 2020-01-19 NOTE — NURSING NOTE
Pt now with IV access and Seng RENEE updated on VS and amiodarone and heparin off since 1830  Dr Laurita Narayanan Notified of same and discussed Amiodarone - to run the Amiodarone at 33 3cc/hr for 1 hr then to decrease to 16 7 cc/hr in keeping with the previous order

## 2020-01-19 NOTE — OCCUPATIONAL THERAPY NOTE
Occupational Therapy Treatment Note      Raeannkati Karishma    2020    Principal Problem:    CVA (cerebral vascular accident) [facility])  Active Problems:    Essential hypertension    Atrial fibrillation with RVR (HCC)    SANDRINE (acute kidney injury) ([facility] [address] )    Elevated troponin I level    Leukocytosis    Morbid obesity with body mass index (BMI) of 40 0 to 44 9 in adult [facility])      Past Medical History:   Diagnosis Date    Diabetes mellitus ([facility] )     Hypertension     PUD (peptic ulcer disease)     Vitamin D deficiency        Past Surgical History:   Procedure Laterality Date    ABDOMINOPLASTY      revision gastric bypass     SECTION      CHOLECYSTECTOMY      COLONOSCOPY      GASTRIC BYPASS      HERNIA REPAIR      NEUROMA EXCISION Right 2019    Procedure: EXCISION 2ND INTERSPACE NEUROMA;  Surgeon: Paulino Strickland DPM;  Location: [address] [place] [address];  Service: Podiatry    ROTATOR CUFF REPAIR Left         20 0825   Restrictions/Precautions   Weight Bearing Precautions Per Order No   Other Precautions Fall Risk;Telemetry;Multiple lines   Pain Assessment   Pain Assessment No/denies pain   Pain Score No Pain   ADL   Where Assessed Chair   Eating Assistance 3  Moderate Assistance   Eating Deficit Setup;Verbal cueing;Supervision/safety; Increased time to complete;Scoop assist;Pureed diet; Beverage management   Bed Mobility   Additional Comments DNT re:pt  was sitting OOB in chair prior / after session  Transfers   Sit to Stand 4  Minimal assistance   Additional items Assist x 2;Armrests; Increased time required;Verbal cues   Stand to Sit 4  Minimal assistance   Additional items Assist x 2;Armrests; Increased time required;Verbal cues   Stand pivot 4  Minimal assistance   Additional items Assist x 2; Increased time required;Verbal cues   Functional Mobility   Functional Mobility 4  Minimal assistance  (A x2)   Additional Comments 15ft x2   Additional items Rolling walker   Cognition   Overall Cognitive Status Unable to assess  (expressive aphasia)   Arousal/Participation Alert; Cooperative   Attention Attends with cues to redirect   Orientation Level Unable to assess   Memory Unable to assess   Following Commands Follows one step commands with increased time or repetition   Activity Tolerance   Activity Tolerance Patient tolerated treatment well   Medical Staff Made Aware yes, nurse   Assessment   Assessment Patient participated in Skilled OT session this date with interventions consisting of ADL re training with the use of correct body mechnaics, Energy Conservation techniques, safety awareness and fall prevention techniques and increase OOB/ sitting tolerance   Patient agreeable to OT treatment session, upon arrival patient was found seated OOB to Recliner  In comparison to previous session, patient with improvements in functional mobility  Patient requiring frequent re direction, verbal cues for safety and verbal cues for pacing thru activity steps  Patient continues to be functioning below baseline level, occupational performance remains limited secondary to factors listed above and increased risk for falls and injury  From OT standpoint, recommendation at time of d/c would be Short Term Rehab  Patient to benefit from continued Occupational Therapy treatment while in the hospital to address deficits as defined above and maximize level of functional independence with ADLs and functional mobility  Plan   Treatment Interventions Neuromuscular reeducation; Fine motor coordination activities; Compensatory technique education; Activityengagement;ADL retraining;Functional transfer training;UE strengthening/ROM; Endurance training;Patient/family training;Equipment evaluation/education; Energy conservation   Goal Expiration Date 02/28/20   OT Treatment Day 1   OT Frequency 5x/wk   Recommendation   OT Discharge Recommendation Short Term Rehab     Farooq Hull MS, OTR/L

## 2020-01-19 NOTE — NURSING NOTE
Pt awake on entering room  Pt able to say some words more clearly today, less anxious  Much encouragement given  Assessment as noted in computer charting  oob to chair with 2 assist, minimal  Amiodarone and heparin gtt continue  Neuro check as noted in charting  Safety in place  Assisted with breakfast  Poor appetite

## 2020-01-19 NOTE — NURSING NOTE
Both ivs infiltrated  4 unsuccessful attempts to place new iv  Amiodarone and heparin gtt off at 1830  Dr made aware of same  Er dr to place central line tonight

## 2020-01-19 NOTE — PLAN OF CARE
Problem: PHYSICAL THERAPY ADULT  Goal: Performs mobility at highest level of function for planned discharge setting  See evaluation for individualized goals  Description  Treatment/Interventions: Functional transfer training, LE strengthening/ROM, Therapeutic exercise, Endurance training, Patient/family training, Equipment eval/education, Bed mobility, Gait training, Spoke to nursing, OT, Family  Equipment Recommended: aCm Ortega       See flowsheet documentation for full assessment, interventions and recommendations  Outcome: Progressing  Note:   Prognosis: Good  Problem List: Decreased strength, Decreased endurance, Impaired balance, Decreased mobility, Decreased coordination, Impaired judgement, Decreased safety awareness, Obesity  Assessment: Pt seen for PT treatment session this date with interventions consisting of gait training w/ emphasis on improving pt's ability to ambulate level surfaces x 30 feet total with mod A provided by therapist with bariatric RW and therapeutic activity consisting of training: sit<>stand transfers, static standing tolerance for 5 minutes w/ B UE support, vc and tactile cues for static sitting posture faciliation, vc and tactile cues for static standing posture faciliation, stand pivot transfers towards B direction and safety/environmental awareness  Pt agreeable to PT treatment session upon arrival, pt found seated OOB in chair, in no apparent distress and cooperative  In comparison to previous session, pt with significant improvements in ambulatory distance, balance, session progression  Post session: all needs in reach and RN notified of session findings/recommendations Continue to recommend STR at time of d/c in order to maximize pt's functional independence and safety w/ mobility  Pt continues to be functioning below baseline level, and remains limited 2* factors listed above and including weakness, impaired balance, decreased endurance, gait dysfunction, decline from PLOF  PT will continue to see pt while here in order to address the deficits listed above and provide interventions consistent w/ POC in effort to achieve LTGs  Barriers to Discharge: Decreased caregiver support     Recommendation: Short-term skilled PT     PT - OK to Discharge: Yes(if medically stable to STR)    See flowsheet documentation for full assessment

## 2020-01-19 NOTE — PHYSICAL THERAPY NOTE
Physical Therapy Treatment Session Note    Patient's Name: Mihir Schwarz    Admitting Diagnosis  CVA (cerebral vascular accident) [facility]) [I63 9]  At[facility] fibrillation with RVR ([facility] ) [I48 91]  [facility] ([facility]) Stroke Scale level of consciousness score 0, alert; keenly responsive [Z78 9]    Problem List  Patient Active Problem List   Diagnosis    Essential hypertension    Atrial fibrillation with RVR ([facility] )    CVA (cerebral vascular accident) ([facility] )    SANDRINE (acute kidney injury) ([facility] )    Elevated troponin I level    Leukocytosis    Morbid obesity with body mass index (BMI) of 40 0 to 44 9 in adult [facility])       Past Medical History  Past Medical History:   Diagnosis Date    Diabetes mellitus ([place] [address] )     Hypertension     PUD (peptic ulcer disease)     Vitamin D deficiency        Past Surgical History  Past Surgical History:   Procedure Laterality Date    ABDOMINOPLASTY      revision gastric bypass     SECTION      CHOLECYSTECTOMY      COLONOSCOPY      GASTRIC BYPASS      HERNIA REPAIR      NEUROMA EXCISION Right 2019    Procedure: EXCISION 2ND INTERSPACE NEUROMA;  Surgeon: Paulino Strickland DPM;  Location: [facility];  Service: Podiatry    ROTATOR CUFF REPAIR Left         20 0815   Pain Assessment   Pain Assessment Giron-Baker FACES   Giron-Baker FACES Pain Rating 0   Pain Rating: FLACC (Rest) - Face 0   Pain Rating: FLACC (Rest) - Legs 0   Pain Rating: FLACC (Rest) - Activity 0   Pain Rating: FLACC (Rest) - Cry 0   Pain Rating: FLACC (Rest) - Consolability 1   Score: FLACC (Rest) 1   Pain Rating: FLACC (Activity) - Face 0   Pain Rating: FLACC (Activity) - Legs 0   Pain Rating: FLACC (Activity) - Activity 0   Pain Rating: FLACC (Activity) - Cry 1   Pain Rating: FLACC (Activity) - Consolability 0   Score: FLACC (Activity) 1   Restrictions/Precautions   Weight Bearing Precautions Per Order No   Other Precautions Fall Risk;Telemetry;Multiple lines   General   Chart Reviewed Yes Response to Previous Treatment Patient with no complaints from previous session  Family/Caregiver Present   (brother)   Cognition   Overall Cognitive Status Unable to assess  (2/2 expressive aphasia)   Arousal/Participation Alert; Cooperative   Attention Attends with cues to redirect   Orientation Level Unable to assess   Memory Unable to assess   Following Commands Follows one step commands with increased time or repetition   Subjective   Subjective Pt  could not provide complete sentences, however noted improvement in word forming today  (clearly expressed yes, no;was able to decline oatmeal)   Bed Mobility   Additional Comments DNT re:pt  was sitting OOB in chair prior / after session  Transfers   Sit to Stand 4  Minimal assistance   Additional items Assist x 2;Armrests; Increased time required;Verbal cues   Stand to Sit 4  Minimal assistance   Additional items Assist x 2;Armrests; Increased time required;Verbal cues   Stand pivot 4  Minimal assistance   Additional items Assist x 2; Increased time required;Verbal cues   Ambulation/Elevation   Gait pattern Decreased foot clearance;Decreased R stance; Inconsistent mikael; Short stride   Gait Assistance 3  Moderate assist   Additional items Assist x 1;Verbal cues; Tactile cues  (LARON adjustment, directional changes x 2, RW usage)   Assistive Device Bariatric Rolling walker   Distance 15 feet x 2   Stair Management Assistance Not tested   Balance   Static Sitting Fair +   Dynamic Sitting Fair   Static Standing Fair   Dynamic Standing Fair -   Ambulatory Fair -   Endurance Deficit   Endurance Deficit Yes   Activity Tolerance   Activity Tolerance Patient limited by fatigue   Nurse Made Aware yesMarilu RN   Assessment   Prognosis Good   Problem List Decreased strength;Decreased endurance; Impaired balance;Decreased mobility; Decreased coordination; Impaired judgement;Decreased safety awareness; Obesity   Assessment Pt seen for PT treatment session this date with interventions consisting of gait training w/ emphasis on improving pt's ability to ambulate level surfaces x 30 feet total with mod A provided by therapist with bariatric RW and therapeutic activity consisting of training: sit<>stand transfers, static standing tolerance for 5 minutes w/ B UE support, vc and tactile cues for static sitting posture faciliation, vc and tactile cues for static standing posture faciliation, stand pivot transfers towards B direction and safety/environmental awareness  Pt agreeable to PT treatment session upon arrival, pt found seated OOB in chair, in no apparent distress and cooperative  In comparison to previous session, pt with significant improvements in ambulatory distance, balance, session progression  Post session: all needs in reach and RN notified of session findings/recommendations Continue to recommend STR at time of d/c in order to maximize pt's functional independence and safety w/ mobility  Pt continues to be functioning below baseline level, and remains limited 2* factors listed above and including weakness, impaired balance, decreased endurance, gait dysfunction, decline from PLOF  PT will continue to see pt while here in order to address the deficits listed above and provide interventions consistent w/ POC in effort to achieve LTGs  Barriers to Discharge Decreased caregiver support   Goals   Patient Goals pt  could not provide 2/2 expressive aphasia   LTG Expiration Date 01/28/20   Long Term Goal #1 LTGs remain appropriate   PT Treatment Day 2   Plan   Treatment/Interventions Functional transfer training;LE strengthening/ROM; Therapeutic exercise; Endurance training;Patient/family training;Equipment eval/education; Bed mobility;Gait training;Spoke to nursing;OT;Family   Progress Progressing toward goals   PT Frequency 7x/wk   Recommendation   Recommendation Short-term skilled PT   Equipment Recommended Walker   PT - OK to Discharge Yes  (if medically stable to STR) Additional Comments Upon conclusion, pt  was sitting OOB in chair w/all needs within reach, nursing staff at bedside         Emi Gore, PT

## 2020-01-20 ENCOUNTER — APPOINTMENT (INPATIENT)
Dept: NON INVASIVE DIAGNOSTICS | Facility: HOSPITAL | Age: 65
DRG: 045 | End: 2020-01-20
Payer: COMMERCIAL

## 2020-01-20 LAB
ANION GAP SERPL CALCULATED.3IONS-SCNC: 9 MMOL/L (ref 4–13)
ANISOCYTOSIS BLD QL SMEAR: PRESENT
APTT PPP: 74 SECONDS (ref 23–37)
APTT PPP: 76 SECONDS (ref 23–37)
ATRIAL RATE: 150 BPM
ATRIAL RATE: 174 BPM
ATRIAL RATE: 85 BPM
BASOPHILS # BLD AUTO: 0.1 THOUSANDS/ΜL (ref 0–0.1)
BASOPHILS NFR BLD AUTO: 1 % (ref 0–2)
BUN SERPL-MCNC: 13 MG/DL (ref 7–25)
CALCIUM SERPL-MCNC: 7.8 MG/DL (ref 8.6–10.5)
CHLORIDE SERPL-SCNC: 109 MMOL/L (ref 98–107)
CO2 SERPL-SCNC: 22 MMOL/L (ref 21–31)
CREAT SERPL-MCNC: 0.89 MG/DL (ref 0.6–1.2)
EOSINOPHIL # BLD AUTO: 0 THOUSAND/ΜL (ref 0–0.61)
EOSINOPHIL NFR BLD AUTO: 0 % (ref 0–5)
ERYTHROCYTE [DISTWIDTH] IN BLOOD BY AUTOMATED COUNT: 21.8 % (ref 11.5–14.5)
GFR SERPL CREATININE-BSD FRML MDRD: 70 ML/MIN/1.73SQ M
GLUCOSE SERPL-MCNC: 94 MG/DL (ref 65–99)
HCT VFR BLD AUTO: 34.8 % (ref 42–47)
HGB BLD-MCNC: 10.7 G/DL (ref 12–16)
LG PLATELETS BLD QL SMEAR: PRESENT
LYMPHOCYTES # BLD AUTO: 1.5 THOUSANDS/ΜL (ref 0.6–4.47)
LYMPHOCYTES NFR BLD AUTO: 16 % (ref 21–51)
MCH RBC QN AUTO: 22.8 PG (ref 26–34)
MCHC RBC AUTO-ENTMCNC: 30.8 G/DL (ref 31–37)
MCV RBC AUTO: 74 FL (ref 81–99)
MONOCYTES # BLD AUTO: 1 THOUSAND/ΜL (ref 0.17–1.22)
MONOCYTES NFR BLD AUTO: 11 % (ref 2–12)
NEUTROPHILS # BLD AUTO: 6.7 THOUSANDS/ΜL (ref 1.4–6.5)
NEUTS SEG NFR BLD AUTO: 72 % (ref 42–75)
PLATELET # BLD AUTO: 263 THOUSANDS/UL (ref 149–390)
PLATELET BLD QL SMEAR: ADEQUATE
PMV BLD AUTO: 9.4 FL (ref 8.6–11.7)
POTASSIUM SERPL-SCNC: 3.9 MMOL/L (ref 3.5–5.5)
QRS AXIS: 11 DEGREES
QRS AXIS: 17 DEGREES
QRS AXIS: 20 DEGREES
QRSD INTERVAL: 100 MS
QRSD INTERVAL: 102 MS
QRSD INTERVAL: 106 MS
QT INTERVAL: 304 MS
QT INTERVAL: 318 MS
QT INTERVAL: 360 MS
QTC INTERVAL: 493 MS
QTC INTERVAL: 503 MS
QTC INTERVAL: 509 MS
RBC # BLD AUTO: 4.71 MILLION/UL (ref 3.9–5.2)
RBC MORPH BLD: NORMAL
SODIUM SERPL-SCNC: 140 MMOL/L (ref 134–143)
STOMATOCYTES BLD QL SMEAR: PRESENT
T WAVE AXIS: 151 DEGREES
T WAVE AXIS: 37 DEGREES
T WAVE AXIS: 5 DEGREES
VENTRICULAR RATE: 113 BPM
VENTRICULAR RATE: 154 BPM
VENTRICULAR RATE: 165 BPM
WBC # BLD AUTO: 9.3 THOUSAND/UL (ref 4.8–10.8)

## 2020-01-20 PROCEDURE — 85025 COMPLETE CBC W/AUTO DIFF WBC: CPT | Performed by: HOSPITALIST

## 2020-01-20 PROCEDURE — 97535 SELF CARE MNGMENT TRAINING: CPT

## 2020-01-20 PROCEDURE — 93308 TTE F-UP OR LMTD: CPT

## 2020-01-20 PROCEDURE — 97530 THERAPEUTIC ACTIVITIES: CPT

## 2020-01-20 PROCEDURE — 99232 SBSQ HOSP IP/OBS MODERATE 35: CPT | Performed by: INTERNAL MEDICINE

## 2020-01-20 PROCEDURE — 93010 ELECTROCARDIOGRAM REPORT: CPT | Performed by: INTERNAL MEDICINE

## 2020-01-20 PROCEDURE — 80048 BASIC METABOLIC PNL TOTAL CA: CPT | Performed by: HOSPITALIST

## 2020-01-20 PROCEDURE — 85730 THROMBOPLASTIN TIME PARTIAL: CPT | Performed by: HOSPITALIST

## 2020-01-20 PROCEDURE — 92523 SPEECH SOUND LANG COMPREHEN: CPT

## 2020-01-20 PROCEDURE — 93308 TTE F-UP OR LMTD: CPT | Performed by: INTERNAL MEDICINE

## 2020-01-20 PROCEDURE — 93325 DOPPLER ECHO COLOR FLOW MAPG: CPT | Performed by: INTERNAL MEDICINE

## 2020-01-20 PROCEDURE — 99232 SBSQ HOSP IP/OBS MODERATE 35: CPT | Performed by: PHYSICAL MEDICINE & REHABILITATION

## 2020-01-20 PROCEDURE — 97116 GAIT TRAINING THERAPY: CPT

## 2020-01-20 PROCEDURE — 93321 DOPPLER ECHO F-UP/LMTD STD: CPT | Performed by: INTERNAL MEDICINE

## 2020-01-20 RX ORDER — METOPROLOL TARTRATE 5 MG/5ML
2.5 INJECTION INTRAVENOUS ONCE
Status: DISCONTINUED | OUTPATIENT
Start: 2020-01-20 | End: 2020-01-20

## 2020-01-20 RX ORDER — METOPROLOL SUCCINATE 50 MG/1
50 TABLET, EXTENDED RELEASE ORAL 2 TIMES DAILY
Status: DISCONTINUED | OUTPATIENT
Start: 2020-01-20 | End: 2020-01-23 | Stop reason: HOSPADM

## 2020-01-20 RX ADMIN — METOPROLOL TARTRATE 5 MG: 5 INJECTION, SOLUTION INTRAVENOUS at 04:27

## 2020-01-20 RX ADMIN — TIMOLOL MALEATE 1 DROP: 2.5 SOLUTION/ DROPS OPHTHALMIC at 08:45

## 2020-01-20 RX ADMIN — METOPROLOL SUCCINATE 50 MG: 50 TABLET, EXTENDED RELEASE ORAL at 19:31

## 2020-01-20 RX ADMIN — AMIODARONE HYDROCHLORIDE 200 MG: 100 TABLET ORAL at 08:44

## 2020-01-20 RX ADMIN — METOPROLOL TARTRATE 5 MG: 5 INJECTION, SOLUTION INTRAVENOUS at 10:40

## 2020-01-20 RX ADMIN — AMIODARONE HYDROCHLORIDE 200 MG: 100 TABLET ORAL at 16:01

## 2020-01-20 RX ADMIN — APIXABAN 5 MG: 5 TABLET, FILM COATED ORAL at 17:29

## 2020-01-20 RX ADMIN — LORAZEPAM 0.5 MG: 0.5 TABLET ORAL at 20:50

## 2020-01-20 RX ADMIN — AMIODARONE HYDROCHLORIDE 200 MG: 100 TABLET ORAL at 12:24

## 2020-01-20 RX ADMIN — HEPARIN SODIUM AND DEXTROSE 11.1 UNITS/KG/HR: 10000; 5 INJECTION INTRAVENOUS at 12:49

## 2020-01-20 RX ADMIN — TIMOLOL MALEATE 1 DROP: 2.5 SOLUTION/ DROPS OPHTHALMIC at 17:29

## 2020-01-20 RX ADMIN — ATORVASTATIN CALCIUM 40 MG: 40 TABLET, FILM COATED ORAL at 17:29

## 2020-01-20 NOTE — QUICK NOTE
Neurology    Neurology paged in regards to Eliquis conversion  Pt already on heparin infusion per cardiology for at least two days given atrial tachycardia, reduced EF  CT head with no evidence of hemorrhagic conversion    - Okay to switch to Eliquis as patient already on heparin AC with no bleed noted  D/w Dr Aparicio

## 2020-01-20 NOTE — PROGRESS NOTES
Progress Note - Elvira  1955, 60 y o  female MRN: [id number]    Unit/Bed#: ICU 06 Encounter: [id number]    Primary Care Provider: Linnette Thao DO   Date and time admitted to hospital: 1/17/2020 12:37 PM        Morbid obesity with body mass index (BMI) of 40 0 to 44 9 in adult [facility])  Assessment & Plan  · Dietary, lifestyle modification counseling be provided in future    Leukocytosis  Assessment & Plan  · Resolved, procalcitonin also within normal limits  · In retrospect was most likely just reactive  · No further workup    Elevated troponin I level  Assessment & Plan  · Likely due to rapid rate   · No true rise or fall in troponins, not indicative of ACS  · Status post 2D echocardiogram testing with the following results:Systolic function was markedly reduced  Ejection fraction was estimated in the range of 20 % to 30 %  There is global dysfunction but the apical portion of the inferior wall is akinetic  There is significant tachycardia which may be transiently depressing the ejection fraction    · Further management as per Cardiology  · Cardiology recommending repeat limited echo today    SANDRINE (acute kidney injury) ([facility] )  Assessment & Plan  · Pre renal, resolved with IV fluids    Atrial fibrillation with RVR ([facility] )  Assessment & Plan  · Heart rate relatively controlled  · Status post a Cardiology evaluation, patient with the following conditions  · Narrow complex tachycardia - SVT/Atrial tachycardia v atrial flutter  · Started on amiodarone and heparin as per cardiology recommendations  · Patient also has a New-onset Cardiomyopathy - possibly takotsubo cardiomyopathy  · Cardiology recommending repeat echocardiogram today now that she has better heart rate control    Essential hypertension  Assessment & Plan  · Patient was on HCTZ at home   · Will hold off for now - okay for permissive hypertension  · Monitor BP closely - blood pressures are stable    * CVA (cerebral vascular accident) [facility])  Assessment & Plan  · With residual right-sided weakness and dysphasia - symptoms are slowly improving  · CT scan showed an acute left MCA distribution infarct which was confirmed on MRI testing, patient was never a candidate for tPA  · Status post a neurology and cardiology evaluation  · Continue aspirin and Lipitor for secondary prevention  · Thankfully no hemorrhagic conversion and repeat CT scan of head  · PT/OT, will likely need rehab        VTE Pharmacologic Prophylaxis: Pharmacologic: Heparin Drip    Patient Centered Rounds: I have performed bedside rounds with nursing staff today  Discussions with Specialists or Other Care Team Provider: n/a  Education and Discussions with Family / Patient: patient    Current Length of Stay: 3 day(s)    Current Patient Status: Inpatient   Certification Statement: The patient will continue to require additional inpatient hospital stay due to cva    Discharge Plan: pending likely placement    Code Status: Level 1 - Full Code    Subjective:   Patient seen examined  No acute events overnight  Feeling better    Objective:     Vitals:   Temp (24hrs), Av 1 °F (36 7 °C), Min:97 8 °F (36 6 °C), Max:98 5 °F (36 9 °C)    Temp:  [97 8 °F (36 6 °C)-98 5 °F (36 9 °C)] 97 8 °F (36 6 °C)  HR:  [] 68  Resp:  [14-51] 25  BP: ()/(61-90) 106/63  SpO2:  [92 %-99 %] 95 %  Body mass index is 41 85 kg/m²  Input and Output Summary (last 24 hours): Intake/Output Summary (Last 24 hours) at 2020 0755  Last data filed at 2020 0630  Gross per 24 hour   Intake 596 55 ml   Output 1200 ml   Net -603 45 ml       Physical Exam:     Physical Exam   Constitutional: She appears well-developed and well-nourished  HENT:   Head: Normocephalic and atraumatic  Eyes: Pupils are equal, round, and reactive to light  EOM are normal    Neck: Normal range of motion  Neck supple  Cardiovascular: Normal rate and regular rhythm     Pulmonary/Chest: Effort normal and breath sounds normal    Abdominal: Soft  Obese   Musculoskeletal: Normal range of motion  She exhibits no edema  Neurological: She is alert  Word-finding difficulty  Oriented to person and place   Skin: Skin is warm  Capillary refill takes less than 2 seconds  Psychiatric: She has a normal mood and affect  Her behavior is normal  Thought content normal    Nursing note and vitals reviewed  Additional Data:     Labs:    Results from last 7 days   Lab Units 01/19/20  1006   WBC Thousand/uL 11 60*   HEMOGLOBIN g/dL 10 0*   HEMATOCRIT % 33 9*   PLATELETS Thousands/uL 271   NEUTROS PCT % 82*   LYMPHS PCT % 8*   MONOS PCT % 10   EOS PCT % 0     Results from last 7 days   Lab Units 01/19/20  1006 01/18/20  0956   POTASSIUM mmol/L 3 8 3 8   CHLORIDE mmol/L 107 109*   CO2 mmol/L 22 21   BUN mg/dL 18 24   CREATININE mg/dL 0 88 0 90   CALCIUM mg/dL 7 5* 7 7*   ALK PHOS U/L  --  62   ALT U/L  --  11   AST U/L  --  15     Results from last 7 days   Lab Units 01/18/20  1405   INR  1 08     Results from last 7 days   Lab Units 01/18/20  0728 01/17/20  2229 01/17/20  1715 01/17/20  1237   POC GLUCOSE mg/dl 86 107 121 180*     Results from last 7 days   Lab Units 01/18/20  0528   HEMOGLOBIN A1C % 6 0       * I Have Reviewed All Lab Data Listed Above  * Additional Pertinent Lab Tests Reviewed:  [facility] Admission  Reviewed    Imaging:  Imaging Reports Reviewed Today Include: n/a    Recent Cultures (last 7 days):           Last 24 Hours Medication List:     Current Facility-Administered Medications:  acetaminophen 650 mg Oral Q4H PRN LEW Hou    amiodarone 200 mg Oral TID With Meals Tish Carroll MD    Followed by        Carlos Castro ON 1/26/2020] amiodarone 200 mg Oral BID With Meals Marisol Guidry MD    Followed by        Carlos Castro ON 2/3/2020] amiodarone 200 mg Oral Daily With Breakfast Pardeep Carroll MD    atorvastatin 40 mg Oral QPM LEW Hou heparin (porcine) 3-20 Units/kg/hr (Order-Specific) Intravenous Titrated Jeanne Knox MD Last Rate: 11 1 Units/kg/hr (01/19/20 2040)   heparin (porcine) 2,000 Units Intravenous PRN Jeanne Knox MD    heparin (porcine) 4,000 Units Intravenous PRN Jeanne Knox MD    influenza vaccine 0 5 mL Intramuscular Once [place]    LORazepam 0 5 mg Oral Q8H PRN LEW Falk    metoprolol 5 mg Intravenous Q6H PRN Jeanne Knox MD    metoprolol succinate 25 mg Oral Q12H Jeanne Knox MD    timolol 1 drop Both Eyes BID LEW Falk         Today, Patient Was Seen By: Kathy Sierra MD    ** Please Note: Dictation voice to text software may have been used in the creation of this document   **

## 2020-01-20 NOTE — NURSING NOTE
Patient assisted to bedside commode, had loose brown BM, then assisted back to bed at this time  Refuses to be OOB in chair  Transfer was slow and steady, moves all 4 extremities equally, denies any numbness or tingling in extremities  Complex physical assessment as noted at this time, see chart for details

## 2020-01-20 NOTE — CONSULTS
RDN consulted for Dx CVA/ischemic stroke  She is receiving a level 1 dysphagia diet thin liquids  No diet texture upgrades today per SLP documentation  Patient does not have history significant wt  change  Her BMI is 41 85 obese class 3  Recommend continue diet as ordered, patient declines oral nutrition supplements, obtained two food preferences from patient, will pass along same to dietary staff

## 2020-01-20 NOTE — SPEECH THERAPY NOTE
Speech/Language Assessment    Patient Name: Tyrese Iverson 60 y o  Today's Date: 2020      Problem List  Principal Problem:    CVA (cerebral vascular accident) [facility])  Active Problems:    Essential hypertension    Atrial fibrillation with RVR (HCC)    SANDRINE (acute kidney injury) ([facility] )    Elevated troponin I level    Leukocytosis    Morbid obesity with body mass index (BMI) of 40 0 to 44 9 in adult [facility])    Past Medical History  Past Medical History:   Diagnosis Date    [facility] mellitus ([facility] )     Hypertension     PUD (peptic ulcer disease)     Vitamin D deficiency      Past Surgical History  Past Surgical History:   Procedure Laterality Date    ABDOMINOPLASTY      revision gastric bypass     SECTION      CHOLECYSTECTOMY      COLONOSCOPY      GASTRIC BYPASS      HERNIA REPAIR      NEUROMA EXCISION Right 2019    Procedure: EXCISION 2ND INTERSPACE NEUROMA;  Surgeon: Dominga Rashid DPM;  Location: [facility] MAIN OR;  Service: Podiatry    ROTATOR CUFF REPAIR Left        CURRENT MEDICAL:  59 y/o female admitted w/ L MCA stroke presenting w/ aphasia and suspected apraxia of speech      CURRENT COGNITIVE: Awake and alert, aware she has had a stroke and is in the hospital     CURRENT PAIN & RESPONSE TO INTERVENTIONS:  No reports of any    AUDITORY COMPREHENSION:  Body part ID: 100%  Object/Picture ID  Personal yes/no ?'s: 100%  Simple yes/no ?'s: 100%  Complex y/n ?'s:  One step commands: 80%  Two step commands: 60%  Complex or 3 step commands:  Paragraph Comprehension:    READING COMPREHENSION: DNT today  Name recognition:  Single word comprehension/Match pic w/ a choice of 2 words:  Simple direction following:  Sentence comprehension:  Paragraph comprehension:  Functional comprehension:    VERBAL EXPRESSION/EXPR LANGUAGE:  Auto Sequences: delayed initiation, articulatory imprecision   SHADY: 100%   TEETEE: 100%   Counting to 21: 100%   Word Repetition: 90%,w/ articulatory imprecision, can repeat pharses and short sentences  Confrontation Namin% w/ delay and/or cue  Responsive Namin%  Phrase Completion:  DNT  Divergent Naming:DNT  Picture Description: DNT  Conversation: non-fluent, but functional for basic needs  Ability to make needs known: 50%, w/ multi-modal cues  WRITTEN EXPRESSION: DNT  Name:  Address:  Phone #:  Word level to dictation:  Word level (written naming)  Sentence to dictation:  Sentence formulation:    ORAL MOTOR/SPEECH MECHANISM EXAM:    MOTOR SPEECH:  Apraxia: suspected   Oral:   Verbal:  Needs further motor speech evaluation:    Cognitive -linguistic skills:   unable to fully assess d/t aphasia    Oriented to: Long term memory:  Short term memory:  Immediate memory:  Problem solving:  Organizational tasks:  Sequencing:    Further evaluation suggested    Symptoms noted:    Phonemic paraphasias  Aware of deficits in expression, not fully sure if aware of all phonemic paraphasiias    Summary/Impression:  Fabiana Moody presents w/ non-fluent expressive greater than receptive aphasia characaterized by halting speech pattern, short agramatical phrases and primarily phonemic paraphasia  Time is required to formulate simple thoughts  To  fully understand multi-step/complex directions both time and repetition are required  Treatment Recommended and Frequency:  Yes, at least 3x/week    Impression and Recommendations reviewed with:  Pt  And nursing at bedside  Patient Stated Goal:  Pt  Wants to go home  Treatment Goals:  1  Ongoing diagnostic assessment of written and verbal expression, and reading/auditory comprehension  2  Expression of full name, and biographics w/ 100%   3 improved comprehension w/ use of slower rate and repetition    Education Initiated with:  Patient at bedside  Therapy Prognosis: good  Prognosis considerations: age, new onset, awareness    Thor Townsend  [facility], CCC/SLP  [id number]  [email or web address]  Available via tiger text

## 2020-01-20 NOTE — SPEECH THERAPY NOTE
Speech Language/Pathology    Speech/Language Pathology Progress Note    Patient Name: Papito Dunne  [id number] Date: 2020     Problem List  Principal Problem:    CVA (cerebral vascular accident) [facility])  Active Problems:    Essential hypertension    Atrial fibrillation with RVR ([facility] )    [facility]    Elevated troponin I level    Leukocytosis    Morbid obesity with body mass index (BMI) of 40 0 to 44 9 in adult [facility])       Past Medical History  Past Medical History:   Diagnosis Date    Diabetes mellitus ([facility] )     Hypertension     PUD (peptic ulcer disease)     Vitamin D deficiency         Past Surgical History  Past Surgical History:   Procedure Laterality Date    ABDOMINOPLASTY      revision gastric bypass     SECTION      CHOLECYSTECTOMY      COLONOSCOPY      GASTRIC BYPASS      HERNIA REPAIR      NEUROMA EXCISION Right 2019    Procedure: EXCISION 2ND INTERSPACE NEUROMA;  Surgeon: Filiberto Chen DPM;  Location: [facility] MAIN OR;  Service: Podiatry    ROTATOR CUFF REPAIR Left          Subjective:  Awake and alert in chair  Objective:  Seen for swallow therapy  Notes from evaluation reviewed  Appetite poor for intake, but tolerating small amounts of pureed and thin  Today when asked if patient wanted to try soft foods, she stated"oh yes, please " Trialed soft pears and rice krispies in milk  Pt w/ anterior chew, but functional breakdown  Minimal  Right sided oral residue, cleared independently w/  2-3 swallows  No overt s/s of aspiration/choking w/ pears  With rice Krispies, pt w/ functional manipulation/transfer, slightly increased oral residue on right and chronic hacking /throat clearing sound noted (possible pharyngeal versus tongue base residue)  Po trials stopped after 4 small bites      Assessment:  Improved swallow functional, but not fully appropriate for diet advancement (assess mealtime management of dysphagia 2)    Plan/Recommendations:  Continue swallow therapy for trial upgrades and safer swallow strategy training (small bites/sips, alternation, dry swallows, check oral cavity and re-swallow)  Zoë Moore MA, CCC/SLP  [id number]  [email or web address]  Available via [employer]

## 2020-01-20 NOTE — NURSING NOTE
Patient's brother Manju Pearson) contacted and made aware of the change in status of his sister  Instructed him to present to the ER should anyone from the family decide to come in

## 2020-01-20 NOTE — NURSING NOTE
Patient still with only scant urinary output  Koroma irrigated with 50 ml of sterile water; with 50 ml return  Irrigant with a slight yellow tinge,; koroma catheter irrigated with ease  Soheila RENEE made aware of the same

## 2020-01-20 NOTE — NURSING NOTE
Patient in bed working with speech therapist   Having frequent bursts of heart rate 140-150's sinus tach on monitor  Denies any feelings of flutter or chest pain, denies lightheadedness  Medicated with PRN metoprolol as ordered  Will continue to monitor

## 2020-01-20 NOTE — NURSING NOTE
Patient written to receive the influenza vaccination  When went into patient's room to see if she was agreeable to vaccination, she stated that she already had the flu shot this year at her PCP's office  Will investigate further

## 2020-01-20 NOTE — OCCUPATIONAL THERAPY NOTE
Occupational Therapy Treatment Note      Williams Salome    2020    Principal Problem:    CVA (cerebral vascular accident) [facility])  Active Problems:    Essential hypertension    Atrial fibrillation with RVR (HCC)    SANDRINE (acute kidney injury) ([facility] [address] )    Elevated troponin I level    Leukocytosis    Morbid obesity with body mass index (BMI) of 40 0 to 44 9 in adult [facility])      Past Medical History:   Diagnosis Date    [facility]     Hypertension     PUD (peptic ulcer disease)     Vitamin D deficiency        Past Surgical History:   Procedure Laterality Date    ABDOMINOPLASTY      revision gastric bypass     SECTION      CHOLECYSTECTOMY      COLONOSCOPY      GASTRIC BYPASS      HERNIA REPAIR      NEUROMA EXCISION Right 2019    Procedure: EXCISION 2ND INTERSPACE NEUROMA;  Surgeon: Sofi Alvarado DPM;  Location: [facility] MAIN OR;  Service: Podiatry    ROTATOR CUFF REPAIR Left         20 0928   Restrictions/Precautions   Weight Bearing Precautions Per Order No   Other Precautions Fall Risk;Telemetry;Multiple lines   Pain Assessment   Pain Assessment No/denies pain   Pain Score No Pain   ADL   Eating Assistance 4  Minimal Assistance   Eating Deficit Supervision/safety  (beverage with straw)   Grooming Assistance 4  Minimal Assistance   Grooming Deficit Setup;Supervision/safety;Verbal cueing   UB Bathing Assistance 3  Moderate Assistance   UB Bathing Deficit Setup;Verbal cueing;Supervision/safety; Increased time to complete; Other (Comment)  (prescence of lines, D back, VC's for thoroughness)   UB Bathing Comments Pt cooperative and participative, completed in sections, c/o being cold and fatigued yet willing to participate   LB Bathing Assistance 2  Maximal Assistance   LB Bathing Deficit Setup;Verbal cueing;Supervision/safety  (Bathed to above knees, ventrally, D back of legs/below knees)   LB Bathing Comments pt assisted with LB by lifting/bending LE's upon request, D buttocks, Mod A (assistance with belly fold)   UB Dressing Assistance 3  Moderate Assistance   UB Dressing Deficit Setup;Supervision/safety; Increased time to complete;Verbal cueing   UB Dressing Comments assisted tountie/tie gown behing neck, min A to thread UE's r/t lines   LB Dressing Assistance Unable to assess   Bed Mobility   Supine to Sit 4  Minimal assistance   Additional items Assist x 1;HOB elevated; Bedrails; Increased time required;Verbal cues;LE management   Sit to Supine 4  Minimal assistance   Additional items Assist x 1;Bedrails; Increased time required;Verbal cues;LE management   Additional Comments side sit EOB w/o support   Transfers   Sit to Stand 4  Minimal assistance   Additional items Assist x 2;Bedrails; Increased time required;Verbal cues   Stand to Sit 4  Minimal assistance   Additional items Assist x 2;Armrests; Increased time required;Verbal cues   Additional Comments VC's for safety, hand position   Cognition   Overall Cognitive Status Unable to assess  (r/t expressive aphasis, however,verbal responses are improvi)   Arousal/Participation Alert; Cooperative   Attention Attends with cues to redirect   Orientation Level Oriented to person;Oriented to place   Memory Decreased recall of precautions   Following Commands Follows one step commands with increased time or repetition   Activity Tolerance   Activity Tolerance Patient tolerated treatment well  (pt noted poor sleep/being tired, yet participative in sessio)   Medical Staff Made Aware yes   Assessment   Assessment Pt agreeable and motivated to participate in tx session as approached  Overall improvement in performance (and verbal expression) this date  Pt agrees to needing further rehab prior to being d/c home  Perseveration at times noted  Pt completed UB self care with generally Mod -  assist (D back) Min to Mod with basic grooming  VC's/assistance re: presence of lines at this time  Pt bathes to knees (ventrally) D buttocks and below knees    See body of note for details of session  Continue OT with established POC to increase level of safety and independence  STR recommended  Plan   Treatment Interventions ADL retraining;Functional transfer training; Endurance training;Patient/family training; Activityengagement; Energy conservation; Compensatory technique education; Neuromuscular reeducation;Equipment evaluation/education;UE strengthening/ROM   Goal Expiration Date 01/28/20   OT Treatment Day 2   OT Frequency 5x/wk   Recommendation   OT Discharge Recommendation Short Term Rehab   OT - OK to Discharge Yes  (to STR)   Eli Bautista OT

## 2020-01-20 NOTE — SOCIAL WORK
Pt has been accepted by [facility] for STR upon dsicharge  Authorization for same has been started with pt insurance as per [facility]  Cm provided update to pt brother's Ginger Clarke and Concetta Deluna #[phone]  They are in agreement with pt going to [facility] for rehab on discharge  CM will also provide them with information regarding Living Will and POA  CM will continue to follow

## 2020-01-20 NOTE — PROGRESS NOTES
Cardiology Progress Note - Rigo Hemphill 60 y o  female MRN: [id number]    Unit/Bed#: ICU 06 Encounter: [id number]      Assessment:  1  Narrow complex tachycardia - SVT/Atrial tachycardia v atrial flutter  2  New-onset Cardiomyopathy - possibly takotsubo cardiomyopathy  3  Acute CVA  · MRI brain - subacute late acute to early subacute left MCA territory infarct  · Multifocal within left MCA distribution  4  Hypertension  5  Diabetes Mellitus Type 2   6  S/p gastric bypass  7  Obesity, Body mass index is 41 85 kg/m²       Plan  Narrow complex tachycardia  · Her tachyarrhythmia had subsided on IV amiodarone yesterday, but overnight she had more episodes of SVT after being switched to oral amiodarone  · CT head showed evolving MCA territory infarct , without any hemorrhagic conversion while on heparin drip  · If acceptable from neurological standpoint, can plan to switch over to eliquis for anticoagulation  · Continue oral amiodarone 200 q8h x 7 days + 200 bid x 7 days + 200 daily  · Will plan to use amiodarone for short term 3-6 months, and this will be re-evaluated outpatient  · Repeat echo today while in NSR, showed LVEF 30% with apical dyskinesis, no diagnostic evidence of apical thrombus  · Keep IV metoprolol 5q6h PRN for persistent HR >130       New-onset cardiomyopathy, Non-MI troponin elevation  · Likely related to tachyarrhythmia  · Troponin 0 06 --> 0 06 --> 0 07  · Repeat echo while in NSR showed similar LVEF 30%  · Increase metoprolol XL to 50 mg bid  · If BP remains elevated plan to add ACE/ARB tomorrow  · Troponin elevation 2/2 stroke and tachyarrhythmia        Subjective:   She continues to improve neurologically  She continues to frustrated at had inability to talk fluently  Her weakness has improved significantly  Able to tolerate oral meds and food    Review of Systems    All other systems negative, except as noted in HPI    Telemetry Review:  Normal sinus rhythm 60s to 80s, with brief episodes of SVT year atrial tachycardia with heart rate in the 140s to 150s  Objective:   Vitals: Blood pressure 121/67, pulse 81, temperature 98 °F (36 7 °C), temperature source Temporal, resp  rate (!) 26, height 5' 6" (1 676 m), weight 118 kg (259 lb 4 2 oz), SpO2 96 %  , Body mass index is 41 85 kg/m² ,   Orthostatic Blood Pressures      Most Recent Value   Blood Pressure  121/67 filed at 01/20/2020 0800   Patient Position - Orthostatic VS  Sitting filed at 01/20/2020 2094         Systolic (31RCN), OMK:566 , Min:94 , UYJ:499     Diastolic (78NBM), PIERRE:04, Min:61, Max:90    Wt Readings from Last 5 Encounters:   01/18/20 118 kg (259 lb 4 2 oz)   01/17/20 119 kg (262 lb 5 6 oz)   10/15/19 126 kg (277 lb)   09/17/19 126 kg (278 lb)   08/29/19 122 kg (268 lb)     I/O       01/17 0701 - 01/18 0700 01/18 0701 - 01/19 0700 01/19 0701 - 01/20 0700    P  O   250 120    I V  (mL/kg) 1344 6 (11 4) 1403 8 (11 9)     IV Piggyback 250 980     Total Intake(mL/kg) 1594 6 (13 5) 2633 8 (22 3) 120 (1)    Urine (mL/kg/hr) 600 1200 (0 4) 175 (0 2)    Stool   0    Total Output 600 1200 175    Net +994 6 +1433 8 -55           Unmeasured Stool Occurrence   1 x              Physical Exam   Constitutional: She appears well-developed and well-nourished  No distress  HENT:   Head: Normocephalic  Eyes: No scleral icterus  Neck: No JVD present  Cardiovascular: Normal rate, regular rhythm and normal heart sounds  Exam reveals no gallop and no friction rub  No murmur heard  Pulmonary/Chest: Effort normal and breath sounds normal  No respiratory distress  She has no rales  Abdominal: Soft  She exhibits no distension  There is no tenderness  Musculoskeletal: She exhibits no edema  Neurological: She is alert  Awake, talks in short sentences, right-sided weakness is much improved, although still does have some weakness there  Skin: Skin is warm  Psychiatric: She has a normal mood and affect     Nursing note and vitals reviewed  Laboratory Results: personally reviewed  Results from last 7 days   Lab Units 01/17/20  1955 01/17/20  1712 01/17/20  1247   TROPONIN I ng/mL 0 07* 0 06* 0 06*       CBC with diff:   Results from last 7 days   Lab Units 01/20/20  0743 01/19/20  1006 01/18/20  0528 01/17/20  1247   WBC Thousand/uL 9 30 11 60* 8 40 15 60*   HEMOGLOBIN g/dL 10 7* 10 0* 9 7* 11 6*   HEMATOCRIT % 34 8* 33 9* 32 4* 39 0*   MCV fL 74* 74* 74* 74*   PLATELETS Thousands/uL 263 271 293 356   MCH pg 22 8* 21 7* 22 1* 21 9*   MCHC g/dL 30 8* 29 4* 29 9* 29 7*   RDW % 21 8* 21 3* 21 2* 21 5*   MPV fL 9 4 8 8 8 2* 8 4*         CMP:  Results from last 7 days   Lab Units 01/20/20  0743 01/19/20  1006 01/18/20  0956 01/17/20  1247   POTASSIUM mmol/L 3 9 3 8 3 8 3 6   CHLORIDE mmol/L 109* 107 109* 103   CO2 mmol/L 22 22 21 24   BUN mg/dL 13 18 24 34*   CREATININE mg/dL 0 89 0 88 0 90 1 24*   CALCIUM mg/dL 7 8* 7 5* 7 7* 9 1   AST U/L  --   --  15  --    ALT U/L  --   --  11  --    ALK PHOS U/L  --   --  62  --    EGFR ml/min/1 73sq m 70 71 69 47         BMP:  Results from last 7 days   Lab Units 01/20/20  0743 01/19/20  1006 01/18/20  0956 01/17/20  1247   POTASSIUM mmol/L 3 9 3 8 3 8 3 6   CHLORIDE mmol/L 109* 107 109* 103   CO2 mmol/L 22 22 21 24   BUN mg/dL 13 18 24 34*   CREATININE mg/dL 0 89 0 88 0 90 1 24*   CALCIUM mg/dL 7 8* 7 5* 7 7* 9 1       BNP: No results for input(s): BNP in the last 72 hours      Magnesium:   Results from last 7 days   Lab Units 01/18/20  0956   MAGNESIUM mg/dL 1 8*       Coags:   Results from last 7 days   Lab Units 01/20/20  0743 01/20/20  0125 01/19/20 2014 01/19/20  1135 01/19/20  0405 01/18/20  2124 01/18/20  1405 01/17/20  1247   PTT seconds 76* 74* 107* 124* >210* 31 26 28   INR   --   --   --   --   --   --  1 08 1 02       TSH:        Hemoglobin A1C   Results from last 7 days   Lab Units 01/18/20  0528   HEMOGLOBIN A1C % 6 0       Lipid Profile:   Results from last 7 days   Lab Units 01/18/20  0956   TRIGLYCERIDES mg/dL 93   HDL mg/dL 44       Meds/Allergies   all current active meds have been reviewed and current meds:   Current Facility-Administered Medications   Medication Dose Route Frequency    acetaminophen (TYLENOL) tablet 650 mg  650 mg Oral Q4H PRN    amiodarone tablet 200 mg  200 mg Oral TID With Meals    Followed by   Devere Agent ON 1/26/2020] amiodarone tablet 200 mg  200 mg Oral BID With Meals    Followed by   Devere Agent ON 2/3/2020] amiodarone tablet 200 mg  200 mg Oral Daily With Breakfast    atorvastatin (LIPITOR) tablet 40 mg  40 mg Oral QPM    heparin (porcine) 25,000 units in 250 mL infusion (premix)  3-20 Units/kg/hr (Order-Specific) Intravenous Titrated    heparin (porcine) injection 2,000 Units  2,000 Units Intravenous PRN    heparin (porcine) injection 4,000 Units  4,000 Units Intravenous PRN    influenza vaccine, recombinant, quadrivalent (FLUBLOK) IM injection 0 5 mL  0 5 mL Intramuscular Once    LORazepam (ATIVAN) tablet 0 5 mg  0 5 mg Oral Q8H PRN    metoprolol (LOPRESSOR) injection 5 mg  5 mg Intravenous Q6H PRN    metoprolol succinate (TOPROL-XL) 24 hr tablet 25 mg  25 mg Oral Q12H    timolol (TIMOPTIC) 0 25 % ophthalmic solution 1 drop  1 drop Both Eyes BID     Medications Prior to Admission   Medication    calcium carbonate (OS-VENTURA) 600 MG tablet    Ergocalciferol (VITAMIN D2 PO)    hydrochlorothiazide (HYDRODIURIL) 12 5 mg tablet    timolol (TIMOPTIC-XE) 0 25 % ophthalmic gel-forming    traMADol (ULTRAM) 50 mg tablet       heparin (porcine) 3-20 Units/kg/hr (Order-Specific) Last Rate: 11 1 Units/kg/hr (01/19/20 2040)         Cardiac testing: reviewed  Results for orders placed during the hospital encounter of 01/17/20   Echo complete with contrast if indicated    [place] [id number]    Transthoracic Echocardiogram  2D, M-mode, Doppler, and Color Doppler    Study date: 2020    Patient: Brian Birch  MR number: [id number]  Account number: [de-identified]  : 1955  Age: 60 years  Gender: Female  Status: Inpatient  Location: [facility]   Height: 63 in  Weight: 272 4 lb  BP: 116/ 80 mmHg    Indications: Atrial fibrillation  CVA  Diagnoses: I48 0 - Atrial fibrillation, I63 9 - Cerebral infarction, unspecified    Sonographer:  Simon Man RDCS  Referring Physician:  LEW Webb  Group:  [id number]  Interpreting Physician:  Paulo Wilson MD    SUMMARY    LEFT VENTRICLE:  Systolic function was markedly reduced  Ejection fraction was estimated in the range of 20 % to 30 %  There is global dysfunction but the apical portion of the inferior wall is akinetic  There is significant tachycardia which may be transiently depressing the ejection fraction  HISTORY: Consistent with transient ischemic attack or stroke  PRIOR HISTORY: Risk factors: hypertension and morbid obesity  PROCEDURE: The study was performed in the [facility]  This was a routine study  The transthoracic approach was used  The study included complete 2D imaging, M-mode, complete spectral Doppler, and color Doppler  The  heart rate was 154 bpm, at the start of the study  Images were obtained from the parasternal, apical, subcostal, and suprasternal notch acoustic windows  Echocardiographic views were limited due to restricted patient mobility, poor  acoustic window availability, decreased penetration, and lung interference  This was a technically difficult study  LEFT VENTRICLE: Size was normal  Systolic function was markedly reduced  Ejection fraction was estimated in the range of 20 % to 30 %  There is global dysfunction but the apical portion of the inferior wall is akinetic  There is significant tachycardia which may be transiently depressing the ejection fraction   Wall thickness was normal  No evidence of apical thrombus  DOPPLER: Transmitral flow pattern: atrial fibrillation  The study was not technically  sufficient to allow evaluation of LV diastolic function  RIGHT VENTRICLE: The size was normal  Systolic function was normal  Wall thickness was normal     LEFT ATRIUM: Size was normal     RIGHT ATRIUM: Size was normal     MITRAL VALVE: Valve structure was normal  There was normal leaflet separation  DOPPLER: The transmitral velocity was within the normal range  There was no evidence for stenosis  There was no significant regurgitation  AORTIC VALVE: The valve was possibly bicuspid  Leaflets exhibited mild to moderate calcification and normal cuspal separation  DOPPLER: Transaortic velocity was within the normal range  There was no evidence for stenosis  There was no  significant regurgitation  TRICUSPID VALVE: The valve structure was normal  There was normal leaflet separation  DOPPLER: The transtricuspid velocity was within the normal range  There was no evidence for stenosis  There was trace regurgitation  PULMONIC VALVE: Leaflets exhibited normal thickness, no calcification, and normal cuspal separation  DOPPLER: The transpulmonic velocity was within the normal range  There was no significant regurgitation  PERICARDIUM: There was no pericardial effusion  The pericardium was normal in appearance  AORTA: The root exhibited normal size  SYSTEMIC VEINS: IVC: The inferior vena cava was normal in size      SYSTEM MEASUREMENT TABLES    2D  %FS: 12 42 %  AV Diam: 3 35 cm  Ao asc: 3 14 cm  EDV(Teich): 112 78 ml  EF(Teich): 26 72 %  ESV(Teich): 82 64 ml  IVSd: 1 28 cm  LA Area: 23 66 cm2  LA Diam: 3 86 cm  LVEDV MOD A4C: 88 01 ml  LVEF MOD A4C: 24 42 %  LVESV MOD A4C: 66 52 ml  LVIDd: 4 9 cm  LVIDs: 4 29 cm  LVLd A4C: 7 91 cm  LVLs A4C: 7 09 cm  LVOT Diam: 2 04 cm  LVPWd: 1 12 cm  RA Area: 10 28 cm2  RV Diam : 1 85 cm  SI(Teich): 13 64 ml/m2  SV MOD A4C: 21 49 ml  SV(Cube): 38 61 ml  SV(Teich): 30 14 ml    CW  AV Env  Ti: 184 84 ms  AV VTI: 26 2 cm  AV Vmax: 1 86 m/s  AV Vmean: 1 42 m/s  AV maxP 81 mmHg  AV meanP 6 mmHg  TR Vmax: 2 5 m/s  TR maxP 32 mmHg    MM  TAPSE: 1 99 cm    PW  ADORE (VTI): 1 96 cm2  ADORE Vmax: 1 66 cm2  LVOT Env  Ti: 266 17 ms  LVOT VTI: 15 72 cm  LVOT Vmax: 0 94 m/s  LVOT Vmean: 0 59 m/s  LVOT maxPG: 3 57 mmHg  LVOT meanP 8 mmHg    [facility]    Prepared and electronically signed by    Carlito Lion MD  Signed 2020 17:00:55       No results found for this or any previous visit  No results found for this or any previous visit  No results found for this or any previous visit

## 2020-01-20 NOTE — NURSING NOTE
Patient had been noted to have self-limiting bouts of tachycardia but for most part has been NSR with rates in the 70-80's  However since not receiving the po lopressor, patient has been noted to be tachycardic for longer periods and more frequently  Hold parameters prevent me from administering beta blocker  Bello mendoza and ok'd administration of IV lopressor  Will monitor for improved heart rate and blood pressure

## 2020-01-20 NOTE — PLAN OF CARE
Problem: PHYSICAL THERAPY ADULT  Goal: Performs mobility at highest level of function for planned discharge setting  See evaluation for individualized goals  Description  Treatment/Interventions: Functional transfer training, LE strengthening/ROM, Therapeutic exercise, Endurance training, Patient/family training, Equipment eval/education, Bed mobility, Gait training, Spoke to nursing, OT, Family  Equipment Recommended: Bella Fong       See flowsheet documentation for full assessment, interventions and recommendations  Outcome: Progressing  Note:   Prognosis: Good  Problem List: Decreased strength, Decreased endurance, Impaired balance, Decreased mobility, Decreased coordination, Impaired judgement, Decreased safety awareness, Obesity  Assessment: Pt seen for PT treatment session this date with interventions consisting of gait training w/ emphasis on improving pt's ability to ambulate level surfaces x 50 feet total with mod A provided by therapist with bariatric RW and therapeutic activity consisting of training: bed mobility, supine<>sit transfers, sit<>stand transfers, static sitting tolerance at EOB for 15 minutes w/ o UE support, static standing tolerance for 5 minutes w/ B UE support, vc and tactile cues for static sitting posture faciliation, vc and tactile cues for static standing posture faciliation and stand pivot transfers towards B direction  Pt agreeable to PT treatment session upon arrival, pt found supine in bed w/ HOB elevated, in no apparent distress and no c/o pain  In comparison to previous session, pt with improvements in ambulatory distance, balance  Post session: all needs in reach and RN notified of session findings/recommendations Continue to recommend STR at time of d/c in order to maximize pt's functional independence and safety w/ mobility   Pt continues to be functioning below baseline level, and remains limited 2* factors listed above and including weakness, aphasia, decreased endurance, decreased activity tolerance, gait dysfuncion, notable decline from PLOF  PT will continue to see pt while here in order to address the deficits listed above and provide interventions consistent w/ POC in effort to achieve LTGs  Barriers to Discharge: Decreased caregiver support     Recommendation: Short-term skilled PT     PT - OK to Discharge: Yes(if medically stable to STR)    See flowsheet documentation for full assessment

## 2020-01-20 NOTE — NURSING NOTE
Patient awake most of night  Frustrated, tearful, demanding  Bouts of tachycardia subsided with IV lopressor administration   @ present  Patient sleeping @ present

## 2020-01-20 NOTE — ASSESSMENT & PLAN NOTE
· With residual right-sided weakness and dysphasia - symptoms are slowly improving  · CT scan showed an acute left MCA distribution infarct which was confirmed on MRI testing, patient was never a candidate for tPA  · Status post a neurology and cardiology evaluation  · Continue aspirin and Lipitor for secondary prevention  · Thankfully no hemorrhagic conversion and repeat CT scan of head  · PT/OT, will likely need rehab

## 2020-01-20 NOTE — ASSESSMENT & PLAN NOTE
· Likely due to rapid rate   · No true rise or fall in troponins, not indicative of ACS  · Status post 2D echocardiogram testing with the following results:Systolic function was markedly reduced  Ejection fraction was estimated in the range of 20 % to 30 %  There is global dysfunction but the apical portion of the inferior wall is akinetic  There is significant tachycardia which may be transiently depressing the ejection fraction    · Further management as per Cardiology  · Cardiology recommending repeat limited echo today

## 2020-01-20 NOTE — ASSESSMENT & PLAN NOTE
· Heart rate relatively controlled  · Status post a Cardiology evaluation, patient with the following conditions  · Narrow complex tachycardia - SVT/Atrial tachycardia v atrial flutter  · Started on amiodarone and heparin as per cardiology recommendations  · Patient also has a New-onset Cardiomyopathy - possibly takotsubo cardiomyopathy  · Cardiology recommending repeat echocardiogram today now that she has better heart rate control

## 2020-01-20 NOTE — PHYSICAL THERAPY NOTE
Physical Therapy Treatment Session Note  Patient's Name: Charly Tello    Admitting Diagnosis  CVA (cerebral vascular accident) [facility]) [I63 9]  Atrial fibrillation with RVR ([facility] ) [I48 91]  [facility] ([facility]H) Stroke Scale level of consciousness score 0, alert; keenly responsive [Z78 9]    Problem List  Patient Active Problem List   Diagnosis    Essential hypertension    [facility]    CVA [facility]cere[facility] vascular accident) ([facility] )    SANDRINE (acute kidney injury) ([facility] )    Elevated troponin I level    Leukocytosis    Morbid obesity with body mass index (BMI) of 40 0 to 44 9 in adult [facility])       Past Medical History  Past Medical History:   Diagnosis Date    Diabetes mellitus ([place] [address] )     Hypertension     PUD (peptic ulcer disease)     Vitamin D deficiency        Past Surgical History  Past Surgical History:   Procedure Laterality Date    ABDOMINOPLASTY      revision gastric bypass     SECTION      CHOLECYSTECTOMY      COLONOSCOPY      GASTRIC BYPASS      HERNIA REPAIR      NEUROMA EXCISION Right 2019    Procedure: EXCISION 2ND INTERSPACE NEUROMA;  Surgeon: Sim Meyer DPM;  Location: [address] [place] [address];  Service: Podiatry    ROTATOR CUFF REPAIR Left         20 0732   Pain Assessment   Pain Assessment No/denies pain   Pain Score No Pain   Giron-Baker FACES Pain Rating 0   Restrictions/Precautions   Weight Bearing Precautions Per Order No   Other Precautions Fall Risk;Telemetry;Multiple lines   General   Chart Reviewed Yes   Response to Previous Treatment Patient unable to report, no changes reported from family or staff   Family/Caregiver Present No   Cognition   Overall Cognitive Status Unable to assess  (oriented to person, additional questions N/T 2/2 aphasia)   Arousal/Participation Alert; Cooperative   Attention Attends with cues to redirect   Orientation Level Oriented to person  (unable to further assess re:aphasia)   Memory Decreased recall of precautions Following Commands Follows one step commands with increased time or repetition   Comments Agreeable to particpate in PT session  Flakita Walker exhibits notable perseverance on tasks w/verbal cues to reiterate focus  Subjective   Subjective "I want to go home"   Bed Mobility   Supine to Sit 4  Minimal assistance   Additional items Assist x 1;HOB elevated; Bedrails; Increased time required;Verbal cues;LE management   Sit to Supine   (DNT re:pt  remained OOB in chair upon conclusion )   Additional Comments Pt  rested on bedside approx  15 minutes w/ supervision of 1 w/o UE support  Transfers   Sit to Stand 4  Minimal assistance   Additional items Assist x 2;Bedrails; Increased time required;Verbal cues   Stand to Sit 4  Minimal assistance   Additional items Assist x 2;Armrests; Increased time required;Verbal cues   Stand pivot 4  Minimal assistance   Additional items Assist x 2;Verbal cues; Increased time required   Ambulation/Elevation   Gait pattern Improper Weight shift;Decreased foot clearance;Decreased R stance; Short stride; Inconsistent mikael  (R lean present w/RLE advancement)   Gait Assistance 3  Moderate assist   Additional items Assist x 1;Verbal cues; Tactile cues   Assistive Device Bariatric Rolling walker   Distance 25 feet x 2   Stair Management Assistance Not tested   Balance   Static Sitting Good   Dynamic Sitting Fair +   Static Standing Fair   Dynamic Standing Fair   Ambulatory Fair   Endurance Deficit   Endurance Deficit Yes   Endurance Deficit Description Pt  easily fatigued w/WB tasks, ambulatory progression  Activity Tolerance   Activity Tolerance Patient limited by fatigue   Nurse Made Aware yes, Chhaya BENITO   Assessment   Prognosis Good   Problem List Decreased strength;Decreased endurance; Impaired balance;Decreased mobility; Decreased coordination; Impaired judgement;Decreased safety awareness; Obesity   Assessment Pt seen for PT treatment session this date with interventions consisting of gait training w/ emphasis on improving pt's ability to ambulate level surfaces x 50 feet total with mod A provided by therapist with bariatric RW and therapeutic activity consisting of training: bed mobility, supine<>sit transfers, sit<>stand transfers, static sitting tolerance at EOB for 15 minutes w/ o UE support, static standing tolerance for 5 minutes w/ B UE support, vc and tactile cues for static sitting posture faciliation, vc and tactile cues for static standing posture faciliation and stand pivot transfers towards B direction  Pt agreeable to PT treatment session upon arrival, pt found supine in bed w/ HOB elevated, in no apparent distress and no c/o pain  In comparison to previous session, pt with improvements in ambulatory distance, balance  Post session: all needs in reach and RN notified of session findings/recommendations Continue to recommend STR at time of d/c in order to maximize pt's functional independence and safety w/ mobility  Pt continues to be functioning below baseline level, and remains limited 2* factors listed above and including weakness, aphasia, decreased endurance, decreased activity tolerance, gait dysfuncion, notable decline from PLOF  PT will continue to see pt while here in order to address the deficits listed above and provide interventions consistent w/ POC in effort to achieve LTGs  Barriers to Discharge Decreased caregiver support   Goals   Patient Goals have some tissues   LTG Expiration Date 01/28/20   Long Term Goal #1 LTGs remain appropriate   PT Treatment Day 3   Plan   Treatment/Interventions Functional transfer training;LE strengthening/ROM; Therapeutic exercise; Endurance training;Patient/family training;Equipment eval/education; Bed mobility;Gait training;Spoke to nursing;OT   Progress Progressing toward goals   PT Frequency 7x/wk   Recommendation   Recommendation Short-term skilled PT   Equipment Recommended Walker   PT - OK to Discharge Yes  (if medically stable to STR)   Additional Comments Upon conclusion, pt  was sitting in recliner w/ BLE's elevated & all needs within reach       [facility], PT

## 2020-01-20 NOTE — PROGRESS NOTES
PM&R Consult Follow Up Note  Yasmni Duran 60 y o  female MRN: [id number]  Unit/Bed#: ICU 06 Encounter: [id number]     Assessment and Recommendations:  Ms  Yasmin Duran is a 60 y o  female who  has a past medical history of Diabetes mellitus ([address] ), Hypertension, PUD (peptic ulcer disease), and Vitamin D deficiency  who presented to the [address] [place] [address] with aphasia and right sided weakness and was found to have a L MCA territory infarct      Impairments:  Impaired functional mobility and ability to perform ADL's  Impaired cognition and speech    Recommendations:  - Continue PT/OT while inpatient  - insurance authorization initiated for acute inpt rehab     Thank you for allowing the PM&R service to participate in the care of Ms Yasmin Duran  We will continue to follow she progress with you  Please do not hesitate to call with questions or concerns  Interval History: patient in process of being transitioned to eliquis      Subjective: patient seen at bedside and is comfortable      Objective:    Physical Therapy:    min tx, mod amb  Occupational Therapy:    mod-max   Vital Signs:      Temp:  [97 8 °F (36 6 °C)-98 5 °F (36 9 °C)] 98 °F (36 7 °C)  HR:  [] 72  Resp:  [14-51] 28  BP: ()/(52-90) 134/89   Intake/Output Summary (Last 24 hours) at 1/20/2020 1318  Last data filed at 1/20/2020 1145  Gross per 24 hour   Intake 536 55 ml   Output 1550 ml   Net -1013 45 ml        Laboratory:      Lab Results   Component Value Date    HGB 10 7 (L) 01/20/2020    HCT 34 8 (L) 01/20/2020    WBC 9 30 01/20/2020     Lab Results   Component Value Date    BUN 13 01/20/2020    K 3 9 01/20/2020     (H) 01/20/2020    CREATININE 0 89 01/20/2020     Lab Results   Component Value Date    PROTIME 12 5 01/18/2020    INR 1 08 01/18/2020          General: alert, no apparent distress, cooperative and comfortable  Head: Normal, normocephalic, atraumatic    Pulmonary: respirations unlabored Cardiovascular: +S1/2  Abdomen: soft NT  Extremity: volume status currently stable    Neurologic: muscle tone and strength normal and symmetric and + aphasia  Psych: patient currently calm

## 2020-01-21 PROBLEM — R79.89 ELEVATED TROPONIN I LEVEL: Status: RESOLVED | Noted: 2020-01-17 | Resolved: 2020-01-21

## 2020-01-21 PROBLEM — N17.9 AKI (ACUTE KIDNEY INJURY) (HCC): Status: RESOLVED | Noted: 2020-01-17 | Resolved: 2020-01-21

## 2020-01-21 PROBLEM — D72.829 LEUKOCYTOSIS: Status: RESOLVED | Noted: 2020-01-17 | Resolved: 2020-01-21

## 2020-01-21 PROBLEM — R77.8 ELEVATED TROPONIN I LEVEL: Status: RESOLVED | Noted: 2020-01-17 | Resolved: 2020-01-21

## 2020-01-21 LAB
ANION GAP SERPL CALCULATED.3IONS-SCNC: 8 MMOL/L (ref 4–13)
ANISOCYTOSIS BLD QL SMEAR: PRESENT
BUN SERPL-MCNC: 11 MG/DL (ref 7–25)
CALCIUM SERPL-MCNC: 7.7 MG/DL (ref 8.6–10.5)
CHLORIDE SERPL-SCNC: 110 MMOL/L (ref 98–107)
CO2 SERPL-SCNC: 21 MMOL/L (ref 21–31)
CREAT SERPL-MCNC: 0.87 MG/DL (ref 0.6–1.2)
ERYTHROCYTE [DISTWIDTH] IN BLOOD BY AUTOMATED COUNT: 21.7 % (ref 11.5–14.5)
GFR SERPL CREATININE-BSD FRML MDRD: 72 ML/MIN/1.73SQ M
GLUCOSE SERPL-MCNC: 81 MG/DL (ref 65–99)
HCT VFR BLD AUTO: 34.3 % (ref 42–47)
HGB BLD-MCNC: 10 G/DL (ref 12–16)
HYPERCHROMIA BLD QL SMEAR: PRESENT
INR PPP: 1.13 (ref 0.9–1.5)
MCH RBC QN AUTO: 22.2 PG (ref 26–34)
MCHC RBC AUTO-ENTMCNC: 29.2 G/DL (ref 31–37)
MCV RBC AUTO: 76 FL (ref 81–99)
MICROCYTES BLD QL AUTO: PRESENT
PLATELET # BLD AUTO: 256 THOUSANDS/UL (ref 149–390)
PLATELET BLD QL SMEAR: ADEQUATE
PMV BLD AUTO: 8.8 FL (ref 8.6–11.7)
POTASSIUM SERPL-SCNC: 3.8 MMOL/L (ref 3.5–5.5)
PROTHROMBIN TIME: 13.1 SECONDS (ref 10.2–13)
RBC # BLD AUTO: 4.53 MILLION/UL (ref 3.9–5.2)
RBC MORPH BLD: NORMAL
SCHISTOCYTES BLD QL SMEAR: PRESENT
SODIUM SERPL-SCNC: 139 MMOL/L (ref 134–143)
WBC # BLD AUTO: 11 THOUSAND/UL (ref 4.8–10.8)

## 2020-01-21 PROCEDURE — 99239 HOSP IP/OBS DSCHRG MGMT >30: CPT | Performed by: INTERNAL MEDICINE

## 2020-01-21 PROCEDURE — 97530 THERAPEUTIC ACTIVITIES: CPT

## 2020-01-21 PROCEDURE — 80048 BASIC METABOLIC PNL TOTAL CA: CPT | Performed by: INTERNAL MEDICINE

## 2020-01-21 PROCEDURE — NC001 PR NO CHARGE

## 2020-01-21 PROCEDURE — 85610 PROTHROMBIN TIME: CPT | Performed by: INTERNAL MEDICINE

## 2020-01-21 PROCEDURE — 97535 SELF CARE MNGMENT TRAINING: CPT

## 2020-01-21 PROCEDURE — 97112 NEUROMUSCULAR REEDUCATION: CPT

## 2020-01-21 PROCEDURE — 99232 SBSQ HOSP IP/OBS MODERATE 35: CPT | Performed by: PHYSICAL MEDICINE & REHABILITATION

## 2020-01-21 PROCEDURE — 99232 SBSQ HOSP IP/OBS MODERATE 35: CPT | Performed by: INTERNAL MEDICINE

## 2020-01-21 PROCEDURE — 85027 COMPLETE CBC AUTOMATED: CPT | Performed by: INTERNAL MEDICINE

## 2020-01-21 PROCEDURE — 92526 ORAL FUNCTION THERAPY: CPT

## 2020-01-21 PROCEDURE — 97116 GAIT TRAINING THERAPY: CPT

## 2020-01-21 RX ORDER — ACETAMINOPHEN 325 MG/1
650 TABLET ORAL EVERY 4 HOURS PRN
Status: CANCELLED | OUTPATIENT
Start: 2020-01-21

## 2020-01-21 RX ORDER — LORAZEPAM 0.5 MG/1
0.5 TABLET ORAL EVERY 8 HOURS PRN
Status: CANCELLED | OUTPATIENT
Start: 2020-01-21

## 2020-01-21 RX ORDER — LISINOPRIL 5 MG/1
5 TABLET ORAL DAILY
Status: CANCELLED | OUTPATIENT
Start: 2020-01-22

## 2020-01-21 RX ORDER — ATORVASTATIN CALCIUM 40 MG/1
40 TABLET, FILM COATED ORAL EVERY EVENING
Status: CANCELLED | OUTPATIENT
Start: 2020-01-21

## 2020-01-21 RX ORDER — AMIODARONE HYDROCHLORIDE 100 MG/1
200 TABLET ORAL 2 TIMES DAILY WITH MEALS
Status: CANCELLED | OUTPATIENT
Start: 2020-01-26 | End: 2020-02-02

## 2020-01-21 RX ORDER — GLIMEPIRIDE 2 MG/1
1 TABLET ORAL 2 TIMES DAILY
Status: CANCELLED | OUTPATIENT
Start: 2020-01-21

## 2020-01-21 RX ORDER — AMIODARONE HYDROCHLORIDE 100 MG/1
200 TABLET ORAL
Status: CANCELLED | OUTPATIENT
Start: 2020-02-03

## 2020-01-21 RX ORDER — LISINOPRIL 5 MG/1
5 TABLET ORAL DAILY
Status: DISCONTINUED | OUTPATIENT
Start: 2020-01-21 | End: 2020-01-23 | Stop reason: HOSPADM

## 2020-01-21 RX ORDER — METOPROLOL SUCCINATE 50 MG/1
50 TABLET, EXTENDED RELEASE ORAL 2 TIMES DAILY
Status: CANCELLED | OUTPATIENT
Start: 2020-01-21

## 2020-01-21 RX ORDER — AMIODARONE HYDROCHLORIDE 100 MG/1
200 TABLET ORAL
Status: CANCELLED | OUTPATIENT
Start: 2020-01-21 | End: 2020-01-26

## 2020-01-21 RX ADMIN — APIXABAN 5 MG: 5 TABLET, FILM COATED ORAL at 17:21

## 2020-01-21 RX ADMIN — ATORVASTATIN CALCIUM 40 MG: 40 TABLET, FILM COATED ORAL at 17:21

## 2020-01-21 RX ADMIN — TIMOLOL MALEATE 1 DROP: 2.5 SOLUTION/ DROPS OPHTHALMIC at 17:21

## 2020-01-21 RX ADMIN — AMIODARONE HYDROCHLORIDE 200 MG: 100 TABLET ORAL at 16:08

## 2020-01-21 RX ADMIN — LISINOPRIL 5 MG: 5 TABLET ORAL at 10:37

## 2020-01-21 RX ADMIN — METOPROLOL SUCCINATE 50 MG: 50 TABLET, EXTENDED RELEASE ORAL at 17:21

## 2020-01-21 RX ADMIN — AMIODARONE HYDROCHLORIDE 200 MG: 100 TABLET ORAL at 13:06

## 2020-01-21 RX ADMIN — AMIODARONE HYDROCHLORIDE 200 MG: 100 TABLET ORAL at 08:18

## 2020-01-21 RX ADMIN — TIMOLOL MALEATE 1 DROP: 2.5 SOLUTION/ DROPS OPHTHALMIC at 08:17

## 2020-01-21 RX ADMIN — METOPROLOL SUCCINATE 50 MG: 50 TABLET, EXTENDED RELEASE ORAL at 08:18

## 2020-01-21 RX ADMIN — APIXABAN 5 MG: 5 TABLET, FILM COATED ORAL at 08:18

## 2020-01-21 NOTE — PHYSICAL THERAPY NOTE
Physical Therapy Treatment Session Note    Patient's Name: Chirag Gifford    Admitting Diagnosis  CVA (cerebral vascular accident) [facility]) [I63 9]  Atrial fibrillation with RVR ([facility] [address] ) [I48 91]  [facility] ([facility]H) Stroke Scale level of consciousness score 0, alert; keenly responsive [Z78 9]    Problem List  Patient Active Problem List   Diagnosis    Essential hypertension    Atrial fibrillation with RVR ([facility] [facility] )    CVA (cerebral vascular accident) ([facility] )    Morbid obesity with body mass index (BMI) of 40 0 to 44 9 in adult [facility])       Past Medical History  Past Medical History:   Diagnosis Date    Diabetes mellitus ([facility] )     Hypertension     PUD (peptic ulcer disease)     Vitamin D deficiency        Past Surgical History  Past Surgical History:   Procedure Laterality Date    ABDOMINOPLASTY      revision gastric bypass     SECTION      CHOLECYSTECTOMY      COLONOSCOPY      GASTRIC BYPASS      HERNIA REPAIR      NEUROMA EXCISION Right 2019    Procedure: EXCISION 2ND INTERSPACE NEUROMA;  Surgeon: Jung Funes DPM;  Location: [address] [place] [address];  Service: Podiatry    ROTATOR CUFF REPAIR Left         20 0816   Pain Assessment   Pain Assessment No/denies pain   Pain Score No Pain   Restrictions/Precautions   Weight Bearing Precautions Per Order No   Other Precautions Fall Risk;Telemetry;Multiple lines   General   Chart Reviewed Yes   Response to Previous Treatment Patient with no complaints from previous session  Family/Caregiver Present No   Cognition   Overall Cognitive Status WFL   Arousal/Participation Alert; Cooperative   Attention Attends with cues to redirect   Orientation Level Oriented X4   Memory Decreased recall of precautions   Following Commands Follows one step commands with increased time or repetition   Subjective   Subjective "I'm anxious to get to rehab"   Bed Mobility   Supine to Sit 4  Minimal assistance   Additional items Assist x 1;HOB elevated; Bedrails; Increased time required;Verbal cues;LE management   Sit to Supine   (DNT re:pt  was sitting OOB in recliner upon conclusion )   Transfers   Sit to Stand 4  Minimal assistance   Additional items Assist x 1;Bedrails; Increased time required;Verbal cues   Stand to Sit 4  Minimal assistance   Additional items Assist x 1; Armrests; Increased time required;Verbal cues   Stand pivot 3  Moderate assistance   Additional items Assist x 1; Increased time required;Verbal cues   Toilet transfer 4  Minimal assistance   Additional items Assist x 1; Armrests; Increased time required;Commode;Raised toilet seat   Additional Comments Reiterated verbal cues for safety of environment, RW usage w/directional changes x 4  Ambulation/Elevation   Gait pattern Improper Weight shift;Decreased foot clearance;Decreased R stance; Inconsistent mikael; Short stride   Gait Assistance 3  Moderate assist   Additional items Assist x 1;Verbal cues; Tactile cues   Assistive Device Bariatric Rolling walker   Distance 40 feet x 2   Stair Management Assistance Not tested   Balance   Static Sitting Good   Dynamic Sitting Fair +   Static Standing Fair +   Dynamic Standing Fair   Ambulatory Fair   Endurance Deficit   Endurance Deficit Yes   Endurance Deficit Description Pt  reported feeling "tired" throughout session w/fatigue present upon ambulatory conclusion,comfortable with BLE elevation on recliner  Activity Tolerance   Activity Tolerance Patient limited by fatigue   Nurse Made Aware yes, Chhaya BENITO   Exercises   Neuro re-ed X 10 minutes including: standing multi-directional head turns 15 reps each w/ mod A of 1 and BUE support on RW, sidestepping B x 15 reps w/ mod  A of 1 and BUE support of RW, BLE marching x 15 reps w/mod A of 1 and BUE support of RW, and continued postural facilitation to increase stability & safety  Assessment   Prognosis Good   Problem List Decreased strength;Decreased endurance; Impaired balance;Decreased mobility; Decreased coordination; Impaired judgement;Decreased safety awareness; Obesity   Assessment Pt seen for PT treatment session this date with interventions consisting of gait training w/ emphasis on improving pt's ability to ambulate level surfaces x 40 feet x 2 with mod A provided by therapist with bariatric RW, therapeutic activity consisting of training: bed mobility, supine<>sit transfers, sit<>stand transfers, static sitting tolerance at EOB for 5 minutes w/ B UE support, static standing tolerance for 5 minutes w/ B UE support, vc and tactile cues for static sitting posture faciliation, vc and tactile cues for static standing posture faciliation, stand pivot transfers towards B direction and toileting on [facility] w/ assistance for pericare and neuromuscular re-education: side stepping bilaterally, marching and ML weightshifting x 15 reps ft w/ B UE support  Pt agreeable to PT treatment session upon arrival, pt found supine in bed w/ HOB elevated, in no apparent distress and A&O x 4  In comparison to previous session, pt with improvements in ambulatory distance, balance, progression to more challenging tasks  Post session: all needs in reach and RN notified of session findings/recommendations Continue to recommend STR at time of d/c in order to maximize pt's functional independence and safety w/ mobility  Pt continues to be functioning below baseline level, and remains limited 2* factors listed above and including weakness, gait dysfunction, decreased endurance, impaired balance, aphasia, decline from PLOF  PT will continue to see pt while here in order to address the deficits listed above and provide interventions consistent w/ POC in effort to achieve LTGs     Barriers to Discharge Decreased caregiver support   Goals   Patient Goals get to rehab asap   LTG Expiration Date 01/28/20   Long Term Goal #1 LTGs remain appropriate   PT Treatment Day 4   Plan   Treatment/Interventions Functional transfer training;LE strengthening/ROM; Therapeutic exercise; Endurance training;Equipment eval/education; Bed mobility;Gait training;Spoke to nursing;OT  (&neuro re-education w/balance training tasks)   Progress Progressing toward goals   PT Frequency 7x/wk   Recommendation   Recommendation Short-term skilled PT   Equipment Recommended Walker   PT - OK to Discharge Yes  (if medically stable)   Additional Comments Upon conclusion, pt  was resting in recliner w/SCD's active and all needs within reach       Gena Sanna, PT

## 2020-01-21 NOTE — SPEECH THERAPY NOTE
Speech Language/Pathology    Speech/Language Pathology Progress Note    Patient Name: Lencho Stovall  LNZBH'Q Date: 2020     Problem List  Principal Problem:    CVA (cerebral vascular accident) [facility])  Active Problems:    Essential hypertension    Atrial fibrillation with RVR ([facility] )    Morbid obesity with body mass index (BMI) of 40 0 to 44 9 in adult [facility])       Past Medical History  Past Medical History:   Diagnosis Date    [facility] mellitus ([facility] )     Hypertension     PUD (peptic ulcer disease)     Vitamin D deficiency         Past Surgical History  Past Surgical History:   Procedure Laterality Date    ABDOMINOPLASTY      revision gastric bypass     SECTION      CHOLECYSTECTOMY      COLONOSCOPY      GASTRIC BYPASS      HERNIA REPAIR      NEUROMA EXCISION Right 2019    Procedure: EXCISION 2ND INTERSPACE NEUROMA;  Surgeon: Harley Alaniz DPM;  Location: [address] [place] [address];  Service: Podiatry    ROTATOR CUFF REPAIR Left          Subjective:  Pt seen for swallow tx  Pt sitting upright in chair  Pt cooperative, states she is anxious  Objective:  Spoke with RN and reviewed chart  Pt has been refusing to eat, due to dislike of the food  Pt was trialed during tx with more challenging foods  This included chopped pears, softened rice crispies in milk, and joe crackers  She drank water by straw  Bolus retrieval was adequate, as was draw from straw  Mastication with the soft foods was mildly prolonged, with mild anterior spill from the R side of the mouth but pt was aware and wiped mouth  Pt was encouraged to chew on the L, stronger side of the mouth  No significant pocketing or residue  Pharyngeal swallows appeared prompt, and there were no s/s of aspiration  With the joe cracker, mastication, bolus formation and transfer was more prolonged, with difficulty forming a cohesive bolus, possibly due to the cracker being dry   In addition, hacking cough was observed intermittently after the swallow of the cracker  There were no s/s of aspiration with the thin liquids  Discussed recommendation for diet change with patient and RN, new aspiration precautions sign was posted  Assessment:  Pt is appropriate for diet upgrade to [facility] soft  Pt verbalized understanding and demonstrated understanding of compensatory strategies  Plan/Recommendations:  Upgrade diet to [facility] soft/dysphagia 2 and continue thin liquids  Eat slowly, small bites/sips, check for pocketing, chew on L side of mouth, alternate solids and liquids, frequent supervision  ST to follow up for tx

## 2020-01-21 NOTE — PLAN OF CARE
Problem: PHYSICAL THERAPY ADULT  Goal: Performs mobility at highest level of function for planned discharge setting  See evaluation for individualized goals  Description  Treatment/Interventions: Functional transfer training, LE strengthening/ROM, Therapeutic exercise, Endurance training, Patient/family training, Equipment eval/education, Bed mobility, Gait training, Spoke to nursing, OT, Family  Equipment Recommended: Geovany Hicks       See flowsheet documentation for full assessment, interventions and recommendations  Outcome: Progressing  Note:   Prognosis: Good  Problem List: Decreased strength, Decreased endurance, Impaired balance, Decreased mobility, Decreased coordination, Impaired judgement, Decreased safety awareness, Obesity  Assessment: Pt seen for PT treatment session this date with interventions consisting of gait training w/ emphasis on improving pt's ability to ambulate level surfaces x 40 feet x 2 with mod A provided by therapist with bariatric RW, therapeutic activity consisting of training: bed mobility, supine<>sit transfers, sit<>stand transfers, static sitting tolerance at EOB for 5 minutes w/ B UE support, static standing tolerance for 5 minutes w/ B UE support, vc and tactile cues for static sitting posture faciliation, vc and tactile cues for static standing posture faciliation, stand pivot transfers towards B direction and toileting on [facility] w/ assistance for pericare and neuromuscular re-education: side stepping bilaterally, marching and ML weightshifting x 15 reps ft w/ B UE support  Pt agreeable to PT treatment session upon arrival, pt found supine in bed w/ HOB elevated, in no apparent distress and A&O x 4  In comparison to previous session, pt with improvements in ambulatory distance, balance, progression to more challenging tasks   Post session: all needs in reach and RN notified of session findings/recommendations Continue to recommend STR at time of d/c in order to maximize pt's functional independence and safety w/ mobility  Pt continues to be functioning below baseline level, and remains limited 2* factors listed above and including weakness, gait dysfunction, decreased endurance, impaired balance, aphasia, decline from PLOF  PT will continue to see pt while here in order to address the deficits listed above and provide interventions consistent w/ POC in effort to achieve LTGs  Barriers to Discharge: Decreased caregiver support     Recommendation: Short-term skilled PT     PT - OK to Discharge: Yes(if medically stable)    See flowsheet documentation for full assessment

## 2020-01-21 NOTE — DISCHARGE SUMMARY
Discharge- Karis Lion 1955, 60 y o  female MRN: [id number]    Unit/Bed#: ICU 06 Encounter: [id number]    Primary Care Provider: Tamiko Kunz, DO   Date and time admitted to hospital: 1/17/2020 12:37 PM    Addendum:  Patient's discharge yesterday delayed due to insurance authorization  Plan to discharge patient to acute rehab unit today, otherwise no change in clinical status      Morbid obesity with body mass index (BMI) of 40 0 to 44 9 in adult [facility])  Assessment & Plan  · Dietary, lifestyle modification counseling be provided in future    Atrial fibrillation with RVR ([facility])  Assessment & Plan  · Heart rate relatively controlled  · Status post a Cardiology evaluation, patient with the following conditions  · Narrow complex tachycardia - SVT/Atrial tachycardia v atrial flutter   · Started on amiodarone and Eliquis as per cardiology recommendations  · Patient also has a New-onset Cardiomyopathy - possibly takotsubo cardiomyopathy  · Repeat echo thankfully did not demonstrate any apical thrombus  · Follow-up with Cardiology as an outpatient    Essential hypertension  Assessment & Plan  · Patient was on HCTZ at home   · Will hold off for now - okay for permissive hypertension  · Monitor BP closely - blood pressures are stable    * CVA (cerebral vascular accident) [facility])  Assessment & Plan  · With residual right-sided weakness and dysphasia - symptoms are slowly improving  · CT scan showed an acute left MCA distribution infarct which was confirmed on MRI testing, patient was never a candidate for tPA  · Status post a neurology and cardiology evaluation  · Continue Eliquis and Lipitor for secondary prevention  · Thankfully no hemorrhagic conversion and repeat CT scan of head  · PT/OT, rehab placement    Leukocytosisresolved as of 1/21/2020  Assessment & Plan  · Resolved, procalcitonin also within normal limits  · In retrospect was most likely just reactive  · No further workup    Elevated troponin I levelresolved as of 1/21/2020  Assessment & Plan  · Likely due to rapid rate   · No true rise or fall in troponins, not indicative of ACS  · Status post 2D echocardiogram testing with the following results:Systolic function was markedly reduced  Ejection fraction was estimated in the range of 20 % to 30 %  There is global dysfunction but the apical portion of the inferior wall is akinetic  There is significant tachycardia which may be transiently depressing the ejection fraction  · Further management as per Cardiology    SANDRINE (acute kidney injury) ([facility])resolved as of 1/21/2020  Assessment & Plan  · Pre renal, resolved with IV fluids        Discharging Physician / Practitioner: Nella Hernandez MD  PCP: Elmer Hermosillo DO  Admission Date:   Admission Orders (From admission, onward)     Ordered        01/17/20 1324  Inpatient Admission  Once                   Discharge Date: 1/23/2020  Disposition:      [address]  at [address] [place] [address]    For Discharges to [facility] SNF:   · Not Applicable to this Patient - Not Applicable to this Patient    Reason for Admission:  Aphasia    Discharge Diagnoses:     Please see assessment and plan section above for further details regarding discharge diagnoses  Resolved Problems  Date Reviewed: 1/17/2020          Resolved    SANDRINE (acute kidney injury) ([facility] [address] ) 1/21/2020     Resolved by  Nella Hernandez MD    Elevated troponin I level 1/21/2020     Resolved by  Nella Hernandez MD    Leukocytosis 1/21/2020     Resolved by  Nella Hernandez MD          Consultations During Hospital Stay:  · Neurology  · Cardiology  · PM&R    Procedures Performed:   · n/a     Medication Adjustments and Discharge Medications:  · Summary of Medication Adjustments made as a result of this hospitalization: as above  · Medication Dosing Tapers - Please refer to Discharge Medication List for details on any medication dosing tapers (if applicable to patient)    · Medications being temporarily held (include recommended restart time): n/a  · Discharge Medication List: See after visit summary for reconciled discharge medications  Wound Care Recommendations:  When applicable, please see wound care section of After Visit Summary  Diet Recommendations at Discharge:  Diet -        Diet Orders   (From admission, onward)             Start     Ordered    01/18/20 1259  Diet Dysphagia/Modified Consistency; Dysphagia 1-Pureed; Thin Liquid  Diet effective now     Question Answer Comment   Diet Type Dysphagia/Modified Consistency    Dysphagia/Modified Consistency Dysphagia 1-Pureed    Liquid Modifier Thin Liquid    RD to adjust diet per protocol? Yes        01/18/20 1258                Instructions for any Catheters / Lines Present at Discharge (including removal date, if applicable): n/a    Significant Findings / Test Results:     CT head wo contrast   Final Result by Derick Bland MD (01/19 1921)      Expected evolution of left MCA territory infarct, without evidence of hemorrhagic conversion  Workstation performed: [id number]         XR chest 1 view portable   Final Result by Laura Welsh MD (01/19 1527)      No acute cardiopulmonary disease  Right jugular catheter at cavoatrial junction with no pneumothorax  Workstation performed: [id number]         MRI brain wo contrast   Final Result by Keith Shaw MD (01/17 0916)      Stable late acute to early subacute left MCA territory infarct  No evidence of increase in extent of the infarct, hemorrhagic conversion or significant mass effect  Workstation performed: [id number]         X-ray chest 1 view portable   Final Result by Carlota Abdul MD (01/17 1413)      No acute cardiopulmonary disease  Findings are stable            Workstation performed: [id number]         CTA stroke alert (head/neck)   Final Result by Joseph Holland MD (01/17 1330)      No large vessel flow restrictive disease within the head or neck          No large vessel intracranial occlusion  Findings were directly discussed with SYDNEE HERNANDEZ on 1/17/2020 1:08 PM                      Workstation performed: [id number]         CT stroke alert brain   Final Result by Dustin Bryan MD (01/17 1248)      Recent bland ischemic edema, multifocal within the left MCA distribution  Minimal local mass effect  Event estimated to be at least several hours of age  Findings were directly discussed with SYDNEE HERNANDEZ on 1/17/2020 12:46 PM       Workstation performed: [id number]             Incidental Findings:   · n/a     Test Results Pending at Discharge (will require follow up):   · n/a     Outpatient Tests Requested:  · n/a    Complications:    · none    Hospital Course:     Waldo Silva is a 60 y o  female patient who originally presented to the hospital on 1/17/2020 due to aphasia and right-sided weakness  She was noted to have CVA with left MCA distribution on admission  She was admitted to the intensive care unit  Diagnostic testing revealed severe cardiomyopathy with an ejection fraction of 20-30%, atrial flutter, for which she was seen by Cardiology  Her rates were controlled with metoprolol and patient was started on amiodarone  In terms of anticoagulation, she was started on heparin drip and transitioned to Eliquis  Repeat CT scan thankfully were negative any hemorrhagic conversion  Patient was seen by Physical and Occupational therapy felt that she may benefit from acute rehab  Plan is for patient be discharged to acute rehab today  She will be following Neurology and Cardiology as an outpatient    Condition at Discharge: fair     Discharge Day Visit / Exam:     Subjective:  Patient seen examined  No acute events overnight    Vitals: Blood Pressure: 143/70 (01/21/20 0700)  Pulse: 70 (01/21/20 0700)  Temperature: 97 6 °F (36 4 °C) (01/21/20 0700)  Temp Source: Temporal (01/21/20 0700)  Respirations: (!) 25 (01/21/20 0700)  Height: 5' 6" (167 6 cm) (01/17/20 1430)  Weight - Scale: 114 kg (250 lb 10 6 oz) (01/21/20 0600)  SpO2: 98 % (01/21/20 0700)  Exam:   Physical Exam   Constitutional: She is oriented to person, place, and time  She appears well-developed and well-nourished  HENT:   Head: Normocephalic and atraumatic  Eyes: Pupils are equal, round, and reactive to light  EOM are normal    Neck: Normal range of motion  Cardiovascular: Normal rate and regular rhythm  Pulmonary/Chest: Effort normal and breath sounds normal    Abdominal: Soft  Obese   Musculoskeletal: Normal range of motion  She exhibits no edema  Neurological: She is alert and oriented to person, place, and time  Word-finding difficulty   Skin: Skin is warm  Capillary refill takes less than 2 seconds  Psychiatric: She has a normal mood and affect  Her behavior is normal  Thought content normal    Nursing note and vitals reviewed  Discussion with Family: called brother, no answer    Goals of Care Discussions:  · Code Status at Discharge: Level 1 - Full Code  · Were there any Goals of Care Discussions during Hospitalization?: Yes  · Results of any General Goals of Care Discussions: full   · POLST Completed: No   · If POLST Completed, Summary of POLST Agreement Provided Here: n/a   · OK to Rehospitalize if Needed? Yes    Discharge instructions/Information to patient and family:   See after visit summary section titled Discharge Instructions for information provided to patient and family  Planned Readmission: aru      Discharge Statement:  I spent 35 minutes discharging the patient  This time was spent on the day of discharge  I had direct contact with the patient on the day of discharge  Greater than 50% of the total time was spent examining patient, answering all patient questions, arranging and discussing plan of care with patient as well as directly providing post-discharge instructions  Additional time then spent on discharge activities      ** Please Note: This note has been constructed using a voice recognition system **

## 2020-01-21 NOTE — SOCIAL WORK
Pt medically stable for discharge today  Pt has been accepted by [facility] for [facility]  They have submitted for auth and same is pending per Iraj Cast  CM will continue to follow

## 2020-01-21 NOTE — NURSING NOTE
Assumed care  Pt alert and oriented with expressive aphasia  She is able to communicate however needs time to say what she needs and reply to questions  Her vitals are stable  She is anxious regarding going to rehab medicated with ativan per orders  Will cont to monitor

## 2020-01-21 NOTE — ASSESSMENT & PLAN NOTE
· Heart rate relatively controlled  · Status post a Cardiology evaluation, patient with the following conditions  · Narrow complex tachycardia - SVT/Atrial tachycardia v atrial flutter   · Started on amiodarone and Eliquis as per cardiology recommendations  · Patient also has a New-onset Cardiomyopathy - possibly takotsubo cardiomyopathy  · Repeat echo thankfully did not demonstrate any apical thrombus  · Follow-up with Cardiology as an outpatient

## 2020-01-21 NOTE — PLAN OF CARE
Problem: OCCUPATIONAL THERAPY ADULT  Goal: Performs self-care activities at highest level of function for planned discharge setting  See evaluation for individualized goals  Description  Treatment Interventions: Neuromuscular reeducation, Fine motor coordination activities, Compensatory technique education, Activityengagement, ADL retraining, Functional transfer training, UE strengthening/ROM, Endurance training, Patient/family training, Equipment evaluation/education, Energy conservation          See flowsheet documentation for full assessment, interventions and recommendations  Outcome: Progressing  Note:   Limitation: Decreased ADL status, Decreased UE ROM, Decreased UE strength, Decreased Safe judgement during ADL, Decreased endurance, Decreased self-care trans, Decreased high-level ADLs  Prognosis: Guarded  Assessment: Patient participated in Skilled OT session this date with interventions consisting of ADL re training with the use of correct body mechnaics, safety awareness and fall prevention techniques and  therapeutic activities to: increase activity tolerance   Patient agreeable to OT treatment session, upon arrival patient was found seated OOB to Chair  Patient requiring verbal cues for correct technique and ocassional safety reminders  Patient continues to be functioning below baseline level, occupational performance remains limited secondary to factors listed above and increased risk for falls and injury  From OT standpoint, recommendation at time of d/c would be Short Term Rehab to assess safety follow-through in upgraded ADLS  Patient to benefit from continued Occupational Therapy treatment while in the hospital to address deficits as defined above and maximize level of functional independence with ADLs and functional mobility  --NOTE:  Patient does quite readily become frustrated with speech impairments        OT Discharge Recommendation: Short Term Rehab  OT - OK to Discharge: Yes(to STR)     HANSA Saenz/ELTON

## 2020-01-21 NOTE — NURSING NOTE
Patient is feeling better today, states she had a good nights sleep  Anxious to "go to rehab"  Complex physical assessment as noted, see chart for details  Verbalizing needs  Call bell in reach

## 2020-01-21 NOTE — PROGRESS NOTES
PHYSICAL MEDICINE AND REHABILITATION   PREADMISSION ASSESSMENT     Projected [facility] Diagnoses:  Impairment of mobility, safety, Activities of Daily Living (ADLs), and cognitive/communication skills due to Neurologic Conditions:  03 9  Other Neurologic Disorder left MCA territory infarct  Etiologic Diagnosis: cardiac emboli secondary to A-fib  Date of Onset: 1/17/20202   Date of surgery: n/a    PATIENT INFORMATION  Name: Gagan Simms Phone #: [phone] (home)   Address: [address] [place] [id number]  YOB: 1955 Age: 60 y o  SS# [id number]  Marital Status: Single  Ethnicity:   Employment Status: patient is not working  Extended Emergency Contact Information  Primary Emergency Contact: Juaquin Martino [id number] [place] [address] Phone: [phone]  Relation: Brother  Secondary Emergency Contact: Alix [facility] Phone: [phone]  Mobile Phone: [phone]  Relation: Brother  Advance Directive: has NO advanced directive  - add't info requested  Referral to SW: no    INSURANCE/COVERAGE:     Primary Payor: [employer] / Plan: Jass Wyman MA / Product Type: Medicaid HMO /   Secondary Payer: private pay    Payer Contact:  Payer Contact:   Contact Phone: [phone] [address]  Contact Phone:     Authorization #: [id number]  Coverage Dates: 7 days   LCD: 1/29/20  Medicare #:   Medicare Days: n/a   Medical Record #: [id number]    REFERRAL SOURCE:   Referring provider: Mara Stark MD  Referring facility: [address] [place] [address]   Room: ICU 06/ICU 06  PCP: Eliud Simmons DO PCP phone number: [phone]    MEDICAL INFORMATION  HPI:   Gagan Simms is a 60 y o  female patient who originally presented to the hospital on 1/17/2020 due to aphasia and right-sided weakness  She was noted to have CVA with left MCA distribution on admission  She was admitted to the intensive care unit    Diagnostic testing revealed severe cardiomyopathy with an ejection fraction of 20-30%, atrial flutter, for which she was seen by Cardiology  Her rates were controlled with metoprolol and patient was started on amiodarone  In terms of anticoagulation, she was started on heparin drip and transitioned to Eliquis  Repeat CT scan thankfully were negative any hemorrhagic conversion  Patient was seen by Physical and Occupational therapy felt that she may benefit from acute rehab  Plan is for patient be discharged to acute rehab today  She will be following Neurology and Cardiology as an outpatient       Past Medical History:   Past Surgical History:    Allergies:     Past Medical History:   Diagnosis Date    Diabetes mellitus ([facility] )     Hypertension     PUD (peptic ulcer disease)     Vitamin D deficiency     Past Surgical History:   Procedure Laterality Date    ABDOMINOPLASTY      revision gastric bypass     SECTION      CHOLECYSTECTOMY      COLONOSCOPY      GASTRIC BYPASS      HERNIA REPAIR      NEUROMA EXCISION Right 2019    Procedure: EXCISION 2ND INTERSPACE NEUROMA;  Surgeon: Brooke Rivers DPM;  Location: [facility] MAIN OR;  Service: Podiatry    ROTATOR CUFF REPAIR Left      Allergies   Allergen Reactions    Nsaids      Due to hx gastric bypass           Comorbidities:    Essential hypertension    [facility]    CVA (cerebral vascular accident) ([facility] )    SANDRINE [facility]acute kidney injury) ([facility]    Elevated troponin I level    Leukocytosis       Surgeries/Procedures in the last 100 days: none    CURRENT VITAL SIGNS:   Temp:  [97 5 °F (36 4 °C)-99 4 °F (37 4 °C)] 98 °F (36 7 °C)  HR:  [55-69] 69  Resp:  [20-32] 20  BP: (105-123)/(62-67) 113/65   Intake/Output Summary (Last 24 hours) at 2020 0855  Last data filed at 2020 0601  Gross per 24 hour   Intake 1440 ml   Output 750 ml   Net 690 ml        LABORATORY RESULTS:      Lab Results   Component Value Date    HGB 10 0 (L) 2020    HCT 34 3 (L) 2020    WBC 11 00 (H) 01/21/2020     Lab Results   Component Value Date    BUN 11 01/21/2020    K 3 8 01/21/2020     (H) 01/21/2020    CREATININE 0 87 01/21/2020     Lab Results   Component Value Date    PROTIME 13 1 (H) 01/21/2020    INR 1 13 01/21/2020        DIAGNOSTIC STUDIES:  Xr Chest 1 View Portable    Result Date: 1/19/2020  Impression: No acute cardiopulmonary disease  Right jugular catheter at cavoatrial junction with no pneumothorax  Workstation performed: [id number]     X-ray Chest 1 View Portable    Result Date: 1/17/2020  Impression: No acute cardiopulmonary disease  Findings are stable Workstation performed: [id number]     Ct Head Wo Contrast    Result Date: 1/19/2020  Impression: Expected evolution of left MCA territory infarct, without evidence of hemorrhagic conversion  Workstation performed: [id number]     Mri Brain Wo Contrast    Result Date: 1/17/2020  Impression: Stable late acute to early subacute left MCA territory infarct  No evidence of increase in extent of the infarct, hemorrhagic conversion or significant mass effect  Workstation performed: [id number]     Ct Stroke Alert Brain    Result Date: 1/17/2020  Impression: Recent bland ischemic edema, multifocal within the left MCA distribution  Minimal local mass effect  Event estimated to be at least several hours of age  Findings were directly discussed with SYDNEE HERNANDEZ on 1/17/2020 12:46 PM  Workstation performed: [id number]     Cta Stroke Alert (head/neck)    Result Date: 1/17/2020  Impression: No large vessel flow restrictive disease within the head or neck  No large vessel intracranial occlusion  Findings were directly discussed with SYDNEE HERNANDEZ on 1/17/2020 1:08 PM  Workstation performed: [id number]       PRECAUTIONS/SPECIAL NEEDS:  Anticoagulation:  Eliquis, Blood Sugar Management: as needed, Safety Concerns and Pain Management Aspiration precautions  Fall precautions, neuro checks       MEDICATIONS:     Current Facility-Administered Medications:     acetaminophen (TYLENOL) tablet 650 mg, 650 mg, Oral, Q4H PRN, YELITZA HogueNP    amiodarone tablet 200 mg, 200 mg, Oral, TID With Meals, 200 mg at 01/22/20 1748 **FOLLOWED BY** [START ON 1/26/2020] amiodarone tablet 200 mg, 200 mg, Oral, BID With Meals **FOLLOWED BY** [START ON 2/3/2020] amiodarone tablet 200 mg, 200 mg, Oral, Daily With Breakfast, Pardeep Latif MD    apixaban (ELIQUIS) tablet 5 mg, 5 mg, Oral, BID, Dieter Houser MD, 5 mg at 01/22/20 1746    atorvastatin (LIPITOR) tablet 40 mg, 40 mg, Oral, QPM, LEW Hogue, 40 mg at 01/22/20 1746    influenza vaccine, recombinant, quadrivalent (FLUBLOK) IM injection 0 5 mL, 0 5 mL, Intramuscular, Once, Miranda Reyes MD    lisinopril (ZESTRIL) tablet 5 mg, 5 mg, Oral, Daily, Pardeep Latif MD, 5 mg at 01/22/20 0931    LORazepam (ATIVAN) tablet 0 5 mg, 0 5 mg, Oral, Q8H PRN, LEW Hogue, 0 5 mg at 01/20/20 2050    metoprolol (LOPRESSOR) injection 5 mg, 5 mg, Intravenous, Q6H PRN, Efrain Narvaez MD, 5 mg at 01/20/20 1040    metoprolol succinate (TOPROL-XL) 24 hr tablet 50 mg, 50 mg, Oral, BID, Pardeep Latif MD, 50 mg at 01/22/20 1753    timolol (TIMOPTIC) 0 25 % ophthalmic solution 1 drop, 1 drop, Both Eyes, BID, LEW Hogue, 1 drop at 01/22/20 0931    SKIN INTEGRITY:   no rashes, no erythema, no peripheral edema, brusining to upper extremities    PRIOR LEVEL OF FUNCTION:  She lives in a(n) single family home  Kerry Gresham is single and lives alone  Prior to admission, pt was I with self care ADL's, IADL's, Ambulation, Driving   Upon evaluation: Pt requires an increased  Level of assistance with self are ADL's, IADL's, functional transfers/toileting/mobility r/t the following deficits impacting occupational performance: weakness, decreased ROM, decreased strength, decreased balance, decreased tolerance and decreased safety awareness  Pt to benefit from continued skilled OT tx while in the hospital to address deficits as defined above and maximize level of functional independence w ADL's and functional mobility  FALLS IN THE LAST 6 MONTHS: none    HOME ENVIRONMENT:  The living area: can live on one level  There are No steps to enter the home  The patient will not have 24 hour supervision/physical assistance available upon discharge  PREVIOUS DME:  Equipment in home (previous DME): None    FUNCTIONAL STATUS:  Physical Therapy Occupational Therapy Speech Therapy   01/21/20 0816    Pain Assessment   Pain Assessment No/denies pain   Pain Score No Pain   Restrictions/Precautions   Weight Bearing Precautions Per Order No   Other Precautions Fall Risk;Telemetry;Multiple lines   General   Chart Reviewed Yes   Response to Previous Treatment Patient with no complaints from previous session  Family/Caregiver Present No   Cognition   Overall Cognitive Status WFL   Arousal/Participation Alert; Cooperative   Attention Attends with cues to redirect   Orientation Level Oriented X4   Memory Decreased recall of precautions   Following Commands Follows one step commands with increased time or repetition   Subjective   Subjective "I'm anxious to get to rehab"   Bed Mobility   Supine to Sit 4  Minimal assistance   Additional items Assist x 1;HOB elevated; Bedrails; Increased time required;Verbal cues;LE management   Sit to Supine    (DNT re:pt  was sitting OOB in recliner upon conclusion )   Transfers   Sit to Stand 4  Minimal assistance   Additional items Assist x 1;Bedrails; Increased time required;Verbal cues   Stand to Sit 4  Minimal assistance   Additional items Assist x 1; Armrests; Increased time required;Verbal cues   Stand pivot 3  Moderate assistance   Additional items Assist x 1; Increased time required;Verbal cues   Toilet transfer 4  Minimal assistance   Additional items Assist x 1; Armrests; Increased time required;Commode;Raised toilet seat   Additional Comments Reiterated verbal cues for safety of environment, RW usage w/directional changes x 4  Ambulation/Elevation   Gait pattern Improper Weight shift;Decreased foot clearance;Decreased R stance; Inconsistent mikael; Short stride   Gait Assistance 3  Moderate assist   Additional items Assist x 1;Verbal cues; Tactile cues   Assistive Device Bariatric Rolling walker   Distance 40 feet x 2   Stair Management Assistance Not tested   Balance   Static Sitting Good   Dynamic Sitting Fair +   Static Standing Fair +   Dynamic Standing Fair   Ambulatory Fair   Endurance Deficit   Endurance Deficit Yes   Endurance Deficit Description Pt  reported feeling "tired" throughout session w/fatigue present upon ambulatory conclusion,comfortable with BLE elevation on recliner  Activity Tolerance   Activity Tolerance Patient limited by fatigue   Nurse Made Aware yes, Chhaya BENITO   Exercises   Neuro re-ed X 10 minutes including: standing multi-directional head turns 15 reps each w/ mod A of 1 and BUE support on RW, sidestepping B x 15 reps w/ mod  A of 1 and BUE support of RW, BLE marching x 15 reps w/mod A of 1 and BUE support of RW, and continued postural facilitation to increase stability & safety  Assessment   Prognosis Good   Problem List Decreased strength;Decreased endurance; Impaired balance;Decreased mobility; Decreased coordination; Impaired judgement;Decreased safety awareness; Obesity   Assessment Pt seen for PT treatment session this date with interventions consisting of gait training w/ emphasis on improving pt's ability to ambulate level surfaces x 40 feet x 2 with mod A provided by therapist with bariatric RW, therapeutic activity consisting of training: bed mobility, supine<>sit transfers, sit<>stand transfers, static sitting tolerance at EOB for 5 minutes w/ B UE support, static standing tolerance for 5 minutes w/ B UE support, vc and tactile cues for static sitting posture faciliation, vc and tactile cues for static standing posture faciliation, stand pivot transfers towards B direction and toileting on [facility] w/ assistance for pericare and neuromuscular re-education: side stepping bilaterally, marching and ML weightshifting x 15 reps ft w/ B UE support  Pt agreeable to PT treatment session upon arrival, pt found supine in bed w/ HOB elevated, in no apparent distress and A&O x 4  In comparison to previous session, pt with improvements in ambulatory distance, balance, progression to more challenging tasks  Post session: all needs in reach and RN notified of session findings/recommendations Continue to recommend STR at time of d/c in order to maximize pt's functional independence and safety w/ mobility  Pt continues to be functioning below baseline level, and remains limited 2* factors listed above and including weakness, gait dysfunction, decreased endurance, impaired balance, aphasia, decline from PLOF  PT will continue to see pt while here in order to address the deficits listed above and provide interventions consistent w/ POC in effort to achieve LTGs  Barriers to Discharge Decreased caregiver support   Goals   Patient Goals get to rehab asap   LTG Expiration Date 01/28/20   Long Term Goal #1 LTGs remain appropriate   PT Treatment Day 4   Plan   Treatment/Interventions Functional transfer training;LE strengthening/ROM; Therapeutic exercise; Endurance training;Equipment eval/education; Bed mobility;Gait training;Spoke to nursing;OT  (&neuro re-education w/balance training tasks)   Progress Progressing toward goals   PT Frequency 7x/wk   Recommendation   Recommendation Short-term skilled PT   Equipment Recommended Walker   PT - OK to Discharge Yes  (if medically stable)   Additional Comments Upon conclusion, pt  was resting in recliner w/SCD's active and all needs within reach  01/21/20 0932   Restrictions/Precautions   Weight Bearing Precautions Per Order No   Other Precautions Telemetry; Fall Risk  (aphasic --- per nurse report, labile)   Lifestyle   Reciprocal Relationships has neighbor who can provide some assistance   Intrinsic Gratification has a cat and did some childcare for a 3year-old that she considers a grandchild   Pain Assessment   Pain Assessment No/denies pain  (though reports some chronic (?2 year) back issue)   ADL   Where Assessed Chair  (and commode)   UB Bathing Assistance 5  Supervision/Setup   UB Bathing Deficit Setup;Supervision/safety   LB Bathing Assistance 5  Supervision/Setup   LB Bathing Deficit Setup;Supervision/safety   UB Dressing Comments requested hospital gown change to more 'fitting' size   LB Dressing Comments able to manage LB clothing    Toileting Assistance  Unable to assess   Toileting Comments patient demanded that I clean her post BM because she is Extremely bothered by the odor and wants to get it out of the room ASAP   Transfers   Sit to Stand 6  Modified independent   Additional items Armrests; Increased time required   Stand to Sit 6  Modified independent   Additional items Armrests  (somewhat impulsive)   Toilet transfer 5  Supervision   Additional items Armrests;Commode   Additional Comments has been practicing toilet transfers with nurse   Toilet Transfers   Toilet Transfers Comments does well    Coordination   Gross Motor [facility]   Dexterity WFL   Cognition   Overall Cognitive Status WFL   Arousal/Participation Responsive; Cooperative   Comments requires some re-direction and support for calming at times   Activity Tolerance   Activity Tolerance Patient tolerated treatment well   Assessment   Assessment Patient participated in Skilled OT session this date with interventions consisting of ADL re training with the use of correct body mechnaics, safety awareness and fall prevention techniques and  therapeutic activities to: increase activity tolerance   Patient agreeable to OT treatment session, upon arrival patient was found seated OOB to Chair    Patient requiring verbal cues for correct technique and ocassional safety reminders  Patient continues to be functioning below baseline level, occupational performance remains limited secondary to factors listed above and increased risk for falls and injury  From OT standpoint, recommendation at time of d/c would be Short Term Rehab to assess safety follow-through in upgraded ADLS  Patient to benefit from continued Occupational Therapy treatment while in the hospital to address deficits as defined above and maximize level of functional independence with ADLs and functional mobility  --NOTE:  Patient does quite readily become frustrated with speech impairments  Plan   Treatment Interventions ADL retraining;Functional transfer training;Patient/family training; Compensatory technique education   Goal Expiration Date 01/28/20   OT Treatment Day 3      Subjective:  Pt seen for dysphagia tx  Pt sitting upright in chair, flat affect but cooperative       Objective:  Mechanical soft lunch tray was delivered  This included ground meatloaf, cooked and finely chopped carrots, mashed potatoes, and pudding  Pt ate small amounts of each, and drank juice, soda and water by straw  Compensatory strategies were reviewed with the patient, including eat slowly, small bites/sips, check for pocketing, chew on L side, and alternate solids and liquids  With minimal cues, pt able to follow guidelines  Minimal pocketing/residue observed, pt cleared with liquid wash  There were no s/s of aspiration  Pt showed good insight when she reported that the carrots were too hard to chew, and did not eat any more  Pt stated "I like it, but I'm not very hungry", and ate only a small amount at lunch       Assessment:  Good toleration of [facility] soft foods and thin liquids, although pt only ate a small portion of lunch       Plan/Recommendations:  Continue with [facility] soft/dysphagia 2 diet and thin liquids  ST to see for dysphagia and speech tx             CURRENT GAP IN FUNCTION  Prior to Admission:     Functional Status: Patient was independent with mobility/ambulation, transfers, ADL's, IADL's  Estimated length of stay: 2 weeks    Anticipated Post-Discharge Disposition/Treatment  Disposition: Return to previous home/apartment  Outpatient Services: Physical Therapy (PT), Occupational Therapy (OT), Speech Therapy and Outpatient Psychology     BARRIERS TO DISCHARGE  limited home support, difficulty performing ADLS and difficulty performing IADLS   Patient requiring verbal cues for correct technique and ocassional safety reminders  Patient continues to be functioning below baseline level, occupational performance remains limited secondary to increased risk for falls and injury  Weakness, balance difficulty, fatigue, home accessibility, caregiver accessibility, financial resources, DME needs, home modification, medication changes per MD recommendations, therapy exercises  INTERVENTIONS FOR DISCHARGE  Adaptive equipment, Patient/Family/Caregiver Education, [employer], Home Evaluation, Support Group, Financial Assistance, Arrange DME needs, Home Modifcations, Therapy exercises, Center of balance support  and Energy conservation education     REQUIRED THERAPY:  Patient will require PT, OT and ST 60 minutes each per day, five days per week to achieve rehab goals  REQUIRED FUNCTIONAL AND MEDICAL MANAGEMENT FOR INPATIENT REHABILITATION:  Pain management moderately controlled  Diet control on [facility] soft/dysphagia 2 diet and thin liquids  Blood sugar monitoring and treatment as needed per MD recommendations  BP monitoring and treatment per MD recommendations; Skin integrity to avoid breakdown with nursing intervention of assessment, positioning, turning  Patient at risk for A-fib resulting in thrombus formation, PE, stroke, MI, death  Patient at risk for DVT resulting in MI, Stroke, pulmonary embolism, further stroke    Patient as risk for dysphagia resulting in aspiration, pneumonia, malnutrition, dehydration, respiratory distress, death  Patient at risk for falls resulting in fractures, head injury, trauma  Patient at risk for poor po intake resulting in dehydration, malnutrition, poor healing, weakness, metabolic imbalance  Patient at risk for poor safety awareness resulting in falls, injury  RECOMMENDED LEVEL OF CARE:    Pt is 60 y o  female seen s/p admit to [address] [place] [address] on 1/17/2020 w/ CVA (cerebral vascular accident) ([facility] )  PT/ OT/ ST consulted to assess pt's functional mobility and d/c needs  Comorbidities affecting pt's physical performance at time of assessment include: CVA w/ R sided weakness and expressive aphasia, HTN, A-fib w/ RVR, SANDRINE, leukocytosis  PTA, pt was independent w/ all functional mobility w/ o AD  Personal factors affecting pt at time of IE include: communication issues, inability to ambulate household distances, inability to navigate community distances, unable to perform dynamic tasks in community, unable to perform physical activity, inability to perform IADLs, inability to perform ADLs and inability to live alone  Impairments and limitations include weakness, impaired balance, decreased endurance, impaired coordination, gait deviations, decreased activity tolerance, decreased functional mobility tolerance, altered sensation, decreased safety awareness, impaired judgement and fall risk  The following objective measures performed on IE also reveal limitations: Barthel Index: 15/100  Pt's clinical presentation is currently unstable/unpredictable  Pt to benefit from continued PT/OT/ST tx to address deficits as defined above and maximize level of functional independent mobility and consistency  Recommendation at time of d/c would be STR pending progress in order to facilitate return to PLOF   Patient will participate in stroke education series, close medication management and PM&R management recommended - monitor labs and other medical co-morbidities  Nursing to manage bowel/bladder function, skin integrity, pain management and medication education  Inpatient acute rehab is recommended for the patient to maximize overall strength, endurance, self care, and mobility upon discharge to home with the support of family

## 2020-01-21 NOTE — PROGRESS NOTES
PM&R Consult Follow Up Note  Frank Espinal 60 y o  female MRN: [id number]  Unit/Bed#: ICU 06 Encounter: [id number]     Assessment and Recommendations:  Ms Frank Espinal is a 60 y o  female who  has a past medical history of Diabetes mellitus ([address] ), Hypertension, PUD (peptic ulcer disease), and Vitamin D deficiency  who presented to the [facility] [address] with aphasia and right sided weakness and was found to have a L MCA territory infarct      Impairments:  Impaired functional mobility and ability to perform ADL's  Impaired cognition and speech    Recommendations:  - continue PT/OT/ST   - insurance auth for [facility] pending     Thank you for allowing the PM&R service to participate in the care of Ms Frank Espinal  We will continue to follow she progress with you  Please do not hesitate to call with questions or concerns  Interval History: cleared for dc by IM       Subjective: d/w patient procedure for ARC admission and that insurance authorization is required      Objective:    Physical Therapy:    min-mod tx, mod amb   Occupational Therapy:    sup   Vital Signs:      Temp:  [97 6 °F (36 4 °C)-98 6 °F (37 °C)] 97 9 °F (36 6 °C)  HR:  [60-95] 72  Resp:  [22-34] 22  BP: (104-145)/(59-84) 128/78   Intake/Output Summary (Last 24 hours) at 1/21/2020 1734  Last data filed at 1/21/2020 0500  Gross per 24 hour   Intake    Output 750 ml   Net -750 ml        Laboratory:      Lab Results   Component Value Date    HGB 10 0 (L) 01/21/2020    HCT 34 3 (L) 01/21/2020    WBC 11 00 (H) 01/21/2020     Lab Results   Component Value Date    BUN 11 01/21/2020    K 3 8 01/21/2020     (H) 01/21/2020    CREATININE 0 87 01/21/2020     Lab Results   Component Value Date    PROTIME 13 1 (H) 01/21/2020    INR 1 13 01/21/2020          General: alert, no apparent distress, cooperative and comfortable  Head: Normal, normocephalic, atraumatic    Pulmonary: respirations unlabored   Cardiovascular: +S1/2  Abdomen: soft NT  Extremity: volume status currently stable    Neurologic: muscle tone and strength normal and symmetric and + aphasia  Psych: patient currently calm

## 2020-01-21 NOTE — OCCUPATIONAL THERAPY NOTE
01/21/20 0932   Restrictions/Precautions   Weight Bearing Precautions Per Order No   Other Precautions Telemetry; Fall Risk  (aphasic --- per nurse report, labile)   Lifestyle   Reciprocal Relationships has neighbor who can provide some assistance   Intrinsic Gratification has a cat and did some childcare for a 3year-old that she considers a grandchild   Pain Assessment   Pain Assessment No/denies pain  (though reports some chronic (?2 year) back issue)   ADL   Where Assessed Chair  (and commode)   UB Bathing Assistance 5  Supervision/Setup   UB Bathing Deficit Setup;Supervision/safety   LB Bathing Assistance 5  Supervision/Setup   LB Bathing Deficit Setup;Supervision/safety   UB Dressing Comments requested hospital gown change to more 'fitting' size   LB Dressing Comments able to manage LB clothing    Toileting Assistance  Unable to assess   Toileting Comments patient demanded that I clean her post BM because she is Extremely bothered by the odor and wants to get it out of the room ASAP   Transfers   Sit to Stand 6  Modified independent   Additional items Armrests; Increased time required   Stand to Sit 6  Modified independent   Additional items Armrests  (somewhat impulsive)   Toilet transfer 5  Supervision   Additional items Armrests;Commode   Additional Comments has been practicing toilet transfers with nurse   Toilet Transfers   Toilet Transfers Comments does well    Coordination   Gross Motor [facility]   Dexterity WFL   Cognition   Overall Cognitive Status WFL   Arousal/Participation Responsive; Cooperative   Comments requires some re-direction and support for calming at times   Activity Tolerance   Activity Tolerance Patient tolerated treatment well   Assessment   Assessment Patient participated in Skilled OT session this date with interventions consisting of ADL re training with the use of correct body mechnaics, safety awareness and fall prevention techniques and  therapeutic activities to: increase activity tolerance   Patient agreeable to OT treatment session, upon arrival patient was found seated OOB to Chair  Patient requiring verbal cues for correct technique and ocassional safety reminders  Patient continues to be functioning below baseline level, occupational performance remains limited secondary to factors listed above and increased risk for falls and injury  From OT standpoint, recommendation at time of d/c would be Short Term Rehab to assess safety follow-through in upgraded ADLS  Patient to benefit from continued Occupational Therapy treatment while in the hospital to address deficits as defined above and maximize level of functional independence with ADLs and functional mobility  --NOTE:  Patient does quite readily become frustrated with speech impairments  Plan   Treatment Interventions ADL retraining;Functional transfer training;Patient/family training; Compensatory technique education   Goal Expiration Date 01/28/20   OT Treatment Day [address], [place]

## 2020-01-21 NOTE — SPEECH THERAPY NOTE
Speech Language/Pathology    Speech/Language Pathology Progress Note    Patient Name: Tyrese BERMUDEZ Date: 2020     Problem List  Principal Problem:    CVA (cerebral vascular accident) [facility])  Active Problems:    Essential hypertension    Atrial fibrillation with RVR ([facility] [address] )    Morbid obesity with body mass index (BMI) of 40 0 to 44 9 in adult [facility])       Past Medical History  Past Medical History:   Diagnosis Date    Diabetes mellitus ([facility] )     Hypertension     PUD (peptic ulcer disease)     Vitamin D deficiency         Past Surgical History  Past Surgical History:   Procedure Laterality Date    ABDOMINOPLASTY      revision gastric bypass     SECTION      CHOLECYSTECTOMY      COLONOSCOPY      GASTRIC BYPASS      HERNIA REPAIR      NEUROMA EXCISION Right 2019    Procedure: EXCISION 2ND INTERSPACE NEUROMA;  Surgeon: Dominga Rashid DPM;  Location[facility] MAIN OR;  Service: Podiatry    ROTATOR CUFF REPAIR Left          Subjective:  Pt seen for dysphagia tx  Pt sitting upright in chair, flat affect but cooperative  Objective:  Mechanical soft lunch tray was delivered  This included ground meatloaf, cooked and finely chopped carrots, mashed potatoes, and pudding  Pt ate small amounts of each, and drank juice, soda and water by straw  Compensatory strategies were reviewed with the patient, including eat slowly, small bites/sips, check for pocketing, chew on L side, and alternate solids and liquids  With minimal cues, pt able to follow guidelines  Minimal pocketing/residue observed, pt cleared with liquid wash  There were no s/s of aspiration  Pt showed good insight when she reported that the carrots were too hard to chew, and did not eat any more  Pt stated "I like it, but I'm not very hungry", and ate only a small amount at lunch  Assessment:  Good toleration of [facility] soft foods and thin liquids, although pt only ate a small portion of lunch       Plan/Recommendations:  Continue with mech soft/dysphagia 2 diet and thin liquids  ST to see for dysphagia and speech tx

## 2020-01-21 NOTE — ASSESSMENT & PLAN NOTE
· With residual right-sided weakness and dysphasia - symptoms are slowly improving  · CT scan showed an acute left MCA distribution infarct which was confirmed on MRI testing, patient was never a candidate for tPA  · Status post a neurology and cardiology evaluation  · Continue Eliquis and Lipitor for secondary prevention  · Thankfully no hemorrhagic conversion and repeat CT scan of head  · PT/OT, rehab placement

## 2020-01-21 NOTE — NURSING NOTE
Patient is OOB in chair, family visiting  Questioning why we "didn't send me for rehab yet "  Explained we are still waiting for authorization from the insurance company  She is upset about same but understands the process, as I have explained it to her again

## 2020-01-21 NOTE — PROGRESS NOTES
Cardiology Progress Note - Dot Barrientos 60 y o  female MRN: [id number]    Unit/Bed#: ICU 06 Encounter: [id number]      Assessment:  1  Narrow complex tachycardia - PSVT/atrial tachycardia with suspected initial atrial fibrillation/flutter  2  New-onset Cardiomyopathy - possibly takotsubo cardiomyopathy  3  Acute CVA  · MRI brain - subacute late acute to early subacute left MCA territory infarct  · Multifocal within left MCA distribution  4  Hypertension  5  Diabetes Mellitus Type 2   6  S/p gastric bypass  7  Obesity, Body mass index is 41 85 kg/m²       Plan  Narrow complex tachycardia - PSVT with suspected initial atrial fibrillation/flutter  · SVT much better now on amiodarone + increased dose of metoprolol XL 50 bid  · Continue oral amiodarone 200 q8h x 7 days + 200 bid x 7 days + 200 daily  · Will plan to use amiodarone for short term 3-6 months, and this will be re-evaluated outpatient  · On eliquis for anticoagulation     New-onset cardiomyopathy, Non-MI troponin elevation  · Cardiomyopathy likely related to tachyarrhythmia  · Troponin 0 06 --> 0 06 --> 0 07  · No complaints of chest pain or ischemic changes on ECG  · Repeat echo while in NSR showed similar LVEF 30%  · Increase metoprolol XL to 50 mg bid  · BP remains in 120-150/60-75  · Add Lisinopril 5 mg  · Will need eventual outpatient nuclear lexiscan stress test to further evaluate for ischemia given new cardiomyopathy  · Office will help arrange same      Subjective:   Continues to feel well with improvement in weakness and slight improvement in speech she is able to speak full sentences when talking slowly, but has difficulty when trying to talk fast   Her arrhythmia seems to have decreased substantially, but she continues to have very short burst of atrial tachycardia    Review of Systems  All other systems negative, except as noted in HPI    Telemetry Review:  Normal sinus rhythm 60s to 80s    Much decreased frequency of atrial tachycardia    Objective:   Vitals: Blood pressure 129/65, pulse 78, temperature 97 6 °F (36 4 °C), temperature source Temporal, resp  rate 22, height 5' 6" (1 676 m), weight 114 kg (250 lb 10 6 oz), SpO2 99 %  , Body mass index is 40 46 kg/m² ,   Orthostatic Blood Pressures      Most Recent Value   Blood Pressure  129/65 filed at 01/21/2020 0800   Patient Position - Orthostatic VS  Sitting filed at 01/21/2020 0775         Systolic (82GTX), RAMA:494 , Min:90 , OLT:955     Diastolic (98FIY), XNA:66, Min:52, Max:89    Wt Readings from Last 5 Encounters:   01/21/20 114 kg (250 lb 10 6 oz)   01/17/20 119 kg (262 lb 5 6 oz)   10/15/19 126 kg (277 lb)   09/17/19 126 kg (278 lb)   08/29/19 122 kg (268 lb)     I/O       01/17 0701 - 01/18 0700 01/18 0701 - 01/19 0700 01/19 0701 - 01/20 0700    P  O   250 120    I V  (mL/kg) 1344 6 (11 4) 1403 8 (11 9)     IV Piggyback 250 980     Total Intake(mL/kg) 1594 6 (13 5) 2633 8 (22 3) 120 (1)    Urine (mL/kg/hr) 600 1200 (0 4) 175 (0 2)    Stool   0    Total Output 600 1200 175    Net +994 6 +1433 8 -55           Unmeasured Stool Occurrence   1 x              Physical Exam   Constitutional: She appears well-developed and well-nourished  No distress  HENT:   Head: Normocephalic  Eyes: No scleral icterus  Neck: No JVD present  Cardiovascular: Normal rate, regular rhythm and normal heart sounds  Exam reveals no gallop and no friction rub  No murmur heard  Pulmonary/Chest: Effort normal and breath sounds normal  No respiratory distress  She has no rales  Abdominal: Soft  She exhibits no distension  There is no tenderness  Musculoskeletal: She exhibits no edema  Neurological: She is alert  Awake, talks in short sentences, has speech difficulty when talking faster, right-sided weakness is much improved  Skin: Skin is warm  Psychiatric: She has a normal mood and affect  Nursing note and vitals reviewed            Laboratory Results: personally reviewed  Results from last 7 days   Lab Units 01/17/20  1955 01/17/20  1712 01/17/20  1247   TROPONIN I ng/mL 0 07* 0 06* 0 06*       CBC with diff:   Results from last 7 days   Lab Units 01/21/20  0544 01/20/20  0743 01/19/20  1006 01/18/20  0528 01/17/20  1247   WBC Thousand/uL 11 00* 9 30 11 60* 8 40 15 60*   HEMOGLOBIN g/dL 10 0* 10 7* 10 0* 9 7* 11 6*   HEMATOCRIT % 34 3* 34 8* 33 9* 32 4* 39 0*   MCV fL 76* 74* 74* 74* 74*   PLATELETS Thousands/uL 256 263 271 293 356   MCH pg 22 2* 22 8* 21 7* 22 1* 21 9*   MCHC g/dL 29 2* 30 8* 29 4* 29 9* 29 7*   RDW % 21 7* 21 8* 21 3* 21 2* 21 5*   MPV fL 8 8 9 4 8 8 8 2* 8 4*         CMP:  Results from last 7 days   Lab Units 01/21/20  0544 01/20/20  0743 01/19/20  1006 01/18/20  0956 01/17/20  1247   POTASSIUM mmol/L 3 8 3 9 3 8 3 8 3 6   CHLORIDE mmol/L 110* 109* 107 109* 103   CO2 mmol/L 21 22 22 21 24   BUN mg/dL 11 13 18 24 34*   CREATININE mg/dL 0 87 0 89 0 88 0 90 1 24*   CALCIUM mg/dL 7 7* 7 8* 7 5* 7 7* 9 1   AST U/L  --   --   --  15  --    ALT U/L  --   --   --  11  --    ALK PHOS U/L  --   --   --  62  --    EGFR ml/min/1 73sq m 72 70 71 69 47         BMP:  Results from last 7 days   Lab Units 01/21/20  0544 01/20/20  0743 01/19/20  1006 01/18/20  0956 01/17/20  1247   POTASSIUM mmol/L 3 8 3 9 3 8 3 8 3 6   CHLORIDE mmol/L 110* 109* 107 109* 103   CO2 mmol/L 21 22 22 21 24   BUN mg/dL 11 13 18 24 34*   CREATININE mg/dL 0 87 0 89 0 88 0 90 1 24*   CALCIUM mg/dL 7 7* 7 8* 7 5* 7 7* 9 1       BNP: No results for input(s): BNP in the last 72 hours      Magnesium:   Results from last 7 days   Lab Units 01/18/20  0956   MAGNESIUM mg/dL 1 8*       Coags:   Results from last 7 days   Lab Units 01/21/20  0544 01/20/20  0743 01/20/20  0125 01/19/20 2014 01/19/20  1135 01/19/20  0405 01/18/20  2124 01/18/20  1405 01/17/20  1247   PTT seconds  --  76* 74* 107* 124* >210* 31 26 28   INR  1 13  --   --   --   --   --   --  1 08 1 02       TSH:        Hemoglobin A1C   Results from last 7 days Lab Units 20  0528   HEMOGLOBIN A1C % 6 0       Lipid Profile:   Results from last 7 days   Lab Units 20  0956   TRIGLYCERIDES mg/dL 93   HDL mg/dL 44       Meds/Allergies   all current active meds have been reviewed and current meds:   Current Facility-Administered Medications   Medication Dose Route Frequency    acetaminophen (TYLENOL) tablet 650 mg  650 mg Oral Q4H PRN    amiodarone tablet 200 mg  200 mg Oral TID With Meals    Followed by   Juaquin Coello ON 2020] amiodarone tablet 200 mg  200 mg Oral BID With Meals    Followed by   Juaquin Coello ON 2/3/2020] amiodarone tablet 200 mg  200 mg Oral Daily With Breakfast    apixaban (ELIQUIS) tablet 5 mg  5 mg Oral BID    atorvastatin (LIPITOR) tablet 40 mg  40 mg Oral QPM    influenza vaccine, recombinant, quadrivalent (FLUBLOK) IM injection 0 5 mL  0 5 mL Intramuscular Once    LORazepam (ATIVAN) tablet 0 5 mg  0 5 mg Oral Q8H PRN    metoprolol (LOPRESSOR) injection 5 mg  5 mg Intravenous Q6H PRN    metoprolol succinate (TOPROL-XL) 24 hr tablet 50 mg  50 mg Oral BID    timolol (TIMOPTIC) 0 25 % ophthalmic solution 1 drop  1 drop Both Eyes BID     Medications Prior to Admission   Medication    calcium carbonate (OS-VENTURA) 600 MG tablet    Ergocalciferol (VITAMIN D2 PO)    hydrochlorothiazide (HYDRODIURIL) 12 5 mg tablet    timolol (TIMOPTIC-XE) 0 25 % ophthalmic gel-forming    traMADol (ULTRAM) 50 mg tablet            Cardiac testing: reviewed  Results for orders placed during the hospital encounter of 20   Echo complete with contrast if indicated    [place] [id number]    Transthoracic Echocardiogram  2D, M-mode, Doppler, and Color Doppler    Study date:  2020    Patient: Whitney Barone  MR number: [id number]  Account number: [de-identified]  : 1955  Age: 60 years  Gender: Female  Status: Inpatient  [facility]   Height: 63 in  Weight: 272 4 lb  BP: 116/ 80 mmHg    Indications: Atrial fibrillation  CVA  Diagnoses: I48 0 - Atrial fibrillation, I63 9 - Cerebral infarction, unspecified    Sonographer:  Mee Yuan RDCS  Referring Physician:  LEW Argueta  Group:  [facility]  Interpreting Physician:  Carolyn Escalante MD    SUMMARY    LEFT VENTRICLE:  Systolic function was markedly reduced  Ejection fraction was estimated in the range of 20 % to 30 %  There is global dysfunction but the apical portion of the inferior wall is akinetic  There is significant tachycardia which may be transiently depressing the ejection fraction  HISTORY: Consistent with transient ischemic attack or stroke  PRIOR HISTORY: Risk factors: hypertension and morbid obesity  PROCEDURE: The study was performed in the [facility]  This was a routine study  The transthoracic approach was used  The study included complete 2D imaging, M-mode, complete spectral Doppler, and color Doppler  The  heart rate was 154 bpm, at the start of the study  Images were obtained from the parasternal, apical, subcostal, and suprasternal notch acoustic windows  Echocardiographic views were limited due to restricted patient mobility, poor  acoustic window availability, decreased penetration, and lung interference  This was a technically difficult study  LEFT VENTRICLE: Size was normal  Systolic function was markedly reduced  Ejection fraction was estimated in the range of 20 % to 30 %  There is global dysfunction but the apical portion of the inferior wall is akinetic  There is significant tachycardia which may be transiently depressing the ejection fraction  Wall thickness was normal  No evidence of apical thrombus  DOPPLER: Transmitral flow pattern: atrial fibrillation  The study was not technically  sufficient to allow evaluation of LV diastolic function      RIGHT VENTRICLE: The size was normal  Systolic function was normal  Wall thickness was normal     LEFT ATRIUM: Size was normal     RIGHT ATRIUM: Size was normal     MITRAL VALVE: Valve structure was normal  There was normal leaflet separation  DOPPLER: The transmitral velocity was within the normal range  There was no evidence for stenosis  There was no significant regurgitation  AORTIC VALVE: The valve was possibly bicuspid  Leaflets exhibited mild to moderate calcification and normal cuspal separation  DOPPLER: Transaortic velocity was within the normal range  There was no evidence for stenosis  There was no  significant regurgitation  TRICUSPID VALVE: The valve structure was normal  There was normal leaflet separation  DOPPLER: The transtricuspid velocity was within the normal range  There was no evidence for stenosis  There was trace regurgitation  PULMONIC VALVE: Leaflets exhibited normal thickness, no calcification, and normal cuspal separation  DOPPLER: The transpulmonic velocity was within the normal range  There was no significant regurgitation  PERICARDIUM: There was no pericardial effusion  The pericardium was normal in appearance  AORTA: The root exhibited normal size  SYSTEMIC VEINS: IVC: The inferior vena cava was normal in size  SYSTEM MEASUREMENT TABLES    2D  %FS: 12 42 %  AV Diam: 3 35 cm  Ao asc: 3 14 cm  EDV(Teich): 112 78 ml  EF(Teich): 26 72 %  ESV(Teich): 82 64 ml  IVSd: 1 28 cm  LA Area: 23 66 cm2  LA Diam: 3 86 cm  LVEDV MOD A4C: 88 01 ml  LVEF MOD A4C: 24 42 %  LVESV MOD A4C: 66 52 ml  LVIDd: 4 9 cm  LVIDs: 4 29 cm  LVLd A4C: 7 91 cm  LVLs A4C: 7 09 cm  LVOT Diam: 2 04 cm  LVPWd: 1 12 cm  RA Area: 10 28 cm2  RV Diam : 1 85 cm  SI(Teich): 13 64 ml/m2  SV MOD A4C: 21 49 ml  SV(Cube): 38 61 ml  SV(Teich): 30 14 ml    CW  AV Env  Ti: 184 84 ms  AV VTI: 26 2 cm  AV Vmax: 1 86 m/s  AV Vmean: 1 42 m/s  AV maxP 81 mmHg  AV meanP 6 mmHg  TR Vmax: 2 5 m/s  TR maxP 32 mmHg    MM  TAPSE: 1 99 cm    PW  ADORE (VTI): 1 96 cm2  ADORE Vmax: 1 66 cm2  LVOT Env  Ti: 266 17 ms  LVOT VTI: 15 72 cm  LVOT Vmax: 0 94 m/s  LVOT Vmean: 0 59 m/s  LVOT maxPG: 3 57 mmHg  LVOT meanP 8 mmHg    [facility]    Prepared and electronically signed by    Moisés Sanchez MD  Signed 2020 17:00:55       No results found for this or any previous visit  No results found for this or any previous visit  No results found for this or any previous visit

## 2020-01-22 PROCEDURE — 99232 SBSQ HOSP IP/OBS MODERATE 35: CPT | Performed by: PHYSICAL MEDICINE & REHABILITATION

## 2020-01-22 RX ADMIN — APIXABAN 5 MG: 5 TABLET, FILM COATED ORAL at 09:30

## 2020-01-22 RX ADMIN — METOPROLOL SUCCINATE 50 MG: 50 TABLET, EXTENDED RELEASE ORAL at 17:53

## 2020-01-22 RX ADMIN — METOPROLOL SUCCINATE 50 MG: 50 TABLET, EXTENDED RELEASE ORAL at 09:30

## 2020-01-22 RX ADMIN — ATORVASTATIN CALCIUM 40 MG: 40 TABLET, FILM COATED ORAL at 17:46

## 2020-01-22 RX ADMIN — APIXABAN 5 MG: 5 TABLET, FILM COATED ORAL at 17:46

## 2020-01-22 RX ADMIN — AMIODARONE HYDROCHLORIDE 200 MG: 100 TABLET ORAL at 14:43

## 2020-01-22 RX ADMIN — AMIODARONE HYDROCHLORIDE 200 MG: 100 TABLET ORAL at 17:48

## 2020-01-22 RX ADMIN — LISINOPRIL 5 MG: 5 TABLET ORAL at 09:31

## 2020-01-22 RX ADMIN — AMIODARONE HYDROCHLORIDE 200 MG: 100 TABLET ORAL at 09:31

## 2020-01-22 RX ADMIN — TIMOLOL MALEATE 1 DROP: 2.5 SOLUTION/ DROPS OPHTHALMIC at 09:31

## 2020-01-22 NOTE — PROGRESS NOTES
PM&R Consult Follow Up Note  Waldo Silva 60 y o  female MRN: [id number]  Unit/Bed#: ICU 06 Encounter: [id number]     Assessment and Recommendations:  Ms Waldo Silva is a 60 y o  female who  has a past medical history of Diabetes mellitus ([address] ), Hypertension, PUD (peptic ulcer disease), and Vitamin D deficiency  who presented to the [facility] with aphasia and right sided weakness and was found to have a L MCA territory infarct      Impairments:  Impaired functional mobility and ability to perform ADL's  Impaired cognition and speech    Recommendations:  - continue PT/OT/ST while patient is in acute care   - awaiting insurance authorization     Thank you for allowing the PM&R service to participate in the care of Ms Waldo Silva  We will continue to follow she progress with you  Please do not hesitate to call with questions or concerns      Interval History: awaiting insurance approval for acute inpt rehab      Subjective: d/w patient procedure for ARC admission and that insurance authorization is required      Objective:    Physical Therapy:   Min tx, mod amb  Occupational Therapy:    sup   Vital Signs:      Temp:  [97 5 °F (36 4 °C)-99 5 °F (37 5 °C)] 97 5 °F (36 4 °C)  HR:  [55-77] 64  Resp:  [23-34] 23  BP: (123-137)/(67-78) 123/67   Intake/Output Summary (Last 24 hours) at 1/22/2020 1709  Last data filed at 1/22/2020 1500  Gross per 24 hour   Intake 820 ml   Output 400 ml   Net 420 ml        Laboratory:      Lab Results   Component Value Date    HGB 10 0 (L) 01/21/2020    HCT 34 3 (L) 01/21/2020    WBC 11 00 (H) 01/21/2020     Lab Results   Component Value Date    BUN 11 01/21/2020    K 3 8 01/21/2020     (H) 01/21/2020    CREATININE 0 87 01/21/2020     Lab Results   Component Value Date    PROTIME 13 1 (H) 01/21/2020    INR 1 13 01/21/2020          General: alert, no apparent distress, cooperative and comfortable  Head: atraumatic   Pulmonary: respirations unlabored Cardiovascular: +S1/2  Abdomen: soft NT  Extremity: volume status currently stable    Neurologic: muscle tone and strength normal and symmetric and + aphasia (which is improving)  Psych: patient is currently calm

## 2020-01-22 NOTE — SOCIAL WORK
Pt dicussed during care coordination rounds  Pt medically stable for discharge today per VIDA Aparicio  Pt has been accepted to [facility]U and they are working on [place]  Auth is still pending with [employer] at this time time per [facility]  Referrals pended to [place] and [facility] in case insurance denies acute rehab  CM to follow

## 2020-01-22 NOTE — NURSING NOTE
Pt awake, alert, and oriented x1-2  oob in chair  Speech clearer today  Pt still has hard time expressing self and becomes frustrated  Encouraged to take time when speaking  Hand  stronger today, and pt more steady on standing and ambulating  Denies pain or sob  Assessment as noted in computer charting  Pt remains on dysphagia diet with thin liquids  Appetite fair  No coughing noted when eating or drinking  Pt takes pills whole without difficulty  Afebrile  Safety in place

## 2020-01-23 ENCOUNTER — HOSPITAL ENCOUNTER (INPATIENT)
Facility: HOSPITAL | Age: 65
LOS: 13 days | Discharge: HOME HEALTH | DRG: 058 | End: 2020-02-05
Attending: PHYSICAL MEDICINE & REHABILITATION | Admitting: PHYSICAL MEDICINE & REHABILITATION
Payer: COMMERCIAL

## 2020-01-23 VITALS
RESPIRATION RATE: 29 BRPM | BODY MASS INDEX: 40.28 KG/M2 | TEMPERATURE: 98.2 F | SYSTOLIC BLOOD PRESSURE: 136 MMHG | WEIGHT: 250.66 LBS | OXYGEN SATURATION: 98 % | HEART RATE: 64 BPM | DIASTOLIC BLOOD PRESSURE: 72 MMHG | HEIGHT: 66 IN

## 2020-01-23 DIAGNOSIS — D64.9 ANEMIA: ICD-10-CM

## 2020-01-23 DIAGNOSIS — R73.01 IFG (IMPAIRED FASTING GLUCOSE): ICD-10-CM

## 2020-01-23 DIAGNOSIS — I10 ESSENTIAL HYPERTENSION: Primary | ICD-10-CM

## 2020-01-23 DIAGNOSIS — I48.91 A-FIB (HCC): ICD-10-CM

## 2020-01-23 DIAGNOSIS — I63.9 CVA (CEREBRAL VASCULAR ACCIDENT) (HCC): ICD-10-CM

## 2020-01-23 DIAGNOSIS — K92.2 GI BLEED: ICD-10-CM

## 2020-01-23 DIAGNOSIS — D64.9 ANEMIA, UNSPECIFIED TYPE: ICD-10-CM

## 2020-01-23 PROBLEM — E83.51 HYPOCALCEMIA: Status: ACTIVE | Noted: 2020-01-23

## 2020-01-23 PROBLEM — R13.10 DYSPHAGIA: Status: ACTIVE | Noted: 2020-01-23

## 2020-01-23 PROBLEM — H40.9 GLAUCOMA: Status: ACTIVE | Noted: 2020-01-23

## 2020-01-23 PROBLEM — R94.31 PROLONGED QT INTERVAL: Status: ACTIVE | Noted: 2020-01-23

## 2020-01-23 PROBLEM — I42.9 CARDIOMYOPATHY (HCC): Status: ACTIVE | Noted: 2020-01-23

## 2020-01-23 PROBLEM — D72.829 LEUKOCYTOSIS: Status: ACTIVE | Noted: 2020-01-23

## 2020-01-23 PROBLEM — E83.42 HYPOMAGNESEMIA: Status: ACTIVE | Noted: 2020-01-23

## 2020-01-23 PROBLEM — Z86.711 HISTORY OF PULMONARY EMBOLISM: Status: ACTIVE | Noted: 2020-01-23

## 2020-01-23 PROCEDURE — 97116 GAIT TRAINING THERAPY: CPT

## 2020-01-23 PROCEDURE — 99232 SBSQ HOSP IP/OBS MODERATE 35: CPT | Performed by: INTERNAL MEDICINE

## 2020-01-23 PROCEDURE — 97535 SELF CARE MNGMENT TRAINING: CPT

## 2020-01-23 PROCEDURE — 97530 THERAPEUTIC ACTIVITIES: CPT

## 2020-01-23 PROCEDURE — 97112 NEUROMUSCULAR REEDUCATION: CPT

## 2020-01-23 PROCEDURE — 99223 1ST HOSP IP/OBS HIGH 75: CPT | Performed by: PHYSICAL MEDICINE & REHABILITATION

## 2020-01-23 RX ORDER — AMIODARONE HYDROCHLORIDE 100 MG/1
200 TABLET ORAL EVERY 8 HOURS
Status: COMPLETED | OUTPATIENT
Start: 2020-01-24 | End: 2020-01-26

## 2020-01-23 RX ORDER — AMIODARONE HYDROCHLORIDE 100 MG/1
200 TABLET ORAL 2 TIMES DAILY WITH MEALS
Status: DISCONTINUED | OUTPATIENT
Start: 2020-01-29 | End: 2020-01-23

## 2020-01-23 RX ORDER — POLYETHYLENE GLYCOL 3350 17 G/17G
17 POWDER, FOR SOLUTION ORAL DAILY PRN
Status: DISCONTINUED | OUTPATIENT
Start: 2020-01-23 | End: 2020-02-04

## 2020-01-23 RX ORDER — AMIODARONE HYDROCHLORIDE 100 MG/1
200 TABLET ORAL
Status: DISCONTINUED | OUTPATIENT
Start: 2020-01-23 | End: 2020-01-23

## 2020-01-23 RX ORDER — METOPROLOL SUCCINATE 50 MG/1
50 TABLET, EXTENDED RELEASE ORAL EVERY 12 HOURS
Status: DISCONTINUED | OUTPATIENT
Start: 2020-01-23 | End: 2020-01-23

## 2020-01-23 RX ORDER — AMIODARONE HYDROCHLORIDE 100 MG/1
200 TABLET ORAL
Status: DISCONTINUED | OUTPATIENT
Start: 2020-02-05 | End: 2020-01-23

## 2020-01-23 RX ORDER — AMIODARONE HYDROCHLORIDE 100 MG/1
200 TABLET ORAL
Status: DISCONTINUED | OUTPATIENT
Start: 2020-02-03 | End: 2020-02-05 | Stop reason: HOSPADM

## 2020-01-23 RX ORDER — ACETAMINOPHEN 325 MG/1
650 TABLET ORAL EVERY 6 HOURS PRN
Status: DISCONTINUED | OUTPATIENT
Start: 2020-01-23 | End: 2020-02-04

## 2020-01-23 RX ORDER — METOPROLOL SUCCINATE 50 MG/1
50 TABLET, EXTENDED RELEASE ORAL EVERY 12 HOURS
Status: DISCONTINUED | OUTPATIENT
Start: 2020-01-23 | End: 2020-02-01

## 2020-01-23 RX ORDER — ATORVASTATIN CALCIUM 40 MG/1
40 TABLET, FILM COATED ORAL EVERY EVENING
Status: DISCONTINUED | OUTPATIENT
Start: 2020-01-23 | End: 2020-02-05 | Stop reason: HOSPADM

## 2020-01-23 RX ORDER — LISINOPRIL 5 MG/1
5 TABLET ORAL DAILY
Status: DISCONTINUED | OUTPATIENT
Start: 2020-01-24 | End: 2020-02-01

## 2020-01-23 RX ORDER — GLIMEPIRIDE 2 MG/1
1 TABLET ORAL 2 TIMES DAILY
Status: DISCONTINUED | OUTPATIENT
Start: 2020-01-23 | End: 2020-01-24

## 2020-01-23 RX ORDER — AMIODARONE HYDROCHLORIDE 100 MG/1
200 TABLET ORAL EVERY 12 HOURS
Status: DISPENSED | OUTPATIENT
Start: 2020-01-27 | End: 2020-02-03

## 2020-01-23 RX ADMIN — APIXABAN 5 MG: 5 TABLET, FILM COATED ORAL at 09:12

## 2020-01-23 RX ADMIN — AMIODARONE HYDROCHLORIDE 200 MG: 100 TABLET ORAL at 12:07

## 2020-01-23 RX ADMIN — METOPROLOL SUCCINATE 50 MG: 50 TABLET, EXTENDED RELEASE ORAL at 09:12

## 2020-01-23 RX ADMIN — ATORVASTATIN CALCIUM 40 MG: 40 TABLET, FILM COATED ORAL at 17:09

## 2020-01-23 RX ADMIN — TIMOLOL MALEATE 1 DROP: 2.5 SOLUTION/ DROPS OPHTHALMIC at 17:14

## 2020-01-23 RX ADMIN — METOPROLOL SUCCINATE 50 MG: 50 TABLET, EXTENDED RELEASE ORAL at 21:27

## 2020-01-23 RX ADMIN — TIMOLOL MALEATE 1 DROP: 2.5 SOLUTION/ DROPS OPHTHALMIC at 09:12

## 2020-01-23 RX ADMIN — AMIODARONE HYDROCHLORIDE 200 MG: 100 TABLET ORAL at 17:09

## 2020-01-23 RX ADMIN — LISINOPRIL 5 MG: 5 TABLET ORAL at 09:12

## 2020-01-23 RX ADMIN — AMIODARONE HYDROCHLORIDE 200 MG: 100 TABLET ORAL at 09:11

## 2020-01-23 RX ADMIN — APIXABAN 5 MG: 5 TABLET, FILM COATED ORAL at 17:09

## 2020-01-23 NOTE — PHYSICAL THERAPY NOTE
Physical Therapy Treatment Session Note    Patient's Name: Kerry Gresham    Admitting Diagnosis  CVA (cerebral vascular accident) [facility]) [I63 9]  Atrial fibrillation with RVR ([facility] [address] ) [I48 91]  [facility] ([facility] Stroke Scale level of consciousness score 0, alert; keenly responsive [Z78 9]    Problem List  Patient Active Problem List   Diagnosis    Essential hypertension    Atrial fibrillation with RVR ([email or web address] )    CVA (cerebral vascular accident) ([facility] )    Morbid obesity with body mass index (BMI) of 40 0 to 44 9 in adult [facility])       Past Medical History  Past Medical History:   Diagnosis Date    Diabetes mellitus ([facility] )     Hypertension     PUD (peptic ulcer disease)     Vitamin D deficiency        Past Surgical History  Past Surgical History:   Procedure Laterality Date    ABDOMINOPLASTY      revision gastric bypass     SECTION      CHOLECYSTECTOMY      COLONOSCOPY      GASTRIC BYPASS      HERNIA REPAIR      NEUROMA EXCISION Right 2019    Procedure: EXCISION 2ND INTERSPACE NEUROMA;  Surgeon: Gucci Mccray DPM;  Location: [address] [place] [address];  Service: Podiatry    ROTATOR CUFF REPAIR Left         20 0821   Pain Assessment   Pain Assessment No/denies pain   Pain Score 1   Restrictions/Precautions   Weight Bearing Precautions Per Order No   Other Precautions Fall Risk;Telemetry   General   Chart Reviewed Yes   Response to Previous Treatment Patient with no complaints from previous session  Family/Caregiver Present No   Cognition   Overall Cognitive Status WFL   Arousal/Participation Alert; Cooperative   Attention Attends with cues to redirect   Orientation Level Oriented X4   Memory Decreased recall of precautions   Following Commands Follows one step commands without difficulty   Comments Pt  agreeable to PT session  Hosea Pedro is very eager to transition into rehab  Subjective   Subjective "I want to go upstairs soon"   Bed Mobility   Additional Comments DNT bed mobility re:pt   was sitting OOB in recliner prior/after session  Transfers   Sit to Stand 4  Minimal assistance   Additional items Assist x 1; Armrests; Increased time required;Verbal cues   Stand to Sit 4  Minimal assistance   Additional items Assist x 1; Armrests; Increased time required;Verbal cues   Stand pivot 4  Minimal assistance   Additional items Assist x 1;Verbal cues   Ambulation/Elevation   Gait pattern Improper Weight shift;Decreased foot clearance;Decreased R stance; Short stride; Inconsistent mikael   Gait Assistance 3  Moderate assist   Additional items Assist x 1;Verbal cues; Tactile cues  (for LARON adjustment, directional changes x 5, safety)   Assistive Device Rolling walker   Distance 50 feet x 2   Stair Management Assistance Not tested   Balance   Static Sitting Good   Dynamic Sitting Fair +   Static Standing Fair +   Dynamic Standing Fair +   Ambulatory Fair   Endurance Deficit   Endurance Deficit Yes   Activity Tolerance   Activity Tolerance Patient tolerated treatment well   Nurse Made Aware yes,Chhaya BENITO   Exercises   Neuro re-ed X 10 minutes including: standing multi-directional head turns 20 reps each w/ mod A of 1 and BUE support on RW, sidestepping B x 20 reps w/ mod  A of 1 and BUE support of RW, BLE marching x 20 reps w/mod A of 1 and BUE support of RW, and continued postural facilitation to increase stability & safety  Balance training  Angela also stepped on/off scale w/ min A of 1 w/o BUE support,maintained static balance x 1 minute w/ min A of 1  Assessment   Prognosis Good   Problem List Decreased strength;Decreased endurance; Impaired balance;Decreased mobility; Impaired judgement;Decreased safety awareness; Obesity   Assessment Pt seen for PT treatment session this date with interventions consisting of gait training w/ emphasis on improving pt's ability to ambulate level surfaces x 50 feet x 2 with mod A provided by therapist with bariatric RW   Pt agreeable to PT treatment session upon arrival, pt found seated OOB in recliner, in no apparent distress and A&O x 4  In comparison to previous session, pt with improvements in ambulatory distance, balance,activity tolerance  Post session: all needs in reach and RN notified of session findings/recommendations Continue to recommend STR at time of d/c in order to maximize pt's functional independence and safety w/ mobility  Pt continues to be functioning below baseline level, and remains limited 2* factors listed above and including weakness, impaired balance,decreased endurance, gait dysfunction,decline from PLOF  PT will continue to see pt while here in order to address the deficits listed above and provide interventions consistent w/ POC in effort to achieve LTGs  Barriers to Discharge Decreased caregiver support   Goals   Patient Goals get to ARU ASA   LTG Expiration Date 01/28/20   Long Term Goal #1 LTGs remain appropriate  (w/ increased ambulatory goal 200-300 feet)   PT Treatment Day 5   Plan   Treatment/Interventions Functional transfer training;LE strengthening/ROM; Therapeutic exercise; Endurance training;Bed mobility;Gait training;Spoke to nursing;Equipment eval/education;OT   Progress Progressing toward goals   PT Frequency 7x/wk   Recommendation   Recommendation Short-term skilled PT   Equipment Recommended Walker   PT - OK to Discharge Yes  (if medically stable)   Additional Comments Upon conclusion, pt  was resting in recliner w/all needs within reach  Chhaya RN informed of treatment session outcome         Perfecto Boas, PT

## 2020-01-23 NOTE — NURSING NOTE
Patient OOB in reclining chair, pleasant  Denies any discomfort  Expressive aphasia still present, but improving with each day  Complex physical assessment as noted, see chart for details

## 2020-01-23 NOTE — PROGRESS NOTES
Progress Note - Arina Bowles 1955, 60 y o  female MRN: [id number]    Unit/Bed#: ICU 06 Encounter: [id number]    Primary Care Provider: Gerald Hodgkins, DO   Date and time admitted to hospital: 1/17/2020 12:37 PM        Morbid obesity with body mass index (BMI) of 40 0 to 44 9 in adult [facility])  Assessment & Plan  · Dietary, lifestyle modification counseling be provided in future    Atrial fibrillation with RVR ([facility] )  Assessment & Plan  · Heart rate controlled  · Status post a Cardiology evaluation, patient with the following conditions  · Narrow complex tachycardia - SVT/Atrial tachycardia v atrial flutter   · Started on amiodarone and Eliquis as per cardiology recommendations  · Patient also has a New-onset Cardiomyopathy - possibly takotsubo cardiomyopathy  · Repeat echo thankfully did not demonstrate any apical thrombus  · Follow-up with Cardiology as an outpatient    Essential hypertension  Assessment & Plan  · Patient was on HCTZ at home   · Will hold off for now - okay for permissive hypertension  · Monitor BP closely - blood pressures are stable    * CVA (cerebral vascular accident) ([facility] )  Assessment & Plan  · With residual right-sided weakness and dysphasia - symptoms are slowly improving  · CT scan showed an acute left MCA distribution infarct which was confirmed on MRI testing, patient was never a candidate for tPA  · Status post a neurology and cardiology evaluation  · Continue Eliquis and Lipitor for secondary prevention  · Thankfully no hemorrhagic conversion and repeat CT scan of head  · PT/OT, rehab placement        VTE Pharmacologic Prophylaxis: Pharmacologic: Apixaban (Eliquis)    Patient Centered Rounds: I have performed bedside rounds with nursing staff today  Discussions with Specialists or Other Care Team Provider: n/a  Education and Discussions with Family / Patient: patient    Current Length of Stay: 6 day(s)    Current Patient Status: Inpatient   Certification Statement:  The patient will continue to require additional inpatient hospital stay due to placement    Discharge Plan:  Discharged air you, awaiting placement    Code Status: Level 1 - Full Code    Subjective:   No acute events overnight    Objective:     Vitals:   Temp (24hrs), Av 3 °F (36 8 °C), Min:97 5 °F (36 4 °C), Max:99 4 °F (37 4 °C)    Temp:  [97 5 °F (36 4 °C)-99 4 °F (37 4 °C)] 98 °F (36 7 °C)  HR:  [55-69] 69  Resp:  [20-32] 20  BP: (105-123)/(62-67) 113/65  SpO2:  [96 %-98 %] 97 %  Body mass index is 40 46 kg/m²  Input and Output Summary (last 24 hours): Intake/Output Summary (Last 24 hours) at 2020 0827  Last data filed at 2020 0601  Gross per 24 hour   Intake 1440 ml   Output 750 ml   Net 690 ml       Physical Exam:     Physical Exam   Constitutional: She is oriented to person, place, and time  She appears well-developed and well-nourished  HENT:   Head: Normocephalic and atraumatic  Eyes: Pupils are equal, round, and reactive to light  EOM are normal    Neck: Normal range of motion  Neck supple  Cardiovascular: Normal rate and regular rhythm  Pulmonary/Chest: Effort normal and breath sounds normal    Abdominal: Soft  Bowel sounds are normal    Obese   Musculoskeletal: Normal range of motion  She exhibits no edema  Neurological: She is alert and oriented to person, place, and time  Word-finding difficulty   Skin: Skin is warm  Capillary refill takes less than 2 seconds  Psychiatric: She has a normal mood and affect  Her behavior is normal  Thought content normal    Nursing note and vitals reviewed        Additional Data:     Labs:    Results from last 7 days   Lab Units 20  0544 20  0743   WBC Thousand/uL 11 00* 9 30   HEMOGLOBIN g/dL 10 0* 10 7*   HEMATOCRIT % 34 3* 34 8*   PLATELETS Thousands/uL 256 263   NEUTROS PCT %  --  72   LYMPHS PCT %  --  16*   MONOS PCT %  --  11   EOS PCT %  --  0     Results from last 7 days   Lab Units 20  0544  20  4145 POTASSIUM mmol/L 3 8   < > 3 8   CHLORIDE mmol/L 110*   < > 109*   CO2 mmol/L 21   < > 21   BUN mg/dL 11   < > 24   CREATININE mg/dL 0 87   < > 0 90   CALCIUM mg/dL 7 7*   < > 7 7*   ALK PHOS U/L  --   --  62   ALT U/L  --   --  11   AST U/L  --   --  15    < > = values in this interval not displayed  Results from last 7 days   Lab Units 01/21/20  0544   INR  1 13     Results from last 7 days   Lab Units 01/18/20  0728 01/17/20  2229 01/17/20  1715 01/17/20  1237   POC GLUCOSE mg/dl 86 107 121 180*     Results from last 7 days   Lab Units 01/18/20  0528   HEMOGLOBIN A1C % 6 0       * I Have Reviewed All Lab Data Listed Above  * Additional Pertinent Lab Tests Reviewed: Naif 66 Admission  Reviewed    Imaging:  Imaging Reports Reviewed Today Include: n/a    Recent Cultures (last 7 days):           Last 24 Hours Medication List:     Current Facility-Administered Medications:  acetaminophen 650 mg Oral Q4H PRN LEW Huntley   amiodarone 200 mg Oral TID With Meals Eric Cyr MD   Followed by       Kristine Grewal ON 1/26/2020] amiodarone 200 mg Oral BID With Meals Eric Cyr MD   Followed by       Kristine Grewal ON 2/3/2020] amiodarone 200 mg Oral Daily With Breakfast Pardeep Cyr MD   apixaban 5 mg Oral BID Ariana Santamaria MD   atorvastatin 40 mg Oral QPM LEW Huntley   influenza vaccine 0 5 mL Intramuscular Once Lora Cao MD   lisinopril 5 mg Oral Daily Nely Carranza MD   LORazepam 0 5 mg Oral Q8H PRN LEW Huntley   metoprolol 5 mg Intravenous Q6H PRN Nely Carranza MD   metoprolol succinate 50 mg Oral BID Nely Carranza MD   timolol 1 drop Both Eyes BID LEW Huntley        Today, Patient Was Seen By: Ariana Santamaria MD    ** Please Note: Dictation voice to text software may have been used in the creation of this document   **

## 2020-01-23 NOTE — UTILIZATION REVIEW
Continued Stay Review    Date: 1/23/2020                       Current Patient Class: INPATIENT    Current Level of Care: STEPDOWN LEVEL 2 TELEMETRY    · HPI:64 y o  female initially admitted Inpatient on [email or web address] d/t Atrial fibrillation with RVR, Essential hypertension, CVA and morbid obesity with body mass index (BMI) of 40 0 to 44 9  Narrow complex tachycardia - SVT/Atrial tachycardia v atrial flutter,amiodarone and Eliquis  New-onset Cardiomyopathy - possibly takotsubo cardiomyopathy  Repeat echo did not demonstrate any apical thrombus  Follow-up with Cardiology as an outpatient  permissive hypertension,Monitor BP closely - blood pressures are stable  residual right-sided weakness and dysphasia - symptoms are slowly improving  CT scan showed an acute left MCA distribution infarct which was confirmed on MRI testing, patient was never a candidate for TPA  s/p neurology and cardiology evaluation  Continue Eliquis and Lipitor  PT/OT, rehab placement        Pertinent Labs/Diagnostic Results:   Results from last 7 days   Lab Units 01/21/20  0544 01/20/20  0743 01/19/20  1006 01/18/20  0528 01/17/20  1247   WBC Thousand/uL 11 00* 9 30 11 60* 8 40 15 60*   HEMOGLOBIN g/dL 10 0* 10 7* 10 0* 9 7* 11 6*   HEMATOCRIT % 34 3* 34 8* 33 9* 32 4* 39 0*   PLATELETS Thousands/uL 256 263 271 293 356   NEUTROS ABS Thousands/µL  --  6 70* 9 40*  --   --          Results from last 7 days   Lab Units 01/21/20  0544 01/20/20  0743 01/19/20  1006 01/18/20  0956 01/17/20  1247   SODIUM mmol/L 139 140 137 139 138   POTASSIUM mmol/L 3 8 3 9 3 8 3 8 3 6   CHLORIDE mmol/L 110* 109* 107 109* 103   CO2 mmol/L 21 22 22 21 24   ANION GAP mmol/L 8 9 8 9 11   BUN mg/dL 11 13 18 24 34*   CREATININE mg/dL 0 87 0 89 0 88 0 90 1 24*   EGFR ml/min/1 73sq m 72 70 71 69 47   CALCIUM mg/dL 7 7* 7 8* 7 5* 7 7* 9 1   MAGNESIUM mg/dL  --   --   --  1 8*  --      Results from last 7 days   Lab Units 01/18/20  0956   AST U/L 15   ALT U/L 11   ALK PHOS U/L 62 TOTAL PROTEIN g/dL 5 8*   ALBUMIN g/dL 3 2*   TOTAL BILIRUBIN mg/dL 0 60     Results from last 7 days   Lab Units 01/18/20  0728 01/17/20  2229 01/17/20  1715 01/17/20  1237   POC GLUCOSE mg/dl 86 107 121 180*     Results from last 7 days   Lab Units 01/21/20  0544 01/20/20  0743 01/19/20  1006 01/18/20  0956 01/17/20  1247   GLUCOSE RANDOM mg/dL 81 94 121* 101* 169*         Results from last 7 days   Lab Units 01/18/20  0528   HEMOGLOBIN A1C % 6 0   EAG mg/dl 126       Results from last 7 days   Lab Units 01/17/20  1955 01/17/20  1712 01/17/20  1247   TROPONIN I ng/mL 0 07* 0 06* 0 06*         Results from last 7 days   Lab Units 01/21/20  0544 01/20/20  0743 01/20/20  0125 01/19/20  2014  01/18/20  1405 01/17/20  1247   PROTIME seconds 13 1*  --   --   --   --  12 5 11 8   INR  1 13  --   --   --   --  1 08 1 02   PTT seconds  --  76* 74* 107*   < > 26 28    < > = values in this interval not displayed  Results from last 7 days   Lab Units 01/17/20  1712   PROCALCITONIN ng/ml <0 05       Results from last 7 days   Lab Units 01/18/20  0034   CLARITY UA  Clear   COLOR UA  Yellow   SPEC GRAV UA  1 010   PH UA  6 5   GLUCOSE UA mg/dl Negative   KETONES UA mg/dl Negative   BLOOD UA  Negative   PROTEIN UA mg/dl Negative   NITRITE UA  Negative   BILIRUBIN UA  Negative   UROBILINOGEN UA E U /dl 0 2   LEUKOCYTES UA  Negative     1/19 ct head:Expected evolution of left MCA territory infarct, without evidence of hemorrhagic conversion  1/18 CXR:No acute cardiopulmonary disease      Right jugular catheter at cavoatrial junction with no pneumothorax  1/17 MRI brain:Stable late acute to early subacute left MCA territory infarct  No evidence of increase in extent of the infarct, hemorrhagic conversion or significant mass effect      1/17 CXR:   No acute cardiopulmonary disease      Findings are stable     1/17 cta head and neck:No large vessel flow restrictive disease within the head or neck        No large vessel intracranial occlusion  1/17 CT brain:Recent bland ischemic edema, multifocal within the left MCA distribution  Minimal local mass effect  Event estimated to be at least several hours of age      1/21 NM stress test:    1/20 echo:LEFT VENTRICLE:  Systolic function was severely reduced  Ejection fraction was estimated to be 30 %  There was dyskinesis of the apical inferior and apical wall(s)  There was mild concentric hypertrophy      AORTIC VALVE:  The valve was probably trileaflet  Leaflets exhibited moderately increased thickness, mild calcification, and normal cuspal separation      HISTORY: Consistent with transient ischemic attack or stroke  PRIOR HISTORY: Follow up echo to assess ejection fraction Atrial fibrillation  Risk factors: hypertension, diabetes, and morbid obesity      PROCEDURE: The study [facility] performed [facility]  This was a routine study  The transthoracic approach was used  The study included limited 2D imaging, M-mode, limited spectral Doppler, and color Doppler  The heart  rate was 64 bpm, at the start of the study  Images were obtained from the parasternal, apical, and subcostal acoustic windows  Images were not obtained from the suprasternal notch acoustic windows  Echocardiographic views were limited due  to restricted patient mobility, poor patient compliance, poor acoustic window availability, decreased penetration, and lung interference  This was a technically difficult study      LEFT VENTRICLE: Size was normal  Systolic function was severely reduced  Ejection fraction was estimated to be 30 %  There was dyskinesis of the apical inferior and apical wall(s)  Wall thickness was mildly increased  There was mild  concentric hypertrophy  No diagnostic evidence of apical thrombus      RIGHT VENTRICLE: The size was normal  Systolic function was normal  Wall thickness was normal      AORTIC VALVE: The valve was probably trileaflet   Leaflets exhibited moderately increased thickness, mild calcification, and normal cuspal separation  DOPPLER: Transaortic velocity was within the normal range  There was no evidence for  stenosis  There was no regurgitation      PULMONIC VALVE: Leaflets exhibited normal thickness, no calcification, and normal cuspal separation  DOPPLER: The transpulmonic velocity was within the normal range  There was mild regurgitation      SYSTEMIC VEINS: IVC: The inferior vena cava was normal in size and course  Respirophasic changes were normal      1/18 EKG :Supraventricular tachycardia  Nonspecific ST abnormality  Abnormal ECG  When compared with ECG of 17-JAN-2020 17:16, (unconfirmed)  Rhythm has changed   Confirmed by Viki Blnut ([id number]) on 1/20/2020 12:36:34 PM    1/17 EKG: The first potion appears to represent atrial fibrillation vs  MAT  Then sinus rhythm emerges  Abnormal ECG  When compared with ECG of 17-JAN-2020 12:55, (unconfirmed)  sinus rhythm is new     Confirmed by Viki Blunt ([id number]) on 1/20/2020 12:38:31 PM    1/17 echo:LEFT VENTRICLE:  Systolic function was markedly reduced  Ejection fraction was estimated in the range of 20 % to 30 %  There is global dysfunction but the apical portion of the inferior wall is akinetic  There is significant tachycardia which may be transiently depressing the ejection fraction      HISTORY: Consistent with transient ischemic attack or stroke  PRIOR HISTORY: Risk factors: hypertension and morbid obesity      PROCEDURE: The study was performed in the [facility]  This was a routine study  The transthoracic approach was used  The study included complete 2D imaging, M-mode, complete spectral Doppler, and color Doppler  The  heart rate was 154 bpm, at the start of the study  Images were obtained from the parasternal, apical, subcostal, and suprasternal notch acoustic windows   Echocardiographic views were limited due to restricted patient mobility, poor  acoustic window availability, decreased penetration, and lung interference  This was a technically difficult study      LEFT VENTRICLE: Size was normal  Systolic function was markedly reduced  Ejection fraction was estimated in the range of 20 % to 30 %  There is global dysfunction but the apical portion of the inferior wall is akinetic  There is significant tachycardia which may be transiently depressing the ejection fraction  Wall thickness was normal  No evidence of apical thrombus  DOPPLER: Transmitral flow pattern: atrial fibrillation   The study was not technically  sufficient to allow evaluation of LV diastolic function      RIGHT VENTRICLE: The size was normal  Systolic function was normal  Wall thickness was normal      LEFT ATRIUM: Size was normal      RIGHT ATRIUM: Size was normal      1/17 EKG:  Supraventricular tachycardia  Nonspecific ST-t wave changes  Abnormal ECG  When compared with ECG of 03-JUN-2019 16:25,  Significant changes have occurred     Confirmed by Rosalinda Reese ([id number]) on 1/20/2020 12:39:35 PM  Vital Signs:   01/23/20 0700  98 °F (36 7 °C)                 01/23/20 0400  99 1 °F (37 3 °C)  69  20  113/65  84  97 %  None (Room air)  Lying   01/23/20 0000  99 °F (37 2 °C)  60  21  105/63  78  98 %  None (Room air)  Lying   01/22/20 2300    56  20             01/22/20 2200    57  20             01/22/20 2100    56  20             01/22/20 2030  99 4 °F (37 4 °C)    21  113/62  83  96 %  None (Room air)  Sitting   01/22/20 1608  97 5 °F (36 4 °C)                 01/22/20 1500  97 9 °F (36 6 °C)  64  23Abnormal   123/67    97 %  None (Room air)  Lying   01/22/20 1400    55  23Abnormal              01/22/20 1300    62  32Abnormal              01/22/20 1123  97 5 °F (36 4 °C)                 01/22/20 0803  99 5 °F (37 5 °C)  61  29Abnormal   137/68    96 %  None (Room air)  Lying   01/21/20 2315  98 3 °F (36 8 °C)  77  34Abnormal   128/76  95  97 %  None (Room air)  Sitting   01/21/20 1934  97 5 °F (36 4 °C)  59  32Abnormal   131/67  92  97 %  None (Room air)  Lying         Medications:   Scheduled Medications:    Medications:  amiodarone 200 mg Oral TID With Meals   Followed by      Bree Bi ON 1/26/2020] amiodarone 200 mg Oral BID With Meals   Followed by      Bree Bi ON 2/3/2020] amiodarone 200 mg Oral Daily With Breakfast   apixaban 5 mg Oral BID   atorvastatin 40 mg Oral QPM   influenza vaccine 0 5 mL Intramuscular Once   lisinopril 5 mg Oral Daily   metoprolol succinate 50 mg Oral BID   timolol 1 drop Both Eyes BID     Continuous IV Infusions:  PRN Meds:    acetaminophen 650 mg Oral Q4H PRN   LORazepam 0 5 mg Oral Q8H PRN   metoprolol 5 mg Intravenous Q6H PRN     Dysphagia diet    Discharge Plan: TBD    Network Utilization Review Department  [email or web address]  ATTENTION: Please call with any questions or concerns to [phone] and carefully listen to the prompts so that you are directed to the right person  All voicemails are confidential   Chrystine Can all requests for admission clinical reviews, approved or denied determinations and any other requests to dedicated fax number below belonging to the campus where the patient is receiving treatment   List of dedicated fax numbers for the Facilities:  [address] DENIALS (Administrative/Medical Necessity) [phone]   [address] [place] [address] (Maternity/NICU/Pediatrics) [phone]   Isaías Congress [phone]   Guanako Marrufo [phone]   Laura Aldrich [phone]   Angélica Huynh [phone]   [address] [place] [address] [phone]   [facility]  [phone]   [address] [facility] [address]  [address] [facility] [address] [facility] [phone]

## 2020-01-23 NOTE — PLAN OF CARE
Problem: OCCUPATIONAL THERAPY ADULT  Goal: Performs self-care activities at highest level of function for planned discharge setting  See evaluation for individualized goals  Description  Treatment Interventions: Neuromuscular reeducation, Fine motor coordination activities, Compensatory technique education, Activityengagement, ADL retraining, Functional transfer training, UE strengthening/ROM, Endurance training, Patient/family training, Equipment evaluation/education, Energy conservation          See flowsheet documentation for full assessment, interventions and recommendations  Outcome: Progressing  Note:   Limitation: Decreased ADL status, Decreased UE ROM, Decreased UE strength, Decreased Safe judgement during ADL, Decreased endurance, Decreased self-care trans, Decreased high-level ADLs  Prognosis: Guarded  Assessment: Patient participated in Skilled OT session this date with interventions consisting of ADL re training with the use of correct body mechnaics, safety awareness and fall prevention techniques and  therapeutic activities to: increase activity tolerance   Patient agreeable to OT treatment session, upon arrival patient was found seated OOB to Chair  In comparison to previous session, patient with improvements in fluidity of speech and word-finding ability  Patient requiring verbal cues for correct technique and ocassional safety reminders  Patient continues to be functioning below baseline level, occupational performance remains limited secondary to factors listed above and increased risk for falls and injury  From OT standpoint, recommendation at time of d/c would be Short Term Rehab to maximize higher level ADL abilities for return home/return to OF  Patient to benefit from continued Occupational Therapy treatment while in the hospital to address deficits as defined above and maximize level of functional independence with ADLs and functional mobility        OT Discharge Recommendation: Short Term Rehab  OT - OK to Discharge: Yes(to STR)     Lamon Brittle, COTA/ELTON

## 2020-01-23 NOTE — PLAN OF CARE
Problem: Potential for Falls  Goal: Patient will remain free of falls  Description  INTERVENTIONS:  - Assess patient frequently for physical needs  -  Identify cognitive and physical deficits and behaviors that affect risk of falls  -  [place]urley fall precautions as indicated by assessment   - Educate patient/family on patient safety including physical limitations  - Instruct patient to call for assistance with activity based on assessment  - Modify environment to reduce risk of injury  - Consider OT/PT consult to assist with strengthening/mobility  Outcome: Progressing     Problem: Nutrition/Hydration-ADULT  Goal: Nutrient/Hydration intake appropriate for improving, restoring or maintaining nutritional needs  Description  Monitor and assess patient's nutrition/hydration status for malnutrition  Collaborate with interdisciplinary team and initiate plan and interventions as ordered  Monitor patient's weight and dietary intake as ordered or per policy  Utilize nutrition screening tool and intervene as necessary  Determine patient's food preferences and provide high-protein, high-caloric foods as appropriate       INTERVENTIONS:  - Monitor oral intake, urinary output, labs, and treatment plans  - Assess nutrition and hydration status and recommend course of action  - Evaluate amount of meals eaten  - Assist patient with eating if necessary   - Allow adequate time for meals  - Recommend/ encourage appropriate diets, oral nutritional supplements, and vitamin/mineral supplements  - Order, calculate, and assess calorie counts as needed  - Recommend, monitor, and adjust tube feedings and TPN/PPN based on assessed needs  - Assess need for intravenous fluids  - Provide specific nutrition/hydration education as appropriate  - Include patient/family/caregiver in decisions related to nutrition  Outcome: Progressing     Problem: PAIN - ADULT  Goal: Verbalizes/displays adequate comfort level or baseline comfort level  Description  Interventions:  - Encourage patient to monitor pain and request assistance  - Assess pain using appropriate pain scale  - Administer analgesics based on type and severity of pain and evaluate response  - Implement non-pharmacological measures as appropriate and evaluate response  - Consider cultural and social influences on pain and pain management  - Notify physician/advanced practitioner if interventions unsuccessful or patient reports new pain  Outcome: Progressing     Problem: INFECTION - ADULT  Goal: Absence or prevention of progression during hospitalization  Description  INTERVENTIONS:  - Assess and monitor for signs and symptoms of infection  - Monitor lab/diagnostic results  - Monitor all insertion sites, i e  indwelling lines, tubes, and drains  - Monitor endotracheal if appropriate and nasal secretions for changes in amount and color  - Palmer appropriate cooling/warming therapies per order  - Administer medications as ordered  - Instruct and encourage patient and family to use good hand hygiene technique  - Identify and instruct in appropriate isolation precautions for identified infection/condition  Outcome: Progressing  Goal: Absence of fever/infection during neutropenic period  Description  INTERVENTIONS:  - Monitor WBC    Outcome: Progressing     Problem: SAFETY ADULT  Goal: Maintain or return to baseline ADL function  Description  INTERVENTIONS:  -  Assess patient's ability to carry out ADLs; assess patient's baseline for ADL function and identify physical deficits which impact ability to perform ADLs (bathing, care of mouth/teeth, toileting, grooming, dressing, etc )  - Assess/evaluate cause of self-care deficits   - Assess range of motion  - Assess patient's mobility; develop plan if impaired  - Assess patient's need for assistive devices and provide as appropriate  - Encourage maximum independence but intervene and supervise when necessary  - Involve family in performance of ADLs  - Assess for home care needs following discharge   - Consider OT consult to assist with ADL evaluation and planning for discharge  - Provide patient education as appropriate  Outcome: Progressing  Goal: Maintain or return mobility status to optimal level  Description  INTERVENTIONS:  - Assess patient's baseline mobility status (ambulation, transfers, stairs, etc )    - Identify cognitive and physical deficits and behaviors that affect mobility  - Identify mobility aids required to assist with transfers and/or ambulation (gait belt, sit-to-stand, lift, walker, cane, etc )  - [place] fall precautions as indicated by assessment  - Record patient progress and toleration of activity level on Mobility SBAR; progress patient to next Phase/Stage  - Instruct patient to call for assistance with activity based on assessment  - Consider rehabilitation consult to assist with strengthening/weightbearing, etc   Outcome: Progressing     Problem: DISCHARGE PLANNING  Goal: Discharge to home or other facility with appropriate resources  Description  INTERVENTIONS:  - Identify barriers to discharge w/patient and caregiver  - Arrange for needed discharge resources and transportation as appropriate  - Identify discharge learning needs (meds, wound care, etc )  - Arrange for interpretive services to assist at discharge as needed  - Refer to Case Management Department for coordinating discharge planning if the patient needs post-hospital services based on physician/advanced practitioner order or complex needs related to functional status, cognitive ability, or social support system  Outcome: Progressing     Problem: Knowledge Deficit  Goal: Patient/family/caregiver demonstrates understanding of disease process, treatment plan, medications, and discharge instructions  Description  Complete learning assessment and assess knowledge base    Interventions:  - Provide teaching at level of understanding  - Provide teaching via preferred learning methods  Outcome: Progressing

## 2020-01-23 NOTE — ASSESSMENT & PLAN NOTE
· Heart rate controlled  · Status post a Cardiology evaluation, patient with the following conditions  · Narrow complex tachycardia - SVT/Atrial tachycardia v atrial flutter   · Started on amiodarone and Eliquis as per cardiology recommendations  · Patient also has a New-onset Cardiomyopathy - possibly takotsubo cardiomyopathy  · Repeat echo thankfully did not demonstrate any apical thrombus  · Follow-up with Cardiology as an outpatient

## 2020-01-23 NOTE — PLAN OF CARE
Problem: PHYSICAL THERAPY ADULT  Goal: Performs mobility at highest level of function for planned discharge setting  See evaluation for individualized goals  Description  Treatment/Interventions: Functional transfer training, LE strengthening/ROM, Therapeutic exercise, Endurance training, Patient/family training, Equipment eval/education, Bed mobility, Gait training, Spoke to nursing, OT, Family  Equipment Recommended: Lalo Garcia       See flowsheet documentation for full assessment, interventions and recommendations  Outcome: Progressing  Note:   Prognosis: Good  Problem List: Decreased strength, Decreased endurance, Impaired balance, Decreased mobility, Impaired judgement, Decreased safety awareness, Obesity  Assessment: Pt seen for PT treatment session this date with interventions consisting of gait training w/ emphasis on improving pt's ability to ambulate level surfaces x 50 feet x 2 with mod A provided by therapist with bariatric RW  Pt agreeable to PT treatment session upon arrival, pt found seated OOB in recliner, in no apparent distress and A&O x 4  In comparison to previous session, pt with improvements in ambulatory distance, balance,activity tolerance  Post session: all needs in reach and RN notified of session findings/recommendations Continue to recommend STR at time of d/c in order to maximize pt's functional independence and safety w/ mobility  Pt continues to be functioning below baseline level, and remains limited 2* factors listed above and including weakness, impaired balance,decreased endurance, gait dysfunction,decline from PLOF  PT will continue to see pt while here in order to address the deficits listed above and provide interventions consistent w/ POC in effort to achieve LTGs  Barriers to Discharge: Decreased caregiver support     Recommendation: Short-term skilled PT     PT - OK to Discharge: Yes(if medically stable)    See flowsheet documentation for full assessment

## 2020-01-23 NOTE — SOCIAL WORK
Authorization received from pt insurance approving admit to ARU per [facility]  CM made pt aware of same and she is in agreement  CM also spoke with pt brother Tyler Sears via phone at pt request to make him aware as well  Pt nurse Chhaya aware of plan  Dr Mike Chen aware and will discharge  CM spoke with [facility] on ARU and they are able to accept pt today

## 2020-01-23 NOTE — H&P
PHYSICAL MEDICINE AND REHABILITATION H&P/ADMISSION NOTE  Yasmin Duran 60 y o  female MRN: [id number]  Unit/Bed#: [facility] 212-01 Encounter: [id number]     Rehab Diagnosis: CVA    History of Present Illness:   Yasmin Duran is a 60 y o  female who presented to the [address] [place] [address] aphasia and right sided weakness and found to have L MCA territory infarct in the setting of a-fib and was started on eliquis in acute care     Subjective: d/w patient rehab program     Review of Systems: A 10-point review of systems was performed  Negative except as listed above      Plan:     * CVA (cerebral vascular accident) ([facility] [address] )  Assessment & Plan  - L MCA territory infarct felt to be due to a-fib  - appears to have been on coumadin PTA and was switched to eliquis 5 mg BID   - also started on lipitor 40 mg qpm   - OP FU with neuro       Hypocalcemia  Assessment & Plan  - check ionized Ca in AM  - IM monitoring       Hypomagnesemia  Assessment & Plan  - recheck in AM  - IM monitoring     Anemia  Assessment & Plan  - Hg currently 10 0; recheck in AM   - IM monitoring     Leukocytosis  Assessment & Plan  - per acute care IM likely reactive  - mild at 11 0; recheck in AM  - IM monitoring     IFG (impaired fasting glucose)  Assessment & Plan  - HgA1C 6 0  - nutritionist--> dietary education   - follows with Trina HACKETT    History of pulmonary embolism  Assessment & Plan  - remote history (years ago) of PE  - appears to have been on coumadin PTA but switched to eliquis 5 mg BID in acute care due to CVA (unlikely given how long ago PE was that initial short term 10 mg BID dosing will be required however will confirm with IM)      Prolonged QT interval  Assessment & Plan  - seen by dylan in acute care and patient is on med regimen as recommended by cardiology  - OP FU with cards     Cardiomyopathy [facility])  Assessment & Plan  - with mildly elevated troponins up to 0 07 however cards did not feel troponin elevation was due to a true coronary event by rather tachycardia in the setting of cardiomyopathy  - EF 30%  - started on toprol XL 50 mg q12 and lisinopril 5 mg qd in acute care per cards  - cards recommended OP nuclear lexiscan stress test which they have ordered    - OP FU with cards     Dysphagia  Assessment & Plan  - modified diet per ST     Glaucoma  Assessment & Plan  - will contact [facility] opthalmology whom patient follows with as an OP to confirm home med regimen for glaucoma     A-fib ([address] )  Assessment & Plan  - with PSVT/atrial tachycardia per cards  - started on amiodarone taper in acute care with eventual dose of 200 mg qd per cards  - started on eliquis 5 mg BID in acute care per cards   - started on toprol XL 50 mg q12 in acute care per cards  - OP FU with cards       Essential hypertension  Assessment & Plan  - at home on HCTZ 12 5 mg qd  - started on toprol XL 50 mg Q12 and lisinopril 5 mg qd in acute care  - IM managing                 Functional History - Prior to Admission:      I PTA     Functional Status Upon Admission to ARC:  Mobility: mod   Transfers: min  ADLs: mod    Tyrese Iverson lives alone  She lives in [facility]) single family home  The living area: can live on one level  There No steps to enter the home  The patient will not have 24 hour supervision available upon discharge      Physical Exam:  Vitals:    01/23/20 1435   BP: 140/68   Pulse: 59   Resp: 18   Temp: 98 1   SpO2: 99%         General: alert, no apparent distress, cooperative and comfortable  HEENT:  Head: Normal, normocephalic, atraumatic    CARDIAC:  +S1/2  LUNGS:  respirations unlabored   ABDOMEN:  soft NT  EXTREMITIES:  volume status currently stable   NEURO:   cranial nerves 2-12 intact, muscle tone and strength normal and symmetric, sensation grossly normal, finger to nose and cerebellar exam normal and +aphasia   PSYCH:  currently calm and not agitated         Laboratory:    Results from last 7 days   Lab Units 01/21/20  5061 20  0743 20  1006   HEMOGLOBIN g/dL 10 0* 10 7* 10 0*   HEMATOCRIT % 34 3* 34 8* 33 9*   WBC Thousand/uL 11 00* 9 30 11 60*     Results from last 7 days   Lab Units 20  0544 20  0743 20  1006 20  0956   BUN mg/dL 11 13 18 24   SODIUM mmol/L 139 140 137 139   POTASSIUM mmol/L 3 8 3 9 3 8 3 8   CHLORIDE mmol/L 110* 109* 107 109*   CREATININE mg/dL 0 87 0 89 0 88 0 90   AST U/L  --   --   --  15   ALT U/L  --   --   --  11     Results from last 7 days   Lab Units 20  0544 20  1405 20  1247   PROTIME seconds 13 1* 12 5 11 8   INR  1 13 1 08 1 02              Past Medical History:   Past Surgical History:   Family History:   Social history:   Past Medical History:   Diagnosis Date    Hypertension     PUD (peptic ulcer disease)     Stroke ([facility] [address] )     Vitamin D deficiency     Past Surgical History:   Procedure Laterality Date    ABDOMINOPLASTY      revision gastric bypass    BREAST SURGERY      reduction     SECTION      CHOLECYSTECTOMY      COLONOSCOPY      GASTRIC BYPASS      HERNIA REPAIR      NEUROMA EXCISION Right 2019    Procedure: EXCISION 2ND INTERSPACE NEUROMA;  Surgeon: Jung Funes DPM;  Location: [address] [place] [address];  Service: Podiatry    ROTATOR CUFF REPAIR Left      Family History   Problem Relation Age of Onset    Stroke Mother     Heart disease Father       Social History     Socioeconomic History    Marital status: Single     Spouse name: None    Number of children: None    Years of education: None    Highest education level: None   Occupational History    None   Social Needs    Financial resource strain: None    Food insecurity:     Worry: None     Inability: None    Transportation needs:     Medical: None     Non-medical: None   Tobacco Use    Smoking status: Never Smoker    Smokeless tobacco: Never Used   Substance and Sexual Activity    Alcohol use: Never     Frequency: Never     Comment: none    Drug use:  No Comment: none    Sexual activity: Not Currently     Birth control/protection: Post-menopausal   Lifestyle    Physical activity:     Days per week: None     Minutes per session: None    Stress: None   Relationships    Social connections:     Talks on phone: None     Gets together: None     Attends Je[employer] witness service: None     Active member of club or organization: None     Attends meetings of clubs or organizations: None     Relationship status: None    Intimate partner violence:     Fear of current or ex partner: None     Emotionally abused: None     Physically abused: None     Forced sexual activity: None   Other Topics Concern    None   Social History Narrative    None          Current Medical Diagnosis Allergies   Patient Active Problem List   Diagnosis    Essential hypertension    A-fib ([facility] [address] )    CVA (cerebral vascular accident) ([facility] [address] )    Glaucoma    Dysphagia    Cardiomyopathy ([facility] [address] )    Prolonged QT interval    History of pulmonary embolism    Leukocytosis    Anemia    Hypomagnesemia    IFG (impaired fasting glucose)    Hypocalcemia    Allergies   Allergen Reactions    Nsaids      Due to hx gastric bypass             Medical Necessity Criteria for ARC Admission: anemia, leukocytosis  In addition, the preadmission screen, post-admission physical evaluation, overall plan of care and admissions order demonstrate a reasonable expectation that the following criteria were met at the time of admission to the [facility]  1  The patient requires active and ongoing therapeutic intervention of multiple therapy disciplines (physical therapy, occupational therapy, speech-language pathology, or prosthetics/orthotics), one of which is physical or occupational therapy      2  Patient requires an intensive rehabilitation therapy program, as defined in Chapter 1, section 110 2 2 of the CMS Medicare Policy Manual  This intensive rehabilitation therapy program will consist of at least 3 hours of therapy per day at least 5 days per week or at least 15 hours of intensive rehabilitation therapy within a 7 consecutive day period, beginning with the date of admission to the [facility]  3  The patient is reasonably expected to actively participate in, and benefit significantly from, the intensive rehabilitation therapy program as defined in Chapter 1, section 110 2 2 of the CMS Medicare Policy Manual at this time of admission to the [facility]  She can reasonably be expected to make measurable improvement (that will be of practical value to improve the patients functional capacity or adaptation to impairments) as a result of the rehabilitation treatment, as defined in section 110 3, and such improvement can be expected to be made within the prescribed period of time  As noted in the CMS Medicare Policy Manual, the patient need not be expected to achieve complete independence in the domain of self-care nor be expected to return to his or her prior level of functioning in order to meet this standard  4  The patient must require physician supervision by a rehabilitation physician  As such, a rehabilitation physician will conduct face-to-face visits with the patient at least 3 days per week throughout the patients stay in the [facility] to assess the patient both medically and functionally, as well as to modify the course of treatment as needed to maximize the patients capacity to benefit from the rehabilitation process    5  The patient requires an intensive and coordinated interdisciplinary approach to providing rehabilitation, as defined in Chapter 1, section 110 2 5 of the CMS Medicare Policy Manual  This will be achieved through periodic team conferences, conducted at least once in a 7-day period, and comprising of an interdisciplinary team of medical professionals consisting of: a rehabilitation physician, registered nurse,  and/or , and a licensed/certified therapist from each therapy discipline involved in treating the patient  Changes Since Pre-admission Assessment: None -This patient's participation in rehab continues to be reasonable, necessary and appropriate  CMS Required Post-Admission Physician Evaluation Elements  History and Physical, including medical history, functional history and active comorbidities as in above text      PostAdmission Physician Evaluation:  The patient has the potential to make improvement and is in need of physical, occupational, and/or therapy services  The patient may also need nutritional services  Given the patient's complex medical condition and risk of further medical complications, rehabilitative services cannot be safely provided at a lower level of care, such as a skilled nursing facility  I have reviewed the patient's functional and medical status at the time of the preadmission screening and they are the same as on the day of this admission  I acknowledge that I have personally performed a full physical examination on this patient within 24 hours of admission   The patient and/or family demonstrated understanding the rehabilitation program and the discharge process after we discussed them      Agree in entirety: yes  Minor adaptions: none    Major changes: none     Juancarlos Muniz MD  Physical Medicine and Rehabilitation

## 2020-01-23 NOTE — NURSING NOTE
Pt brought to ARU from ICU  Oriented to unit and explained plan of care while on ARU  All questions answered at this time  Pt A+O x4, mild expressive aphasia  Pt can ambulate with assist x1 with RW  2RN skin assessment performed, ecchymotic areas to BUE and bruising to BLE noted  Bed and chair alarms in place and turned on  Educated pt on use of call bell and when to call for assistance before attempting to get up  All needs are currently met  Call bell and belongings within reach  Pt currently in bed  Will continue to monitor and follow plan of care

## 2020-01-24 LAB
ANISOCYTOSIS BLD QL SMEAR: PRESENT
BASOPHILS # BLD AUTO: 0.1 THOUSANDS/ΜL (ref 0–0.1)
BASOPHILS NFR BLD AUTO: 1 % (ref 0–2)
CA-I BLD-SCNC: 1.09 MMOL/L (ref 1.12–1.32)
EOSINOPHIL # BLD AUTO: 0.1 THOUSAND/ΜL (ref 0–0.61)
EOSINOPHIL NFR BLD AUTO: 2 % (ref 0–5)
ERYTHROCYTE [DISTWIDTH] IN BLOOD BY AUTOMATED COUNT: 22.7 % (ref 11.5–14.5)
HCT VFR BLD AUTO: 34.5 % (ref 42–47)
HGB BLD-MCNC: 10.4 G/DL (ref 12–16)
HYPERCHROMIA BLD QL SMEAR: PRESENT
LYMPHOCYTES # BLD AUTO: 1 THOUSANDS/ΜL (ref 0.6–4.47)
LYMPHOCYTES NFR BLD AUTO: 10 % (ref 21–51)
MAGNESIUM SERPL-MCNC: 2.3 MG/DL (ref 1.9–2.7)
MCH RBC QN AUTO: 22.8 PG (ref 26–34)
MCHC RBC AUTO-ENTMCNC: 30.2 G/DL (ref 31–37)
MCV RBC AUTO: 75 FL (ref 81–99)
MICROCYTES BLD QL AUTO: PRESENT
MONOCYTES # BLD AUTO: 1 THOUSAND/ΜL (ref 0.17–1.22)
MONOCYTES NFR BLD AUTO: 11 % (ref 2–12)
NEUTROPHILS # BLD AUTO: 7.5 THOUSANDS/ΜL (ref 1.4–6.5)
NEUTS SEG NFR BLD AUTO: 77 % (ref 42–75)
PLATELET # BLD AUTO: 303 THOUSANDS/UL (ref 149–390)
PLATELET BLD QL SMEAR: ADEQUATE
PMV BLD AUTO: 8.5 FL (ref 8.6–11.7)
POLYCHROMASIA BLD QL SMEAR: PRESENT
RBC # BLD AUTO: 4.57 MILLION/UL (ref 3.9–5.2)
RBC MORPH BLD: NORMAL
WBC # BLD AUTO: 9.7 THOUSAND/UL (ref 4.8–10.8)

## 2020-01-24 PROCEDURE — 97535 SELF CARE MNGMENT TRAINING: CPT

## 2020-01-24 PROCEDURE — 92610 EVALUATE SWALLOWING FUNCTION: CPT

## 2020-01-24 PROCEDURE — 99253 IP/OBS CNSLTJ NEW/EST LOW 45: CPT | Performed by: INTERNAL MEDICINE

## 2020-01-24 PROCEDURE — 96105 ASSESSMENT OF APHASIA: CPT

## 2020-01-24 PROCEDURE — 97530 THERAPEUTIC ACTIVITIES: CPT

## 2020-01-24 PROCEDURE — 83735 ASSAY OF MAGNESIUM: CPT | Performed by: PHYSICAL MEDICINE & REHABILITATION

## 2020-01-24 PROCEDURE — 97116 GAIT TRAINING THERAPY: CPT

## 2020-01-24 PROCEDURE — 97163 PT EVAL HIGH COMPLEX 45 MIN: CPT

## 2020-01-24 PROCEDURE — 97110 THERAPEUTIC EXERCISES: CPT

## 2020-01-24 PROCEDURE — 99232 SBSQ HOSP IP/OBS MODERATE 35: CPT | Performed by: PHYSICAL MEDICINE & REHABILITATION

## 2020-01-24 PROCEDURE — 97167 OT EVAL HIGH COMPLEX 60 MIN: CPT

## 2020-01-24 PROCEDURE — 85025 COMPLETE CBC W/AUTO DIFF WBC: CPT | Performed by: PHYSICAL MEDICINE & REHABILITATION

## 2020-01-24 PROCEDURE — 82330 ASSAY OF CALCIUM: CPT | Performed by: PHYSICAL MEDICINE & REHABILITATION

## 2020-01-24 RX ORDER — DORZOLAMIDE HYDROCHLORIDE AND TIMOLOL MALEATE 20; 5 MG/ML; MG/ML
1 SOLUTION/ DROPS OPHTHALMIC 2 TIMES DAILY
Status: DISCONTINUED | OUTPATIENT
Start: 2020-01-24 | End: 2020-02-05 | Stop reason: HOSPADM

## 2020-01-24 RX ADMIN — LISINOPRIL 5 MG: 5 TABLET ORAL at 09:23

## 2020-01-24 RX ADMIN — AMIODARONE HYDROCHLORIDE 200 MG: 100 TABLET ORAL at 21:04

## 2020-01-24 RX ADMIN — METOPROLOL SUCCINATE 50 MG: 50 TABLET, EXTENDED RELEASE ORAL at 21:04

## 2020-01-24 RX ADMIN — TIMOLOL MALEATE 1 DROP: 2.5 SOLUTION/ DROPS OPHTHALMIC at 09:22

## 2020-01-24 RX ADMIN — DORZOLAMIDE HYDROCHLORIDE AND TIMOLOL MALEATE 1 DROP: 20; 5 SOLUTION/ DROPS OPHTHALMIC at 18:49

## 2020-01-24 RX ADMIN — APIXABAN 5 MG: 5 TABLET, FILM COATED ORAL at 17:49

## 2020-01-24 RX ADMIN — APIXABAN 5 MG: 5 TABLET, FILM COATED ORAL at 09:22

## 2020-01-24 RX ADMIN — AMIODARONE HYDROCHLORIDE 200 MG: 100 TABLET ORAL at 14:57

## 2020-01-24 RX ADMIN — METOPROLOL SUCCINATE 50 MG: 50 TABLET, EXTENDED RELEASE ORAL at 09:23

## 2020-01-24 RX ADMIN — AMIODARONE HYDROCHLORIDE 200 MG: 100 TABLET ORAL at 05:15

## 2020-01-24 RX ADMIN — ATORVASTATIN CALCIUM 40 MG: 40 TABLET, FILM COATED ORAL at 17:49

## 2020-01-24 NOTE — NURSING NOTE
Pt transfers with walker and min assist  Able to make needs known but needs extra time to form thoughts and words  Compliant with SCDs all shift

## 2020-01-24 NOTE — PROGRESS NOTES
01/24/20 1405   Patient Data   Rehab Impairment /L/ CVA, Expressive Aphasia   Date of Onset 01/17/20   Insurance Information   Primary Payer [employer]   Support System   Name Pollo Vallecillo Brother   Power of [address] to provide 24 hour supervision No   Able to provide physical help? Yes   Multiple Support Systems Yes   Home Setup   Type of Home Apartment   Method of Entry Stairs   Number of Stairs 2  (no railing)   First Floor Bathroom Combo   Available Equipment Roller Walker   Baseline Information   Transportation    Prior IADL Participation   Money Management Identify Money   Meal Preparation Full Participation   Laundry Full Participation   Home Cleaning Full Participation   Prior Level of Function   Self-Care 3  Independent - Patient completed the activities by him/herself, with or without an assistive device, with no assistance from a helper  Indoor-Mobility (Ambulation) 3  Independent - Patient completed the activities by him/herself, with or without an assistive device, with no assistance from a helper  Stairs 3  Independent - Patient completed the activities by him/herself, with or without an assistive device, with no assistance from a helper  Functional Cognition 3  Independent - Patient completed the activities by him/herself, with or without an assistive device, with no assistance from a helper  Prior Device Used Z   None of the above   Patient Preference   Nickname (Patient Preference) Angela   Psychosocial   Psychosocial (WDL) WDL   Patient Behaviors/Mood Anxious   Needs Expressed Physical   Ability to Express Feelings Able to express   Ability to Express Needs Needs assistance   Pain Assessment   Diversional Activities Television   Eating Assessment   Swallow Precautions Yes   Bedside Swallow Results Yes   Food To Mouth Yes   Positioning Upright;Out of Bed   Safety Needs Increase Time   Meal Assessed Dinner   QI: Swallowing/Nutritional Status Modified food consistency; Supervision during eating   Current Diet Dysphia II; Thin   Intake Mode PO   Finishes Timely No   Type of Assistance Needed Supervision   Comment patient needs encouragement to accept current diet texture as well as assistance in conveying likes and dislikes   Eating CARE Score 4   Comprehension   Assist Devices Glasses   QI: Comprehension 3  Usually Understands: Understands most conversations, but misses some part/intent of message  Requires cues at times to understand  Comprehension (FIM) 3 - Understands basic info/conversation 50-74% of time   Expression   QI: Expression 2  Frequently exhibits difficulty with expressing needs and ideas   Expression (FIM) 3 - Expresses basic info/needs 50-74% of time   Social Interaction   Participation Small Group   Social Interaction (FIM) 4 - Interacts 75-89% of time   Problem Solving   Problem solving (FIM) 3 - Solves basic problmes 50-74% of time   Memory   Recognize People Yes   Initiates Tasks Yes   Memory (FIM) 4 - Recalls 2 of 3 steps   Cognition   Overall Cognitive Status Impaired   Arousal/Participation Alert; Responsive   Attention Attends with cues to redirect   Orientation Level Oriented to person;Oriented to place;Oriented to situation   Memory Decreased recall of biographical information;Decreased recall of recent events;Decreased recall of precautions  (assessment affected by Expressive Aphasia)   Vision   Vision Comments Reading glasses   Objective Measure   ST Measure(s) bedside swallow assessment, portions Aayush Goldberg and BCAT   ST Findings moderate oral dysphagia, mild pharyngeal, moderate expressive Aphasia, moderate reasoning and symbolic function deficits  Mild comprehension and memory deficits     Therapeutic Exercise   Therapeutic Exercise/Activity oral exercises   Discharge Information   Vocational Plan Retired/not working   Patient's Rehab Expectations regular food, communicate normally   Barriers to Discharge Home Safety Considerations   Impressions Kerry Gresham is a 60 y o  female who presented to the [address] on 1/17/20 with Aphasia and right sided weakness and found to have L MCA territory infarct in the setting of a-fib and was started on eliquis in acute care   Prior to incident patient was essentially independent with mobility, transfers and ADL's  Thimelda Guzman lives alone in a single family  There are no steps to enter  First floor set up is available and the patient will not have 24 hour supervision available upon discharge  Skilled speech assessment was completed  Patient presented with moderate oral dysphagia, mild pharyngeal secondary to weakness and incoordination post CVA  Speech comprehension at the multi step level was min to modo assist requiring frequent redirection to task and apparent breakdowns in symbolic function for understanding  Expression was moderate due to Expressive Aphasia  Cognitively, patient presented as min assist for memory and mod assist for reasoning through familiar tasks leading to anxious behavior  Skilled ST indicated     SLP Therapy Minutes   SLP Time In 9072   SLP Time Out 4843   SLP Total Time (minutes) 60   SLP Mode of treatment - Individual (minutes) 60

## 2020-01-24 NOTE — CASE MANAGEMENT
Initial assessment & orientation to [facility] with Pt,who expressed understanding & agreement  Message left for Pt's brother  Explained role of this worker & Tx team meeting to be held next week  Pt resides alone in a one story apartment, 1-2 steps in  She reported that she has a strong support system with her brothers & nephews  She reported that she was completely independent PTA  She expressed feeling anxious & somewhat irritable, but also motivated to reach her goals to return to her home  SW will continue to monitor & assist as needed with [address]  Ins co is [address] , policy [phone], [employer] [id number]  CM to fax clinical update to [phone] on 1/29/20

## 2020-01-24 NOTE — PHYSICAL THERAPY NOTE
01/24/20 1005   Patient Data   Rehab Impairment CVA with expressive apashia    Date of Onset 01/17/20   Home Setup   Type of Home Apartment   Method of Entry Stairs   Number of Stairs 2  (no hand railing )   First Floor Bathroom Combo   Available Equipment Roller ADELE Little   Baseline Information   Transportation    Prior IADL Participation   Meal Preparation Full Participation   Laundry Full Participation   Home Cleaning Full Participation   Prior Level of Function   Self-Care 3  Independent - Patient completed the activities by him/herself, with or without an assistive device, with no assistance from a helper  Indoor-Mobility (Ambulation) 3  Independent - Patient completed the activities by him/herself, with or without an assistive device, with no assistance from a helper  Stairs 3  Independent - Patient completed the activities by him/herself, with or without an assistive device, with no assistance from a helper  Prior Device Used Z  None of the above   Patient Preference   Nickname (Patient Preference)   (Angela )   Psychosocial   Psychosocial (WDL) WDL   Patient Behaviors/Mood Anxious   Restrictions/Precautions   Precautions Fall Risk;Aspiration; Aphasia   Pain Assessment   Pain Assessment 0-10   Pain Score No Pain   Transfer Bed/Chair/Wheelchair   Limitations Noted In Balance; Endurance;Problem Solving; Sequencing;LE Strength   Adaptive Equipment Roller Walker   Type of Assistance Needed Incidental touching;Verbal cues   Chair/Bed-to-Chair Transfer CARE Score 4   Roll Left and Right   Type of Assistance Needed Supervision;Verbal cues   Roll Left and Right CARE Score 4   Sit to Lying   Type of Assistance Needed Incidental touching;Verbal cues   Sit to Lying CARE Score 4   Lying to Sitting on Side of Bed   Type of Assistance Needed Verbal cues; Incidental touching   Lying to Sitting on Side of Bed CARE Score 4   Sit to Stand   Type of Assistance Needed Incidental touching;Verbal cues Sit to Stand CARE Score 4   Ambulation   Primary Mode of Locomotion Prior to Admission Walk   Distance Walked (feet)   (646' 85' 98')   Assist Device Roller Walker   Limitations Noted In Balance; Endurance; Safety   Walk Assist Level Contact Guard   Walk 10 Feet   Type of Assistance Needed Incidental touching;Verbal cues   Walk 10 Feet CARE Score 4   Walk 50 Feet with Two Turns   Type of Assistance Needed Incidental touching;Verbal cues   Walk 50 Feet with Two Turns CARE Score 4   Walk 150 Feet   Type of Assistance Needed Incidental touching;Verbal cues   Walk 150 Feet CARE Score 4   Walking 10 Feet on Uneven Surfaces   Type of Assistance Needed Incidental touching;Verbal cues   Walking 10 Feet on Uneven Surfaces CARE Score 4   Wheel 50 Feet with Two Turns   Reason if not Attempted Activity not applicable   Wheel 50 Feet with Two Turns CARE Score 9   Wheel 150 Feet   Reason if not Attempted Activity not applicable   Wheel 663 Feet CARE Score 9   Curb or Single Stair   Style negotiated Single stair   Type of Assistance Needed Incidental touching;Verbal cues   1 Step (Curb) CARE Score 4   4 Steps   Type of Assistance Needed Incidental touching;Verbal cues   4 Steps CARE Score 4   12 Steps   Reason if not Attempted Safety concerns   12 Steps CARE Score 88   Stairs   Type Stairs   # of Steps   (6)   Assist Devices Bilateral Rail   Comprehension   QI: Comprehension 3  Usually Understands: Understands most conversations, but misses some part/intent of message  Requires cues at times to understand  Comprehension (FIM) 4 - Understands basic info/conversation 75-90% of time   Expression   QI: Expression 2   Frequently exhibits difficulty with expressing needs and ideas   Expression (FIM) 4 - Expresses basic info/needs 75-90% of time   Social Interaction   Social Interaction (FIM) 4 - Interacts 75-89% of time   Problem Solving   Problem solving (FIM) 4 - Solves basic problems 75-89% of time   Memory   Memory (FIM) 4 - Recognizes/recalls/performs 75-89%   Strength RLE   R Hip Flexion 3-/5   R Knee Extension 3+/5   R Ankle Dorsiflexion 3+/5   R Ankle Plantar Flexion 3+/5   LLE Assessment   LLE Assessment   (gross MMT 3+/5)   RUE Assessment   RUE Assessment WFL   LUE Assessment   LUE Assessment WFL   Coordination   Movements are Fluid and Coordinated 1   Coordination and Movement Description   (tactile and VC for attention and directions )   Sensation   Light Touch No apparent deficits   Propioception No apparent deficits   Cognition   Overall Cognitive Status Impaired   Arousal/Participation Alert; Cooperative   Attention Attends with cues to redirect   Orientation Level Oriented to person;Oriented to time;Oriented to situation;Oriented to place   Memory Decreased recall of precautions;Decreased recall of recent events;Decreased short term memory;Decreased long term memory   Following Commands Follows one step commands with increased time or repetition   Comments   (Pt easily distracted and perservates on tasks)   Vision   Vision Comments reading glasses    Therapeutic Exercise   Therapeutic Exercise/Activity Seated TE performed to tolerance, gait training, transfer training, community re-integration    Discharge [address] Retired/not working  (enjoys going on walks with her cat; use to work at a [address])   Patient's Rehab Expectations   (" to get back to doing everything again" )   Impressions Pt [facility] to [facility] following CVA with dysphagia and expressive aphasia  Pt requires assistance with functional mobility tasks secondary to impaired static/dynamic balance, strength, gait deficits, cognition, poor sequencing/problem solving, impaired speech and communication with increased frustration with expressing thoughts  Pt will benefit from additional skilled Pt services to maximize safety and independence when performing functional mobility tasks      PT Therapy Minutes   PT Time In 1005   PT Time Out 1140   PT Total Time (minutes) 95   PT Mode of treatment - Individual (minutes) 95   PT Mode of treatment - Concurrent (minutes) 0   PT Mode of treatment - Group (minutes) 0   PT Mode of treatment - Co-treat (minutes) 0   PT Mode of Treatment - Total time(minutes) 95 minutes   PT Cumulative Minutes 95   Cumulative Minutes   Cumulative therapy minutes 95

## 2020-01-24 NOTE — ASSESSMENT & PLAN NOTE
- contacted [facility] ophthalmology whom patient follows with as an OP and per their records patient is to be on cosopt 22 3-6 8 mg/ml 1 gtt both eyes BID therefore resumed patient on this home medication

## 2020-01-24 NOTE — PROGRESS NOTES
OT eval and ADL note     01/24/20 6835   Patient Data   Rehab Impairment CVA with expressive apashia   Date of Onset 01/17/20   Support System   Name Kim Bah and Terry Vinson   Relationship Grandson and Son   Home Setup   Type of Home Apartment  (1st floor)   Method of Entry Stairs   Number of Stairs 2   First Floor Bathroom Combo   Available Equipment Roller W  R  Anabel   Baseline Information   Transportation    Prior IADL Participation   Money Management Identify Money;Estimate Costs;Estimate Change;Combine Bills;Manage Checkbook   Meal Preparation Full Participation   Laundry Full Participation   Home Cleaning Full Participation   Prior Level of Function   Self-Care 3  Independent - Patient completed the activities by him/herself, with or without an assistive device, with no assistance from a helper  Patient Preference   Nickname (Patient Preference) Angela   Restrictions/Precautions   Precautions Aphasia; Aspiration; Fall Risk  (dysphagia 2 with thin liquids)   Pain Assessment   Pain Assessment No/denies pain   Eating Assessment   Meal Assessed Breakfast   Current Diet Dysphia II; Thin   Type of Assistance Needed Supervision   Comment pt presented with sausage and eggs but prefers to attempt Rice Krispies  Pt did not like the milk temperature from the tray and when ot attempted to provided cold milk and a different container of cereal patient requested ice cream instead stating she doesn't like hot drinks and is "Picky"   [facility]    Eating CARE Score 4   Oral Hygiene   Type of Assistance Needed Supervision   Comment standing at the sink   Oral Hygiene CARE Score 4   Grooming   Able To Initiate Tasks;Comb/Brush Hair;Wash/Dry Face;Wash/Dry Hands;Brush/Clean Teeth   Limitation Noted In Safety   Tub/Shower Transfer   Reason Not Assessed Sponge Bath   Shower/Bathe Self   Type of Assistance Needed Physical assistance   Amount of Physical Assistance Provided 25%-49%   Shower/Bathe Self CARE Score 3   Bathing   Assessed Bath Style Sponge Bath   Anticipated D/C Bath Style Sponge Bath   Able to Gather/Transport No   Able to [employer] Temperature No   Able to Wash/Rinse/Dry (body part) Left Arm;Right Arm;L Upper Leg;R Upper Leg;Chest;Abdomen;Perineal Area; Buttocks   Limitations Noted in Balance; Endurance; Safety   Dressing/Undressing Clothing   Remove UB Clothes Pullover Shirt   Don UB Clothes Pullover Shirt   Type of Assistance Needed Supervision   Upper Body Dressing CARE Score 4   Remove LB Clothes Undergarment;Socks   Don LB Clothes Pants; Undergarment;Socks   Type of Assistance Needed Physical assistance   Amount of Physical Assistance Provided Less than 25%   Lower Body Dressing CARE Score 3   Limitations Noted In Balance; Endurance; Safety   Putting On/Taking Off Footwear   Type of Assistance Needed Physical assistance   Amount of Physical Assistance Provided 50%-74%   Putting On/Taking Off Footwear CARE Score 2   Toileting Hygiene   Type of Assistance Needed Incidental touching   Toileting Hygiene CARE Score 4   Toilet Transfer   Surface Assessed Raised Toilet   Transfer Technique Stand Pivot   Limitations Noted In Balance; Endurance; Safety   Adaptive Equipment Grab Bar;Walker   Type of Assistance Needed Incidental touching   Toilet Transfer CARE Score 4   Toileting   Able to Pull Clothing down yes, up yes  Manage Hygiene Bladder   Limitations Noted In Balance; Safety   Transfer Bed/Chair/Wheelchair   Limitations Noted In Balance; Endurance   Type of Assistance Needed Incidental touching   Chair/Bed-to-Chair Transfer CARE Score 4   Sit to Lying   Type of Assistance Needed Physical assistance   Amount of Physical Assistance Provided Less than 25%   Comment Assist LUE into bed   Sit to Lying CARE Score 3   Lying to Sitting on Side of Bed   Type of Assistance Needed Supervision   Lying to Sitting on Side of Bed CARE Score 4   Sit to Stand   Type of Assistance Needed Incidental touching   Sit to Stand CARE Score 4   Picking Up Object   Type of Assistance Needed Physical assistance   Amount of Physical Assistance Provided Less than 25%   Picking Up Object CARE Score 3   Ambulation   Distance Walked (feet) 15 ft   Assist Device Roller Walker   Limitations Noted In Balance; Endurance; Safety   Walk Assist Level Contact Guard   Comprehension   Comprehension (FIM) 4 - Understands basic info/conversation 75-90% of time   Expression   Expression (FIM) 4 - Expresses basic info/needs 75-90% of time   Social Interaction   Social Interaction (FIM) 4 - Interacts 75-89% of time   Memory   Memory (FIM) 4 - Recalls 2 of 3 steps   RUE Assessment   RUE Assessment WFL  (MMT 4-/5 grossly sh to hand)   LUE Assessment   LUE Assessment WFL  (MMT 4-/5 grossly sh to hand)   Coordination   Movements are Fluid and Coordinated 1   Sensation   Light Touch No apparent deficits   Propioception No apparent deficits   Cognition   Overall Cognitive Status Impaired   Arousal/Participation Alert; Responsive   Attention Attends with cues to redirect   Orientation Level Oriented to person   Memory   (difficult to assess due to expressive aphasia)   Discharge [address] Retired/not working  (pt enjoys being with cat)   Patient's Rehab Expectations "Go home soon "   Impressions Pt presents following hospital saty due to CVA with dysphagia and expressive aphasia  Pt requires assist and supervision due to decreased balance, safety, endurance and cognition with limited sequencing/ problem solving and communication becoming easily frustrated with difficulty expressing thoughts and needs  Pt will benefit from skilled OT services to increase independence with daily tasks to prepare for return to home with pet cat     OT Therapy Minutes   OT Time In 3542   OT Time Out 0908   OT Total Time (minutes) 93   OT Mode of treatment - Individual (minutes) 93

## 2020-01-24 NOTE — CONSULTS
Consult- Yasmin Duran 1955, 60 y o  female MRN: [id number]    Unit/Bed#: [facility] 212-01 Encounter: [id number]    Primary Care Provider: Charli Clayton DO   Date and time admitted to hospital: 2020  2:28 PM      Inpatient consult to Internal Medicine  Consult performed by: Gabriel Pina MD  Consult ordered by: Thena Schlatter, MD          * CVA (cerebral vascular accident) [facility])  Assessment & Plan  · Continue PT/OT with acute rehab  · Continue Eliquis/statin  · Supportive care  · Follow-up with PCP and Neurology as outpatient    Cardiomyopathy [facility])  Assessment & Plan  · Currently stable  · Continue current medications  · Follow up with Cardiology as outpatient    A-fib [facility])  Assessment & Plan  · Rate controlled  · Continue metoprolol and Eliquis    Essential hypertension  Assessment & Plan  · BP stable  · Continue current meds        Recommendations for Discharge:  · Per Primary Service      History of Present Illness:  Yasmin Duran is a 60 y o  female who is originally admitted to the acute rehab service due to CVA  We are consulted for medical management  Patient was recently on the inpatient medical floor being treated for acute CVA  Patient was medically optimized and transitioned to acute rehab  Patient denies any pain complaints at this time  Tolerating diet  No fever or chills      Review of Systems:  Review of Systems    Past Medical and Surgical History:   Past Medical History:   Diagnosis Date    Hypertension     PUD (peptic ulcer disease)     Stroke ([facility] [address] )     Vitamin D deficiency        Past Surgical History:   Procedure Laterality Date    ABDOMINOPLASTY      revision gastric bypass    BREAST SURGERY      reduction     SECTION      CHOLECYSTECTOMY      COLONOSCOPY      GASTRIC BYPASS      HERNIA REPAIR      NEUROMA EXCISION Right 2019    Procedure: EXCISION 2ND INTERSPACE NEUROMA;  Surgeon: Last Mcnulty DPM;  Location: [address] [place] [address];  Service: Podiatry    ROTATOR CUFF REPAIR Left        Meds/Allergies:  all medications and allergies reviewed    Allergies: Allergies   Allergen Reactions    Nsaids      Due to hx gastric bypass         Social History:     Marital Status: Single    Substance Use History:   Social History     Substance and Sexual Activity   Alcohol Use Never    Frequency: Never    Comment: none     Social History     Tobacco Use   Smoking Status Never Smoker   Smokeless Tobacco Never Used     Social History     Substance and Sexual Activity   Drug Use No    Comment: none       Family History:  I have reviewed the patients family history    Physical Exam:   Vitals:   Blood Pressure: 124/80 (01/24/20 0745)  Pulse: 59 (01/24/20 0745)  Temperature: 98 6 °F (37 °C) (01/24/20 0745)  Temp Source: Tympanic (01/24/20 0745)  Respirations: 18 (01/24/20 0745)  Height: 5' 6" (167 6 cm) (01/23/20 1435)  Weight - Scale: 115 kg (253 lb 15 5 oz) (01/23/20 1435)  SpO2: 96 % (01/24/20 0745)    Physical Exam   Constitutional: No distress  Obese  female   HENT:   Head: Normocephalic and atraumatic  Eyes: Conjunctivae and EOM are normal    Neck: Normal range of motion  Neck supple  Cardiovascular: Normal rate and regular rhythm  Pulmonary/Chest: Effort normal  No respiratory distress  Abdominal: Soft  She exhibits no distension  There is no tenderness  Musculoskeletal: Normal range of motion  She exhibits no edema  Neurological: She is alert  No cranial nerve deficit  Speech difficulty noted   Skin: Skin is warm and dry  Psychiatric: She has a normal mood and affect  Her behavior is normal        Additional Data:   Lab Results: I have personally reviewed pertinent reports        Results from last 7 days   Lab Units 01/24/20  0530   WBC Thousand/uL 9 70   HEMOGLOBIN g/dL 10 4*   HEMATOCRIT % 34 5*   PLATELETS Thousands/uL 303   NEUTROS PCT % 77*   LYMPHS PCT % 10*   MONOS PCT % 11   EOS PCT % 2     Results from last 7 days   Lab Units 01/21/20  0544 01/18/20  0956   POTASSIUM mmol/L 3 8   < > 3 8   CHLORIDE mmol/L 110*   < > 109*   CO2 mmol/L 21   < > 21   BUN mg/dL 11   < > 24   CREATININE mg/dL 0 87   < > 0 90   CALCIUM mg/dL 7 7*   < > 7 7*   ALK PHOS U/L  --   --  62   ALT U/L  --   --  11   AST U/L  --   --  15    < > = values in this interval not displayed  Results from last 7 days   Lab Units 01/21/20  0544   INR  1 13     Results from last 7 days   Lab Units 01/17/20  1955 01/17/20  1712 01/17/20  1247   TROPONIN I ng/mL 0 07* 0 06* 0 06*     Lab Results   Component Value Date/Time    HGBA1C 6 0 01/18/2020 05:28 AM     Results from last 7 days   Lab Units 01/18/20  0728 01/17/20  2229 01/17/20  1715 01/17/20  1237   POC GLUCOSE mg/dl 86 107 121 180*       Imaging: I have personally reviewed pertinent reports  No orders to display       ** Please Note: This note has been constructed using a voice recognition system   **

## 2020-01-24 NOTE — PROGRESS NOTES
Physical Medicine and Rehabilitation Progress Note  Yasmin Duran 60 y o  female MRN: [id number]  Unit/Bed#: -01 Encounter: [id number]    HPI: Yasmin Duran is a 60 y o  female who presented to the [address] [place] [address] aphasia and right sided weakness and found to have L MCA territory infarct in the setting of a-fib and was started on eliquis in acute care     Chief Complaint: CVA    Interval/Subjective: patient without complaint currently     ROS: A 10 point ROS was performed; negative except as noted above       Assessment/Plan:      * CVA (cerebral vascular accident) ([facility] [address] )  Assessment & Plan  - L MCA territory infarct felt to be due to a-fib  - appears to have been on coumadin PTA and was switched to eliquis 5 mg BID   - also started on lipitor 40 mg qpm   - OP FU with neuro       Hypocalcemia  Assessment & Plan  - ionized Ca just below LLN of 1 12 at 1 09   - IM monitoring       Hypomagnesemia  Assessment & Plan  - currently WNL     Anemia  Assessment & Plan  - Hg currently stable at 10 4  - IM monitoring     Leukocytosis  Assessment & Plan  - now WNL     IFG (impaired fasting glucose)  Assessment & Plan  - HgA1C 6 0  - nutritionist--> dietary education   - follows with Trina HACKETT    History of pulmonary embolism  Assessment & Plan  - remote history (years ago) of PE  - appears to have been on coumadin PTA but switched to eliquis 5 mg BID in acute care due to CVA (per IM given how long ago PE was that an initial short term 10 mg BID dosing is not required)      Prolonged QT interval  Assessment & Plan  - seen by dylan in acute care and patient is on med regimen as recommended by cardiology  - OP FU with cards     Cardiomyopathy [facility])  Assessment & Plan  - with mildly elevated troponins up to 0 07 however cards did not feel troponin elevation was due to a true coronary event by rather tachycardia in the setting of cardiomyopathy  - EF 30%  - started on toprol XL 50 mg q12 and lisinopril 5 mg qd in acute care per cards  - cards recommended OP nuclear lexiscan stress test which they have ordered    - OP FU with cards     Dysphagia  Assessment & Plan  - modified diet per      Glaucoma  Assessment & Plan  - contacted [facility] ophthalmology whom patient follows with as an OP and per their records patient is to be on cosopt 22 3-6 8 mg/ml 1 gtt both eyes BID therefore resumed patient on this home medication       A-fib [facility])  Assessment & Plan  - with PSVT/atrial tachycardia per cards  - started on amiodarone taper in acute care with eventual dose of 200 mg qd per cards  - started on eliquis 5 mg BID in acute care per cards   - started on toprol XL 50 mg q12 in acute care per cards  - OP FU with cards       Essential hypertension  Assessment & Plan  - at home on HCTZ 12 5 mg qd  - started on toprol XL 50 mg Q12 and lisinopril 5 mg qd in acute care  - IM managing      Scheduled Meds:    Current Facility-Administered Medications:  acetaminophen 650 mg Oral Q6H PRN Melecio Arceo MD   amiodarone 200 mg Oral Q8H Melecio Arceo MD   Followed by       Samuel Johnson ON 1/27/2020] amiodarone 200 mg Oral Q12H Melecio Arceo MD   Followed by       Samuel Johnson ON 2/3/2020] amiodarone 200 mg Oral Daily With Breakfast Melecio Arceo MD   apixaban 5 mg Oral BID Melecio Arceo MD   atorvastatin 40 mg Oral QPM Melecio Arceo MD   dorzolamide-timolol 1 drop Both Eyes BID Melecio Arceo MD   lisinopril 5 mg Oral Daily Melecio Arceo MD   metoprolol succinate 50 mg Oral Q12H Melecio Arceo MD   polyethylene glycol 17 g Oral Daily PRN Melecio Arceo MD        Objective:    Functional Update:  Mobility: cg   Transfers: cg  ADLs: min        Physical Exam:  Vitals:    01/24/20 0745   BP: 124/80   Pulse: 59   Resp: 18   Temp: 98 6 °F (37 °C)   SpO2: 96%         General: alert, no apparent distress, cooperative and comfortable  HEENT:  Head: Normal, normocephalic, atraumatic    CARDIAC:  +S1/2  LUNGS:  respirations unlabored  ABDOMEN:  soft NT  EXTREMITIES:  volume status currently stable   NEURO:   +aphasia however is improving   PSYCH:  mood/affect currently stable     Laboratory:   Results from last 7 days   Lab Units 01/24/20  0530 01/21/20  0544 01/20/20  0743   HEMOGLOBIN g/dL 10 4* 10 0* 10 7*   HEMATOCRIT % 34 5* 34 3* 34 8*   WBC Thousand/uL 9 70 11 00* 9 30     Results from last 7 days   Lab Units 01/21/20  0544 01/20/20  0743 01/19/20  1006 01/18/20  0956   BUN mg/dL 11 13 18 24   SODIUM mmol/L 139 140 137 139   POTASSIUM mmol/L 3 8 3 9 3 8 3 8   CHLORIDE mmol/L 110* 109* 107 109*   CREATININE mg/dL 0 87 0 89 0 88 0 90   AST U/L  --   --   --  15   ALT U/L  --   --   --  11     Results from last 7 days   Lab Units 01/21/20  0544 01/18/20  1405   PROTIME seconds 13 1* 12 5   INR  1 13 1 08

## 2020-01-24 NOTE — ASSESSMENT & PLAN NOTE
- at home on HCTZ 12 5 mg qd  - IM managing and patient is not currently on BP meds due to lower BPs and IM recommends patient be dc'd off of anti-hypertensive agents and FU with Dr Jesus Vivar of cardiology as planned

## 2020-01-24 NOTE — MALNUTRITION/BMI
This medical record reflects one or more clinical indicators suggestive of malnutrition and/or morbid obesity  Malnutrition Findings:              BMI Findings:  BMI Classifications: Morbid Obesity 40-44 9     Body mass index is 40 99 kg/m²  See Nutrition note dated 1/24/20 for additional details  Completed nutrition assessment is viewable in the nutrition documentation

## 2020-01-24 NOTE — ASSESSMENT & PLAN NOTE
- with mildly elevated troponins up to 0 07 however cards did not feel troponin elevation was due to a true coronary event by rather tachycardia in the setting of cardiomyopathy  - EF 30%  - started on toprol XL 50 mg q12 and lisinopril 5 mg qd in acute care per cards however lisinopril dc'd and toprol XL reduced to 25 mg q12 by IM in acute rehab due to low BPs and on 2/4 toprol XL was completely dc'd due to lower HR per recommendation of Dr Shoshana Drummond of IM   - Dr Jesus Vivar of cards recommended OP nuclear lexiscan stress test which he has ordered    - OP FU with Dr Jesus Vivar

## 2020-01-24 NOTE — ASSESSMENT & PLAN NOTE
- resolved, cleared for regular diet texture and thin liquids per ST; however currently on clear liquid diet per GI due to FOBT+ stool

## 2020-01-24 NOTE — PROGRESS NOTES
01/24/20 0856   Charting Type   Charting Type Shift assessment   Neurological   Neuro (WDL) X   Level of Consciousness Alert/awake   Orientation Level Oriented to person;Oriented to place   Cognition Follows commands   Facial Symmetry Right facial drooping   Tongue Deviation Midline   Speech Expressive aphasia   R Hand  Moderate   L Hand  Moderate   R Foot Dorsiflexion Strong   L Foot Dorsiflexion Strong   R Foot Plantar Flexion Strong   L Foot Plantar Flexion Strong   RUE Motor Response Responds to commands   RUE Sensation Full sensation   RUE Muscle Strength 5- Normal strength   LUE Motor Response Responds to commands   LUE Sensation Full sensation   LUE Muscle Strength 5- Normal strength   RLE Motor Response Responds to commands   RLE Sensation Full sensation   RLE Muscle Strength 5- Normal strength   LLE Motor Response Responds to commands   LLE Sensation Full sensation   LLE Muscle Strength 5- Normal strength   Neuro Symptoms Anxiety   Carie Coma Scale   Eye Opening 4   Best Verbal Response 5   Best Motor Response 6   [place] Coma Scale Score 15   HEENT   HEENT (WDL) X   Head and Face Symmetrical   R Eye Mildly impaired vision   L Eye Mildly impaired vision   Mucous Membrane(s) Moist;Pink; Intact   Teeth Missing teeth   Respiratory   Respiratory (WDL) WDL   Bilateral Breath Sounds Clear   Respiratory Interventions   Respiratory Interventions Incentive spirometry;Cough and deep breathe   Cardiac   Cardiac (WDL) X  (a-fib)   Cardiac Regularity Regular   Pacemaker/ICD No   Post Pain Intervention Assessment   Post Intervention POSS 1   Peripheral Vascular   Peripheral Vascular (WDL) X   Cyanosis None   RLE Edema Trace   LLE Edema Trace   RUE Neurovascular Assessment   R Radial Pulse +2   LUE Neurovascular Assessment   L Radial Pulse +2   RLE Neurovascular Assessment   R Pedal Pulse +1   LLE Neurovascular Assessment   L Pedal Pulse +1   Integumentary   Integumentary (WDL) X   Skin Color Appropriate for ethnicity; Ecchymosis   Skin Condition/Temp Warm;Dry   Skin Integrity Bruising   Skin Location bilateral arms/hands   Allan Scale   Sensory Perceptions 3   Moisture 4   Activity 3   Mobility 3   Nutrition 2   Friction and Shear 3   Allan Scale Score 18   Incision 01/14/19 Foot Right   Date First Assessed/Time First Assessed: 01/14/19 1312   Location: Foot  Wound Location Orientation: Right   Incision Description Clean;Dry; Intact   Musculoskeletal   Musculoskeletal (WDL) X   Level of Assistance Standby assist, set-up cues, supervision of patient - no hands on   Assistive Device Front wheel walker   RUE Full movement   LUE Full movement   RLE Full movement   LLE Full movement   Gastrointestinal   Gastrointestinal (WDL) WDL   Last BM Date 01/22/20   Genitourinary   Genitourinary (WDL) WDL   Urine Assessment   Urinary Incontinence No   Urine Color Yellow/straw   Urine Appearance Clear   Urine Odor No odor   Genitalia   Female Genitalia Intact   Anal/Rectal   Anal/Rectal (WDL) WDL   Psychosocial   Psychosocial (WDL) X   Patient Behaviors/Mood Anxious   Ability to Express Feelings Able to express   Ability to Express Needs Able to express   Ability to Express Thoughts Needs assistance   Ability to Understand Others Understands   Cough   Cough None

## 2020-01-24 NOTE — ASSESSMENT & PLAN NOTE
- ionized Ca just below LLN of 1 12 at 1 09   - IM monitoring and have cleared patient for dc with OP FU with PCP

## 2020-01-24 NOTE — PCC SPEECH THERAPY
Waldo Silva is a 60 y o  female who presented to the [address] [facility] [address] on 1/17/20 with Aphasia and right sided weakness and found to have L MCA territory infarct in the setting of a-fib and was started on eliquis in acute care   Prior to incident patient was essentially independent with mobility, transfers and ADL's  Greg Anil lives alone in a single family  There are no steps to enter  First floor set up is available and the patient will not have 24 hour supervision available upon discharge  Skilled speech assessment was completed  Patient presented with moderate oral dysphagia, mild pharyngeal secondary to weakness and incoordination post CVA  Speech comprehension at the multi step level was min to modo assist requiring frequent redirection to task and apparent breakdowns in symbolic function for understanding  Expression was moderate due to Expressive Aphasia  Cognitively, patient presented as min assist for memory and mod assist for reasoning through familiar tasks leading to anxious behavior  Skilled ST indicated  1/27/2020  Patient is participating in skilled ST focusing on swallow oral function and speech cognitive communication skills  Current diet texture is Dysphagia #2, Mechanical Soft with Thin liquids  SLP is completing trials of Dysphagia #3, Dental Soft with Thin liquids at mealtime with noted progress in texture tolerance, but patient continues to need encouragement for adequate PO intake  Speech comprehension at the multi step level is min assist   Expression due to expressive Aphasia is mod assist   She is gaining in using a variety of modalities to improve ability to express complex needs and ideas  Cognitively, patient presents as min assist for memory and mod assist for reasoning her deficits for understanding the need for assistance and use of safety precautions      2/3/2020  Patient is participating in skilled ST focusing on swallow oral function and speech cognitive communication skills  Current diet texture is Regular with Thin liquids  Speech comprehension and expression at the multi step level is supervision with familiar procedures and intermittent situational cues due to expressive Aphasiamin assist  Cognitively, patient presents as supervision for memory and min assist for reasoning her deficits for understanding the need for assistance and use of safety precautions

## 2020-01-24 NOTE — ASSESSMENT & PLAN NOTE
- seen by cards in acute care and patient is on med regimen as recommended by cardiology  - OP FU with Dr Avelino Gallardo

## 2020-01-24 NOTE — ASSESSMENT & PLAN NOTE
· Continue PT/OT with acute rehab  · Continue Eliquis/statin  · Supportive care  · Follow-up with PCP and Neurology as outpatient

## 2020-01-24 NOTE — ASSESSMENT & PLAN NOTE
- remote history (years ago) of PE  - appears to have been on coumadin PTA but switched to eliquis in acute care due to CVA however patient with + FOBT & dark stools therefore AC was held and Dr João Norton of GI performed EGD and colonoscopy; personally d/w Dr João Norton who noted no significant findings on EGD and while prep was poor on colonoscopy noted diverticular dz which he suspected was the source of the bleed and therefore Dr João Norton recommended patient not resume AC at this time   Patient was advised to not to resume AC by Dr João Norton after findings of EGD/colonoscopy and additionally patient was advised by me (and noted on dc med rec) that she is not to resume her home coumadin and was never given a prescription for eliquis upon dc   - OP FU with PCP for further management and possibly hematology referral at [facility] discretion as it is unknown if the PE was provoked or not

## 2020-01-24 NOTE — ASSESSMENT & PLAN NOTE
- with PSVT/atrial tachycardia per cards  - started on amiodarone taper in acute care with eventual dose of 200 mg qd per cards which patient is now on per IM   - home coumadin stopped and started on eliquis 5 mg BID in acute care per cards however with FOBT & dark stools therefore AC was held and Dr Fatmata Garcia of GI performed EGD and colonoscopy; personally d/w Dr Fatmata Garcia who noted no significant findings on EGD and while prep was poor on colonoscopy noted diverticular dz which he suspected was the source of the bleed and therefore Dr Fatmata Garcia recommended patient not resume AC at this time and FU with Dr Adolfo Holder of cardiology for discussion of possible Watchman device   Patient was advised to not to resume AC by Dr Fatmata Garcia after findings of EGD/colonoscopy and additionally patient was advised by me (and noted on dc med rec) that she is not to resume her home coumadin and was never given a prescription for eliquis upon dc   - started on toprol XL 50 mg q12 in acute care per cards however decreased to 25 mg q12 by IM in acute rehab due to lower BPs and on 2/4 toprol XL was completely dc'd due to lower HR per recommendation of Dr Dahlia Pate of IM   - OP FU with Dr Adolfo Holder

## 2020-01-24 NOTE — PROGRESS NOTES
GRISN consulted for Dx impaired fasting glucose  Patient receiving level 2 dysphagia diet  Her intakes have been 25% for two meals  Her BMI is 40 99 obese class 3  The patient is able to convey food preferences, expresses dislike for current diet texture   has visited patient to obtain food preferences, SELVIN has worked with patient to obtain food preferences  Will continue diet per speech recommendations, honor food preferences as able, she declines Ensure nutrition supplements at this time, she does like jello, recommend fruit punch gelatein TID at meals  Will provide education as able

## 2020-01-24 NOTE — ASSESSMENT & PLAN NOTE
- L MCA territory infarct felt to be due to a-fib  - appears to have been on coumadin PTA and was switched to eliquis 5 mg BID in acute care per cards however patient with + FOBT & dark stools and therefore AC was held and Dr Pamela Gomez of GI performed EGD and colonoscopy; personally d/w Dr Pamela Gomez who noted no significant findings on EGD and while prep was poor on colonoscopy noted diverticular dz which he suspected was the source of the bleed and therefore Dr Pamela Gomez recommended patient not resume AC at this time and FU with Dr Radha Abebe of cardiology for discussion of possible Watchman device   Patient was advised to not to resume AC by Dr Pamela Gomez after findings of EGD/colonoscopy and additionally patient was advised by me (and noted on dc med rec) that she is not to resume her home coumadin and was never given a prescription for eliquis upon dc   - also started on lipitor 40 mg qpm   - OP FU with neuro

## 2020-01-24 NOTE — ASSESSMENT & PLAN NOTE
- patient with + FOBT & dark stools in the setting of being on [facility] with eliquis (was on coumadin at home PTA however switched to eliquis in acute care) for afib in the setting of CVA thought to be 2/2 to A-fib and therefore AC was held and Dr Rich Del Rosario of GI performed EGD and colonoscopy; personally d/w Dr Rich Del Rosario who noted no significant findings on EGD and while prep was poor on colonoscopy noted diverticular dz which he suspected was the source of the bleed and therefore Dr Rich Del Rosario recommended patient not resume AC at this time and FU with Dr Jesus Vivar of cardiology for discussion of possible Watchman device   Patient was advised to not to resume AC by Dr Rich Del Rosario after findings of EGD/colonoscopy and additionally patient was advised by me (and noted on dc med rec) that she is not to resume her home coumadin and was never given a prescription for eliquis upon dc   -Hg is currently 8 4 (scrip was provided upon dc for CBC with results to PCP and Dr Rich Del Rosario)   - OP FU with PCP and Dr Rich Del Rosario

## 2020-01-24 NOTE — PCC CARE MANAGEMENT
Initial assessment & orientation to [facility] with Pt,who expressed understanding & agreement  Message left for Pt's brother  Explained role of this worker & Tx team meeting to be held next week  Pt resides alone in a one story apartment, 1-2 steps in  She reported that she has a strong support system with her brothers & nephews  She reported that she was completely independent PTA  She expressed feeling anxious & somewhat irritable, but also motivated to reach her goals to return to her home  SW will continue to monitor & assist as needed with [address]  Ins co is [employer] , policy [phone], Gigi [phone]  CM to fax clinical update to [phone] on 1/29/20 1/28/20 - Tx team recommendations reviewed with patient & messages left for Pt's brothers  Pt expressed understanding & agreement  Target DC date is Wed 2/5 with [facility] (PT, OT, ST, Nsg, HHA); a list of providers was provided to Pt and a referral will be made based on Pt preference  Awaiting return call from Pt's family to schedule FT  SW will continue to monitor & assist as needed with Tx & DC planning with Tx & DC planning  2/4/20 - Tx team recommendations reviewed with patient & family, who expressed understanding & agreement  Target DC date from rehab remains 2/5 with [facility] (PT, OT, ST, Nsg, HHA); a list of providers was provided to Pt & she expressed a preference for [facility]  [facility] physician reported that Pt may potentially be going to [facility] [address] for testing, either today or tomorrow   unclear at this time  SW will continue to monitor & assist as needed with Tx & DC planning with Tx & DC planning

## 2020-01-25 PROCEDURE — 97110 THERAPEUTIC EXERCISES: CPT

## 2020-01-25 PROCEDURE — 97535 SELF CARE MNGMENT TRAINING: CPT

## 2020-01-25 PROCEDURE — 97537 COMMUNITY/WORK REINTEGRATION: CPT

## 2020-01-25 PROCEDURE — 97116 GAIT TRAINING THERAPY: CPT

## 2020-01-25 PROCEDURE — 97530 THERAPEUTIC ACTIVITIES: CPT

## 2020-01-25 RX ADMIN — APIXABAN 5 MG: 5 TABLET, FILM COATED ORAL at 09:58

## 2020-01-25 RX ADMIN — METOPROLOL SUCCINATE 50 MG: 50 TABLET, EXTENDED RELEASE ORAL at 09:58

## 2020-01-25 RX ADMIN — AMIODARONE HYDROCHLORIDE 200 MG: 100 TABLET ORAL at 21:27

## 2020-01-25 RX ADMIN — AMIODARONE HYDROCHLORIDE 200 MG: 100 TABLET ORAL at 14:10

## 2020-01-25 RX ADMIN — DORZOLAMIDE HYDROCHLORIDE AND TIMOLOL MALEATE 1 DROP: 20; 5 SOLUTION/ DROPS OPHTHALMIC at 17:23

## 2020-01-25 RX ADMIN — AMIODARONE HYDROCHLORIDE 200 MG: 100 TABLET ORAL at 05:59

## 2020-01-25 RX ADMIN — APIXABAN 5 MG: 5 TABLET, FILM COATED ORAL at 17:23

## 2020-01-25 RX ADMIN — LISINOPRIL 5 MG: 5 TABLET ORAL at 09:58

## 2020-01-25 RX ADMIN — ATORVASTATIN CALCIUM 40 MG: 40 TABLET, FILM COATED ORAL at 17:23

## 2020-01-25 RX ADMIN — DORZOLAMIDE HYDROCHLORIDE AND TIMOLOL MALEATE 1 DROP: 20; 5 SOLUTION/ DROPS OPHTHALMIC at 10:00

## 2020-01-25 NOTE — NURSING NOTE
Patient resting comfortably in bed at this time  No signs of distress noted  Patient was able to ambulate to the bathroom with one assist for contact guard overnight  Contact guard assist for personal hygiene  SCDs as ordered for DVT prophylaxis  Patient remains forgetful at times bed alarm and close supervision in place to promote patient safety  Patient was encouraged to reposition self frequently overnight to promote skin integrity  Call bell within reach  Will continue to monitor patient and follow plan of care

## 2020-01-25 NOTE — PROGRESS NOTES
01/25/20 0830   Pain Assessment   Pain Assessment No/denies pain   Restrictions/Precautions   Precautions Fall Risk;Aphasia; Aspiration   Eating   Type of Assistance Needed Set-up / clean-up   Comment limited intake due to dislike of food and texture recommended   Eating CARE Score 5   Grooming   Able To Initiate Tasks;Comb/Brush Hair;Wash/Dry Face;Wash/Dry Hands   Limitation Noted In Safety;Problem Solving; Sequencing   Findings supervision standing at the sink except for oral hygiene pt reports completing earlier   Shower/Bathe Self   Type of Assistance Needed Supervision   Shower/Bathe Self CARE Score 4   Bathing   Assessed Bath Style Shower   Anticipated D/C Bath Style Shower   Able to [place] Anam No   Able to [employer] Temperature No   Able to Wash/Rinse/Dry (body part) Left Arm;Right Arm;L Upper Leg;R Upper Leg;L Lower Leg/Foot;R Lower Leg/Foot;Chest;Buttocks; Perineal Area; Abdomen   Limitations Noted in Balance; Endurance;ROM;Safety   Tub/Shower Transfer   Limitations Noted In Balance; Endurance; Safety   Adaptive Equipment Grab Bars;Seat with Back   Assessed Shower   Findings Supervision/ CGA   Upper Body Dressing   Type of Assistance Needed Set-up / clean-up   Upper Body Dressing CARE Score 5   Lower Body Dressing   Type of Assistance Needed Supervision   Lower Body Dressing CARE Score 4   Putting On/Taking Off Footwear   Type of Assistance Needed Physical assistance   Amount of Physical Assistance Provided Less than 25%   Comment sitting on bench outside of shower   Putting On/Taking Off Footwear CARE Score 3   Picking Up Object   Type of Assistance Needed Incidental touching   Picking Up Object CARE Score 4   Dressing/Undressing Clothing   [facility] [address]; Undergarment;Socks   Don LB Clothes Pants; Undergarment;Socks; Shoes   Limitations Noted In Balance; Endurance; Safety;ROM;Strength   Lying to Sitting on Side of Bed   Type of Assistance Needed Supervision   Lying to Sitting on Side of Bed CARE Score 4   Sit to Stand   Type of Assistance Needed Supervision   Sit to Stand CARE Score 4   Bed-Chair Transfer   Type of Assistance Needed Supervision   Chair/Bed-to-Chair Transfer CARE Score 4   Toileting Hygiene   Type of Assistance Needed Supervision   Toileting Hygiene CARE Score 4   Toileting   Able to [address] down yes, up yes  Manage Hygiene Bladder   Limitations Noted In Balance;ROM;Safety;LE Strength   Toilet Transfer   Type of Assistance Needed Supervision   Toilet Transfer CARE Score 4   Toilet Transfer   Surface Assessed Raised Toilet   Transfer Technique Stand Pivot   Limitations Noted In Balance; Endurance; Safety;ROM   Exercise Tools   Exercise Tools Yes   Other Exercise Tool 1 card match B hands while sitting Supervision after setup   Other Exercise Tool 2 writing activity with name and demographics with min a and difficulty with several numbers and unable to correct  Coordination   Dexterity S for writing with min A organizing thoughts and fixing mistakes   Cognition   Overall Cognitive Status [facility]   Arousal/Participation Alert; Responsive; Cooperative   Attention Within functional limits   Orientation Level Oriented to person;Oriented to place;Oriented to time;Oriented to situation   Additional Activities   Additional Activities Other (Comment)   Additional Activities Comments fxl mobility with RW S in room and hallway   Assessment   Treatment Assessment Pt participates in ADLs with a shower completed, transfers/ mobility and BUE therex with cognitive orientation and thought expression verbally and written  Pt tolerates session well perseverating often about dislike of meals and that brother will be visiting later this day  Pt will benefit from continued skilled OT services to increase independence with daily tasks  Problem List Decreased range of motion;Decreased endurance; Impaired balance;Decreased safety awareness;Decreased cognition   Plan   Treatment/Interventions ADL retraining;Functional transfer training; Therapeutic exercise; Endurance training;Patient/family training; Compensatory technique education   Progress Progressing toward goals   OT Therapy Minutes   OT Time In 0830   OT Time Out 1000   OT Total Time (minutes) 90   OT Mode of treatment - Individual (minutes) 90   Therapy Time missed   Time missed?  No

## 2020-01-25 NOTE — PROGRESS NOTES
01/25/20 1100   Pain Assessment   Pain Assessment No/denies pain   Pain Score No Pain   Restrictions/Precautions   Precautions Fall Risk;Aphasia; Aspiration   Cognition   Overall Cognitive Status WFL   Orientation Level Oriented X4   Sit to Stand   Type of Assistance Needed Supervision;Verbal cues   Sit to Stand CARE Score 4   Bed-Chair Transfer   Type of Assistance Needed Supervision;Verbal cues   Chair/Bed-to-Chair Transfer CARE Score 4   Walk 10 Feet   Type of Assistance Needed Incidental touching;Verbal cues   Walk 10 Feet CARE Score 4   Walk 50 Feet with Two Turns   Type of Assistance Needed Incidental touching;Verbal cues   Walk 50 Feet with Two Turns CARE Score 4   Walk 150 Feet   Type of Assistance Needed Incidental touching;Verbal cues   Walk 150 Feet CARE Score 4   Walking 10 Feet on Uneven Surfaces   Type of Assistance Needed Incidental touching;Verbal cues   Walking 10 Feet on Uneven Surfaces CARE Score 4   Ambulation   Does the patient walk? 2  Yes   Primary Mode of Locomotion Prior to Admission Walk   Distance Walked (feet) 167 ft  (167' 156')   Assist Device Roller Walker   Gait Pattern Inconsistant [place]   Limitations Noted In Balance; Safety   Provided Assistance with: Balance   Walk Assist Level Contact Guard;Close Supervision   Findings level and carpet   Curb or Single Stair   Style negotiated Single stair   Type of Assistance Needed Supervision   1 Step (Curb) CARE Score 4   4 Steps   Type of Assistance Needed Supervision   4 Steps CARE Score 4   Stairs   Type Stairs   # of Steps 7   Weight Bearing Precautions WBAT   Assist Devices Bilateral Rail   Findings supervision   Therapeutic Interventions   Strengthening seated and standing ther ex   Balance gait and transfer training   Assessment   Treatment Assessment Patient tolerated therapy well    Completed ther ex for general LE strengthening; gait and transfer training focusing on sequence and technique for improved balance and safety with functional mobility  Patient need cues for general safety  PT Barriers   Physical Impairment Decreased strength;Decreased range of motion;Decreased endurance; Impaired balance;Decreased mobility; Decreased safety awareness;Decreased cognition   Functional Limitation Walking;Transfers;Standing;Stair negotiation   Plan   Treatment/Interventions Functional transfer training;LE strengthening/ROM; Elevations; Therapeutic exercise;Gait training   Progress Progressing toward goals   PT Therapy Minutes   PT Time In 1100   PT Time Out 1230   PT Total Time (minutes) 90   PT Mode of treatment - Individual (minutes) 90   PT Mode of treatment - Concurrent (minutes) 0   PT Mode of treatment - Group (minutes) 0   PT Mode of treatment - Co-treat (minutes) 0   PT Mode of Treatment - Total time(minutes) 90 minutes   PT Cumulative Minutes 185   Therapy Time missed   Time missed?  No

## 2020-01-25 NOTE — NURSING NOTE
Pt has been refusing SCD's throughout shift  Education on purpose of SCD's and blood clots given to pt  Pt states she only wants to wear them at night while she is sleeping  All needs are currently met  Call bell and belongings within reach  Family at bedside    Will continue to monitor and follow plan of care

## 2020-01-25 NOTE — PLAN OF CARE
Problem: Potential for Falls  Goal: Patient will remain free of falls  Description  INTERVENTIONS:  - Assess patient frequently for physical needs  -  Identify cognitive and physical deficits and behaviors that affect risk of falls  -  [place] fall precautions as indicated by assessment   - Educate patient/family on patient safety including physical limitations  - Instruct patient to call for assistance with activity based on assessment  - Modify environment to reduce risk of injury  - Consider OT/PT consult to assist with strengthening/mobility  Outcome: Progressing     Problem: Nutrition/Hydration-ADULT  Goal: Nutrient/Hydration intake appropriate for improving, restoring or maintaining nutritional needs  Description  Monitor and assess patient's nutrition/hydration status for malnutrition  Collaborate with interdisciplinary team and initiate plan and interventions as ordered  Monitor patient's weight and dietary intake as ordered or per policy  Utilize nutrition screening tool and intervene as necessary  Determine patient's food preferences and provide high-protein, high-caloric foods as appropriate       INTERVENTIONS:  - Monitor oral intake, urinary output, labs, and treatment plans  - Assess nutrition and hydration status and recommend course of action  - Evaluate amount of meals eaten  - Assist patient with eating if necessary   - Allow adequate time for meals  - Recommend/ encourage appropriate diets, oral nutritional supplements, and vitamin/mineral supplements  - Order, calculate, and assess calorie counts as needed  - Recommend, monitor, and adjust tube feedings and TPN/PPN based on assessed needs  - Assess need for intravenous fluids  - Provide specific nutrition/hydration education as appropriate  - Include patient/family/caregiver in decisions related to nutrition  Outcome: Progressing     Problem: PAIN - ADULT  Goal: Verbalizes/displays adequate comfort level or baseline comfort level  Description  Interventions:  - Encourage patient to monitor pain and request assistance  - Assess pain using appropriate pain scale  - Administer analgesics based on type and severity of pain and evaluate response  - Implement non-pharmacological measures as appropriate and evaluate response  - Consider cultural and social influences on pain and pain management  - Notify physician/advanced practitioner if interventions unsuccessful or patient reports new pain  Outcome: Progressing     Problem: INFECTION - ADULT  Goal: Absence or prevention of progression during hospitalization  Description  INTERVENTIONS:  - Assess and monitor for signs and symptoms of infection  - Monitor lab/diagnostic results  - Monitor all insertion sites, i e  indwelling lines, tubes, and drains  - Monitor endotracheal if appropriate and nasal secretions for changes in amount and color  - [place] appropriate cooling/warming therapies per order  - Administer medications as ordered  - Instruct and encourage patient and family to use good hand hygiene technique  - Identify and instruct in appropriate isolation precautions for identified infection/condition  Outcome: Progressing  Goal: Absence of fever/infection during neutropenic period  Description  INTERVENTIONS:  - Monitor WBC    Outcome: Progressing     Problem: SAFETY ADULT  Goal: Maintain or return to baseline ADL function  Description  INTERVENTIONS:  -  Assess patient's ability to carry out ADLs; assess patient's baseline for ADL function and identify physical deficits which impact ability to perform ADLs (bathing, care of mouth/teeth, toileting, grooming, dressing, etc )  - Assess/evaluate cause of self-care deficits   - Assess range of motion  - Assess patient's mobility; develop plan if impaired  - Assess patient's need for assistive devices and provide as appropriate  - Encourage maximum independence but intervene and supervise when necessary  - Involve family in performance of ADLs  - Assess for home care needs following discharge   - Consider OT consult to assist with ADL evaluation and planning for discharge  - Provide patient education as appropriate  Outcome: Progressing  Goal: Maintain or return mobility status to optimal level  Description  INTERVENTIONS:  - Assess patient's baseline mobility status (ambulation, transfers, stairs, etc )    - Identify cognitive and physical deficits and behaviors that affect mobility  - Identify mobility aids required to assist with transfers and/or ambulation (gait belt, sit-to-stand, lift, walker, cane, etc )  - [place] fall precautions as indicated by assessment  - Record patient progress and toleration of activity level on Mobility SBAR; progress patient to next Phase/Stage  - Instruct patient to call for assistance with activity based on assessment  - Consider rehabilitation consult to assist with strengthening/weightbearing, etc   Outcome: Progressing     Problem: DISCHARGE PLANNING  Goal: Discharge to home or other facility with appropriate resources  Description  INTERVENTIONS:  - Identify barriers to discharge w/patient and caregiver  - Arrange for needed discharge resources and transportation as appropriate  - Identify discharge learning needs (meds, wound care, etc )  - Arrange for interpretive services to assist at discharge as needed  - Refer to Case Management Department for coordinating discharge planning if the patient needs post-hospital services based on physician/advanced practitioner order or complex needs related to functional status, cognitive ability, or social support system  Outcome: Progressing     Problem: Knowledge Deficit  Goal: Patient/family/caregiver demonstrates understanding of disease process, treatment plan, medications, and discharge instructions  Description  Complete learning assessment and assess knowledge base    Interventions:  - Provide teaching at level of understanding  - Provide teaching via preferred learning methods  Outcome: Progressing     Problem: Prexisting or High Potential for Compromised Skin Integrity  Goal: Skin integrity is maintained or improved  Description  INTERVENTIONS:  - Identify patients at risk for skin breakdown  - Assess and monitor skin integrity  - Assess and monitor nutrition and hydration status  - Monitor labs   - Assess for incontinence   - Turn and reposition patient  - Assist with mobility/ambulation  - Relieve pressure over bony prominences  - Avoid friction and shearing  - Provide appropriate hygiene as needed including keeping skin clean and dry  - Evaluate need for skin moisturizer/barrier cream  - Collaborate with interdisciplinary team   - Patient/family teaching  - Consider wound care consult   Outcome: Progressing

## 2020-01-26 RX ADMIN — AMIODARONE HYDROCHLORIDE 200 MG: 100 TABLET ORAL at 14:09

## 2020-01-26 RX ADMIN — ATORVASTATIN CALCIUM 40 MG: 40 TABLET, FILM COATED ORAL at 17:12

## 2020-01-26 RX ADMIN — DORZOLAMIDE HYDROCHLORIDE AND TIMOLOL MALEATE 1 DROP: 20; 5 SOLUTION/ DROPS OPHTHALMIC at 08:38

## 2020-01-26 RX ADMIN — APIXABAN 5 MG: 5 TABLET, FILM COATED ORAL at 17:12

## 2020-01-26 RX ADMIN — AMIODARONE HYDROCHLORIDE 200 MG: 100 TABLET ORAL at 21:02

## 2020-01-26 RX ADMIN — APIXABAN 5 MG: 5 TABLET, FILM COATED ORAL at 08:39

## 2020-01-26 RX ADMIN — AMIODARONE HYDROCHLORIDE 200 MG: 100 TABLET ORAL at 05:13

## 2020-01-26 RX ADMIN — METOPROLOL SUCCINATE 50 MG: 50 TABLET, EXTENDED RELEASE ORAL at 21:02

## 2020-01-26 RX ADMIN — LISINOPRIL 5 MG: 5 TABLET ORAL at 08:39

## 2020-01-26 RX ADMIN — METOPROLOL SUCCINATE 50 MG: 50 TABLET, EXTENDED RELEASE ORAL at 08:39

## 2020-01-26 RX ADMIN — DORZOLAMIDE HYDROCHLORIDE AND TIMOLOL MALEATE 1 DROP: 20; 5 SOLUTION/ DROPS OPHTHALMIC at 17:12

## 2020-01-26 NOTE — PLAN OF CARE
Problem: Potential for Falls  Goal: Patient will remain free of falls  Description  INTERVENTIONS:  - Assess patient frequently for physical needs  -  Identify cognitive and physical deficits and behaviors that affect risk of falls  -  [place] fall precautions as indicated by assessment   - Educate patient/family on patient safety including physical limitations  - Instruct patient to call for assistance with activity based on assessment  - Modify environment to reduce risk of injury  - Consider OT/PT consult to assist with strengthening/mobility  Outcome: Progressing     Problem: Nutrition/Hydration-ADULT  Goal: Nutrient/Hydration intake appropriate for improving, restoring or maintaining nutritional needs  Description  Monitor and assess patient's nutrition/hydration status for malnutrition  Collaborate with interdisciplinary team and initiate plan and interventions as ordered  Monitor patient's weight and dietary intake as ordered or per policy  Utilize nutrition screening tool and intervene as necessary  Determine patient's food preferences and provide high-protein, high-caloric foods as appropriate       INTERVENTIONS:  - Monitor oral intake, urinary output, labs, and treatment plans  - Assess nutrition and hydration status and recommend course of action  - Evaluate amount of meals eaten  - Assist patient with eating if necessary   - Allow adequate time for meals  - Recommend/ encourage appropriate diets, oral nutritional supplements, and vitamin/mineral supplements  - Order, calculate, and assess calorie counts as needed  - Recommend, monitor, and adjust tube feedings and TPN/PPN based on assessed needs  - Assess need for intravenous fluids  - Provide specific nutrition/hydration education as appropriate  - Include patient/family/caregiver in decisions related to nutrition  Outcome: Progressing     Problem: PAIN - ADULT  Goal: Verbalizes/displays adequate comfort level or baseline comfort level  Description  Interventions:  - Encourage patient to monitor pain and request assistance  - Assess pain using appropriate pain scale  - Administer analgesics based on type and severity of pain and evaluate response  - Implement non-pharmacological measures as appropriate and evaluate response  - Consider cultural and social influences on pain and pain management  - Notify physician/advanced practitioner if interventions unsuccessful or patient reports new pain  Outcome: Progressing     Problem: INFECTION - ADULT  Goal: Absence or prevention of progression during hospitalization  Description  INTERVENTIONS:  - Assess and monitor for signs and symptoms of infection  - Monitor lab/diagnostic results  - Monitor all insertion sites, i e  indwelling lines, tubes, and drains  - Monitor endotracheal if appropriate and nasal secretions for changes in amount and color  - [place] appropriate cooling/warming therapies per order  - Administer medications as ordered  - Instruct and encourage patient and family to use good hand hygiene technique  - Identify and instruct in appropriate isolation precautions for identified infection/condition  Outcome: Progressing  Goal: Absence of fever/infection during neutropenic period  Description  INTERVENTIONS:  - Monitor WBC    Outcome: Progressing     Problem: SAFETY ADULT  Goal: Maintain or return to baseline ADL function  Description  INTERVENTIONS:  -  Assess patient's ability to carry out ADLs; assess patient's baseline for ADL function and identify physical deficits which impact ability to perform ADLs (bathing, care of mouth/teeth, toileting, grooming, dressing, etc )  - Assess/evaluate cause of self-care deficits   - Assess range of motion  - Assess patient's mobility; develop plan if impaired  - Assess patient's need for assistive devices and provide as appropriate  - Encourage maximum independence but intervene and supervise when necessary  - Involve family in performance of ADLs  - Assess for home care needs following discharge   - Consider OT consult to assist with ADL evaluation and planning for discharge  - Provide patient education as appropriate  Outcome: Progressing  Goal: Maintain or return mobility status to optimal level  Description  INTERVENTIONS:  - Assess patient's baseline mobility status (ambulation, transfers, stairs, etc )    - Identify cognitive and physical deficits and behaviors that affect mobility  - Identify mobility aids required to assist with transfers and/or ambulation (gait belt, sit-to-stand, lift, walker, cane, etc )  - [place] fall precautions as indicated by assessment  - Record patient progress and toleration of activity level on Mobility SBAR; progress patient to next Phase/Stage  - Instruct patient to call for assistance with activity based on assessment  - Consider rehabilitation consult to assist with strengthening/weightbearing, etc   Outcome: Progressing     Problem: DISCHARGE PLANNING  Goal: Discharge to home or other facility with appropriate resources  Description  INTERVENTIONS:  - Identify barriers to discharge w/patient and caregiver  - Arrange for needed discharge resources and transportation as appropriate  - Identify discharge learning needs (meds, wound care, etc )  - Arrange for interpretive services to assist at discharge as needed  - Refer to Case Management Department for coordinating discharge planning if the patient needs post-hospital services based on physician/advanced practitioner order or complex needs related to functional status, cognitive ability, or social support system  Outcome: Progressing     Problem: Knowledge Deficit  Goal: Patient/family/caregiver demonstrates understanding of disease process, treatment plan, medications, and discharge instructions  Description  Complete learning assessment and assess knowledge base    Interventions:  - Provide teaching at level of understanding  - Provide teaching via preferred learning methods  Outcome: Progressing     Problem: Prexisting or High Potential for Compromised Skin Integrity  Goal: Skin integrity is maintained or improved  Description  INTERVENTIONS:  - Identify patients at risk for skin breakdown  - Assess and monitor skin integrity  - Assess and monitor nutrition and hydration status  - Monitor labs   - Assess for incontinence   - Turn and reposition patient  - Assist with mobility/ambulation  - Relieve pressure over bony prominences  - Avoid friction and shearing  - Provide appropriate hygiene as needed including keeping skin clean and dry  - Evaluate need for skin moisturizer/barrier cream  - Collaborate with interdisciplinary team   - Patient/family teaching  - Consider wound care consult   Outcome: Progressing

## 2020-01-26 NOTE — PROGRESS NOTES
More pleasant and cooperative this afternoon  Very particular about placement of things in her room  States "I have always been like that "  SCD's on at this time , refused earlier due to expecting visitors  Ambulated RW/S to dining room earlier for dinner, needs verbal cues to slow down and reinforced safety precautions

## 2020-01-26 NOTE — NURSING NOTE
Patient resting comfortably in bed at this time  No signs of distress noted  Patient was able to ambulate to the bathroom with one assist for contact guard overnight  Contact guard assist for personal hygiene  Patient wore SCDs as ordered for DVT prophylaxis  Patient remains forgetful at times bed alarm and close supervision in place to promote patient safety  Patient was encouraged to reposition self frequently overnight to promote skin integrity  Call bell within reach  Will continue to monitor patient and follow plan of care

## 2020-01-26 NOTE — PROGRESS NOTES
More pleasant and cooperative this afternoon  Ambulated down to dining room supervision with walker, does need verbal cues at times to slow down for safety with RW  Non complaining at this time  SCD's maintained to BLE, refused earlier due to upcoming visitors per pt

## 2020-01-26 NOTE — PROGRESS NOTES
01/26/20 0905   Charting Type   Charting Type Shift assessment   Neurological   Neuro (WDL) X   Level of Consciousness Alert/awake   Orientation Level Oriented X4   Cognition Follows commands; Short term memory loss   Extraocular Movements Full   Speech Expressive aphasia   Pupil Assessment Yes   R Pupil Size (mm) 2   R Pupil Shape Round   R Pupil Reaction Brisk   L Pupil Size (mm) 3   L Pupil Shape Round   L Pupil Reaction Brisk   Muscle Function/Sensation Assessment ;Motor response   R Hand  Moderate   L Hand  Moderate   RUE Motor Response Responds to commands   RUE Muscle Strength 5- Normal strength   LUE Muscle Strength 5- Normal strength   RLE Muscle Strength 5- Normal strength   LLE Muscle Strength 5- Normal strength   Neuro Symptoms Anxiety; Agitation;Irritable   Relieved by Relaxation techniques (Comment)   Neuro Additional Assessments No   [place] Coma Scale   Eye Opening 4   Best Verbal Response 5   Best Motor Response 6   [place] Coma Scale Score 15   HEENT   HEENT (WDL) X   R Eye Mildly impaired vision   L Eye Mildly impaired vision   R Ear Intact   L Ear Intact   Teeth Missing teeth   Respiratory   Respiratory (WDL) WDL   Respiratory Pattern Normal   Chest Assessment Chest expansion symmetrical   Bilateral Breath Sounds Clear   Respiratory Interventions   Respiratory Interventions Cough and deep breathe;Incentive spirometry   Cough and Deep Breathe   Cough and Deep Breathe Yes   Cardiac   Cardiac (WDL) X   Cardiac Regularity Irregular   Heart Sounds No adventitious heart sounds   Jugular Venous Distention (JVD) No   Cardiac Symptoms None   Chest Pain Present No   Pacemaker/ICD No   Pain Assessment   Pain Assessment 0-10   Pain Score No Pain   [place] FACES Pain Rating 0   Peripheral Vascular   Peripheral Vascular (WDL) X   Cyanosis None   Edema Right lower extremity; Left lower extremity   RLE Edema Trace   LLE Edema Trace   PVS Additional Assessments No   RUE Neurovascular Assessment   R Radial Pulse +2   LUE Neurovascular Assessment   L Radial Pulse +2   RLE Neurovascular Assessment   R Pedal Pulse +2   LLE Neurovascular Assessment   L Pedal Pulse +2   Integumentary   Integumentary (WDL) X   Skin Condition/Temp Warm;Dry   Skin Integrity Bruising   Skin Location   (arms and hands)   Skin Turgor Non-tenting   Integumentary Additional Assessments No   Tattoos/Piercings   Does patient have tattoos? No   [place]an Scale   Sensory Perceptions 4   Moisture 3   Activity 3   Mobility 3   Nutrition 3   Friction and Shear 2   [place]an Scale Score 18   Musculoskeletal   Musculoskeletal (WDL) X   Level of Assistance Standby assist, set-up cues, supervision of patient - no hands on   Assistive Device Front wheel walker   Musculoskeletal Additional Assessments No   Gastrointestinal   Gastrointestinal (WDL) X   Abdomen Inspection Soft;Obese   Bowel Sounds (All Quadrants) Normoactive   Tenderness Nontender; Soft   Passing Flatus Yes   GI Symptoms None   Gastrointestinal Additional Assessments No   Bowel Shift Assessment   Assistance Needed None   Bowel Incontinence No   Stool Assessment   Bowel Incontinence No   Genitourinary   Genitourinary (WDL) WDL   Genitourinary Symptoms None   Bladder Shift Assessment   Bladder Device None   Bladder Incontinence No   Bladder Management Supervision/setup   Bladder Management Deficit Grab bar use   Urine Assessment   Urinary Incontinence No   Urine Color Yellow/straw   Urine Appearance Clear   Urine Odor No odor   Genitalia   Female Genitalia Intact   Anal/Rectal   Anal/Rectal (WDL) WDL   Psychosocial   Psychosocial (WDL) WDL   Patient Behaviors/Mood Anxious;Irritable   Needs Expressed Denies   Ability to Express Feelings Able to express   Ability to Express Needs Needs assistance   Ability to Express Thoughts Needs assistance   Ability to Understand Others Usually understands   [place] Suicide Severity Rating Scale   1  Wish to be Dead No   2  Suicidal Thoughts Never   6   Suicide Behavior Question Never   *Risk Level* Low Risk   Cough   Cough None

## 2020-01-26 NOTE — PROGRESS NOTES
Anxious and agitated on initial rounds  States she didn't sleep well   Several complaints about room and bathroom not being kept clean, fruit punch given on breakfast tray which she does not like, bedside table on opposite side of bed etc   Room appears clean,  notified and made aware of pt's complaint and cleaned  Occ expressive aphasia  Denies weakness of extremities  Transfers CGA/RW to chair  Self a m  Bath and dressing with supervision  Pt calm after bath and breakfast  Back to bed per pt request   Initially refused SCD's but explained reason for same and agreed to wear them  Pt  Calm and pleasant at this time  Son visiting   Callbell within reach  Reviewed meds, side effects and stroke education along with safety  Continue to monitor frequently

## 2020-01-27 PROCEDURE — 97130 THER IVNTJ EA ADDL 15 MIN: CPT

## 2020-01-27 PROCEDURE — 92526 ORAL FUNCTION THERAPY: CPT

## 2020-01-27 PROCEDURE — 97110 THERAPEUTIC EXERCISES: CPT

## 2020-01-27 PROCEDURE — 97129 THER IVNTJ 1ST 15 MIN: CPT

## 2020-01-27 PROCEDURE — 97116 GAIT TRAINING THERAPY: CPT

## 2020-01-27 PROCEDURE — 99232 SBSQ HOSP IP/OBS MODERATE 35: CPT | Performed by: PHYSICAL MEDICINE & REHABILITATION

## 2020-01-27 PROCEDURE — 97535 SELF CARE MNGMENT TRAINING: CPT

## 2020-01-27 PROCEDURE — 97530 THERAPEUTIC ACTIVITIES: CPT

## 2020-01-27 PROCEDURE — 97537 COMMUNITY/WORK REINTEGRATION: CPT

## 2020-01-27 RX ADMIN — AMIODARONE HYDROCHLORIDE 200 MG: 100 TABLET ORAL at 05:19

## 2020-01-27 RX ADMIN — AMIODARONE HYDROCHLORIDE 200 MG: 100 TABLET ORAL at 17:11

## 2020-01-27 RX ADMIN — DORZOLAMIDE HYDROCHLORIDE AND TIMOLOL MALEATE 1 DROP: 20; 5 SOLUTION/ DROPS OPHTHALMIC at 08:46

## 2020-01-27 RX ADMIN — ATORVASTATIN CALCIUM 40 MG: 40 TABLET, FILM COATED ORAL at 17:10

## 2020-01-27 RX ADMIN — METOPROLOL SUCCINATE 50 MG: 50 TABLET, EXTENDED RELEASE ORAL at 08:46

## 2020-01-27 RX ADMIN — APIXABAN 5 MG: 5 TABLET, FILM COATED ORAL at 17:11

## 2020-01-27 RX ADMIN — LISINOPRIL 5 MG: 5 TABLET ORAL at 08:46

## 2020-01-27 RX ADMIN — APIXABAN 5 MG: 5 TABLET, FILM COATED ORAL at 08:46

## 2020-01-27 RX ADMIN — DORZOLAMIDE HYDROCHLORIDE AND TIMOLOL MALEATE 1 DROP: 20; 5 SOLUTION/ DROPS OPHTHALMIC at 17:10

## 2020-01-27 NOTE — PROGRESS NOTES
01/27/20 1136   Pain Assessment   Pain Assessment No/denies pain   Pain Score No Pain   Restrictions/Precautions   Precautions Aphasia; Aspiration; Fall Risk;Cognitive   Cognition   Overall Cognitive Status Impaired   Arousal/Participation Alert; Cooperative   Attention Attends with cues to redirect   Following Commands Follows one step commands without difficulty   Sit to Lying   Type of Assistance Needed Supervision   Sit to Lying CARE Score 4   Lying to Sitting on Side of Bed   Type of Assistance Needed Supervision   Lying to Sitting on Side of Bed CARE Score 4   Sit to Stand   Type of Assistance Needed Supervision   Sit to Stand CARE Score 4   Bed-Chair Transfer   Type of Assistance Needed Supervision   Chair/Bed-to-Chair Transfer CARE Score 4   Transfer Bed/Chair/Wheelchair   Limitations Noted In Balance;LE Strength; Endurance   Adaptive Equipment Roller Walker   Stand Pivot Supervision   Sit to Stand Supervision   Stand to Sit Supervision   Supine to Sit Supervision   Sit to Supine Supervision   All Transfer Supervision   Walk 10 Feet   Type of Assistance Needed Supervision   Walk 10 Feet CARE Score 4   Walk 50 Feet with Two Turns   Type of Assistance Needed Supervision   Walk 50 Feet with Two Turns CARE Score 4   Walk 150 Feet   Type of Assistance Needed Supervision   Walk 150 Feet CARE Score 4   Walking 10 Feet on Uneven Surfaces   Type of Assistance Needed Supervision   Walking 10 Feet on Uneven Surfaces CARE Score 4   Ambulation   Does the patient walk? 2  Yes   Primary Mode of Locomotion Prior to Admission Walk   Distance Walked (feet)   (589' 152' 135')   Assist Device Roller Walker   Gait Pattern Inconsistant [place]   Limitations Noted In Balance; Safety; Endurance   Provided Assistance with: Balance   Walk Assist Level Close Supervision   Findings   (level and carpet )   Curb or Single Stair   Style negotiated Single stair   Type of Assistance Needed Supervision   1 Step (Curb) CARE Score 4   4 Steps Type of Assistance Needed Supervision   4 Steps CARE Score 4   12 Steps   Type of Assistance Needed Supervision   12 Steps CARE Score 4   Stairs   Type Stairs   # of Steps   (14)   Assist Devices Bilateral Rail   Findings   (S)   Therapeutic Interventions   Strengthening supine TE performed to tolerance    Other Gait and transfer training, community re-integration    Assessment   Treatment Assessment Pt tolerated therapy well  Completed supine TE for general LE strengthening and conditioning  Worked on transfer training and gait training using RW for longest distance of 80' with therapist providing close S assistance  Pt does need VC at times for improved safety and appropriate gait speed  Pt ambulates with an inconsistent gait pattern and fatigues easily  Completed 14 steps with 2 HR with S assistance  She continues to make functional progress and will benefit from additional skilled PT services to return Pt back to her PLOF  PT Barriers   Physical Impairment Decreased strength;Decreased range of motion;Decreased endurance; Impaired balance;Decreased mobility; Impaired judgement;Decreased safety awareness;Decreased cognition   Functional Limitation Walking;Transfers;Standing;Stair negotiation   Plan   Treatment/Interventions Functional transfer training;LE strengthening/ROM; Therapeutic exercise; Endurance training;Patient/family training;Bed mobility;Gait training; Compensatory technique education;Cognitive reorientation   Progress Progressing toward goals   PT Therapy Minutes   PT Time In 1136   PT Time Out 1232   PT Total Time (minutes) 56   PT Mode of treatment - Individual (minutes) 56   PT Mode of treatment - Concurrent (minutes) 0   PT Mode of treatment - Group (minutes) 0   PT Mode of treatment - Co-treat (minutes) 0   PT Mode of Treatment - Total time(minutes) 56 minutes   PT Cumulative Minutes 241   Therapy Time missed   Time missed?  No

## 2020-01-27 NOTE — PROGRESS NOTES
Addendum: Actual treatment time 8:59-10:30 with incorrect column completed by SAMM/L      01/27/20 9937   Pain Assessment   Pain Assessment No/denies pain   Restrictions/Precautions   Precautions Aphasia; Aspiration; Fall Risk;Cognitive   Oral Hygiene   Type of Assistance Needed Supervision   Comment standing at the sink   Oral Hygiene CARE Score 4   Grooming   Able To Initiate Tasks;Comb/Brush Hair;Wash/Dry Face;Brush/Clean Teeth;Wash/Dry Hands   Shower/Bathe Self   Type of Assistance Needed Supervision   Shower/Bathe Self CARE Score 4   Bathing   Assessed Bath Style Shower   Anticipated D/C Bath Style Shower   Able to [place] Anam No   Able to [employer] Temperature No   Able to Wash/Rinse/Dry (body part) Left Arm;Right Arm;L Upper Leg;L Lower Leg/Foot;R Upper Leg;R Lower Leg/Foot;Chest;Abdomen;Perineal Area; Buttocks   Limitations Noted in Endurance;Problem Solving; Safety; Sequencing   Tub/Shower Transfer   Limitations Noted In Safety   Adaptive Equipment Seat with Back;Grab Bars   Assessed Shower   Findings Supervision   Upper Body Dressing   Type of Assistance Needed Supervision   Upper Body Dressing CARE Score 4   Lower Body Dressing   Type of Assistance Needed Supervision   Lower Body Dressing CARE Score 4   Putting On/Taking Off Footwear   Type of Assistance Needed Physical assistance   Amount of Physical Assistance Provided Less than 25%   Comment shoes and socks on tub bench that is higher than bed in room   Putting On/Taking Off Footwear CARE Score 3   Picking Up Object   Type of Assistance Needed Incidental touching   Picking Up Object CARE Score 4   Dressing/Undressing Clothing   Remove UB Clothes Pullover Shirt   Don UB Clothes Pullover Shirt   Remove LB Clothes Pants; Undergarment;Socks   Don LB Clothes Pants; Undergarment;Socks; Shoes   Limitations Noted In Endurance;ROM;Safety; Sequencing;Problem Solving   Sit to Lying   Type of Assistance Needed Supervision   Sit to Lying CARE Score 4   Lying to Sitting on Side of Bed   Type of Assistance Needed Supervision   Lying to Sitting on Side of Bed CARE Score 4   Sit to Stand   Type of Assistance Needed Supervision   Sit to Stand CARE Score 4   Bed-Chair Transfer   Type of Assistance Needed Supervision   Chair/Bed-to-Chair Transfer CARE Score 4   Light Housekeeping   Light Housekeeping Level of Assistance Close supervision;Contact guard   Light Housekeeping retrieval and transport of ADL items, S with RW and CGA without in the room   Exercise Tools   Other Exercise Tool 1 pt able to copy prices of items and calculate the total needed for shopping list, difficulty writing numbers exactly and correcting mistakes requiring miin A and verbal cues   Cognition   Overall Cognitive Status Impaired   Arousal/Participation Alert; Responsive; Cooperative   Attention Attends with cues to redirect   Orientation Level Oriented to person;Oriented to place;Oriented to situation   Memory Decreased recall of recent events   Comments pt becomes easily distracted during tasks focusing on negative areas such as not liking diet texture or choices, TV not working, room not being clean and the temperature not comfortable  Pt redirected with activity and encouraged to keep trying to build skills needed to attend to tasks   Additional Activities   Additional Activities Other (Comment)   Additional Activities Comments fxl mobility without DME CGA and with RW S in room and to/ from shower and OT room   Assessment   Treatment Assessment Pt agreeable to an ADL preferring to shower after discussion about picture of TV, work order completed and the other TV remote issued to allow for use until picture adjusted on TV  Pt completed tasks with S/ min A requiring assist due to increased height of bench for donning socks and shoes and cues for attention to task and completion due to decreased cognition and frustration due to expressive aphasia   Pt is making gains towards goals and will benefit from continued skilled OT services to increase independence with daily tasks  Problem List Decreased range of motion;Decreased endurance; Impaired balance;Decreased cognition;Decreased safety awareness   Plan   Treatment/Interventions ADL retraining;Functional transfer training; Therapeutic exercise; Endurance training;Patient/family training; Compensatory technique education   Progress Progressing toward goals   OT Therapy Minutes   OT Time In 2135   OT Time Out 1030   OT Total Time (minutes) 91   OT Mode of treatment - Individual (minutes) 91   Therapy Time missed   Time missed?  No

## 2020-01-27 NOTE — PLAN OF CARE
Problem: Potential for Falls  Goal: Patient will remain free of falls  Description  INTERVENTIONS:  - Assess patient frequently for physical needs  -  Identify cognitive and physical deficits and behaviors that affect risk of falls  -  [place] fall precautions as indicated by assessment   - Educate patient/family on patient safety including physical limitations  - Instruct patient to call for assistance with activity based on assessment  - Modify environment to reduce risk of injury  - Consider OT/PT consult to assist with strengthening/mobility  Outcome: Progressing     Problem: Nutrition/Hydration-ADULT  Goal: Nutrient/Hydration intake appropriate for improving, restoring or maintaining nutritional needs  Description  Monitor and assess patient's nutrition/hydration status for malnutrition  Collaborate with interdisciplinary team and initiate plan and interventions as ordered  Monitor patient's weight and dietary intake as ordered or per policy  Utilize nutrition screening tool and intervene as necessary  Determine patient's food preferences and provide high-protein, high-caloric foods as appropriate       INTERVENTIONS:  - Monitor oral intake, urinary output, labs, and treatment plans  - Assess nutrition and hydration status and recommend course of action  - Evaluate amount of meals eaten  - Assist patient with eating if necessary   - Allow adequate time for meals  - Recommend/ encourage appropriate diets, oral nutritional supplements, and vitamin/mineral supplements  - Order, calculate, and assess calorie counts as needed  - Recommend, monitor, and adjust tube feedings and TPN/PPN based on assessed needs  - Assess need for intravenous fluids  - Provide specific nutrition/hydration education as appropriate  - Include patient/family/caregiver in decisions related to nutrition  Outcome: Progressing     Problem: PAIN - ADULT  Goal: Verbalizes/displays adequate comfort level or baseline comfort level  Description  Interventions:  - Encourage patient to monitor pain and request assistance  - Assess pain using appropriate pain scale  - Administer analgesics based on type and severity of pain and evaluate response  - Implement non-pharmacological measures as appropriate and evaluate response  - Consider cultural and social influences on pain and pain management  - Notify physician/advanced practitioner if interventions unsuccessful or patient reports new pain  Outcome: Progressing     Problem: INFECTION - ADULT  Goal: Absence or prevention of progression during hospitalization  Description  INTERVENTIONS:  - Assess and monitor for signs and symptoms of infection  - Monitor lab/diagnostic results  - Monitor all insertion sites, i e  indwelling lines, tubes, and drains  - Monitor endotracheal if appropriate and nasal secretions for changes in amount and color  - [place] appropriate cooling/warming therapies per order  - Administer medications as ordered  - Instruct and encourage patient and family to use good hand hygiene technique  - Identify and instruct in appropriate isolation precautions for identified infection/condition  Outcome: Progressing  Goal: Absence of fever/infection during neutropenic period  Description  INTERVENTIONS:  - Monitor WBC    Outcome: Progressing     Problem: SAFETY ADULT  Goal: Maintain or return to baseline ADL function  Description  INTERVENTIONS:  -  Assess patient's ability to carry out ADLs; assess patient's baseline for ADL function and identify physical deficits which impact ability to perform ADLs (bathing, care of mouth/teeth, toileting, grooming, dressing, etc )  - Assess/evaluate cause of self-care deficits   - Assess range of motion  - Assess patient's mobility; develop plan if impaired  - Assess patient's need for assistive devices and provide as appropriate  - Encourage maximum independence but intervene and supervise when necessary  - Involve family in performance of ADLs  - Assess for home care needs following discharge   - Consider OT consult to assist with ADL evaluation and planning for discharge  - Provide patient education as appropriate  Outcome: Progressing  Goal: Maintain or return mobility status to optimal level  Description  INTERVENTIONS:  - Assess patient's baseline mobility status (ambulation, transfers, stairs, etc )    - Identify cognitive and physical deficits and behaviors that affect mobility  - Identify mobility aids required to assist with transfers and/or ambulation (gait belt, sit-to-stand, lift, walker, cane, etc )  - [place] fall precautions as indicated by assessment  - Record patient progress and toleration of activity level on Mobility SBAR; progress patient to next Phase/Stage  - Instruct patient to call for assistance with activity based on assessment  - Consider rehabilitation consult to assist with strengthening/weightbearing, etc   Outcome: Progressing     Problem: DISCHARGE PLANNING  Goal: Discharge to home or other facility with appropriate resources  Description  INTERVENTIONS:  - Identify barriers to discharge w/patient and caregiver  - Arrange for needed discharge resources and transportation as appropriate  - Identify discharge learning needs (meds, wound care, etc )  - Arrange for interpretive services to assist at discharge as needed  - Refer to Case Management Department for coordinating discharge planning if the patient needs post-hospital services based on physician/advanced practitioner order or complex needs related to functional status, cognitive ability, or social support system  Outcome: Progressing     Problem: Knowledge Deficit  Goal: Patient/family/caregiver demonstrates understanding of disease process, treatment plan, medications, and discharge instructions  Description  Complete learning assessment and assess knowledge base    Interventions:  - Provide teaching at level of understanding  - Provide teaching via preferred learning methods  Outcome: Progressing     Problem: Prexisting or High Potential for Compromised Skin Integrity  Goal: Skin integrity is maintained or improved  Description  INTERVENTIONS:  - Identify patients at risk for skin breakdown  - Assess and monitor skin integrity  - Assess and monitor nutrition and hydration status  - Monitor labs   - Assess for incontinence   - Turn and reposition patient  - Assist with mobility/ambulation  - Relieve pressure over bony prominences  - Avoid friction and shearing  - Provide appropriate hygiene as needed including keeping skin clean and dry  - Evaluate need for skin moisturizer/barrier cream  - Collaborate with interdisciplinary team   - Patient/family teaching  - Consider wound care consult   Outcome: Progressing

## 2020-01-27 NOTE — PROGRESS NOTES
PM&R Progress Note:    Subjective: Patient seen face to face  Reports she feels tired today  Patient was seen in her physical therapy session ambulating with a rolling walker witnessed 75 ft at a supervision level  She denies chest pain, shortness of breath  Review of Systems: Pertinent positives in subjective, all other ROS reviewed are negative  Objective:   /80   Pulse 66   Temp 98 5 °F (36 9 °C) (Temporal)   Resp 18   Ht 5' 6" (1 676 m)   Wt 114 kg (251 lb 5 2 oz)   SpO2 93%   BMI 40 56 kg/m²     Physical Exam   Constitutional: She is oriented to person, place, and time  She appears well-developed and well-nourished  HENT:   Head: Normocephalic and atraumatic  Eyes: Pupils are equal, round, and reactive to light  EOM are normal    Cardiovascular: Normal rate and regular rhythm  Pulmonary/Chest: Breath sounds normal  She has no wheezes  She has no rales  Abdominal: Soft  Bowel sounds are normal  She exhibits no distension  There is no tenderness  Musculoskeletal: She exhibits edema (Lymphedema legs)  Neurological: She is alert and oriented to person, place, and time  Speech is dysarthric but fluent  Sensation light touch is intact x4  Motor exam:  Bilateral upper extremity exam:  4/5 throughout  Left lower extremity: 4/5  Right lower extremity:  3+/5 proximally, 4-/5 distally  Antalgic gait pattern with rolling walker   Skin: Skin is warm  Psychiatric: She has a normal mood and affect  Nursing note and vitals reviewed  Assessment/Plan: Nataly May a 60 y  o  female with cardiomyopathy with EF of 30%, hypertension, anemia, glaucoma who sustained a L MCA territory infarct in the setting of a-fib and was started on eliquis  Patient currently in acute rehabilitation for impaired mobility, self-care, swallow, cognitive deficits  · Rehabilitation:  Deficits of mild right hemiparesis, dysarthria, dysphagia  Continue PT/OT/SLP    Progressing to a supervision level   Will discuss in team conference tomorrow  · Left MCA CVA:  Likely cardioembolic secondary to atrial fibrillation  Continue secondary stroke prophylaxis with Lipitor in Eliquis  · Atrial fibrillation:  Rate and rhythm controlled currently on amiodarone and Toprol XL  Anticoagulated with Eliquis  · Hypertension: managed on lisinopril and Toprol-XL  · Cardiomyopathy with EF 30%  Patient will require a stress test as an outpatient  · Glaucoma:  Continue Cosopt  · Appreciate internal medicine consultants medical comanagement        Lab Results   Component Value Date    WBC 9 70 01/24/2020    HGB 10 4 (L) 01/24/2020    HCT 34 5 (L) 01/24/2020    MCV 75 (L) 01/24/2020     01/24/2020     Lab Results   Component Value Date    SODIUM 139 01/21/2020    K 3 8 01/21/2020     (H) 01/21/2020    CO2 21 01/21/2020    BUN 11 01/21/2020    CREATININE 0 87 01/21/2020    GLUC 81 01/21/2020    CALCIUM 7 7 (L) 01/21/2020     Lab Results   Component Value Date    INR 1 13 01/21/2020    INR 1 08 01/18/2020    INR 1 02 01/17/2020    PROTIME 13 1 (H) 01/21/2020    PROTIME 12 5 01/18/2020    PROTIME 11 8 01/17/2020           Current Facility-Administered Medications:     acetaminophen (TYLENOL) tablet 650 mg, 650 mg, Oral, Q6H PRN, Melecio Arceo MD    [COMPLETED] amiodarone tablet 200 mg, 200 mg, Oral, Q8H, 200 mg at 01/26/20 2102 **FOLLOWED BY** amiodarone tablet 200 mg, 200 mg, Oral, Q12H, 200 mg at 01/27/20 0519 **FOLLOWED BY** [START ON 2/3/2020] amiodarone tablet 200 mg, 200 mg, Oral, Daily With Breakfast, Melecio Arceo MD    apixaban (ELIQUIS) tablet 5 mg, 5 mg, Oral, BID, Melecio Arceo MD, 5 mg at 01/27/20 0846    atorvastatin (LIPITOR) tablet 40 mg, 40 mg, Oral, QPM, Melecio Arceo MD, 40 mg at 01/26/20 1712    dorzolamide-timolol (COSOPT) 22 3-6 8 MG/ML ophthalmic solution 1 drop, 1 drop, Both Eyes, BID, Melecio Arceo MD, 1 drop at 01/27/20 0873    lisinopril (ZESTRIL) tablet 5 mg, 5 mg, Oral, Daily, Bhupinder Connor Stringer MD, 5 mg at 01/27/20 0846    metoprolol succinate (TOPROL-XL) 24 hr tablet 50 mg, 50 mg, Oral, Q12H, Lyly Dooley MD, 50 mg at 01/27/20 0846    polyethylene glycol (MIRALAX) packet 17 g, 17 g, Oral, Daily PRN, Lyly Dooley MD    Past Medical History:   Diagnosis Date    Hypertension     PUD (peptic ulcer disease)     Stroke ([place] [address] )     Vitamin D deficiency        Patient Active Problem List    Diagnosis Date Noted    Glaucoma 01/23/2020    Dysphagia 01/23/2020    Cardiomyopathy ([place] [address] ) 01/23/2020    Prolonged QT interval 01/23/2020    History of pulmonary embolism 01/23/2020    Leukocytosis 01/23/2020    Anemia 01/23/2020    Hypomagnesemia 01/23/2020    IFG (impaired fasting glucose) 01/23/2020    Hypocalcemia 01/23/2020    A-fib ([place] [address] ) 01/17/2020    CVA (cerebral vascular accident) ([place] [address] ) 01/17/2020    Essential hypertension 06/03/2019          Gennaro Mitchell MD  PM&R Attending

## 2020-01-27 NOTE — PCC PHYSICAL THERAPY
1/27/19: Pt is progressing well towards therapy goals  Current level of function includes S for bed mobility, S for transfers, close S ambulation with RW for distance of 169', and S while navigating 14 steps with 2 HR  Pt remains limited by her speech, cognition, decreased safety, impaired strength, balance, and decreased endurance/ activity tolerance  Pt will continue to benefit from skilled inpatient physical therapy to address the remaining deficits to maximize safety and functional independence for anticipated D/C to home  2/3/20: Pt is progressing well towards therapy goals  Current level of function includes MI for bed mobility and transfers, S ambulation with RW for distance of 119', and S while navigating 7 steps with 2 HR  Pt was limited today due to dizziness; Pt will be receiving blood transfusion today;Pt remains limited by her speech, cognition, decreased safety, impaired strength, balance, and decreased endurance/ activity tolerance  Pt will continue to benefit from skilled inpatient physical therapy to address the remaining deficits to maximize safety and functional independence for anticipated D/C to home

## 2020-01-27 NOTE — PROGRESS NOTES
01/27/20 1650   Pain Assessment   Pain Assessment No/denies pain   Pain Score No Pain   Restrictions/Precautions   Precautions Aphasia; Fall Risk;Aspiration;Cognitive   Comprehension   Assist Devices Glasses   QI: Comprehension 3  Usually Understands: Understands most conversations, but misses some part/intent of message  Requires cues at times to understand  Comprehension (FIM) 4 - Understands basic info/conversation 75-90% of time   Expression   QI: Expression 3  Exhibits some difficulty with expressing needs and ideas (e g , some words or finishing thoughts) or speech is not clear   Expression (FIM) 3 - Expresses basic info/needs 50-74% of time   Social Interaction   Participation Small Group   Social Interaction (FIM) 4 - Interacts 75-89% of time   Problem Solving   Problem solving (FIM) 3 - Solves basic problmes 50-74% of time   Memory   Recognize People Yes   Remember Routine Yes   Initiates Tasks Yes   Memory (FIM) 4 - Recognizes/recalls/performs 75-89%   Executive Function Skills   Insight Moderate insight   Impulsive Mildly impulsive   Flexibility of Thought Reduced flexibility   Planning Reduced planning skills   Memory Skills   Orientation Level Oriented to person;Oriented to place   Short Term Working Recall Mild Impairment   Auditory Comprehension   Comphrehends Conversation Simple   Interfering Components Attention - selective   Speech/Swallow Mechanism Exam   Facial Strength Reduced   Lingual Strength Mild reduced   Mandible Other (Comment)  (reduced ROM and endurance)   Motor Speech Evaluation   Dysarthria Yes   Mild Dystharia Impaired articulation   Speech/Language/Cognition Assessmetn   Treatment Assessment Speech Pathology Daily Treatment Note - Speech/Cognitive Communication Skills - SLP focused on cognitive memory and reasoning using the skilled therapy goals as the stimulus    Patient was asked to recall the procedures performed during therapy across disciplines, how they relate to current goals and how they will facilitate the ability to return to the previous level of function  Patient's daily schedule was utilized as a visual aid to promote recall of the days events and procedures performed during treatment  SLP then targeted setting goals for the week in speech therapy as well as across disciplines  Focus was on reasoning the current level, anticipating the final outcome and planning goals with reasonable outcomes for this week  Patient presented as min assist for comprehension of task and mod assist for sequencing new procedures  She required mod assist for goal planning based on level of redirection to task and reasoning current deficits and level of necessary assistance  Swallow Information   Current Risks for Dysphagia & Aspiration Dysarthria;General debilitation;New Neuro event   Current Symptoms/Concerns Difficulty chewing   Current Diet Dysphagia mechanical soft   Consistencies Assessed and Performance   Oral Stage Moderate impaired   Oral Stage Comment   (extended oral phase)   Phargngeal Stage Mild impaired   Recommendations   Diet Solid Recommendation Level 3 Dysphagia/ advanced/ soft to chew  (trials)   Diet Liquid Recommendation Thin liquid   General Precautions Aspiration precautions; Feed only when alert;Minimize distractions;Upright as possible for all oral intake   Eating   Type of Assistance Needed Supervision   Eating CARE Score 4   Swallow Assessment   Swallow Treatment Assessment Speech Pathology Daily Treatment Note - Swallow Oral Function - Focus of dysphagia therapy this session was oral phase management of solids  Therapist targeted identification of appropriate bite and sip size for adequate management and safety  Therapist educated on the benefits of moist solids to ease oral phase breakdown and bolus formation via the use of gravy, broth or taking an initial sip to moisten the oral cavity    SLP targeted oral closure with spoon retrieval and during oral phase chewing to promote full rotary chew  SLP educated on the importance of using the tongue to transfer food anterior to posterior and to fully clear the oral cavity prior to reintroduction of bolus  SLP completed trial of Dysphagia #3 Advance, Dental Soft with thin liquids  Patient demonstrated adequate breakdown of this more complex level but set was limited to less than 25% due to dislike of the meal which is a pattern  SLP to begin upgraded texture trials of dental soft at mealtime     Swallow Assessment Prognosis   Prognosis Good   Prognosis Considerations Family/community support;Previous level of function   SLP Therapy Minutes   SLP Time In 1650   SLP Time Out 1750   SLP Total Time (minutes) 60   SLP Mode of treatment - Individual (minutes) 60   Daily FIM Score   Eating (FIM) 5 - Patient requires supervision, cueing or coaxing

## 2020-01-27 NOTE — PCC OCCUPATIONAL THERAPY
1/27/2020: Pt participates in ADLs, transfers/ mobility, light homemaking, cognitive retraining and BUE therex  Pt is making gains towards goals with current LOF as stated above  Pt requires cues and redirection due to decreased attention/ sequencing, expressive aphasia and assist due to decreased ROM/ balance, endurance and safety  Pt will benefit from continued skilled OT services to increase independence with daily tasks  02/03/2020: Pt participates in ADLs, transfers/ mobility, light homemaking, cognitive retraining and BUE therex  Pt is making gains towards goals with current LOF as stated above  Pt does require occasional assist due to decrease BP with dizziness  Pt requires cues and redirection due to decreased attention/ sequencing, expressive aphasia and assist due to decreased ROM/ balance, endurance and safety  Family training completed with son, grandson and brothers to prepare for transition to home and they report being able to provide support during transition  Pt will benefit from continued skilled OT services to increase independence with daily tasks prior to discharge to home planned for Wed

## 2020-01-27 NOTE — PROGRESS NOTES
Continues to need reminders at times to slow down for safety when ambulating with walker  Remains forgetful with expressive aphasia at times  Tolerating therapy, continue same plan of care

## 2020-01-27 NOTE — PROGRESS NOTES
01/27/20 1302   Pain Assessment   Pain Assessment No/denies pain   Pain Score No Pain   Restrictions/Precautions   Precautions Aphasia; Fall Risk;Aspiration;Cognitive   Cognition   Overall Cognitive Status Impaired   Arousal/Participation Alert; Cooperative   Attention Attends with cues to redirect   Following Commands Follows one step commands without difficulty   Transfer Bed/Chair/Wheelchair   Limitations Noted In Balance;LE Strength; Endurance   Adaptive Equipment Roller Walker   Sit to TXU [place]   Stand to Sit Supervision   Ambulation   Primary Mode of Locomotion Prior to Admission Walk   Distance Walked (feet)   (160' 125')   Assist Device Roller Walker   Gait Pattern Inconsistant [place]   Limitations Noted In Balance; Safety; Endurance   Walk Assist Level Close Supervision   Therapeutic Interventions   Strengthening seated TE performed to tolerance   Other gait training    Assessment   Treatment Assessment Pt agreeable to participate in therapy this afternoon  Completed gait training to and from therapy room with RW with some cues needed for appriorate speed and safety  Completed seated TE for general LE strengthening and conditioning  Pt states at the start of the therapt session "i'm really tired " Frequent rest breaks given throughout treatment session due to impaired fatigue and activity tolerance  PT Barriers   Physical Impairment Decreased strength;Decreased range of motion;Decreased endurance; Impaired balance;Decreased mobility; Decreased cognition; Impaired judgement;Decreased safety awareness   Functional Limitation Walking;Transfers;Standing;Stair negotiation   Plan   Treatment/Interventions Functional transfer training;LE strengthening/ROM; Therapeutic exercise; Endurance training;Patient/family training;Bed mobility;Gait training; Compensatory technique education   Progress Progressing toward goals   PT Therapy Minutes   PT Time In 1302   PT Time Out 1332   PT Total Time (minutes) 30   PT Mode of treatment - Individual (minutes) 30   PT Mode of treatment - Concurrent (minutes) 0   PT Mode of treatment - Group (minutes) 0   PT Mode of treatment - Co-treat (minutes) 0   PT Mode of Treatment - Total time(minutes) 30 minutes   PT Cumulative Minutes 271   Therapy Time missed   Time missed?  No

## 2020-01-28 PROCEDURE — 97116 GAIT TRAINING THERAPY: CPT

## 2020-01-28 PROCEDURE — 99232 SBSQ HOSP IP/OBS MODERATE 35: CPT | Performed by: PHYSICAL MEDICINE & REHABILITATION

## 2020-01-28 PROCEDURE — 92526 ORAL FUNCTION THERAPY: CPT

## 2020-01-28 PROCEDURE — 97530 THERAPEUTIC ACTIVITIES: CPT

## 2020-01-28 PROCEDURE — 97129 THER IVNTJ 1ST 15 MIN: CPT

## 2020-01-28 PROCEDURE — 92507 TX SP LANG VOICE COMM INDIV: CPT

## 2020-01-28 PROCEDURE — 97537 COMMUNITY/WORK REINTEGRATION: CPT

## 2020-01-28 PROCEDURE — 97535 SELF CARE MNGMENT TRAINING: CPT

## 2020-01-28 PROCEDURE — 97110 THERAPEUTIC EXERCISES: CPT

## 2020-01-28 RX ADMIN — ACETAMINOPHEN 650 MG: 325 TABLET ORAL at 21:17

## 2020-01-28 RX ADMIN — METOPROLOL SUCCINATE 50 MG: 50 TABLET, EXTENDED RELEASE ORAL at 08:39

## 2020-01-28 RX ADMIN — DORZOLAMIDE HYDROCHLORIDE AND TIMOLOL MALEATE 1 DROP: 20; 5 SOLUTION/ DROPS OPHTHALMIC at 17:34

## 2020-01-28 RX ADMIN — AMIODARONE HYDROCHLORIDE 200 MG: 100 TABLET ORAL at 17:30

## 2020-01-28 RX ADMIN — DORZOLAMIDE HYDROCHLORIDE AND TIMOLOL MALEATE 1 DROP: 20; 5 SOLUTION/ DROPS OPHTHALMIC at 08:40

## 2020-01-28 RX ADMIN — ATORVASTATIN CALCIUM 40 MG: 40 TABLET, FILM COATED ORAL at 17:30

## 2020-01-28 RX ADMIN — APIXABAN 5 MG: 5 TABLET, FILM COATED ORAL at 17:30

## 2020-01-28 RX ADMIN — APIXABAN 5 MG: 5 TABLET, FILM COATED ORAL at 08:39

## 2020-01-28 RX ADMIN — AMIODARONE HYDROCHLORIDE 200 MG: 100 TABLET ORAL at 06:09

## 2020-01-28 RX ADMIN — LISINOPRIL 5 MG: 5 TABLET ORAL at 08:39

## 2020-01-28 NOTE — NURSING NOTE
Pt awake resting in bed  Pt repositioned self in bed during shift  Pt gets agitated very easily emotional support provided to pt  Expressive aphasia present  Pt was asking about using a cane for ambulation vs walker, explained to pt when therapy comes in today to talk with them and will pass onto oncoming shift  Refused SCDs during shift  Explained and educated to pt importance of wearing to prevent DVT  Bed alarm in place for safety  Cont b&b  Denies pain  Continuing to monitor and follow plan of care  Items within reach

## 2020-01-28 NOTE — PLAN OF CARE
Problem: Potential for Falls  Goal: Patient will remain free of falls  Description  INTERVENTIONS:  - Assess patient frequently for physical needs  -  Identify cognitive and physical deficits and behaviors that affect risk of falls  -  [place] fall precautions as indicated by assessment   - Educate patient/family on patient safety including physical limitations  - Instruct patient to call for assistance with activity based on assessment  - Modify environment to reduce risk of injury  - Consider OT/PT consult to assist with strengthening/mobility  Outcome: Progressing     Problem: Nutrition/Hydration-ADULT  Goal: Nutrient/Hydration intake appropriate for improving, restoring or maintaining nutritional needs  Description  Monitor and assess patient's nutrition/hydration status for malnutrition  Collaborate with interdisciplinary team and initiate plan and interventions as ordered  Monitor patient's weight and dietary intake as ordered or per policy  Utilize nutrition screening tool and intervene as necessary  Determine patient's food preferences and provide high-protein, high-caloric foods as appropriate       INTERVENTIONS:  - Monitor oral intake, urinary output, labs, and treatment plans  - Assess nutrition and hydration status and recommend course of action  - Evaluate amount of meals eaten  - Assist patient with eating if necessary   - Allow adequate time for meals  - Recommend/ encourage appropriate diets, oral nutritional supplements, and vitamin/mineral supplements  - Order, calculate, and assess calorie counts as needed  - Recommend, monitor, and adjust tube feedings and TPN/PPN based on assessed needs  - Assess need for intravenous fluids  - Provide specific nutrition/hydration education as appropriate  - Include patient/family/caregiver in decisions related to nutrition  Outcome: Progressing     Problem: PAIN - ADULT  Goal: Verbalizes/displays adequate comfort level or baseline comfort level  Description  Interventions:  - Encourage patient to monitor pain and request assistance  - Assess pain using appropriate pain scale  - Administer analgesics based on type and severity of pain and evaluate response  - Implement non-pharmacological measures as appropriate and evaluate response  - Consider cultural and social influences on pain and pain management  - Notify physician/advanced practitioner if interventions unsuccessful or patient reports new pain  Outcome: Progressing     Problem: INFECTION - ADULT  Goal: Absence or prevention of progression during hospitalization  Description  INTERVENTIONS:  - Assess and monitor for signs and symptoms of infection  - Monitor lab/diagnostic results  - Monitor all insertion sites, i e  indwelling lines, tubes, and drains  - Monitor endotracheal if appropriate and nasal secretions for changes in amount and color  - [place] appropriate cooling/warming therapies per order  - Administer medications as ordered  - Instruct and encourage patient and family to use good hand hygiene technique  - Identify and instruct in appropriate isolation precautions for identified infection/condition  Outcome: Progressing  Goal: Absence of fever/infection during neutropenic period  Description  INTERVENTIONS:  - Monitor WBC    Outcome: Progressing     Problem: SAFETY ADULT  Goal: Maintain or return to baseline ADL function  Description  INTERVENTIONS:  -  Assess patient's ability to carry out ADLs; assess patient's baseline for ADL function and identify physical deficits which impact ability to perform ADLs (bathing, care of mouth/teeth, toileting, grooming, dressing, etc )  - Assess/evaluate cause of self-care deficits   - Assess range of motion  - Assess patient's mobility; develop plan if impaired  - Assess patient's need for assistive devices and provide as appropriate  - Encourage maximum independence but intervene and supervise when necessary  - Involve family in performance of ADLs  - Assess for home care needs following discharge   - Consider OT consult to assist with ADL evaluation and planning for discharge  - Provide patient education as appropriate  Outcome: Progressing  Goal: Maintain or return mobility status to optimal level  Description  INTERVENTIONS:  - Assess patient's baseline mobility status (ambulation, transfers, stairs, etc )    - Identify cognitive and physical deficits and behaviors that affect mobility  - Identify mobility aids required to assist with transfers and/or ambulation (gait belt, sit-to-stand, lift, walker, cane, etc )  - [place] fall precautions as indicated by assessment  - Record patient progress and toleration of activity level on Mobility SBAR; progress patient to next Phase/Stage  - Instruct patient to call for assistance with activity based on assessment  - Consider rehabilitation consult to assist with strengthening/weightbearing, etc   Outcome: Progressing     Problem: DISCHARGE PLANNING  Goal: Discharge to home or other facility with appropriate resources  Description  INTERVENTIONS:  - Identify barriers to discharge w/patient and caregiver  - Arrange for needed discharge resources and transportation as appropriate  - Identify discharge learning needs (meds, wound care, etc )  - Arrange for interpretive services to assist at discharge as needed  - Refer to Case Management Department for coordinating discharge planning if the patient needs post-hospital services based on physician/advanced practitioner order or complex needs related to functional status, cognitive ability, or social support system  Outcome: Progressing     Problem: Knowledge Deficit  Goal: Patient/family/caregiver demonstrates understanding of disease process, treatment plan, medications, and discharge instructions  Description  Complete learning assessment and assess knowledge base    Interventions:  - Provide teaching at level of understanding  - Provide teaching via preferred learning methods  Outcome: Progressing     Problem: Prexisting or High Potential for Compromised Skin Integrity  Goal: Skin integrity is maintained or improved  Description  INTERVENTIONS:  - Identify patients at risk for skin breakdown  - Assess and monitor skin integrity  - Assess and monitor nutrition and hydration status  - Monitor labs   - Assess for incontinence   - Turn and reposition patient  - Assist with mobility/ambulation  - Relieve pressure over bony prominences  - Avoid friction and shearing  - Provide appropriate hygiene as needed including keeping skin clean and dry  - Evaluate need for skin moisturizer/barrier cream  - Collaborate with interdisciplinary team   - Patient/family teaching  - Consider wound care consult   Outcome: Progressing

## 2020-01-28 NOTE — CASE MANAGEMENT
Tx team recommendations reviewed with patient & messages left for Pt's brothers  Pt expressed understanding & agreement  Target DC date is Wed 2/5 with [facility] (PT, OT, ST, Nsg, HHA); a list of providers was provided to Pt and a referral will be made based on Pt preference  Awaiting return call from Pt's family to schedule FT  SW will continue to monitor & assist as needed with Tx & DC planning with Tx & DC planning

## 2020-01-28 NOTE — TEAM CONFERENCE
Acute RehabilitationTeam Conference Note  Date: 1/28/2020   Time: 10:50 AM       Patient Name:  Karis Lion       Medical Record Number: [id number]   YOB: 1955  Sex: Female          Room/Bed:  /[facility] 212-01  Payor Info:  Payor: Courtney To MA MCO / Plan: Severiano Estrada MA / Product Type: Medicaid HMO /      Admitting Diagnosis: Stroke (cerebrum) ([facility] ) [I63 9]   Admit Date/Time:  1/23/2020  2:28 PM  Admission Comments: No comment available     Primary Diagnosis:  CVA (cerebral vascular accident) ([facility] )  Principal Problem: CVA (cerebral vascular accident) [facility])    Patient Active Problem List    Diagnosis Date Noted    Glaucoma 01/23/2020    Dysphagia 01/23/2020    Cardiomyopathy ([facility] ) 01/23/2020    Prolonged QT interval 01/23/2020    History of pulmonary embolism 01/23/2020    Leukocytosis 01/23/2020    Anemia 01/23/2020    Hypomagnesemia 01/23/2020    IFG (impaired fasting glucose) 01/23/2020    Hypocalcemia 01/23/2020    A-fib ([facility] ) 01/17/2020    CVA (cerebral vascular accident) ([facility] ) 01/17/2020    Essential hypertension 06/03/2019       Physical Therapy:    Transfers: Supervision  Bed Mobility: Supervision  Amulation Distance (ft): 169 feet  Ambulation: Supervision  Assistive Device for Ambulation: Roller Walker  Number of Stairs: 14  Assistive Device for Stairs: Bilateral [employer] [place]  Stair Assistance: Supervision  Discharge Recommendations: Home with:  [address] [facility] [place] Cloud with[de-identified] Home Physical Therapy, Family Support    1/27/19: Pt is progressing well towards therapy goals  Current level of function includes S for bed mobility, S for transfers, close S ambulation with RW for distance of 169', and S while navigating 14 steps with 2 HR  Pt remains limited by her speech, cognition, decreased safety, impaired strength, balance, and decreased endurance/ activity tolerance   Pt will continue to benefit from skilled inpatient physical therapy to address the remaining deficits to maximize safety and functional independence for anticipated D/C to home  Occupational Therapy:  Eating: Supervision  Grooming: Supervision  Bathing: Supervision  Bathing: Supervision  Upper Body Dressing: Supervision  Lower Body Dressing: Supervision, Minimal Assistance  Toileting: Supervision  Tub/Shower Transfer: Supervision  Toilet Transfer: Supervision  Cognition: Exceptions to WNL  Orientation: Person, Place, Time, Situation  Discharge Recommendations: Home with:  [address] [place] Cloud with[de-identified] Family Support, First Floor Setup, Home Occupational Therapy       1/27/2020: Pt participates in ADLs, transfers/ mobility, light homemaking, cognitive retraining and BUE therex  Pt is making gains towards goals with current LOF as stated above  Pt requires cues and redirection due to decreased attention/ sequencing, expressive aphasia and assist due to decreased ROM/ balance, endurance and safety  Pt will benefit from continued skilled OT services to increase independence with daily tasks  Speech Therapy:  Mode of Communication: Verbal  Speech/Language: Expressive Aphasia  Cognition: Exceptions to WNL  Cognition: Decreased Executive Functions, Decreased Memory, Decreased Comprehension, Decreased Safety, Impulsive  Orientation: Person, Place, Situation  Diet Recommendations: Level 2/Mechanically Altered, Thin, Level 3/Denture Soft  Discharge Recommendations: Home with:  [address] [place] Cloud with[de-identified] Family Lilliam Mueller is a 60 y o  female who presented to the [facility] [address] on 1/17/20 with Aphasia and right sided weakness and found to have L MCA territory infarct in the setting of a-fib and was started on eliquis in acute care   Prior to incident patient was essentially independent with mobility, transfers and ADL's  An Hidalgo lives alone in a single family  There are no steps to enter  First floor set up is available and the patient will not have 24 hour supervision available upon discharge    Skilled speech assessment was completed  Patient presented with moderate oral dysphagia, mild pharyngeal secondary to weakness and incoordination post CVA  Speech comprehension at the multi step level was min to modo assist requiring frequent redirection to task and apparent breakdowns in symbolic function for understanding  Expression was moderate due to Expressive Aphasia  Cognitively, patient presented as min assist for memory and mod assist for reasoning through familiar tasks leading to anxious behavior  Skilled ST indicated  1/27/2020  Patient is participating in skilled ST focusing on swallow oral function and speech cognitive communication skills  Current diet texture is Dysphagia #2, Mechanical Soft with Thin liquids  SLP is completing trials of Dysphagia #3, Dental Soft with Thin liquids at mealtime with noted progress in texture tolerance, but patient continues to need encouragement for adequate PO intake  Speech comprehension at the multi step level is min assist   Expression due to expressive Aphasia is mod assist   She is gaining in using a variety of modalities to improve ability to express complex needs and ideas  Cognitively, patient presents as min assist for memory and mod assist for reasoning her deficits for understanding the need for assistance and use of safety precautions  Nursing Notes:  Appetite: Fair  Diet Type: Dysphagia II, Thin Liquids                      Diet Patient/Family Education Complete: Yes                Incision 01/14/19 Foot Right (Active)   Incision Description Clean;Dry; Intact 1/26/2020  9:04 PM   Cristin-wound Assessment Clean;Dry; Intact 1/26/2020  9:04 PM              Bladder: Continent     Bladder Patient/Family Education: Yes  Bowel: Continent     Bowel Patient/Family Education: Yes        Pain Score: 0                          Pain Patient/Family Education: Yes       01/27/2020Pt admitted with CVA  Generalized weakness noted   Expressive aphasia present, forgetful at times  Lungs WDL, HRR non-pitting edema to b/lle  Ambulated to BR with RW supervison/CG cont b&b  Denies pain  Case Management:     Discharge Planning  Living Arrangements: Alone  Support Systems: Family members  Assistance Needed: cognitive  Type of Current Residence: Private residence  Current [address] : No  Initial assessment & orientation to [facility] with Pt,who expressed understanding & agreement  Message left for Pt's brother  Explained role of this worker & Tx team meeting to be held next week  Pt resides alone in a one story apartment, 1-2 steps in  She reported that she has a strong support system with her brothers & nephews  She reported that she was completely independent PTA  She expressed feeling anxious & somewhat irritable, but also motivated to reach her goals to return to her home  SW will continue to monitor & assist as needed with [address]  Ins co is [address], policy [phone], Sil Byers [phone]  CM to fax clinical update to [phone] on 1/29/20  Is the patient actively participating in therapies? yes  List any modifications to the treatment plan: na    Barriers Interventions   Decreased safety Cues, ADL, transfer, gait training   aphasia ST strategies   dysphagia Upgrade to dental soft, thin liquids, ST strategies   Decreased cog, problem solving ST strategies, ADL/transfer/gait training   Decreased end ADL, transfer, gait training     Is the patient making expected progress toward goals?  yes  List any update or changes to goals: na    Medical Goals: Patient will be able to manage medical conditions and comorbid conditions with medications and follow up upon completion of rehab program    Weekly Team Goals:   Rehab Team Goals  ADL Team Goal: Patient will be independent with ADLs with least restrictive device upon completion of rehab program  Bowel/Bladder Team Goal: Patient will return to premorbid level for bladder/bowel management upon completion of rehab program  Transfer Team Goal: Patient will be independent with transfers with least restrictive device upon completion of rehab program  Locomotion Team Goal: Patient will be independent with locomotion with least restrictive device upon completion of rehab program  Cognitive Team Goal: Patient will return to premorbid level of cognitive activity upon completion of rehab program     Mod I transfers, mobility, and bed mobility with LRAD  Mod I self care  S/min assist cog, expression, and problem solving  Complete family training    Health and wellness: to be able to return home and caring for cat    Discussion: Plan for return home with family support with Tahir Whitaker for PT, OT, ST, nursing, and aides      Anticipated Discharge Date:   Feb 5, 2020

## 2020-01-28 NOTE — PROGRESS NOTES
01/28/20 1411   Pain Assessment   Pain Assessment No/denies pain   Pain Score No Pain   Restrictions/Precautions   Precautions Cognitive; Fall Risk   Cognition   Overall Cognitive Status Impaired   Arousal/Participation Alert; Responsive; Cooperative   Attention Attends with cues to redirect   Orientation Level Oriented to person;Oriented to place;Oriented to situation   Memory Decreased short term memory   Following Commands Follows one step commands without difficulty   Roll Left and Right   Type of Assistance Needed Independent   Roll Left and Right CARE Score 6   Lying to Sitting on Side of Bed   Type of Assistance Needed Supervision;Verbal cues   Lying to Sitting on Side of Bed CARE Score 4   Sit to Stand   Type of Assistance Needed Supervision;Verbal cues   Sit to Stand CARE Score 4   Bed-Chair Transfer   Type of Assistance Needed Supervision;Verbal cues   Chair/Bed-to-Chair Transfer CARE Score 4   Walk 10 Feet   Type of Assistance Needed Incidental touching;Verbal cues   Comment cg   Walk 10 Feet CARE Score 4   Walk 50 Feet with Two Turns   Type of Assistance Needed Incidental touching;Verbal cues   Comment cg   Walk 50 Feet with Two Turns CARE Score 4   Walk 150 Feet   Type of Assistance Needed Incidental touching;Verbal cues   Comment cg   Walk 150 Feet CARE Score 4   Walking 10 Feet on Uneven Surfaces   Type of Assistance Needed Incidental touching;Verbal cues   Comment cg   Walking 10 Feet on Uneven Surfaces CARE Score 4   Ambulation   Does the patient walk? 2  Yes   Primary Mode of Locomotion Prior to Admission Walk   Distance Walked (feet) 156 ft  (156' 162')   Assist Device Cane   Gait Pattern Inconsistant Argelia   Limitations Noted In Balance; Endurance; Safety;Strength   Provided Assistance with: Balance   Walk Assist Level Contact Guard;Close Supervision   Findings level and carpet   Curb or Single Stair   Style negotiated Single stair   Type of Assistance Needed Supervision;Verbal cues   1 Step (Curb) CARE Score 4   4 Steps   Type of Assistance Needed Supervision;Verbal cues   4 Steps CARE Score 4   Stairs   Type Stairs   # of Steps 7   Weight Bearing Precautions WBAT   Assist Devices Bilateral Rail   Findings supervision with cues   Therapeutic Interventions   Strengthening standing ther ex   Balance gait and transfer training   Assessment   Treatment Assessment Tolerated therapy well  Patient needed increased help using the cane this aftenoon due to needing to step and rest from decreased endurance  Completed ther ex for general LE strengthening; gait and transfer training focusing on sequence and technique  for improved balance and safety with mobility  PT Barriers   Physical Impairment Decreased strength;Decreased range of motion;Decreased endurance; Impaired balance;Decreased mobility; Decreased safety awareness;Decreased cognition   Functional Limitation Walking;Transfers;Standing;Stair negotiation   Plan   Treatment/Interventions Functional transfer training;LE strengthening/ROM; Elevations; Therapeutic exercise; Bed mobility;Gait training   Progress Slow progress, cognitive deficits   PT Therapy Minutes   PT Time In 1411   PT Time Out 1511   PT Total Time (minutes) 60   PT Mode of treatment - Individual (minutes) 60   PT Mode of treatment - Concurrent (minutes) 0   PT Mode of treatment - Group (minutes) 0   PT Mode of treatment - Co-treat (minutes) 0   PT Mode of Treatment - Total time(minutes) 60 minutes   PT Cumulative Minutes 361   Therapy Time missed   Time missed?  No

## 2020-01-28 NOTE — PROGRESS NOTES
01/28/20 0900   Pain Assessment   Pain Assessment No/denies pain   Restrictions/Precautions   Precautions Cognitive; Fall Risk;Aphasia; Aspiration   Oral Hygiene   Type of Assistance Needed Supervision   Oral Hygiene CARE Score 4   Grooming   Able To Initiate Tasks;Comb/Brush Hair;Wash/Dry Face;Brush/Clean Teeth;Wash/Dry Hands   Limitation Noted In Safety; Sequencing   Findings supervision   Bathing   Assessed Bath Style Sponge Bath   Anticipated D/C Bath Style Shower;Sponge Bath   Able to Chaz Anam No   Able to [employer] Temperature No   Able to Wash/Rinse/Dry (body part) Left Arm;Right Arm;L Upper Leg;R Upper Leg;L Lower Leg/Foot;R Lower Leg/Foot;Chest;Abdomen;Perineal Area; Buttocks   Limitations Noted in Safety; Endurance   Upper Body Dressing   Type of Assistance Needed Supervision   Upper Body Dressing CARE Score 4   Lower Body Dressing   Type of Assistance Needed Supervision   Lower Body Dressing CARE Score 4   Putting On/Taking Off Footwear   Type of Assistance Needed Supervision   Putting On/Taking Off Footwear CARE Score 4   Picking Up Object   Type of Assistance Needed Incidental touching   Picking Up Object CARE Score 4   Dressing/Undressing Clothing   Remove UB Clothes Pullover Shirt   Don UB Clothes Pullover Shirt   Remove LB Clothes Pants; Undergarment;Socks   Don LB Clothes Pants; Undergarment;Socks; Shoes   Limitations Noted In Endurance; Safety;ROM   Sit to Stand   Type of Assistance Needed Supervision   Sit to Stand CARE Score 4   Bed-Chair Transfer   Type of Assistance Needed Supervision   Chair/Bed-to-Chair Transfer CARE Score 4   Toileting Hygiene   Type of Assistance Needed Supervision   Toileting Hygiene CARE Score 4   Toileting   Able to [address] down yes, up yes  Manage Hygiene Bladder; Bowel   Limitations Noted In Safety; Sequencing   Toilet Transfer   Type of Assistance Needed Supervision   Toilet Transfer CARE Score 4   Toilet Transfer   Surface Assessed Raised Toilet   Transfer Technique Stand Pivot   Limitations Noted In Safety; Sequencing   Exercise Tools   Hand Gripper gripper with pegs B hands   Other Exercise Tool 1 fxl reacher use R hand for retrieval of pegs fromt he floor S and increased time until familar with the task   Other Exercise Tool 2 writing colors with min verbal cues and matching colors with pegs available   Other Exercise Tool 3 S search a word beginning with level 1 activity book   Cognition   Overall Cognitive Status Impaired   Arousal/Participation Alert; Responsive; Cooperative   Attention Attends with cues to redirect   Orientation Level Oriented to person;Oriented to place;Oriented to time;Oriented to situation   Memory Decreased recall of precautions   Additional Activities   Additional Activities Other (Comment)   Additional Activities Comments fxl mobility with SPC S/ CGA in room and hallway   Assessment   Treatment Assessment Pt agreeable to bathing adn dressing this morning preferring to sponge bathe  Reacher and sock aide introduced with practice completed using reacher for retrieval to gain familarity to prepare for functional use  Pt able to complete ADL S while sitting on EOB due to decreased height of bed and ablility to move more freely although yesterday min A for LB dressing due to increased height and fear of falling forward so A/E may be appropriate after a shower  Pt completes writing task and search a word with min verbal cues and increasing ability to recognize and correct a problem although continued cues required  Pt becomes easily distracted and frustrated with cues and education for self regulation  Pt is tolerating program well and improvign towards goals although continues to requires S/A for tasks due to decreased cognition, balance, enduracne and safety  Pt will benefitf rom continued skilled oT servcies to increase independence with daily tasks especially higher level cognitive tasks     Problem List Decreased endurance;Decreased cognition;Decreased safety awareness   Plan   Treatment/Interventions ADL retraining;Functional transfer training; Therapeutic exercise; Endurance training;Patient/family training;Cognitive reorientation; Compensatory technique education   Progress Progressing toward goals   OT Therapy Minutes   OT Time In 0900   OT Time Out 1034   OT Total Time (minutes) 94   OT Mode of treatment - Individual (minutes) 94   Therapy Time missed   Time missed?  No

## 2020-01-28 NOTE — PROGRESS NOTES
01/28/20 0907   Charting Type   Charting Type Shift assessment   Neurological   Neuro (WDL) X   Orientation Level Disoriented to situation   Speech Delayed responses; Expressive aphasia   Muscle Function/Sensation Assessment    R Hand  Moderate   L Hand  Moderate   Neuro Symptoms Forgetful   Relieved by Rest   Delirium Assessment- CAM    Acute Onset and Fluctuating Course (1) No   Inattention (2) No   Disorganized Thinking (3) No   Rate Patient's Level of Consciousness (4) Alert (Normal), No   Delirium Present No   Carie Coma Scale   Eye Opening 4   Best Verbal Response 5   Best Motor Response 6   Radcliffe Coma Scale Score 15   HEENT   HEENT (WDL) X   R Eye Mildly impaired vision   L Eye Mildly impaired vision   Teeth Missing teeth   Respiratory   Respiratory (WDL) WDL   Cardiac   Cardiac (WDL) WDL   Pain Assessment   Pain Assessment No/denies pain   Pain Score No Pain   Peripheral Vascular   Peripheral Vascular (WDL) X   Edema Right lower extremity; Left lower extremity   RLE Edema Non-pitting   LLE Edema Non-pitting   Integumentary   Integumentary (WDL) X   Skin Integrity Bruising   Skin Location scattered   Allan Scale   Sensory Perceptions 4   Moisture 3   Activity 3   Mobility 3   Nutrition 3   Friction and Shear 3   Allan Scale Score 19   Musculoskeletal   Musculoskeletal (WDL) X   Level of Assistance Standby assist, set-up cues, supervision of patient - no hands on   Assistive Device Cane   Gastrointestinal   Gastrointestinal (WDL) X   Abdomen Inspection Obese   Genitourinary   Genitourinary (WDL) WDL   Urine Assessment   Urinary Incontinence No   Anal/Rectal   Anal/Rectal (WDL) WDL   Psychosocial   Psychosocial (WDL) X   Patient Behaviors/Mood Irritable   Needs Expressed Denies   Ability to Express Feelings Needs assistance   Ability to Express Needs Needs assistance   Ability to Express Thoughts Needs assistance   Ability to Understand Others Usually understands

## 2020-01-28 NOTE — PROGRESS NOTES
PM&R Progress Note:    Subjective: Patient seen face to face  Educated again on her diagnosis  She is perseverating on having an ophthalmology appointment for macular degeneration injections which she has to get in [place]  She is requesting if she is able to get these closer to home  Review of Systems: Pertinent positives in subjective, all other ROS reviewed are negative  Objective:   /74 (BP Location: Right arm)   Pulse 61   Temp 97 6 °F (36 4 °C) (Tympanic)   Resp 18   Ht 5' 6" (1 676 m)   Wt 114 kg (251 lb 5 2 oz)   SpO2 98%   BMI 40 56 kg/m²     Physical Exam   Constitutional: She is oriented to person, place, and time  She appears well-developed and well-nourished  HENT:   Head: Normocephalic  Eyes: Conjunctivae and EOM are normal    Neck: Neck supple  Cardiovascular: Normal rate, regular rhythm and normal heart sounds  Pulmonary/Chest: Effort normal and breath sounds normal  She has no wheezes  She has no rales  Abdominal: Soft  Bowel sounds are normal  She exhibits no distension  There is no tenderness  Musculoskeletal: Normal range of motion  She exhibits edema (Lymphedema legs)  Neurological: She is alert and oriented to person, place, and time  Speech is dysarthric but fluent  Sensation light touch is intact x4  Motor exam:  Bilateral upper extremity exam:  4/5 throughout  Left lower extremity: 4/5  Right lower extremity:  3+/5 proximally, 4-/5 distally  Antalgic gait pattern with rolling walker   Skin: Skin is warm  Psychiatric: She has a normal mood and affect  Nursing note and vitals reviewed  Assessment/Plan: Elvira Lake a 60 y  o  female with cardiomyopathy with EF of 30%, hypertension, anemia, glaucoma who sustained a L MCA territory infarct in the setting of a-fib and was started on eliquis  Patient currently in acute rehabilitation for impaired mobility, self-care, swallow, cognitive deficits        · Rehabilitation:  Deficits of mild right hemiparesis, dysarthria, dysphagia  Continue PT/OT/SLP  Functional update in weekly team conference: Son and grandson are available for assistance  She lives alone  Goals are Supervision - Mod I  Patient has barrier cognitive deficits, insight, memory, reasoning, aphasia, weakness  Currently at a supervision with mobility including stair negotiation with SPC or RW, CGA - supervision with ADLs, and Min A-Mod A with cognition  Family training  ELOS:  1 week Estimated Discharge plan Wednesday Feb 5th 2020 with home care RN, PT/OT/SLP, Aide  · Dysphagia: upgraded diet to D3 diet with thins  Patient with poor oral intake - trailing eating 6 smaller meals  · Anxiety: Consider Buspar vs SSRI  After discussion with patient, she would like to hold off at this time  · Left MCA CVA:  Likely cardioembolic secondary to atrial fibrillation  Continue secondary stroke prophylaxis with Lipitor in Eliquis  · Atrial fibrillation:  Rate and rhythm controlled currently on amiodarone and Toprol XL  Anticoagulated with Eliquis  · Hypertension: managed on lisinopril and Toprol-XL  · Cardiomyopathy with EF 30%  Patient will require a stress test as an outpatient  · Glaucoma:  Continue Cosopt  · Appreciate internal medicine consultants medical comanagement        Lab Results   Component Value Date    WBC 9 70 01/24/2020    HGB 10 4 (L) 01/24/2020    HCT 34 5 (L) 01/24/2020    MCV 75 (L) 01/24/2020     01/24/2020     Lab Results   Component Value Date    SODIUM 139 01/21/2020    K 3 8 01/21/2020     (H) 01/21/2020    CO2 21 01/21/2020    BUN 11 01/21/2020    CREATININE 0 87 01/21/2020    GLUC 81 01/21/2020    CALCIUM 7 7 (L) 01/21/2020     Lab Results   Component Value Date    INR 1 13 01/21/2020    INR 1 08 01/18/2020    INR 1 02 01/17/2020    PROTIME 13 1 (H) 01/21/2020    PROTIME 12 5 01/18/2020    PROTIME 11 8 01/17/2020           Current Facility-Administered Medications:     acetaminophen (TYLENOL) tablet 650 mg, 650 mg, Oral, Q6H PRN, Isaias Santoro MD    [COMPLETED] amiodarone tablet 200 mg, 200 mg, Oral, Q8H, 200 mg at 01/26/20 2102 **FOLLOWED BY** amiodarone tablet 200 mg, 200 mg, Oral, Q12H, 200 mg at 01/28/20 0609 **FOLLOWED BY** [START ON 2/3/2020] amiodarone tablet 200 mg, 200 mg, Oral, Daily With Breakfast, Isaias Santoro MD    apixaban (ELIQUIS) tablet 5 mg, 5 mg, Oral, BID, Isaias Santoro MD, 5 mg at 01/28/20 1791    atorvastatin (LIPITOR) tablet 40 mg, 40 mg, Oral, QPM, Isaias Santoro MD, 40 mg at 01/27/20 1710    dorzolamide-timolol (COSOPT) 22 3-6 8 MG/ML ophthalmic solution 1 drop, 1 drop, Both Eyes, BID, Isaias Santoro MD, 1 drop at 01/28/20 0840    lisinopril (ZESTRIL) tablet 5 mg, 5 mg, Oral, Daily, Isaias Santoro MD, 5 mg at 01/28/20 2858    metoprolol succinate (TOPROL-XL) 24 hr tablet 50 mg, 50 mg, Oral, Q12H, Isaias Santoro MD, 50 mg at 01/28/20 3791    polyethylene glycol (MIRALAX) packet 17 g, 17 g, Oral, Daily PRN, Isaias Santoro MD    Past Medical History:   Diagnosis Date    Hypertension     PUD (peptic ulcer disease)     Stroke ([facility] )     Vitamin D deficiency        Patient Active Problem List    Diagnosis Date Noted    Glaucoma 01/23/2020    Dysphagia 01/23/2020    [facility] 01/23/2020    Prolonged QT interval 01/23/2020    History of pulmonary embolism 01/23/2020    Leukocytosis 01/23/2020    Anemia 01/23/2020    Hypomagnesemia 01/23/2020    IFG (impaired fasting glucose) 01/23/2020    Hypocalcemia 01/23/2020    [facility]-fib ([facility] ) 01/17/2020    CVA (cerebral vascular accident) ([facility] ) 01/17/2020    Essential hypertension 06/03/2019      I have spent 45 minutes coodinating patient care today in which greater than 50% of this time was spent in counseling/coordination of care regarding Intructions for management and Impressions  I personally attended and this patient was discussed by the interdisciplinary team in weekly case conference today   The care of the patient was extensively discussed with all care providers and an appropriate rehabilitation plan was formulated unique for this patient  Barriers were identified preventing progression of therapy and appropriate interventions were discussed with each discipline  Please see the team note for input from all disciplines regarding barriers, intervention, and discharge planning      Rodríguez العلي MD  PM&R Attending

## 2020-01-28 NOTE — PROGRESS NOTES
01/28/20 1205   Pain Assessment   Pain Assessment No/denies pain   Pain Score No Pain   Restrictions/Precautions   Precautions Combative; Fall Risk   Cognition   Overall Cognitive Status Impaired   Arousal/Participation Alert; Responsive; Cooperative   Attention Attends with cues to redirect   Orientation Level Oriented to person;Oriented to place;Oriented to situation   Memory Decreased short term memory   Following Commands Follows one step commands without difficulty   Lying to Sitting on Side of Bed   Type of Assistance Needed Supervision;Verbal cues   Lying to Sitting on Side of Bed CARE Score 4   Sit to Stand   Type of Assistance Needed Supervision;Verbal cues   Sit to Stand CARE Score 4   Bed-Chair Transfer   Type of Assistance Needed Supervision;Verbal cues   Chair/Bed-to-Chair Transfer CARE Score 4   Walk 10 Feet   Type of Assistance Needed Supervision;Verbal cues   Walk 10 Feet CARE Score 4   Walk 50 Feet with Two Turns   Type of Assistance Needed Supervision;Verbal cues   Walk 50 Feet with Two Turns CARE Score 4   Walk 150 Feet   Type of Assistance Needed Supervision;Verbal cues   Walk 150 Feet CARE Score 4   Walking 10 Feet on Uneven Surfaces   Type of Assistance Needed Supervision;Verbal cues   Walking 10 Feet on Uneven Surfaces CARE Score 4   Ambulation   Does the patient walk? 2  Yes   Primary Mode of Locomotion Prior to Admission Walk   Distance Walked (feet) 163 ft  (163' 156')   Assist Device Cane   Gait Pattern Inconsistant Argelia   Limitations Noted In Balance; Safety   Provided Assistance with: Balance   Walk Assist Level Close Supervision;Supervision   Findings level and carpet   Therapeutic Interventions   Strengthening seated ther ex   Balance gait and transfer training   Assessment   Treatment Assessment Patient toleated therapy session well  Patient very focused on when she is going home  Requires cues to stay on task  Trialed patient with SPC today vs RW    Completed ther ex for general LE strengthening; gait  and transfer training focusing on sequence and technique for improved balance and safety with functional mobility  Patient appropriate for concurrent therapy to increase motivation among pts with similar diagnosis and deficits while completing therapy session  PT Barriers   Physical Impairment Decreased strength;Decreased range of motion;Decreased endurance; Impaired balance;Decreased mobility; Decreased safety awareness   Functional Limitation Walking;Transfers;Standing   Plan   Treatment/Interventions Functional transfer training;Bed mobility;Gait training   Progress Slow progress, cognitive deficits   PT Therapy Minutes   PT Time In 1205   PT Time Out 1235   PT Total Time (minutes) 30   PT Mode of treatment - Individual (minutes) 0   PT Mode of treatment - Concurrent (minutes) 30   PT Mode of treatment - Group (minutes) 0   PT Mode of treatment - Co-treat (minutes) 0   PT Mode of Treatment - Total time(minutes) 30 minutes   PT Cumulative Minutes 301   Therapy Time missed   Time missed?  No

## 2020-01-28 NOTE — PROGRESS NOTES
01/28/20 1310   Pain Assessment   Pain Assessment No/denies pain   Pain Score No Pain   Restrictions/Precautions   Precautions Cognitive; Fall Risk   Comprehension   Assist Devices Glasses   QI: Comprehension 3  Usually Understands: Understands most conversations, but misses some part/intent of message  Requires cues at times to understand  Comprehension (FIM) 4 - Understands basic info/conversation 75-90% of time   Expression   QI: Expression 3  Exhibits some difficulty with expressing needs and ideas (e g , some words or finishing thoughts) or speech is not clear   Expression (FIM) 3 - Expresses basic info/needs 50-74% of time   Social Interaction   Participation Small Group   Social Interaction (FIM) 4 - Interacts 75-89% of time   Problem Solving   Problem solving (FIM) 3 - Solves basic problmes 50-74% of time   Memory   Recognize People Yes   Remember Routine Yes   Initiates Tasks Yes   Memory (FIM) 4 - Recognizes/recalls/performs 75-89%   Executive Function Skills   Insight Moderate insight   Impulsive Mildly impulsive   Flexibility of Thought Reduced flexibility   Planning Reduced planning skills   Memory Skills   Orientation Level Oriented to person   Short Term Working Recall Mild Impairment   Auditory Comprehension   Comphrehends Conversation Simple   Interfering Components Attention - selective   Speech/Swallow Mechanism Exam   Facial Strength Reduced   Lingual Strength Mild reduced   Motor Speech Evaluation   Dysarthria Yes   Mild Dystharia Impaired articulation   Speech/Language/Cognition Assessmetn   Treatment Assessment Speech Pathology Daily Treatment Note - Speech/Cognitive Communication Skills - For this session, SLP targeted comprehension of therapeutic levels and expression of precautions and safety sequences  Functional tasks were represented in a series of 5 action cards which depicted an activity which the patient would engage in regularly in the home setting    The task was presented in simplified salient steps to aid retention  The patient was initially requested to sequence the cards and explain the sequence verbally  At each step of the sequence, SLP stressed the level of necessary assistance and focused on patient's own awareness of how much assistance would be needed at that step within patient's current therapeutic levels, to perform the same task  Focus of this task was improving patient's awareness and ability to verbalize their current level of necessary assistance in daily tasks  Patient presented as mod assist with expression due to expressive Aphasia  She benefited from the visual aid for thought organization but struggled with word finding  Further focus to be provided in the use of visual aids to improve sentence production  Swallow Information   Current Risks for Dysphagia & Aspiration Dysarthria;General debilitation   Current Symptoms/Concerns Difficulty chewing   Current Diet Dysphagia mechanical soft; Dysphagia advance  (trials Dys #3 with SLP)   Consistencies Assessed and Performance   Oral Stage Moderate impaired   Phargngeal Stage Mild impaired   Recommendations   Diet Solid Recommendation Level 3 Dysphagia/ advanced/ soft to chew   Diet Liquid Recommendation Thin liquid   General Precautions Aspiration precautions   Eating   Type of Assistance Needed Supervision   Eating CARE Score 4   Swallow Assessment   Swallow Treatment Assessment Speech Pathology Daily Treatment Note - Swallow Oral Function - SLP focused on texture trials of Dysphagia #3, Dental Soft  This session targeted diet texture modification to improve texture tolerance and PO intake  SLP targeted initial sip to moisten the oral cavity, prep to small bites and modification via use of gravy, sauce, soup broth or dipping to moisten dry solids  This aids in the ease of oral breakdown and cohesive bolus formation    Patient encouraged to alternate with a sip to ensure clearance of the oral cavity prior to next intake of solid food  Noted improvement in management of complex solids noted with use of the above technique  SLP recommends upgrade to dental soft at this time  MD notified     Swallow Assessment Prognosis   Prognosis Good   Prognosis Considerations Family/community support   SLP Therapy Minutes   SLP Time In 1310   SLP Time Out 1410   SLP Total Time (minutes) 60   SLP Mode of treatment - Individual (minutes) 60   Daily FIM Score   Eating (FIM) 5 - Patient requires supervision, cueing or coaxing

## 2020-01-28 NOTE — PCC NURSING
01/27/2020Pt admitted with CVA  Generalized weakness noted  Expressive aphasia present, forgetful at times  Lungs WDL, HRR non-pitting edema to b/lle  Ambulated to BR with RW supervison/CG cont b&b  Denies pain  2/4/20  Patient continues with generalized weakness and expressive aphasia at times  Bed alarm and close supervision in place to maintain patient safety  Lungs remain clear on room air  Heart rate regular to auscultation  Generalized edema noted  Patient with hemoccult positive stool test and low hemoglobin level  Patient receiving two units of packed red blood cells  Will retest hemoglobin level in morning post transfusion  Scattered ecchymotic areas noted

## 2020-01-29 PROCEDURE — 97535 SELF CARE MNGMENT TRAINING: CPT

## 2020-01-29 PROCEDURE — 97530 THERAPEUTIC ACTIVITIES: CPT

## 2020-01-29 PROCEDURE — 99232 SBSQ HOSP IP/OBS MODERATE 35: CPT | Performed by: PHYSICAL MEDICINE & REHABILITATION

## 2020-01-29 PROCEDURE — 97110 THERAPEUTIC EXERCISES: CPT

## 2020-01-29 PROCEDURE — 97129 THER IVNTJ 1ST 15 MIN: CPT

## 2020-01-29 PROCEDURE — 97537 COMMUNITY/WORK REINTEGRATION: CPT

## 2020-01-29 PROCEDURE — 92507 TX SP LANG VOICE COMM INDIV: CPT

## 2020-01-29 PROCEDURE — 97116 GAIT TRAINING THERAPY: CPT

## 2020-01-29 PROCEDURE — 92526 ORAL FUNCTION THERAPY: CPT

## 2020-01-29 RX ORDER — ANTIOX #8/OM3/DHA/EPA/LUT/ZEAX 250-2.5 MG
1 CAPSULE ORAL 2 TIMES DAILY
Status: DISCONTINUED | OUTPATIENT
Start: 2020-01-29 | End: 2020-02-04

## 2020-01-29 RX ADMIN — LISINOPRIL 5 MG: 5 TABLET ORAL at 09:15

## 2020-01-29 RX ADMIN — DORZOLAMIDE HYDROCHLORIDE AND TIMOLOL MALEATE 1 DROP: 20; 5 SOLUTION/ DROPS OPHTHALMIC at 18:25

## 2020-01-29 RX ADMIN — Medication 1 CAPSULE: at 17:11

## 2020-01-29 RX ADMIN — ATORVASTATIN CALCIUM 40 MG: 40 TABLET, FILM COATED ORAL at 17:10

## 2020-01-29 RX ADMIN — APIXABAN 5 MG: 5 TABLET, FILM COATED ORAL at 09:15

## 2020-01-29 RX ADMIN — AMIODARONE HYDROCHLORIDE 200 MG: 100 TABLET ORAL at 17:11

## 2020-01-29 RX ADMIN — APIXABAN 5 MG: 5 TABLET, FILM COATED ORAL at 17:10

## 2020-01-29 RX ADMIN — METOPROLOL SUCCINATE 50 MG: 50 TABLET, EXTENDED RELEASE ORAL at 09:15

## 2020-01-29 RX ADMIN — AMIODARONE HYDROCHLORIDE 200 MG: 100 TABLET ORAL at 05:13

## 2020-01-29 RX ADMIN — DORZOLAMIDE HYDROCHLORIDE AND TIMOLOL MALEATE 1 DROP: 20; 5 SOLUTION/ DROPS OPHTHALMIC at 09:13

## 2020-01-29 RX ADMIN — METOPROLOL SUCCINATE 50 MG: 50 TABLET, EXTENDED RELEASE ORAL at 20:05

## 2020-01-29 NOTE — NURSING NOTE
Patient requested fresh water, when cup given filled with ice and water , patient became irritable towards staff "its too full "   Patient becomes irritable easily, time out given with good results  Call bell with in reach   Refused SCD's on at HS , "I cant sleep "   Educated on VTE protocol, refused teaching , became irritable , so did not continue with VTE education

## 2020-01-29 NOTE — PROGRESS NOTES
01/29/20 0930   Pain Assessment   Pain Assessment No/denies pain   Pain Score No Pain   Restrictions/Precautions   Precautions Aphasia;Cognitive; Fall Risk   Cognition   Overall Cognitive Status Impaired   Arousal/Participation Alert; Responsive; Cooperative   Attention Attends with cues to redirect   Orientation Level Oriented X4   Memory Decreased short term memory   Following Commands Follows one step commands without difficulty   Sit to Lying   Type of Assistance Needed Supervision   Amount of Physical Assistance Provided No physical assistance   Sit to Lying CARE Score 4   Lying to Sitting on Side of Bed   Type of Assistance Needed Supervision   Amount of Physical Assistance Provided No physical assistance   Lying to Sitting on Side of Bed CARE Score 4   Sit to Stand   Type of Assistance Needed Supervision   Amount of Physical Assistance Provided No physical assistance   Sit to Stand CARE Score 4   Bed-Chair Transfer   Type of Assistance Needed Supervision   Amount of Physical Assistance Provided No physical assistance   Chair/Bed-to-Chair Transfer CARE Score 4   Transfer Bed/Chair/Wheelchair   Limitations Noted In Balance; Endurance;LE Strength   Adaptive Equipment Cane   Stand Pivot Supervision   Sit to Stand Supervision   Stand to Sit Supervision   Supine to Sit Supervision   Sit to Supine Supervision   All Transfer Supervision   Walk 10 Feet   Type of Assistance Needed Supervision   Amount of Physical Assistance Provided No physical assistance   Walk 10 Feet CARE Score 4   Walk 50 Feet with Two Turns   Type of Assistance Needed Supervision   Amount of Physical Assistance Provided No physical assistance   Walk 50 Feet with Two Turns CARE Score 4   Walk 150 Feet   Type of Assistance Needed Supervision   Amount of Physical Assistance Provided No physical assistance   Walk 150 Feet CARE Score 4   Walking 10 Feet on Uneven Surfaces   Type of Assistance Needed Supervision   Amount of Physical Assistance Provided No physical assistance   Walking 10 Feet on Uneven Surfaces CARE Score 4   Ambulation   Does the patient walk? 2  Yes   Primary Mode of Locomotion Prior to Admission Walk   Distance Walked (feet) 121 ft  (114, 119)   Assist Device Cane   Gait Pattern Inconsistant Argelia   Limitations Noted In Balance; Endurance; Safety;Strength   Provided Assistance with: Balance   Walk Assist Level Supervision   Findings level and carpet   Curb or Single Stair   Style negotiated Single stair   Type of Assistance Needed Supervision   Amount of Physical Assistance Provided No physical assistance   1 Step (Curb) CARE Score 4   4 Steps   Type of Assistance Needed Supervision   Amount of Physical Assistance Provided No physical assistance   4 Steps CARE Score 4   12 Steps   Reason if not Attempted Safety concerns   12 Steps CARE Score 88   Stairs   Type Stairs   # of Steps 7   Weight Bearing Precautions WBAT   Assist Devices Bilateral Rail   Findings S   Therapeutic Interventions   Strengthening seated, standing, and supine ther ex 30 reps   Other gait and community re-ed   Assessment   Treatment Assessment Pt needs rests throughout session due to decreased endurance; Pt amb up to 126' with cane and S with cues for directions; Pt is focused on history of back pain and needs encouragement and reassurance; Pt gets distracted and needs cues to stay on task; Pt went up/down 7 steps with 2 HR and S; Pt performed seated, supine, and standing ther ex as juan alberto for strengthening and general conditioning   PT Barriers   Physical Impairment Decreased strength;Decreased range of motion;Decreased endurance; Impaired balance;Decreased mobility; Decreased cognition;Decreased safety awareness   Functional Limitation Walking;Transfers;Standing;Stair negotiation   Plan   Treatment/Interventions Functional transfer training;LE strengthening/ROM; Elevations; Therapeutic exercise;Gait training   Progress Progressing toward goals   PT Therapy Minutes   PT Time In 0930   PT Time Out 1100   PT Total Time (minutes) 90   PT Mode of treatment - Individual (minutes) 90   PT Mode of treatment - Concurrent (minutes) 0   PT Mode of treatment - Group (minutes) 0   PT Mode of treatment - Co-treat (minutes) 0   PT Mode of Treatment - Total time(minutes) 90 minutes   PT Cumulative Minutes 451   Therapy Time missed   Time missed?  No

## 2020-01-29 NOTE — PLAN OF CARE
Problem: Nutrition/Hydration-ADULT  Goal: Nutrient/Hydration intake appropriate for improving, restoring or maintaining nutritional needs  Description  Monitor and assess patient's nutrition/hydration status for malnutrition  Collaborate with interdisciplinary team and initiate plan and interventions as ordered  Monitor patient's weight and dietary intake as ordered or per policy  Utilize nutrition screening tool and intervene as necessary  Determine patient's food preferences and provide high-protein, high-caloric foods as appropriate  INTERVENTIONS:  - Monitor oral intake, urinary output, labs, and treatment plans  - Assess nutrition and hydration status and recommend course of action  - Evaluate amount of meals eaten  - Assist patient with eating if necessary   - Allow adequate time for meals  - Recommend/ encourage appropriate diets, oral nutritional supplements, and vitamin/mineral supplements  - Order, calculate, and assess calorie counts as needed  - Recommend, monitor, and adjust tube feedings and TPN/PPN based on assessed needs  - Assess need for intravenous fluids  - Provide specific nutrition/hydration education as appropriate  - Include patient/family/caregiver in decisions related to nutrition  Outcome: Not Progressing   Patient diet upgraded to level 3 dysphagia thin liquids  Meal completions are variable 25-50%  Patient does not prefer geletein supplement, refuses to consume  Wt  stable 251 1/27/20  She is for participation in Moving Forward from Stroke Cooking Right for Me class today  Recommend continue diet per speech recommendations  Patient reports early satiety with meals  Consider appetite stimulant if patient agreeable  D/C supplement

## 2020-01-29 NOTE — PROGRESS NOTES
01/29/20 0830   Pain Assessment   Pain Assessment No/denies pain   Restrictions/Precautions   Precautions Aphasia;Cognitive; Fall Risk   Eating   Type of Assistance Needed Supervision   Comment encouragement for intake   Eating CARE Score 4   Oral Hygiene   Comment pt declines during session requesting a rest instead   Grooming   Able To Initiate Tasks; Wash/Dry Face;Wash/Dry Hands;Comb/Brush Hair   Limitation Noted In Safety; Sequencing   Findings supervision   Shower/Bathe Self   Type of Assistance Needed Supervision   Shower/Bathe Self CARE Score 4   Bathing   Assessed Bath Style Shower   Anticipated D/C Bath Style Shower;Sponge Bath   Able to Chaz Anam No   Able to [employer] Temperature No   Able to Wash/Rinse/Dry (body part) Left Arm;Right Arm;L Upper Leg;R Upper Leg;L Lower Leg/Foot;R Lower Leg/Foot;Chest;Abdomen;Perineal Area; Buttocks   Limitations Noted in Safety; Sequencing   Positioning Seated;Standing   Tub/Shower Transfer   Limitations Noted In Balance; Endurance; Safety   Adaptive Equipment Seat with Back;Grab Bars   Assessed Shower   Findings supervision   Upper Body Dressing   Type of Assistance Needed Set-up / clean-up   Upper Body Dressing CARE Score 5   Lower Body Dressing   Type of Assistance Needed Supervision   Lower Body Dressing CARE Score 4   Putting On/Taking Off Footwear   Type of Assistance Needed Supervision   Putting On/Taking Off Footwear CARE Score 4   Picking Up Object   Type of Assistance Needed Incidental touching   Picking Up Object CARE Score 4   Dressing/Undressing Clothing   Remove UB Clothes Pullover Shirt   Don UB Clothes Pullover Shirt   Remove LB Clothes Pants; Undergarment;Socks   Don LB Clothes Pants; Undergarment;Socks; Shoes   Limitations Noted In Endurance; Safety; Sequencing   Sit to Stand   Type of Assistance Needed Supervision   Sit to Stand CARE Score 4   Bed-Chair Transfer   Type of Assistance Needed Supervision   Chair/Bed-to-Chair Transfer CARE Score 4 Toileting Hygiene   Type of Assistance Needed Supervision   Toileting Hygiene CARE Score 4   Toileting   Able to [address] down yes, up yes  Manage Hygiene Bladder; Bowel   Limitations Noted In Safety   Toilet Transfer   Type of Assistance Needed Supervision   Toilet Transfer CARE Score 4   Toilet Transfer   Surface Assessed Raised Toilet   Transfer Technique Stand Pivot   Limitations Noted In Safety   Light Housekeeping   Light Housekeeping Level Cane   Light Housekeeping Level of Assistance Close supervision   Light Housekeeping retrieval and transport of clothing for after shower   Additional Activities   Additional Activities Other (Comment)   Additional Activities Comments fxl mobility with [facility]   Assessment   Treatment Assessment Pt agreeable to a shower this day following completion of a list for friend  Pt able to compile list fixing mistakes with min cues and writing a rough copy before finalizing  Pt returns to bed at the end of the session to rest before PT session comlpeting FM task while supine and receiving medications from RN  Pt is making gains and will benefit from continued skilled OT services to increase independence with daily tasks  Problem List Decreased endurance;Decreased cognition;Decreased safety awareness   Plan   Treatment/Interventions ADL retraining;Functional transfer training; Therapeutic exercise; Endurance training;Cognitive reorientation;Patient/family training; Compensatory technique education   Progress Progressing toward goals   OT Therapy Minutes   OT Time In 0830   OT Time Out 0930   OT Total Time (minutes) 60   OT Mode of treatment - Individual (minutes) 60   Therapy Time missed   Time missed?  No

## 2020-01-29 NOTE — PLAN OF CARE
Problem: Potential for Falls  Goal: Patient will remain free of falls  Description  INTERVENTIONS:  - Assess patient frequently for physical needs  -  Identify cognitive and physical deficits and behaviors that affect risk of falls  -  [place] fall precautions as indicated by assessment   - Educate patient/family on patient safety including physical limitations  - Instruct patient to call for assistance with activity based on assessment  - Modify environment to reduce risk of injury  - Consider OT/PT consult to assist with strengthening/mobility  Outcome: Progressing     Problem: Nutrition/Hydration-ADULT  Goal: Nutrient/Hydration intake appropriate for improving, restoring or maintaining nutritional needs  Description  Monitor and assess patient's nutrition/hydration status for malnutrition  Collaborate with interdisciplinary team and initiate plan and interventions as ordered  Monitor patient's weight and dietary intake as ordered or per policy  Utilize nutrition screening tool and intervene as necessary  Determine patient's food preferences and provide high-protein, high-caloric foods as appropriate       INTERVENTIONS:  - Monitor oral intake, urinary output, labs, and treatment plans  - Assess nutrition and hydration status and recommend course of action  - Evaluate amount of meals eaten  - Assist patient with eating if necessary   - Allow adequate time for meals  - Recommend/ encourage appropriate diets, oral nutritional supplements, and vitamin/mineral supplements  - Order, calculate, and assess calorie counts as needed  - Recommend, monitor, and adjust tube feedings and TPN/PPN based on assessed needs  - Assess need for intravenous fluids  - Provide specific nutrition/hydration education as appropriate  - Include patient/family/caregiver in decisions related to nutrition  Outcome: Progressing     Problem: PAIN - ADULT  Goal: Verbalizes/displays adequate comfort level or baseline comfort level  Description  Interventions:  - Encourage patient to monitor pain and request assistance  - Assess pain using appropriate pain scale  - Administer analgesics based on type and severity of pain and evaluate response  - Implement non-pharmacological measures as appropriate and evaluate response  - Consider cultural and social influences on pain and pain management  - Notify physician/advanced practitioner if interventions unsuccessful or patient reports new pain  Outcome: Progressing     Problem: INFECTION - ADULT  Goal: Absence or prevention of progression during hospitalization  Description  INTERVENTIONS:  - Assess and monitor for signs and symptoms of infection  - Monitor lab/diagnostic results  - Monitor all insertion sites, i e  indwelling lines, tubes, and drains  - Monitor endotracheal if appropriate and nasal secretions for changes in amount and color  - [place] appropriate cooling/warming therapies per order  - Administer medications as ordered  - Instruct and encourage patient and family to use good hand hygiene technique  - Identify and instruct in appropriate isolation precautions for identified infection/condition  Outcome: Progressing  Goal: Absence of fever/infection during neutropenic period  Description  INTERVENTIONS:  - Monitor WBC    Outcome: Progressing     Problem: SAFETY ADULT  Goal: Maintain or return to baseline ADL function  Description  INTERVENTIONS:  -  Assess patient's ability to carry out ADLs; assess patient's baseline for ADL function and identify physical deficits which impact ability to perform ADLs (bathing, care of mouth/teeth, toileting, grooming, dressing, etc )  - Assess/evaluate cause of self-care deficits   - Assess range of motion  - Assess patient's mobility; develop plan if impaired  - Assess patient's need for assistive devices and provide as appropriate  - Encourage maximum independence but intervene and supervise when necessary  - Involve family in performance of ADLs  - Assess for home care needs following discharge   - Consider OT consult to assist with ADL evaluation and planning for discharge  - Provide patient education as appropriate  Outcome: Progressing  Goal: Maintain or return mobility status to optimal level  Description  INTERVENTIONS:  - Assess patient's baseline mobility status (ambulation, transfers, stairs, etc )    - Identify cognitive and physical deficits and behaviors that affect mobility  - Identify mobility aids required to assist with transfers and/or ambulation (gait belt, sit-to-stand, lift, walker, cane, etc )  - [place] fall precautions as indicated by assessment  - Record patient progress and toleration of activity level on Mobility SBAR; progress patient to next Phase/Stage  - Instruct patient to call for assistance with activity based on assessment  - Consider rehabilitation consult to assist with strengthening/weightbearing, etc   Outcome: Progressing     Problem: DISCHARGE PLANNING  Goal: Discharge to home or other facility with appropriate resources  Description  INTERVENTIONS:  - Identify barriers to discharge w/patient and caregiver  - Arrange for needed discharge resources and transportation as appropriate  - Identify discharge learning needs (meds, wound care, etc )  - Arrange for interpretive services to assist at discharge as needed  - Refer to Case Management Department for coordinating discharge planning if the patient needs post-hospital services based on physician/advanced practitioner order or complex needs related to functional status, cognitive ability, or social support system  Outcome: Progressing     Problem: Knowledge Deficit  Goal: Patient/family/caregiver demonstrates understanding of disease process, treatment plan, medications, and discharge instructions  Description  Complete learning assessment and assess knowledge base    Interventions:  - Provide teaching at level of understanding  - Provide teaching via preferred learning methods  Outcome: Progressing     Problem: Prexisting or High Potential for Compromised Skin Integrity  Goal: Skin integrity is maintained or improved  Description  INTERVENTIONS:  - Identify patients at risk for skin breakdown  - Assess and monitor skin integrity  - Assess and monitor nutrition and hydration status  - Monitor labs   - Assess for incontinence   - Turn and reposition patient  - Assist with mobility/ambulation  - Relieve pressure over bony prominences  - Avoid friction and shearing  - Provide appropriate hygiene as needed including keeping skin clean and dry  - Evaluate need for skin moisturizer/barrier cream  - Collaborate with interdisciplinary team   - Patient/family teaching  - Consider wound care consult   Outcome: Progressing

## 2020-01-29 NOTE — CASE MANAGEMENT
Called Pt's pharmacy, [facility] at [phone], and [facility]  Pt's RX for Eliquis is covered with [employer] with a $3 00 co-pay  FT scheduled for 4:30 on 1/30/20

## 2020-01-29 NOTE — PROGRESS NOTES
PM&R Progress Note:    Subjective: Patient seen face to face  No acute issues  Perseverative on and I doctor's appointment  She is brought in her preservation tablets for her macular degeneration  Review of Systems: Pertinent positives in subjective, all other ROS reviewed are negative  Objective:   /62 (BP Location: Left arm)   Pulse 66   Temp 97 9 °F (36 6 °C) (Temporal)   Resp 18   Ht 5' 6" (1 676 m)   Wt 114 kg (251 lb 5 2 oz)   SpO2 99%   BMI 40 56 kg/m²     Physical Exam   Constitutional: She is oriented to person, place, and time  She appears well-developed and well-nourished  HENT:   Head: Normocephalic  Eyes: Conjunctivae and EOM are normal    Neck: Neck supple  Cardiovascular: Normal rate, regular rhythm and normal heart sounds  Pulmonary/Chest: Effort normal and breath sounds normal  She has no wheezes  She has no rales  Abdominal: Soft  Bowel sounds are normal  She exhibits no distension  There is no tenderness  Musculoskeletal: Normal range of motion  She exhibits edema (Lymphedema legs)  Neurological: She is alert and oriented to person, place, and time  Speech is dysarthric but fluent  Sensation light touch is intact x4  Motor exam:  Bilateral upper extremity exam:  4/5 throughout  Left lower extremity: 4/5  Right lower extremity:  3+/5 proximally, 4-/5 distally  Antalgic gait pattern with rolling walker   Skin: Skin is warm  Psychiatric: She has a normal mood and affect  Nursing note and vitals reviewed  Assessment/Plan: Annabella Asencio a 60 y  o  female with cardiomyopathy with EF of 30%, hypertension, anemia, glaucoma who sustained a L MCA territory infarct in the setting of a-fib and was started on eliquis  Patient currently in acute rehabilitation for impaired mobility, self-care, swallow, cognitive deficits  · Rehabilitation:  Deficits of mild right hemiparesis, dysarthria, dysphagia  Continue PT/OT/SLP     · Dysphagia: upgraded diet to D3 diet with thins  · Anxiety: Consider Buspar vs SSRI  After discussion with patient, she would like to hold off at this time  · Left MCA CVA:  Likely cardioembolic secondary to atrial fibrillation  Continue secondary stroke prophylaxis with Lipitor in Eliquis  · Atrial fibrillation:  Rate and rhythm controlled currently on amiodarone and Toprol XL  Anticoagulated with Eliquis  · Hypertension: managed on lisinopril and Toprol-XL  · Cardiomyopathy with EF 30%  Patient will require a stress test as an outpatient  · Glaucoma:  Continue Cosopt  · Appreciate internal medicine consultants medical comanagement  · Macular degeneration:  Preservation tablets ordered non formulary  She will need an appointment at [facility] upon d/c  · Estimated Discharge plan Wednesday Feb 5th 2020 with home care RN, PT/OT/SLP, Aide         Lab Results   Component Value Date    WBC 9 70 01/24/2020    HGB 10 4 (L) 01/24/2020    HCT 34 5 (L) 01/24/2020    MCV 75 (L) 01/24/2020     01/24/2020     Lab Results   Component Value Date    SODIUM 139 01/21/2020    K 3 8 01/21/2020     (H) 01/21/2020    CO2 21 01/21/2020    BUN 11 01/21/2020    CREATININE 0 87 01/21/2020    GLUC 81 01/21/2020    CALCIUM 7 7 (L) 01/21/2020     Lab Results   Component Value Date    INR 1 13 01/21/2020    INR 1 08 01/18/2020    INR 1 02 01/17/2020    PROTIME 13 1 (H) 01/21/2020    PROTIME 12 5 01/18/2020    PROTIME 11 8 01/17/2020           Current Facility-Administered Medications:     acetaminophen (TYLENOL) tablet 650 mg, 650 mg, Oral, Q6H PRN, Melecio Arceo MD, 650 mg at 01/28/20 2117    [COMPLETED] amiodarone tablet 200 mg, 200 mg, Oral, Q8H, 200 mg at 01/26/20 2102 **FOLLOWED BY** amiodarone tablet 200 mg, 200 mg, Oral, Q12H, 200 mg at 01/29/20 0513 **FOLLOWED BY** [START ON 2/3/2020] amiodarone tablet 200 mg, 200 mg, Oral, Daily With Breakfast, Melecio Arceo MD    apixaban (ELIQUIS) tablet 5 mg, 5 mg, Oral, BID, Bhupinder Jag Barakat MD, 5 mg at 01/29/20 0915    atorvastatin (LIPITOR) tablet 40 mg, 40 mg, Oral, QPM, Tra Zavala MD, 40 mg at 01/28/20 1730    dorzolamide-timolol (COSOPT) 22 3-6 8 MG/ML ophthalmic solution 1 drop, 1 drop, Both Eyes, BID, Tra Zavala MD, 1 drop at 01/29/20 0913    lisinopril (ZESTRIL) tablet 5 mg, 5 mg, Oral, Daily, Tra Zavala MD, 5 mg at 01/29/20 0915    metoprolol succinate (TOPROL-XL) 24 hr tablet 50 mg, 50 mg, Oral, Q12H, Tra Zavala MD, 50 mg at 01/29/20 0915    polyethylene glycol (MIRALAX) packet 17 g, 17 g, Oral, Daily PRN, Tra Zavala MD    Past Medical History:   Diagnosis Date    Hypertension     PUD (peptic ulcer disease)     Stroke ([place] [address] )     Vitamin D deficiency        Patient Active Problem List    Diagnosis Date Noted    Glaucoma 01/23/2020    Dysphagia 01/23/2020    Cardiomyopathy ([place] [address] ) 01/23/2020    Prolonged QT interval 01/23/2020    History of pulmonary embolism 01/23/2020    Leukocytosis 01/23/2020    Anemia 01/23/2020    Hypomagnesemia 01/23/2020    IFG (impaired fasting glucose) 01/23/2020    Hypocalcemia 01/23/2020    A-fib ([place] [address] ) 01/17/2020    CVA (cerebral vascular accident) ([place] [address] ) 01/17/2020    Essential hypertension 06/03/2019          Albino Espino MD  PM&R Attending

## 2020-01-29 NOTE — PROGRESS NOTES
01/29/20 1415   Pain Assessment   Pain Assessment No/denies pain   Pain Score No Pain   Restrictions/Precautions   Precautions Aphasia; Aspiration   Comprehension   Assist Devices Glasses   QI: Comprehension 3  Usually Understands: Understands most conversations, but misses some part/intent of message  Requires cues at times to understand  Comprehension (FIM) 4 - Understands basic info/conversation 75-90% of time   Expression   QI: Expression 3  Exhibits some difficulty with expressing needs and ideas (e g , some words or finishing thoughts) or speech is not clear   Expression (FIM) 3 - Expresses basic info/needs 50-74% of time   Social Interaction   Participation Individual   Social Interaction (FIM) 4 - Interacts 75-89% of time   Problem Solving   Problem solving (FIM) 3 - Solves basic problmes 50-74% of time   Memory   Recognize People Yes   Remember Routine Yes   Initiates Tasks Yes   Memory (FIM) 4 - Recognizes/recalls/performs 75-89%   Executive Function Skills   Insight Moderate insight   Impulsive Mildly impulsive   Flexibility of Thought Reduced flexibility   Planning Reduced planning skills   Memory Skills   Orientation Level Oriented X4   Short Term Working Recall Mild Impairment   Auditory Comprehension   Comphrehends Conversation Simple   Interfering Components Attention - selective   Speech/Swallow Mechanism Exam   Facial Strength Reduced   Lingual Strength Mild reduced   Mandible   (reduced ROM and endurance)   Motor Speech Evaluation   Dysarthria Yes   Mild Dystharia Impaired articulation   Speech/Language/Cognition Assessmetn   Treatment Assessment Speech Pathology Daily Treatment Note - Speech/Cognitive Communication Skills - Patient seen during the [employer] Cooking Class  SLP initially engaged patient in completion of a survey regarding eating habits in the home  This task involved remote memory and reasoning with numbers related to portion size    SLP targeted comprehension of directions and organization during completion of a recipe  SLP then focused on reasoning and recent memory with recall of the task and discussion of how the nutritional facts will relate to functional changes they can make for themselves in the home setting  Swallow Information   Current Risks for Dysphagia & Aspiration Dysarthria;General debilitation   Current Symptoms/Concerns Difficulty chewing   Current Diet Dysphagia advance; Thin liquid   Consistencies Assessed and Performance   Oral Stage Mild impaired   Oral Stage Comment extended   Phargngeal Stage Mild impaired   Recommendations   Diet Solid Recommendation Level 3 Dysphagia/ advanced/ soft to chew   Diet Liquid Recommendation Thin liquid   General Precautions Aspiration precautions   Eating   Type of Assistance Needed Supervision   Eating CARE Score 4   Swallow Assessment   Swallow Treatment Assessment Speech Pathology Daily Treatment Note - Swallow Oral Function - Patient was seen during and most the moving forward cooking class  During the activity, Compensatory safe swallow education provided which included appropriate 90 degree head/neck/trunk posture, prep to small bites, small and single sips of liquid,complete oral breakdown of solids with formation of a cohesive bolus, oral closure with spoon retrieval, decreased speaking on oral phase, oral clearance prior to reintroduction of bolus, alternate solids with liquids, and rate control  Current level of demonstration of above strategies is supervision for oral breakdown in particular which is extended and c/o difficulty chewing  SLP continues to complete trials of complex solids to ascertain highest functional level  Swallow Assessment Prognosis   Prognosis Good   Prognosis Considerations Family/community support   SLP Therapy Minutes   SLP Time In 1023   SLP Time Out 8817   SLP Total Time (minutes) 60   SLP Mode of treatment - Individual (minutes) 60   Therapy Time missed   Time missed?  No Daily FIM Score   Eating (FIM) 5 - Patient requires supervision, cueing or coaxing

## 2020-01-29 NOTE — PROGRESS NOTES
01/29/20 1300   Pain Assessment   Pain Assessment No/denies pain   Restrictions/Precautions   Precautions Aphasia; Aspiration;Cognitive; Fall Risk   Sit to Lying   Type of Assistance Needed Supervision   Sit to Lying CARE Score 4   Sit to Stand   Type of Assistance Needed Supervision   Sit to Stand CARE Score 4   Bed-Chair Transfer   Type of Assistance Needed Supervision   Chair/Bed-to-Chair Transfer CARE Score 4   Health Management   Health Management Pt eager to practice writing this afternoon with direction from ST to work on writing sentences to form descriptions  Pt is able to write sentences about Cat and family  Pt returns to room at the end of session to rest before Moving forward cooking group planned at 2pm  Pt tolerates session well demonstrating improving organization for written thoughts and communicating wants and needs that will be required with transition to home living alone  Assessment   Problem List Decreased endurance; Impaired balance;Decreased cognition;Decreased safety awareness   Plan   Treatment/Interventions ADL retraining;Functional transfer training; Therapeutic exercise; Endurance training;Cognitive reorientation;Patient/family training; Compensatory technique education   Progress Progressing toward goals   OT Therapy Minutes   OT Time In 1300   OT Time Out 1330   OT Total Time (minutes) 30   OT Mode of treatment - Individual (minutes) 30   Therapy Time missed   Time missed?  No

## 2020-01-30 PROCEDURE — 92507 TX SP LANG VOICE COMM INDIV: CPT

## 2020-01-30 PROCEDURE — 97535 SELF CARE MNGMENT TRAINING: CPT

## 2020-01-30 PROCEDURE — 97530 THERAPEUTIC ACTIVITIES: CPT

## 2020-01-30 PROCEDURE — 97116 GAIT TRAINING THERAPY: CPT

## 2020-01-30 PROCEDURE — 97537 COMMUNITY/WORK REINTEGRATION: CPT

## 2020-01-30 PROCEDURE — 99232 SBSQ HOSP IP/OBS MODERATE 35: CPT | Performed by: PHYSICAL MEDICINE & REHABILITATION

## 2020-01-30 PROCEDURE — 97110 THERAPEUTIC EXERCISES: CPT

## 2020-01-30 PROCEDURE — 92526 ORAL FUNCTION THERAPY: CPT

## 2020-01-30 RX ADMIN — ATORVASTATIN CALCIUM 40 MG: 40 TABLET, FILM COATED ORAL at 17:39

## 2020-01-30 RX ADMIN — AMIODARONE HYDROCHLORIDE 200 MG: 100 TABLET ORAL at 05:56

## 2020-01-30 RX ADMIN — DORZOLAMIDE HYDROCHLORIDE AND TIMOLOL MALEATE 1 DROP: 20; 5 SOLUTION/ DROPS OPHTHALMIC at 17:40

## 2020-01-30 RX ADMIN — LISINOPRIL 5 MG: 5 TABLET ORAL at 09:09

## 2020-01-30 RX ADMIN — METOPROLOL SUCCINATE 50 MG: 50 TABLET, EXTENDED RELEASE ORAL at 09:10

## 2020-01-30 RX ADMIN — DORZOLAMIDE HYDROCHLORIDE AND TIMOLOL MALEATE 1 DROP: 20; 5 SOLUTION/ DROPS OPHTHALMIC at 09:10

## 2020-01-30 RX ADMIN — APIXABAN 5 MG: 5 TABLET, FILM COATED ORAL at 17:39

## 2020-01-30 RX ADMIN — APIXABAN 5 MG: 5 TABLET, FILM COATED ORAL at 09:10

## 2020-01-30 RX ADMIN — Medication 1 CAPSULE: at 10:48

## 2020-01-30 RX ADMIN — Medication 1 CAPSULE: at 17:40

## 2020-01-30 RX ADMIN — AMIODARONE HYDROCHLORIDE 200 MG: 100 TABLET ORAL at 17:39

## 2020-01-30 NOTE — PROGRESS NOTES
01/30/20 1629   Pain Assessment   Pain Assessment No/denies pain   Restrictions/Precautions   Precautions Aphasia; Aspiration; Fall Risk   Lying to Sitting on Side of Bed   Type of Assistance Needed Independent   Lying to Sitting on Side of Bed CARE Score 6   Sit to Stand   Type of Assistance Needed Supervision   Sit to Stand CARE Score 4   Bed-Chair Transfer   Type of Assistance Needed Supervision   Chair/Bed-to-Chair Transfer CARE Score 4   Additional Activities   Additional Activities Other (Comment)   Additional Activities Comments fxl mobility with SPC S in room and hallway, S/CGA for going up and down the stairs, pt has more difficulty with one handrail and SPC than when using B handrails   Assessment   Treatment Assessment Pt participates in FT this afternoon with 2 brothers, son adn grandson present  Current LOF with ADLs discussed and pt demonstrates transfers/ mobility  Discussed recommendations for having supervision with medication management, homemaking/ meal prep, and finances  Family is planning to provide support with transition to home with a friend nearby as well  OT Family training done with: 2 brothers, son and grandson   Assessment of family training Family is aware of current LOF  Concerns regarding higher level homemaking tasks discussed with plan established for support during transition to home  Pt has a SPC and shower chair  Discharge to home discussed with [facility] planned to assist family with transition  Problem List Decreased endurance; Impaired balance;Decreased cognition;Decreased safety awareness   Plan   Treatment/Interventions ADL retraining;Functional transfer training; Therapeutic exercise; Endurance training;Patient/family training;Cognitive reorientation; Compensatory technique education   Progress Progressing toward goals   OT Therapy Minutes   OT Time In 1629   OT Time Out 1700   OT Total Time (minutes) 31

## 2020-01-30 NOTE — NURSING NOTE
Patient resting comfortably in bed at this time  No signs or distress noted  Patient with bed alarm in place to maintain patient safety  Patient with expressive aphasia noted at times  Patient was able to walk to the bathroom with one assist and cane overnight  Patient refused to wear SCDs while sleeping as they keep her awake patient educated on risks of refusal and continues to refuse overnight  Patient refusing to bathe at this time agrees to wash later in the day  Call bell within reach  Will continue to monitor patient and follow plan of care

## 2020-01-30 NOTE — PLAN OF CARE
Problem: Potential for Falls  Goal: Patient will remain free of falls  Description  INTERVENTIONS:  - Assess patient frequently for physical needs  -  Identify cognitive and physical deficits and behaviors that affect risk of falls  -  [place] fall precautions as indicated by assessment   - Educate patient/family on patient safety including physical limitations  - Instruct patient to call for assistance with activity based on assessment  - Modify environment to reduce risk of injury  - Consider OT/PT consult to assist with strengthening/mobility  Outcome: Progressing     Problem: Nutrition/Hydration-ADULT  Goal: Nutrient/Hydration intake appropriate for improving, restoring or maintaining nutritional needs  Description  Monitor and assess patient's nutrition/hydration status for malnutrition  Collaborate with interdisciplinary team and initiate plan and interventions as ordered  Monitor patient's weight and dietary intake as ordered or per policy  Utilize nutrition screening tool and intervene as necessary  Determine patient's food preferences and provide high-protein, high-caloric foods as appropriate       INTERVENTIONS:  - Monitor oral intake, urinary output, labs, and treatment plans  - Assess nutrition and hydration status and recommend course of action  - Evaluate amount of meals eaten  - Assist patient with eating if necessary   - Allow adequate time for meals  - Recommend/ encourage appropriate diets, oral nutritional supplements, and vitamin/mineral supplements  - Order, calculate, and assess calorie counts as needed  - Recommend, monitor, and adjust tube feedings and TPN/PPN based on assessed needs  - Assess need for intravenous fluids  - Provide specific nutrition/hydration education as appropriate  - Include patient/family/caregiver in decisions related to nutrition  Outcome: Progressing     Problem: PAIN - ADULT  Goal: Verbalizes/displays adequate comfort level or baseline comfort level  Description  Interventions:  - Encourage patient to monitor pain and request assistance  - Assess pain using appropriate pain scale  - Administer analgesics based on type and severity of pain and evaluate response  - Implement non-pharmacological measures as appropriate and evaluate response  - Consider cultural and social influences on pain and pain management  - Notify physician/advanced practitioner if interventions unsuccessful or patient reports new pain  Outcome: Progressing     Problem: INFECTION - ADULT  Goal: Absence or prevention of progression during hospitalization  Description  INTERVENTIONS:  - Assess and monitor for signs and symptoms of infection  - Monitor lab/diagnostic results  - Monitor all insertion sites, i e  indwelling lines, tubes, and drains  - Monitor endotracheal if appropriate and nasal secretions for changes in amount and color  - [place] appropriate cooling/warming therapies per order  - Administer medications as ordered  - Instruct and encourage patient and family to use good hand hygiene technique  - Identify and instruct in appropriate isolation precautions for identified infection/condition  Outcome: Progressing  Goal: Absence of fever/infection during neutropenic period  Description  INTERVENTIONS:  - Monitor WBC    Outcome: Progressing     Problem: SAFETY ADULT  Goal: Maintain or return to baseline ADL function  Description  INTERVENTIONS:  -  Assess patient's ability to carry out ADLs; assess patient's baseline for ADL function and identify physical deficits which impact ability to perform ADLs (bathing, care of mouth/teeth, toileting, grooming, dressing, etc )  - Assess/evaluate cause of self-care deficits   - Assess range of motion  - Assess patient's mobility; develop plan if impaired  - Assess patient's need for assistive devices and provide as appropriate  - Encourage maximum independence but intervene and supervise when necessary  - Involve family in performance of ADLs  - Assess for home care needs following discharge   - Consider OT consult to assist with ADL evaluation and planning for discharge  - Provide patient education as appropriate  Outcome: Progressing  Goal: Maintain or return mobility status to optimal level  Description  INTERVENTIONS:  - Assess patient's baseline mobility status (ambulation, transfers, stairs, etc )    - Identify cognitive and physical deficits and behaviors that affect mobility  - Identify mobility aids required to assist with transfers and/or ambulation (gait belt, sit-to-stand, lift, walker, cane, etc )  - [place] fall precautions as indicated by assessment  - Record patient progress and toleration of activity level on Mobility SBAR; progress patient to next Phase/Stage  - Instruct patient to call for assistance with activity based on assessment  - Consider rehabilitation consult to assist with strengthening/weightbearing, etc   Outcome: Progressing     Problem: DISCHARGE PLANNING  Goal: Discharge to home or other facility with appropriate resources  Description  INTERVENTIONS:  - Identify barriers to discharge w/patient and caregiver  - Arrange for needed discharge resources and transportation as appropriate  - Identify discharge learning needs (meds, wound care, etc )  - Arrange for interpretive services to assist at discharge as needed  - Refer to Case Management Department for coordinating discharge planning if the patient needs post-hospital services based on physician/advanced practitioner order or complex needs related to functional status, cognitive ability, or social support system  Outcome: Progressing     Problem: Knowledge Deficit  Goal: Patient/family/caregiver demonstrates understanding of disease process, treatment plan, medications, and discharge instructions  Description  Complete learning assessment and assess knowledge base    Interventions:  - Provide teaching at level of understanding  - Provide teaching via preferred learning methods  Outcome: Progressing     Problem: Prexisting or High Potential for Compromised Skin Integrity  Goal: Skin integrity is maintained or improved  Description  INTERVENTIONS:  - Identify patients at risk for skin breakdown  - Assess and monitor skin integrity  - Assess and monitor nutrition and hydration status  - Monitor labs   - Assess for incontinence   - Turn and reposition patient  - Assist with mobility/ambulation  - Relieve pressure over bony prominences  - Avoid friction and shearing  - Provide appropriate hygiene as needed including keeping skin clean and dry  - Evaluate need for skin moisturizer/barrier cream  - Collaborate with interdisciplinary team   - Patient/family teaching  - Consider wound care consult   Outcome: Progressing

## 2020-01-30 NOTE — PROGRESS NOTES
01/30/20 0859   Pain Assessment   Pain Assessment 0-10   Pain Score No Pain   Restrictions/Precautions   Precautions Aphasia; Aspiration;Cognitive; Fall Risk   Cognition   Overall Cognitive Status Impaired   Arousal/Participation Alert; Responsive; Cooperative   Attention Attends with cues to redirect   Orientation Level Oriented X4   Memory Decreased short term memory   Following Commands Follows one step commands without difficulty   Lying to Sitting on Side of Bed   Type of Assistance Needed Supervision   Amount of Physical Assistance Provided No physical assistance   Lying to Sitting on Side of Bed CARE Score 4   Sit to Stand   Type of Assistance Needed Supervision   Amount of Physical Assistance Provided No physical assistance   Sit to Stand CARE Score 4   Bed-Chair Transfer   Type of Assistance Needed Supervision   Amount of Physical Assistance Provided No physical assistance   Chair/Bed-to-Chair Transfer CARE Score 4   Transfer Bed/Chair/Wheelchair   Limitations Noted In Balance; Endurance;LE Strength   Adaptive Equipment Cane   Stand Pivot Supervision   Sit to Stand Supervision   Stand to Sit Supervision   Supine to Sit Supervision   All Transfer Supervision   Walk 10 Feet   Type of Assistance Needed Supervision   Amount of Physical Assistance Provided No physical assistance   Walk 10 Feet CARE Score 4   Walk 50 Feet with Two Turns   Type of Assistance Needed Supervision   Amount of Physical Assistance Provided No physical assistance   Walk 50 Feet with Two Turns CARE Score 4   Walk 150 Feet   Reason if not Attempted Safety concerns   Walk 150 Feet CARE Score 88   Walking 10 Feet on Uneven Surfaces   Type of Assistance Needed Supervision   Amount of Physical Assistance Provided No physical assistance   Walking 10 Feet on Uneven Surfaces CARE Score 4   Ambulation   Does the patient walk? 2   Yes   Primary Mode of Locomotion Prior to Admission Walk   Distance Walked (feet) 131 ft  (119, 117)   Assist Device U S  [facility] Gait Pattern Inconsistant [place]   Limitations Noted In Balance; Endurance; Safety   Provided Assistance with: Balance   Walk Assist Level Supervision   Findings level and carpet   Curb or Single Stair   Style negotiated Single stair   Type of Assistance Needed Supervision   Amount of Physical Assistance Provided No physical assistance   1 Step (Curb) CARE Score 4   4 Steps   Type of Assistance Needed Supervision   Amount of Physical Assistance Provided No physical assistance   4 Steps CARE Score 4   12 Steps   Reason if not Attempted Safety concerns   12 Steps CARE Score 88   Stairs   Type Stairs   # of Steps 7   Weight Bearing Precautions WBAT   Assist Devices Bilateral Rail   Findings S   Therapeutic Interventions   Strengthening seated, standing, and supine ther ex 30 reps   Other gait and community re-ed   Assessment   Treatment Assessment Pt is pleasant and cooperative with PT today; She is S for all transfers and amb up to 131' with SPC; Pt is limited by pain and decreased endurance needing rests throughout session; Pt went up/down 7 steps with 2 HR and S; Pt performed seated, supine, and standing marches for strengthening B LE; Pt needs occasional cues for technique with exercises   PT Barriers   Physical Impairment Decreased strength;Decreased range of motion;Decreased endurance; Impaired balance;Decreased mobility; Decreased safety awareness;Decreased cognition   Functional Limitation Walking;Transfers;Standing;Stair negotiation   Plan   Treatment/Interventions Functional transfer training;LE strengthening/ROM; Elevations; Therapeutic exercise;Gait training   Progress Progressing toward goals   PT Therapy Minutes   PT Time In 0859   PT Time Out 1029   PT Total Time (minutes) 90   PT Mode of treatment - Individual (minutes) 90   PT Mode of treatment - Concurrent (minutes) 0   PT Mode of treatment - Group (minutes) 0   PT Mode of treatment - Co-treat (minutes) 0   PT Mode of Treatment - Total time(minutes) 90 minutes   PT Cumulative Minutes 541   Therapy Time missed   Time missed?  No

## 2020-01-30 NOTE — PROGRESS NOTES
01/30/20 1135   Pain Assessment   Pain Assessment No/denies pain   Restrictions/Precautions   Precautions Aphasia; Aspiration;Cognitive; Fall Risk   Eating Assessment   Positioning Upright;Out of Bed   Meal Assessed Snacks   Current Diet Dental Soft   Findings Supervision   Grooming   Able To Initiate Tasks; Wash/Dry Hands   Limitation Noted In Safety   Findings Supervision   Putting On/Taking Off Footwear   Type of Assistance Needed Supervision   Putting On/Taking Off Footwear CARE Score 4   Sit to Stand   Type of Assistance Needed Supervision   Sit to Stand CARE Score 4   Bed-Chair Transfer   Type of Assistance Needed Supervision   Chair/Bed-to-Chair Transfer CARE Score 4   [address] Score 4   Toileting   Able to [address] down yes, up yes  Manage Hygiene Bladder   Toilet Transfer   Type of Assistance Needed Supervision   Toilet Transfer CARE Score 4   Toilet Transfer   Surface Assessed Raised Toilet   Transfer Technique Stand Pivot   Limitations Noted In Safety; Endurance   Exercise Tools   Other Exercise Tool 1 push pegs in and then out stacks of 3 using B hands   Other Exercise Tool 2 writing sentences and shopping list with Supervision and occ verbal cues   Cognition   Overall Cognitive Status Impaired   Arousal/Participation Alert; Responsive; Cooperative   Attention Attends with cues to redirect   Orientation Level Oriented X4   Memory Decreased short term memory;Decreased recall of recent events;Decreased recall of precautions   Additional Activities   Additional Activities Other (Comment)   Additional Activities Comments fxl mobility with SPC S in room and hallway   Assessment   Treatment Assessment Pt participates in LB dressing before transition to OT gym for UB therex  Pt did request a half of a donut and did not eat much lunch because reporting she was full and tired   Pt tolerates exercise with BUE therex to increase activity tolerance with similar goals as another  Pt returns to roomto rest after lunch and sitting rest period  FT planned for later today with son and brother  Problem List Decreased endurance; Impaired balance;Decreased cognition;Decreased safety awareness   Plan   Treatment/Interventions ADL retraining;Functional transfer training; Therapeutic exercise; Endurance training;Patient/family training;Cognitive reorientation; Compensatory technique education   Progress Progressing toward goals   OT Therapy Minutes   OT Time In 1135   OT Time Out 1250   OT Total Time (minutes) 75   OT Mode of treatment - Individual (minutes) 50   OT Mode of treatment - Concurrent (minutes) 25   Therapy Time missed   Time missed?  No

## 2020-01-30 NOTE — PROGRESS NOTES
01/30/20 0730   Charting Type   Charting Type Shift assessment   Neurological   Neuro (WDL) X   Level of Consciousness Alert/awake   Orientation Level Oriented X4   Cognition Appropriate judgement   Pupil Assessment Yes   R Pupil Size (mm) 3   R Pupil Shape Round   R Pupil Reaction Brisk   L Pupil Size (mm) 3   L Pupil Shape Round   L Pupil Reaction Brisk   Muscle Function/Sensation Assessment ;Motor response;Muscle strength   R Hand  Strong   L Hand  Strong   RUE Motor Response Responds to commands   RUE Sensation Full sensation   RUE Muscle Strength 5- Normal strength   LUE Motor Response Responds to commands   LUE Sensation Full sensation   LUE Muscle Strength 5- Normal strength   RLE Motor Response Responds to commands   RLE Sensation Full sensation   RLE Muscle Strength 5- Normal strength   LLE Motor Response Responds to commands   LLE Sensation Full sensation   LLE Muscle Strength 5- Normal strength   Neuro Symptoms Agitation;Irritable   HEENT   HEENT (WDL) X   Head and Face Symmetrical   R Eye Mildly impaired vision   L Eye Mildly impaired vision   R Ear Intact   L Ear Intact   Mucous Membrane(s) Moist;Pink; Intact   Teeth Missing teeth   Respiratory   Respiratory (WDL) WDL   Bilateral Breath Sounds Clear   Incentive Spirometry   IS level of assistance Assisted by nursing   Frequency q1hr W/A   Respiratory Effort Normal   Treatment Tolerance Tolerated well   Incentive Spirometry Goal (mL) 2000 mL   Incentive Spirometry Achieved (mL) 1250 mL   Cardiac   Cardiac (WDL) WDL   Peripheral Vascular   Peripheral Vascular (WDL) X   Cyanosis None   Capillary Refill Less than/equal to 2 seconds (All extremities)   Pulses R radial;L radial;R pedal;L pedal   Edema Generalized   Generalized Edema Non-pitting   RLE Edema Non-pitting   LLE Edema Non-pitting   RUE Neurovascular Assessment   R Radial Pulse +2   LUE Neurovascular Assessment   L Radial Pulse +2   RLE Neurovascular Assessment   R Pedal Pulse +2 LLE Neurovascular Assessment   L Pedal Pulse +2   Integumentary   Integumentary (WDL) X   Skin Color Appropriate for ethnicity   Skin Condition/Temp Warm;Dry   Skin Integrity Bruising   Skin Location scattered   Skin Turgor Non-tenting   Musculoskeletal   Musculoskeletal (WDL) X   Level of Assistance Standby assist, set-up cues, supervision of patient - no hands on   Assistive Device Cane   RUE Full movement   LUE Full movement   RLE Full movement   LLE Full movement   Gastrointestinal   Gastrointestinal (WDL) WDL   Abdomen Inspection Obese   Bowel Sounds (All Quadrants) Normoactive   Tenderness Nontender; Soft   Last BM Date 01/28/20   Genitourinary   Genitourinary (WDL) WDL   Genitalia   Female Genitalia Intact   Anal/Rectal   Anal/Rectal (WDL) WDL   Psychosocial   Psychosocial (WDL) WDL

## 2020-01-31 PROCEDURE — 97110 THERAPEUTIC EXERCISES: CPT

## 2020-01-31 PROCEDURE — 97537 COMMUNITY/WORK REINTEGRATION: CPT

## 2020-01-31 PROCEDURE — 92526 ORAL FUNCTION THERAPY: CPT

## 2020-01-31 PROCEDURE — 97535 SELF CARE MNGMENT TRAINING: CPT

## 2020-01-31 PROCEDURE — 92507 TX SP LANG VOICE COMM INDIV: CPT

## 2020-01-31 PROCEDURE — 97116 GAIT TRAINING THERAPY: CPT

## 2020-01-31 PROCEDURE — 99232 SBSQ HOSP IP/OBS MODERATE 35: CPT | Performed by: PHYSICAL MEDICINE & REHABILITATION

## 2020-01-31 PROCEDURE — 97530 THERAPEUTIC ACTIVITIES: CPT

## 2020-01-31 RX ADMIN — METOPROLOL SUCCINATE 50 MG: 50 TABLET, EXTENDED RELEASE ORAL at 09:25

## 2020-01-31 RX ADMIN — APIXABAN 5 MG: 5 TABLET, FILM COATED ORAL at 09:25

## 2020-01-31 RX ADMIN — AMIODARONE HYDROCHLORIDE 200 MG: 100 TABLET ORAL at 06:18

## 2020-01-31 RX ADMIN — Medication 1 CAPSULE: at 17:40

## 2020-01-31 RX ADMIN — Medication 1 CAPSULE: at 09:27

## 2020-01-31 RX ADMIN — DORZOLAMIDE HYDROCHLORIDE AND TIMOLOL MALEATE 1 DROP: 20; 5 SOLUTION/ DROPS OPHTHALMIC at 09:24

## 2020-01-31 RX ADMIN — DORZOLAMIDE HYDROCHLORIDE AND TIMOLOL MALEATE 1 DROP: 20; 5 SOLUTION/ DROPS OPHTHALMIC at 17:40

## 2020-01-31 RX ADMIN — AMIODARONE HYDROCHLORIDE 200 MG: 100 TABLET ORAL at 17:39

## 2020-01-31 RX ADMIN — ATORVASTATIN CALCIUM 40 MG: 40 TABLET, FILM COATED ORAL at 17:39

## 2020-01-31 RX ADMIN — APIXABAN 5 MG: 5 TABLET, FILM COATED ORAL at 17:40

## 2020-01-31 RX ADMIN — LISINOPRIL 5 MG: 5 TABLET ORAL at 09:25

## 2020-01-31 NOTE — PROGRESS NOTES
01/31/20 0730   Pain Assessment   Pain Assessment No/denies pain   Restrictions/Precautions   Precautions Agitated; Aphasia; Aspiration; Fall Risk   Oral Hygiene   Type of Assistance Needed Set-up / Lisabeth Batters; Verbal cues   Oral Hygiene CARE Score 4   Grooming   Able To Comb/Brush Hair;Wash/Dry Face;Brush/Clean Teeth;Wash/Dry Hands   Findings VCs for task;requested seated EOB due to fatigue;brushed teeth minimally and didnt rinse but declined need due to hating the toothpaste and cheap mouthwash    Shower/Bathe Self   Type of Assistance Needed Incidental touching;Verbal cues; Set-up / clean-up   Shower/Bathe Self CARE Score 4   Bathing   Assessed Bath Style Sponge Bath   Able to Gather/Transport No   Able to Wash/Rinse/Dry (body part) Left Arm;Right Arm;Perineal Area; Buttocks   Limitations Noted in Balance; Endurance;Problem Solving; Safety   Findings  requested EOB;declined full ADL;VCs for encouragement due to very upset    Tub/Shower Transfer   Findings declined    Upper Body Dressing   Type of Assistance Needed Supervision   Upper Body Dressing CARE Score 4   Lower Body Dressing   Type of Assistance Needed Physical assistance;Verbal cues   Amount of Physical Assistance Provided Less than 25%   Lower Body Dressing CARE Score 3   Putting On/Taking Off Footwear   Type of Assistance Needed Physical assistance;Verbal cues   Amount of Physical Assistance Provided Less than 25%   Putting On/Taking Off Footwear CARE Score 3   Dressing/Undressing Clothing   Remove UB Clothes Pullover Shirt   Don UB Clothes Pullover Shirt   Remove LB Clothes Socks; Undergarment;Pants   Don LB Clothes Pants; Undergarment;Socks  (Min A )   Positioning Sit Edge Of Bed   Findings pt demanding assistance with LB self care despite OT recomm to try on her own     Lying to Sitting on Side of Bed   Type of Assistance Needed Supervision   Lying to Sitting on Side of Bed CARE Score 4   Sit to Stand   Type of Assistance Needed Supervision   Sit to Stand CARE Score 4   Toileting Hygiene   Type of Assistance Needed Supervision   Toileting Hygiene CARE Score 4   Toileting   Able to Pull Clothing down yes, up yes  Manage Hygiene Bladder; Bowel   Findings supervision   Toilet Transfer   Type of Assistance Needed Supervision;Verbal cues   Toilet Transfer CARE Score 4   Toilet Transfer   Surface Assessed Raised Toilet   Transfer Technique Standard   Limitations Noted In Balance; Endurance; Safety   Findings CS due to fatigue   Cognition   Overall Cognitive Status Impaired   Arousal/Participation Alert; Cooperative   Attention Attends with cues to redirect   Orientation Level Oriented X4   Memory Decreased short term memory;Decreased recall of recent events;Decreased recall of precautions   Following Commands Follows one step commands without difficulty   Additional Activities   Additional Activities Comments functional mobility in room with [facility] with CS/CGA  due to fatigue and decreased actvity tolerance    Activity Tolerance   Activity Tolerance Patient tolerated treatment well   Assessment   Treatment Assessment Pt crying out during ADL stating"I cant get any sleep", "Im tired and know one cares"  Difficult to redirect to task and stating "you dont have to write everything down that I say"  Pt unsatisifed with tooth paste crying out Im tired frequently throughout the session cursing at times and  at times making voice louder and louder  Pt only partially washed due to frustration  Pt participated in ADL with compensatory strategies reviewed although pt set on her own technqiue reporting she is not a handi cap  Refer to respective sections for details of session  Pt requested to return to bed and turn all lights out  Prognosis Good   Problem List Decreased strength;Decreased range of motion;Decreased endurance; Impaired balance;Decreased mobility; Decreased cognition; Impaired judgement;Decreased safety awareness   Plan   Treatment/Interventions ADL retraining;Functional transfer training;Patient/family training;Bed mobility;Gait training; Compensatory technique education   Progress Improving as expected   OT Therapy Minutes   OT Time In 0730   OT Time Out 0830   OT Total Time (minutes) 60   OT Mode of treatment - Individual (minutes) 60   OT Mode of treatment - Concurrent (minutes) 0   OT Mode of treatment - Group (minutes) 0   OT Mode of treatment - Co-treat (minutes) 0   OT Mode of Treatment - Total time(minutes) 60 minutes   OT Cumulative Minutes 60   Therapy Time missed   Time missed?  No

## 2020-01-31 NOTE — PROGRESS NOTES
Tolerating therapy  "ready to go home"  States difficulty speaking is improving  Expressive aphasia persists  Continue same plan of care  For ADT home on Wednesday 2/5/20  Needs verbal cues at times for safety with ambulation/RW and or cane

## 2020-01-31 NOTE — PROGRESS NOTES
PM&R Progress Note:    Subjective: Patient seen face to face  No acute issues  Related her appointment with her retinal specialist     Review of Systems: Pertinent positives in subjective, all other ROS reviewed are negative  Objective:   /64   Pulse 65   Temp 98 3 °F (36 8 °C) (Temporal)   Resp 20   Ht 5' 6" (1 676 m)   Wt 114 kg (251 lb 5 2 oz)   SpO2 93%   BMI 40 56 kg/m²     Physical Exam   Constitutional: She is oriented to person, place, and time  She appears well-developed  HENT:   Head: Normocephalic and atraumatic  Eyes: Conjunctivae are normal    Neck: Neck supple  Cardiovascular: Normal rate and regular rhythm  Pulmonary/Chest: Breath sounds normal  She has no wheezes  She has no rales  Abdominal: Soft  Bowel sounds are normal  She exhibits no distension  There is no tenderness  Musculoskeletal: Normal range of motion  She exhibits edema (Lymphedema legs)  Neurological: She is alert and oriented to person, place, and time  Speech is dysarthric but fluent  + aphasia with word-finding difficulties intermittently  Sensation light touch is intact x4  Motor exam:  Bilateral upper extremity exam:  4/5 throughout  Left lower extremity: 4+/5  Right lower extremity:  4-/5 proximally, 4/5 distally  Antalgic gait pattern with rolling walker   Skin: Skin is warm  Psychiatric: She has a normal mood and affect  Nursing note and vitals reviewed  Assessment/Plan: Yeyo Blakely a 60 y  o  female with cardiomyopathy with EF of 30%, hypertension, anemia, glaucoma who sustained a L MCA territory infarct in the setting of a-fib and was started on eliquis  Patient currently in acute rehabilitation for impaired mobility, self-care, swallow, cognitive deficits  · Rehabilitation:  Deficits of mild right hemiparesis, aphasia, dysarthria, dysphagia  Continue PT/OT/SLP    In therapy today patient has progressed to a supervision level now utilizing a cane and practicing stairs with bilateral hand rails also needing supervision  + expressive aphasia  Minimal assistance for ADLs  · Dysphagia: upgraded diet to D3 diet with thins  · Anxiety:  Stable  · Left MCA CVA:  Likely cardioembolic secondary to atrial fibrillation  Continue secondary stroke prophylaxis with Lipitor in Eliquis  · Atrial fibrillation:  Rate and rhythm controlled currently on amiodarone and Toprol XL  Anticoagulated with Eliquis  · Hypertension: managed on lisinopril and Toprol-XL  · Cardiomyopathy with EF 30%  Patient will require a stress test as an outpatient  · Glaucoma:  Continue Cosopt  · Macular degeneration:  Preservation tablets ordered non formulary  Patient has a follow-up appointment with her retinal specialist on 2/7/19 - added to AVS  · Appreciate [facility] medical comanagement  · Estimated Discharge plan Wednesday Feb 5th 2020 with home care RN, PT/OT/SLP, Aide         Lab Results   Component Value Date    WBC 9 70 01/24/2020    HGB 10 4 (L) 01/24/2020    HCT 34 5 (L) 01/24/2020    MCV 75 (L) 01/24/2020     01/24/2020     Lab Results   Component Value Date    SODIUM 139 01/21/2020    K 3 8 01/21/2020     (H) 01/21/2020    CO2 21 01/21/2020    BUN 11 01/21/2020    CREATININE 0 87 01/21/2020    GLUC 81 01/21/2020    CALCIUM 7 7 (L) 01/21/2020     Lab Results   Component Value Date    INR 1 13 01/21/2020    INR 1 08 01/18/2020    INR 1 02 01/17/2020    PROTIME 13 1 (H) 01/21/2020    PROTIME 12 5 01/18/2020    PROTIME 11 8 01/17/2020           Current Facility-Administered Medications:     acetaminophen (TYLENOL) tablet 650 mg, 650 mg, Oral, Q6H PRN, Vickey Montilla MD, 650 mg at 01/28/20 2117    [COMPLETED] amiodarone tablet 200 mg, 200 mg, Oral, Q8H, 200 mg at 01/26/20 2102 **FOLLOWED BY** amiodarone tablet 200 mg, 200 mg, Oral, Q12H, 200 mg at 01/31/20 0618 **FOLLOWED BY** [START ON 2/3/2020] amiodarone tablet 200 mg, 200 mg, Oral, Daily With Breakfast, Bhupinder Doyle, MD    apixaban (ELIQUIS) tablet 5 mg, 5 mg, Oral, BID, Ronn Dietrich MD, 5 mg at 01/31/20 0925    atorvastatin (LIPITOR) tablet 40 mg, 40 mg, Oral, QPM, Ronn Dietrich MD, 40 mg at 01/30/20 1739    dorzolamide-timolol (COSOPT) 22 3-6 8 MG/ML ophthalmic solution 1 drop, 1 drop, Both Eyes, BID, Ronn Dietrich MD, 1 drop at 01/31/20 0924    lisinopril (ZESTRIL) tablet 5 mg, 5 mg, Oral, Daily, Ronn Dietrich MD, 5 mg at 01/31/20 4368    metoprolol succinate (TOPROL-XL) 24 hr tablet 50 mg, 50 mg, Oral, Q12H, Ronn Dietrich MD, 50 mg at 01/31/20 1968    polyethylene glycol (MIRALAX) packet 17 g, 17 g, Oral, Daily PRN, Ronn Dietrich MD    PRESERVISION AREDS 2 CAPS 1 capsule, 1 capsule, Oral, BID, Jazzy Dallas MD, 1 capsule at 01/31/20 5743    Past Medical History:   Diagnosis Date    Hypertension     PUD (peptic ulcer disease)     Stroke ([place] [id number] )     Vitamin D deficiency        Patient Active Problem List    Diagnosis Date Noted    Glaucoma 01/23/2020    Dysphagia 01/23/2020    Cardiomyopathy ([place] [address] ) 01/23/2020    Prolonged QT interval 01/23/2020    History of pulmonary embolism 01/23/2020    Leukocytosis 01/23/2020    Anemia 01/23/2020    Hypomagnesemia 01/23/2020    IFG (impaired fasting glucose) 01/23/2020    Hypocalcemia 01/23/2020    A-fib ([place] [address] ) 01/17/2020    CVA (cerebral vascular accident) ([place] [address] ) 01/17/2020    Essential hypertension 06/03/2019          Jazzy Dallas MD  PM&R Attending

## 2020-01-31 NOTE — PLAN OF CARE
Problem: Potential for Falls  Goal: Patient will remain free of falls  Description  INTERVENTIONS:  - Assess patient frequently for physical needs  -  Identify cognitive and physical deficits and behaviors that affect risk of falls  -  [place] fall precautions as indicated by assessment   - Educate patient/family on patient safety including physical limitations  - Instruct patient to call for assistance with activity based on assessment  - Modify environment to reduce risk of injury  - Consider OT/PT consult to assist with strengthening/mobility  Outcome: Progressing     Problem: Nutrition/Hydration-ADULT  Goal: Nutrient/Hydration intake appropriate for improving, restoring or maintaining nutritional needs  Description  Monitor and assess patient's nutrition/hydration status for malnutrition  Collaborate with interdisciplinary team and initiate plan and interventions as ordered  Monitor patient's weight and dietary intake as ordered or per policy  Utilize nutrition screening tool and intervene as necessary  Determine patient's food preferences and provide high-protein, high-caloric foods as appropriate       INTERVENTIONS:  - Monitor oral intake, urinary output, labs, and treatment plans  - Assess nutrition and hydration status and recommend course of action  - Evaluate amount of meals eaten  - Assist patient with eating if necessary   - Allow adequate time for meals  - Recommend/ encourage appropriate diets, oral nutritional supplements, and vitamin/mineral supplements  - Order, calculate, and assess calorie counts as needed  - Recommend, monitor, and adjust tube feedings and TPN/PPN based on assessed needs  - Assess need for intravenous fluids  - Provide specific nutrition/hydration education as appropriate  - Include patient/family/caregiver in decisions related to nutrition  Outcome: Progressing     Problem: PAIN - ADULT  Goal: Verbalizes/displays adequate comfort level or baseline comfort level  Description  Interventions:  - Encourage patient to monitor pain and request assistance  - Assess pain using appropriate pain scale  - Administer analgesics based on type and severity of pain and evaluate response  - Implement non-pharmacological measures as appropriate and evaluate response  - Consider cultural and social influences on pain and pain management  - Notify physician/advanced practitioner if interventions unsuccessful or patient reports new pain  Outcome: Progressing     Problem: INFECTION - ADULT  Goal: Absence or prevention of progression during hospitalization  Description  INTERVENTIONS:  - Assess and monitor for signs and symptoms of infection  - Monitor lab/diagnostic results  - Monitor all insertion sites, i e  indwelling lines, tubes, and drains  - Monitor endotracheal if appropriate and nasal secretions for changes in amount and color  - [place] appropriate cooling/warming therapies per order  - Administer medications as ordered  - Instruct and encourage patient and family to use good hand hygiene technique  - Identify and instruct in appropriate isolation precautions for identified infection/condition  Outcome: Progressing  Goal: Absence of fever/infection during neutropenic period  Description  INTERVENTIONS:  - Monitor WBC    Outcome: Progressing     Problem: SAFETY ADULT  Goal: Maintain or return to baseline ADL function  Description  INTERVENTIONS:  -  Assess patient's ability to carry out ADLs; assess patient's baseline for ADL function and identify physical deficits which impact ability to perform ADLs (bathing, care of mouth/teeth, toileting, grooming, dressing, etc )  - Assess/evaluate cause of self-care deficits   - Assess range of motion  - Assess patient's mobility; develop plan if impaired  - Assess patient's need for assistive devices and provide as appropriate  - Encourage maximum independence but intervene and supervise when necessary  - Involve family in performance of ADLs  - Assess for home care needs following discharge   - Consider OT consult to assist with ADL evaluation and planning for discharge  - Provide patient education as appropriate  Outcome: Progressing  Goal: Maintain or return mobility status to optimal level  Description  INTERVENTIONS:  - Assess patient's baseline mobility status (ambulation, transfers, stairs, etc )    - Identify cognitive and physical deficits and behaviors that affect mobility  - Identify mobility aids required to assist with transfers and/or ambulation (gait belt, sit-to-stand, lift, walker, cane, etc )  - [place] fall precautions as indicated by assessment  - Record patient progress and toleration of activity level on Mobility SBAR; progress patient to next Phase/Stage  - Instruct patient to call for assistance with activity based on assessment  - Consider rehabilitation consult to assist with strengthening/weightbearing, etc   Outcome: Progressing     Problem: DISCHARGE PLANNING  Goal: Discharge to home or other facility with appropriate resources  Description  INTERVENTIONS:  - Identify barriers to discharge w/patient and caregiver  - Arrange for needed discharge resources and transportation as appropriate  - Identify discharge learning needs (meds, wound care, etc )  - Arrange for interpretive services to assist at discharge as needed  - Refer to Case Management Department for coordinating discharge planning if the patient needs post-hospital services based on physician/advanced practitioner order or complex needs related to functional status, cognitive ability, or social support system  Outcome: Progressing     Problem: Knowledge Deficit  Goal: Patient/family/caregiver demonstrates understanding of disease process, treatment plan, medications, and discharge instructions  Description  Complete learning assessment and assess knowledge base    Interventions:  - Provide teaching at level of understanding  - Provide teaching via preferred learning methods  Outcome: Progressing     Problem: Prexisting or High Potential for Compromised Skin Integrity  Goal: Skin integrity is maintained or improved  Description  INTERVENTIONS:  - Identify patients at risk for skin breakdown  - Assess and monitor skin integrity  - Assess and monitor nutrition and hydration status  - Monitor labs   - Assess for incontinence   - Turn and reposition patient  - Assist with mobility/ambulation  - Relieve pressure over bony prominences  - Avoid friction and shearing  - Provide appropriate hygiene as needed including keeping skin clean and dry  - Evaluate need for skin moisturizer/barrier cream  - Collaborate with interdisciplinary team   - Patient/family teaching  - Consider wound care consult   Outcome: Progressing

## 2020-01-31 NOTE — PROGRESS NOTES
01/31/20 0925   Charting Type   Charting Type Shift assessment   Neurological   Neuro (WDL) X   Level of Consciousness Alert/awake   Orientation Level Oriented X4   Cognition Appropriate judgement; Follows commands   Extraocular Movements Full   R Pupil Size (mm) 3   L Pupil Size (mm) 3   RUE Muscle Strength 5- Normal strength   LUE Muscle Strength 5- Normal strength   RLE Muscle Strength 5- Normal strength   LLE Muscle Strength 5- Normal strength   Neuro Additional Assessments No   Reflexes   Gag Present   Cough Present   [place] Coma Scale   Eye Opening 4   Best Verbal Response 5   Best Motor Response 6   [place] Coma Scale Score 15   HEENT   HEENT (WDL) X   Head and Face Symmetrical   R Eye Mildly impaired vision   L Eye Mildly impaired vision   Vision - Corrective Lenses (sent home) Glasses   L Ear Intact   Nose Intact   Teeth Missing teeth   Respiratory   Respiratory (WDL) WDL   Respiratory Pattern Normal   Chest Assessment Chest expansion symmetrical   Bilateral Breath Sounds Clear   Respiratory Interventions   Respiratory Interventions Cough and deep breathe;Incentive spirometry   Cough and Deep Breathe   Cough and Deep Breathe Yes   Cardiac   Cardiac (WDL) WDL   Cardiac Regularity Regular   Heart Sounds No adventitious heart sounds   Jugular Venous Distention (JVD) No   Cardiac Symptoms None   Chest Pain Present No   Pacemaker/ICD No   Pain Assessment   Pain Assessment 0-10   Pain Score No Pain   Peripheral Vascular   Peripheral Vascular (WDL) X   RLE Edema Non-pitting   LLE Edema Non-pitting   PVS Additional Assessments No   RUE Neurovascular Assessment   R Radial Pulse +2   LUE Neurovascular Assessment   L Radial Pulse +2   RLE Neurovascular Assessment   R Pedal Pulse +2   LLE Neurovascular Assessment   L Pedal Pulse +2   Integumentary   Integumentary (WDL) X   Skin Integrity Bruising   Skin Location scattered   Skin Turgor Non-tenting   Integumentary Additional Assessments No   Tattoos/Piercings   Does patient have tattoos? No   Piercings Remaining No   [place]an Scale   Sensory Perceptions 3   Moisture 4   Activity 3   Mobility 3   Nutrition 3   Friction and Shear 2   Allan Scale Score 18   Incision 01/14/19 Foot Right   Date First Assessed/Time First Assessed: 01/14/19 1312   Location: Foot  Wound Location Orientation: Right   Incision Description Clean;Dry; Intact   Musculoskeletal   Musculoskeletal (WDL) X   Level of Assistance Standby assist, set-up cues, supervision of patient - no hands on   Assistive Device Cane   Musculoskeletal Additional Assessments No   Gastrointestinal   Gastrointestinal (WDL) X   Abdomen Inspection Obese; Soft   Bowel Sounds (All Quadrants) Normoactive   Tenderness Soft;Nontender   GI Symptoms None   Bowel Shift Assessment   Assistance Needed None   Bowel Incontinence No   Stool Assessment   Bowel Incontinence No   Genitourinary   Genitourinary (WDL) WDL   Bladder Shift Assessment   Bladder Device None   Bladder Incontinence No   Bladder Management Supervision/setup   Bladder Management Deficit Grab bar use   Urine Assessment   Urinary Incontinence No   Genitalia   Female Genitalia Intact   Anal/Rectal   Anal/Rectal (WDL) WDL   Psychosocial   Psychosocial (WDL) WDL   Needs Expressed Denies   Ability to Express Feelings Able to express   Ability to Express Needs Able to express   Ability to Express Thoughts Able to express   Ability to Understand Others Usually understands   [place] Suicide Severity Rating Scale   1  Wish to be Dead No   2  Suicidal Thoughts Never   6   Suicide Behavior Question Never   *Risk Level* Low Risk   Cough   Cough None

## 2020-01-31 NOTE — NURSING NOTE
Patient resting comfortably in bed at this time  No signs or distress noted  Patient with bed alarm in place to maintain patient safety  Patient with expressive aphasia noted at times  Patient was able to walk to the bathroom with one assist and cane overnight  Patient refused to wear SCDs overnight for DVT prophylaxis patient educated on risks of refusal and continues to refuse overnight  Call bell within reach  Will continue to monitor patient and follow plan of care

## 2020-01-31 NOTE — PROGRESS NOTES
01/31/20 1100   Pain Assessment   Pain Assessment 0-10   Pain Score No Pain   Restrictions/Precautions   Precautions Aphasia; Aspiration; Fall Risk   Cognition   Overall Cognitive Status Impaired   Arousal/Participation Alert; Responsive; Cooperative   Attention Attends with cues to redirect   Orientation Level Oriented X4   Memory Decreased short term memory;Decreased recall of recent events;Decreased recall of precautions   Following Commands Follows one step commands without difficulty   Sit to Lying   Type of Assistance Needed Supervision   Amount of Physical Assistance Provided No physical assistance   Sit to Lying CARE Score 4   Lying to Sitting on Side of Bed   Type of Assistance Needed Supervision   Amount of Physical Assistance Provided No physical assistance   Lying to Sitting on Side of Bed CARE Score 4   Sit to Stand   Type of Assistance Needed Supervision   Amount of Physical Assistance Provided No physical assistance   Sit to Stand CARE Score 4   Bed-Chair Transfer   Type of Assistance Needed Supervision   Amount of Physical Assistance Provided No physical assistance   Chair/Bed-to-Chair Transfer CARE Score 4   Transfer Bed/Chair/Wheelchair   Limitations Noted In Balance; Endurance;LE Strength   Adaptive Equipment Cane   Stand Pivot Supervision   Sit to Stand Supervision   Stand to Sit Supervision   Supine to Sit Supervision   Sit to Supine Supervision   All Transfer Supervision   Walk 10 Feet   Type of Assistance Needed Supervision   Amount of Physical Assistance Provided No physical assistance   Walk 10 Feet CARE Score 4   Walk 50 Feet with Two Turns   Type of Assistance Needed Supervision   Amount of Physical Assistance Provided No physical assistance   Walk 50 Feet with Two Turns CARE Score 4   Walk 150 Feet   Reason if not Attempted Safety concerns   Walk 150 Feet CARE Score 88   Walking 10 Feet on Uneven Surfaces   Type of Assistance Needed Supervision   Amount of Physical Assistance Provided No physical assistance   Walking 10 Feet on Uneven Surfaces CARE Score 4   Ambulation   Does the patient walk? 2  Yes   Primary Mode of Locomotion Prior to Admission Walk   Distance Walked (feet) 120 ft  (108, 118)   Assist Device Cane   Gait Pattern Inconsistant [place]   Limitations Noted In Balance; Endurance; Safety   Provided Assistance with: Balance   Walk Assist Level Supervision   Findings level and carpet   Curb or Single Stair   Style negotiated Single stair   Type of Assistance Needed Supervision   Amount of Physical Assistance Provided No physical assistance   1 Step (Curb) CARE Score 4   4 Steps   Type of Assistance Needed Supervision   Amount of Physical Assistance Provided No physical assistance   4 Steps CARE Score 4   12 Steps   Reason if not Attempted Safety concerns   12 Steps CARE Score 88   Stairs   Type Stairs   # of Steps 7   Weight Bearing Precautions WBAT   Assist Devices Bilateral Rail   Findings S   Therapeutic Interventions   Strengthening seated and supine ther ex 30 reps   Other gait and community re-ed   Assessment   Treatment Assessment Upon enetering room pt was pleasant and cooperative with PT; Pt is S for transfers and amb up to 120' with SPC; Once in PT gym pt was complaining about being tired today; She explained that she always worked night shift and is use to going to bed at 8 am; Pt was agreeable to session but needed rests due to fatigue; Pt went up/down 7 steps with 2 HR and S; Pt performed seated and supine ther ex for strengthening B LE; When session was over and pt was walking back to room she started crying and saying she was tired   PT Barriers   Physical Impairment Decreased strength;Decreased range of motion;Decreased endurance; Impaired balance;Decreased mobility; Decreased cognition;Decreased safety awareness   Functional Limitation Walking;Transfers;Standing;Stair negotiation   Plan   Treatment/Interventions Functional transfer training;LE strengthening/ROM; Elevations; Therapeutic exercise;Gait training   Progress Improving as expected   PT Therapy Minutes   PT Time In 1100   PT Time Out 1230   PT Total Time (minutes) 90   PT Mode of treatment - Individual (minutes) 90   PT Mode of treatment - Concurrent (minutes) 0   PT Mode of treatment - Group (minutes) 0   PT Mode of treatment - Co-treat (minutes) 0   PT Mode of Treatment - Total time(minutes) 90 minutes   PT Cumulative Minutes 631   Therapy Time missed   Time missed?  No

## 2020-01-31 NOTE — PROGRESS NOTES
01/31/20 1530   Pain Assessment   Pain Assessment No/denies pain   Pain Score No Pain   Restrictions/Precautions   Precautions Aphasia; Aspiration;Cognitive; Fall Risk   Comprehension   Assist Devices Glasses   QI: Comprehension 3  Usually Understands: Understands most conversations, but misses some part/intent of message  Requires cues at times to understand  Comprehension (FIM) 5 - Understands basic directions and conversation   Expression   QI: Expression 3  Exhibits some difficulty with expressing needs and ideas (e g , some words or finishing thoughts) or speech is not clear   Expression (FIM) 4 - Expresses basic info/needs 75-90% of time   Social Interaction   Participation Individual   Social Interaction (FIM) 4 - Interacts 75-89% of time   Problem Solving   Problem solving (FIM) 4 - Solves basic problems 75-89% of time   Memory   Recognize People Yes   Remember Routine Yes   Initiates Tasks Yes   Memory (FIM) 5 - [place] cues patient   Executive Function Skills   Insight Mild insight   Impulsive Mildly impulsive   Flexibility of Thought Reduced flexibility   Planning Reduced planning skills   Memory Skills   Orientation Level Oriented X4   Short Term Working Recall Mild Impairment   Auditory Comprehension   Comphrehends Conversation Simple   Interfering Components Attention - selective   Speech/Swallow Mechanism Exam   Facial Strength Reduced   Lingual Strength Mild reduced   Motor Speech Evaluation   Dysarthria Yes   Mild Dystharia Impaired articulation   Speech/Language/Cognition Assessmetn   Treatment Assessment Speech Pathology Daily Treatment Note - Speech/Cognitive Communication Skills - SLP focused on attention to cues and comprehension of directions during completion of ADL's as well as patient's own ability to give directions to increase their success  SLP utilized tray set up with meal prep as a familiar scenario in patient's environment    Patient was required to follow 2-3step directions related to safe posture and positioning in the wheelchair with ability to place them selves near enough to the table in order to reach all tray items  Attention was placed on placement of her single point cane within view and reach pre meal   At the initiation of the meal, the patient was required to give directions for functional preparation of the tray in terms of bite size as well as opening and preparation of all items in terms of condiments, etc  to be eaten  This task has been designed to increase patient's understanding of the steps needed to ensure their ongoing functional success at mealtime  Patient showed improved expression to level of min assist in her ability to give directions to others for her own functional success  Swallow Information   Current Risks for Dysphagia & Aspiration Dysarthria;General debilitation   Current Symptoms/Concerns Difficulty chewing   Current Diet Dysphagia advance   Consistencies Assessed and Performance   Oral Stage Mild impaired   Recommendations   Diet Solid Recommendation Regular consistency   Diet Liquid Recommendation Thin liquid   General Precautions Aspiration precautions   Compensatory Swallowing Strategies Alternate solids and liquids; External pacing   Eating   Type of Assistance Needed Supervision   Eating CARE Score 4   Swallow Assessment   Swallow Treatment Assessment Speech Pathology Daily Treatment Note - Swallow Oral Function - Patient seen 1:1 plus concurrent as benefit is gained through receiving multisensory cues to improve comprehension, demonstration and retention of techniques and precautions  SLP completed trials of regular this day which for this meal was pizza    Compensatory safe swallow education provided which included appropriate 90 degree head/neck/trunk posture, prep to small bites, small and single sips of liquid,complete oral breakdown of solids with formation of a cohesive bolus, oral closure with spoon retrieval, decreased speaking on oral phase, oral clearance prior to reintroduction of bolus, alternate solids with liquids, and rate control  Patient showed improved awareness of deficits and use of the above techniques for adequate tolerance of this complex item  SLP recommends upgrade to regular solids at this time  MD notified  Swallow Assessment Prognosis   Prognosis Good   Prognosis Considerations Family/community support   SLP Therapy Minutes   SLP Time In 3079   SLP Time Out 36   SLP Total Time (minutes) 60   SLP Mode of treatment - Individual (minutes) 40   SLP Mode of treatment - Concurrent (minutes) 20   Therapy Time missed   Time missed?  No   Daily FIM Score   Eating (FIM) 5 - Patient requires supervision, cueing or coaxing

## 2020-02-01 PROCEDURE — 97530 THERAPEUTIC ACTIVITIES: CPT

## 2020-02-01 PROCEDURE — 97129 THER IVNTJ 1ST 15 MIN: CPT

## 2020-02-01 RX ORDER — METOPROLOL SUCCINATE 25 MG/1
25 TABLET, EXTENDED RELEASE ORAL EVERY 12 HOURS
Status: DISCONTINUED | OUTPATIENT
Start: 2020-02-01 | End: 2020-02-04

## 2020-02-01 RX ORDER — LISINOPRIL 5 MG/1
2.5 TABLET ORAL DAILY
Status: DISCONTINUED | OUTPATIENT
Start: 2020-02-02 | End: 2020-02-03

## 2020-02-01 RX ADMIN — ATORVASTATIN CALCIUM 40 MG: 40 TABLET, FILM COATED ORAL at 17:57

## 2020-02-01 RX ADMIN — Medication 1 CAPSULE: at 10:13

## 2020-02-01 RX ADMIN — DORZOLAMIDE HYDROCHLORIDE AND TIMOLOL MALEATE 1 DROP: 20; 5 SOLUTION/ DROPS OPHTHALMIC at 10:14

## 2020-02-01 RX ADMIN — AMIODARONE HYDROCHLORIDE 200 MG: 100 TABLET ORAL at 06:03

## 2020-02-01 RX ADMIN — APIXABAN 5 MG: 5 TABLET, FILM COATED ORAL at 17:57

## 2020-02-01 RX ADMIN — DORZOLAMIDE HYDROCHLORIDE AND TIMOLOL MALEATE 1 DROP: 20; 5 SOLUTION/ DROPS OPHTHALMIC at 18:00

## 2020-02-01 RX ADMIN — Medication 1 CAPSULE: at 18:00

## 2020-02-01 RX ADMIN — APIXABAN 5 MG: 5 TABLET, FILM COATED ORAL at 09:43

## 2020-02-01 NOTE — PROGRESS NOTES
02/01/20 1400   Pain Assessment   Pain Assessment No/denies pain   Restrictions/Precautions   Precautions Fall Risk;Cognitive;Aspiration   Lying to Sitting on Side of Bed   Type of Assistance Needed Supervision   Lying to Sitting on Side of Bed CARE Score 4   Sit to Stand   Type of Assistance Needed Supervision   Sit to Stand CARE Score 4   Bed-Chair Transfer   Type of Assistance Needed Supervision   Chair/Bed-to-Chair Transfer CARE Score 4   Meal Prep   Meal Preparation pt is able to make list for items needed to make lasagna on Monday with min Verbal cues, occasional help with spelling   Assessment   Treatment Assessment Pt presents supine  Agreeable to making the shopping list and able to complete  Pt does complain of dizziness with nursing aware and in contact with the MD regarding current medications  Pt returns to supine after list completed declining more activity because of not feeling well  Pt will benefit from continued skilled OT services to increase independence prior to discharge planned Wed  Problem List Decreased endurance; Impaired balance;Decreased cognition;Decreased safety awareness   Plan   Treatment/Interventions ADL retraining;Functional transfer training; Therapeutic exercise; Endurance training;Cognitive reorientation;Patient/family training; Compensatory technique education   OT Therapy Minutes   OT Time In 1400   OT Time Out 1430   OT Total Time (minutes) 30   OT Mode of treatment - Individual (minutes) 30   Therapy Time missed   Time missed?  No

## 2020-02-01 NOTE — QUICK NOTE
Patient continues to have dizziness and dose not want take her amiodarone dose this AM  Will hold PM dose and resume BID tomorrow  Tomorrow will be the last day for amiodarone BID  2/3 will transition to 200 mg daily

## 2020-02-01 NOTE — QUICK NOTE
Patient is complaining of feeling dizzy and lightheaded  Blood pressure is on the lower side  Patient's heart rate has been ranging from mid 50s to mid 60s  I will decrease lisinopril to 2 5 mg daily  Decreased Toprol to 25 mg b i d    Will continue to monitor BP and HR closely to see if this will help with her dizziness

## 2020-02-02 LAB — HEMOCCULT STL QL: NEGATIVE

## 2020-02-02 PROCEDURE — 82272 OCCULT BLD FECES 1-3 TESTS: CPT | Performed by: NURSE PRACTITIONER

## 2020-02-02 RX ORDER — DIPHENHYDRAMINE HCL 25 MG
25 TABLET ORAL
Status: DISCONTINUED | OUTPATIENT
Start: 2020-02-02 | End: 2020-02-04

## 2020-02-02 RX ADMIN — APIXABAN 5 MG: 5 TABLET, FILM COATED ORAL at 09:03

## 2020-02-02 RX ADMIN — APIXABAN 5 MG: 5 TABLET, FILM COATED ORAL at 17:35

## 2020-02-02 RX ADMIN — METOPROLOL SUCCINATE 25 MG: 25 TABLET, EXTENDED RELEASE ORAL at 09:02

## 2020-02-02 RX ADMIN — LISINOPRIL 2.5 MG: 5 TABLET ORAL at 09:02

## 2020-02-02 RX ADMIN — Medication 1 CAPSULE: at 17:35

## 2020-02-02 RX ADMIN — Medication 1 CAPSULE: at 09:03

## 2020-02-02 RX ADMIN — AMIODARONE HYDROCHLORIDE 200 MG: 100 TABLET ORAL at 06:18

## 2020-02-02 RX ADMIN — DIPHENHYDRAMINE HCL 25 MG: 25 TABLET ORAL at 23:33

## 2020-02-02 RX ADMIN — AMIODARONE HYDROCHLORIDE 200 MG: 100 TABLET ORAL at 17:35

## 2020-02-02 RX ADMIN — ATORVASTATIN CALCIUM 40 MG: 40 TABLET, FILM COATED ORAL at 17:35

## 2020-02-02 RX ADMIN — DORZOLAMIDE HYDROCHLORIDE AND TIMOLOL MALEATE 1 DROP: 20; 5 SOLUTION/ DROPS OPHTHALMIC at 09:00

## 2020-02-02 RX ADMIN — DORZOLAMIDE HYDROCHLORIDE AND TIMOLOL MALEATE 1 DROP: 20; 5 SOLUTION/ DROPS OPHTHALMIC at 17:35

## 2020-02-02 NOTE — PROGRESS NOTES
02/01/20 2200   Charting Type   Charting Type Shift assessment   Neurological   Level of Consciousness Alert/awake   Orientation Level Oriented X4   Cognition Appropriate judgement; Appropriate safety awareness; Appropriate attention/concentration; Appropriate for developmental age; Follows commands   Speech Expressive aphasia; Delayed responses   Swallow Able to swallow solids and liquids without difficulty   R Hand  Strong   L Hand  Strong   RUE Muscle Strength 5- Normal strength   LUE Muscle Strength 5- Normal strength   RLE Muscle Strength 5- Normal strength   LLE Muscle Strength 5- Normal strength   Carie Coma Scale   Eye Opening 4   Best Verbal Response 5   Best Motor Response 6   [place] Coma Scale Score 15   HEENT   Vision - Corrective Lenses (at bedside) Glasses   Teeth Missing teeth   Respiratory   Respiratory (WDL) WDL   Cardiac   Cardiac (WDL) WDL   Cardiac Regularity Regular   Peripheral Vascular   Edema Generalized   Integumentary   Skin Color Appropriate for ethnicity   Skin Condition/Temp Warm;Dry   Allan Scale   Sensory Perceptions 3   Moisture 4   Activity 3   Mobility 3   Nutrition 3   Friction and Shear 3   Allan Scale Score 19   Musculoskeletal   Level of Assistance Standby assist, set-up cues, supervision of patient - no hands on   Assistive Device Cane   RUE Full movement   LUE Full movement   RLE Full movement   LLE Full movement   Gastrointestinal   Abdomen Inspection Soft;Obese   Stool Assessment   Bowel Incontinence No   Urine Assessment   Urinary Incontinence No   Psychosocial   Patient Behaviors/Mood Calm   Ability to Express Feelings Able to express   Ability to Express Needs Able to express   Ability to Express Thoughts Able to express   Ability to Understand Others Usually understands

## 2020-02-02 NOTE — NURSING NOTE
Pt stated she had black tarry stool on 2/1/20  No witness  Today RN witnessed black tarry stool after 1400, sharon brothersen noted  Notified Pernell Hinton, stool for OB sent as ordered  Pt reports dizziness is much diminished  She does get excitable re: out of the ordinary happenings (black stool)  Staff able to reassure & calm her  Resting in bed @ this time  No motor deficits from stroke & expressive aphasia mostly resolved

## 2020-02-03 PROBLEM — K92.2 ACUTE GASTROINTESTINAL HEMORRHAGE: Status: ACTIVE | Noted: 2020-01-23

## 2020-02-03 LAB
ABO GROUP BLD: NORMAL
BLD GP AB SCN SERPL QL: NEGATIVE
FERRITIN SERPL-MCNC: 11 NG/ML (ref 8–388)
HCT VFR BLD AUTO: 23.4 % (ref 42–47)
HEMOCCULT STL QL: POSITIVE
HGB BLD-MCNC: 7 G/DL (ref 12–16)
IRON SATN MFR SERPL: 4 %
IRON SERPL-MCNC: 12 UG/DL (ref 50–170)
RH BLD: POSITIVE
SPECIMEN EXPIRATION DATE: NORMAL
TIBC SERPL-MCNC: 304 UG/DL (ref 250–450)

## 2020-02-03 PROCEDURE — 86850 RBC ANTIBODY SCREEN: CPT | Performed by: NURSE PRACTITIONER

## 2020-02-03 PROCEDURE — 30233N1 TRANSFUSION OF NONAUTOLOGOUS RED BLOOD CELLS INTO PERIPHERAL VEIN, PERCUTANEOUS APPROACH: ICD-10-PCS | Performed by: INTERNAL MEDICINE

## 2020-02-03 PROCEDURE — P9016 RBC LEUKOCYTES REDUCED: HCPCS

## 2020-02-03 PROCEDURE — 86920 COMPATIBILITY TEST SPIN: CPT

## 2020-02-03 PROCEDURE — 85014 HEMATOCRIT: CPT | Performed by: INTERNAL MEDICINE

## 2020-02-03 PROCEDURE — 86922 COMPATIBILITY TEST ANTIGLOB: CPT

## 2020-02-03 PROCEDURE — 97130 THER IVNTJ EA ADDL 15 MIN: CPT

## 2020-02-03 PROCEDURE — 97110 THERAPEUTIC EXERCISES: CPT

## 2020-02-03 PROCEDURE — 82272 OCCULT BLD FECES 1-3 TESTS: CPT | Performed by: PHYSICAL MEDICINE & REHABILITATION

## 2020-02-03 PROCEDURE — 86921 COMPATIBILITY TEST INCUBATE: CPT

## 2020-02-03 PROCEDURE — C9113 INJ PANTOPRAZOLE SODIUM, VIA: HCPCS | Performed by: NURSE PRACTITIONER

## 2020-02-03 PROCEDURE — 99232 SBSQ HOSP IP/OBS MODERATE 35: CPT | Performed by: PHYSICAL MEDICINE & REHABILITATION

## 2020-02-03 PROCEDURE — 92526 ORAL FUNCTION THERAPY: CPT

## 2020-02-03 PROCEDURE — 86901 BLOOD TYPING SEROLOGIC RH(D): CPT | Performed by: NURSE PRACTITIONER

## 2020-02-03 PROCEDURE — 97129 THER IVNTJ 1ST 15 MIN: CPT

## 2020-02-03 PROCEDURE — 97116 GAIT TRAINING THERAPY: CPT

## 2020-02-03 PROCEDURE — 97530 THERAPEUTIC ACTIVITIES: CPT

## 2020-02-03 PROCEDURE — 99232 SBSQ HOSP IP/OBS MODERATE 35: CPT | Performed by: NURSE PRACTITIONER

## 2020-02-03 PROCEDURE — 83550 IRON BINDING TEST: CPT | Performed by: NURSE PRACTITIONER

## 2020-02-03 PROCEDURE — 86900 BLOOD TYPING SEROLOGIC ABO: CPT | Performed by: NURSE PRACTITIONER

## 2020-02-03 PROCEDURE — 83540 ASSAY OF IRON: CPT | Performed by: NURSE PRACTITIONER

## 2020-02-03 PROCEDURE — 97535 SELF CARE MNGMENT TRAINING: CPT

## 2020-02-03 PROCEDURE — 85018 HEMOGLOBIN: CPT | Performed by: INTERNAL MEDICINE

## 2020-02-03 PROCEDURE — 82728 ASSAY OF FERRITIN: CPT | Performed by: NURSE PRACTITIONER

## 2020-02-03 RX ORDER — PANTOPRAZOLE SODIUM 40 MG/1
40 INJECTION, POWDER, FOR SOLUTION INTRAVENOUS
Status: DISCONTINUED | OUTPATIENT
Start: 2020-02-03 | End: 2020-02-03

## 2020-02-03 RX ORDER — PANTOPRAZOLE SODIUM 40 MG/1
40 TABLET, DELAYED RELEASE ORAL
Status: DISCONTINUED | OUTPATIENT
Start: 2020-02-04 | End: 2020-02-05 | Stop reason: HOSPADM

## 2020-02-03 RX ORDER — PANTOPRAZOLE SODIUM 40 MG/1
40 TABLET, DELAYED RELEASE ORAL
Status: DISCONTINUED | OUTPATIENT
Start: 2020-02-03 | End: 2020-02-03

## 2020-02-03 RX ADMIN — PANTOPRAZOLE SODIUM 40 MG: 40 TABLET, DELAYED RELEASE ORAL at 11:07

## 2020-02-03 RX ADMIN — Medication 1 CAPSULE: at 17:13

## 2020-02-03 RX ADMIN — PANTOPRAZOLE SODIUM 40 MG: 40 INJECTION, POWDER, LYOPHILIZED, FOR SOLUTION INTRAVENOUS at 14:43

## 2020-02-03 RX ADMIN — ACETAMINOPHEN 650 MG: 325 TABLET ORAL at 20:10

## 2020-02-03 RX ADMIN — DORZOLAMIDE HYDROCHLORIDE AND TIMOLOL MALEATE 1 DROP: 20; 5 SOLUTION/ DROPS OPHTHALMIC at 08:24

## 2020-02-03 RX ADMIN — DORZOLAMIDE HYDROCHLORIDE AND TIMOLOL MALEATE 1 DROP: 20; 5 SOLUTION/ DROPS OPHTHALMIC at 17:13

## 2020-02-03 RX ADMIN — AMIODARONE HYDROCHLORIDE 200 MG: 100 TABLET ORAL at 08:24

## 2020-02-03 RX ADMIN — ATORVASTATIN CALCIUM 40 MG: 40 TABLET, FILM COATED ORAL at 17:12

## 2020-02-03 RX ADMIN — Medication 1 CAPSULE: at 08:24

## 2020-02-03 NOTE — PROGRESS NOTES
02/03/20 0730   Pain Assessment   Pain Assessment No/denies pain   Restrictions/Precautions   Precautions Cognitive; Fall Risk;Aphasia   Eating   Type of Assistance Needed Independent   Comment sitting briefly EOB   Eating CARE Score 6   Eating Assessment   Food To Mouth No   Able To Cut Yes   Meal Assessed Breakfast   Oral Hygiene   Comment pt requested waiting due to dizziness after shower   Grooming   Able To Initiate Tasks; Wash/Dry Face;Wash/Dry Hands   Limitation Noted In Safety   Findings Supervision/ setup   Shower/Bathe Self   Type of Assistance Needed Physical assistance   Amount of Physical Assistance Provided Less than 25%   Shower/Bathe Self CARE Score 3   Bathing   Assessed Bath Style Shower   Anticipated D/C Bath Style Shower   Able to Chaz Anam No   Able to [employer] Temperature No   Able to Wash/Rinse/Dry (body part) Left Arm;Right Arm;L Upper Leg;R Upper Leg;Chest;Abdomen;Perineal Area; Buttocks   Limitations Noted in Balance; Endurance; Safety  (dizziness)   Positioning Seated;Standing   Adaptive Equipment Shower Bars; Shower Seat   Findings  pt able to bathe S but required assist to dry feet due to increasing dizziness as task continued   Tub/Shower Transfer   Limitations Noted In Safety; Endurance   Adaptive Equipment Grab Bars;Seat with Back   Assessed Shower   Findings S/CGA   Upper Body Dressing   Type of Assistance Needed Physical assistance   Amount of Physical Assistance Provided Less than 25%   Upper Body Dressing CARE Score 3   Lower Body Dressing   Type of Assistance Needed Physical assistance   Amount of Physical Assistance Provided Less than 25%   Lower Body Dressing CARE Score 3   Putting On/Taking Off Footwear   Type of Assistance Needed Physical assistance   Amount of Physical Assistance Provided Less than 25%   Putting On/Taking Off Footwear CARE Score 3   Picking Up Object   Type of Assistance Needed Physical assistance   Amount of Physical Assistance Provided Less than 25% Picking Up Object CARE Score 3   Dressing/Undressing Clothing   Remove UB Clothes Pullover Shirt   Don UB Clothes Pullover Shirt   Remove LB Clothes Pants; Undergarment;Socks   Don LB Clothes Pants; Undergarment;Socks; Shoes   Limitations Noted In Balance; Endurance; Safety;Problem Solving   Positioning Sit Edge Of Bed;Standing   Findings pt is Supervision for removing clothing although required assist to dina clothing due to dizziness as activity progresses   Sit to Lying   Type of Assistance Needed Supervision   Sit to Lying CARE Score 4   Lying to Sitting on Side of Bed   Type of Assistance Needed Supervision   Lying to Sitting on Side of Bed CARE Score 4   Sit to Stand   Type of Assistance Needed Supervision   Sit to Stand CARE Score 4   Bed-Chair Transfer   Type of Assistance Needed Supervision; Incidental touching   Comment CGA when pt reports dizziness   Chair/Bed-to-Chair Transfer CARE Score 4   Light Housekeeping   Light Housekeeping Level of Assistance Close supervision   Light Housekeeping applying pillowcases sitting   Additional Activities   Additional Activities Other (Comment)   Additional Activities Comments fxl mobility with SPC S/CGA to and from shower, S to and CGA from due to increasing dizziness by end of task   Other Comments   Assessment pt becomes easily frustrated during shower with hanging and removing nozzle with education to take her time, ask for help and not get so excited when things do not go well quickly   Assessment   Treatment Assessment Pt presents supine reports feeling better  Pt agreeable to a shower with a cooking task also planned ofr later today  Pt has had several days of increasing dizziness limiting tolerance to task  Pt begins session with BP WFL however as session porgresses pt reports increasing dizziness with BP decreased to 96/56  Pt returns to bed after min A provided and able to sit breifly for breakfast but declines standing at the sink for oral hygiene   RN is aware, monitoring BP and taking bloodowrk  Pt will benefit from continued skilled OT services to increase independence with daily tasks with plans for discharge on Wed  Problem List Decreased range of motion; Impaired balance;Decreased endurance;Decreased safety awareness  (decreased BP/ dizziness)   Plan   Treatment/Interventions ADL retraining;Functional transfer training; Therapeutic exercise; Endurance training;Patient/family training; Compensatory technique education   Progress Slow progress, decreased activity tolerance   OT Therapy Minutes   OT Time In 0730   OT Time Out 0830   OT Total Time (minutes) 60   OT Mode of treatment - Individual (minutes) 60   Therapy Time missed   Time missed?  No

## 2020-02-03 NOTE — QUICK NOTE
I was notified via [place] text by ARU nurse regarding patient's hemoglobin of 7  Eliquis discontinued    I have called Dr Candis Vicente of GI to notify him of findings and have consulted GI for their recommendations

## 2020-02-03 NOTE — PROGRESS NOTES
02/03/20 1139   Pain Assessment   Pain Assessment No/denies pain   Restrictions/Precautions   Precautions Aphasia; Fall Risk;Cognitive   Sit to Lying   Type of Assistance Needed Supervision   Sit to Lying CARE Score 4   Lying to Sitting on Side of Bed   Type of Assistance Needed Supervision   Lying to Sitting on Side of Bed CARE Score 4   Sit to Stand   Type of Assistance Needed Supervision   Sit to Stand CARE Score 4   Exercise Tools   Other Exercise Tool 1 fxl reacher use for retrieval of pegs R hand with cues and redirection due to easily frustrated, use reviewed for feeding Jasen and retireving items around the house   Other Exercise Tool 2 gripper with pegs L hand   Assessment   Treatment Assessment Pt participate in 30 minutes treatment sitting EOB for exercises  Pt tolerates sesison for the most part well becoming dizzy near the end of the time and requesting return to supine  Making lasagna scheduled during this time but deferred until tomorrow due to decreased activity tolerance and dizziness  Pt will benefit form continued skilled OT services to increase independence with daily tasks  Problem List Decreased range of motion; Impaired balance;Decreased endurance;Decreased cognition;Decreased safety awareness   Plan   Treatment/Interventions ADL retraining;Functional transfer training; Therapeutic exercise; Endurance training;Patient/family training;Cognitive reorientation; Compensatory technique education   Progress Progressing toward goals   OT Therapy Minutes   OT Time In 1139   OT Time Out 1209   OT Total Time (minutes) 30   OT Mode of treatment - Individual (minutes) 30   Therapy Time missed   Time missed?  No

## 2020-02-03 NOTE — QUICK NOTE
Called by nursing staff regarding stool being positive for occult blood and recommendation on whether to give Eliquis or hold it  At this time will check hemoglobin level  If stable can resume Eliquis  Patient's blood pressure has been on the lower side  BP medication doses were recently decreased  Continue amiodarone for now  Lisinopril/Toprol be held this morning due to not meeting parameters

## 2020-02-03 NOTE — PROGRESS NOTES
02/02/20 2200   Charting Type   Charting Type Shift assessment   Neurological   Level of Consciousness Alert/awake   Orientation Level Oriented X4   Cognition Appropriate judgement; Appropriate safety awareness; Appropriate attention/concentration; Appropriate for developmental age; Follows commands   Speech Expressive aphasia; Delayed responses   R Hand  Strong   L Hand  Strong   RUE Muscle Strength 5- Normal strength   LUE Muscle Strength 5- Normal strength   RLE Muscle Strength 5- Normal strength   LLE Muscle Strength 5- Normal strength   Delirium Assessment- CAM    Acute Onset and Fluctuating Course (1) No   Inattention (2) No   Disorganized Thinking (3) No   Rate Patient's Level of Consciousness (4) Alert (Normal), No   Delirium Present No   Carie Coma Scale   Eye Opening 4   Best Verbal Response 5   Best Motor Response 6   Carie Coma Scale Score 15   HEENT   Vision - Corrective Lenses (at bedside) Glasses   Teeth Missing teeth   Respiratory   Respiratory (WDL) WDL   Cardiac   Cardiac (WDL) WDL   Cardiac Regularity Regular   Pain Assessment   Pain Assessment No/denies pain   Pain Score No Pain   Peripheral Vascular   Cyanosis None   Edema Generalized   Integumentary   Skin Color Appropriate for ethnicity   Skin Condition/Temp Warm;Dry   Allan Scale   Sensory Perceptions 3   Moisture 4   Activity 3   Mobility 3   Nutrition 3   Friction and Shear 3   Allan Scale Score 19   Musculoskeletal   Level of Assistance Standby assist, set-up cues, supervision of patient - no hands on   Assistive Device Cane   RUE Full movement   LUE Full movement   RLE Full movement   LLE Full movement   Gastrointestinal   Abdomen Inspection Soft;Obese   Stool Assessment   Bowel Incontinence No   Urine Assessment   Urinary Incontinence No   Psychosocial   Patient Behaviors/Mood Calm   Ability to Express Feelings Able to express   Ability to Express Needs Able to express   Ability to Express Thoughts Able to express   Ability to Understand Others Understands

## 2020-02-03 NOTE — NURSING NOTE
Pt 2nd fecal occult positive  Pt also c/o dizzinees and lightheadedness, /62 HR 67  Contacted hospitalist, verbal orders to hold metoprolol and lisinopril and administer amiodarone and eliquis  STAT H&H oders placed  HGB 7 0, hospitalist notified  Will continue to monitor pt and follow plan of care

## 2020-02-03 NOTE — PROGRESS NOTES
Physical Medicine and Rehabilitation Progress Note  Geovany Gannon 60 y o  female MRN: [id number]  Unit/Bed#: [facility] 212-01 Encounter: [id number]    HPI: Geovany Gannon is a 60 y o  female who presented to the [address] [place] [address] aphasia and right sided weakness and found to have L MCA territory infarct in the setting of a-fib and was started on eliquis in acute care     Chief Complaint: CVA    Interval/Subjective: updated patient on active medical issues     ROS: A 10 point ROS was performed; negative except as noted above       Assessment/Plan:      * CVA (cerebral vascular accident) ([facility] [address] )  Assessment & Plan  - L MCA territory infarct felt to be due to a-fib  - appears to have been on coumadin PTA and was switched to eliquis 5 mg BID (which is currently on hold due to FOBT + stool, GI planning for EGD and possibly colonoscopy as well)   - also started on lipitor 40 mg qpm   - OP FU with neuro       Hypocalcemia  Assessment & Plan  - ionized Ca just below LLN of 1 12 at 1 09   - IM monitoring       Anemia  Assessment & Plan  - Hg currently 7 0 with + FOBT, IM transfusing and GI planning for EGD and possibly colonoscopy as well and have recommended starting patient on protonix 40 mg qd (per d/w GI consultant IV protonix is not needed and PO is acceptable)   - IM managing     IFG (impaired fasting glucose)  Assessment & Plan  - HgA1C 6 0  - nutritionist--> dietary education   - follows with Angie HACKETT    History of pulmonary embolism  Assessment & Plan  - remote history (years ago) of PE  - appears to have been on coumadin PTA but switched to eliquis 5 mg BID in acute care due to CVA (per IM given how long ago PE was that an initial short term 10 mg BID dosing is not required); however AC currently on hold due to FOBT + stool, GI planning for EGD and possibly colonoscopy as well     Prolonged QT interval  Assessment & Plan  - seen by cards in acute care and patient is on med regimen as recommended by cardiology  - OP FU with cards     Cardiomyopathy [facility])  Assessment & Plan  - with mildly elevated troponins up to 0 07 however cards did not feel troponin elevation was due to a true coronary event by rather tachycardia in the setting of cardiomyopathy  - EF 30%  - started on toprol XL 50 mg q12 and lisinopril 5 mg qd in acute care per cards however lisinopril dc'd and toprol XL reduced to 25 mg q12 by IM in acute rehab due to low BPs   - cards recommended OP nuclear lexiscan stress test which they have ordered    - OP FU with cards     Dysphagia  Assessment & Plan  - resolved, cleared for regular diet texture and thin liquids per ST; however currently on clear liquid diet per GI due to FOBT+ stool     Glaucoma  Assessment & Plan  - contacted [facility] ophthalmology whom patient follows with as an OP and per their records patient is to be on cosopt 22 3-6 8 mg/ml 1 gtt both eyes BID therefore resumed patient on this home medication       A-fib [facility])  Assessment & Plan  - with PSVT/atrial tachycardia per cards  - started on amiodarone taper in acute care with eventual dose of 200 mg qd per cards which patient is now on per IM   - started on eliquis 5 mg BID in acute care per cards however with FOBT + stool therefore on hold until cleared by GI to resume (GI planning for EGD and possibly colonoscopy as well)   - started on toprol XL 50 mg q12 in acute care per cards however decreased to 25 mg q12 by IM in acute rehab due to lower BPs   - OP FU with [facility]       Essential hypertension  Assessment & Plan  - at home on HCTZ 12 5 mg qd  - IM managing      Scheduled Meds:    Current Facility-Administered Medications:  acetaminophen 650 mg Oral Q6H PRN Juan R Tadeo MD    amiodarone 200 mg Oral Daily With Seb Medellin MD    atorvastatin 40 mg Oral QPM Juan R Tadeo MD    diphenhydrAMINE 25 mg Oral HS PRN Apple Griffin PA-C    dorzolamide-timolol 1 drop Both Eyes BID Juan R Tadeo MD    iron sucrose 200 mg Intravenous Daily Zuly Cole PA-C Last Rate: 200 mg (02/04/20 1251)   metoprolol succinate 25 mg Oral Q12H LEW Mullins    ondansetron 4 mg Intravenous Once MD Shraddha Whitney ON 2/5/2020] ondansetron 4 mg Intravenous Once Soumya Zendejas MD    pantoprazole 40 mg Oral Early Morning Glenna Cotter MD    polyethylene glycol 2,000 mL Oral Q10H Soumya Zendejas MD    polyethylene glycol 17 g Oral Daily PRN Glenna Cotter MD    PRESERVISION AREDS 2 1 capsule Oral BID Sherif Jean MD         Objective:    Functional Update:  Mobility: sup  Transfers: sup  ADLs: sup         Physical Exam:        General: alert, no apparent distress, cooperative and comfortable  HEENT:  Head: Normal, normocephalic, atraumatic    CARDIAC:  +S1/2  LUNGS:  respirations unlabored  ABDOMEN:  soft NT  EXTREMITIES:  volume status currently stable   NEURO:   +aphasia however is improving   PSYCH:  mood/affect currently stable     Laboratory:   Results from last 7 days   Lab Units 02/04/20  0442 02/03/20  0848   HEMOGLOBIN g/dL 8 0* 7 0*   HEMATOCRIT % 26 1* 23 4*   WBC Thousand/uL 8 40  --            Invalid input(s): CA

## 2020-02-03 NOTE — PLAN OF CARE
Problem: Potential for Falls  Goal: Patient will remain free of falls  Description  INTERVENTIONS:  - Assess patient frequently for physical needs  -  Identify cognitive and physical deficits and behaviors that affect risk of falls  -  [place] fall precautions as indicated by assessment   - Educate patient/family on patient safety including physical limitations  - Instruct patient to call for assistance with activity based on assessment  - Modify environment to reduce risk of injury  - Consider OT/PT consult to assist with strengthening/mobility  Outcome: Progressing     Problem: Nutrition/Hydration-ADULT  Goal: Nutrient/Hydration intake appropriate for improving, restoring or maintaining nutritional needs  Description  Monitor and assess patient's nutrition/hydration status for malnutrition  Collaborate with interdisciplinary team and initiate plan and interventions as ordered  Monitor patient's weight and dietary intake as ordered or per policy  Utilize nutrition screening tool and intervene as necessary  Determine patient's food preferences and provide high-protein, high-caloric foods as appropriate       INTERVENTIONS:  - Monitor oral intake, urinary output, labs, and treatment plans  - Assess nutrition and hydration status and recommend course of action  - Evaluate amount of meals eaten  - Assist patient with eating if necessary   - Allow adequate time for meals  - Recommend/ encourage appropriate diets, oral nutritional supplements, and vitamin/mineral supplements  - Order, calculate, and assess calorie counts as needed  - Recommend, monitor, and adjust tube feedings and TPN/PPN based on assessed needs  - Assess need for intravenous fluids  - Provide specific nutrition/hydration education as appropriate  - Include patient/family/caregiver in decisions related to nutrition  Outcome: Progressing     Problem: PAIN - ADULT  Goal: Verbalizes/displays adequate comfort level or baseline comfort level  Description  Interventions:  - Encourage patient to monitor pain and request assistance  - Assess pain using appropriate pain scale  - Administer analgesics based on type and severity of pain and evaluate response  - Implement non-pharmacological measures as appropriate and evaluate response  - Consider cultural and social influences on pain and pain management  - Notify physician/advanced practitioner if interventions unsuccessful or patient reports new pain  Outcome: Progressing     Problem: INFECTION - ADULT  Goal: Absence or prevention of progression during hospitalization  Description  INTERVENTIONS:  - Assess and monitor for signs and symptoms of infection  - Monitor lab/diagnostic results  - Monitor all insertion sites, i e  indwelling lines, tubes, and drains  - Monitor endotracheal if appropriate and nasal secretions for changes in amount and color  - [place] appropriate cooling/warming therapies per order  - Administer medications as ordered  - Instruct and encourage patient and family to use good hand hygiene technique  - Identify and instruct in appropriate isolation precautions for identified infection/condition  Outcome: Progressing  Goal: Absence of fever/infection during neutropenic period  Description  INTERVENTIONS:  - Monitor WBC    Outcome: Progressing     Problem: SAFETY ADULT  Goal: Maintain or return to baseline ADL function  Description  INTERVENTIONS:  -  Assess patient's ability to carry out ADLs; assess patient's baseline for ADL function and identify physical deficits which impact ability to perform ADLs (bathing, care of mouth/teeth, toileting, grooming, dressing, etc )  - Assess/evaluate cause of self-care deficits   - Assess range of motion  - Assess patient's mobility; develop plan if impaired  - Assess patient's need for assistive devices and provide as appropriate  - Encourage maximum independence but intervene and supervise when necessary  - Involve family in performance of ADLs  - Assess for home care needs following discharge   - Consider OT consult to assist with ADL evaluation and planning for discharge  - Provide patient education as appropriate  Outcome: Progressing  Goal: Maintain or return mobility status to optimal level  Description  INTERVENTIONS:  - Assess patient's baseline mobility status (ambulation, transfers, stairs, etc )    - Identify cognitive and physical deficits and behaviors that affect mobility  - Identify mobility aids required to assist with transfers and/or ambulation (gait belt, sit-to-stand, lift, walker, cane, etc )  - [place] fall precautions as indicated by assessment  - Record patient progress and toleration of activity level on Mobility SBAR; progress patient to next Phase/Stage  - Instruct patient to call for assistance with activity based on assessment  - Consider rehabilitation consult to assist with strengthening/weightbearing, etc   Outcome: Progressing     Problem: DISCHARGE PLANNING  Goal: Discharge to home or other facility with appropriate resources  Description  INTERVENTIONS:  - Identify barriers to discharge w/patient and caregiver  - Arrange for needed discharge resources and transportation as appropriate  - Identify discharge learning needs (meds, wound care, etc )  - Arrange for interpretive services to assist at discharge as needed  - Refer to Case Management Department for coordinating discharge planning if the patient needs post-hospital services based on physician/advanced practitioner order or complex needs related to functional status, cognitive ability, or social support system  Outcome: Progressing     Problem: Knowledge Deficit  Goal: Patient/family/caregiver demonstrates understanding of disease process, treatment plan, medications, and discharge instructions  Description  Complete learning assessment and assess knowledge base    Interventions:  - Provide teaching at level of understanding  - Provide teaching via preferred learning methods  Outcome: Progressing     Problem: Prexisting or High Potential for Compromised Skin Integrity  Goal: Skin integrity is maintained or improved  Description  INTERVENTIONS:  - Identify patients at risk for skin breakdown  - Assess and monitor skin integrity  - Assess and monitor nutrition and hydration status  - Monitor labs   - Assess for incontinence   - Turn and reposition patient  - Assist with mobility/ambulation  - Relieve pressure over bony prominences  - Avoid friction and shearing  - Provide appropriate hygiene as needed including keeping skin clean and dry  - Evaluate need for skin moisturizer/barrier cream  - Collaborate with interdisciplinary team   - Patient/family teaching  - Consider wound care consult   Outcome: Progressing

## 2020-02-03 NOTE — PROGRESS NOTES
02/03/20 1305   Pain Assessment   Pain Assessment No/denies pain   Pain Score No Pain   Restrictions/Precautions   Precautions Aphasia;Cognitive; Fall Risk   Cognition   Overall Cognitive Status Impaired   Arousal/Participation Alert; Responsive; Cooperative   Attention Attends with cues to redirect   Orientation Level Oriented X4   Memory Decreased short term memory;Decreased recall of recent events;Decreased recall of precautions   Following Commands Follows one step commands with increased time or repetition   Sit to Lying   Type of Assistance Needed Independent   Amount of Physical Assistance Provided No physical assistance   Sit to Lying CARE Score 6   Lying to Sitting on Side of Bed   Type of Assistance Needed Independent   Amount of Physical Assistance Provided No physical assistance   Lying to Sitting on Side of Bed CARE Score 6   Sit to Stand   Type of Assistance Needed Independent   Amount of Physical Assistance Provided No physical assistance   Sit to Stand CARE Score 6   Bed-Chair Transfer   Type of Assistance Needed Independent   Amount of Physical Assistance Provided No physical assistance   Chair/Bed-to-Chair Transfer CARE Score 6   Transfer Bed/Chair/Wheelchair   Limitations Noted In Balance; Endurance;LE Strength   Adaptive Equipment Cane   Stand Pivot Modified Independent   Sit to Stand Modified Independent   Stand to Sit Modified Independent   Supine to Sit Modified Independent   Sit to Supine Modified Independent   All Transfer Modified Independent   Walk 10 Feet   Type of Assistance Needed Supervision   Amount of Physical Assistance Provided No physical assistance   Walk 10 Feet CARE Score 4   Walk 50 Feet with Two Turns   Type of Assistance Needed Supervision   Amount of Physical Assistance Provided No physical assistance   Walk 50 Feet with Two Turns CARE Score 4   Walk 150 Feet   Reason if not Attempted Safety concerns   Walk 150 Feet CARE Score 88   Walking 10 Feet on Uneven Surfaces   Type of Assistance Needed Supervision   Amount of Physical Assistance Provided No physical assistance   Walking 10 Feet on Uneven Surfaces CARE Score 4   Ambulation   Does the patient walk? 2  Yes   Primary Mode of Locomotion Prior to Admission Walk   Distance Walked (feet) 119 ft  (x2)   Assist Device Cane   Gait Pattern Inconsistant [place]   Limitations Noted In Balance; Endurance; Safety   Provided Assistance with: Balance   Walk Assist Level Supervision   Therapeutic Interventions   Strengthening seated ther ex 30 reps   Other gait and community re-ed   Assessment   Treatment Assessment Nurse reported pt will be receiveing a blood transfusion today; Pt reports having some dizziness but is agreeable to participate in PT as able; Pt is MI for bed mobility and transfers; Pt amb up to 119' with cane and S; Pt has increased fatigue today needing increased rests; Pt performed seated and supine ther ex for general conditioning    PT Barriers   Physical Impairment Decreased strength;Decreased range of motion;Decreased endurance; Impaired balance;Decreased mobility; Decreased safety awareness   Functional Limitation Walking;Transfers;Standing;Stair negotiation   Plan   Treatment/Interventions Functional transfer training;LE strengthening/ROM; Elevations; Therapeutic exercise;Gait training   Progress Progressing toward goals   PT Therapy Minutes   PT Time In 1305   PT Time Out 1405   PT Total Time (minutes) 60   PT Mode of treatment - Individual (minutes) 55   PT Mode of treatment - Concurrent (minutes) 5   PT Mode of treatment - Group (minutes) 0   PT Mode of treatment - Co-treat (minutes) 0   PT Mode of Treatment - Total time(minutes) 60 minutes   PT Cumulative Minutes 691   Therapy Time missed   Time missed?  No

## 2020-02-03 NOTE — CONSULTS
Consultation - GI   Ramiro Brewster 60 y o  female MRN: [id number]  Unit/Bed#: -01 Encounter: [id number]      Assessment/Plan     Assessment:  GI bleed and melena  Iron-deficiency anemia  Patient was recently started on Eliquis due to his CVA  Plan:  She will need transfusion and an EGD to evaluate any upper GI lesion  If the EGD is negative then she will need the lower GI evaluation as well  History of Present Illness   Physician Requesting Consult: Hemant Pablo MD  Reason for Consult / Principal Problem:  GI bleed anemia  Hx and PE limited by:   HPI: Ramiro Brewster is a 60y o  year old female who presents with anemia and heme-positive stools  The patient that had a CVA and was started on Eliquis daily  Patient says that she started noticing darker stools  The stools were tested and were positive for occult blood  The patient dropped her hemoglobin and her last hemoglobin was 7 g  She denies any bright red blood per rectum denies any hematemesis denies any abdominal pains  She denies any dysphagia or early satiety  There is no reported NSAID use  Of note, she had an EGD done in 2019 by Dr Wes Bertrand which had shown status post Gio-en-Y and no active bleeding        Consults    Review of Systems    Historical Information   Past Medical History:   Diagnosis Date    Hypertension     PUD (peptic ulcer disease)     Stroke ([facility] [address] )     Vitamin D deficiency      Past Surgical History:   Procedure Laterality Date    ABDOMINOPLASTY      revision gastric bypass    BREAST SURGERY      reduction     SECTION      CHOLECYSTECTOMY      COLONOSCOPY      GASTRIC BYPASS      HERNIA REPAIR      NEUROMA EXCISION Right 2019    Procedure: EXCISION 2ND INTERSPACE NEUROMA;  Surgeon: Marianne López DPM;  Location: [address] [place] [address] OR;  Service: Podiatry    ROTATOR CUFF REPAIR Left      Social History   Social History     Substance and Sexual Activity   Alcohol Use Never    Frequency: Never Comment: none     Social History     Substance and Sexual Activity   Drug Use No    Comment: none     Social History     Tobacco Use   Smoking Status Never Smoker   Smokeless Tobacco Never Used     Family History: non-contributory    Meds/Allergies   all current active meds have been reviewed    Allergies   Allergen Reactions    Nsaids      Due to hx gastric bypass         Objective       Intake/Output Summary (Last 24 hours) at 2/3/2020 1624  Last data filed at 2/3/2020 1301  Gross per 24 hour   Intake 240 ml   Output    Net 240 ml       Invasive Devices:        Physical Exam   Elderly white female in no acute distress  Afebrile  Pulse 61 per minute  Blood pressure 104/58  Respiratory rate 16  HEENT anicteric sclera neck supple  No JVD  Chest lungs clear to auscultation percussion  Heart irregularly irregular  Abdomen soft nontender nondistended positive bowel sounds  No better splenomegaly could be assessed due to body habitus  Neuro awake alert oriented x3 cranial nerves 2-12 intact grossly but she does have some mild difficulty in talking  Told me that it is improving now  Labs show a hemoglobin of 7 with hematocrit of 23 4%  Stools heme-positive  Lab Results: I have personally reviewed pertinent reports  Imaging Studies: I have personally reviewed pertinent reports  EKG, Pathology, and Other Studies:     VTE Prophylaxis:     Counseling / Coordination of Care  Total floor / unit time spent today 60 minutes  Greater than 50% of total time was spent with the patient and / or family counseling and / or coordination of care  A description of the counseling / coordination of care:  Discussed this with Dr Sid Monroy and with the hospitalist   The Eliquis was stopped and will arrange for an EGD for further evaluation

## 2020-02-03 NOTE — PROGRESS NOTES
02/03/20 1235   Pain Assessment   Pain Assessment No/denies pain   Pain Score No Pain   Restrictions/Precautions   Precautions Aphasia;Cognitive; Fall Risk   Comprehension   Assist Devices Glasses   QI: Comprehension 3  Usually Understands: Understands most conversations, but misses some part/intent of message  Requires cues at times to understand  Comprehension (FIM) 5 - Understands basic directions and conversation   Expression   QI: Expression 3  Exhibits some difficulty with expressing needs and ideas (e g , some words or finishing thoughts) or speech is not clear   Expression (FIM) 5 - Needs help/cues only RARELY (< 10% of the time)  (with familiar verbalized sequences)   Social Interaction   Participation Small Group   Social Interaction (FIM) 5 - Interacts appropriately with others 90% of time   Problem Solving   Problem solving (FIM) 4 - Solves basic problems 75-89% of time   Memory   Recognize People Yes   Remember Routine Yes   Initiates Tasks Yes   Memory (FIM) 5 - [place] cues patient   Executive Function Skills   Insight Mild insight   Impulsive Mildly impulsive   Flexibility of Thought Reduced flexibility   Memory Skills   Orientation Level Oriented X4   Short Term Working Recall Mild Impairment   Auditory Comprehension   Comphrehends Conversation Simple   Interfering Components Attention - selective   Speech/Swallow Mechanism Exam   Facial Strength Reduced   Lingual Strength Mild reduced   Motor Speech Evaluation   Dysarthria Yes   Mild Dystharia Impaired articulation   Speech/Language/Cognition Assessmetn   Treatment Assessment Speech Pathology Daily Treatment Note - Speech/Cognitive Communication Skills - SLP focused on cognitive memory and reasoning using the skilled therapy goals as the stimulus    Patient was asked to recall the procedures performed during therapy across disciplines, how they relate to current goals and how they will facilitate the ability to return to the previous level of function  Patient's daily schedule was utilized as a visual aid to promote recall of the days events and procedures performed during treatment  SLP then targeted setting goals for the week in speech therapy as well as across disciplines  Focus was on reasoning the current level, anticipating the final outcome and planning goals with reasonable outcomes for this week  Patient showed improvement in her ability to verbalize procedures in a salient 3-5 step sequence with intermittent situational cues or supervision level which is good progress in this treatment period   Swallow Information   Current Risks for Dysphagia & Aspiration Dysarthria;General debilitation   Current Symptoms/Concerns Difficulty chewing   Current Diet Regular   Consistencies Assessed and Performance   Oral Stage Mild impaired   Oral Stage Comment extended oral phase   Recommendations   Diet Solid Recommendation Regular consistency   Diet Liquid Recommendation Thin liquid   General Precautions Aspiration precautions   Compensatory Swallowing Strategies Alternate solids and liquids   Eating   Type of Assistance Needed Independent   Eating CARE Score 6   Swallow Assessment   Swallow Treatment Assessment Speech Pathology Daily Treatment Note - Swallow Oral Function - Patient seen 1:1 plus concurrent as patient gains through talkback method of verbalizing therapeutic techniques and safety precautions to others  Focus of dysphagia therapy this session was pharyngeal phase management of solids and liquids  Therapist initially educated on appropriate 90 degree posture for safety with thin liquids as well as rate control    Therapy then focused on verbalization and demonstration of compensatory safe swallow techniques at mealtime which included:  small and single sips of liquid, use of effortful swallow, use of double swallow as necessary, alternating solids with liquids, ensuring a clear oral cavity during intake of liquids and cough to clear the airway as needed post swallow  Current recommended diet texture is regular with strong adherence to use of the above for tolerance and safety   Swallow Assessment Prognosis   Prognosis Good   Prognosis Considerations Family/community support   SLP Therapy Minutes   SLP Time In 1235   SLP Time Out 1335   SLP Total Time (minutes) 60   SLP Mode of treatment - Individual (minutes) 35   SLP Mode of treatment - Concurrent (minutes) 25   Therapy Time missed   Time missed?  No   Daily FIM Score   Eating (FIM) 6 - Patient requires increased time or safety concern

## 2020-02-04 ENCOUNTER — ANESTHESIA EVENT (OUTPATIENT)
Dept: GASTROENTEROLOGY | Facility: HOSPITAL | Age: 65
End: 2020-02-04

## 2020-02-04 PROBLEM — D75.839 THROMBOCYTOSIS: Status: ACTIVE | Noted: 2020-02-04

## 2020-02-04 LAB
ABO GROUP BLD BPU: NORMAL
ABO GROUP BLD BPU: NORMAL
ANISOCYTOSIS BLD QL SMEAR: PRESENT
BPU ID: NORMAL
BPU ID: NORMAL
CROSSMATCH: NORMAL
CROSSMATCH: NORMAL
ERYTHROCYTE [DISTWIDTH] IN BLOOD BY AUTOMATED COUNT: 24.3 % (ref 11.5–14.5)
HCT VFR BLD AUTO: 26.1 % (ref 42–47)
HGB BLD-MCNC: 8 G/DL (ref 12–16)
HYPERCHROMIA BLD QL SMEAR: PRESENT
MCH RBC QN AUTO: 24.2 PG (ref 26–34)
MCHC RBC AUTO-ENTMCNC: 30.7 G/DL (ref 31–37)
MCV RBC AUTO: 79 FL (ref 81–99)
MICROCYTES BLD QL AUTO: PRESENT
OVALOCYTES BLD QL SMEAR: PRESENT
PLATELET # BLD AUTO: 434 THOUSANDS/UL (ref 149–390)
PLATELET BLD QL SMEAR: ABNORMAL
PMV BLD AUTO: 8.2 FL (ref 8.6–11.7)
RBC # BLD AUTO: 3.31 MILLION/UL (ref 3.9–5.2)
RBC MORPH BLD: PRESENT
TARGETS BLD QL SMEAR: PRESENT
UNIT DISPENSE STATUS: NORMAL
UNIT DISPENSE STATUS: NORMAL
UNIT PRODUCT CODE: NORMAL
UNIT PRODUCT CODE: NORMAL
UNIT RH: NORMAL
UNIT RH: NORMAL
WBC # BLD AUTO: 8.4 THOUSAND/UL (ref 4.8–10.8)

## 2020-02-04 PROCEDURE — 97535 SELF CARE MNGMENT TRAINING: CPT

## 2020-02-04 PROCEDURE — 99233 SBSQ HOSP IP/OBS HIGH 50: CPT | Performed by: PHYSICAL MEDICINE & REHABILITATION

## 2020-02-04 PROCEDURE — 97537 COMMUNITY/WORK REINTEGRATION: CPT

## 2020-02-04 PROCEDURE — 85027 COMPLETE CBC AUTOMATED: CPT | Performed by: INTERNAL MEDICINE

## 2020-02-04 PROCEDURE — 97110 THERAPEUTIC EXERCISES: CPT

## 2020-02-04 PROCEDURE — 97530 THERAPEUTIC ACTIVITIES: CPT

## 2020-02-04 PROCEDURE — 92507 TX SP LANG VOICE COMM INDIV: CPT

## 2020-02-04 PROCEDURE — 97116 GAIT TRAINING THERAPY: CPT

## 2020-02-04 PROCEDURE — 92526 ORAL FUNCTION THERAPY: CPT

## 2020-02-04 RX ORDER — PANTOPRAZOLE SODIUM 40 MG/1
40 TABLET, DELAYED RELEASE ORAL
Qty: 30 TABLET | Refills: 0 | Status: SHIPPED | OUTPATIENT
Start: 2020-02-06 | End: 2020-02-05 | Stop reason: HOSPADM

## 2020-02-04 RX ORDER — ONDANSETRON 2 MG/ML
4 INJECTION INTRAMUSCULAR; INTRAVENOUS ONCE
Status: DISCONTINUED | OUTPATIENT
Start: 2020-02-05 | End: 2020-02-05 | Stop reason: HOSPADM

## 2020-02-04 RX ORDER — MAGNESIUM CARB/ALUMINUM HYDROX 105-160MG
296 TABLET,CHEWABLE ORAL ONCE
Status: COMPLETED | OUTPATIENT
Start: 2020-02-04 | End: 2020-02-04

## 2020-02-04 RX ORDER — AMIODARONE HYDROCHLORIDE 200 MG/1
200 TABLET ORAL
Qty: 30 TABLET | Refills: 0 | Status: SHIPPED | OUTPATIENT
Start: 2020-02-06 | End: 2020-03-03 | Stop reason: SDUPTHER

## 2020-02-04 RX ORDER — DORZOLAMIDE HYDROCHLORIDE AND TIMOLOL MALEATE 20; 5 MG/ML; MG/ML
1 SOLUTION/ DROPS OPHTHALMIC 2 TIMES DAILY
COMMUNITY

## 2020-02-04 RX ORDER — ONDANSETRON 2 MG/ML
4 INJECTION INTRAMUSCULAR; INTRAVENOUS ONCE
Status: COMPLETED | OUTPATIENT
Start: 2020-02-04 | End: 2020-02-04

## 2020-02-04 RX ORDER — ATORVASTATIN CALCIUM 40 MG/1
40 TABLET, FILM COATED ORAL EVERY EVENING
Qty: 30 TABLET | Refills: 0 | Status: SHIPPED | OUTPATIENT
Start: 2020-02-05

## 2020-02-04 RX ADMIN — DORZOLAMIDE HYDROCHLORIDE AND TIMOLOL MALEATE 1 DROP: 20; 5 SOLUTION/ DROPS OPHTHALMIC at 17:38

## 2020-02-04 RX ADMIN — MAGNESIUM CITRATE 296 ML: 1.75 LIQUID ORAL at 22:26

## 2020-02-04 RX ADMIN — ATORVASTATIN CALCIUM 40 MG: 40 TABLET, FILM COATED ORAL at 17:38

## 2020-02-04 RX ADMIN — AMIODARONE HYDROCHLORIDE 200 MG: 100 TABLET ORAL at 09:09

## 2020-02-04 RX ADMIN — PANTOPRAZOLE SODIUM 40 MG: 40 TABLET, DELAYED RELEASE ORAL at 05:08

## 2020-02-04 RX ADMIN — BISACODYL 20 MG: 5 TABLET, COATED ORAL at 20:51

## 2020-02-04 RX ADMIN — POLYETHYLENE GLYCOL-3350 AND ELECTROLYTES WITH FLAVOR PACK 2000 ML: 240; 5.84; 2.98; 6.72; 22.72 POWDER, FOR SOLUTION ORAL at 18:20

## 2020-02-04 RX ADMIN — ONDANSETRON 4 MG: 2 INJECTION INTRAMUSCULAR; INTRAVENOUS at 17:38

## 2020-02-04 RX ADMIN — DORZOLAMIDE HYDROCHLORIDE AND TIMOLOL MALEATE 1 DROP: 20; 5 SOLUTION/ DROPS OPHTHALMIC at 09:11

## 2020-02-04 RX ADMIN — Medication 1 CAPSULE: at 09:09

## 2020-02-04 RX ADMIN — IRON SUCROSE 200 MG: 20 INJECTION, SOLUTION INTRAVENOUS at 12:51

## 2020-02-04 RX ADMIN — METOPROLOL SUCCINATE 25 MG: 25 TABLET, EXTENDED RELEASE ORAL at 09:11

## 2020-02-04 NOTE — SPEECH THERAPY NOTE
Speech Pathology Discharge Summary  Binta Sutton is a 60 y o  female who presented to the [facility] on 1/17/20 with Aphasia and right sided weakness and found to have L MCA territory infarct in the setting of a-fib and was started on eliquis in acute care   Prior to incident patient was essentially independent with mobility, transfers and ADL's  Zenovia Hatchet lives alone in a single family  There are no steps to enter  First floor set up is available and the patient will not have 24 hour supervision available upon discharge  Skilled speech assessment was completed  Patient presented with moderate oral dysphagia, mild pharyngeal secondary to weakness and incoordination post CVA  Speech comprehension at the multi step level was min to modo assist requiring frequent redirection to task and apparent breakdowns in symbolic function for understanding  Expression was moderate due to Expressive Aphasia  Cognitively, patient presented as min assist for memory and mod assist for reasoning through familiar tasks leading to anxious behavior  Skilled ST indicated  Patient participated in skilled ST focusing on swallow oral function and speech cognitive communication skills  Current diet texture is Regular with Thin liquids  Speech comprehension and expression at the multi step level is supervision with familiar procedures and intermittent situational cues due to expressive Aphasiamin assist  Cognitively, patient presents as supervision for memory and min assist for reasoning her deficits for understanding the need for assistance and use of safety precautions

## 2020-02-04 NOTE — TEAM CONFERENCE
Acute RehabilitationTeam Conference Note  Date: 2/4/2020   Time: 11:11 AM       Patient Name:  Bogdan Anne       Medical Record Number: [id number]   YOB: 1955  Sex: Female          Room/Bed:  [facility] 212/[facility] 212-01  Payor Info:  Payor: Golden Sosa MA MCO / Plan: Yariel Dillon MA / Product Type: Medicaid HMO /      Admitting Diagnosis: Stroke (cerebrum) ([facility] ) [I63 9]   Admit Date/Time:  1/23/2020  2:28 PM  Admission Comments: No comment available     Primary Diagnosis:  CVA (cerebral vascular accident) ([facility] )  Principal Problem: CVA (cerebral vascular accident) [facility])    Patient Active Problem List    Diagnosis Date Noted    Glaucoma 01/23/2020    Dysphagia 01/23/2020    Cardiomyopathy ([facility] ) 01/23/2020    Prolonged QT interval 01/23/2020    History of pulmonary embolism 01/23/2020    Anemia 01/23/2020    IFG (impaired fasting glucose) 01/23/2020    Hypocalcemia 01/23/2020    A-fib ([facility] ) 01/17/2020    CVA (cerebral vascular accident) ([facility] ) 01/17/2020    Essential hypertension 06/03/2019       Physical Therapy:    Weight Bearing Status: Weight Bearing as Tolerated  Transfers: Independent  Bed Mobility: Independent  Amulation Distance (ft): 119 feet  Ambulation: Supervision  [address]istive [address] for [address]ulation: [address]  Number of Stairs: 7  Assistive Device for Stairs: Bilateral [employer]  Stair Assistance: Supervision  Discharge Recommendations: Home with:  [address] [facility] Ul[address] [place] with[de-identified] Family Support, Home Physical Therapy    1/27/19: Pt is progressing well towards therapy goals  Current level of function includes S for bed mobility, S for transfers, close S ambulation with RW for distance of 169', and S while navigating 14 steps with 2 HR  Pt remains limited by her speech, cognition, decreased safety, impaired strength, balance, and decreased endurance/ activity tolerance   Pt will continue to benefit from skilled inpatient physical therapy to address the remaining deficits to maximize safety and functional independence for anticipated D/C to home  2/3/20: Pt is progressing well towards therapy goals  Current level of function includes MI for bed mobility and transfers, S ambulation with RW for distance of 119', and S while navigating 7 steps with 2 HR  Pt was limited today due to dizziness; Pt will be receiving blood transfusion today;Pt remains limited by her speech, cognition, decreased safety, impaired strength, balance, and decreased endurance/ activity tolerance  Pt will continue to benefit from skilled inpatient physical therapy to address the remaining deficits to maximize safety and functional independence for anticipated D/C to home  Occupational Therapy:  Eating: Independent  Grooming: Supervision  Bathing: Supervision, Minimal Assistance  Bathing: Supervision, Minimal Assistance  Upper Body Dressing: Minimal Assistance, Supervision  Lower Body Dressing: Minimal Assistance, Supervision  Toileting: Supervision  Tub/Shower Transfer: Supervision, Incidental Touching  Toilet Transfer: Supervision, Incidental Touching  Cognition: Exceptions to WNL  Cognition: Decreased Attention, Decreased Safety  Orientation: Person, Place, Time, Situation  Discharge Recommendations: Home with:  [address] [facility] Ul[place] Ai with[de-identified] Family Support, Home Occupational Therapy       1/27/2020: Pt participates in ADLs, transfers/ mobility, light homemaking, cognitive retraining and BUE therex  Pt is making gains towards goals with current LOF as stated above  Pt requires cues and redirection due to decreased attention/ sequencing, expressive aphasia and assist due to decreased ROM/ balance, endurance and safety  Pt will benefit from continued skilled OT services to increase independence with daily tasks  02/03/2020: Pt participates in ADLs, transfers/ mobility, light homemaking, cognitive retraining and BUE therex  Pt is making gains towards goals with current LOF as stated above   Pt does require occasional assist due to decrease BP with dizziness  Pt requires cues and redirection due to decreased attention/ sequencing, expressive aphasia and assist due to decreased ROM/ balance, endurance and safety  Family training completed with son, grandson and brothers to prepare for transition to home and they report being able to provide support during transition  Pt will benefit from continued skilled OT services to increase independence with daily tasks prior to discharge to home planned for Wed  Speech Therapy:  Mode of Communication: Verbal  Speech/Language: Expressive Aphasia  Cognition: Exceptions to WNL  Cognition: Decreased Executive Functions, Impulsive, Decreased Comprehension  Orientation: Person, Place, Time, Situation  Swallowing: Within Defined Limits  Diet Recommendations: Regular Diet  Discharge Recommendations: Home with:  [address] with[de-identified] Family Denisse Esteban is a 60 y o  female who presented to the [address] on 1/17/20 with Aphasia and right sided weakness and found to have L MCA territory infarct in the setting of a-fib and was started on eliquis in acute care   Prior to incident patient was essentially independent with mobility, transfers and ADL's  Arnulfo  lives alone in a single family  There are no steps to enter  First floor set up is available and the patient will not have 24 hour supervision available upon discharge  Skilled speech assessment was completed  Patient presented with moderate oral dysphagia, mild pharyngeal secondary to weakness and incoordination post CVA  Speech comprehension at the multi step level was min to modo assist requiring frequent redirection to task and apparent breakdowns in symbolic function for understanding  Expression was moderate due to Expressive Aphasia  Cognitively, patient presented as min assist for memory and mod assist for reasoning through familiar tasks leading to anxious behavior    Skilled ST indicated  1/27/2020  Patient is participating in skilled ST focusing on swallow oral function and speech cognitive communication skills  Current diet texture is Dysphagia #2, Mechanical Soft with Thin liquids  SLP is completing trials of Dysphagia #3, Dental Soft with Thin liquids at mealtime with noted progress in texture tolerance, but patient continues to need encouragement for adequate PO intake  Speech comprehension at the multi step level is min assist   Expression due to expressive Aphasia is mod assist   She is gaining in using a variety of modalities to improve ability to express complex needs and ideas  Cognitively, patient presents as min assist for memory and mod assist for reasoning her deficits for understanding the need for assistance and use of safety precautions  2/3/2020  Patient is participating in skilled ST focusing on swallow oral function and speech cognitive communication skills  Current diet texture is Regular with Thin liquids  Speech comprehension and expression at the multi step level is supervision with familiar procedures and intermittent situational cues due to expressive Aphasiamin assist  Cognitively, patient presents as supervision for memory and min assist for reasoning her deficits for understanding the need for assistance and use of safety precautions  Nursing Notes:  Appetite: Fair  Diet Type: Clear liquid                      Diet Patient/Family Education Complete: Yes                Incision 01/14/19 Foot Right (Active)   Incision Description Clean;Dry; Intact 1/31/2020  9:25 AM   Cristin-wound Assessment Clean;Dry; Intact 1/26/2020  9:04 PM              Bladder: 5 - Supervision     Bladder Patient/Family Education: Yes  Bowel: 5 - Supervision     Bowel Patient/Family Education: Yes  Pain Location: Arm  Pain Orientation: Right  Pain Score: 0                       Hospital Pain Intervention(s): Repositioned  Pain Patient/Family Education: No       01/27/2020Pt admitted with CVA  Generalized weakness noted  Expressive aphasia present, forgetful at times  Lungs WDL, HRR non-pitting edema to b/lle  Ambulated to BR with RW supervison/CG cont b&b  Denies pain  2/4/20  Patient continues with generalized weakness and expressive aphasia at times  Bed alarm and close supervision in place to maintain patient safety  Lungs remain clear on room air  Heart rate regular to auscultation  Generalized edema noted  Patient with hemoccult positive stool test and low hemoglobin level  Patient receiving two units of packed red blood cells  Will retest hemoglobin level in morning post transfusion  Scattered ecchymotic areas noted  Case Management:     Discharge Planning  Living Arrangements: Alone  Support Systems: Family members  Assistance Needed: cognitive  Type of Current Residence: Private residence  Current [address] : No  Initial assessment & orientation to [facility] with Pt,who expressed understanding & agreement  Message left for Pt's brother  Explained role of this worker & Tx team meeting to be held next week  Pt resides alone in a one story apartment, 1-2 steps in  She reported that she has a strong support system with her brothers & nephews  She reported that she was completely independent PTA  She expressed feeling anxious & somewhat irritable, but also motivated to reach her goals to return to her home  SW will continue to monitor & assist as needed with [address]  Ins co is [employer] , policy [phone], Jordi Llanos [phone]  CM to fax clinical update to [phone] on 1/29/20 1/28/20 - Tx team recommendations reviewed with patient & messages left for Pt's brothers  Pt expressed understanding & agreement  Target DC date is Wed 2/5 with [facility] (PT, OT, ST, Nsg, HHA); a list of providers was provided to Pt and a referral will be made based on Pt preference  Awaiting return call from Pt's family to schedule FT   SW will continue to monitor & assist as needed with Tx & DC planning with Tx & DC planning  Is the patient actively participating in therapies? yes  List any modifications to the treatment plan: na    Barriers Interventions   Decreased end ADL, transfer, gait training   Decreased cog ST strategies, ADL/transfer/gait training   Edema right UE Therapeutic exercise, therapeutic activity   aphasia ST strategies   dysphagia ST strategies, reg with thin     Is the patient making expected progress toward goals? yes  List any update or changes to goals: na    Medical Goals: Patient will be able to manage medical conditions and comorbid conditions with medications and follow up upon completion of rehab program    Weekly Team Goals:   Rehab Team Goals  ADL Team Goal: Patient will be independent with ADLs with least restrictive device upon completion of rehab program  Bowel/Bladder Team Goal: Patient will return to premorbid level for bladder/bowel management upon completion of rehab program  Transfer Team Goal: Patient will be independent with transfers with least restrictive device upon completion of rehab program  Locomotion Team Goal: Patient will be independent with locomotion with least restrictive device upon completion of rehab program  Cognitive Team Goal: Patient will return to premorbid level of cognitive activity upon completion of rehab program     Mod I transfers, mobility  Min assist self care  Mod I Expression, comprehension  S/Mod I Memory, problem solving    Health and wellness: to be able to return home and care for pet    Discussion: Plan for return home with family support with Tahir Whitaker for PT, OT, ST, nursing, and aides      Anticipated Discharge Date:  Feb 5, 2020

## 2020-02-04 NOTE — PROGRESS NOTES
Progress Note - Karis Lion 1955, 60 y o  female MRN: [id number]    Unit/Bed#: -01 Encounter: [id number]    Primary Care Provider: Tamiko Kunz, DO   Date and time admitted to hospital: 2020  2:28 PM        * CVA (cerebral vascular accident) [facility])  Assessment & Plan  · Continue PT/OT with acute rehab  · Continue statin  · Supportive care  · Follow-up with PCP and Neurology as outpatient      Acute gastrointestinal hemorrhage  Assessment & Plan  · With acute blood loss anemia  · The patient with occult blood positive  · Will transfuse 2 units of packed red blood cells today  · Monitor H&H closely  · GI consult  · Continue with PPI     A-fib ([address] )  Assessment & Plan  · Rate controlled  · Continue metoprolol   · Will hold Eliquis for now due to GI bleed        VTE Pharmacologic Prophylaxis: Pharmacologic: SCDs only     Patient Centered Rounds: I have performed bedside rounds with nursing staff today  Discussions with Specialists or Other Care Team Provider:  Primary team, nursing, GI  Education and Discussions with Family / Patient: patient     Current Length of Stay: 11 day(s)    Current Patient Status: Inpatient Rehab     Discharge Plan: per primary team     Code Status: Level 1 - Full Code    Subjective:   Feeling okay  She is very concerned about her blood being low  Denies any chest pain or dyspnea  Objective:     Vitals:   Temp (24hrs), Av 3 °F (36 8 °C), Min:97 5 °F (36 4 °C), Max:98 8 °F (37 1 °C)    Temp:  [97 5 °F (36 4 °C)-98 8 °F (37 1 °C)] 98 8 °F (37 1 °C)  HR:  [61-69] 63  Resp:  [14-18] 16  BP: ()/(54-62) 104/58  SpO2:  [97 %-100 %] 100 %  Body mass index is 39 75 kg/m²  Input and Output Summary (last 24 hours):        Intake/Output Summary (Last 24 hours) at 2/3/2020 2034  Last data filed at 2/3/2020 1801  Gross per 24 hour   Intake 590 ml   Output    Net 590 ml       Physical Exam:     Physical Exam   Constitutional: She is oriented to person, place, and time  She appears well-developed and well-nourished  HENT:   Head: Normocephalic and atraumatic  Mouth/Throat: Oropharynx is clear and moist    Eyes: Pupils are equal, round, and reactive to light  Conjunctivae and EOM are normal    Neck: Normal range of motion  Neck supple  Cardiovascular: Normal heart sounds  Exam reveals no gallop and no friction rub  No murmur heard  Irregularly irregular    Pulmonary/Chest: Effort normal and breath sounds normal  She has no wheezes  She has no rales  Abdominal: Soft  Bowel sounds are normal  She exhibits no distension  There is no tenderness  There is no rebound  Musculoskeletal: Normal range of motion  She exhibits no edema, tenderness or deformity  Neurological: She is alert and oriented to person, place, and time  No cranial nerve deficit  Skin: Skin is warm and dry  No rash noted  No erythema  Psychiatric: She has a normal mood and affect  Her behavior is normal  Thought content normal    Vitals reviewed  Additional Data:     Labs:    Results from last 7 days   Lab Units 02/03/20  0848   HEMOGLOBIN g/dL 7 0*   HEMATOCRIT % 23 4*           Invalid input(s): LABALBU                * I Have Reviewed All Lab Data Listed Above  * Additional Pertinent Lab Tests Reviewed:  Naif Glover Admission  Reviewed    Imaging:  Imaging Reports Reviewed Today Include: n/a     Recent Cultures (last 7 days):           Last 24 Hours Medication List:     Current Facility-Administered Medications:  acetaminophen 650 mg Oral Q6H PRN Karli Salinas MD   amiodarone 200 mg Oral Daily With Breakfast Karli Salinas MD   atorvastatin 40 mg Oral QPM Karli Salinas MD   diphenhydrAMINE 25 mg Oral HS PRN Michelle Keene PA-C   dorzolamide-timolol 1 drop Both Eyes BID Karli Salinas MD   metoprolol succinate 25 mg Oral Q12H LEW Astudillo   [START ON 2/4/2020] pantoprazole 40 mg Oral Early Morning Karli Salinas MD   polyethylene glycol 17 g Oral Daily PRN Karli Salinas MD   PRESERVISION AREDS 2 1 capsule Oral BID King Logan MD        Today, Patient Was Seen By: LEW Delacruz    ** Please Note: Dictation voice to text software may have been used in the creation of this document   **

## 2020-02-04 NOTE — NURSING NOTE
Verbal orders with readback Given from Dr Caity Rothman to RN, EGD and colonoscopy to be done early afternoon on 2/5  Pt to drink 0 5gal Go Lightly between 1593-9504 tonight 2/4 and 0 5 gal Go Lightly between 6193-2303 early morning 2/5   4mg Zofran to be administered IV before each Go Lightly  Pt to be NPO after 0600 2/5  Verbal orders have been discussed with Dr Mary Grace De Leon who is aware of the plan  Order for venofer 200mg placed, to be administered when brought to floor from pharmacy  Will continue to monitor and follow plan of care

## 2020-02-04 NOTE — PROGRESS NOTES
02/04/20 0925   Pain Assessment   Pain Assessment 0-10   Pain Score 6   Pain Location Arm   Pain Orientation Right   Restrictions/Precautions   Precautions Aphasia; Fall Risk;Limb alert   Comprehension   Assist Devices Glasses   QI: Comprehension 3  Usually Understands: Understands most conversations, but misses some part/intent of message  Requires cues at times to understand  Comprehension (FIM) 5 - Understands basic directions and conversation   Expression   QI: Expression 3  Exhibits some difficulty with expressing needs and ideas (e g , some words or finishing thoughts) or speech is not clear   Expression (FIM) 5 - Needs help/cues only RARELY (< 10% of the time)   Social Interaction   Cooperation with family   Participation Small Group   Social Interaction (FIM) 5 - Requires redirection but less than 10% of the time     Problem Solving   Problem solving (FIM) 5 - Solves basic problems 90% of time   Memory   Recognize People Yes   Remember Routine Yes   Initiates Tasks Yes   Memory (FIM) 5 - Recalls/performs request 90% of time   Executive Function Skills   Insight Mild insight   Impulsive Mildly impulsive   Flexibility of Thought Reduced flexibility   Planning Reduced planning skills   Memory Skills   Orientation Level Oriented X4   Short Term Working Recall Mild Impairment   Auditory Comprehension   Comphrehends Conversation Simple   Interfering Components Attention - selective   EffectiveTechniques Extra processing time;Pausing;Repetition;Stressing words;Visual/Gestural cues   Speech/Swallow Mechanism Exam   Facial Strength Reduced   Lingual Strength Mild reduced   Lingual ROM Mild reduced   Mandible   (reduced endurance)   Dentition Dentures top   Motor Speech Evaluation   Dysarthria Yes   Mild Dystharia Impaired articulation   Speech/Language/Cognition Assessmetn   Treatment Assessment Speech Pathology Daily Treatment Note - Speech/Cognitive Communication Skills - SLP targeted comprehension and expression of current safety precautions and safe mobility sequences  Functional tasks were discussed and then verbally sequenced into simplified salient steps to aid retention  At each step of the sequence, SLP stressed the level of necessary assistance which is currently required and focused on patient's own awareness of their limitations  Patient presented at the level of supervision for comprehension and showed improvement in inclusion of safety precautions within mobility sequences to level of supervision  Swallow Information   Current Risks for Dysphagia & Aspiration Dysarthria;General debilitation   Current Symptoms/Concerns Difficulty chewing   Current Diet Regular   Consistencies Assessed and Performance   Oral Stage Mild impaired   Oral Stage Comment extended oral phase   Phargngeal Stage Mild impaired   Recommendations   Diet Solid Recommendation Regular consistency   Diet Liquid Recommendation Thin liquid   General Precautions Aspiration precautions   Compensatory Swallowing Strategies Alternate solids and liquids   Eating   Type of Assistance Needed Independent;Supervision   Eating CARE Score -   Swallow Assessment   Swallow Treatment Assessment Speech Pathology Daily Treatment Note - Swallow Oral Function - SLP targeted pharyngeal phase swallow function via instruction on wet verses dry vocal quality to determine adequate airway protection  Ice cold liquid was utilized in this task as the stimulus  Small sips were provided  Post swallow, patient was asked to produce a sustained /a/   Judgements were made, initially by skilled therapist and then patient was encouraged to  themselves, if the vocal quality was wet or dry  Compensatory techniques of throat clear and cough were introduced as methods to protect themselves from food or liquids entering the airway  A visual diagram was utilized depicting the Oral and Pharyngeal phases of swallow to increase comprehension of task    Patient has made steady gains in tolerance of thin liquids with use of small sips   Swallow Assessment Prognosis   Prognosis Good   Prognosis Considerations Family/community support   SLP Therapy Minutes   SLP Time In 0925   SLP Time Out 1025   SLP Total Time (minutes) 60   SLP Mode of treatment - Individual (minutes) 34   SLP Mode of treatment - Concurrent (minutes) 26   Therapy Time missed   Time missed?  No   Daily FIM Score   Eating (FIM) 6 - Patient requires increased time or safety concern

## 2020-02-04 NOTE — ASSESSMENT & PLAN NOTE
- mild and stable at 483   - IM has cleared patient for dc with scrip for CBC with results to PCP which was provided to patient upon dc

## 2020-02-04 NOTE — ASSESSMENT & PLAN NOTE
· Continue PT/OT with acute rehab  · Continue statin  · Supportive care  · Follow-up with PCP and Neurology as outpatient

## 2020-02-04 NOTE — INCIDENTAL FINDINGS
1) abnormalities on EKG of your heart (including but not limited to prolonged QTc and abnormal heart rhythm) : Please follow up with Dr Marilu Pro for further testing/treatment/monitoring if any as they deem necessary     2) low calcium level: Please follow up with your primary care provider for further testing/treatment if any and/or specialist referral if any as they deem necessary     3) prediabetes: please follow the dietary instructions provided by the nutritionist and please follow up your primary care provider for further testing/treatment if any and/or specialist referral if any as they deem necessary     4) cardiomyopathy: Please follow up with Dr Marilu Pro for further testing/treatment/monitoring (including a nuclear [facility] of your heart which he has ordered) as he deems necessary     5) elevated platelet count: please have your blood work drawn as prescribed to monitor your platelet count the results will be sent to your primary care provider

## 2020-02-04 NOTE — PLAN OF CARE
Problem: Potential for Falls  Goal: Patient will remain free of falls  Description  INTERVENTIONS:  - Assess patient frequently for physical needs  -  Identify cognitive and physical deficits and behaviors that affect risk of falls  -  [place] fall precautions as indicated by assessment   - Educate patient/family on patient safety including physical limitations  - Instruct patient to call for assistance with activity based on assessment  - Modify environment to reduce risk of injury  - Consider OT/PT consult to assist with strengthening/mobility  Outcome: Progressing     Problem: Nutrition/Hydration-ADULT  Goal: Nutrient/Hydration intake appropriate for improving, restoring or maintaining nutritional needs  Description  Monitor and assess patient's nutrition/hydration status for malnutrition  Collaborate with interdisciplinary team and initiate plan and interventions as ordered  Monitor patient's weight and dietary intake as ordered or per policy  Utilize nutrition screening tool and intervene as necessary  Determine patient's food preferences and provide high-protein, high-caloric foods as appropriate       INTERVENTIONS:  - Monitor oral intake, urinary output, labs, and treatment plans  - Assess nutrition and hydration status and recommend course of action  - Evaluate amount of meals eaten  - Assist patient with eating if necessary   - Allow adequate time for meals  - Recommend/ encourage appropriate diets, oral nutritional supplements, and vitamin/mineral supplements  - Order, calculate, and assess calorie counts as needed  - Recommend, monitor, and adjust tube feedings and TPN/PPN based on assessed needs  - Assess need for intravenous fluids  - Provide specific nutrition/hydration education as appropriate  - Include patient/family/caregiver in decisions related to nutrition  Outcome: Progressing     Problem: PAIN - ADULT  Goal: Verbalizes/displays adequate comfort level or baseline comfort level  Description  Interventions:  - Encourage patient to monitor pain and request assistance  - Assess pain using appropriate pain scale  - Administer analgesics based on type and severity of pain and evaluate response  - Implement non-pharmacological measures as appropriate and evaluate response  - Consider cultural and social influences on pain and pain management  - Notify physician/advanced practitioner if interventions unsuccessful or patient reports new pain  Outcome: Progressing     Problem: INFECTION - ADULT  Goal: Absence or prevention of progression during hospitalization  Description  INTERVENTIONS:  - Assess and monitor for signs and symptoms of infection  - Monitor lab/diagnostic results  - Monitor all insertion sites, i e  indwelling lines, tubes, and drains  - Monitor endotracheal if appropriate and nasal secretions for changes in amount and color  - [place] appropriate cooling/warming therapies per order  - Administer medications as ordered  - Instruct and encourage patient and family to use good hand hygiene technique  - Identify and instruct in appropriate isolation precautions for identified infection/condition  Outcome: Progressing  Goal: Absence of fever/infection during neutropenic period  Description  INTERVENTIONS:  - Monitor WBC    Outcome: Progressing     Problem: SAFETY ADULT  Goal: Maintain or return to baseline ADL function  Description  INTERVENTIONS:  -  Assess patient's ability to carry out ADLs; assess patient's baseline for ADL function and identify physical deficits which impact ability to perform ADLs (bathing, care of mouth/teeth, toileting, grooming, dressing, etc )  - Assess/evaluate cause of self-care deficits   - Assess range of motion  - Assess patient's mobility; develop plan if impaired  - Assess patient's need for assistive devices and provide as appropriate  - Encourage maximum independence but intervene and supervise when necessary  - Involve family in performance of ADLs  - Assess for home care needs following discharge   - Consider OT consult to assist with ADL evaluation and planning for discharge  - Provide patient education as appropriate  Outcome: Progressing  Goal: Maintain or return mobility status to optimal level  Description  INTERVENTIONS:  - Assess patient's baseline mobility status (ambulation, transfers, stairs, etc )    - Identify cognitive and physical deficits and behaviors that affect mobility  - Identify mobility aids required to assist with transfers and/or ambulation (gait belt, sit-to-stand, lift, walker, cane, etc )  - [place] fall precautions as indicated by assessment  - Record patient progress and toleration of activity level on Mobility SBAR; progress patient to next Phase/Stage  - Instruct patient to call for assistance with activity based on assessment  - Consider rehabilitation consult to assist with strengthening/weightbearing, etc   Outcome: Progressing     Problem: DISCHARGE PLANNING  Goal: Discharge to home or other facility with appropriate resources  Description  INTERVENTIONS:  - Identify barriers to discharge w/patient and caregiver  - Arrange for needed discharge resources and transportation as appropriate  - Identify discharge learning needs (meds, wound care, etc )  - Arrange for interpretive services to assist at discharge as needed  - Refer to Case Management Department for coordinating discharge planning if the patient needs post-hospital services based on physician/advanced practitioner order or complex needs related to functional status, cognitive ability, or social support system  Outcome: Progressing     Problem: Knowledge Deficit  Goal: Patient/family/caregiver demonstrates understanding of disease process, treatment plan, medications, and discharge instructions  Description  Complete learning assessment and assess knowledge base    Interventions:  - Provide teaching at level of understanding  - Provide teaching via preferred learning methods  Outcome: Progressing     Problem: Prexisting or High Potential for Compromised Skin Integrity  Goal: Skin integrity is maintained or improved  Description  INTERVENTIONS:  - Identify patients at risk for skin breakdown  - Assess and monitor skin integrity  - Assess and monitor nutrition and hydration status  - Monitor labs   - Assess for incontinence   - Turn and reposition patient  - Assist with mobility/ambulation  - Relieve pressure over bony prominences  - Avoid friction and shearing  - Provide appropriate hygiene as needed including keeping skin clean and dry  - Evaluate need for skin moisturizer/barrier cream  - Collaborate with interdisciplinary team   - Patient/family teaching  - Consider wound care consult   Outcome: Progressing

## 2020-02-04 NOTE — PROGRESS NOTES
02/04/20 1500   Pain Assessment   Pain Assessment No/denies pain   Restrictions/Precautions   Precautions Aphasia; Limb alert; Fall Risk   Bed-Chair Transfer   Type of Assistance Needed Independent   Chair/Bed-to-Chair Transfer CARE Score 6   Toileting Hygiene   Type of Assistance Needed Independent   Toileting Hygiene CARE Score 6   Toileting   Able to Pull Clothing down yes, up yes  Manage Hygiene Bladder   Toilet Transfer   Type of Assistance Needed Independent   Toilet Transfer CARE Score 6   Toilet Transfer   Surface Assessed Standard Toilet   Transfer Technique Stand Pivot   Cognition   Overall Cognitive Status Impaired   Arousal/Participation Alert; Responsive; Cooperative   Attention Attends with cues to redirect   Orientation Level Oriented X4   Comments pt speaking with [employer] rep when OT arrives  Unable to leave phone call because of fraudulent charges that needed to be addressed  Pt assisted verbally with answering question and physically with selecting text messages  Issue resolved and pt then able to call brother and son with min verbal cues   Assessment   Treatment Assessment Pt and OT complete phone call to adjust cell phone bill due to fraudulent activity and pt completes transfers/ mobility and toileting at end of session  Pt toelrates session with education to slow downa dn problem solve through tasks before getting frustrated and have family and friends assist with transition to home due to new medical complications limiting function and ability to meet goals  Problem List Decreased endurance;Decreased cognition;Decreased safety awareness   Plan   Treatment/Interventions ADL retraining;Functional transfer training; Therapeutic exercise; Endurance training;Cognitive reorientation;Patient/family training; Compensatory technique education   Progress Progressing toward goals   OT Therapy Minutes   OT Time In 1500   OT Time Out 1536   OT Total Time (minutes) 36   OT Mode of treatment - Individual (minutes) 36

## 2020-02-04 NOTE — PROGRESS NOTES
Physical Medicine and Rehabilitation Progress Note  Abbe Seymour 60 y o  female MRN: [id number]  Unit/Bed#: -01 Encounter: [id number]    HPI: Abbe Seymour is a 60 y o  female who presented to the [address] [place] [address] aphasia and right sided weakness and found to have L MCA territory infarct in the setting of a-fib and was started on eliquis in acute care     Chief Complaint: CVA    Interval/Subjective: d/w patient medical and discharge plan and notified patient that rehab goals have been met and patient is ready for dc from therapy perspective but will be going directly from acute rehab to short procedure unit for EGD and possibly colonoscopy as well with Dr Amber Mariano       ROS: A 10 point ROS was performed; negative except as noted above       Assessment/Plan:      * CVA (cerebral vascular accident) ([facility] [address] )  Assessment & Plan  - L MCA territory infarct felt to be due to a-fib  - appears to have been on coumadin PTA and was switched to eliquis 5 mg BID -->which is currently on hold due to FOBT + stool, Dr Tavares Goldman for EGD and possibly colonoscopy as well on 2/5, personally d/w Dr Amber Mariano that patient will be discharged from acute rehab unit in the AM just prior to the procedure and then go directly to procedure from acute rehab unit and if there are no significant findings Dr Amber Mariano plans to dc the patient home and will contact me personally so that I may send a prescription to patient's pharmacy so that she may resume her eliquis if cleared by Dr Amber Mariano to [facility] however if there are significant findings Dr Amber Mariano will re-admit patient to med/surg floor (d/w IM and they are in agreement with this plan)   - also started on lipitor 40 mg qpm   - OP FU with neuro       Hypocalcemia  Assessment & Plan  - ionized Ca just below LLN of 1 12 at 1 09   - IM monitoring and have cleared patient for dc with OP FU with PCP       Anemia  Assessment & Plan  - patient with + FOBT and therefore AC on hold and GI have recommended starting patient on protonix 40 mg qd (per d/w GI consultant IV protonix is not needed and PO is acceptable)   - Dr Marino Handley for EGD and possibly colonoscopy as well on 2/5, personally d/w Dr Rich Del Rosario that patient will be discharged from acute rehab unit in the AM just prior to the procedure and then go directly to procedure from acute rehab unit and if there are no significant findings Dr Rich Del Rosario plans to dc the patient home and will contact me personally so that I may send a prescription to patient's pharmacy so that she may resume her eliquis if cleared by Dr Rich Del Rosario to resume [facility] however if there are significant findings Dr Rich Del Rosario will re-admit patient to med/surg floor (d/w IM and they are in agreement with this plan)   - IM has checked iron panel and iron level low and they are giving patient venofer  - IM also transfused patient and Hg is currently 8 0 (scrip will be provided upon dc for CBC with results to PCP and Dr Rich Del Rosario)   - IM & GI co-managing   - OP FU with PCP and Dr Rich Del Rosario     Thrombocytosis [facility])  Assessment & Plan  - mild at 434   - IM has cleared patient for dc with scrip for CBC with results to PCP      IFG (impaired fasting glucose)  Assessment & Plan  - HgA1C 6 0  - nutritionist--> dietary education   - follows with Zac HACKETT    History of pulmonary embolism  Assessment & Plan  - remote history (years ago) of PE  - appears to have been on coumadin PTA but switched to eliquis 5 mg BID in acute care due to CVA (per IM given how long ago PE was that an initial short term 10 mg BID dosing is not required); however AC currently on hold due to FOBT + stool, Dr Marino Handley for EGD and possibly colonoscopy as well on 2/5, personally d/w Dr Rich Del Rosario that patient will be discharged from acute rehab unit in the AM just prior to the procedure and then go directly to procedure from acute rehab unit and if there are no significant findings Dr Caity Rothman plans to dc the patient home and will contact me personally so that I may send a prescription to patient's pharmacy so that she may resume her eliquis if cleared by Dr Caity Rothman to resume [facility] however if there are significant findings Dr Caity Rothman will re-admit patient to med/surg floor (d/w IM and they are in agreement with this plan)       Prolonged QT interval  Assessment & Plan  - seen by cards in acute care and patient is on med regimen as recommended by cardiology  - OP FU with Dr Isa Almaguer [facility])  Assessment & Plan  - with mildly elevated troponins up to 0 07 however cards did not feel troponin elevation was due to a true coronary event by rather tachycardia in the setting of cardiomyopathy  - EF 30%  - started on toprol XL 50 mg q12 and lisinopril 5 mg qd in acute care per cards however lisinopril dc'd and toprol XL reduced to 25 mg q12 by IM in acute rehab due to low BPs and on 2/4 toprol XL was completely dc'd due to lower HR per recommendation of Dr Tomasa Espinosa of IM   - Dr Avelino Gallardo of [facility] recommended OP nuclear lexiscan stress test which he has ordered    - OP FU with Dr Katty Loya  - contacted [facility] ophthalmology whom patient follows with as an OP and per their records patient is to be on cosopt 22 3-6 8 mg/ml 1 gtt both eyes BID therefore resumed patient on this home medication       A-fib [facility])  Assessment & Plan  - with PSVT/atrial tachycardia per cards  - started on amiodarone taper in acute care with eventual dose of 200 mg qd per cards which patient is now on per IM   - started on eliquis 5 mg BID in acute care per cards however with FOBT + stool therefore on hold until cleared by GI to resume ; Dr Shankar Ahuja for EGD and possibly colonoscopy as well on 2/5, personally d/w Dr Caity Rothman that patient will be discharged from acute rehab unit in the AM just prior to the procedure and then go directly to procedure from acute rehab unit and if there are no significant findings Dr Juan Manuel Hdz plans to dc the patient home and will contact me personally so that I may send a prescription to patient's pharmacy so that she may resume her eliquis if cleared by Dr Juan Manuel Hdz to [facility] however if there are significant findings Dr Juan Manuel Hdz will re-admit patient to med/surg floor (d/w IM and they are in agreement with this plan)   - started on toprol XL 50 mg q12 in acute care per cards however decreased to 25 mg q12 by IM in acute rehab due to lower BPs and on 2/4 toprol XL was completely dc'd due to lower HR per recommendation of Dr Marilu Matos of IM   - OP FU with Dr Gabby Nowak hypertension  Assessment & Plan  - at home on HCTZ 12 5 mg qd  - IM managing and patient is not currently on BP meds due to lower BPs and IM recommends patient be dc'd off of anti-hypertensive agents and FU with Dr Jesi Skinner of cardiology as planned       Scheduled Meds:    Current Facility-Administered Medications:  amiodarone 200 mg Oral Daily With Breakfast Juancarlos Muniz MD    atorvastatin 40 mg Oral QPM Juancarlos Muniz MD    dorzolamide-timolol 1 drop Both Eyes BID Juancarlos Muniz MD    iron sucrose 200 mg Intravenous Daily Rosita Barcenas PA-C Last Rate: 200 mg (02/04/20 1251)   ondansetron 4 mg Intravenous Once Leonides Wang MD    [facility] ON 2/5/2020] ondansetron 4 mg Intravenous Once Leonides Wang MD    pantoprazole 40 mg Oral Early Morning Juancarlos Muniz MD    polyethylene glycol 2,000 mL Oral Q10H Leonides Wang MD           DVT ppx: SCDs  Dispo: transfer to Dr Juan Manuel Hdz for EGD and possibly colonoscopy (rehab goals have been met and patient is ready for dc from therapy perspective)     Objective:    Functional Update:  Mobility: mod I   Transfers: mod I   ADLs: mod I         Physical Exam:    Vitals:    02/04/20 0747   BP: 112/70   Pulse: 69   Resp: 16   Temp: 98 2   SpO2: 98%         General: alert, no apparent distress, cooperative and comfortable  HEENT:  Head: Normal, normocephalic, atraumatic  CARDIAC:  +S1/2  LUNGS:  respirations unlabored  ABDOMEN:  soft NT  EXTREMITIES:  volume status currently stable   NEURO:   +aphasia however is improving   PSYCH:  mood/affect currently stable     Laboratory:   Results from last 7 days   Lab Units 02/04/20  0442 02/03/20  0848   HEMOGLOBIN g/dL 8 0* 7 0*   HEMATOCRIT % 26 1* 23 4*   WBC Thousand/uL 8 40  --            Invalid input(s): CA         This patient was discussed by the Interdisciplinary Team in weekly case conference today  The care of the patient was extensively discussed with all care providers and an appropriate rehabilitation plan was formulated unique for this patient  Barriers were identified preventing progression of therapy and appropriate interventions were discussed with each discipline  Please see the team note for input from all disciplines regarding barriers, intervention, and discharge planning  [ x ] Total time spent: 35 Mins, and greater than 50% of this time was spent counseling/coordinating care

## 2020-02-04 NOTE — INCIDENTAL FINDINGS
1) abnormalities on EKG of your heart (including but not limited to prolonged QTc and abnormal heart rhythm) : Please follow up with Dr Ishmael Lopez for further testing/treatment/monitoring if any as they deem necessary     2) low calcium level: Please follow up with your primary care provider for further testing/treatment if any and/or specialist referral if any as they deem necessary     3) prediabetes: please follow the dietary instructions provided by the nutritionist and please follow up your primary care provider for further testing/treatment if any and/or specialist referral if any as they deem necessary

## 2020-02-04 NOTE — PROGRESS NOTES
02/04/20 1130   Pain Assessment   Pain Assessment 0-10   Pain Score No Pain   Restrictions/Precautions   Precautions Aphasia; Limb alert; Fall Risk   Cognition   Overall Cognitive Status Impaired   Arousal/Participation Alert; Responsive; Cooperative   Attention Attends with cues to redirect   Orientation Level Oriented X4   Memory Decreased short term memory;Decreased recall of recent events;Decreased recall of precautions   Following Commands Follows one step commands without difficulty   Roll Left and Right   Type of Assistance Needed Independent   Amount of Physical Assistance Provided No physical assistance   Roll Left and Right CARE Score 6   Sit to Lying   Type of Assistance Needed Independent   Amount of Physical Assistance Provided No physical assistance   Sit to Lying CARE Score 6   Lying to Sitting on Side of Bed   Type of Assistance Needed Independent   Amount of Physical Assistance Provided No physical assistance   Lying to Sitting on Side of Bed CARE Score 6   Sit to Stand   Type of Assistance Needed Independent   Amount of Physical Assistance Provided No physical assistance   Sit to Stand CARE Score 6   Bed-Chair Transfer   Type of Assistance Needed Independent   Amount of Physical Assistance Provided No physical assistance   Chair/Bed-to-Chair Transfer CARE Score 6   Transfer Bed/Chair/Wheelchair   Limitations Noted In Balance; Endurance;LE Strength   Adaptive Equipment Cane   Stand Pivot Modified Independent   Sit to Stand Modified Independent   Stand to Sit Modified Independent   Supine to Sit Modified Independent   Sit to Supine Modified Independent   All Transfer Modified Independent   Walk 10 Feet   Type of Assistance Needed Independent   Amount of Physical Assistance Provided No physical assistance   Walk 10 Feet CARE Score 6   Walk 50 Feet with Two Turns   Type of Assistance Needed Independent   Amount of Physical Assistance Provided No physical assistance   Walk 50 Feet with Two Turns CARE Score 6   Walk 150 Feet   Type of Assistance Needed Independent   Amount of Physical Assistance Provided No physical assistance   Walk 150 Feet CARE Score 6   Walking 10 Feet on Uneven Surfaces   Type of Assistance Needed Independent   Amount of Physical Assistance Provided No physical assistance   Walking 10 Feet on Uneven Surfaces CARE Score 6   Ambulation   Does the patient walk? 2  Yes   Primary Mode of Locomotion Prior to Admission Walk   Distance Walked (feet) 165 ft  (120)   Assist Device Cane   Gait Pattern Inconsistant [place]   Limitations Noted In Balance; Endurance; Safety;Strength   Provided Assistance with: Balance   Walk Assist Level Modified Independent   Findings level and carpet   Curb or Single Stair   Style negotiated Single stair   Type of Assistance Needed Independent   1 Step (Curb) CARE Score 6   4 Steps   Type of Assistance Needed Independent   Amount of Physical Assistance Provided No physical assistance   4 Steps CARE Score 6   12 Steps   Reason if not Attempted Safety concerns   12 Steps CARE Score 88   Stairs   Type Stairs   # of Steps 7   Weight Bearing Precautions WBAT   Assist Devices Bilateral Rail   Findings Pt is MI for up/down 7 steps with 2 HR; Pt went up/down 1 curb step with SPC and hand hold on opposite side of cane   Therapeutic Interventions   Strengthening seated ther ex 30 reps   Other gait and community re-ed   Assessment   Treatment Assessment Pt is pleasant and cooperative with PT; Pt reports feeling better today and has no dizziness; Pt is MI for transfers and amb up to 165' with SPC; Pt needs rests due to hip pain at times; Pt is eager to go home and hoping for D/C tomorrow; Pt was able to go up/down 1 curb step with SPC and hand hold assist; Pt also went up/down 7 steps with 2 HR MI; Possible D/C home tomorrow pending medical clearance   PT Barriers   Physical Impairment Decreased strength;Decreased range of motion;Decreased endurance; Impaired balance;Decreased mobility; Decreased safety awareness   Functional Limitation Walking;Transfers;Standing;Stair negotiation   Plan   Treatment/Interventions Functional transfer training;LE strengthening/ROM; Elevations; Therapeutic exercise;Gait training   Progress Progressing toward goals   PT Therapy Minutes   PT Time In 1130   PT Time Out 1230   PT Total Time (minutes) 60   PT Mode of treatment - Individual (minutes) 60   PT Mode of treatment - Concurrent (minutes) 0   PT Mode of treatment - Group (minutes) 0   PT Mode of treatment - Co-treat (minutes) 0   PT Mode of Treatment - Total time(minutes) 60 minutes   PT Cumulative Minutes 751   Therapy Time missed   Time missed?  No

## 2020-02-04 NOTE — CASE MANAGEMENT
Tx team recommendations reviewed with patient & family, who expressed understanding & agreement  Target DC date from rehab remains 2/5 with [facility] (PT, OT, ST, Nsg, HHA); a list of providers was provided to Pt & she expressed a preference for [facility]  [facility] physician reported that Pt may potentially be going to [facility] for testing, either today or tomorrow   unclear at this time  SW will continue to monitor & assist as needed with Tx & DC planning with Tx & DC planning

## 2020-02-04 NOTE — NURSING NOTE
Pt to be discharged from [facility]U prior to being sent to [facility] for EGD in the morning  Pt should arrive to [facility] by 11am with NPO after 2nd bowel prep  No meds administered PO after this time as well  Pt aware of plan for procedure and prep prior to procedure  Will continue to monitor and follow plan of care

## 2020-02-04 NOTE — INCIDENTAL FINDINGS
1) abnormalities on EKG of your heart (including but not limited to prolonged QTc and abnormal heart rhythm) : Please follow up with Dr Facundo Alexandra for further testing/treatment/monitoring if any as they deem necessary     2) low calcium level: Please follow up with your primary care provider for further testing/treatment if any and/or specialist referral if any as they deem necessary     3) prediabetes: please follow the dietary instructions provided by the nutritionist and please follow up your primary care provider for further testing/treatment if any and/or specialist referral if any as they deem necessary     4) cardiomyopathy: Please follow up with Dr Facundo Alexandra for further testing/treatment/monitoring (including a nuclear Daphane Karo of your heart which he has ordered) as he deems necessary

## 2020-02-04 NOTE — NURSING NOTE
Patient resting comfortably in bed at this time   No signs or distress noted   Patient with expressive aphasia noted at times   Patient was able to walk to the bathroom with one assist and cane overnight   Patient refused to wear SCDs overnight for DVT prophylaxis patient educated on risks of refusal and continues to refuse overnight   Two units of packed red blood cells were transfused overnight as ordered no s/s of transfusion reaction  Bruising noted to the right AC prior IV site warm compress applied as requested    Call bell within reach   Will continue to monitor patient and follow plan of care

## 2020-02-04 NOTE — ASSESSMENT & PLAN NOTE
· With acute blood loss anemia  · The patient with occult blood positive  · Will transfuse 2 units of packed red blood cells today  · Monitor H&H closely  · GI consult  · Continue with PPI

## 2020-02-04 NOTE — PROGRESS NOTES
02/04/20 0736   Pain Assessment   Pain Assessment 0-10   Pain Score 8   Pain Location Arm   Pain Orientation Right   Restrictions/Precautions   Precautions Aphasia; Fall Risk;Limb alert;Pain  (RUE bruise and LUE IV)   Eating   Type of Assistance Needed Supervision   Eating CARE Score 4   Eating Assessment   Positioning Upright   Meal Assessed Breakfast   QI: Swallowing/Nutritional Status   (clear liquids today)   Shower/Bathe Self   Type of Assistance Needed Supervision   Comment increased time and redirection   Shower/Bathe Self CARE Score 4   Bathing   Assessed Bath Style Sponge Bath   Anticipated D/C Bath Style Sponge Bath; Shower   Able to [place] Anam No   Able to [employer] Temperature No   Able to Wash/Rinse/Dry (body part) Left Arm;Right Arm;L Upper Leg;R Upper Leg;L Lower Leg/Foot;R Lower Leg/Foot;Chest;Abdomen;Perineal Area; Buttocks   Limitations Noted in Balance; Endurance; Safety;ROM;Strength  (dcreased ROM RUE)   Upper Body Dressing   Type of Assistance Needed Physical assistance   Amount of Physical Assistance Provided Less than 25%   Upper Body Dressing CARE Score 3   Lower Body Dressing   Type of Assistance Needed Set-up / clean-up   Lower Body Dressing CARE Score 5   Putting On/Taking Off Footwear   Type of Assistance Needed Set-up / clean-up   Putting On/Taking Off Footwear CARE Score 5   Dressing/Undressing Clothing   Remove UB Clothes Pullover Shirt   Don UB Clothes Pullover Shirt   Remove LB Clothes Pants; Undergarment;Socks   Don LB Clothes Pants; Undergarment;Socks   Limitations Noted In Endurance;ROM   Sit to Stand   Type of Assistance Needed Supervision   Sit to Stand CARE Score 4   Bed-Chair Transfer   Type of Assistance Needed Supervision   Chair/Bed-to-Chair Transfer CARE Score 4   Light Housekeeping   Light Housekeeping pt unable to gather clothing or bathing items due to perseveration on hematoma RUE, difficulty sleeping, and desire to return home tomorrow   Cognition   Overall Cognitive Status Impaired   Arousal/Participation Alert; Responsive   Attention Difficulty attending to directions   Orientation Level Oriented to person;Oriented to place;Oriented to time;Oriented to situation   Assessment   Treatment Assessment Pt presents sitting in the recliner chair asleep  Pt agreeable to bathing and dressing  Pt requires increased cues for attention to task with difficulty staying on task due to focus on hematoma RUE and decreased ROM with pain  Pt requires min A/S for ADL with patient unable to gather clothing  Pt did report she feels better today with the scarlett=zziness resolved however task modified during session  Pt is still eager to return to home and reports family and neightbor Anali Sands will assist her to transition into home  Pt will benefit ftom one last treatment session later this day to complete discharge planning or make lasagna depending on medical directions  Problem List Decreased endurance; Impaired balance;Decreased cognition;Decreased safety awareness;Decreased range of motion   Plan   Treatment/Interventions ADL retraining;Functional transfer training; Therapeutic exercise; Endurance training;Patient/family training;Cognitive reorientation; Compensatory technique education   Progress Progressing toward goals   OT Therapy Minutes   OT Time In 0736   OT Time Out 0835   OT Total Time (minutes) 59   OT Mode of treatment - Individual (minutes) 59   Therapy Time missed   Time missed?  No

## 2020-02-05 ENCOUNTER — ANESTHESIA (OUTPATIENT)
Dept: GASTROENTEROLOGY | Facility: HOSPITAL | Age: 65
End: 2020-02-05

## 2020-02-05 ENCOUNTER — APPOINTMENT (OUTPATIENT)
Dept: GASTROENTEROLOGY | Facility: HOSPITAL | Age: 65
End: 2020-02-05
Attending: INTERNAL MEDICINE
Payer: COMMERCIAL

## 2020-02-05 VITALS
SYSTOLIC BLOOD PRESSURE: 126 MMHG | BODY MASS INDEX: 39.58 KG/M2 | HEIGHT: 66 IN | DIASTOLIC BLOOD PRESSURE: 60 MMHG | RESPIRATION RATE: 18 BRPM | WEIGHT: 246.25 LBS | TEMPERATURE: 96.8 F | HEART RATE: 70 BPM | OXYGEN SATURATION: 96 %

## 2020-02-05 PROBLEM — D64.9 ANEMIA: Status: ACTIVE | Noted: 2020-02-05

## 2020-02-05 LAB
ANISOCYTOSIS BLD QL SMEAR: PRESENT
ERYTHROCYTE [DISTWIDTH] IN BLOOD BY AUTOMATED COUNT: 23.9 % (ref 11.5–14.5)
HCT VFR BLD AUTO: 26.5 % (ref 42–47)
HGB BLD-MCNC: 8.4 G/DL (ref 12–16)
HYPERCHROMIA BLD QL SMEAR: PRESENT
MACROCYTES BLD QL AUTO: PRESENT
MCH RBC QN AUTO: 24.8 PG (ref 26–34)
MCHC RBC AUTO-ENTMCNC: 31.6 G/DL (ref 31–37)
MCV RBC AUTO: 79 FL (ref 81–99)
OVALOCYTES BLD QL SMEAR: PRESENT
PLATELET # BLD AUTO: 483 THOUSANDS/UL (ref 149–390)
PLATELET BLD QL SMEAR: ABNORMAL
PMV BLD AUTO: 8.6 FL (ref 8.6–11.7)
POIKILOCYTOSIS BLD QL SMEAR: PRESENT
RBC # BLD AUTO: 3.36 MILLION/UL (ref 3.9–5.2)
RBC MORPH BLD: ABNORMAL
SCHISTOCYTES BLD QL SMEAR: PRESENT
TARGETS BLD QL SMEAR: PRESENT
WBC # BLD AUTO: 9.8 THOUSAND/UL (ref 4.8–10.8)

## 2020-02-05 PROCEDURE — 99239 HOSP IP/OBS DSCHRG MGMT >30: CPT | Performed by: PHYSICAL MEDICINE & REHABILITATION

## 2020-02-05 PROCEDURE — 85027 COMPLETE CBC AUTOMATED: CPT | Performed by: PHYSICIAN ASSISTANT

## 2020-02-05 RX ORDER — SODIUM CHLORIDE, SODIUM LACTATE, POTASSIUM CHLORIDE, CALCIUM CHLORIDE 600; 310; 30; 20 MG/100ML; MG/100ML; MG/100ML; MG/100ML
INJECTION, SOLUTION INTRAVENOUS CONTINUOUS PRN
Status: DISCONTINUED | OUTPATIENT
Start: 2020-02-05 | End: 2020-02-05 | Stop reason: SURG

## 2020-02-05 RX ORDER — EPHEDRINE SULFATE 50 MG/ML
INJECTION INTRAVENOUS AS NEEDED
Status: DISCONTINUED | OUTPATIENT
Start: 2020-02-05 | End: 2020-02-05 | Stop reason: SURG

## 2020-02-05 RX ORDER — LIDOCAINE HYDROCHLORIDE 10 MG/ML
INJECTION, SOLUTION EPIDURAL; INFILTRATION; INTRACAUDAL; PERINEURAL AS NEEDED
Status: DISCONTINUED | OUTPATIENT
Start: 2020-02-05 | End: 2020-02-05 | Stop reason: SURG

## 2020-02-05 RX ORDER — PROPOFOL 10 MG/ML
INJECTION, EMULSION INTRAVENOUS AS NEEDED
Status: DISCONTINUED | OUTPATIENT
Start: 2020-02-05 | End: 2020-02-05 | Stop reason: SURG

## 2020-02-05 RX ORDER — SODIUM CHLORIDE, SODIUM LACTATE, POTASSIUM CHLORIDE, CALCIUM CHLORIDE 600; 310; 30; 20 MG/100ML; MG/100ML; MG/100ML; MG/100ML
125 INJECTION, SOLUTION INTRAVENOUS CONTINUOUS
Status: DISCONTINUED | OUTPATIENT
Start: 2020-02-05 | End: 2020-02-16

## 2020-02-05 RX ORDER — ATORVASTATIN CALCIUM 40 MG/1
40 TABLET, FILM COATED ORAL EVERY MORNING
COMMUNITY
End: 2020-02-05 | Stop reason: CLARIF

## 2020-02-05 RX ORDER — PANTOPRAZOLE SODIUM 40 MG/1
40 TABLET, DELAYED RELEASE ORAL EVERY MORNING
COMMUNITY
End: 2020-02-05 | Stop reason: CLARIF

## 2020-02-05 RX ORDER — AMIODARONE HYDROCHLORIDE 200 MG/1
200 TABLET ORAL EVERY MORNING
COMMUNITY
End: 2020-02-05 | Stop reason: CLARIF

## 2020-02-05 RX ADMIN — PROPOFOL 50 MG: 10 INJECTION, EMULSION INTRAVENOUS at 11:45

## 2020-02-05 RX ADMIN — LIDOCAINE HYDROCHLORIDE 100 MG: 10 INJECTION, SOLUTION EPIDURAL; INFILTRATION; INTRACAUDAL; PERINEURAL at 11:38

## 2020-02-05 RX ADMIN — DORZOLAMIDE HYDROCHLORIDE AND TIMOLOL MALEATE 1 DROP: 20; 5 SOLUTION/ DROPS OPHTHALMIC at 09:11

## 2020-02-05 RX ADMIN — IRON SUCROSE 200 MG: 20 INJECTION, SOLUTION INTRAVENOUS at 09:15

## 2020-02-05 RX ADMIN — PROPOFOL 100 MG: 10 INJECTION, EMULSION INTRAVENOUS at 11:38

## 2020-02-05 RX ADMIN — SODIUM CHLORIDE, POTASSIUM CHLORIDE, SODIUM LACTATE AND CALCIUM CHLORIDE 125 ML/HR: 600; 310; 30; 20 INJECTION, SOLUTION INTRAVENOUS at 11:15

## 2020-02-05 RX ADMIN — AMIODARONE HYDROCHLORIDE 200 MG: 100 TABLET ORAL at 09:10

## 2020-02-05 RX ADMIN — EPHEDRINE SULFATE 10 MG: 50 INJECTION, SOLUTION INTRAVENOUS at 11:50

## 2020-02-05 RX ADMIN — PROPOFOL 30 MG: 10 INJECTION, EMULSION INTRAVENOUS at 11:57

## 2020-02-05 RX ADMIN — PROPOFOL 20 MG: 10 INJECTION, EMULSION INTRAVENOUS at 11:53

## 2020-02-05 RX ADMIN — EPHEDRINE SULFATE 10 MG: 50 INJECTION, SOLUTION INTRAVENOUS at 12:02

## 2020-02-05 RX ADMIN — SODIUM CHLORIDE, SODIUM LACTATE, POTASSIUM CHLORIDE, AND CALCIUM CHLORIDE: .6; .31; .03; .02 INJECTION, SOLUTION INTRAVENOUS at 11:22

## 2020-02-05 NOTE — ANESTHESIA PREPROCEDURE EVALUATION
Review of Systems/Medical History  Patient summary reviewed  Chart reviewed  No history of anesthetic complications     Cardiovascular  EKG reviewed, Hypertension controlled, Dysrhythmias , atrial fibrillation, CHF compensated CHF,    Pulmonary       GI/Hepatic    GI bleeding , active, PUD,             Endo/Other    Obesity  morbid obesity   GYN       Hematology  Anemia acute blood loss anemia,     Musculoskeletal       Neurology    CVA , residual symptoms,    Psychology           Physical Exam    Airway    Mallampati score: III  TM Distance: >3 FB  Neck ROM: full     Dental   upper dentures,     Cardiovascular      Pulmonary      Other Findings        Anesthesia Plan  ASA Score- 3     Anesthesia Type- IV sedation with anesthesia with ASA Monitors  Additional Monitors:   Airway Plan:         Plan Factors-    Induction- intravenous  Postoperative Plan-     Informed Consent- Anesthetic plan and risks discussed with patient  I personally reviewed this patient with the CRNA  Discussed and agreed on the Anesthesia Plan with the CRNA  Vickey Torres

## 2020-02-05 NOTE — DISCHARGE INSTRUCTIONS
Diverticulosis   AMBULATORY CARE:   Diverticulosis  is a condition that causes small pockets called diverticula to form in your intestine  These pockets make it difficult for bowel movements to pass through your digestive system  Signs and symptoms:  Diverticulosis usually does not cause any signs or symptoms  It may cause any of the following in some people:  · Pain or discomfort in your lower abdomen    · Abdominal bloating    · Constipation or diarrhea  Seek care immediately if:   · You have severe pain on the left side of your lower abdomen  · Your bowel movements are bright or dark red  Contact your healthcare provider if:   · You have a fever and chills  · You feel dizzy or lightheaded  · You have nausea, or you are vomiting  · You have a change in your bowel movements  · You have questions or concerns about your condition or care  Treatment:  The goal of treatment is to manage any symptoms you have and prevent other problems such as diverticulitis  Diverticulitis is swelling or infection of the diverticula  Your healthcare provider may recommend any of the following:  · Eat a variety of high-fiber foods  High-fiber foods help you have regular bowel movements  High-fiber foods include cooked beans, fruits, vegetables, and some cereals  Most adults need 25 to 35 grams of fiber each day  Your healthcare provider may recommend that you have more  Ask your healthcare provider how much fiber you need  Increase fiber slowly  You may have abdominal discomfort, bloating, and gas if you add fiber to your diet too quickly  You may need to take a fiber supplement if you are not getting enough fiber from food  · Medicines  to soften your bowel movements may be given  You may also need medicines to treat symptoms such as bloating and pain  · Drink liquids as directed  You may need to drink 2 to 3 liters (8 to 12 cups) of liquids every day   Ask your healthcare provider how much liquid to drink each day and which liquids are best for you  · Apply heat  on your abdomen for 20 to 30 minutes every 2 hours for as many days as directed  Heat helps decrease pain and muscle spasms  Help prevent diverticulitis or other symptoms: The following may help decrease your risk for diverticulitis or symptoms, such as bleeding  Talk to your provider about these or other things you can do to prevent problems that may occur with diverticulosis  · Exercise regularly  Ask your healthcare provider about the best exercise plan for you  Exercise can help you have regular bowel movements  Get 30 minutes of exercise on most days of the week  · Maintain a healthy weight  Ask your healthcare provider how much you should weigh  Ask him or her to help you create a weight loss plan if you are overweight  · Do not smoke  Nicotine and other chemicals in cigarettes increase your risk for diverticulitis  Ask your healthcare provider for information if you currently smoke and need help to quit  E-cigarettes or smokeless tobacco still contain nicotine  Talk to your healthcare provider before you use these products  · Ask your healthcare provider if it is safe to take NSAIDs  NSAIDs may increase your risk of diverticulitis  Follow up with your healthcare provider as directed:  Write down your questions so you remember to ask them during your visits  © 2017 [facility] Information is for End User's use only and may not be sold, redistributed or otherwise used for commercial purposes  All illustrations and images included in CareNotes® are the copyrighted property of [employer]  or Ashkan Paez  The above information is an  only  It is not intended as medical advice for individual conditions or treatments  Talk to your doctor, nurse or pharmacist before following any medical regimen to see if it is safe and effective for you    Colonoscopy   AMBULATORY CARE:   What you need to know about a colonoscopy:  A colonoscopy is a procedure to examine the inside of your colon (intestine) with a scope  A scope is a flexible tube with a small light and camera on the end  Polyps or tissue growths may be removed during your colonoscopy  What you need to do the week before your colonoscopy: You will need to stop taking medicines that contain aspirin or iron for 7 days before your colonoscopy  If you take anticoagulants, such as warfarin, ask when you should stop taking it  Make plans for someone to drive you home after your procedure  How to prepare for your colonoscopy: Your healthcare provider will have you prepare your bowels before your procedure  Your bowels will need to be empty before your procedure to allow him to clearly see your colon  You will need to do the following the day before your procedure:  · Have only clear liquids  for the entire day before your colonoscopy  Clear liquid diet includes clear fruit juices and broths, clear flavored gelatin, and hard candy  It also includes coffee, tea, carbonated beverages, and clear sports drinks  · Follow your bowel prep as directed  There are many different preparations that can be given before a colonoscopy  Some are given over 2 hours and others over 6 hours  Some are given earlier in the afternoon the day before the colonoscopy  Others are given the day before and then the morning of the colonoscopy  With any bowel prep, stay close to the bathroom  This liquid will cause your bowels to move frequently  · An enema  may be needed  Your healthcare provider may tell you to use an enema to help clean out your bowels  · Do not eat or drink anything after midnight  This will help prevent problems that can happen if you vomit while under anesthesia  What will happen during your colonoscopy:   · You will be given medicine to help you relax   You will lie on your left side and raise one or both knees toward your chest  Your healthcare provider will examine your anus and use a finger to check your rectum  You may need another enema if your bowel is not empty  The scope will be lubricated and gently placed into your anus  It will then be passed through your rectum and into your colon  Water or air will be put into your colon to help clean or expand it  This is done so your healthcare provider can see your colon clearly  · Tissue samples may be taken from the walls of your bowel and sent to a lab for tests  If you have a polyp, your healthcare provider will pass a wire loop through the scope and use it to hold the polyp  The polyp is then burned or cut off the wall of your colon  Removed polyps are sent to a lab for tests  Pictures of your colon may be taken during the procedure  The scope will be removed when the procedure is done  What will happen after your colonoscopy:   · Rest after your procedure  You may feel bloated, have some gas and abdominal discomfort  You may need to lie on your right side with a heating pad on your abdomen  You may need to take short walks to help move the gas out  Eat small meals, if you feel bloated  Do not drive or make important decisions until the day after your procedure  · You may have polyps removed  Do not take aspirin or go on long car trips for 7 days after your procedure  Ask your healthcare provider about any other limits after your procedure  Risks of a colonoscopy: You may have pain or bleeding after the scope or polyps are removed  You may also have a slow heartbeat, decreased blood pressure, or increased sweating  Your colon may tear due to the increased pressure from the scope and other instruments  This may cause bowel contents to leak out of your colon and into your abdomen  If this happens, you will need to stay in the hospital and have surgery on your colon  Seek care immediately if:   · You have a large amount of bright red blood in your bowel movements      · Your abdomen is hard and firm and you have severe pain  · You have sudden trouble breathing  Contact your healthcare provider if:   · You develop a rash or hives  · You have a fever within 24 hours of your procedure  · You have not had a bowel movement for 3 days after your procedure  · You have questions or concerns about your condition or care  Activity:   · Do not lift, strain, or run  for 3 days after your procedure  · Rest after your procedure  You have been given medicine to relax you  Do not  drive or make important decisions until the day after your procedure  Return to your normal activity as directed  · Relieve gas and discomfort from bloating  by lying on your right side with a heating pad on your abdomen  You may need to take short walks to help the gas move out  Eat small meals until bloating is relieved  If you had polyps removed: For 7 days after your procedure:  · Do not  take aspirin  · Do not  go on long car rides  Help prevent constipation:   · Eat a variety of healthy foods  Healthy foods include fruit, vegetables, whole-grain breads, low-fat dairy products, beans, lean meat, and fish  Ask if you need to be on a special diet  Your healthcare provider may recommend that you eat high-fiber foods such as cooked beans  Fiber helps you have regular bowel movements  · Drink liquids as directed  Adults should drink between 9 and 13 eight-ounce cups of liquid every day  Ask what amount is best for you  For most people, good liquids to drink are water, juice, and milk  · Exercise as directed  Talk to your healthcare provider about the best exercise plan for you  Exercise can help prevent constipation, decrease your blood pressure and improve your health  Follow up with your healthcare provider as directed:  Write down your questions so you remember to ask them during your visits     © 2017 [employer] Information is for End User's use only and may not be sold, redistributed or otherwise used for commercial purposes  All illustrations and images included in CareNotes® are the copyrighted property of [employer]  or Ashkan Paez  The above information is an  only  It is not intended as medical advice for individual conditions or treatments  Talk to your doctor, nurse or pharmacist before following any medical regimen to see if it is safe and effective for you  Upper Endoscopy   AMBULATORY CARE:   What you need to know about an upper endoscopy: An upper endoscopy is also called an upper gastrointestinal (GI) endoscopy, or an esophagogastroduodenoscopy (EGD)  A scope (thin, flexible tube with a light and camera) is used to examine the walls of your upper intestines  The upper intestines include the esophagus, stomach, and duodenum (first part of the small intestine)  An upper endoscopy is used to look for problems, such as bleeding, polyps, ulcers, or infection  How to prepare for an upper endoscopy: Your healthcare provider will talk to you about how to prepare for your procedure  You may need to not eat or drink anything except water for 6 to 12 hours before the procedure  He will tell you what medicines to take or not take on the day of your procedure  Arrange to have someone drive you home  What will happen during an upper endoscopy:   · You will be given medicine through your IV to help you relax and make you drowsy  You will also be given medicine to numb your throat  You may need to wear a plastic mouthpiece to help hold your mouth open and protect your teeth and tongue  Your healthcare provider will gently insert the endoscope through your mouth and down into your throat  You may be asked to swallow once to help move the scope into your upper intestines  You may feel pressure in your throat but you should not feel pain  The endoscope does not restrict your breathing       · Your healthcare provider will watch the scope on a monitor  He will take pictures with the scope  He may gently inject air so he can see your digestive tract clearly  Your healthcare provider may take tissue samples and send them to the lab for tests  He may remove foreign objects, tumors, or polyps that may be blocking your upper intestines  Your healthcare provider may also insert tools with the scope to treat bleeding or place a stent (tube)  When the procedure is finished, the endoscope will be slowly removed  What will happen after an upper endoscopy: You may feel bloated, gassy, or have some abdominal discomfort  Your throat may be sore for 24 to 36 hours after the procedure  You may burp or pass gas from air that is still inside your body after your procedure  You may need to take short walks to help move the gas out  Eat small meals, if you feel bloated  Do not drive or make important decisions until the day after your procedure  Risks of an upper endoscopy: Your esophagus, stomach, or duodenum may be punctured or torn during the procedure  This is because of increased pressure as the scope and air are passing through  You may bleed more than expected or get an infection  You may have a slow or irregular heartbeat, or low blood pressure  This can cause sweating and fainting  Fluid may enter your lungs and you may have trouble breathing  These problems can be life-threatening  Call 911 if:   · You have sudden chest pain or trouble breathing  Seek care immediately if:   · You feel dizzy or faint  · You have trouble swallowing  · You have severe throat pain  · Your bowel movements are very dark or black  · Your abdomen is hard and firm and you have severe pain  · You vomit blood  Contact your healthcare provider if:   · You feel full or bloated and cannot burp or pass gas  · You have not had a bowel movement for 3 days after your procedure  · You have neck pain  · You have a fever or chills      · You have nausea or are vomiting  · You have a rash or hives  · You have questions or concerns about your endoscopy  Relieve a sore throat:  Suck on throat lozenges or crushed ice  Gargle with a small amount of warm salt water  Mix 1 teaspoon of salt and 1 cup of warm water to make salt water  Relieve gas and discomfort from bloating:  Lie on your right side with a heating pad on your abdomen  Take short walks to help pass gas  Eat small meals until bloating is relieved  Rest after your procedure: You have been given medicine to relax you  Do not  drive or make important decisions until the day after your procedure  Return to your normal activity as directed  You can usually return to work the day after your procedure  Follow up with your healthcare provider as directed:  Write down your questions so you remember to ask them during your visits  © 2017 [employer] Information is for End User's use only and may not be sold, redistributed or otherwise used for commercial purposes  All illustrations and images included in CareNotes® are the copyrighted property of [employer]  or Ashkan Paez  The above information is an  only  It is not intended as medical advice for individual conditions or treatments  Talk to your doctor, nurse or pharmacist before following any medical regimen to see if it is safe and effective for you    Please have your blood work drawn as prescribed the results will be sent to your primary care provider     Please note you are restricted from driving/operating a motorized vehicle/operating heavy machinery/etc    Please see your doctors listed in the follow up providers section of your discharge paperwork, and take the discharge paperwork with you to your appointments    Please call the doctors listed in the follow up providers section to confirm office locations of your follow up appointments     Please note changes may have been made to your medications please refer to your discharge paperwork for your current medications and take this list with you to all your doctors appointments for your doctors to review    Please do not resume a home medication unless the medication reconciliation sheet indicates to do so, please do not assume that a medication that you were given a prescription for is the same as a medication you have at home based on both medications having the same name as dosages and frequency may have changed  Your nurse also reviewed with you just prior to your discharge from the hospital your medications, when to take them, how to take them, what they are for, how much to take, and when your next dose is due, please follow these instructions       Please note the following incidental findings were found during your recent hospitalization please discuss them with your doctors so that they may arrange any tests/referrals as they deem necessary:    1) abnormalities on EKG of your heart (including but not limited to prolonged QTc and abnormal heart rhythm) : Please follow up with Dr Yesika Covington for further testing/treatment/monitoring if any as they deem necessary     2) low calcium level: Please follow up with your primary care provider for further testing/treatment if any and/or specialist referral if any as they deem necessary     3) prediabetes: please follow the dietary instructions provided by the nutritionist and please follow up your primary care provider for further testing/treatment if any and/or specialist referral if any as they deem necessary     4) cardiomyopathy: Please follow up with Dr Yesika Covington for further testing/treatment/monitoring (including a nuclear [place] [place] of your heart which he has ordered) as he deems necessary     5) elevated platelet count: please have your blood work drawn as prescribed to monitor your platelet count the results will be sent to your primary care provider      Please avoid NSAID (including but not limited to nabumetone, celebrex, celecoxib, advil, aleve, motrin, naproxen, ibuprofen, mobic, meloxicam, diclofenac etc) medications, anti platelet medications (including but not limited to plavix, aspirin, and aspirin containing products), and any prescription blood thinners due to you possibly having a gastrointestinal bleed unless you are cleared to do so by Dr Eladio Bhandari not start taking eliquis (apixaban) until you are cleared to do so by Dr João Norton       Please note a summary of your hospital stay with relevant information for your doctors has been sent to them, please confirm with your doctors at your follow up visits that they have received this summary and have them contact [address] [place] [address] if they have not received them along with any other medical records they may require

## 2020-02-05 NOTE — CASE MANAGEMENT
DCI reviewed with patient & family, who expressed understanding & agreement  Pt to be DC'd from [facility] today  She will report to [facility] as outpatient prior to going home  Pt will be transported home by family via car, after [facility], which Tx team has determined to be a safe mode of transport  FU appts indicated on DCI, to be scheduled by Pt's family per scheduling preferences  CM & therapy staff spoke with Pt's family regarding need for increased assistance as she transitions home, and they are able & willing to assist as needed  FT has been completed  HHC to be provided by [facility] per Pt preference (PT, OT, ST, Nsg, HHA); a list of providers was provided to Pt   See AVS

## 2020-02-05 NOTE — NURSING NOTE
Spoke with Dr Christo Hernandez via telephone about patient's concerns about not being able to complete bowel prep ordered for colonoscopy prep  Dr Christo Hernandez encouraged continuing to try to consume as much of the bowel prep as possible overnight and added a one time dose of mag citrate and dulcolax at this time to assist in bowel evacuation  Nurse explained bowel prep ordered by physician encouraging attempting to continue with colyte as much as possible  Patient refusing to continue to drink colyte states she is just throwing it up  Nurse encouraged patient to at least continue drinking fluids overnight to help with bowel evacuation  Will continue to monitor patient and follow plan of care

## 2020-02-05 NOTE — NURSING NOTE
Patient reporting feeling that she will be unable to complete ordered bowel prep for colonoscopy tomorrow  Pt states she just keeps "foaming at the mouth when she drinks it "  [place] text sent to Dr Juan A Choudhary who called back nurse and stated that Dr Rigo Shell is on call tonight and will be the one to perform the procedure tomorrow and he would be the one to ask about possible alternatives to the current bowel prep  Message left on Dr Domenic Yuan voice mail to call back this nurse  Awaiting phone call back

## 2020-02-05 NOTE — DISCHARGE SUMMARY
Physical Medicine and Rehabilitation Progress Note  Gagan Simms 60 y o  female MRN: [id number]  Unit/Bed#: [facility] 212-01 Encounter: [id number]  Discharge Summary - PMR           Admission Date:    1/23/20  Discharge Date:   2/5/20    Diagnosis:   CVA    Functional Status Upon Discharge: Mod I     Condition at Discharge: stable    Discharge instructions/Information to patient and family:   See after visit summary for information provided to patient and family  Provisions for Follow-Up Care:  See after visit summary for information related to follow-up care and any pertinent home health orders  Disposition: was discharged to Dr Daniela Rivera for EGD & colonoscopy     Planned Readmission: no        Discharge Medications:  See after visit summary for reconciled discharge medications provided to patient and family  Comorbidities:   See below for medical details     Hospital Course: The patient had an unremarkable rehab course with significant functional gains made, please see below for medical details:        Gagan Simms is a 60 y o  female who presented to the [address] [place] [address] aphasia and right sided weakness and found to have L MCA territory infarct in the setting of a-fib and was started on eliquis in acute care           * CVA (cerebral vascular accident) ([facility] [address] )  Assessment & Plan  - L MCA territory infarct felt to be due to a-fib  - appears to have been on coumadin PTA and was switched to eliquis 5 mg BID in acute care per cards however patient with + FOBT & dark stools and therefore AC was held and Dr Daniela Rivera of GI performed EGD and colonoscopy; personally d/w Dr Daniela Rivera who noted no significant findings on EGD and while prep was poor on colonoscopy noted diverticular dz which he suspected was the source of the bleed and therefore Dr Daniela Rivera recommended patient not resume AC at this time and FU with Dr Ra Mendoza of cardiology for discussion of possible Watchman device  Patient was advised to not to resume AC by Dr Jassi Monaco after findings of EGD/colonoscopy and additionally patient was advised by me (and noted on dc med rec) that she is not to resume her home coumadin and was never given a prescription for eliquis upon dc   - also started on lipitor 40 mg qpm   - OP FU with neuro       Hypocalcemia  Assessment & Plan  - ionized Ca just below LLN of 1 12 at 1 09   - IM monitoring and have cleared patient for dc with OP FU with PCP       GIB (gastrointestinal bleeding)  Assessment & Plan  - patient with + FOBT & dark stools in the setting of being on [facility] with eliquis (was on coumadin at home PTA however switched to eliquis in acute care) for afib in the setting of CVA thought to be 2/2 to A-fib and therefore AC was held and Dr Jassi Monaco of GI performed EGD and colonoscopy; personally d/w Dr Jassi Monaco who noted no significant findings on EGD and while prep was poor on colonoscopy noted diverticular dz which he suspected was the source of the bleed and therefore Dr Jassi Monaco recommended patient not resume AC at this time and FU with Dr Cristhian Jean of cardiology for discussion of possible Watchman device   Patient was advised to not to resume AC by Dr Jassi Monaco after findings of EGD/colonoscopy and additionally patient was advised by me (and noted on dc med rec) that she is not to resume her home coumadin and was never given a prescription for eliquis upon dc   -Hg is currently 8 4 (scrip was provided upon dc for CBC with results to PCP and Dr Jassi Monaco)   - OP FU with PCP and Dr Ibrahim Back  - patient with + FOBT & dark stools in the setting of being on [facility] with eliquis (was on coumadin at home PTA however switched to eliquis in acute care) for afib in the setting of CVA thought to be 2/2 to A-fib and therefore [facility] was held and Dr Jassi Monaco of GI performed EGD and colonoscopy; personally d/w Dr Jassi Monaco who noted no significant findings on EGD and while prep was poor on colonoscopy noted diverticular dz which he suspected was the source of the bleed and therefore Dr Raul Mesa recommended patient not resume AC at this time and FU with Dr Isabelle Valencia of cardiology for discussion of possible Watchman device  Patient was advised to not to resume AC by Dr Raul Mesa after findings of EGD/colonoscopy and additionally patient was advised by me (and noted on dc med rec) that she is not to resume her home coumadin and was never given a prescription for eliquis upon dc   -Hg is currently 8 4 and IM & Dr Raul Mesa have cleared patient for dc with scrip for CBC with results to PCP and Dr Raul Mesa (which was provided to patient)   - OP FU with PCP and Dr Raul Mesa     Thrombocytosis [facility])  Assessment & Plan  - mild and stable at 483   - IM has cleared patient for dc with scrip for CBC with results to PCP which was provided to patient upon dc     IFG (impaired fasting glucose)  Assessment & Plan  - HgA1C 6 0  - nutritionist--> dietary education   - follows with [place] [facility]    History of pulmonary embolism  Assessment & Plan  - remote history (years ago) of PE  - appears to have been on coumadin PTA but switched to eliquis in acute care due to CVA however patient with + FOBT & dark stools therefore AC was held and Dr Raul Mesa of GI performed EGD and colonoscopy; personally d/w Dr Raul Mesa who noted no significant findings on EGD and while prep was poor on colonoscopy noted diverticular dz which he suspected was the source of the bleed and therefore Dr Raul Mesa recommended patient not resume AC at this time   Patient was advised to not to resume AC by Dr Raul Mesa after findings of EGD/colonoscopy and additionally patient was advised by me (and noted on dc med rec) that she is not to resume her home coumadin and was never given a prescription for eliquis upon dc   - OP FU with PCP for further management and possibly hematology referral at [facility] discretion as it is unknown if the PE was provoked or not     Prolonged QT interval  Assessment & Plan  - seen by cards in acute care and patient is on med regimen as recommended by cardiology  - OP FU with Dr Regina Licea [facility])  Assessment & Plan  - with mildly elevated troponins up to 0 07 however cards did not feel troponin elevation was due to a true coronary event by rather tachycardia in the setting of cardiomyopathy  - EF 30%  - started on toprol XL 50 mg q12 and lisinopril 5 mg qd in acute care per cards however lisinopril dc'd and toprol XL reduced to 25 mg q12 by IM in acute rehab due to low BPs and on 2/4 toprol XL was completely dc'd due to lower HR per recommendation of Dr Sade Prater of IM   - Dr Mckeon Files of [facility] recommended OP nuclear lexiscan stress test which he has ordered    - OP FU with Dr Clay Power  - contacted [facility] ophthalmology whom patient follows with as an OP and per their records patient is to be on cosopt 22 3-6 8 mg/ml 1 gtt both eyes BID therefore resumed patient on this home medication       A-fib [facility])  Assessment & Plan  - with PSVT/atrial tachycardia per cards  - started on amiodarone taper in acute care with eventual dose of 200 mg qd per cards which patient is now on per IM   - home coumadin stopped and started on eliquis 5 mg BID in acute care per cards however with FOBT & dark stools therefore AC was held and Dr Tonya Hauser of GI performed EGD and colonoscopy; personally d/w Dr Tonya Hauser who noted no significant findings on EGD and while prep was poor on colonoscopy noted diverticular dz which he suspected was the source of the bleed and therefore Dr Tonya Hauser recommended patient not resume AC at this time and FU with Dr Mckeon Files of cardiology for discussion of possible Watchman device   Patient was advised to not to resume AC by Dr Tonya Hauser after findings of EGD/colonoscopy and additionally patient was advised by me (and noted on dc med rec) that she is not to resume her home coumadin and was never given a prescription for eliquis upon dc   - started on toprol XL 50 mg q12 in acute care per cards however decreased to 25 mg q12 by IM in acute rehab due to lower BPs and on 2/4 toprol XL was completely dc'd due to lower HR per recommendation of Dr Patrice Delgado of IM   - OP FU with Dr Gilford Clinch hypertension  Assessment & Plan  - at home on HCTZ 12 5 mg qd  - IM managing and patient is not currently on BP meds due to lower BPs and IM recommends patient be dc'd off of anti-hypertensive agents and FU with Dr Radha Abebe of cardiology as planned       Scheduled Meds:         DVT ppx: SCDs      * 75 min was spent in preparation for patient discharge including discussion with patient, patient's nurse, therapy staff, and case management

## 2020-02-05 NOTE — ASSESSMENT & PLAN NOTE
- patient with + FOBT & dark stools in the setting of being on [facility] with eliquis (was on coumadin at home PTA however switched to eliquis in acute care) for afib in the setting of CVA thought to be 2/2 to A-fib and therefore AC was held and Dr Caity Rothman of GI performed EGD and colonoscopy; personally d/w Dr Caity Rothman who noted no significant findings on EGD and while prep was poor on colonoscopy noted diverticular dz which he suspected was the source of the bleed and therefore Dr Caity Rothman recommended patient not resume AC at this time and FU with Dr Avelino Gallardo of cardiology for discussion of possible Watchman device   Patient was advised to not to resume AC by Dr Caity Rothman after findings of EGD/colonoscopy and additionally patient was advised by me (and noted on dc med rec) that she is not to resume her home coumadin and was never given a prescription for eliquis upon dc   -Hg is currently 8 4 and IM & Dr Caity Rothman have cleared patient for dc with scrip for CBC with results to PCP and Dr Caity Rothman (which was provided to patient)   - OP FU with PCP and Dr Caity Rothman

## 2020-02-05 NOTE — NURSING NOTE
Patient discharged at approximately 1045 to [facility]PU for endocscopy/colonoscopy  Report given to RN in SPU, left FA IV in place for procedure, bruising noted at site when venofer administered, flushed with ease  Patient anxious and tearful prior to discharge, 1:1 talk and support with patient  Reviewed discharge instructions, f/u appointments, script for bloodwork, and scripts sent electronically to pharmacy per physician  Patient verbalized understanding of instructions for home  Discharged to SPU via wc with assist of PCA  Interdisciplinary care team determined safest means of transport is via car for home, brother to  after procedure

## 2020-02-05 NOTE — ANESTHESIA POSTPROCEDURE EVALUATION
Post-Op Assessment Note    CV Status:  Stable  Pain Score: 0    Pain management: adequate     Mental Status:  Awake   Hydration Status:  Stable   PONV Controlled:  None   Airway Patency:  Patent   Post Op Vitals Reviewed: Yes      Staff: CRNA           BP   99/44   Temp     Pulse 77   Resp   24   SpO2   96

## 2020-02-05 NOTE — NURSING NOTE
Entered room encouraged patient to drink some more of her Magnesium Citrate drink for her bowel prep  Patient refusing to drink any more of her bowel prep at this time  Patient only consumed small fraction of dose of medication  Nurse educated patient on need to evacuate her bowels in order for her colonoscopy to be performed successfully  Patient states "I dont care if I am not able to have my colonoscopy  I'm not drinking that stuff "  Will continue to monitor patient and follow plan of care

## 2020-02-05 NOTE — NURSING NOTE
Patient resting comfortably in bed at this time  No signs of distress noted  Patient found ambulating to the bathroom without assist patient educated on maintaining safety during hospital stay and for patient to ring for assistance prior to ambulating  Patient noted to have a bowel movement at this time patient continues to refuse bowel prep intake  Patient now placed NPO until procedure today  Call bell within reach  Will continue to monitor patient and follow plan of care

## 2020-02-05 NOTE — OCCUPATIONAL THERAPY NOTE
Discharge Summary   Pt participates in ADLs, transfers/ mobility and BUE therex with cognitive tasks  Pt has made great progress however due to medical complications the last 2 days of OT sessions pt unable to met goals of Mod I  Pt has a procedure planned prior to discharge and good family support available for home with family training and education provided  Pt is currently S/ min A for ADL tasks and family will assist with medication management, finances, homemaking and meal prep  Pt is eager to return home and discharge to home planned after procedure with family to assist with transition  Assist required due to RUE pain from IV site, decreased activity tolerance and expressive aphasia  Discharge planned 2/5/2020 with goals partially met, HHOT recommended and pt has a cane

## 2020-02-06 ENCOUNTER — APPOINTMENT (EMERGENCY)
Dept: CT IMAGING | Facility: HOSPITAL | Age: 65
DRG: 047 | End: 2020-02-06
Payer: COMMERCIAL

## 2020-02-06 ENCOUNTER — APPOINTMENT (EMERGENCY)
Dept: RADIOLOGY | Facility: HOSPITAL | Age: 65
DRG: 047 | End: 2020-02-06
Payer: COMMERCIAL

## 2020-02-06 ENCOUNTER — HOSPITAL ENCOUNTER (INPATIENT)
Facility: HOSPITAL | Age: 65
LOS: 2 days | Discharge: HOME HEALTH | DRG: 047 | End: 2020-02-09
Attending: EMERGENCY MEDICINE | Admitting: INTERNAL MEDICINE
Payer: COMMERCIAL

## 2020-02-06 DIAGNOSIS — Z86.73 H/O ISCHEMIC LEFT MCA STROKE: ICD-10-CM

## 2020-02-06 DIAGNOSIS — Z79.01 LONG TERM (CURRENT) USE OF ANTICOAGULANTS: ICD-10-CM

## 2020-02-06 DIAGNOSIS — K92.2 GASTROINTESTINAL HEMORRHAGE, UNSPECIFIED GASTROINTESTINAL HEMORRHAGE TYPE: ICD-10-CM

## 2020-02-06 DIAGNOSIS — R29.898 LEFT ARM WEAKNESS: ICD-10-CM

## 2020-02-06 DIAGNOSIS — I10 ESSENTIAL HYPERTENSION: ICD-10-CM

## 2020-02-06 DIAGNOSIS — G45.9 TIA (TRANSIENT ISCHEMIC ATTACK): Primary | ICD-10-CM

## 2020-02-06 DIAGNOSIS — D50.0 IRON DEFICIENCY ANEMIA DUE TO CHRONIC BLOOD LOSS: ICD-10-CM

## 2020-02-06 DIAGNOSIS — I48.19 PERSISTENT ATRIAL FIBRILLATION (HCC): ICD-10-CM

## 2020-02-06 DIAGNOSIS — I63.412 CEREBROVASCULAR ACCIDENT (CVA) DUE TO EMBOLISM OF LEFT MIDDLE CEREBRAL ARTERY (HCC): ICD-10-CM

## 2020-02-06 DIAGNOSIS — I42.9 CARDIOMYOPATHY, UNSPECIFIED TYPE (HCC): ICD-10-CM

## 2020-02-06 PROBLEM — E66.01 MORBID OBESITY WITH BMI OF 40.0-44.9, ADULT (HCC): Status: ACTIVE | Noted: 2020-02-06

## 2020-02-06 PROBLEM — K57.32 DIVERTICULITIS OF LARGE INTESTINE WITHOUT PERFORATION OR ABSCESS WITHOUT BLEEDING: Status: ACTIVE | Noted: 2020-02-06

## 2020-02-06 LAB
ANION GAP SERPL CALCULATED.3IONS-SCNC: 11 MMOL/L (ref 4–13)
ANISOCYTOSIS BLD QL SMEAR: PRESENT
APTT PPP: 40 SECONDS (ref 23–37)
BUN SERPL-MCNC: 18 MG/DL (ref 7–25)
CALCIUM SERPL-MCNC: 7.7 MG/DL (ref 8.6–10.5)
CHLORIDE SERPL-SCNC: 105 MMOL/L (ref 98–107)
CO2 SERPL-SCNC: 20 MMOL/L (ref 21–31)
CREAT SERPL-MCNC: 1.07 MG/DL (ref 0.6–1.2)
ERYTHROCYTE [DISTWIDTH] IN BLOOD BY AUTOMATED COUNT: 23.9 % (ref 11.5–14.5)
GFR SERPL CREATININE-BSD FRML MDRD: 56 ML/MIN/1.73SQ M
GLUCOSE SERPL-MCNC: 115 MG/DL (ref 65–99)
HCT VFR BLD AUTO: 27.6 % (ref 42–47)
HGB BLD-MCNC: 8.4 G/DL (ref 12–16)
HYPERCHROMIA BLD QL SMEAR: PRESENT
INR PPP: 1.14 (ref 0.9–1.5)
MACROCYTES BLD QL AUTO: PRESENT
MCH RBC QN AUTO: 24.2 PG (ref 26–34)
MCHC RBC AUTO-ENTMCNC: 30.4 G/DL (ref 31–37)
MCV RBC AUTO: 80 FL (ref 81–99)
OVALOCYTES BLD QL SMEAR: PRESENT
PLATELET # BLD AUTO: 533 THOUSANDS/UL (ref 149–390)
PLATELET BLD QL SMEAR: ABNORMAL
PMV BLD AUTO: 8 FL (ref 8.6–11.7)
POIKILOCYTOSIS BLD QL SMEAR: PRESENT
POTASSIUM SERPL-SCNC: 3.4 MMOL/L (ref 3.5–5.5)
PROTHROMBIN TIME: 13.2 SECONDS (ref 10.2–13)
RBC # BLD AUTO: 3.46 MILLION/UL (ref 3.9–5.2)
RBC MORPH BLD: PRESENT
SCHISTOCYTES BLD QL SMEAR: PRESENT
SODIUM SERPL-SCNC: 136 MMOL/L (ref 134–143)
TARGETS BLD QL SMEAR: PRESENT
TROPONIN I SERPL-MCNC: <0.03 NG/ML
WBC # BLD AUTO: 9.7 THOUSAND/UL (ref 4.8–10.8)

## 2020-02-06 PROCEDURE — 82948 REAGENT STRIP/BLOOD GLUCOSE: CPT

## 2020-02-06 PROCEDURE — 80048 BASIC METABOLIC PNL TOTAL CA: CPT | Performed by: EMERGENCY MEDICINE

## 2020-02-06 PROCEDURE — 85027 COMPLETE CBC AUTOMATED: CPT | Performed by: EMERGENCY MEDICINE

## 2020-02-06 PROCEDURE — 85730 THROMBOPLASTIN TIME PARTIAL: CPT | Performed by: EMERGENCY MEDICINE

## 2020-02-06 PROCEDURE — 99284 EMERGENCY DEPT VISIT MOD MDM: CPT | Performed by: PHYSICIAN ASSISTANT

## 2020-02-06 PROCEDURE — 84484 ASSAY OF TROPONIN QUANT: CPT | Performed by: EMERGENCY MEDICINE

## 2020-02-06 PROCEDURE — 99220 PR INITIAL OBSERVATION CARE/DAY 70 MINUTES: CPT | Performed by: NURSE PRACTITIONER

## 2020-02-06 PROCEDURE — 36415 COLL VENOUS BLD VENIPUNCTURE: CPT | Performed by: EMERGENCY MEDICINE

## 2020-02-06 PROCEDURE — 93005 ELECTROCARDIOGRAM TRACING: CPT

## 2020-02-06 PROCEDURE — 71045 X-RAY EXAM CHEST 1 VIEW: CPT

## 2020-02-06 PROCEDURE — 99285 EMERGENCY DEPT VISIT HI MDM: CPT

## 2020-02-06 PROCEDURE — 85610 PROTHROMBIN TIME: CPT | Performed by: EMERGENCY MEDICINE

## 2020-02-06 RX ORDER — AMIODARONE HYDROCHLORIDE 100 MG/1
200 TABLET ORAL
Status: DISCONTINUED | OUTPATIENT
Start: 2020-02-07 | End: 2020-02-09 | Stop reason: HOSPADM

## 2020-02-06 RX ORDER — AMIODARONE HYDROCHLORIDE 100 MG/1
200 TABLET ORAL ONCE
Status: COMPLETED | OUTPATIENT
Start: 2020-02-06 | End: 2020-02-06

## 2020-02-06 RX ORDER — DORZOLAMIDE HYDROCHLORIDE AND TIMOLOL MALEATE 20; 5 MG/ML; MG/ML
1 SOLUTION/ DROPS OPHTHALMIC 2 TIMES DAILY
Status: DISCONTINUED | OUTPATIENT
Start: 2020-02-07 | End: 2020-02-09 | Stop reason: HOSPADM

## 2020-02-06 RX ORDER — PANTOPRAZOLE SODIUM 40 MG/1
40 TABLET, DELAYED RELEASE ORAL ONCE
Status: COMPLETED | OUTPATIENT
Start: 2020-02-06 | End: 2020-02-06

## 2020-02-06 RX ORDER — CHLORHEXIDINE GLUCONATE 0.12 MG/ML
15 RINSE ORAL EVERY 12 HOURS SCHEDULED
Status: DISCONTINUED | OUTPATIENT
Start: 2020-02-06 | End: 2020-02-09 | Stop reason: HOSPADM

## 2020-02-06 RX ORDER — ONDANSETRON 2 MG/ML
4 INJECTION INTRAMUSCULAR; INTRAVENOUS EVERY 6 HOURS PRN
Status: DISCONTINUED | OUTPATIENT
Start: 2020-02-06 | End: 2020-02-09 | Stop reason: HOSPADM

## 2020-02-06 RX ORDER — ATORVASTATIN CALCIUM 40 MG/1
40 TABLET, FILM COATED ORAL EVERY EVENING
Status: DISCONTINUED | OUTPATIENT
Start: 2020-02-07 | End: 2020-02-09 | Stop reason: HOSPADM

## 2020-02-06 RX ORDER — ACETAMINOPHEN 325 MG/1
650 TABLET ORAL EVERY 6 HOURS PRN
Status: DISCONTINUED | OUTPATIENT
Start: 2020-02-06 | End: 2020-02-09 | Stop reason: HOSPADM

## 2020-02-06 RX ORDER — ATORVASTATIN CALCIUM 40 MG/1
40 TABLET, FILM COATED ORAL
Status: DISCONTINUED | OUTPATIENT
Start: 2020-02-06 | End: 2020-02-06 | Stop reason: SDUPTHER

## 2020-02-06 RX ORDER — POTASSIUM CHLORIDE 20 MEQ/1
20 TABLET, EXTENDED RELEASE ORAL ONCE
Status: COMPLETED | OUTPATIENT
Start: 2020-02-06 | End: 2020-02-06

## 2020-02-06 RX ADMIN — POTASSIUM CHLORIDE 20 MEQ: 20 TABLET, EXTENDED RELEASE ORAL at 21:11

## 2020-02-06 RX ADMIN — ATORVASTATIN CALCIUM 40 MG: 40 TABLET, FILM COATED ORAL at 21:05

## 2020-02-06 RX ADMIN — PANTOPRAZOLE SODIUM 40 MG: 40 TABLET, DELAYED RELEASE ORAL at 21:11

## 2020-02-06 RX ADMIN — AMIODARONE HYDROCHLORIDE 200 MG: 100 TABLET ORAL at 21:05

## 2020-02-06 RX ADMIN — CHLORHEXIDINE GLUCONATE 0.12% ORAL RINSE 15 ML: 1.2 LIQUID ORAL at 23:08

## 2020-02-06 NOTE — ED NOTES
When patient arrived to ER she has no nuero deficits or stroke symptoms - she reported weakness/some tingling sensation in her left arm about 1/2 hour prior to arrival that has since resolved     Lolis Henriquez RN  02/06/20 5092

## 2020-02-06 NOTE — ED PROVIDER NOTES
History  Chief Complaint   Patient presents with    Extremity Weakness     patient arrived with her brother reporting she had a stroke 2 weeks ago and about 1/2 hour ago felt weakness/tingling in her left arm      77-year-old female recent history of stroke 3 weeks ago presents complaining of left arm weakness  She states that approximately 1700 hours today she felt tingling in her 2nd 3rd and 4th left finger and experienced significant weakness in the left arm  She was unable to the left her left arm off of the bed and came to the emergency department immediately  She states at that time her symptoms have resolved that they lasted for approximately 30 minutes  Although she admits that both her legs are still rather weak, she was able to walk in on her own and has no new complaints  The man accompanying her states that this is currently her baseline  Her initial NIH is to for some leg weakness which is her baseline  She currently denies any headache, fever, chills, chest pain, shortness of breath, abdominal pain, urinary symptoms or lower leg pain or swelling  Prior to Admission Medications   Prescriptions Last Dose Informant Patient Reported?  Taking?   amiodarone 200 mg tablet   No No   Sig: Take 1 tablet (200 mg total) by mouth daily with breakfast   atorvastatin (LIPITOR) 40 mg tablet   No No   Sig: Take 1 tablet (40 mg total) by mouth every evening   dorzolamide-timolol (COSOPT) 22 3-6 8 MG/ML ophthalmic solution   Yes No   Sig: Administer 1 drop to both eyes 2 (two) times a day      Facility-Administered Medications: None       Past Medical History:   Diagnosis Date    Hypertension     PUD (peptic ulcer disease)     Stroke ([facility] [address] )     Vitamin D deficiency        Past Surgical History:   Procedure Laterality Date    ABDOMINOPLASTY      revision gastric bypass    BREAST SURGERY      reduction     SECTION      CHOLECYSTECTOMY      COLONOSCOPY      GASTRIC BYPASS      HERNIA REPAIR  NEUROMA EXCISION Right 1/14/2019    Procedure: EXCISION 2ND INTERSPACE NEUROMA;  Surgeon: Jodie Valentine DPM;  [facility] MAIN OR;  Service: Podiatry    ROTATOR CUFF REPAIR Left        Family History   Problem Relation Age of Onset    Stroke Mother     Heart disease Father      I have reviewed and agree with the history as documented  Social History     Tobacco Use    Smoking status: Never Smoker    Smokeless tobacco: Never Used   Substance Use Topics    Alcohol use: Never     Frequency: Never     Comment: none    Drug use: No     Comment: none        Review of Systems   Constitutional: Negative for chills, fatigue and fever  HENT: Negative for ear pain, postnasal drip, rhinorrhea, sinus pressure, sinus pain and sore throat  Eyes: Negative for pain  Respiratory: Negative for cough, shortness of breath and wheezing  Cardiovascular: Negative for chest pain, palpitations and leg swelling  Gastrointestinal: Negative for abdominal pain, constipation, diarrhea, nausea and vomiting  Endocrine: Negative for polyuria  Genitourinary: Negative for dysuria and pelvic pain  Musculoskeletal: Negative for arthralgias, myalgias, neck pain and neck stiffness  Neurological: Positive for weakness  Negative for dizziness, syncope, light-headedness and headaches  All other systems reviewed and are negative  Physical Exam  Physical Exam   Constitutional: She is oriented to person, place, and time  She appears well-developed and well-nourished  HENT:   Head: Normocephalic and atraumatic  Mouth/Throat: No oropharyngeal exudate  Eyes: EOM are normal    Neck: Normal range of motion  Cardiovascular: Normal rate, regular rhythm and normal heart sounds  Pulmonary/Chest: Effort normal and breath sounds normal    Abdominal: Soft  Bowel sounds are normal  There is no tenderness  Musculoskeletal: Normal range of motion  Neurological: She is alert and oriented to person, place, and time     No dysarthria, nystagmus, dysphagia, diplopia, aphasia, vertigo, ataxia, visions loss, dysmetria  Normal finger to nose, gait, rapid alternating movement,  tandem gait, strength and sensation in bilat upper and lower extremities  Normal upper and lower reflexes  No pronator drift  Normal heel to shin  EOMI, no visual field defects  CN 2-12 intact  GCS 15  AOx3  Normal babinski  Skin: Skin is warm  Capillary refill takes less than 2 seconds  Psychiatric: She has a normal mood and affect         Vital Signs  ED Triage Vitals [02/06/20 1805]   Temperature Pulse Respirations Blood Pressure SpO2   98 °F (36 7 °C) 70 18 156/69 100 %      Temp Source Heart Rate Source Patient Position - Orthostatic VS BP Location FiO2 (%)   Tympanic Monitor Sitting Left arm --      Pain Score       No Pain           Vitals:    02/06/20 1805   BP: 156/69   Pulse: 70   Patient Position - Orthostatic VS: Sitting         Visual Acuity      ED Medications  Medications   atorvastatin (LIPITOR) tablet 40 mg (has no administration in time range)   amiodarone tablet 200 mg (has no administration in time range)   psyllium (METAMUCIL) 1 packet (has no administration in time range)       Diagnostic Studies  Results Reviewed     Procedure Component Value Units Date/Time    Protime-INR [[id number]]  (Abnormal) Collected:  02/06/20 1909    Lab Status:  Final result Specimen:  Blood from Arm, Right Updated:  02/06/20 1943     Protime 13 2 seconds      INR 1 14    APTT [[id number]]  (Abnormal) Collected:  02/06/20 1909    Lab Status:  Final result Specimen:  Blood from Arm, Right Updated:  02/06/20 1943     PTT 40 seconds     Troponin I [[id number]]  (Normal) Collected:  02/06/20 1909    Lab Status:  Final result Specimen:  Blood from Arm, Right Updated:  02/06/20 1935     Troponin I <0 03 ng/mL     Basic metabolic panel [[id number]]  (Abnormal) Collected:  02/06/20 1909    Lab Status:  Final result Specimen:  Blood from Arm, Right Updated:  02/06/20 1935     Sodium 136 mmol/L      Potassium 3 4 mmol/L      Chloride 105 mmol/L      CO2 20 mmol/L      ANION GAP 11 mmol/L      BUN 18 mg/dL      Creatinine 1 07 mg/dL      Glucose 115 mg/dL      Calcium 7 7 mg/dL      eGFR 56 ml/min/1 73sq m     Narrative:       [place] guidelines for Chronic Kidney Disease (CKD):     Stage 1 with normal or high GFR (GFR > 90 mL/min/1 73 square meters)    Stage 2 Mild CKD (GFR = 60-89 mL/min/1 73 square meters)    Stage 3A Moderate CKD (GFR = 45-59 mL/min/1 73 square meters)    Stage 3B Moderate CKD (GFR = 30-44 mL/min/1 73 square meters)    Stage 4 Severe CKD (GFR = 15-29 mL/min/1 73 square meters)    Stage 5 End Stage CKD (GFR <15 mL/min/1 73 square meters)  Note: GFR calculation is accurate only with a steady state creatinine    CBC and Platelet [[id number]]  (Abnormal) Collected:  02/06/20 1909    Lab Status:  Final result Specimen:  Blood from Arm, Right Updated:  02/06/20 1926     WBC 9 70 Thousand/uL      RBC 3 46 Million/uL      Hemoglobin 8 4 g/dL      Hematocrit 27 6 %      MCV 80 fL      MCH 24 2 pg      MCHC 30 4 g/dL      RDW 23 9 %      Platelets 362 Thousands/uL      MPV 8 0 fL                  CT stroke alert brain   Final Result by Anni Peters MD (02/06 1826)      No change from 1/19/2020      Findings were directly discussed with Yovany Kessler on 2/6/2020 6:23 PM       Workstation performed: [id number]         X-ray chest 1 view portable    (Results Pending)              Procedures  Procedures         ED Course                               MDM  Number of Diagnoses or Management Options  Diagnosis management comments: Patient case discussed with Dr Carola Kelsey that stated CTA of head was not necessary given patient was currently at baseline with no new neuro deficits  Recommended to admit for observation          Disposition  Final diagnoses:   None     ED Disposition     ED Disposition Condition Date/Time Comment    Admit Stable Thu Feb 6, 2020  8:51 PM Case was discussed with Natasha Osbrone and the patient's admission status was agreed to be Admission Status: observation status to the service of Dr Ramirez Trixie  Follow-up Information    None         Patient's Medications   Discharge Prescriptions    No medications on file     No discharge procedures on file      ED Provider  Electronically Signed by           Lori Li PA-C  02/06/20 2054

## 2020-02-06 NOTE — PHYSICAL THERAPY NOTE
PT DISCHARGE SUMMARY:    Patient has met max benefit for inpatient [facility] PT  Patient MOD I bed mobility; MOD I all transfers with cane; MOD I ambulation up to 165' level and carpet with cane; CG curb step with cane and HHA; mod I 7 steps with 1 handrail and cane  Unable to assess car transfer as patient was d/c to acute care for a procedure  Patient for d/c to home with continued PT services  Patient safe to transport home via car

## 2020-02-06 NOTE — ED NOTES
Patient's POCT is 80 - patient's brother reports 2 weeks ago she had a stroke and was admitted to [facility] and then was in acute rehab unit - discharged to home yesterday     Yadiel Vargas RN  02/06/20 0050

## 2020-02-06 NOTE — ED NOTES
Patient transported to [address] [place] [address], [address] [place] [address]  02/06/20 7259

## 2020-02-07 ENCOUNTER — APPOINTMENT (OUTPATIENT)
Dept: MRI IMAGING | Facility: HOSPITAL | Age: 65
DRG: 047 | End: 2020-02-07
Payer: COMMERCIAL

## 2020-02-07 LAB
ABO GROUP BLD: NORMAL
ALBUMIN SERPL BCP-MCNC: 3 G/DL (ref 3.5–5.7)
ALP SERPL-CCNC: 68 U/L (ref 55–165)
ALT SERPL W P-5'-P-CCNC: 12 U/L (ref 7–52)
ANION GAP SERPL CALCULATED.3IONS-SCNC: 8 MMOL/L (ref 4–13)
ANISOCYTOSIS BLD QL SMEAR: PRESENT
AST SERPL W P-5'-P-CCNC: 21 U/L (ref 13–39)
ATRIAL RATE: 65 BPM
BASO STIPL BLD QL SMEAR: PRESENT
BILIRUB SERPL-MCNC: 0.6 MG/DL (ref 0.2–1)
BLD GP AB SCN SERPL QL: NEGATIVE
BUN SERPL-MCNC: 18 MG/DL (ref 7–25)
BURR CELLS BLD QL SMEAR: PRESENT
CALCIUM SERPL-MCNC: 7.2 MG/DL (ref 8.6–10.5)
CHLORIDE SERPL-SCNC: 108 MMOL/L (ref 98–107)
CO2 SERPL-SCNC: 20 MMOL/L (ref 21–31)
CREAT SERPL-MCNC: 1.04 MG/DL (ref 0.6–1.2)
ERYTHROCYTE [DISTWIDTH] IN BLOOD BY AUTOMATED COUNT: 23.6 % (ref 11.5–14.5)
EST. AVERAGE GLUCOSE BLD GHB EST-MCNC: 103 MG/DL
GFR SERPL CREATININE-BSD FRML MDRD: 58 ML/MIN/1.73SQ M
GLUCOSE P FAST SERPL-MCNC: 88 MG/DL (ref 65–99)
GLUCOSE SERPL-MCNC: 88 MG/DL (ref 65–99)
HBA1C MFR BLD: 5.2 % (ref 4.2–6.3)
HCT VFR BLD AUTO: 23.1 % (ref 42–47)
HCT VFR BLD AUTO: 31 % (ref 42–47)
HGB BLD-MCNC: 7 G/DL (ref 12–16)
HGB BLD-MCNC: 9.6 G/DL (ref 12–16)
HYPERCHROMIA BLD QL SMEAR: PRESENT
INR PPP: 1.16 (ref 0.9–1.5)
LYMPHOCYTES # BLD AUTO: 1.68 THOUSAND/UL (ref 0.6–4.47)
LYMPHOCYTES # BLD AUTO: 20 % (ref 20–51)
MAGNESIUM SERPL-MCNC: 2.2 MG/DL (ref 1.9–2.7)
MCH RBC QN AUTO: 24.6 PG (ref 26–34)
MCHC RBC AUTO-ENTMCNC: 30.3 G/DL (ref 31–37)
MCV RBC AUTO: 81 FL (ref 81–99)
MICROCYTES BLD QL AUTO: PRESENT
MONOCYTES # BLD AUTO: 0.92 THOUSAND/UL (ref 0–1.22)
MONOCYTES NFR BLD AUTO: 11 % (ref 4–12)
NEUTS SEG # BLD: 5.71 THOUSAND/UL (ref 1.81–6.82)
NEUTS SEG NFR BLD AUTO: 68 % (ref 42–75)
NRBC BLD AUTO-RTO: 9 /100 WBC (ref 0–2)
P AXIS: 40 DEGREES
PLATELET # BLD AUTO: 324 THOUSANDS/UL (ref 149–390)
PLATELET BLD QL SMEAR: ADEQUATE
PMV BLD AUTO: 8.8 FL (ref 8.6–11.7)
POLYCHROMASIA BLD QL SMEAR: PRESENT
POTASSIUM SERPL-SCNC: 3.7 MMOL/L (ref 3.5–5.5)
PR INTERVAL: 182 MS
PROT SERPL-MCNC: 5 G/DL (ref 6.4–8.9)
PROTHROMBIN TIME: 13.5 SECONDS (ref 10.2–13)
QRS AXIS: -6 DEGREES
QRSD INTERVAL: 116 MS
QT INTERVAL: 456 MS
QTC INTERVAL: 474 MS
RBC # BLD AUTO: 2.85 MILLION/UL (ref 3.9–5.2)
RBC MORPH BLD: PRESENT
RH BLD: POSITIVE
SODIUM SERPL-SCNC: 136 MMOL/L (ref 134–143)
SPECIMEN EXPIRATION DATE: NORMAL
T WAVE AXIS: 123 DEGREES
TOTAL CELLS COUNTED SPEC: 100
VARIANT LYMPHS # BLD AUTO: 1 % (ref 0–0)
VENTRICULAR RATE: 65 BPM
WBC # BLD AUTO: 8.4 THOUSAND/UL (ref 4.8–10.8)
WBC NRBC COR # BLD: 8.4 THOUSAND/UL (ref 4.31–10.16)

## 2020-02-07 PROCEDURE — 83735 ASSAY OF MAGNESIUM: CPT | Performed by: NURSE PRACTITIONER

## 2020-02-07 PROCEDURE — 80053 COMPREHEN METABOLIC PANEL: CPT | Performed by: NURSE PRACTITIONER

## 2020-02-07 PROCEDURE — 86900 BLOOD TYPING SEROLOGIC ABO: CPT | Performed by: INTERNAL MEDICINE

## 2020-02-07 PROCEDURE — 93010 ELECTROCARDIOGRAM REPORT: CPT | Performed by: INTERNAL MEDICINE

## 2020-02-07 PROCEDURE — 86920 COMPATIBILITY TEST SPIN: CPT

## 2020-02-07 PROCEDURE — 86850 RBC ANTIBODY SCREEN: CPT | Performed by: INTERNAL MEDICINE

## 2020-02-07 PROCEDURE — 86901 BLOOD TYPING SEROLOGIC RH(D): CPT | Performed by: INTERNAL MEDICINE

## 2020-02-07 PROCEDURE — 97167 OT EVAL HIGH COMPLEX 60 MIN: CPT

## 2020-02-07 PROCEDURE — 70551 MRI BRAIN STEM W/O DYE: CPT

## 2020-02-07 PROCEDURE — 85027 COMPLETE CBC AUTOMATED: CPT | Performed by: NURSE PRACTITIONER

## 2020-02-07 PROCEDURE — 30233N1 TRANSFUSION OF NONAUTOLOGOUS RED BLOOD CELLS INTO PERIPHERAL VEIN, PERCUTANEOUS APPROACH: ICD-10-PCS | Performed by: INTERNAL MEDICINE

## 2020-02-07 PROCEDURE — 85014 HEMATOCRIT: CPT | Performed by: PHYSICIAN ASSISTANT

## 2020-02-07 PROCEDURE — 83036 HEMOGLOBIN GLYCOSYLATED A1C: CPT | Performed by: NURSE PRACTITIONER

## 2020-02-07 PROCEDURE — 85610 PROTHROMBIN TIME: CPT | Performed by: NURSE PRACTITIONER

## 2020-02-07 PROCEDURE — 99232 SBSQ HOSP IP/OBS MODERATE 35: CPT | Performed by: INTERNAL MEDICINE

## 2020-02-07 PROCEDURE — 97530 THERAPEUTIC ACTIVITIES: CPT

## 2020-02-07 PROCEDURE — P9016 RBC LEUKOCYTES REDUCED: HCPCS

## 2020-02-07 PROCEDURE — C9113 INJ PANTOPRAZOLE SODIUM, VIA: HCPCS | Performed by: INTERNAL MEDICINE

## 2020-02-07 PROCEDURE — 97163 PT EVAL HIGH COMPLEX 45 MIN: CPT

## 2020-02-07 PROCEDURE — 85007 BL SMEAR W/DIFF WBC COUNT: CPT | Performed by: NURSE PRACTITIONER

## 2020-02-07 PROCEDURE — G0427 INPT/ED TELECONSULT70: HCPCS | Performed by: PSYCHIATRY & NEUROLOGY

## 2020-02-07 PROCEDURE — 99255 IP/OBS CONSLTJ NEW/EST HI 80: CPT | Performed by: PHYSICAL MEDICINE & REHABILITATION

## 2020-02-07 PROCEDURE — 85018 HEMOGLOBIN: CPT | Performed by: PHYSICIAN ASSISTANT

## 2020-02-07 RX ORDER — PANTOPRAZOLE SODIUM 40 MG/1
40 INJECTION, POWDER, FOR SOLUTION INTRAVENOUS EVERY 12 HOURS SCHEDULED
Status: DISCONTINUED | OUTPATIENT
Start: 2020-02-07 | End: 2020-02-09 | Stop reason: HOSPADM

## 2020-02-07 RX ADMIN — AMIODARONE HYDROCHLORIDE 200 MG: 100 TABLET ORAL at 09:53

## 2020-02-07 RX ADMIN — PANTOPRAZOLE SODIUM 40 MG: 40 INJECTION, POWDER, FOR SOLUTION INTRAVENOUS at 21:01

## 2020-02-07 RX ADMIN — ATORVASTATIN CALCIUM 40 MG: 40 TABLET, FILM COATED ORAL at 17:12

## 2020-02-07 RX ADMIN — CHLORHEXIDINE GLUCONATE 0.12% ORAL RINSE 15 ML: 1.2 LIQUID ORAL at 09:53

## 2020-02-07 RX ADMIN — CHLORHEXIDINE GLUCONATE 0.12% ORAL RINSE 15 ML: 1.2 LIQUID ORAL at 21:01

## 2020-02-07 RX ADMIN — DORZOLAMIDE HYDROCHLORIDE AND TIMOLOL MALEATE 1 DROP: 20; 5 SOLUTION/ DROPS OPHTHALMIC at 09:53

## 2020-02-07 RX ADMIN — PANTOPRAZOLE SODIUM 40 MG: 40 INJECTION, POWDER, FOR SOLUTION INTRAVENOUS at 11:56

## 2020-02-07 RX ADMIN — DORZOLAMIDE HYDROCHLORIDE AND TIMOLOL MALEATE 1 DROP: 20; 5 SOLUTION/ DROPS OPHTHALMIC at 17:12

## 2020-02-07 NOTE — ASSESSMENT & PLAN NOTE
· Patient was a stroke alert on 01/17/2020 with right-sided weakness, was found to have a left MCA CVA  Per cardiology documentation they believed that this was a cardioembolic issue due to atrial fibrillation that was underlying  The patient at that time had a narrow complex tachycardia, and Takotsubu cardiomyopathy  She does have known ejection fraction of 20-30%    · Patient was recently discharged from acute rehab on 02/05/2020  · Patient had outpatient EGD and colonoscopy on 02/05/2020  · Will continue medications which include amiodarone, Lipitor, Protonix

## 2020-02-07 NOTE — H&P
H&P- Abbe Seymour 1955, 60 y o  female MRN: [id number]    Unit/Bed#: ICU 01 Encounter: [id number]    Primary Care Provider: Latoya Jerez DO   Date and time admitted to hospital: 2/6/2020  6:02 PM        * Left arm weakness  Assessment & Plan  · Patient had complaints of weakness and tingling of her left hand 2nd 3rd and 4th fingers  · This had resolved by the time the patient arrived in the emergency department  · Patient states this lasted approximately 30 minutes  · Consult to Cardiology, acute rehab, Neurology  · PT OT evaluation  · Continue neuro checks  · Dysphagia assessment  · Baseline NIH is a 2 secondary to chronic bilateral lower extremity weakness, patient does state that she is at her baseline  · Fall precautions  · MRI    H/O ischemic left MCA stroke  Assessment & Plan  · Patient was a stroke alert on 01/17/2020 with right-sided weakness, was found to have a left MCA CVA  Per cardiology documentation they believed that this was a cardioembolic issue due to atrial fibrillation that was underlying  The patient at that time had a narrow complex tachycardia, and Takotsubu cardiomyopathy  She does have known ejection fraction of 20-30%    · Patient was recently discharged from acute rehab on 02/05/2020  · Patient had outpatient EGD and colonoscopy on 02/05/2020  · Will continue medications which include amiodarone, Lipitor, Protonix    Persistent atrial fibrillation  Assessment & Plan  · Patient had been trialed on Toprol, HCTZ, lisinopril, and Eliquis  · Patient will only continue amiodarone    Diverticulitis of large intestine without perforation or abscess without bleeding  Assessment & Plan  · Patient underwent EGD/colonoscopy on 02/04/2020 with Dr Amber Mariano  · Results showed EGD with no significant findings, colonoscopy with moderate extensive pan colonic diverticula - this is most likely the reason why the patient had GI bleeding and anemia during her acute rehab stay, which did require the patient to have a total of 4 units of packed red blood cells, 2 units on 02/03/2020, and 2 units on 02/04/2020  · Consult to cardiology and GI    Iron deficiency anemia due to chronic blood loss  Assessment & Plan  · Consult to GI for review of anticoagulant needs of this patient    Essential hypertension  Assessment & Plan  · Continue amiodarone    Glaucoma of both eyes  Assessment & Plan  · Continue Cosopt    Morbid obesity with BMI of 40 0-44 9, adult (HCC)  Assessment & Plan  · Supportive care    VTE Prophylaxis: None as patient has had issues with GI bleeding in the past  Code Status:  Full code  POLST: POLST is not applicable to this patient  Discussion with family:  Discussed with patient    Anticipated Length of Stay:  Patient will be admitted on an Observation basis with an anticipated length of stay of  < 2 midnights  Justification for Hospital Stay:  Need to be seen by Cardiology and Gastroenterology, as well as Neurology    Chief Complaint:   Left arm weakness    History of Present Illness:    Elvira Espinoza is a 60 y o  female who presents with left arm weakness which included tingling in her left hand 2nd 3rd and 4th fingers  Patient was recently discharged from the acute rehab unit on 02/04/2020 secondary to left MCA CVA  Patient's discharge medications included amiodarone, Lipitor, Cosopt, Protonix, Metamucil pills  Patient was not discharged on any anticoagulation secondary to low hemoglobin, requiring a total 4 units of packed red blood cells, and GI bleeding  Patient did undergo EGD colonoscopy on 02/04/2020 which did show moderate extensive pan colonic diverticula - which is the suspecting cause for her GI bleed  Upon my assessment of the patient in the intensive care unit, although her tingling and weakness sensations have resolved completely    She does state that she has some bilateral lower extremity weakness, but that is from being in the hospital for the number of days     Review of Systems:  Review of Systems   Neurological: Positive for weakness  Left hand 2nd, 3rd, 4th fingers tingly  All other systems reviewed and are negative  Past Medical and Surgical History:   Past Medical History:   Diagnosis Date    Hypertension     PUD (peptic ulcer disease)     Stroke ([facility] [address] )     Vitamin D deficiency        Past Surgical History:   Procedure Laterality Date    ABDOMINOPLASTY      revision gastric bypass    BREAST SURGERY      reduction     SECTION      CHOLECYSTECTOMY      COLONOSCOPY      GASTRIC BYPASS      HERNIA REPAIR      NEUROMA EXCISION Right 2019    Procedure: EXCISION 2ND INTERSPACE NEUROMA;  Surgeon: Maine Santiago DPM;  Location: [address] [place] [address];  Service: Podiatry    ROTATOR CUFF REPAIR Left        Meds/Allergies:  Prior to Admission medications    Medication Sig Start Date End Date Taking? Authorizing Provider   amiodarone 200 mg tablet Take 1 tablet (200 mg total) by mouth daily with breakfast 20  Yes Siomara Jean Baptiste MD   atorvastatin (LIPITOR) 40 mg tablet Take 1 tablet (40 mg total) by mouth every evening 20  Yes Siomara Jean Baptiste MD   dorzolamide-timolol (COSOPT) 22 3-6 8 MG/ML ophthalmic solution Administer 1 drop to both eyes 2 (two) times a day   Yes Historical Provider, MD     I have reviewed home medications with patient personally  Allergies:    Allergies   Allergen Reactions    Nsaids      Due to hx gastric bypass         Social History:  Marital Status: Single   Occupation:  Retired  Patient Pre-hospital Living Situation:  At home  Patient Pre-hospital Level of Mobility:  Improving  Patient Pre-hospital Diet Restrictions:  Regular  Substance Use History:   Social History     Substance and Sexual Activity   Alcohol Use Never    Frequency: Never    Comment: none     Social History     Tobacco Use   Smoking Status Never Smoker   Smokeless Tobacco Never Used     Social History     Substance and Sexual Activity   Drug Use No Comment: none       Family History:  I have reviewed the patients family history    Physical Exam:   Vitals:   Blood Pressure: 119/63 (02/06/20 2245)  Pulse: 63 (02/06/20 2245)  Temperature: 98 °F (36 7 °C) (02/06/20 1805)  Temp Source: Tympanic (02/06/20 1805)  Respirations: 21 (02/06/20 2245)  Height: 5' 3" (160 cm) (02/06/20 1805)  Weight - Scale: 112 kg (248 lb) (02/06/20 1805)  SpO2: 99 % (02/06/20 2245)    Physical Exam   Constitutional: She is oriented to person, place, and time  Vital signs are normal  She appears well-developed and well-nourished  She is cooperative  HENT:   Head: Normocephalic and atraumatic  Nose: Nose normal    Mouth/Throat: Oropharynx is clear and moist and mucous membranes are normal    Eyes: Conjunctivae and EOM are normal    Neck: Full passive range of motion without pain  Cardiovascular: Normal rate, regular rhythm, normal heart sounds and normal pulses  Pulmonary/Chest: Effort normal and breath sounds normal    Abdominal: Soft  Normal appearance and bowel sounds are normal    Musculoskeletal:   Patient does have generalized bilateral lower extremity weakness, secondary to being hospitalized  Neurological: She is alert and oriented to person, place, and time  Skin: Skin is warm  Psychiatric: She has a normal mood and affect  Her speech is normal and behavior is normal        Additional Data:   Lab Results: I have personally reviewed pertinent reports  Results from last 7 days   Lab Units 02/06/20 1909   WBC Thousand/uL 9 70   HEMOGLOBIN g/dL 8 4*   HEMATOCRIT % 27 6*   PLATELETS Thousands/uL 533*     Results from last 7 days   Lab Units 02/06/20 1909   POTASSIUM mmol/L 3 4*   CHLORIDE mmol/L 105   CO2 mmol/L 20*   BUN mg/dL 18   CREATININE mg/dL 1 07   CALCIUM mg/dL 7 7*     Results from last 7 days   Lab Units 02/06/20  1909   INR  1 14               Imaging: I have personally reviewed pertinent reports      CT stroke alert brain   Final Result by [facility] MD Ana Lilia (02/06 9476)      No change from 1/19/2020      Findings were directly discussed with Yoana Hdz on 2/6/2020 6:23 PM       Workstation performed: [id number]         X-ray chest 1 view portable    (Results Pending)   MRI Inpatient Order    (Results Pending)       EKG, Pathology, and Other Studies Reviewed on Admission:   · EKG:  Not performed in the ED    NetAccess / Epic Records Reviewed: Yes     ** Please Note: This note has been constructed using a voice recognition system   **

## 2020-02-07 NOTE — PHYSICIAN ADVISOR
Current patient class: Observation  The patient is currently on Hospital Day: 2 at [address]      This patient was originally admitted to the hospital under observation status  After admission the patient was reevaluated and determined to require further hospitalization  The patient is now documented to require at least a 2nd midnight in the hospital  As such the patient is now expected to satisfy the 2 midnight benchmark and is therefore eligible for inpatient admission  After review of the relevant documentation, labs, vital signs and test results, the patient is appropriate for INPATIENT ADMISSION  Admission to the hospital as an inpatient is a complex decision making process which requires the practitioner to consider the patients presenting complaint, history and physical examination and all relevant testing  With this in mind, in this case, the patient was deemed appropriate for INPATIENT ADMISSION  After review of the documentation and testing available at the time of the admission I concur with this clinical determination of medical necessity  Rationale is as follows: The patient is a 60 yrs Female who presented to the ED at 2/6/2020  6:02 PM with a chief complaint of Extremity Weakness (patient arrived with her brother reporting she had a stroke 2 weeks ago and about 1/2 hour ago felt weakness/tingling in her left arm )   Patient admitted with left arm weakness  SHe has a history of recent stroke  Initial imaging with CT head is negative for acute findings, MRI brain and neurology consult is pending  Hospital course is complicated by anemia  Admission hgb was 8 4 now down to 7 0  GI has been consulted and patient receiving transfusion 2 units of blood  She is currently NPO  Continued hgb monitoring needed and neurology evaluation pending  She is appropriate for inpatient stay      The patients vitals on arrival were ED Triage Vitals [02/06/20 1805] Temperature Pulse Respirations Blood Pressure SpO2   98 °F (36 7 °C) 70 18 156/69 100 %      Temp Source Heart Rate Source Patient Position - Orthostatic VS BP Location FiO2 (%)   Tympanic Monitor Sitting Left arm --      Pain Score       No Pain           Past Medical History:   Diagnosis Date    Hypertension     PUD (peptic ulcer disease)     Stroke ([facility] [address] [facility]     Vitamin D deficiency      Past Surgical History:   Procedure Laterality Date    ABDOMINOPLASTY      revision gastric bypass    BREAST SURGERY      reduction     SECTION      CHOLECYSTECTOMY      COLONOSCOPY      GASTRIC BYPASS      HERNIA REPAIR      NEUROMA EXCISION Right 2019    Procedure: EXCISION 2ND INTERSPACE NEUROMA;  Surgeon: Pako Solorio DPM;  Location: [facility] MAIN OR;  Service: Podiatry    ROTATOR CUFF REPAIR Left        The patient was admitted to the hospital at N/A on N/A for the following diagnosis:  TIA (transient ischemic attack) [G45 9]  Essential hypertension [I10]  Iron deficiency anemia due to chronic blood loss [D50 0]  Persistent atrial fibrillation [I48 19]  Left arm weakness [R29 898]  Lower extremity weakness [R29 898]  H/O ischemic left MCA stroke [Z86 73]    Consults have been placed to:   IP CONSULT TO NEUROLOGY  IP CONSULT TO PHYSICAL MEDICINE REHAB  IP CONSULT TO CASE MANAGEMENT  IP CONSULT TO GASTROENTEROLOGY    Vitals:    20 0800 20 1153 20 1200 20 1300   BP: 122/59 115/56 117/74 120/58   Pulse: 67 (!) 53 (!) 52 74   Resp: (!) 40 18 16 20   Temp: 97 8 °F (36 6 °C) 98 5 °F (36 9 °C)     TempSrc: Temporal      SpO2: 97%  98% 99%   Weight:       Height:           Most recent labs:    Recent Labs     20  1909 20  0505   WBC 9 70 8 40   HGB 8 4* 7 0*   HCT 27 6* 23 1*   * 324   K 3 4* 3 7   CALCIUM 7 7* 7 2*   BUN 18 18   CREATININE 1 07 1 04   INR 1 14 1 16   TROPONINI <0 03  --    AST  --  21   ALT  --  12   ALKPHOS  --  68       Scheduled Meds:  Current Facility-Administered Medications:  acetaminophen 650 mg Oral Q6H PRN LEW Scott   amiodarone 200 mg Oral Daily With Breakfast LEW Scott   atorvastatin 40 mg Oral QPM LEW Scott   chlorhexidine 15 mL Swish & Spit Q12H [address] LEW Scott   dorzolamide-timolol 1 drop Both Eyes BID LEW Scott   ondansetron 4 mg Intravenous Q6H PRN ELW Scott   pantoprazole 40 mg Intravenous Q12H Yue Almazan MD   Psyllium 1 capsule Oral TID LEW Scott     Facility-Administered Medications Ordered in Other Encounters:  lactated ringers 125 mL/hr Intravenous Continuous Brett Kulkarni MD Last Rate: 125 mL/hr (02/05/20 1115)     Continuous Infusions:   PRN Meds:   acetaminophen    ondansetron    Surgical procedures (if appropriate):

## 2020-02-07 NOTE — PLAN OF CARE
Problem: Potential for Falls  Goal: Patient will remain free of falls  Description  INTERVENTIONS:  - Assess patient frequently for physical needs  -  Identify cognitive and physical deficits and behaviors that affect risk of falls    -  [place] fall precautions as indicated by assessment   - Educate patient/family on patient safety including physical limitations  - Instruct patient to call for assistance with activity based on assessment  - Modify environment to reduce risk of injury  - Consider OT/PT consult to assist with strengthening/mobility  Outcome: Progressing     Problem: Prexisting or High Potential for Compromised Skin Integrity  Goal: Skin integrity is maintained or improved  Description  INTERVENTIONS:  - Identify patients at risk for skin breakdown  - Assess and monitor skin integrity  - Assess and monitor nutrition and hydration status  - Monitor labs   - Assess for incontinence   - Turn and reposition patient  - Assist with mobility/ambulation  - Relieve pressure over bony prominences  - Avoid friction and shearing  - Provide appropriate hygiene as needed including keeping skin clean and dry  - Evaluate need for skin moisturizer/barrier cream  - Collaborate with interdisciplinary team   - Patient/family teaching  - Consider wound care consult   Outcome: Progressing     Problem: PAIN - ADULT  Goal: Verbalizes/displays adequate comfort level or baseline comfort level  Description  Interventions:  - Encourage patient to monitor pain and request assistance  - Assess pain using appropriate pain scale  - Administer analgesics based on type and severity of pain and evaluate response  - Implement non-pharmacological measures as appropriate and evaluate response  - Consider cultural and social influences on pain and pain management  - Notify physician/advanced practitioner if interventions unsuccessful or patient reports new pain  Outcome: Progressing     Problem: INFECTION - ADULT  Goal: Absence or prevention of progression during hospitalization  Description  INTERVENTIONS:  - Assess and monitor for signs and symptoms of infection  - Monitor lab/diagnostic results  - Monitor all insertion sites, i e  indwelling lines, tubes, and drains  - Monitor endotracheal if appropriate and nasal secretions for changes in amount and color  - [place] appropriate cooling/warming therapies per order  - Administer medications as ordered  - Instruct and encourage patient and family to use good hand hygiene technique  - Identify and instruct in appropriate isolation precautions for identified infection/condition  Outcome: Progressing  Goal: Absence of fever/infection during neutropenic period  Description  INTERVENTIONS:  - Monitor WBC    Outcome: Progressing     Problem: SAFETY ADULT  Goal: Maintain or return to baseline ADL function  Description  INTERVENTIONS:  -  Assess patient's ability to carry out ADLs; assess patient's baseline for ADL function and identify physical deficits which impact ability to perform ADLs (bathing, care of mouth/teeth, toileting, grooming, dressing, etc )  - Assess/evaluate cause of self-care deficits   - Assess range of motion  - Assess patient's mobility; develop plan if impaired  - Assess patient's need for assistive devices and provide as appropriate  - Encourage maximum independence but intervene and supervise when necessary  - Involve family in performance of ADLs  - Assess for home care needs following discharge   - Consider OT consult to assist with ADL evaluation and planning for discharge  - Provide patient education as appropriate  Outcome: Progressing  Goal: Maintain or return mobility status to optimal level  Description  INTERVENTIONS:  - Assess patient's baseline mobility status (ambulation, transfers, stairs, etc )    - Identify cognitive and physical deficits and behaviors that affect mobility  - Identify mobility aids required to assist with transfers and/or ambulation (gait belt, sit-to-stand, lift, walker, cane, etc )  - [place] fall precautions as indicated by assessment  - Record patient progress and toleration of activity level on Mobility SBAR; progress patient to next Phase/Stage  - Instruct patient to call for assistance with activity based on assessment  - Consider rehabilitation consult to assist with strengthening/weightbearing, etc   Outcome: Progressing     Problem: DISCHARGE PLANNING  Goal: Discharge to home or other facility with appropriate resources  Description  INTERVENTIONS:  - Identify barriers to discharge w/patient and caregiver  - Arrange for needed discharge resources and transportation as appropriate  - Identify discharge learning needs (meds, wound care, etc )  - Arrange for interpretive services to assist at discharge as needed  - Refer to Case Management Department for coordinating discharge planning if the patient needs post-hospital services based on physician/advanced practitioner order or complex needs related to functional status, cognitive ability, or social support system  Outcome: Progressing

## 2020-02-07 NOTE — ASSESSMENT & PLAN NOTE
· Patient had complaints of weakness and tingling of her left hand 2nd 3rd and 4th fingers  · This had resolved by the time the patient arrived in the emergency department  · CT negative for any acute findings  · MRI brain pending  · Will consult neurology  · PT/OT/speech eval  · Continue neuro checks  · Of note patient with recent stroke of left MCA area, was recently discharged from acute rehab

## 2020-02-07 NOTE — ASSESSMENT & PLAN NOTE
· Patient had complaints of weakness and tingling of her left hand 2nd 3rd and 4th fingers  · This had resolved by the time the patient arrived in the emergency department  · Patient states this lasted approximately 30 minutes  · Consult to Cardiology, acute rehab, Neurology  · PT OT evaluation  · Continue neuro checks  · Dysphagia assessment  · Baseline NIH is a 2 secondary to chronic bilateral lower extremity weakness, patient does state that she is at her baseline  · Fall precautions  · MRI

## 2020-02-07 NOTE — UTILIZATION REVIEW
Notification of Inpatient Admission/Inpatient Authorization Request   This is a Notification of Inpatient Admission for [address]  Be advised that this patient was admitted to our facility under Inpatient Status  Contact Flaquita Españao at [phone] for additional admission information  [facility] DEPT  DEDICATED -[phone]  Patient Name:   Papito Dunne   YOB: 1955       Authorization Information   Servicing Facility:   [facility] [address] [address]  Tax ID: [id number]  NPI: [id number] Attending Provider/NPI: Ulysses Milroy, [address] [[id number]]   Attending Physician:  Ulysses Milroy, M D  [facility] ID- [id number]  [address] [place] [address]  Phone [phone]: [phone]  Fax: [phone]   Place of Service Code: 24     Place of Service Name:  [address] [place] [address]   Start Date: 2/7/20 1440     Discharge Date & Time: No discharge date for patient encounter  Type of Admission: Inpatient Status Discharge Disposition   (if discharged): Home with [employer]   Patient Diagnoses: TIA (transient ischemic attack) [G45 9]  Essential hypertension [I10]  Iron deficiency anemia due to chronic blood loss [D50 0]  Persistent atrial fibrillation [I48 19]  Left arm weakness [R29 898]  Lower extremity weakness [R29 898]  H/O ischemic left MCA stroke [Z86 73]     Orders: Admission Orders (From admission, onward)     Ordered        02/07/20 1440  Inpatient Admission  Once         02/06/20 2052  Place in Observation  Once                    Assigned Utilization Review Contact: Britt Pendleton  Utilization   Network Utilization Review Department  Phone: [phone]; Fax [phone]  Email: [email or web address]   ATTENTION PAYERS: Please call the assigned Utilization  directly with any questions or concerns ALL voicemails in the department are confidential  [employer] all requests for admission clinical reviews, approved or denied determinations and any other requests to dedicated fax number belonging to the campus where the patient is receiving treatment

## 2020-02-07 NOTE — PHYSICAL THERAPY NOTE
Physical Therapy Evaluation     Patient's Name: Dot Barrientos    Admitting Diagnosis  TIA (transient ischemic attack) [G45 9]  Essential hypertension [I10]  Iron deficiency anemia due to chronic blood loss [D50 0]  Persistent atrial fibrillation [I48 19]  Left arm weakness [R29 898]  Lower extremity weakness [R29 898]  H/O ischemic left MCA stroke [Z86 73]    Problem List  Patient Active Problem List   Diagnosis    Essential hypertension    Persistent atrial fibrillation    [facility]cere[facility] accident[facility] ([facility] )    Glaucoma of both eyes    Cardiomyopathy ([facility] )    Prolonged QT interval    History of pulmonary embolism    GIB (gastrointestinal bleeding)    IFG (impaired fasting glucose)    Hypocalcemia    Thrombocytosis (HCC)    Iron deficiency anemia due to chronic blood loss    H/O ischemic left MCA stroke    Diverticulitis of large intestine without perforation or abscess without bleeding    Morbid obesity with BMI of 40 0-44 9, adult ([facility] )    Left arm weakness    TIA (transient ischemic attack)       Past Medical History  Past Medical History:   Diagnosis Date    Hypertension     PUD (peptic ulcer disease)     Stroke ([facility] )     Vitamin D deficiency        Past Surgical History  Past Surgical History:   Procedure Laterality Date    ABDOMINOPLASTY      revision gastric bypass    BREAST SURGERY      reduction     SECTION      CHOLECYSTECTOMY      COLONOSCOPY      GASTRIC BYPASS      HERNIA REPAIR      NEUROMA EXCISION Right 2019    Procedure: EXCISION 2ND INTERSPACE NEUROMA;  Surgeon: Sabiha Omalley DPM;  Location: [address] [place] [address];  Service: Podiatry    ROTATOR CUFF REPAIR Left         20 0958   Note Type   Note type Eval/Treat   Pain Assessment   Pain Assessment No/denies pain   Pain Score No Pain   Home Living   Type of Home Apartment   Home Layout One level;Performs ADLs on one level; Able to live on main level with bedroom/bathroom   Bathroom Shower/Tub Tub/shower unit Bathroom Toilet Standard   Bathroom Accessibility Accessible   Home Equipment Walker;Cane  (Pt  reports she was not utilizing an AD PTA  )   Prior Function   Level of [place] Independent with ADLs and functional mobility   Lives With Alone   Receives Help From Neighbor;Family   ADL Assistance Independent   IADLs Needs assistance   Falls in the last 6 months 0   Vocational Retired   Restrictions/Precautions   [facility] Bearing Precautions Per Order No   Other Precautions Telemetry; Fall Risk;Multiple lines;Visual impairment  (glaucoma)   General   Additional Pertinent History Nuno De La Fuente was admitted on 01/17/2020 with right-sided weakness, was found to have a left MCA CVA  She received STR at [address] [place] [address] and recently d/c home  Family/Caregiver Present No   Cognition   Overall Cognitive Status WFL   Arousal/Participation Alert   Orientation Level Oriented X4   Memory Within functional limits   Following Commands Follows one step commands with increased time or repetition  (2/2 pre-existing perseverating characteristics)   RUE Assessment   RUE Assessment   (Please see OT assessment )   LUE Assessment   LUE Assessment   (Please see OT assessment )   RLE Assessment   RLE Assessment X  (4-/5 gross musculature)   LLE Assessment   LLE Assessment X  (4-/5 gross musculature)   Light Touch   RLE Light Touch Grossly intact   LLE Light Touch Grossly intact   Sharp/Dull   RLE Sharp/Dull Grossly intact   LLE Sharp/Dull Grossly intact   Bed Mobility   Rolling R 5  Supervision   Additional items Assist x 1;Bedrails; Increased time required;Verbal cues   Rolling L 5  Supervision   Additional items Assist x 1;Bedrails; Increased time required;Verbal cues   Supine to Sit   (CGA)   Additional items Assist x 1;Bedrails; Increased time required;Verbal cues   Sit to Supine 4  Minimal assistance   Additional items Assist x 1;Bedrails; Increased time required;Verbal cues;LE management   Transfers   Sit to Stand 4  Minimal assistance Additional items Assist x 1; Increased time required;Verbal cues   Stand to Sit   (CGA)   Additional items Assist x 1; Increased time required;Verbal cues   Stand pivot   (CGA)   Additional items Assist x 1;Verbal cues; Increased time required   Ambulation/Elevation   Gait pattern Improper Weight shift;Decreased foot clearance; Inconsistent mikael; Short stride   Gait Assistance   (CGA)   Additional items Assist x 1;Verbal cues; Tactile cues   Assistive Device None  (HHA)   Distance 35 feet  (from w/c->toilet->bedside)   Stair Management Assistance Not tested   Balance   Static Sitting Good   Dynamic Sitting Fair   Static Standing Fair   Dynamic Standing Fair -   Ambulatory Fair -   Endurance Deficit   Endurance Deficit Yes   Endurance Deficit Description fatigue present upon mobilization  (/69   HR 67)   Activity Tolerance   Activity Tolerance Patient limited by fatigue   Nurse Made Aware yes, Camryn Henderson RN   Assessment   Prognosis Good   Problem List Decreased strength;Decreased endurance; Impaired balance;Decreased mobility; Impaired judgement;Decreased safety awareness; Obesity   Assessment Pt is 60 y o  female seen for PT evaluation s/p admit to [facility] ICU on 2/6/2020 w/ Left arm weakness  PT consulted to assess pt's functional mobility and d/c needs  Order placed for PT eval and tx, w/ eval & treat order  Comorbidities affecting pt's physical performance at time of assessment include: weakness, perseverating characteristics,L arm weakness, glaucoma, iron deficient anemia, recent CVA,   PTA, pt was independent w/ all functional mobility w/ o AD  Personal factors affecting pt at time of IE include: communication issues, inability to navigate community distances, unable to perform dynamic tasks in community, decreased initiation and engagement, limited insight into impairments and inability to perform ADLs   Please find objective findings from PT assessment regarding body systems outlined above with impairments and limitations including weakness, impaired balance, decreased endurance, gait deviations, decreased activity tolerance, decreased functional mobility tolerance, decreased safety awareness and fall risk  The following objective measures performed on IE also reveal limitations: Barthel Index: 60/100  Pt's clinical presentation is currently unstable/unpredictable seen in pt's presentation of neurological symtoms LUE, abnormal CBC values receiving RBCs,recent CVA  Pt to benefit from continued PT tx to address deficits as defined above and maximize level of functional independent mobility and consistency  From PT/mobility standpoint, recommendation at time of d/c would be Home PT with family support pending progress in order to facilitate return to PLOF  Goals   Patient Goals to know what's going on   LTG Expiration Date 02/17/20   Long Term Goal #1 1 )Patient will complete bed mobility modified I for decrease need for caregiver assistance, decrease burden of care  2 ) Patient will complete transfers with supervision of 1 to decrease risk of falls, facilitate upright standing posture  3 ) BLE strength to greater than/equal to 4/5 gross musculature to increase ability to safely transfer, control descent to chair  4 ) Patient will exhibit increase dynamic standing balance to Good, for 5-7 minutes without LOB and supervision of 1 to improve activity tolerance & endurance  5 ) Patient will exhibit increase dynamic ambulatory balance to Good for 150-250 w/o AD independently to improve ability to mobilize to toilet, chair and decrease risk for additional medical complications  6 ) Patient will exhibit good self monitoring and ability to follow 2 step commands to increase complexity of tasks and resume ADL's without LOB  PT Treatment Day 1   Plan   Treatment/Interventions Functional transfer training;LE strengthening/ROM; Therapeutic exercise; Endurance training;Patient/family training;Bed mobility;Gait training;Spoke to nursing;OT   PT Frequency Other (Comment)  (3-5x/wk)   Recommendation   Recommendation Home PT; Home with family support   Equipment Recommended Other (Comment)  (TBD)   PT - OK to Discharge No   Additional Comments Upon conclusion, pt  was resting in bed w/SCD's active & all needs within reach  Barthel Index   Feeding 10   Bathing 5   Grooming Score 5   Dressing Score 10   Bladder Score 5   Bowels Score 10   Toilet Use Score 5   Transfers (Bed/Chair) Score 10   Mobility (Level Surface) Score 0   Stairs Score 0   Barthel Index Score 60     Additional skilled interventions: Therapeutic Activity x 10 minutes    S: No reported pain prior or during session, patient agreeable to mobilize and requested toilet usage  A&Ox 4, however demonstrated perseverating characteristics requiring verbal cues to regain task focus  O: therapeutic activity x 10 minutes including mobilization education: bed mobility, supine<->sit, static/dynamic sitting balance w/ postural facilitation, sit<->stand transfers, static/dynamic standing balance w/ postural facilitation, toileting on standard toilet, and continued safety training and increased awareness  A: Patient exhibited weakness, impaired balance, gait dysfunction, decreased endurance, activity intolerance, and decline from PLOF to benefit from continued interventions  P: Continue PT tx with progression of functional tasks as patient can safely tolerate, 3-5x/week      Thiago Carranza, PT

## 2020-02-07 NOTE — ASSESSMENT & PLAN NOTE
· Patient had been trialed on Toprol, HCTZ, lisinopril, and Eliquis  · Patient will only continue amiodarone

## 2020-02-07 NOTE — OCCUPATIONAL THERAPY NOTE
Occupational Therapy Evaluation      Burt Baires    2020    Principal Problem:    Left arm weakness  Active Problems:    Essential hypertension    Persistent atrial fibrillation    Glaucoma of both eyes    Iron deficiency anemia due to chronic blood loss    H/O ischemic left MCA stroke    Diverticulitis of large intestine without perforation or abscess without bleeding    Morbid obesity with BMI of 40 0-44 9, adult (HCC)    TIA (transient ischemic attack)      Past Medical History:   Diagnosis Date    Hypertension     PUD (peptic ulcer disease)     Stroke ([address] )     Vitamin D deficiency        Past Surgical History:   Procedure Laterality Date    ABDOMINOPLASTY      revision gastric bypass    BREAST SURGERY      reduction     SECTION      CHOLECYSTECTOMY      COLONOSCOPY      GASTRIC BYPASS      HERNIA REPAIR      NEUROMA EXCISION Right 2019    Procedure: EXCISION 2ND INTERSPACE NEUROMA;  Surgeon: Yaneli Townsend DPM;  Location: [address] [place] [address] OR;  Service: Podiatry    ROTATOR CUFF REPAIR Left         20 0932   Note Type   Note type Eval/Treat   Restrictions/Precautions   Weight Bearing Precautions Per Order No   Other Precautions Fall Risk;Telemetry;Multiple lines   Pain Assessment   Pain Assessment No/denies pain   Pain Score No Pain   Home Living   Type of Home Apartment   Home Layout One level;Performs ADLs on one level; Able to live on main level with bedroom/bathroom   Bathroom Shower/Tub Tub/shower unit   Bathroom Toilet Standard   Bathroom Accessibility Accessible   Home Equipment Walker;Cane   Additional Comments ambulatory w/o devices prior to CVA, recently returned home from ARU   Prior Function   Level of [place] Independent with ADLs and functional mobility   Lives With Alone   Receives Help From Family; Neighbor   ADL Assistance Independent   IADLs Needs assistance   Falls in the last 6 months 0   Vocational Retired   Psychosocial   Psychosocial ([address]) [address]   Patient Behaviors/Mood Other (Comment)  (pt perseverates and fixated on some tasks/difficult to focum)   Subjective   Subjective agreeable to therapy eval, denies pain, denies any 'new' symptoms   ADL   Eating Assistance 5  Supervision/Setup   Grooming Assistance 5  Supervision/Setup   UB Bathing Assistance 4  Minimal Assistance   LB Bathing Assistance 4  Minimal Assistance   [address] 3  Moderate Assistance  (r/t lines)   Bed Mobility   Rolling R 5  Supervision   Additional items Assist x 1;Bedrails; Increased time required;Verbal cues   Rolling L 5  Supervision   Additional items Assist x 1;Bedrails; Increased time required;Verbal cues   Supine to Sit   (CGA)   Additional items Assist x 1;Bedrails; Increased time required;Verbal cues   Sit to Supine 4  Minimal assistance   Additional items Assist x 1;Bedrails; Increased time required;Verbal cues;LE management   Transfers   Sit to Stand 4  Minimal assistance   Additional items Assist x 1; Increased time required;Verbal cues   Stand to Sit   (CGA)   Additional items Assist x 1; Increased time required;Verbal cues   Balance   Static Sitting Good   Dynamic Sitting Fair   Static Standing Fair   Dynamic Standing Fair -   Ambulatory Fair -   Activity Tolerance   Activity Tolerance Patient limited by fatigue   Nurse Made Aware yes   RUE Assessment   RUE Assessment WFL   LUE Assessment   LUE Assessment WFL   Hand Function   Gross Motor Coordination Functional   Fine Motor Coordination Functional   Cognition   Overall Cognitive Status WFL   Arousal/Participation Alert; Cooperative   Attention Attends with cues to redirect   Orientation Level Oriented X4   Memory Within functional limits   Following Commands Follows one step commands with increased time or repetition   Comments re direction as pt focuses pardeep task/need, repeats self   Assessment   Limitation Decreased ADL status; Decreased Safe judgement during ADL;Decreased endurance;Decreased self-care trans;Decreased high-level ADLs   Prognosis Good   Assessment Pt is a 60 y o  female seen for OT evaluation s/p admit to [address] on 2/6/2020 w/ Left arm weakness  Comorbidities affecting pt's functional performance at time of assessment include: HTN, obesity and recent CVA, A Fib, Cardiomyopathy, see H & P for additional PMHx  Personal factors affecting pt at time of IE include:steps to enter environment, limited home support, difficulty performing ADLS and difficulty performing IADLS   Prior to admission, pt was I with self care, some A with IADL's (recently r/t CVA)  Upon evaluation: Pt requires a slight increase in self care ADL's and functional mobility/toileting/transfers r/t fatigue and the following deficits impacting occupational performance: decreased tolerance and decreased safety awareness  (no overt/new UB deficits noted) Pt to benefit from continued skilled OT tx while in the hospital to address deficits as defined above and maximize level of functional independence w ADL's and functional mobility  Occupational Performance areas to address include: bathing/shower, dressing and functional mobility  From OT standpoint, recommendation at time of d/c would be HHC and family support  Goals   Patient Goals see what the test says   STG Time Frame 3-5   Short Term Goal #1 increase UB self care ADL's to set-up/SBA   Short Term Goal #2 increase bed mobility to MI for self care participation   LTG Time Frame 7-10   Long Term Goal #1 increase LB self care to MI   Long Term Goal #2 increase self toileting to MI with good safety demonstrated   Plan   Treatment Interventions ADL retraining;Energy conservation; Activityengagement; Functional transfer training; Endurance training;Patient/family training; Compensatory technique education   Goal Expiration Date 02/17/20   OT Treatment Day 0   OT Frequency 3-5x/wk   Recommendation   OT Discharge Recommendation Home with family support   OT - OK to Discharge Yes   Barthel Index   Feeding 10 Bathing 5  (set up/some assist)   Grooming Score 5   Dressing Score 5   Bladder Score 5   Bowels Score 10   Toilet Use Score 5   Transfers (Bed/Chair) Score 10   Mobility (Level Surface) Score 0   Stairs Score 0   Barthel Index Score 55   Viktoria Rodrigues, OT

## 2020-02-07 NOTE — ASSESSMENT & PLAN NOTE
· Patient underwent EGD/colonoscopy on 02/04/2020 with Dr Delio Carter  · Results showed EGD with no significant findings, colonoscopy with moderate extensive pan colonic diverticula - this is most likely the reason why the patient had GI bleeding and anemia during her acute rehab stay, which did require the patient to have a total of 4 units of packed red blood cells, 2 units on 02/03/2020, and 2 units on 02/04/2020    · Consult to cardiology and GI

## 2020-02-07 NOTE — TELEMEDICINE
TeleConsultation - Neurology   Lance Kong 60 y o  female MRN: [id number]   Encounter: [id number]      REQUIRED DOCUMENTATION:     1  This service was provided via Telemedicine  2  Provider located at home  3  TeleMed provider: Eluterio Dancer, MD   4  Identify all parties in room with patient during tele consult:  RN  5  After connecting through [employer], patient was identified by name and date of birth and assistant checked wristband  Patient was then informed that this was a Telemedicine visit and that the exam was being conducted confidentially over secure lines  My office door was closed  No one else was in the room  Patient acknowledged consent and understanding of privacy and security of the Telemedicine visit, and gave us permission to have the assistant stay in the room in order to assist with the history and to conduct the exam   I informed the patient that I have reviewed their record in Epic and presented the opportunity for them to ask any questions regarding the visit today  The patient agreed to participate  Assessment/Plan   Assessment:  Transient left arm weakness (arm drop) associated with tingling and numbness  Symptoms started abruptly and fully resolved within 30 minutes  They are very likely TIA in nature  Patient has history of A-fib but could not be anticoagulated due to GI bleeding  Eliquis was discontinued and now she is on Amiodarone only  CTA on 1/17/2020 was normal  TTE on 1/17/2020 showed ventricular dyskinesia at apex  Plan:  Agree with MRI brain  Pending  GI recs are appreciated  Patient had a colonoscopy 3 days ago which showed diverticulosis  If anticoagulation can be restarted, she should be put back on Eliquis  If not, then cardiology evaluation for watchman device is appropriate         History of Present Illness     Reason for Consult / Principal Problem: Left sided weakness  Hx and PE limited by: none  HPI: Lance Kong is a 60 y o  female with recent cardio-embolic stroke of left MCA in setting of A-fib presents for brief drop of left arm and lateral three fingers  She stated she could not move the arm at that time  In addition, she stated the arm was tingling and numb  Symptoms resolved within 30 minutes and patient was back to baseline  No other issues    Consult to neurology  Consult performed by: Ammy Doshi MD  Consult ordered by: LEW Rivera          Review of Systems  Complete ROS was done and is negative other than what is mentioned in HPI    Historical Information   Past Medical History:   Diagnosis Date    Hypertension     PUD (peptic ulcer disease)     [facility]     Vitamin D deficiency      Past Surgical History:   Procedure Laterality Date    ABDOMINOPLASTY      revision gastric bypass    BREAST SURGERY      reduction     SECTION      CHOLECYSTECTOMY      COLONOSCOPY      GASTRIC BYPASS      HERNIA REPAIR      NEUROMA EXCISION Right 2019    Procedure: EXCISION 2ND INTERSPACE NEUROMA;  Surgeon: Isak Bishop DPM;  [facility];  Service: Podiatry    ROTATOR CUFF REPAIR Left      Social History   Social History     Substance and Sexual Activity   Alcohol Use Never    Frequency: Never    Comment: none     Social History     Substance and Sexual Activity   Drug Use No    Comment: none     Social History     Tobacco Use   Smoking Status Never Smoker   Smokeless Tobacco Never Used     Family History: non-contributory    Review of previous medical records was completed  Meds/Allergies   PTA meds:   Prior to Admission Medications   Prescriptions Last Dose Informant Patient Reported?  Taking?   amiodarone 200 mg tablet 2020 at Unknown time  No Yes   Sig: Take 1 tablet (200 mg total) by mouth daily with breakfast   atorvastatin (LIPITOR) 40 mg tablet 2020 at Unknown time  No Yes   Sig: Take 1 tablet (40 mg total) by mouth every evening   dorzolamide-timolol (COSOPT) 22 3-6 8 MG/ML ophthalmic solution 2/6/2020 at Unknown time  Yes Yes   Sig: Administer 1 drop to both eyes 2 (two) times a day      Facility-Administered Medications: None       Allergies   Allergen Reactions    Nsaids      Due to hx gastric bypass         Objective   Vitals:Blood pressure 122/59, pulse 67, temperature 97 8 °F (36 6 °C), temperature source Temporal, resp  rate (!) 40, height 5' 3" (1 6 m), weight 113 kg (249 lb 9 oz), SpO2 97 %, not currently breastfeeding  ,Body mass index is 44 21 kg/m²  No intake or output data in the 24 hours ending 02/07/20 1026    Invasive Devices: Invasive Devices     Peripheral Intravenous Line            Peripheral IV 02/06/20 Left Wrist less than 1 day                Physical Exam NAD  Skin: no seen lesions  HEENT: ATNC  Chest: breathing comfortably on room air  GI: no apparent distension  Neurologic Exam   Alert and oriented for self, place and time  Memory is intact to recent and remote events  Language is intact to comprehension and expression  No neglect  Speech is normal  EOMI  Pupils are symmetric  Visual field appears intact  Face is symmetric (?flattending of right NL fold)  Tongue is midline  Able to move all extremities against gravity, although right side appears slightly weaker  No dysmetria noted   Did not test gait      Lab Results:   CBC:   Results from last 7 days   Lab Units 02/07/20  0505 02/06/20  1909 02/05/20  0511   WBC Thousand/uL 8 40 9 70 9 80   RBC Million/uL 2 85* 3 46* 3 36*   HEMOGLOBIN g/dL 7 0* 8 4* 8 4*   HEMATOCRIT % 23 1* 27 6* 26 5*   MCV fL 81 80* 79*   PLATELETS Thousands/uL 324 533* 483*   , BMP/CMP:   Results from last 7 days   Lab Units 02/07/20  0505 02/06/20  1909   SODIUM mmol/L 136 136   POTASSIUM mmol/L 3 7 3 4*   CHLORIDE mmol/L 108* 105   CO2 mmol/L 20* 20*   BUN mg/dL 18 18   CREATININE mg/dL 1 04 1 07   CALCIUM mg/dL 7 2* 7 7*   AST U/L 21  --    ALT U/L 12  --    ALK PHOS U/L 68  --    EGFR ml/min/1 73sq m 58 56   , TSH:   , Lipid Profile:     Imaging Studies: I have personally reviewed pertinent films in PACS  EKG, Pathology, and Other Studies: I have personally reviewed pertinent reports      VTE Prophylaxis: Sequential compression device Asif Zamorano)     Code Status: Level 1 - Full Code  Advance Directive and Living Will:      Power of :    POLST:      Counseling / Coordination of Care  N/A

## 2020-02-07 NOTE — PLAN OF CARE
Problem: DISCHARGE PLANNING - CARE MANAGEMENT  Goal: Discharge to post-acute care or home with appropriate resources  Description  INTERVENTIONS:  - Conduct assessment to determine patient/family and health care team treatment goals, and need for post-acute services based on payer coverage, community resources, and patient preferences, and barriers to discharge  - Address psychosocial, clinical, and financial barriers to discharge as identified in assessment in conjunction with the patient/family and health care team  - Arrange appropriate level of post-acute services according to patient's   needs and preference and payer coverage in collaboration with the physician and health care team  - Communicate with and update the patient/family, physician, and health care team regarding progress on the discharge plan  - Arrange appropriate transportation to post-acute venues  Pt will go home and resume services with [facility]    Brother will transport    Outcome: Progressing

## 2020-02-07 NOTE — ASSESSMENT & PLAN NOTE
· Hemoglobin down to 7 this morning, patient denies any melena  · Patient had EGD/colonoscopy done on 02/04/2020  · Will start Protonix IV b i d   · Consult GI  · NPO for now  · IV fluids as needed

## 2020-02-07 NOTE — SOCIAL WORK
Evaluated the pt at the bedside with the brother Gerri Hurt present  Pt was admitted to the hospital with a TIA  Pt was just discharged from [address] 2/5/2020 after having a CVA  Explained the role of CM and the options of discharge planning with the pt  Pt reports she lives in a first floor apartment by herself  Pt has 1 JEREL  Pt indicated she has been bathing and dressing herself  Was able to make her meals, take her medications and was walking independently without any device  PT brother will provide transport  Pt has an upcoming appt with Dr Gerber Mustafa on 2/13/2020  Pt gets her medications from [facility] on first [address]  states she is active with [facility]  A post acute care recommendation was made by your care team for [facility]  Discussed [place] of Choice with patient  Choice is to return/continue services  Pt will be receiving blood transfusion today and is awaiting a consult from GI  Brother states the pt had a colonoscopy while she was previously in the hospital   Will make the referral to [facility] to resume services  Brother will transport home  Patient/caregiver received discharge checklist   Content reviewed  Patient/caregiver encouraged to participate in discharge plan of care prior to discharge home  CM reviewed d/c planning process including the following: identifying help at home, patient preference for d/c planning needs, availability of treatment team to discuss questions or concerns patient and/or family may have regarding understanding medications and recognizing signs and symptoms once discharged  CM also encouraged patient to follow up with all recommended appointments after discharge  Patient advised of importance for patient and family to participate in managing patients medical well being

## 2020-02-07 NOTE — CONSULTS
Consultation - GI   Mihir Schwarz 60 y o  female MRN: [id number]  Unit/Bed#: ICU 01 Encounter: [id number]      Assessment/Plan     Assessment:  Acute on chronic GI bleeding of uncertain etiology  Possibilities include diverticular bleed or bleeding secondary to AVMs  Recent EGD and colonoscopy done 2 days ago revealed no obvious source of bleeding  Plan:  Need to continue to avoid anticoagulation  Agree with blood transfusion  Consider CT angiography and or bleeding scan if there is evidence of any further bleeding  Consider cardiology evaluation for possible watchman device placement  Consider small bowel evaluation by capsule or double balloon enteroscopy  Allow clear liquid diet  History of Present Illness   Physician Requesting Consult: Gene Ruby MD  Reason for Consult / Principal Problem:  GI bleed  Hx and PE limited by:   HPI: Mihir Schwarz is a 60y o  year old female who presents with neurologic symptoms, possibly consistent with TIA  The patient cannot take anticoagulation due to GI bleeding  She was admitted with hemoglobin of 7  She just had a negative EGD  She is status post gastric bypass and the scope was passed well into jejunum without any source of bleeding  The colonoscopy revealed a fair to poor preparation with diffuse diverticulosis  There was black stool scattered throughout but no obvious source of bleeding  AVMs or small polyps may not have been seen  The patient claims to have done her best with the preparation  She is not able to drink much liquids due to her gastric bypass  Consults    Review of Systems   Constitutional: Positive for activity change, appetite change and fatigue  HENT: Negative  Respiratory: Negative  Gastrointestinal: Positive for blood in stool  Musculoskeletal: Positive for arthralgias  Skin: Positive for pallor         Historical Information   Past Medical History:   Diagnosis Date    Hypertension     PUD (peptic ulcer disease)     Stroke [facility] [address] [facility]     Vitamin D deficiency      Past Surgical History:   Procedure Laterality Date    ABDOMINOPLASTY      revision gastric bypass    BREAST SURGERY      reduction     SECTION      CHOLECYSTECTOMY      COLONOSCOPY      GASTRIC BYPASS      HERNIA REPAIR      NEUROMA EXCISION Right 2019    Procedure: EXCISION 2ND INTERSPACE NEUROMA;  Surgeon: Isak Bishop DPM;  Location: [address] [place] [address];  Service: Podiatry    ROTATOR CUFF REPAIR Left      Social History   Social History     Substance and Sexual Activity   Alcohol Use Never    Frequency: Never    Comment: none     Social History     Substance and Sexual Activity   Drug Use No    Comment: none     Social History     Tobacco Use   Smoking Status Never Smoker   Smokeless Tobacco Never Used     Family History: non-contributory    Meds/Allergies   all current active meds have been reviewed    Allergies   Allergen Reactions    Nsaids      Due to hx gastric bypass         Objective     No intake or output data in the 24 hours ending 20 1319    Invasive Devices:   Peripheral IV 20 Left Wrist (Active)   Site Assessment Clean;Dry; Intact 2020 10:10 PM   Dressing Type Transparent 2020 10:10 PM   Line Status Blood return noted 2020 10:10 PM   Dressing Status Clean;Dry; Intact 2020 10:10 PM       Physical Exam   Constitutional: She appears well-developed  HENT:   Head: Normocephalic and atraumatic  Cardiovascular: Normal rate  Pulmonary/Chest: Effort normal and breath sounds normal    Abdominal: Soft  Bowel sounds are normal    Skin: Skin is warm and dry  Psychiatric: She has a normal mood and affect  Lab Results: I have personally reviewed pertinent reports  Imaging Studies: I have personally reviewed pertinent reports  EKG, Pathology, and Other Studies: I have personally reviewed pertinent reports        VTE Prophylaxis: Sequential compression device Natasha Anderson)     Counseling / Coordination of Care  Total floor / unit time spent today 60 minutes  Greater than 50% of total time was spent with the patient and / or family counseling and / or coordination of care  A description of the counseling / coordination of care:  Case discussed with patient and hospital physician

## 2020-02-07 NOTE — PROGRESS NOTES
Progress Note - Luci Parents 1955, 60 y o  female MRN: [id number]    Unit/Bed#: ICU 01 Encounter: [id number]    Primary Care Provider: Adam Wallace DO   Date and time admitted to hospital: 2/6/2020  6:02 PM        * Left arm weakness  Assessment & Plan  · Patient had complaints of weakness and tingling of her left hand 2nd 3rd and 4th fingers  · This had resolved by the time the patient arrived in the emergency department  · CT negative for any acute findings  · MRI brain pending  · Will consult neurology  · PT/OT/speech eval  · Continue neuro checks  · Of note patient with recent stroke of left MCA area, was recently discharged from acute rehab    Iron deficiency anemia due to chronic blood loss  Assessment & Plan  · Hemoglobin down to 7 this morning, patient denies any melena  · Patient had EGD/colonoscopy done on 02/04/2020  · Will start Protonix IV b i d   · Consult GI  · NPO for now  · IV fluids as needed    Morbid obesity with BMI of 40 0-44 9, adult [facility])  Assessment & Plan  · Supportive care  · Nutrition consult    H/O ischemic left MCA stroke  Assessment & Plan  · Patient was a stroke alert on 01/17/2020 with right-sided weakness, was found to have a left MCA CVA  Per cardiology documentation they believed that this was a cardioembolic issue due to atrial fibrillation that was underlying  The patient at that time had a narrow complex tachycardia, and Takotsubu cardiomyopathy  She does have known ejection fraction of 20-30%    · Patient was recently discharged from acute rehab on 02/05/2020  · Will continue medications which include amiodarone, Lipitor, Protonix    Persistent atrial fibrillation  Assessment & Plan  · Currently rate controlled  · Will continue amiodarone  · Follow-up with cardiology    Essential hypertension  Assessment & Plan  · BP has been on the lower side  · Currently not on any antihypertensives  · Will monitor      VTE Pharmacologic Prophylaxis: Pharmacologic: Held due to suspected GI bleed    Patient Centered Rounds: I have performed bedside rounds with nursing staff today  Discussions with Specialists or Other Care Team Provider: yes  Education and Discussions with Family / Patient: yes    Current Length of Stay: 0 day(s)    Current Patient Status: Observation   Certification Statement: The patient will continue to require additional inpatient hospital stay due to Suspected CVA    Discharge Plan:  Pending hospital course    Code Status: Level 1 - Full Code    Subjective:   Patient denies any melena this morning however states that she really has not had a bowel movement over the last few days  Patient did have a colonoscopy/EGD done on 2020  Patient denies any pain complaints  States that her left upper extremity weakness is improved    Objective:     Vitals:   Temp (24hrs), Av 8 °F (36 6 °C), Min:97 6 °F (36 4 °C), Max:98 °F (36 7 °C)    Temp:  [97 6 °F (36 4 °C)-98 °F (36 7 °C)] 97 6 °F (36 4 °C)  HR:  [] 62  Resp:  [18-46] 20  BP: ()/(48-70) 107/57  SpO2:  [92 %-100 %] 95 %  Body mass index is 44 21 kg/m²  Input and Output Summary (last 24 hours):     No intake or output data in the 24 hours ending 20 0808    Physical Exam:     Physical Exam   Constitutional: No distress  Morbidly obese  female   HENT:   Head: Normocephalic and atraumatic  Eyes: Conjunctivae and EOM are normal    Neck: Normal range of motion  Cardiovascular: Normal rate and regular rhythm  Pulmonary/Chest: Effort normal  No respiratory distress  Abdominal: Soft  She exhibits no distension  There is no tenderness  Musculoskeletal: Normal range of motion  Neurological: She is alert  No cranial nerve deficit  Coordination normal    Skin: Skin is warm and dry  Psychiatric: She has a normal mood and affect   Her behavior is normal        Additional Data:     Labs:    Results from last 7 days   Lab Units 20  0505   WBC Thousand/uL 8 40   HEMOGLOBIN g/dL 7 0*   HEMATOCRIT % 23 1*   PLATELETS Thousands/uL 324   LYMPHO PCT % 20   MONO PCT % 11     Results from last 7 days   Lab Units 02/07/20  0505   POTASSIUM mmol/L 3 7   CHLORIDE mmol/L 108*   CO2 mmol/L 20*   BUN mg/dL 18   CREATININE mg/dL 1 04   CALCIUM mg/dL 7 2*   ALK PHOS U/L 68   ALT U/L 12   AST U/L 21     Results from last 7 days   Lab Units 02/07/20  0505   INR  1 16               * I Have Reviewed All Lab Data Listed Above  * Additional Pertinent Lab Tests Reviewed: Naif 66 Admission  Reviewed    Imaging:  Imaging Reports Reviewed Today Include:  No new imaging    Recent Cultures (last 7 days):           Last 24 Hours Medication List:     Current Facility-Administered Medications:  acetaminophen 650 mg Oral Q6H PRN Carla A Lexikes, CRNP   amiodarone 200 mg Oral Daily With Breakfast Carla A Lillian, CRNP   atorvastatin 40 mg Oral QPM Carla A Lillian, CRNP   chlorhexidine 15 mL Swish & Spit Q12H [address] Carla A Lillian, CRNP   dorzolamide-timolol 1 drop Both Eyes BID Carla A Lexikes, CRNP   NON FORMULARY 1 tablet Oral TID Carla A Lillian, CRNP   ondansetron 4 mg Intravenous Q6H PRN Carla A Lillian, CRNP   pantoprazole 40 mg Intravenous Q12H Chemo Starr MD     Facility-Administered Medications Ordered in Other Encounters:  lactated ringers 125 mL/hr Intravenous Continuous Gordo Asencio MD Last Rate: 125 mL/hr (02/05/20 1115)        Today, Patient Was Seen By: Praul Clark MD    ** Please Note: Dictation voice to text software may have been used in the creation of this document   **

## 2020-02-07 NOTE — UTILIZATION REVIEW
Initial Clinical Review  Observation 2/6/20 @ 2052, converted to inpatient admission 2/7/20 @ 1440 for continued care & tx for L arm weakness  Per MD:  Left arm weakness with history of recent stroke  Initial imaging with CT head is negative for acute findings, neuro consulted, MRI brain pending  Hospital course is complicated by anemia  Admission hgb was 8 4 now down to 7 0  GI has been consulted and patient receiving transfusion 2 units of blood  She is currently NPO  Continued hgb monitoring needed  Per GI:  Assessment:  Acute on chronic GI bleeding of uncertain etiology  Possibilities include diverticular bleed or bleeding secondary to AVMs  Recent EGD and colonoscopy done 2 days ago revealed no obvious source of bleeding  Plan:  Need to continue to avoid anticoagulation  Agree with blood transfusion  Consider CT angiography and or bleeding scan if there is evidence of any further bleeding  Consider cardiology evaluation for possible watchman device placement  Consider small bowel evaluation by capsule or double balloon enteroscopy  Allow clear liquid diet  Per neuro:  Assessment:  Transient left arm weakness (arm drop) associated with tingling and numbness  Symptoms started abruptly and fully resolved within 30 minutes  They are very likely TIA in nature  Patient has history of A-fib but could not be anticoagulated due to GI bleeding  Eliquis was discontinued and now she is on Amiodarone only  CTA on 1/17/2020 was normal  TTE on 1/17/2020 showed ventricular dyskinesia at apex  Plan:  Agree with MRI brain  Pending  GI recs are appreciated  Patient had a colonoscopy 3 days ago which showed diverticulosis  If anticoagulation can be restarted, she should be put back on Eliquis  If not, then cardiology evaluation for watchman device is appropriate      Admitting Physician Terence Orlando    Level of Care Critical Care    Estimated length of stay More than 2 Midnights    Certification I certify that inpatient services are medically necessary for this patient for a duration of greater than two midnights  See H&P and MD Progress Notes for additional information about the patient's course of treatment            Order History   Inpatient   Date/Time Action Taken User   02/07/20 1440 Release Nayely Bishop MD (auto-released)   02/07/20 1440 Complete Nayely Bishop MD     ED Arrival Information     Expected Arrival Acuity Means of Arrival Escorted By Service Admission Type    - 2/6/2020 17:48 Emergent Walk-In Family Member General Medicine Emergency    Arrival Complaint    Arm Weakness         Chief Complaint   Patient presents with    Extremity Weakness     patient arrived with her brother reporting she had a stroke 2 weeks ago and about 1/2 hour ago felt weakness/tingling in her left arm      Assessment/Plan:   74-year-old female recent history of stroke 3 weeks ago presents complaining of left arm weakness  She states that approximately 1700 hours today she felt tingling in her 2nd 3rd and 4th left finger and experienced significant weakness in the left arm  She was unable to the left her left arm off of the bed and came to the emergency department immediately  She states at that time her symptoms have resolved that they lasted for approximately 30 minutes  Although she admits that both her legs are still rather weak, she was able to walk in on her own and has no new complaints  The man accompanying her states that this is currently her baseline  Her initial NIH is to for some leg weakness which is her baseline  She currently denies any headache, fever, chills, chest pain, shortness of breath, abdominal pain, urinary symptoms or lower leg pain or swelling      ED Triage Vitals [02/06/20 1805]   Temperature Pulse Respirations Blood Pressure SpO2   98 °F (36 7 °C) 70 18 156/69 100 %      Temp Source Heart Rate Source Patient Position - Orthostatic VS BP Location FiO2 (%)   Tympanic Monitor Sitting Left arm --      Pain Score       No Pain        Wt Readings from Last 1 Encounters:   02/06/20 113 kg (249 lb 9 oz)     Additional Vital Signs:   02/07/20 0100    64  20  102/59  74  94 %       02/07/20 0045    65  21  101/56  73  95 %       02/07/20 0030    71  24Abnormal   103/58  77  96 %       02/07/20 0015    75  27Abnormal   104/54  76  98 %       02/07/20 0000    74  25Abnormal   103/55  75  97 %       02/06/20 2345    189Abnormal   46Abnormal   97/48Abnormal   69  98 %       02/06/20 2330    76  27Abnormal   105/56  79  97 %       02/06/20 2315    66  29Abnormal   132/70  93  99 %       02/06/20 2300    64  19  121/60  85  97 %       Pertinent Labs/Diagnostic Test Results:   Results from last 7 days   Lab Units 02/07/20  0505 02/06/20  1909 02/05/20  0511 02/04/20  0442  02/03/20  0848   WBC Thousand/uL 8 40 9 70 9 80 8 40   < >  --    HEMOGLOBIN g/dL 7 0* 8 4* 8 4* 8 0*  --  7 0*   HEMATOCRIT % 23 1* 27 6* 26 5* 26 1*  --  23 4*   PLATELETS Thousands/uL 324 533* 483* 434*   < >  --    TOTAL NEUT ABS Thousand/uL 5 71  --   --   --   --   --     < > = values in this interval not displayed       Results from last 7 days   Lab Units 02/07/20  0505 02/06/20  1909   SODIUM mmol/L 136 136   POTASSIUM mmol/L 3 7 3 4*   CHLORIDE mmol/L 108* 105   CO2 mmol/L 20* 20*   ANION GAP mmol/L 8 11   BUN mg/dL 18 18   CREATININE mg/dL 1 04 1 07   EGFR ml/min/1 73sq m 58 56   CALCIUM mg/dL 7 2* 7 7*   MAGNESIUM mg/dL 2 2  --      Results from last 7 days   Lab Units 02/07/20  0505   AST U/L 21   ALT U/L 12   ALK PHOS U/L 68   TOTAL PROTEIN g/dL 5 0*   ALBUMIN g/dL 3 0*   TOTAL BILIRUBIN mg/dL 0 60     Results from last 7 days   Lab Units 02/07/20  0505 02/06/20  1909   GLUCOSE RANDOM mg/dL 88 115*     Results from last 7 days   Lab Units 02/06/20  1909   TROPONIN I ng/mL <0 03     Results from last 7 days   Lab Units 02/07/20  0505 02/06/20  1909   PROTIME seconds 13 5* 13 2*   INR  1 16 1 14   PTT seconds --  40*     Results from last 7 days   Lab Units 02/03/20  1429   FERRITIN ng/mL 11       Results from last 7 days   Lab Units 02/07/20  0505   TOTAL COUNTED  100     2/6  Ct brain=No change from 1/19/2020    ED Treatment:   Medication Administration from 02/06/2020 1748 to 02/06/2020 2202       Date/Time Order Dose Route     02/06/2020 2105 atorvastatin (LIPITOR) tablet 40 mg 40 mg Oral     02/06/2020 2105 amiodarone tablet 200 mg 200 mg Oral     02/06/2020 2111 pantoprazole (PROTONIX) EC tablet 40 mg 40 mg Oral     02/06/2020 2111 potassium chloride (K-DUR,KLOR-CON) CR tablet 20 mEq 20 mEq Oral        Past Medical History:   Diagnosis Date    Hypertension     PUD (peptic ulcer disease)     [facility]roke ([facility] [address] )     Vitamin D deficiency      Present on Admission:   Essential hypertension   Iron deficiency anemia due to chronic blood loss   Glaucoma of both eyes   Persistent atrial fibrillation   Diverticulitis of large intestine without perforation or abscess without bleeding   Left arm weakness  Admitting Diagnosis: TIA (transient ischemic attack) [G45 9]  Essential hypertension [I10]  Iron deficiency anemia due to chronic blood loss [D50 0]  Persistent atrial fibrillation [I48 19]  Left arm weakness [R29 898]  Lower extremity weakness [R29 898]  H/O ischemic left MCA stroke [Z86 73]  Age/Sex: 60 y o  female  Admission Orders:  Telemetry  Neuro checks:Every 1 hour x 4 hours, then every 2 hours x 8 hours, then every 4 hours x 72 hours  o2 to keep sats>92%  Consult neuro  accuchecks with coverage scale  Fall precaution  Pt/ot/st eval & tx  Consult cardio  Incentive spirometry  Scd's  Transfuse 2uprbc's  Consult GI  Scheduled Medications:  acetaminophen 650 mg Oral Q6H PRN   amiodarone 200 mg Oral Daily With Breakfast   atorvastatin 40 mg Oral QPM   chlorhexidine 15 mL Swish & Spit Q12H [address]   dorzolamide-timolol 1 drop Both Eyes BID   ondansetron 4 mg Intravenous Q6H PRN   pantoprazole 40 mg Intravenous Q12H [address] Psyllium 1 capsule Oral TID     Network Utilization Review Department  [email or web address]  ATTENTION: Please call with any questions or concerns to [phone] and carefully listen to the prompts so that you are directed to the right person  All voicemails are confidential   [facility]aky all requests for admission clinical reviews, approved or denied determinations and any other requests to dedicated fax number below belonging to the campus where the patient is receiving treatment   List of dedicated fax numbers for the Facilities:  [address] DENIALS (Administrative/Medical Necessity) [phone]   [address] (Maternity/NICU/Pediatrics) [phone]   Auna Oswaldo [phone]   Alvena Perkins [phone]   [facility] [phone]   [facility] [phone]   [address] [place] [address] [phone]   [facility]  [phone]   [address] [facility]  [address] [facility] [address] [facility] [phone]

## 2020-02-07 NOTE — PLAN OF CARE
Problem: PHYSICAL THERAPY ADULT  Goal: Performs mobility at highest level of function for planned discharge setting  See evaluation for individualized goals  Description  Treatment/Interventions: Functional transfer training, LE strengthening/ROM, Therapeutic exercise, Endurance training, Patient/family training, Bed mobility, Gait training, Spoke to nursing, OT  Equipment Recommended: Other (Comment)(TBD)       See flowsheet documentation for full assessment, interventions and recommendations  Note:   Prognosis: Good  Problem List: Decreased strength, Decreased endurance, Impaired balance, Decreased mobility, Impaired judgement, Decreased safety awareness, Obesity  Assessment: Pt is 60 y o  female seen for PT evaluation s/p admit to [facility] ICU on 2/6/2020 w/ Left arm weakness  PT consulted to assess pt's functional mobility and d/c needs  Order placed for PT eval and tx, w/ eval & treat order  Comorbidities affecting pt's physical performance at time of assessment include: weakness, perseverating characteristics,L arm weakness, glaucoma, iron deficient anemia, recent CVA,   PTA, pt was independent w/ all functional mobility w/ o AD  Personal factors affecting pt at time of IE include: communication issues, inability to navigate community distances, unable to perform dynamic tasks in community, decreased initiation and engagement, limited insight into impairments and inability to perform ADLs  Please find objective findings from PT assessment regarding body systems outlined above with impairments and limitations including weakness, impaired balance, decreased endurance, gait deviations, decreased activity tolerance, decreased functional mobility tolerance, decreased safety awareness and fall risk  The following objective measures performed on IE also reveal limitations: Barthel Index: 60/100   Pt's clinical presentation is currently unstable/unpredictable seen in pt's presentation of neurological symtoms LUE, abnormal CBC values receiving RBCs,recent CVA  Pt to benefit from continued PT tx to address deficits as defined above and maximize level of functional independent mobility and consistency  From PT/mobility standpoint, recommendation at time of d/c would be Home PT with family support pending progress in order to facilitate return to PLOF  Recommendation: Home PT, Home with family support     PT - OK to Discharge: No    See flowsheet documentation for full assessment

## 2020-02-07 NOTE — PLAN OF CARE
Problem: OCCUPATIONAL THERAPY ADULT  Goal: Performs self-care activities at highest level of function for planned discharge setting  See evaluation for individualized goals  Description  Treatment Interventions: ADL retraining, Energy conservation, Activityengagement, Functional transfer training, Endurance training, Patient/family training, Compensatory technique education          See flowsheet documentation for full assessment, interventions and recommendations  Note:   Limitation: Decreased ADL status, Decreased Safe judgement during ADL, Decreased endurance, Decreased self-care trans, Decreased high-level ADLs  Prognosis: Good  Assessment: Pt is a 60 y o  female seen for OT evaluation s/p admit to [facility] on 2/6/2020 w/ Left arm weakness  Comorbidities affecting pt's functional performance at time of assessment include: HTN, obesity and recent CVA, A Fib, Cardiomyopathy, see H & P for additional PMHx  Personal factors affecting pt at time of IE include:steps to enter environment, limited home support, difficulty performing ADLS and difficulty performing IADLS   Prior to admission, pt was I with self care, some A with IADL's (recently r/t CVA)  Upon evaluation: Pt requires a slight increase in self care ADL's and functional mobility/toileting/transfers r/t fatigue and the following deficits impacting occupational performance: decreased tolerance and decreased safety awareness  (no overt/new UB deficits noted) Pt to benefit from continued skilled OT tx while in the hospital to address deficits as defined above and maximize level of functional independence w ADL's and functional mobility  Occupational Performance areas to address include: bathing/shower, dressing and functional mobility  From OT standpoint, recommendation at time of d/c would be C and family support  OT Discharge Recommendation: Home with family support  OT - OK to Discharge:  Yes

## 2020-02-07 NOTE — ASSESSMENT & PLAN NOTE
· Patient was a stroke alert on 01/17/2020 with right-sided weakness, was found to have a left MCA CVA  Per cardiology documentation they believed that this was a cardioembolic issue due to atrial fibrillation that was underlying  The patient at that time had a narrow complex tachycardia, and Takotsubu cardiomyopathy  She does have known ejection fraction of 20-30%    · Patient was recently discharged from acute rehab on 02/05/2020  · Will continue medications which include amiodarone, Lipitor, Protonix

## 2020-02-08 LAB
ABO GROUP BLD BPU: NORMAL
ABO GROUP BLD BPU: NORMAL
ANION GAP SERPL CALCULATED.3IONS-SCNC: 10 MMOL/L (ref 4–13)
BASOPHILS # BLD AUTO: 0 THOUSANDS/ΜL (ref 0–0.1)
BASOPHILS NFR BLD AUTO: 0 % (ref 0–2)
BPU ID: NORMAL
BPU ID: NORMAL
BUN SERPL-MCNC: 13 MG/DL (ref 7–25)
CALCIUM SERPL-MCNC: 7.3 MG/DL (ref 8.6–10.5)
CHLORIDE SERPL-SCNC: 110 MMOL/L (ref 98–107)
CO2 SERPL-SCNC: 21 MMOL/L (ref 21–31)
CREAT SERPL-MCNC: 0.9 MG/DL (ref 0.6–1.2)
CROSSMATCH: NORMAL
CROSSMATCH: NORMAL
EOSINOPHIL # BLD AUTO: 0.1 THOUSAND/ΜL (ref 0–0.61)
EOSINOPHIL NFR BLD AUTO: 1 % (ref 0–5)
ERYTHROCYTE [DISTWIDTH] IN BLOOD BY AUTOMATED COUNT: 21.9 % (ref 11.5–14.5)
FERRITIN SERPL-MCNC: 171 NG/ML (ref 8–388)
GFR SERPL CREATININE-BSD FRML MDRD: 69 ML/MIN/1.73SQ M
GLUCOSE SERPL-MCNC: 125 MG/DL (ref 65–140)
GLUCOSE SERPL-MCNC: 80 MG/DL (ref 65–99)
HCT VFR BLD AUTO: 29.5 % (ref 42–47)
HGB BLD-MCNC: 9.3 G/DL (ref 12–16)
IRON SATN MFR SERPL: 8 %
IRON SERPL-MCNC: 25 UG/DL (ref 50–170)
LYMPHOCYTES # BLD AUTO: 1.2 THOUSANDS/ΜL (ref 0.6–4.47)
LYMPHOCYTES NFR BLD AUTO: 16 % (ref 21–51)
MCH RBC QN AUTO: 26 PG (ref 26–34)
MCHC RBC AUTO-ENTMCNC: 31.5 G/DL (ref 31–37)
MCV RBC AUTO: 83 FL (ref 81–99)
MONOCYTES # BLD AUTO: 0.9 THOUSAND/ΜL (ref 0.17–1.22)
MONOCYTES NFR BLD AUTO: 12 % (ref 2–12)
NEUTROPHILS # BLD AUTO: 5.5 THOUSANDS/ΜL (ref 1.4–6.5)
NEUTS SEG NFR BLD AUTO: 71 % (ref 42–75)
PLATELET # BLD AUTO: 402 THOUSANDS/UL (ref 149–390)
PMV BLD AUTO: 7.8 FL (ref 8.6–11.7)
POTASSIUM SERPL-SCNC: 3.7 MMOL/L (ref 3.5–5.5)
RBC # BLD AUTO: 3.57 MILLION/UL (ref 3.9–5.2)
SODIUM SERPL-SCNC: 141 MMOL/L (ref 134–143)
TIBC SERPL-MCNC: 300 UG/DL (ref 250–450)
UNIT DISPENSE STATUS: NORMAL
UNIT DISPENSE STATUS: NORMAL
UNIT PRODUCT CODE: NORMAL
UNIT PRODUCT CODE: NORMAL
UNIT RH: NORMAL
UNIT RH: NORMAL
WBC # BLD AUTO: 7.8 THOUSAND/UL (ref 4.8–10.8)

## 2020-02-08 PROCEDURE — 82728 ASSAY OF FERRITIN: CPT | Performed by: INTERNAL MEDICINE

## 2020-02-08 PROCEDURE — 83550 IRON BINDING TEST: CPT | Performed by: INTERNAL MEDICINE

## 2020-02-08 PROCEDURE — 85025 COMPLETE CBC W/AUTO DIFF WBC: CPT | Performed by: INTERNAL MEDICINE

## 2020-02-08 PROCEDURE — 99232 SBSQ HOSP IP/OBS MODERATE 35: CPT | Performed by: INTERNAL MEDICINE

## 2020-02-08 PROCEDURE — C9113 INJ PANTOPRAZOLE SODIUM, VIA: HCPCS | Performed by: INTERNAL MEDICINE

## 2020-02-08 PROCEDURE — 80048 BASIC METABOLIC PNL TOTAL CA: CPT | Performed by: INTERNAL MEDICINE

## 2020-02-08 PROCEDURE — 83540 ASSAY OF IRON: CPT | Performed by: INTERNAL MEDICINE

## 2020-02-08 RX ADMIN — AMIODARONE HYDROCHLORIDE 200 MG: 100 TABLET ORAL at 08:57

## 2020-02-08 RX ADMIN — DORZOLAMIDE HYDROCHLORIDE AND TIMOLOL MALEATE 1 DROP: 20; 5 SOLUTION/ DROPS OPHTHALMIC at 17:29

## 2020-02-08 RX ADMIN — CHLORHEXIDINE GLUCONATE 0.12% ORAL RINSE 15 ML: 1.2 LIQUID ORAL at 20:23

## 2020-02-08 RX ADMIN — PANTOPRAZOLE SODIUM 40 MG: 40 INJECTION, POWDER, FOR SOLUTION INTRAVENOUS at 08:58

## 2020-02-08 RX ADMIN — DORZOLAMIDE HYDROCHLORIDE AND TIMOLOL MALEATE 1 DROP: 20; 5 SOLUTION/ DROPS OPHTHALMIC at 08:57

## 2020-02-08 RX ADMIN — ATORVASTATIN CALCIUM 40 MG: 40 TABLET, FILM COATED ORAL at 17:29

## 2020-02-08 RX ADMIN — PANTOPRAZOLE SODIUM 40 MG: 40 INJECTION, POWDER, FOR SOLUTION INTRAVENOUS at 20:27

## 2020-02-08 RX ADMIN — IRON SUCROSE 100 MG: 20 INJECTION, SOLUTION INTRAVENOUS at 20:00

## 2020-02-08 RX ADMIN — CHLORHEXIDINE GLUCONATE 0.12% ORAL RINSE 15 ML: 1.2 LIQUID ORAL at 08:57

## 2020-02-08 NOTE — SOCIAL WORK
Discharge plan remains for pt to return home with brother transporting and [facility] to resume  CM will continue to follow for any further discharge needs  Tip Rosales

## 2020-02-08 NOTE — NURSING NOTE
Pt AAOx4,,anxious @ times  Moves all extremities,Both pupils reactive to light,no speech impairment noted at this time  In no acute respiratory distress  Assisted with needs

## 2020-02-08 NOTE — PROGRESS NOTES
Progress Note - Waldo Silva 1955, 60 y o  female MRN: [id number]    Unit/Bed#: ICU 01 Encounter: [id number]    Primary Care Provider: Safia Pascal DO   Date and time admitted to hospital: 2/6/2020  6:02 PM      TIA (transient ischemic attack)  Assessment & Plan  · Neurology input appreciated  · Symptoms have resolved  · MRI results pending  · Will continue current medications  · Neuro checks    Iron deficiency anemia due to chronic blood loss  Assessment & Plan  · Patient is status post 2 units PRBCs on 02/07/2020  · Stool for occult blood pending  · Will follow-up iron studies  · Patient had EGD/colonoscopy done on 02/04/2020  · Continue PPI  · GI input appreciated  · Will advance diet as tolerated  · IV fluids as needed    Morbid obesity with BMI of 40 0-44 9, adult [facility])  Assessment & Plan  · Supportive care  · Nutrition consult    H/O ischemic left MCA stroke  Assessment & Plan  · Patient was a stroke alert on 01/17/2020 with right-sided weakness, was found to have a left MCA CVA  Per cardiology documentation they believed that this was a cardioembolic issue due to atrial fibrillation that was underlying  The patient at that time had a narrow complex tachycardia, and Takotsubu cardiomyopathy  She does have known ejection fraction of 20-30%  · Patient was recently discharged from acute rehab on 02/05/2020  · Will continue medications which include amiodarone, Lipitor, Protonix    Persistent atrial fibrillation  Assessment & Plan  · Currently rate controlled  · Will continue amiodarone  · Eliquis held due to suspected GI bleed  · Follow-up with cardiology    Essential hypertension  Assessment & Plan  · BP has been on the lower side  · Currently not on any antihypertensives  · Will monitor      VTE Pharmacologic Prophylaxis: Pharmacologic: Held due to suspected GI bleed    Patient Centered Rounds: I have performed bedside rounds with nursing staff today      Discussions with Specialists or Other Care Team Provider: yes  Education and Discussions with Family / Patient: yes    Current Length of Stay: 1 day(s)    Current Patient Status: Inpatient   Certification Statement: The patient will continue to require additional inpatient hospital stay due to Anemia requiring blood transfusion    Discharge Plan:  Pending hospital course    Code Status: Level 1 - Full Code    Subjective:   No overnight events noted  Patient received 2 units PRBCs yesterday  Patient denies any melena however she has not had any bowel movements  Patient denies any pain complaints  Left upper extremity weakness/numbness tingling has improved    Objective:     Vitals:   Temp (24hrs), Av 4 °F (36 3 °C), Min:96 °F (35 6 °C), Max:98 3 °F (36 8 °C)    Temp:  [96 °F (35 6 °C)-98 3 °F (36 8 °C)] 97 °F (36 1 °C)  HR:  [48-71] 48  Resp:  [17-36] 26  BP: (104-127)/(55-71) 120/67  SpO2:  [92 %-99 %] 99 %  Body mass index is 45 69 kg/m²  Input and Output Summary (last 24 hours): Intake/Output Summary (Last 24 hours) at 2020 1319  Last data filed at 2020 2101  Gross per 24 hour   Intake 1052 5 ml   Output 600 ml   Net 452 5 ml       Physical Exam:     Physical Exam   Constitutional: No distress  Obese  female   HENT:   Head: Normocephalic and atraumatic  Eyes: Conjunctivae and EOM are normal    Neck: Normal range of motion  Neck supple  Cardiovascular: Normal rate and regular rhythm  Pulmonary/Chest: Effort normal  No respiratory distress  Abdominal: Soft  She exhibits no distension  There is no tenderness  Musculoskeletal: Normal range of motion  Neurological: She is alert  No cranial nerve deficit  Skin: Skin is warm and dry  Psychiatric: She has a normal mood and affect   Her behavior is normal        Additional Data:     Labs:    Results from last 7 days   Lab Units 20  0615   WBC Thousand/uL 7 80   HEMOGLOBIN g/dL 9 3*   HEMATOCRIT % 29 5*   PLATELETS Thousands/uL 402*   NEUTROS PCT % 71 LYMPHS PCT % 16*   MONOS PCT % 12   EOS PCT % 1     Results from last 7 days   Lab Units 02/08/20  0615 02/07/20  0505   POTASSIUM mmol/L 3 7 3 7   CHLORIDE mmol/L 110* 108*   CO2 mmol/L 21 20*   BUN mg/dL 13 18   CREATININE mg/dL 0 90 1 04   CALCIUM mg/dL 7 3* 7 2*   ALK PHOS U/L  --  68   ALT U/L  --  12   AST U/L  --  21     Results from last 7 days   Lab Units 02/07/20  0505   INR  1 16     Results from last 7 days   Lab Units 02/06/20  1805   POC GLUCOSE mg/dl 125     Results from last 7 days   Lab Units 02/07/20  0505   HEMOGLOBIN A1C % 5 2       * I Have Reviewed All Lab Data Listed Above  * Additional Pertinent Lab Tests Reviewed: [address] Admission  Reviewed    Imaging:  Imaging Reports Reviewed Today Include:  No new imaging    Recent Cultures (last 7 days):           Last 24 Hours Medication List:     Current Facility-Administered Medications:  acetaminophen 650 mg Oral Q6H PRN LEW Scott   amiodarone 200 mg Oral Daily With Breakfast Carla Snyder, YELITZANP   atorvastatin 40 mg Oral QPM LEW Scott   chlorhexidine 15 mL Swish & Spit Q12H [address] LEW Scott   dorzolamide-timolol 1 drop Both Eyes BID LEW Scott   ondansetron 4 mg Intravenous Q6H PRN Carla Snyder, LEW   pantoprazole 40 mg Intravenous Q12H Holly Medina MD   Psyllium 1 capsule Oral TID LEW Scott     Facility-Administered Medications Ordered in Other Encounters:  lactated ringers 125 mL/hr Intravenous Continuous Moe Enciso MD Last Rate: 125 mL/hr (02/05/20 1115)        Today, Patient Was Seen By: Елена Benedict MD    ** Please Note: Dictation voice to text software may have been used in the creation of this document   **

## 2020-02-08 NOTE — CONSULTS
PHYSICAL MEDICINE AND REHABILITATION CONSULT NOTE  Abbe Seymour 60 y o  female MRN: [id number]  Unit/Bed#: ICU 01 Encounter: [id number]    Requested by (Physician/Service): Vianney Perez MD  Reason for Consultation:  Assessment of rehabilitation needs  Reason for Hospitalization:  TIA (transient ischemic attack) [G45 9]  Essential hypertension [I10]  Iron deficiency anemia due to chronic blood loss [D50 0]  Persistent atrial fibrillation [I48 19]  Left arm weakness [R29 898]  Lower extremity weakness [R29 898]  H/O ischemic left MCA stroke [Z86 73]  Rehabilitation Diagnosis: likely TIA    CC: L arm weakness   History of Present Illness:  Abbe Seymour is a 60 y o  female who  has a past medical history of Hypertension, PUD (peptic ulcer disease), Stroke ([address] ), and Vitamin D deficiency  who presented to the [facility] with L arm weakness which spontaneously resolved in 30 min, like TIA however MRI pending  Location:L arm  Quality: weakness  Severity: see exam  Duration: 30 min   Timing: acute  Context: recent CVA  Modifying factors: not on AC   Associating signs & symptoms: tingling in Lt hand       PM&R consulted for rehabilitation recommendations          Hospital Complications/Comorbidities:   Complications: As above  Comorbidities: As above        Functional History:     Prior to Admission:      I PTA      Present:  Physical Therapy Occupational Therapy   Min-CG tx, CG amb 28' Min      Past Medical History:   Past Surgical History:   Family History:     Past Medical History:   Diagnosis Date    Hypertension     PUD (peptic ulcer disease)     Stroke ([address] )     Vitamin D deficiency     Past Surgical History:   Procedure Laterality Date    ABDOMINOPLASTY      revision gastric bypass    BREAST SURGERY      reduction     SECTION      CHOLECYSTECTOMY      COLONOSCOPY      GASTRIC BYPASS      HERNIA REPAIR      NEUROMA EXCISION Right 2019    Procedure: EXCISION 2ND INTERSPACE NEUROMA;  Surgeon: Yaneli Townsend DPM;  Location: [address] [place] [address] OR;  Service: Podiatry    ROTATOR CUFF REPAIR Left      Family History   Problem Relation Age of Onset    Stroke Mother     Heart disease Father            Medications:    Current Facility-Administered Medications:     acetaminophen (TYLENOL) tablet 650 mg, 650 mg, Oral, Q6H PRN, Sharrie Pill Lillian, YELITZANP    amiodarone tablet 200 mg, 200 mg, Oral, Daily With Breakfast, YELITZA ScottNP, 200 mg at 02/07/20 0953    atorvastatin (LIPITOR) tablet 40 mg, 40 mg, Oral, QPM, Carla Snyder, CRNP, 40 mg at 02/07/20 1712    chlorhexidine (PERIDEX) 0 12 % oral rinse 15 mL, 15 mL, Swish & Spit, Q12H [facility], LEW Scott, 15 mL at 02/07/20 0953    dorzolamide-timolol (COSOPT) 22 3-6 8 MG/ML ophthalmic solution 1 drop, 1 drop, Both Eyes, BID, YELITZA ScottNP, 1 drop at 02/07/20 1712    ondansetron (ZOFRAN) injection 4 mg, 4 mg, Intravenous, Q6H PRN, LEW Scott    pantoprazole (PROTONIX) injection 40 mg, 40 mg, Intravenous, Q12H [facility], Alisha Nolasco MD, 40 mg at 02/07/20 1156    Psyllium CAPS 1 capsule, 1 capsule, Oral, TID, YELITZA ScottNP, 1 capsule at 02/07/20 1625    Facility-Administered Medications Ordered in Other Encounters:     lactated ringers infusion, 125 mL/hr, Intravenous, Continuous, Jammie Dye MD, Last Rate: 125 mL/hr at 02/05/20 1115, 125 mL/hr at 02/05/20 1115    Allergies:    Allergies   Allergen Reactions    Nsaids      Due to hx gastric bypass          Social History:    Social History     Socioeconomic History    Marital status: Single     Spouse name: None    Number of children: None    Years of education: None    Highest education level: None   Occupational History    None   Social Needs    Financial resource strain: None    Food insecurity:     Worry: None     Inability: None    Transportation needs:     Medical: None     Non-medical: None   Tobacco Use    Smoking status: Never Smoker    Smokeless tobacco: Never Used   Substance and Sexual Activity    Alcohol use: Never     Frequency: Never     Comment: none    Drug use: No     Comment: none    Sexual activity: Not Currently     Birth control/protection: Post-menopausal   Lifestyle    Physical activity:     Days per week: None     Minutes per session: None    Stress: None   Relationships    Social connections:     Talks on phone: None     Gets together: None     Attends Je[employer]'s witness service: None     Active member of club or organization: None     Attends meetings of clubs or organizations: None     Relationship status: None    Intimate partner violence:     Fear of current or ex partner: None     Emotionally abused: None     Physically abused: None     Forced sexual activity: None   Other Topics Concern    None   Social History Narrative    None        Binta Sutton Lives with: lives alone  She lives in [facility] apartment  The living area: can live on one level      Review of Systems: A 10-point review of systems was performed  Negative except as listed above  Physical Exam:  Vital Signs:      Temp:  [97 6 °F (36 4 °C)-98 5 °F (36 9 °C)] 98 °F (36 7 °C)  HR:  [] 71  Resp:  [16-46] 36  BP: ()/(48-74) 112/57   Intake/Output Summary (Last 24 hours) at 2/7/2020 1928  Last data filed at 2/7/2020 1758  Gross per 24 hour   Intake 952 5 ml   Output    Net 952 5 ml        Laboratory:      Lab Results   Component Value Date    HGB 7 0 (L) 02/07/2020    HCT 23 1 (L) 02/07/2020    WBC 8 40 02/07/2020     Lab Results   Component Value Date    BUN 18 02/07/2020    K 3 7 02/07/2020     (H) 02/07/2020    CREATININE 1 04 02/07/2020     Lab Results   Component Value Date    PROTIME 13 5 (H) 02/07/2020    INR 1 16 02/07/2020        General: alert, no apparent distress, cooperative and comfortable  HEENT: Normal, normocephalic, atraumatic    Pulmonary: respirations unlabored  Cardiovascular: +S1/2  Abdomen: soft NT Extremity: volume status currently stable   Neurologic: 4-/5 throughout on MMT, + aphasia  Psych: mood/affect currently stable       Imaging: Reviewed  X-ray Chest 1 View Portable    Result Date: 2/7/2020  Impression: No acute cardiopulmonary disease  Workstation performed: [id number]     Ct Stroke Alert Brain    Result Date: 2/6/2020  Impression: No change from 1/19/2020 Findings were directly discussed with Garnetta Gottron on 2/6/2020 6:23 PM  Workstation performed: [id number]       Assessment and Recommendations:  Patient is a 61 yo female who presented to the [address] [place] [address] with L arm weakness which spontaneously resolved in 30 min, like TIA however MRI pending  PM&R consulted for rehabilitation recommendations  Impairments:  Impaired functional mobility and ability to perform ADL's      Recommendations:      - Continue PT/OT/SLP while inpatient  - awaiting results of MRI       Thank you for allowing the PM&R service to participate in the care of this patient  We will continue to follow Stan Harper's progress with you   Please do not hesitate to call with questions or concerns

## 2020-02-08 NOTE — ASSESSMENT & PLAN NOTE
· Neurology input appreciated  · Symptoms have resolved  · MRI results pending  · Will continue current medications  · Neuro checks

## 2020-02-08 NOTE — ASSESSMENT & PLAN NOTE
· Currently rate controlled  · Will continue amiodarone  · Eliquis held due to suspected GI bleed  · Follow-up with cardiology

## 2020-02-08 NOTE — ASSESSMENT & PLAN NOTE
· Patient is status post 2 units PRBCs on 02/07/2020  · Stool for occult blood pending  · Will follow-up iron studies  · Patient had EGD/colonoscopy done on 02/04/2020  · Continue PPI  · GI input appreciated  · Will advance diet as tolerated  · IV fluids as needed

## 2020-02-09 VITALS
OXYGEN SATURATION: 94 % | DIASTOLIC BLOOD PRESSURE: 54 MMHG | HEART RATE: 63 BPM | RESPIRATION RATE: 20 BRPM | WEIGHT: 255.73 LBS | HEIGHT: 63 IN | TEMPERATURE: 97.8 F | BODY MASS INDEX: 45.31 KG/M2 | SYSTOLIC BLOOD PRESSURE: 87 MMHG

## 2020-02-09 PROBLEM — Z79.01 LONG TERM (CURRENT) USE OF ANTICOAGULANTS: Status: ACTIVE | Noted: 2020-02-09

## 2020-02-09 LAB
ACANTHOCYTES BLD QL SMEAR: PRESENT
ANION GAP SERPL CALCULATED.3IONS-SCNC: 8 MMOL/L (ref 4–13)
ANISOCYTOSIS BLD QL SMEAR: PRESENT
BASOPHILS # BLD AUTO: 0 THOUSANDS/ΜL (ref 0–0.1)
BASOPHILS NFR BLD AUTO: 0 % (ref 0–2)
BUN SERPL-MCNC: 14 MG/DL (ref 7–25)
BURR CELLS BLD QL SMEAR: PRESENT
CALCIUM SERPL-MCNC: 7.3 MG/DL (ref 8.6–10.5)
CHLORIDE SERPL-SCNC: 112 MMOL/L (ref 98–107)
CO2 SERPL-SCNC: 20 MMOL/L (ref 21–31)
CREAT SERPL-MCNC: 0.85 MG/DL (ref 0.6–1.2)
EOSINOPHIL # BLD AUTO: 0.1 THOUSAND/ΜL (ref 0–0.61)
EOSINOPHIL NFR BLD AUTO: 1 % (ref 0–5)
ERYTHROCYTE [DISTWIDTH] IN BLOOD BY AUTOMATED COUNT: 22.9 % (ref 11.5–14.5)
GFR SERPL CREATININE-BSD FRML MDRD: 74 ML/MIN/1.73SQ M
GLUCOSE SERPL-MCNC: 79 MG/DL (ref 65–99)
HCT VFR BLD AUTO: 29.8 % (ref 42–47)
HGB BLD-MCNC: 9.4 G/DL (ref 12–16)
HYPERCHROMIA BLD QL SMEAR: PRESENT
LYMPHOCYTES # BLD AUTO: 1 THOUSANDS/ΜL (ref 0.6–4.47)
LYMPHOCYTES NFR BLD AUTO: 12 % (ref 21–51)
MACROCYTES BLD QL AUTO: PRESENT
MCH RBC QN AUTO: 26.1 PG (ref 26–34)
MCHC RBC AUTO-ENTMCNC: 31.4 G/DL (ref 31–37)
MCV RBC AUTO: 83 FL (ref 81–99)
MICROCYTES BLD QL AUTO: PRESENT
MONOCYTES # BLD AUTO: 1 THOUSAND/ΜL (ref 0.17–1.22)
MONOCYTES NFR BLD AUTO: 12 % (ref 2–12)
NEUTROPHILS # BLD AUTO: 5.9 THOUSANDS/ΜL (ref 1.4–6.5)
NEUTS SEG NFR BLD AUTO: 75 % (ref 42–75)
OVALOCYTES BLD QL SMEAR: PRESENT
PLATELET # BLD AUTO: 315 THOUSANDS/UL (ref 149–390)
PLATELET BLD QL SMEAR: ADEQUATE
PMV BLD AUTO: 7.8 FL (ref 8.6–11.7)
POLYCHROMASIA BLD QL SMEAR: PRESENT
POTASSIUM SERPL-SCNC: 3.5 MMOL/L (ref 3.5–5.5)
RBC # BLD AUTO: 3.59 MILLION/UL (ref 3.9–5.2)
RBC MORPH BLD: NORMAL
SODIUM SERPL-SCNC: 140 MMOL/L (ref 134–143)
WBC # BLD AUTO: 7.9 THOUSAND/UL (ref 4.8–10.8)

## 2020-02-09 PROCEDURE — C9113 INJ PANTOPRAZOLE SODIUM, VIA: HCPCS | Performed by: INTERNAL MEDICINE

## 2020-02-09 PROCEDURE — 85025 COMPLETE CBC W/AUTO DIFF WBC: CPT | Performed by: INTERNAL MEDICINE

## 2020-02-09 PROCEDURE — 99239 HOSP IP/OBS DSCHRG MGMT >30: CPT | Performed by: INTERNAL MEDICINE

## 2020-02-09 PROCEDURE — 80048 BASIC METABOLIC PNL TOTAL CA: CPT | Performed by: INTERNAL MEDICINE

## 2020-02-09 PROCEDURE — 99254 IP/OBS CNSLTJ NEW/EST MOD 60: CPT | Performed by: PHYSICIAN ASSISTANT

## 2020-02-09 RX ORDER — LISINOPRIL 5 MG/1
5 TABLET ORAL DAILY
Qty: 30 TABLET | Refills: 0 | Status: SHIPPED | OUTPATIENT
Start: 2020-02-09

## 2020-02-09 RX ORDER — PANTOPRAZOLE SODIUM 40 MG/1
40 TABLET, DELAYED RELEASE ORAL DAILY
Qty: 30 TABLET | Refills: 0 | Status: SHIPPED | OUTPATIENT
Start: 2020-02-09

## 2020-02-09 RX ORDER — LISINOPRIL 5 MG/1
5 TABLET ORAL DAILY
Status: DISCONTINUED | OUTPATIENT
Start: 2020-02-09 | End: 2020-02-09 | Stop reason: HOSPADM

## 2020-02-09 RX ORDER — FERROUS SULFATE TAB EC 324 MG (65 MG FE EQUIVALENT) 324 (65 FE) MG
324 TABLET DELAYED RESPONSE ORAL
Qty: 30 TABLET | Refills: 0 | Status: SHIPPED | OUTPATIENT
Start: 2020-02-09

## 2020-02-09 RX ADMIN — PANTOPRAZOLE SODIUM 40 MG: 40 INJECTION, POWDER, FOR SOLUTION INTRAVENOUS at 09:12

## 2020-02-09 RX ADMIN — DORZOLAMIDE HYDROCHLORIDE AND TIMOLOL MALEATE 1 DROP: 20; 5 SOLUTION/ DROPS OPHTHALMIC at 09:12

## 2020-02-09 RX ADMIN — AMIODARONE HYDROCHLORIDE 200 MG: 100 TABLET ORAL at 09:12

## 2020-02-09 RX ADMIN — CHLORHEXIDINE GLUCONATE 0.12% ORAL RINSE 15 ML: 1.2 LIQUID ORAL at 09:13

## 2020-02-09 NOTE — ASSESSMENT & PLAN NOTE
· Currently rate controlled  · Will continue amiodarone  · Eliquis resumed as patient had recent colonoscopy/EGD with no signs of active bleeding  Hemoglobin has been stable after receiving 2 units PRBCs  No melena or hematochezia    · Follow-up with cardiology  · Cardiology will make referral to EP

## 2020-02-09 NOTE — DISCHARGE INSTR - OTHER ORDERS
Notify doctor if difficulty breathing, headache or visual disturbances  Increased fatigue that does not go away with rest  Report severe uncontrolled pain with any continued rectal bleeding

## 2020-02-09 NOTE — CONSULTS
Consult- Natalya Del Rio 1955, 60 y o  female MRN: [id number]    Unit/Bed#: ICU 01 Encounter: [id number]    Primary Care Provider: Alyssa Delacruz DO   Date and time admitted to hospital: 2/6/2020  6:02 PM      Inpatient consult to Cardiology  Consult performed by: Gennaro Parra PA-C  Consult ordered by: Jailyn Salinas MD        Assessment/Plan:   1  TIA  · Left arm weakness now resolved  · Symptoms occurred while off of anticoagulation due to anemia and EGD/colonoscopy   · Discussed risks and benefits of resuming anticoagulation  Will resume Eliquis at 2 5mg BID with weekly CBCs  Will refer to EP to discuss Watchman device  2  Paroxysmal afib   · Sinus rhythm during this admission   · Continue amiodarone   · Start Eliquis 2 5mg BID as above     3  Acute blood loss anemia   · Hgb down to 7 0 on 2/3, +FOBT   · EGD/colonoscopy on 2/5 were negative for acute bleeding  · Received 2 units PRBCs on 2/7 - Hgb has been stable 9 3-9 6 in the last 3 days     4  Dilated cardiomyopathy - EF 30%  · ? Tachy-mediated due to uncontrolled afib   · Outpatient Lexiscan ordered but not completed yet  · Previously on Toprol 50mg and lisinopril 5mg, but both dc'd while at rehab due to hypotension  For now, restart lisinopril 5mg  HR has been in the low 50s-60s during admission- can reassess / possibly restart metoprolol as outpatient     5  Left MCA stroke 1/17/2020  · In the setting of newly diagnosed AFib, felt to be cardioembolic    Emphasized importance of close CBC monitoring and need to return to the ED w/ any s/sx of bleeding, melena, bright red blood per rectum, etc  Will arrange follow up with our office and place EP referral      HPI: Natalya Del Rio is a 60 y o  female who presents with left arm weakness  She was admitted on 1/17/20 with aphasia and right-sided weakness and was found to have acute left MCA stroke in the setting of newly diagnosed AFib    She was started on Eliquis 5 mg b i d  and discharged to acute rehab  While at acute rehab she was noted to have a drop in hemoglobin to 7 0 on 2/3  Fecal occult blood was positive  EGD and colonoscopy on 2020 were both negative for acute bleeding  She did not resume anticoagulation and returned to the ED on  with left arm weakness which resolved in about 30 minutes  Presenting EKG w/ sinus rhythm  She has maintained sinus throughout this admission, without recurrence of symptoms  EKG () /Telemetry: NSR    Most recent cardiac testing:    Echo 20 - EF 30% - dyskinesis of the apical inferior and apical walls  Mild LVH  Review of Systems:   Review of Systems   Constitution: Negative  HENT: Negative  Eyes: Negative  Cardiovascular: Negative for chest pain, claudication, dyspnea on exertion, irregular heartbeat, leg swelling, near-syncope, orthopnea, palpitations, paroxysmal nocturnal dyspnea and syncope  Respiratory: Negative for cough, shortness of breath and wheezing  Endocrine: Negative  Skin: Negative  Musculoskeletal: Negative  Gastrointestinal: Negative  Genitourinary: Negative  Neurological: Positive for focal weakness (left arm - now resolved)  Negative for dizziness and light-headedness  Psychiatric/Behavioral: Negative            Historical Information   Past Medical History:   Diagnosis Date    Hypertension     PUD (peptic ulcer disease)     [address] [address]     Vitamin D deficiency      Past Surgical History:   Procedure Laterality Date    ABDOMINOPLASTY      revision gastric bypass    BREAST SURGERY      reduction     SECTION      CHOLECYSTECTOMY      COLONOSCOPY      GASTRIC BYPASS      HERNIA REPAIR      NEUROMA EXCISION Right 2019    Procedure: EXCISION 2ND INTERSPACE NEUROMA;  Surgeon: Maria Guadalupe Grover DPM;  Location: [address] [place] [address];  Service: Podiatry    ROTATOR CUFF REPAIR Left      Social History     Substance and Sexual Activity   Alcohol Use Never    Frequency: Never    Comment: none     Social History     Substance and Sexual Activity   Drug Use No    Comment: none     Social History     Tobacco Use   Smoking Status Never Smoker   Smokeless Tobacco Never Used       Family History:   Family History   Problem Relation Age of Onset    Stroke Mother     Heart disease Father        Meds/Allergies   all current active meds have been reviewed  Medications Prior to Admission   Medication    amiodarone 200 mg tablet    atorvastatin (LIPITOR) 40 mg tablet    dorzolamide-timolol (COSOPT) 22 3-6 8 MG/ML ophthalmic solution       Allergies   Allergen Reactions    Nsaids      Due to hx gastric bypass         Objective   Vitals: Blood pressure 123/60, pulse 59, temperature (!) 96 9 °F (36 1 °C), temperature source Temporal, resp  rate (!) 25, height 5' 3" (1 6 m), weight 116 kg (255 lb 11 7 oz), SpO2 94 %, not currently breastfeeding , Body mass index is 45 3 kg/m² ,   Orthostatic Blood Pressures      Most Recent Value   Blood Pressure  123/60 filed at 02/09/2020 0500   Patient Position - Orthostatic VS  Lying filed at 02/09/2020 0500          Physical Exam   Physical Exam   Constitutional: She is oriented to person, place, and time  She appears well-developed and well-nourished  No distress  HENT:   Head: Normocephalic and atraumatic  Eyes: EOM are normal  No scleral icterus  Neck: Normal range of motion  Neck supple  Cardiovascular: Normal rate, regular rhythm, S1 normal and S2 normal    No murmur heard  Pulmonary/Chest: Effort normal and breath sounds normal  She has no wheezes  She has no rales  Abdominal: Soft  Bowel sounds are normal    Musculoskeletal: She exhibits no edema  Neurological: She is alert and oriented to person, place, and time  Skin: Skin is warm and dry  Psychiatric: She has a normal mood and affect  Her behavior is normal    Nursing note and vitals reviewed      Lab Results:     Troponins:   Results from last 7 days   Lab Units 02/06/20  1909   TROPONIN I ng/mL <0 03     BNP:    CBC with diff:   Results from last 7 days   Lab Units 02/09/20  0539 02/08/20  0615 02/07/20  1933 02/07/20  0505   WBC Thousand/uL 7 90 7 80  --  8 40   HEMOGLOBIN g/dL 9 4* 9 3* 9 6* 7 0*   HEMATOCRIT % 29 8* 29 5* 31 0* 23 1*   PLATELETS Thousands/uL 315 402*  --  324   RBC Million/uL 3 59* 3 57*  --  2 85*     CMP:  Results from last 7 days   Lab Units 02/09/20  0539 02/08/20  0615 02/07/20  0505 02/06/20  1909   POTASSIUM mmol/L 3 5 3 7 3 7 3 4*   CHLORIDE mmol/L 112* 110* 108* 105   CO2 mmol/L 20* 21 20* 20*   BUN mg/dL 14 13 18 18   CREATININE mg/dL 0 85 0 90 1 04 1 07   CALCIUM mg/dL 7 3* 7 3* 7 2* 7 7*   AST U/L  --   --  21  --    ALT U/L  --   --  12  --    ALK PHOS U/L  --   --  68  --    EGFR ml/min/1 73sq m 74 69 58 56     TSH:     Coags:   Results from last 7 days   Lab Units 02/07/20  0505 02/06/20  1909   INR  1 16 1 14

## 2020-02-09 NOTE — DISCHARGE SUMMARY
Discharge- Cassidy Muller 1955, 60 y o  female MRN: [id number]    Unit/Bed#: ICU 01 Encounter: [id number]    Primary Care Provider: Tanvi Babcock DO   Date and time admitted to hospital: 2/6/2020  6:02 PM      * TIA (transient ischemic attack)  Assessment & Plan  · Neurology input appreciated  · Symptoms have resolved  · Continue statin  · Eliquis resumed per Cardiology recommendations  Patient's hemoglobin has remained stable  Patient had recent colonoscopy an EGD with no signs of active bleeding  No melena or hematochezia while in the hospital during this admission    Iron deficiency anemia due to chronic blood loss  Assessment & Plan  · Patient is status post 2 units PRBCs on 02/07/2020  · Patient is iron deficient based on iron panel  · Patient had EGD/colonoscopy done on 02/04/2020  · GI input appreciated  · Patient tolerating diet  · Will initiate iron supplementation  · Hemoglobin has been stable, no hematochezia/melena  · Patient will have repeat hemoglobin on Thursday next week with results sent to cardiology and patient's PCP    Morbid obesity with BMI of 40 0-44 9, adult [facility])  Assessment & Plan  · Supportive care  · Nutrition consult    H/O ischemic left MCA stroke  Assessment & Plan  · Patient was a stroke alert on 01/17/2020 with right-sided weakness, was found to have a left MCA CVA  Per cardiology documentation they believed that this was a cardioembolic issue due to atrial fibrillation that was underlying  The patient at that time had a narrow complex tachycardia, and Takotsubu cardiomyopathy  She does have known ejection fraction of 20-30%  · Patient was recently discharged from acute rehab on 02/05/2020  · Will continue medications which include amiodarone, Lipitor, Protonix    Persistent atrial fibrillation  Assessment & Plan  · Currently rate controlled  · Will continue amiodarone  · Eliquis resumed as patient had recent colonoscopy/EGD with no signs of active bleeding  Hemoglobin has been stable after receiving 2 units PRBCs  No melena or hematochezia  · Follow-up with cardiology  · Cardiology will make referral to EP      Discharging Physician / Practitioner: Miguel Hodge MD  PCP: Yimi Fierro DO  Admission Date:   Admission Orders (From admission, onward)     Ordered        02/07/20 1440  Inpatient Admission  Once         02/06/20 2052  Place in Observation  Once                   Discharge Date: 02/09/20    Resolved Problems  Date Reviewed: 2/6/2020    None          Consultations During Hospital Stay:  · Cardiology, GI, Neurology    Procedures Performed:   · None    Significant Findings / Test Results:   · Suspected TIA    Incidental Findings:   · None     Test Results Pending at Discharge (will require follow up): · None     Outpatient Tests Requested:  · CBC to be done on Thursday with results sent to PCP    Complications:     None    Reason for Admission:  Suspected CVA    Hospital Course:     Alfredo Melendez is a 60 y o  female patient who originally presented to the hospital on 2/6/2020 due to left upper extremity weakness and numbness/tingling  Patient was admitted to the ICU to rule out CVA  Patient was recently found to have acute CVA and transitioned to acute rehab from which she was discharged approximately 1 week ago  CT head on admission was negative for any acute findings  Neurology was consulted and stated that since symptoms have resolved patient likely had TIA  Patient was on Eliquis recently but this was discontinued due to suspected GI bleed  Patient had EGD/colonoscopy done recently which did not show any signs of acute bleeding  On admission patient's hemoglobin level was noted to be 7  Patient was transfused 2 units of PRBCs and GI was consulted  GI recommended further workup if patient's hemoglobin continue to drop  Patient's hemoglobin has been stable since receiving 2 units PRBCs patient is not having any melena or hematochezia  Discussed case with Cardiology who evaluated patient and recommended resuming Eliquis at reduced dose given that patient does require anticoagulation given her history of recent CVA and TIA  However given history of suspected GI bleeding will resume at reduced dose and monitor  Patient have repeat CBC done on Thursday  Patient advised that if she developed any melena or hematochezia she should returned to the hospital or call her PCP immediately  Patient will also be discharged on iron supplementation  Lisinopril 5 mg resume per Cardiology for patient's cardiomyopathy  Patient has outpatient follow-up with Cardiology for potential EP evaluation and advised to follow-up with her PCP within the next 4-5 days  Please see above list of diagnoses and related plan for additional information  Condition at Discharge: stable     Discharge Day Visit / Exam:     Subjective:  No complaints at this time  Patient is tolerating diet  Ambulating well  Vitals: Blood Pressure: 123/60 (02/09/20 0500)  Pulse: 59 (02/09/20 0500)  Temperature: (!) 96 9 °F (36 1 °C) (02/09/20 0400)  Temp Source: Temporal (02/09/20 0400)  Respirations: (!) 25 (02/09/20 0500)  Height: 5' 3" (160 cm) (02/06/20 1805)  Weight - Scale: 116 kg (255 lb 11 7 oz) (02/09/20 0539)  SpO2: 94 % (02/09/20 0500)     Exam:   Physical Exam   Constitutional: No distress  Morbidly obese  female   HENT:   Head: Normocephalic and atraumatic  Eyes: Conjunctivae and EOM are normal    Neck: Normal range of motion  Neck supple  Cardiovascular: Normal rate and regular rhythm  Pulmonary/Chest: Effort normal  No respiratory distress  Abdominal: Soft  She exhibits no distension  There is no tenderness  Musculoskeletal: Normal range of motion  She exhibits no edema  Neurological: She is alert  No cranial nerve deficit  Skin: Skin is warm and dry  Psychiatric: She has a normal mood and affect   Her behavior is normal        Discussion with Family:  No family available at bedside during my evaluation    Discharge instructions/Information to patient and family:   See after visit summary for information provided to patient and family  Provisions for Follow-Up Care:  See after visit summary for information related to follow-up care and any pertinent home health orders  Disposition:     Home with VNA Services (Reminder: Complete face to face encounter)    For Discharges to [facility]:   · Not Applicable to this Patient - Not Applicable to this Patient    Planned Readmission:    no     Discharge Statement:  I spent 35 minutes discharging the patient  This time was spent on the day of discharge  I had direct contact with the patient on the day of discharge  Greater than 50% of the total time was spent examining patient, answering all patient questions, arranging and discussing plan of care with patient as well as directly providing post-discharge instructions  Additional time then spent on discharge activities  Discharge Medications:  See after visit summary for reconciled discharge medications provided to patient and family        ** Please Note: This note has been constructed using a voice recognition system **

## 2020-02-09 NOTE — NURSING NOTE
DC instructions reviewed with patient; voices full understanding  IV removed with no redness or swelling; 2X2 and tape applied  Son here to transfer patient home

## 2020-02-09 NOTE — ASSESSMENT & PLAN NOTE
· Neurology input appreciated  · Symptoms have resolved  · Continue statin  · Eliquis resumed per Cardiology recommendations  Patient's hemoglobin has remained stable  Patient had recent colonoscopy an EGD with no signs of active bleeding    No melena or hematochezia while in the hospital during this admission

## 2020-02-12 ENCOUNTER — HOSPITAL ENCOUNTER (OUTPATIENT)
Dept: RADIOLOGY | Facility: IMAGING CENTER | Age: 65
Discharge: HOME | End: 2020-02-12
Payer: COMMERCIAL

## 2020-02-12 ENCOUNTER — TRANSCRIBE ORDERS (OUTPATIENT)
Dept: ADMINISTRATIVE | Facility: HOSPITAL | Age: 65
End: 2020-02-12

## 2020-02-12 DIAGNOSIS — M79.671 RIGHT FOOT PAIN: ICD-10-CM

## 2020-02-12 DIAGNOSIS — D64.9 ANEMIA: ICD-10-CM

## 2020-02-12 DIAGNOSIS — M79.672 LEFT FOOT PAIN: Primary | ICD-10-CM

## 2020-02-12 PROCEDURE — 73630 X-RAY EXAM OF FOOT: CPT

## 2020-02-13 ENCOUNTER — APPOINTMENT (OUTPATIENT)
Dept: LAB | Facility: HOSPITAL | Age: 65
End: 2020-02-13
Attending: INTERNAL MEDICINE
Payer: COMMERCIAL

## 2020-02-13 DIAGNOSIS — D50.0 IRON DEFICIENCY ANEMIA DUE TO CHRONIC BLOOD LOSS: ICD-10-CM

## 2020-02-13 LAB
ANISOCYTOSIS BLD QL SMEAR: PRESENT
BASOPHILS # BLD AUTO: 0 THOUSANDS/ΜL (ref 0–0.1)
BASOPHILS NFR BLD AUTO: 1 % (ref 0–2)
EOSINOPHIL # BLD AUTO: 0.1 THOUSAND/ΜL (ref 0–0.61)
EOSINOPHIL NFR BLD AUTO: 2 % (ref 0–5)
ERYTHROCYTE [DISTWIDTH] IN BLOOD BY AUTOMATED COUNT: 26.7 % (ref 11.5–14.5)
HCT VFR BLD AUTO: 37.2 % (ref 42–47)
HGB BLD-MCNC: 11.3 G/DL (ref 12–16)
HYPERCHROMIA BLD QL SMEAR: PRESENT
LYMPHOCYTES # BLD AUTO: 1 THOUSANDS/ΜL (ref 0.6–4.47)
LYMPHOCYTES NFR BLD AUTO: 13 % (ref 21–51)
MCH RBC QN AUTO: 26.1 PG (ref 26–34)
MCHC RBC AUTO-ENTMCNC: 30.5 G/DL (ref 31–37)
MCV RBC AUTO: 86 FL (ref 81–99)
MONOCYTES # BLD AUTO: 0.9 THOUSAND/ΜL (ref 0.17–1.22)
MONOCYTES NFR BLD AUTO: 13 % (ref 2–12)
NEUTROPHILS # BLD AUTO: 5.3 THOUSANDS/ΜL (ref 1.4–6.5)
NEUTS SEG NFR BLD AUTO: 72 % (ref 42–75)
PLATELET # BLD AUTO: 263 THOUSANDS/UL (ref 149–390)
PLATELET BLD QL SMEAR: ADEQUATE
PMV BLD AUTO: 7.5 FL (ref 8.6–11.7)
RBC # BLD AUTO: 4.34 MILLION/UL (ref 3.9–5.2)
RBC MORPH BLD: NORMAL
WBC # BLD AUTO: 7.4 THOUSAND/UL (ref 4.8–10.8)

## 2020-02-13 PROCEDURE — 36415 COLL VENOUS BLD VENIPUNCTURE: CPT | Performed by: PODIATRIST

## 2020-02-13 PROCEDURE — 85025 COMPLETE CBC W/AUTO DIFF WBC: CPT | Performed by: PODIATRIST

## 2020-02-16 ENCOUNTER — OFFICE VISIT (OUTPATIENT)
Dept: URGENT CARE | Facility: CLINIC | Age: 65
End: 2020-02-16
Payer: COMMERCIAL

## 2020-02-16 VITALS
BODY MASS INDEX: 45.18 KG/M2 | WEIGHT: 255 LBS | HEART RATE: 73 BPM | OXYGEN SATURATION: 99 % | TEMPERATURE: 97.1 F | HEIGHT: 63 IN | DIASTOLIC BLOOD PRESSURE: 78 MMHG | RESPIRATION RATE: 18 BRPM | SYSTOLIC BLOOD PRESSURE: 131 MMHG

## 2020-02-16 DIAGNOSIS — Z79.01 ON CONTINUOUS ORAL ANTICOAGULATION: ICD-10-CM

## 2020-02-16 DIAGNOSIS — R60.0 MILD PERIPHERAL EDEMA: Primary | ICD-10-CM

## 2020-02-16 DIAGNOSIS — I99.9 VASCULAR DISEASE: ICD-10-CM

## 2020-02-16 PROCEDURE — G0382 LEV 3 HOSP TYPE B ED VISIT: HCPCS | Performed by: NURSE PRACTITIONER

## 2020-02-16 PROCEDURE — 99213 OFFICE O/P EST LOW 20 MIN: CPT | Performed by: NURSE PRACTITIONER

## 2020-02-16 PROCEDURE — 99283 EMERGENCY DEPT VISIT LOW MDM: CPT | Performed by: NURSE PRACTITIONER

## 2020-02-16 NOTE — PATIENT INSTRUCTIONS
Elevate your legs, use ACE wraps gently wrapped on your legs (think gentle hug, not tight squeeze) wrapping from the feet up towards the knee and/or use compression stockings (light compression, approx 7-12 jaden compression)  Decrease salt in diet and make sure you drink enough fluids  If the swelling persists then follow-up with your family provider  If your symptoms change and become severe, then you should be seen in the ER (significant leg pain, chest pain, shortness of breath, etc)  Leg Edema   WHAT YOU NEED TO KNOW:   Leg edema is swelling caused by fluid buildup  Your legs may swell if you sit or stand for long periods of time, are pregnant, or are injured  Swelling may also occur if you have heart failure or circulation problems  This means that your heart does not pump blood through your body as it should  DISCHARGE INSTRUCTIONS:   Self-care:   · Elevate your legs:  Raise your legs above the level of your heart as often as you can  This will help decrease swelling and pain  Prop your legs on pillows or blankets to keep them elevated comfortably  · Wear pressure stockings: These tight stockings put pressure on your legs to promote blood flow and prevent blood clots  Wear the stockings during the day  Do not wear them while you sleep  · Apply heat:  Heat helps decrease pain and swelling  Apply heat on the area for 20 to 30 minutes every 2 hours for as many days as directed  · Stay active:  Do not stand or sit for long periods of time  Ask your healthcare provider about the best exercise plan for you  · Eat healthy foods:  Healthy foods include fruits, vegetables, whole-grain breads, low-fat dairy products, beans, lean meats, and fish  Ask if you need to be on a special diet  Limit salt  Salt will make your body hold even more fluid  Follow up with your healthcare provider as directed:  Write down your questions so you remember to ask them during your visits     Contact your healthcare provider if:   · You have a fever or feel more tired than usual     · The veins in your legs look larger than usual  They may look full or bulging  · Your legs itch or feel heavy  · You have red or white areas or sores on your legs  The skin may also appear dimpled or have indentations  · You are gaining weight  · You have trouble moving your ankles  · The swelling does not go away, or other parts of your body swell  · You have questions or concerns about your condition or care  Return to the emergency department if:   · You cannot walk  · You feel faint or confused  · Your skin turns blue or gray  · Your leg feels warm, tender, and painful  It may be swollen and red  · You have chest pain or trouble breathing that is worse when you lie down  · You suddenly feel lightheaded and have trouble breathing  · You have new and sudden chest pain  You may have more pain when you take deep breaths or cough  You may also cough up blood  © 2017 [employer]  Information is for End User's use only and may not be sold, redistributed or otherwise used for commercial purposes  All illustrations and images included in CareNotes® are the copyrighted property of [employer]  or Ashkan Paez  The above information is an  only  It is not intended as medical advice for individual conditions or treatments  Talk to your doctor, nurse or pharmacist before following any medical regimen to see if it is safe and effective for you

## 2020-02-17 NOTE — PROGRESS NOTES
Pt did not cont PT after visit on 1/10/20 for unknown reasons  Pt DC'd from PT at this time due to no show for scheduled apptmts and no call from pt

## 2020-02-24 ENCOUNTER — APPOINTMENT (OUTPATIENT)
Dept: RADIOLOGY | Facility: CLINIC | Age: 65
End: 2020-02-24
Payer: COMMERCIAL

## 2020-02-24 ENCOUNTER — TRANSCRIBE ORDERS (OUTPATIENT)
Dept: RADIOLOGY | Facility: CLINIC | Age: 65
End: 2020-02-24

## 2020-02-24 DIAGNOSIS — I10 ESSENTIAL HYPERTENSION, MALIGNANT: ICD-10-CM

## 2020-02-24 DIAGNOSIS — M79.671 RIGHT FOOT PAIN: Primary | ICD-10-CM

## 2020-02-24 DIAGNOSIS — M79.671 RIGHT FOOT PAIN: ICD-10-CM

## 2020-02-24 PROCEDURE — 73630 X-RAY EXAM OF FOOT: CPT

## 2020-02-26 ENCOUNTER — CONSULT (OUTPATIENT)
Dept: CARDIOLOGY CLINIC | Facility: CLINIC | Age: 65
End: 2020-02-26
Payer: COMMERCIAL

## 2020-02-26 ENCOUNTER — TELEPHONE (OUTPATIENT)
Dept: CARDIOLOGY CLINIC | Facility: CLINIC | Age: 65
End: 2020-02-26

## 2020-02-26 VITALS
HEIGHT: 63 IN | DIASTOLIC BLOOD PRESSURE: 70 MMHG | WEIGHT: 258 LBS | SYSTOLIC BLOOD PRESSURE: 112 MMHG | BODY MASS INDEX: 45.71 KG/M2 | HEART RATE: 58 BPM

## 2020-02-26 DIAGNOSIS — I42.9 CARDIOMYOPATHY, UNSPECIFIED TYPE (HCC): ICD-10-CM

## 2020-02-26 DIAGNOSIS — I10 ESSENTIAL HYPERTENSION: ICD-10-CM

## 2020-02-26 DIAGNOSIS — I48.91 ATRIAL FIBRILLATION, UNSPECIFIED TYPE (HCC): Primary | ICD-10-CM

## 2020-02-26 DIAGNOSIS — Z86.73 H/O ISCHEMIC LEFT MCA STROKE: ICD-10-CM

## 2020-02-26 PROCEDURE — 99214 OFFICE O/P EST MOD 30 MIN: CPT | Performed by: INTERNAL MEDICINE

## 2020-02-26 RX ORDER — TRAMADOL HYDROCHLORIDE 50 MG/1
50 TABLET ORAL EVERY 6 HOURS PRN
COMMUNITY

## 2020-02-26 NOTE — TELEPHONE ENCOUNTER
Pt is scheduled on 03/09/20 for a LHC with Dr Cydney Dubon at [facility]     Pt in office for instructions  Can I get preauth please

## 2020-02-26 NOTE — H&P (VIEW-ONLY)
Interventional Cardiology Consultation      Ramiro Brewster  1955  [address] [facility]  [address] [address] [phone]    1  Atrial fibrillation, unspecified type [facility])  Ambulatory referral to Cardiac Electrophysiology    Cardiac catheterization   2  Cardiomyopathy, unspecified type [facility])  Cardiac catheterization   3  Essential hypertension     4  H/O ischemic left MCA stroke       Referring: Mickey/Sujata  Reason: Watchman     Discussion/Summary    1  Recent paroxysmal atrial fibrillation with cardiomyopathy, in sinus rhythm on amiodarone    2  History of GI bleeding requiring transfusion on Eliquis, currently on Eliquis 2 5 mg b i d (CHADS2 Vasc score 5, HAS-BLED 2) referred for Watchman as a result    3  Prior gastric bypass surgery    4  History of ischemic stroke    Plan:  Patient initially referred for Watchman procedure today due to GI bleeding in the setting of being on direct oral anticoagulant Eliquis 5 mg b i d  She had colonoscopy and ultimately blood transfusion with eliquis decreased to 2 5 mg b i d  She was placed on amiodarone, and is in sinus rhythm  I think regarding her paroxysmal atrial fibrillation that given the fact that she had a cardiomyopathy that ablation could be considered before continuing  long-term amiodarone at the age of 60 even though she has significant comorbidities  I have arranged outpatient electrophysiology evaluation in that regard  If she is deemed a candidate, could consider ablation and implantable loop recorder and observation    If not a candidate for ablation, after exploring the etiology of her cardiomyopathy with cardiac cath which I have arranged, ultimate decision about left atrial appendage occlusion could be made at that point and Watchman could be placed after performing transesophageal echocardiogram     History:     60-year-old female history gastric bypass surgery he [place] [facility] [place] 2020 with atrial fibrillation rapid ventricular rate, cardiomyopathy with LVEF 30% and subacute/left MCA infarct  She was discharged on Eliquis and amiodarone in January  She subsequently  developed GI bleeding, had colonoscopy required blood transfusion and as result was referred here for Watchman procedure  Patient little insight to ongoing medical issues      Patient Active Problem List   Diagnosis    Essential hypertension    Persistent atrial fibrillation    [facility]    Glaucoma of both eyes    Cardiomyopathy ([facility] )    Prolonged QT interval    History of pulmonary embolism    GIB (gastrointestinal bleeding)    IFG (impaired fasting glucose)    Hypocalcemia    Thrombocytosis (HCC)    Iron deficiency anemia due to chronic blood loss    H/O ischemic left MCA stroke    Diverticulitis of large intestine without perforation or abscess without bleeding    Morbid obesity with BMI of 40 0-44 9, adult (HCC)    Left arm weakness    TIA (transient ischemic attack)    Long term (current) use of anticoagulants     Past Medical History:   Diagnosis Date    Hyperlipidemia     Hypertension     PAF (paroxysmal atrial fibrillation) (HCC)     PUD (peptic ulcer disease)     Stroke ([facility] )     Vitamin D deficiency      Social History     Socioeconomic History    Marital status: Single     Spouse name: Not on file    Number of children: Not on file    Years of education: Not on file    Highest education level: Not on file   Occupational History    Not on file   Social Needs    Financial resource strain: Not on file    Food insecurity:     Worry: Not on file     Inability: Not on file    Transportation needs:     Medical: Not on file     Non-medical: Not on file   Tobacco Use    Smoking status: Never Smoker    Smokeless tobacco: Never Used   Substance and Sexual Activity    Alcohol use: Never     Frequency: Never Comment: none    Drug use: No     Comment: none    Sexual activity: Not Currently     Birth control/protection: Post-menopausal   Lifestyle    Physical activity:     Days per week: Not on file     Minutes per session: Not on file    Stress: Not on file   Relationships    Social connections:     Talks on phone: Not on file     Gets together: Not on file     Attends [employer] service: Not on file     Active member of club or organization: Not on file     Attends meetings of clubs or organizations: Not on file     Relationship status: Not on file    Intimate partner violence:     Fear of current or ex partner: Not on file     Emotionally abused: Not on file     Physically abused: Not on file     Forced sexual activity: Not on file   Other Topics Concern    Not on file   Social History Narrative    Not on file      Family History   Problem Relation Age of Onset    Stroke Mother     Heart disease Father     Hypertension Brother      Past Surgical History:   Procedure Laterality Date    ABDOMINOPLASTY      revision gastric bypass    BREAST SURGERY      reduction     SECTION      CHOLECYSTECTOMY      COLONOSCOPY      GASTRIC BYPASS      HERNIA REPAIR      NEUROMA EXCISION Right 2019    Procedure: EXCISION 2ND INTERSPACE NEUROMA;  Surgeon: Yaneli Townsend DPM;  Location: [address] [place] [address];  Service: Podiatry    ROTATOR CUFF REPAIR Left        Current Outpatient Medications:     amiodarone 200 mg tablet, Take 1 tablet (200 mg total) by mouth daily with breakfast, Disp: 30 tablet, Rfl: 0    apixaban (ELIQUIS) 2 5 mg, Take 1 tablet (2 5 mg total) by mouth 2 (two) times a day, Disp: 60 tablet, Rfl: 0    atorvastatin (LIPITOR) 40 mg tablet, Take 1 tablet (40 mg total) by mouth every evening, Disp: 30 tablet, Rfl: 0    dorzolamide-timolol (COSOPT) 22 3-6 8 MG/ML ophthalmic solution, Administer 1 drop to both eyes 2 (two) times a day, Disp: , Rfl:     ferrous sulfate 324 (65 Fe) mg, Take 1 tablet (324 mg total) by mouth daily before breakfast, Disp: 30 tablet, Rfl: 0    lisinopril (ZESTRIL) 5 mg tablet, Take 1 tablet (5 mg total) by mouth daily, Disp: 30 tablet, Rfl: 0    pantoprazole (PROTONIX) 40 mg tablet, Take 1 tablet (40 mg total) by mouth daily, Disp: 30 tablet, Rfl: 0    traMADol (ULTRAM) 50 mg tablet, Take 50 mg by mouth every 6 (six) hours as needed for moderate pain, Disp: , Rfl:   Allergies   Allergen Reactions    Nsaids      Due to hx gastric bypass         Social, Family and medication history as listed, reviewed and updated as necessary    Labs:   Lab Results   Component Value Date    K 3 5 02/09/2020     (H) 02/09/2020    CO2 20 (L) 02/09/2020    BUN 14 02/09/2020    CREATININE 0 85 02/09/2020    CREATININE 0 90 02/08/2020    CALCIUM 7 3 (L) 02/09/2020       Lab Results   Component Value Date    WBC 7 40 02/13/2020    HGB 11 3 (L) 02/13/2020    HGB 9 4 (L) 02/09/2020    HCT 37 2 (L) 02/13/2020    HCT 29 8 (L) 02/09/2020     02/13/2020     02/09/2020       No results found for: CHOL  Lab Results   Component Value Date    HDL 44 01/18/2020     Lab Results   Component Value Date    LDLCALC 109 (H) 01/18/2020     Lab Results   Component Value Date    TRIG 93 01/18/2020     No results found for: LDLDIRECT    Lab Results   Component Value Date    ALT 12 02/07/2020    ALT 11 01/18/2020    AST 21 02/07/2020    AST 15 01/18/2020             No results found for: NTBNP    Lab Results   Component Value Date    HGBA1C 5 2 02/07/2020    HGBA1C 6 0 01/18/2020       Imaging: Reviewed in epic      Review of Systems:  14 systems reviewed and negative with exception of the above       PHYSICAL EXAM:        Vitals:    02/26/20 1402   BP: 112/70   Pulse: 58     Body mass index is 45 7 kg/m²    Weight (last 2 days)     Date/Time   Weight    02/26/20 1402   117 (258)                 Gen: No acute distress  HEENT: anicteric, mucous membranes moist  Neck: supple, no jugular venous distention, or carotid bruit  Heart: regular, normal s1 and s2, no murmur/rub or gallop  Lungs :clear to auscultation bilaterally, no rales/rhonchi or wheeze  Abdomen: soft nontender, normoactive bowel sounds, no organomegaly  Ext: warm and perfused, normal femoral pulses, no edema, or clubbing  Skin: warm, no rashes  Neuro: AAO x 3, no focal findings  Psychiatric: normal affect  Musculoskeletal: no obvious joint deformities

## 2020-02-26 NOTE — PROGRESS NOTES
Interventional Cardiology Consultation      Kerrymiguel ángel Gresham  1955  [facility]  [address] [address] [phone]    1  Atrial fibrillation, unspecified type [facility])  Ambulatory referral to Cardiac Electrophysiology    Cardiac catheterization   2  Cardiomyopathy, unspecified type [facility])  Cardiac catheterization   3  Essential hypertension     4  H/O ischemic left MCA stroke       Referring: Mickey/Sujata  Reason: Watchman     Discussion/Summary    1  Recent paroxysmal atrial fibrillation with cardiomyopathy, in sinus rhythm on amiodarone    2  History of GI bleeding requiring transfusion on Eliquis, currently on Eliquis 2 5 mg b i d (CHADS2 Vasc score 5, HAS-BLED 2) referred for Watchman as a result    3  Prior gastric bypass surgery    4  History of ischemic stroke    Plan:  Patient initially referred for Watchman procedure today due to GI bleeding in the setting of being on direct oral anticoagulant Eliquis 5 mg b i d  She had colonoscopy and ultimately blood transfusion with eliquis decreased to 2 5 mg b i d  She was placed on amiodarone, and is in sinus rhythm  I think regarding her paroxysmal atrial fibrillation that given the fact that she had a cardiomyopathy that ablation could be considered before continuing  long-term amiodarone at the age of 60 even though she has significant comorbidities  I have arranged outpatient electrophysiology evaluation in that regard  If she is deemed a candidate, could consider ablation and implantable loop recorder and observation    If not a candidate for ablation, after exploring the etiology of her cardiomyopathy with cardiac cath which I have arranged, ultimate decision about left atrial appendage occlusion could be made at that point and Watchman could be placed after performing transesophageal echocardiogram     History:     60-year-old female history gastric bypass surgery he presented [facility] [place] 2020 with atrial fibrillation rapid ventricular rate, cardiomyopathy with LVEF 30% and subacute/left MCA infarct  She was discharged on Eliquis and amiodarone in January  She subsequently  developed GI bleeding, had colonoscopy required blood transfusion and as result was referred here for Watchman procedure  Patient little insight to ongoing medical issues      Patient Active Problem List   Diagnosis    Essential hypertension    Persistent atrial fibrillation    [facility]    Glaucoma of both eyes    Cardiomyopathy ([facility] )    Prolonged QT interval    History of pulmonary embolism    GIB (gastrointestinal bleeding)    IFG (impaired fasting glucose)    Hypocalcemia    Thrombocytosis (HCC)    Iron deficiency anemia due to chronic blood loss    H/O ischemic left MCA stroke    Diverticulitis of large intestine without perforation or abscess without bleeding    Morbid obesity with BMI of 40 0-44 9, adult (HCC)    Left arm weakness    TIA (transient ischemic attack)    Long term (current) use of anticoagulants     Past Medical History:   Diagnosis Date    Hyperlipidemia     Hypertension     PAF (paroxysmal atrial fibrillation) (HCC)     PUD (peptic ulcer disease)     Stroke ([facility] )     Vitamin D deficiency      Social History     Socioeconomic History    Marital status: Single     Spouse name: Not on file    Number of children: Not on file    Years of education: Not on file    Highest education level: Not on file   Occupational History    Not on file   Social Needs    Financial resource strain: Not on file    Food insecurity:     Worry: Not on file     Inability: Not on file    Transportation needs:     Medical: Not on file     Non-medical: Not on file   Tobacco Use    Smoking status: Never Smoker    Smokeless tobacco: Never Used   Substance and Sexual Activity    Alcohol use: Never     Frequency: Never Comment: none    Drug use: No     Comment: none    Sexual activity: Not Currently     Birth control/protection: Post-menopausal   Lifestyle    Physical activity:     Days per week: Not on file     Minutes per session: Not on file    Stress: Not on file   Relationships    Social connections:     Talks on phone: Not on file     Gets together: Not on file     Attends Scientologist service: Not on file     Active member of club or organization: Not on file     Attends meetings of clubs or organizations: Not on file     Relationship status: Not on file    Intimate partner violence:     Fear of current or ex partner: Not on file     Emotionally abused: Not on file     Physically abused: Not on file     Forced sexual activity: Not on file   Other Topics Concern    Not on file   Social History Narrative    Not on file      Family History   Problem Relation Age of Onset    Stroke Mother     Heart disease Father     Hypertension Brother      Past Surgical History:   Procedure Laterality Date    ABDOMINOPLASTY      revision gastric bypass    BREAST SURGERY      reduction     SECTION      CHOLECYSTECTOMY      COLONOSCOPY      GASTRIC BYPASS      HERNIA REPAIR      NEUROMA EXCISION Right 2019    Procedure: EXCISION 2ND INTERSPACE NEUROMA;  Surgeon: Marcus Bustos DPM;  Location: [address] [place] [address];  Service: Podiatry    ROTATOR CUFF REPAIR Left        Current Outpatient Medications:     amiodarone 200 mg tablet, Take 1 tablet (200 mg total) by mouth daily with breakfast, Disp: 30 tablet, Rfl: 0    apixaban (ELIQUIS) 2 5 mg, Take 1 tablet (2 5 mg total) by mouth 2 (two) times a day, Disp: 60 tablet, Rfl: 0    atorvastatin (LIPITOR) 40 mg tablet, Take 1 tablet (40 mg total) by mouth every evening, Disp: 30 tablet, Rfl: 0    dorzolamide-timolol (COSOPT) 22 3-6 8 MG/ML ophthalmic solution, Administer 1 drop to both eyes 2 (two) times a day, Disp: , Rfl:     ferrous sulfate 324 (65 Fe) mg, Take 1 tablet (324 mg total) by mouth daily before breakfast, Disp: 30 tablet, Rfl: 0    lisinopril (ZESTRIL) 5 mg tablet, Take 1 tablet (5 mg total) by mouth daily, Disp: 30 tablet, Rfl: 0    pantoprazole (PROTONIX) 40 mg tablet, Take 1 tablet (40 mg total) by mouth daily, Disp: 30 tablet, Rfl: 0    traMADol (ULTRAM) 50 mg tablet, Take 50 mg by mouth every 6 (six) hours as needed for moderate pain, Disp: , Rfl:   Allergies   Allergen Reactions    Nsaids      Due to hx gastric bypass         Social, Family and medication history as listed, reviewed and updated as necessary    Labs:   Lab Results   Component Value Date    K 3 5 02/09/2020     (H) 02/09/2020    CO2 20 (L) 02/09/2020    BUN 14 02/09/2020    CREATININE 0 85 02/09/2020    CREATININE 0 90 02/08/2020    CALCIUM 7 3 (L) 02/09/2020       Lab Results   Component Value Date    WBC 7 40 02/13/2020    HGB 11 3 (L) 02/13/2020    HGB 9 4 (L) 02/09/2020    HCT 37 2 (L) 02/13/2020    HCT 29 8 (L) 02/09/2020     02/13/2020     02/09/2020       No results found for: CHOL  Lab Results   Component Value Date    HDL 44 01/18/2020     Lab Results   Component Value Date    LDLCALC 109 (H) 01/18/2020     Lab Results   Component Value Date    TRIG 93 01/18/2020     No results found for: LDLDIRECT    Lab Results   Component Value Date    ALT 12 02/07/2020    ALT 11 01/18/2020    AST 21 02/07/2020    AST 15 01/18/2020             No results found for: NTBNP    Lab Results   Component Value Date    HGBA1C 5 2 02/07/2020    HGBA1C 6 0 01/18/2020       Imaging: Reviewed in epic      Review of Systems:  14 systems reviewed and negative with exception of the above       PHYSICAL EXAM:        Vitals:    02/26/20 1402   BP: 112/70   Pulse: 58     Body mass index is 45 7 kg/m²    Weight (last 2 days)     Date/Time   Weight    02/26/20 1402   117 (258)                 Gen: No acute distress  HEENT: anicteric, mucous membranes moist  Neck: supple, no jugular venous distention, or carotid bruit  Heart: regular, normal s1 and s2, no murmur/rub or gallop  Lungs :clear to auscultation bilaterally, no rales/rhonchi or wheeze  Abdomen: soft nontender, normoactive bowel sounds, no organomegaly  Ext: warm and perfused, normal femoral pulses, no edema, or clubbing  Skin: warm, no rashes  Neuro: AAO x 3, no focal findings  Psychiatric: normal affect  Musculoskeletal: no obvious joint deformities

## 2020-03-03 DIAGNOSIS — I48.91 A-FIB (HCC): ICD-10-CM

## 2020-03-03 NOTE — TELEPHONE ENCOUNTER
She does not need auth for her cath [id number] on 3/9/20 at [facility] per Joaquin Moore at [address] [place] [address] 3/3/30

## 2020-03-04 RX ORDER — AMIODARONE HYDROCHLORIDE 200 MG/1
200 TABLET ORAL
Qty: 90 TABLET | Refills: 0 | Status: SHIPPED | OUTPATIENT
Start: 2020-03-04 | End: 2020-05-27 | Stop reason: SDUPTHER

## 2020-03-06 ENCOUNTER — TELEPHONE (OUTPATIENT)
Dept: SURGERY | Facility: HOSPITAL | Age: 65
End: 2020-03-06

## 2020-03-06 RX ORDER — SODIUM CHLORIDE 9 MG/ML
100 INJECTION, SOLUTION INTRAVENOUS ONCE
Status: CANCELLED | OUTPATIENT
Start: 2020-03-09

## 2020-03-09 ENCOUNTER — HOSPITAL ENCOUNTER (OUTPATIENT)
Dept: NON INVASIVE DIAGNOSTICS | Facility: HOSPITAL | Age: 65
Discharge: HOME | End: 2020-03-09
Attending: INTERNAL MEDICINE | Admitting: INTERNAL MEDICINE
Payer: COMMERCIAL

## 2020-03-09 VITALS
RESPIRATION RATE: 16 BRPM | WEIGHT: 245 LBS | HEART RATE: 48 BPM | BODY MASS INDEX: 43.41 KG/M2 | SYSTOLIC BLOOD PRESSURE: 125 MMHG | TEMPERATURE: 96.9 F | HEIGHT: 63 IN | OXYGEN SATURATION: 99 % | DIASTOLIC BLOOD PRESSURE: 59 MMHG

## 2020-03-09 DIAGNOSIS — I48.91 ATRIAL FIBRILLATION, UNSPECIFIED TYPE (HCC): ICD-10-CM

## 2020-03-09 DIAGNOSIS — I42.9 CARDIOMYOPATHY, UNSPECIFIED TYPE (HCC): ICD-10-CM

## 2020-03-09 LAB
ANION GAP SERPL CALCULATED.3IONS-SCNC: 12 MMOL/L (ref 4–13)
ATRIAL RATE: 51 BPM
BUN SERPL-MCNC: 19 MG/DL (ref 5–25)
CALCIUM SERPL-MCNC: 8.7 MG/DL (ref 8.3–10.1)
CHLORIDE SERPL-SCNC: 103 MMOL/L (ref 100–108)
CO2 SERPL-SCNC: 26 MMOL/L (ref 21–32)
CREAT SERPL-MCNC: 1.32 MG/DL (ref 0.6–1.3)
ERYTHROCYTE [DISTWIDTH] IN BLOOD BY AUTOMATED COUNT: 21.6 % (ref 11.6–15.1)
GFR SERPL CREATININE-BSD FRML MDRD: 43 ML/MIN/1.73SQ M
GLUCOSE P FAST SERPL-MCNC: 94 MG/DL (ref 65–99)
GLUCOSE SERPL-MCNC: 94 MG/DL (ref 65–140)
HCT VFR BLD AUTO: 36.6 % (ref 34.8–46.1)
HGB BLD-MCNC: 11.7 G/DL (ref 11.5–15.4)
MCH RBC QN AUTO: 28 PG (ref 26.8–34.3)
MCHC RBC AUTO-ENTMCNC: 32 G/DL (ref 31.4–37.4)
MCV RBC AUTO: 88 FL (ref 82–98)
P AXIS: 3 DEGREES
PLATELET # BLD AUTO: 300 THOUSANDS/UL (ref 149–390)
PMV BLD AUTO: 9.6 FL (ref 8.9–12.7)
POTASSIUM SERPL-SCNC: 2.9 MMOL/L (ref 3.5–5.3)
PR INTERVAL: 166 MS
QRS AXIS: -12 DEGREES
QRSD INTERVAL: 116 MS
QT INTERVAL: 532 MS
QTC INTERVAL: 490 MS
RBC # BLD AUTO: 4.18 MILLION/UL (ref 3.81–5.12)
SODIUM SERPL-SCNC: 141 MMOL/L (ref 136–145)
T WAVE AXIS: 12 DEGREES
VENTRICULAR RATE: 51 BPM
WBC # BLD AUTO: 7.72 THOUSAND/UL (ref 4.31–10.16)

## 2020-03-09 PROCEDURE — 93458 L HRT ARTERY/VENTRICLE ANGIO: CPT | Performed by: INTERNAL MEDICINE

## 2020-03-09 PROCEDURE — 93010 ELECTROCARDIOGRAM REPORT: CPT | Performed by: INTERNAL MEDICINE

## 2020-03-09 PROCEDURE — 99152 MOD SED SAME PHYS/QHP 5/>YRS: CPT | Performed by: INTERNAL MEDICINE

## 2020-03-09 PROCEDURE — 93005 ELECTROCARDIOGRAM TRACING: CPT

## 2020-03-09 PROCEDURE — 80048 BASIC METABOLIC PNL TOTAL CA: CPT | Performed by: PHYSICIAN ASSISTANT

## 2020-03-09 PROCEDURE — 85027 COMPLETE CBC AUTOMATED: CPT | Performed by: PHYSICIAN ASSISTANT

## 2020-03-09 PROCEDURE — C1894 INTRO/SHEATH, NON-LASER: HCPCS | Performed by: INTERNAL MEDICINE

## 2020-03-09 PROCEDURE — C1769 GUIDE WIRE: HCPCS | Performed by: INTERNAL MEDICINE

## 2020-03-09 PROCEDURE — 99153 MOD SED SAME PHYS/QHP EA: CPT | Performed by: INTERNAL MEDICINE

## 2020-03-09 RX ORDER — MIDAZOLAM HYDROCHLORIDE 2 MG/2ML
INJECTION, SOLUTION INTRAMUSCULAR; INTRAVENOUS CODE/TRAUMA/SEDATION MEDICATION
Status: COMPLETED | OUTPATIENT
Start: 2020-03-09 | End: 2020-03-09

## 2020-03-09 RX ORDER — LIDOCAINE HYDROCHLORIDE 10 MG/ML
INJECTION, SOLUTION EPIDURAL; INFILTRATION; INTRACAUDAL; PERINEURAL CODE/TRAUMA/SEDATION MEDICATION
Status: COMPLETED | OUTPATIENT
Start: 2020-03-09 | End: 2020-03-09

## 2020-03-09 RX ORDER — HEPARIN SODIUM 1000 [USP'U]/ML
INJECTION, SOLUTION INTRAVENOUS; SUBCUTANEOUS CODE/TRAUMA/SEDATION MEDICATION
Status: COMPLETED | OUTPATIENT
Start: 2020-03-09 | End: 2020-03-09

## 2020-03-09 RX ORDER — FENTANYL CITRATE 50 UG/ML
INJECTION, SOLUTION INTRAMUSCULAR; INTRAVENOUS CODE/TRAUMA/SEDATION MEDICATION
Status: COMPLETED | OUTPATIENT
Start: 2020-03-09 | End: 2020-03-09

## 2020-03-09 RX ORDER — NITROGLYCERIN 20 MG/100ML
INJECTION INTRAVENOUS CODE/TRAUMA/SEDATION MEDICATION
Status: COMPLETED | OUTPATIENT
Start: 2020-03-09 | End: 2020-03-09

## 2020-03-09 RX ORDER — SODIUM CHLORIDE 9 MG/ML
100 INJECTION, SOLUTION INTRAVENOUS CONTINUOUS
Status: DISPENSED | OUTPATIENT
Start: 2020-03-09 | End: 2020-03-09

## 2020-03-09 RX ORDER — SODIUM CHLORIDE 9 MG/ML
100 INJECTION, SOLUTION INTRAVENOUS ONCE
Status: COMPLETED | OUTPATIENT
Start: 2020-03-09 | End: 2020-03-09

## 2020-03-09 RX ORDER — POTASSIUM CHLORIDE 20 MEQ/1
TABLET, EXTENDED RELEASE ORAL CODE/TRAUMA/SEDATION MEDICATION
Status: COMPLETED | OUTPATIENT
Start: 2020-03-09 | End: 2020-03-09

## 2020-03-09 RX ORDER — VERAPAMIL HYDROCHLORIDE 2.5 MG/ML
INJECTION, SOLUTION INTRAVENOUS CODE/TRAUMA/SEDATION MEDICATION
Status: COMPLETED | OUTPATIENT
Start: 2020-03-09 | End: 2020-03-09

## 2020-03-09 RX ADMIN — NITROGLYCERIN 200 MCG: 20 INJECTION INTRAVENOUS at 09:16

## 2020-03-09 RX ADMIN — FENTANYL CITRATE 25 MCG: 50 INJECTION, SOLUTION INTRAMUSCULAR; INTRAVENOUS at 09:16

## 2020-03-09 RX ADMIN — SODIUM CHLORIDE 100 ML/HR: 0.9 INJECTION, SOLUTION INTRAVENOUS at 07:22

## 2020-03-09 RX ADMIN — LIDOCAINE HYDROCHLORIDE 1 ML: 10 INJECTION, SOLUTION EPIDURAL; INFILTRATION; INTRACAUDAL; PERINEURAL at 09:06

## 2020-03-09 RX ADMIN — FENTANYL CITRATE 25 MCG: 50 INJECTION, SOLUTION INTRAMUSCULAR; INTRAVENOUS at 09:06

## 2020-03-09 RX ADMIN — FENTANYL CITRATE 50 MCG: 50 INJECTION, SOLUTION INTRAMUSCULAR; INTRAVENOUS at 08:58

## 2020-03-09 RX ADMIN — MIDAZOLAM 2 MG: 1 INJECTION INTRAMUSCULAR; INTRAVENOUS at 08:58

## 2020-03-09 RX ADMIN — HEPARIN SODIUM 4000 UNITS: 1000 INJECTION INTRAVENOUS; SUBCUTANEOUS at 09:16

## 2020-03-09 RX ADMIN — MIDAZOLAM 1 MG: 1 INJECTION INTRAMUSCULAR; INTRAVENOUS at 09:03

## 2020-03-09 RX ADMIN — VERAPAMIL HYDROCHLORIDE 2.5 MG: 2.5 INJECTION INTRAVENOUS at 09:17

## 2020-03-09 RX ADMIN — FENTANYL CITRATE 25 MCG: 50 INJECTION, SOLUTION INTRAMUSCULAR; INTRAVENOUS at 09:03

## 2020-03-09 RX ADMIN — POTASSIUM CHLORIDE 40 MEQ: 1500 TABLET, EXTENDED RELEASE ORAL at 08:47

## 2020-03-09 RX ADMIN — MIDAZOLAM 1 MG: 1 INJECTION INTRAMUSCULAR; INTRAVENOUS at 09:07

## 2020-03-09 RX ADMIN — MIDAZOLAM 1 MG: 1 INJECTION INTRAMUSCULAR; INTRAVENOUS at 09:16

## 2020-03-09 NOTE — DISCHARGE INSTRUCTIONS
After Radial Heart Catheterization   WHAT YOU NEED TO KNOW:   What will happen after a radial heart catheterization? · You will be attached to a heart monitor until you are fully awake  A heart monitor is an EKG that stays on continuously to record your heart's electrical activity  Healthcare providers will monitor your vital signs and pulses in your arm  They will frequently check your pressure bandage for bleeding or swelling  · You may have a band wrapped tightly around your wrist  The band puts pressure on your wound and helps prevent bleeding  A healthcare provider can put air into the band or remove air from the band  A healthcare provider will gradually remove air from the band and decrease pressure on your wrist  The band may be removed in 2 hours or when your wound stops bleeding  · You will need to keep your wrist straight for 2 to 4 hours  Do not  push or pull with your arm  Arm movements can cause serious bleeding  After you are monitored for several hours, you may go home or may need to stay in the hospital overnight  What do I need to know before I go home? · Care for your wound as directed  Remove the pressure bandage in 24 hours or as directed  Mild bruising is normal and expected  A small bandage can be placed on your wound after you remove the pressure bandage  Do not put powders, lotions, or creams on your wound  They may cause your wound to get infected  Monitor your wound every day for signs of infection, such as redness, swelling, or pus  · Shower the day after your procedure or as directed  Remove your pressure bandage before you shower  Do not take baths or go in hot tubs or pools  Carefully wash the wound with soap and water  Pat the area dry  A small bandage can be placed on your wound after you shower  · Apply firm, steady pressure to your wound if it bleeds  Apply pressure with a clean gauze or towel for 5 to 10 minutes   Call 911 if bleeding becomes heavy or does not stop  · Drink liquids as directed  Liquids will help flush the contrast liquid from your body  Ask how much liquid to drink each day and which liquids are best for you  · Do not lift anything heavier than 5 pounds until directed by your healthcare provider  Heavy lifting can put stress on your wound and cause bleeding  Do not push or pull with the arm that was used for the procedure  Do not do vigorous activity for at least 48 hours  Vigorous activity may cause bleeding from your wound  Rest and do quiet activities  Take short walks around the house to prevent a blood clot  Ask your healthcare provider when you can return to your normal activities  · Do not drive or return to work until your healthcare provider says it is okay  Your healthcare provider may tell you to wait 48 hours before you drive to decrease your risk for bleeding  You may not be able to return to work for at least 2 days after your procedure if your job involves heavy lifting  What medicines may I need? You may need any of the following:  · Blood thinners    help prevent blood clots  Examples of blood thinners include heparin and warfarin  Clots can cause strokes, heart attacks, and death  The following are general safety guidelines to follow while you are taking a blood thinner:    ¨ Watch for bleeding and bruising while you take blood thinners  Watch for bleeding from your gums or nose  Watch for blood in your urine and bowel movements  Use a soft washcloth on your skin, and a soft toothbrush to brush your teeth  This can keep your skin and gums from bleeding  If you shave, use an electric shaver  Do not play contact sports  ¨ Tell your dentist and other healthcare providers that you take anticoagulants  Wear a bracelet or necklace that says you take this medicine  ¨ Do not start or stop any medicines unless your healthcare provider tells you to  Many medicines cannot be used with blood thinners       ¨ Tell your healthcare provider right away if you forget to take the medicine, or if you take too much  ¨ Warfarin  is a blood thinner that you may need to take  The following are things you should be aware of if you take warfarin  § Foods and medicines can affect the amount of warfarin in your blood  Do not make major changes to your diet while you take warfarin  Warfarin works best when you eat about the same amount of vitamin K every day  Vitamin K is found in green leafy vegetables and certain other foods  Ask for more information about what to eat when you are taking warfarin  § You will need to see your healthcare provider for follow-up visits when you are on warfarin  You will need regular blood tests  These tests are used to decide how much medicine you need  · Acetaminophen  helps decrease pain and fever  This medicine is available without a doctor's order  Ask how much medicine is safe to take, and how often to take it  Acetaminophen can cause liver damage if not taken correctly  · Take your medicine as directed  Contact your healthcare provider if you think your medicine is not helping or if you have side effects  Tell him or her if you are allergic to any medicine  Keep a list of the medicines, vitamins, and herbs you take  Include the amounts, and when and why you take them  Bring the list or the pill bottles to follow-up visits  Carry your medicine list with you in case of an emergency    Call 911 for any of the following:   · You have any of the following signs of a heart attack:      ¨ Squeezing, pressure, or pain in your chest that lasts longer than 5 minutes or returns    ¨ Discomfort or pain in your back, neck, jaw, stomach, or arm     ¨ Trouble breathing    ¨ Nausea or vomiting    ¨ Lightheadedness or a sudden cold sweat, especially with chest pain or trouble breathing    · You have any of the following signs of a stroke:      ¨ Numbness or drooping on one side of your face     ¨ Weakness in an arm or leg    ¨ Confusion or difficulty speaking    ¨ Dizziness, a severe headache, or vision loss    · You feel lightheaded, short of breath, and have chest pain  · You cough up blood  · You have trouble breathing  · You cannot stop the bleeding from your wound even after you hold firm pressure for 10 minutes  When should I seek immediate care? · Blood soaks through your bandage  · Your stitches come apart  · Your hand or arm feels numb, cool, or looks pale  · Your wound gets swollen quickly  When should I contact my healthcare provider? · You have a fever or chills  · Your wound is red, swollen, or draining pus  · Your wound looks more bruised or you have new bruising on the side of your wrist      · You have nausea or are vomiting  · Your skin is itchy, swollen, or you have a rash  · You have questions or concerns about your condition or care  CARE AGREEMENT:   You have the right to help plan your care  Learn about your health condition and how it may be treated  Discuss treatment options with your caregivers to decide what care you want to receive  You always have the right to refuse treatment  The above information is an  only  It is not intended as medical advice for individual conditions or treatments  Talk to your doctor, nurse or pharmacist before following any medical regimen to see if it is safe and effective for you  © 2017 [employer] Information is for End User's use only and may not be sold, redistributed or otherwise used for commercial purposes  All illustrations and images included in CareNotes® are the copyrighted property of [employer]  or Ashkan Paez

## 2020-03-09 NOTE — INTERVAL H&P NOTE
Update: (This section must be completed if the H&P was completed greater than 24 hrs to procedure or admission)    H&P reviewed  After examining the patient, I find no changed to the H&P since it had been written  /81   Pulse 62   Temp (!) 96 9 °F (36 1 °C)   Resp 20   Ht 5' 3" (1 6 m)   Wt 111 kg (245 lb)   LMP  (LMP Unknown)   SpO2 97%   BMI 43 40 kg/m²     Patient re-evaluated   Accept as history and physical     Ramiro Alonzo, DO/March 9, 2020/8:54 AM

## 2020-03-17 ENCOUNTER — EVALUATION (OUTPATIENT)
Dept: SPEECH THERAPY | Facility: CLINIC | Age: 65
End: 2020-03-17
Payer: COMMERCIAL

## 2020-03-17 ENCOUNTER — EVALUATION (OUTPATIENT)
Dept: PHYSICAL THERAPY | Facility: CLINIC | Age: 65
End: 2020-03-17
Payer: OTHER MISCELLANEOUS

## 2020-03-17 ENCOUNTER — APPOINTMENT (OUTPATIENT)
Dept: LAB | Facility: HOSPITAL | Age: 65
End: 2020-03-17
Payer: COMMERCIAL

## 2020-03-17 ENCOUNTER — TRANSCRIBE ORDERS (OUTPATIENT)
Dept: PHYSICAL THERAPY | Facility: CLINIC | Age: 65
End: 2020-03-17

## 2020-03-17 DIAGNOSIS — I63.412 CEREBROVASCULAR ACCIDENT (CVA) DUE TO EMBOLISM OF LEFT MIDDLE CEREBRAL ARTERY (HCC): Primary | ICD-10-CM

## 2020-03-17 DIAGNOSIS — M79.671 RIGHT FOOT PAIN: ICD-10-CM

## 2020-03-17 DIAGNOSIS — M53.3 DISORDER OF SACRUM: ICD-10-CM

## 2020-03-17 DIAGNOSIS — M25.552 LEFT HIP PAIN: Primary | ICD-10-CM

## 2020-03-17 DIAGNOSIS — M67.959 TENDINOPATHY OF GLUTEAL REGION: ICD-10-CM

## 2020-03-17 DIAGNOSIS — I10 ESSENTIAL HYPERTENSION, MALIGNANT: ICD-10-CM

## 2020-03-17 LAB
ALBUMIN SERPL BCP-MCNC: 3.3 G/DL (ref 3.5–5.7)
ALP SERPL-CCNC: 119 U/L (ref 55–165)
ALT SERPL W P-5'-P-CCNC: 21 U/L (ref 7–52)
ANION GAP SERPL CALCULATED.3IONS-SCNC: 9 MMOL/L (ref 4–13)
ANISOCYTOSIS BLD QL SMEAR: PRESENT
AST SERPL W P-5'-P-CCNC: 31 U/L (ref 13–39)
BASOPHILS # BLD AUTO: 0 THOUSANDS/ΜL (ref 0–0.1)
BASOPHILS NFR BLD AUTO: 1 % (ref 0–2)
BILIRUB SERPL-MCNC: 0.6 MG/DL (ref 0.2–1)
BUN SERPL-MCNC: 17 MG/DL (ref 7–25)
CALCIUM SERPL-MCNC: 7.8 MG/DL (ref 8.6–10.5)
CHLORIDE SERPL-SCNC: 104 MMOL/L (ref 98–107)
CO2 SERPL-SCNC: 27 MMOL/L (ref 21–31)
CREAT SERPL-MCNC: 1.14 MG/DL (ref 0.6–1.2)
EOSINOPHIL # BLD AUTO: 0.1 THOUSAND/ΜL (ref 0–0.61)
EOSINOPHIL NFR BLD AUTO: 1 % (ref 0–5)
ERYTHROCYTE [DISTWIDTH] IN BLOOD BY AUTOMATED COUNT: 22.3 % (ref 11.5–14.5)
GFR SERPL CREATININE-BSD FRML MDRD: 52 ML/MIN/1.73SQ M
GLUCOSE P FAST SERPL-MCNC: 97 MG/DL (ref 65–99)
HCT VFR BLD AUTO: 39.8 % (ref 42–47)
HGB BLD-MCNC: 12.5 G/DL (ref 12–16)
HYPERCHROMIA BLD QL SMEAR: PRESENT
LYMPHOCYTES # BLD AUTO: 1.5 THOUSANDS/ΜL (ref 0.6–4.47)
LYMPHOCYTES NFR BLD AUTO: 28 % (ref 21–51)
MCH RBC QN AUTO: 27.7 PG (ref 26–34)
MCHC RBC AUTO-ENTMCNC: 31.3 G/DL (ref 31–37)
MCV RBC AUTO: 89 FL (ref 81–99)
MONOCYTES # BLD AUTO: 0.6 THOUSAND/ΜL (ref 0.17–1.22)
MONOCYTES NFR BLD AUTO: 11 % (ref 2–12)
NEUTROPHILS # BLD AUTO: 3.3 THOUSANDS/ΜL (ref 1.4–6.5)
NEUTS SEG NFR BLD AUTO: 60 % (ref 42–75)
PLATELET # BLD AUTO: 323 THOUSANDS/UL (ref 149–390)
PLATELET BLD QL SMEAR: ADEQUATE
PMV BLD AUTO: 8.5 FL (ref 8.6–11.7)
POTASSIUM SERPL-SCNC: 4 MMOL/L (ref 3.5–5.5)
PROT SERPL-MCNC: 6.3 G/DL (ref 6.4–8.9)
RBC # BLD AUTO: 4.5 MILLION/UL (ref 3.9–5.2)
RBC MORPH BLD: NORMAL
SODIUM SERPL-SCNC: 140 MMOL/L (ref 134–143)
WBC # BLD AUTO: 5.5 THOUSAND/UL (ref 4.8–10.8)

## 2020-03-17 PROCEDURE — 96125 COGNITIVE TEST BY HC PRO: CPT

## 2020-03-17 PROCEDURE — 36415 COLL VENOUS BLD VENIPUNCTURE: CPT

## 2020-03-17 PROCEDURE — 97163 PT EVAL HIGH COMPLEX 45 MIN: CPT | Performed by: PHYSICAL THERAPIST

## 2020-03-17 PROCEDURE — 80053 COMPREHEN METABOLIC PANEL: CPT

## 2020-03-17 PROCEDURE — 85025 COMPLETE CBC W/AUTO DIFF WBC: CPT

## 2020-03-17 NOTE — LETTER
2020    Piper Rivera PA-C   [facility]  [address]    Patient: Jossy Ryan   YOB: 1955   Date of Visit: 3/17/2020     Encounter Diagnosis     ICD-10-CM    1  Left hip pain M25 552    2  Disorder of sacrum M53 3    3  Tendinopathy of gluteal region M67 959        Dear Dr Noah Rojas: Thank you for your recent referral of Jossy Ryan  Please review the attached evaluation summary from Amrita's recent visit  Please verify that you agree with the plan of care by signing the attached order  If you have any questions or concerns, please do not hesitate to call  I sincerely appreciate the opportunity to share in the care of one of your patients and hope to have another opportunity to work with you in the near future  Sincerely,    Vasyl Jenkins      Referring Provider:      I certify that I have read the below Plan of Care and certify the need for these services furnished under this plan of treatment while under my care  Piper Rivera PA-C   [facility] [address]: [phone]          PT Evaluation     Today's date: 3/17/2020  Patient name: Jossy Ryan  : 1955  MRN: [id number]  Referring provider: Dheeraj Downs PA-C  Dx:   Encounter Diagnosis     ICD-10-CM    1  Left hip pain M25 552    2  Disorder of sacrum M53 3    3  Tendinopathy of gluteal region M67 959                   Assessment  Assessment details: Jossy Ryan is a 60 y o  female presenting to outpatient physical therapy with diagnosis of LBP  Patient's current impairments include pain, impaired soft tissue mobility, reduced range of motion, reduced strength, reduced postural awareness, asymmetry of gait, and reduced activity tolerance   Patient's present functional limitations include difficulty with ADLs with increased need for assistance, reliance on medication and/or modalities for pain relief, poor tolerance for functional mobility and activity, and difficulty completing household responsibilities  Patient to benefit from skilled outpatient physical therapy 2x/week for 6 weeks in order to reduce pain, maximize pain free range of motion, increase strength and stability, and improve functional mobility/functional activity in order to maximize return to prior level of function with reduced limitations  Thank you for your referral     Impairments: abnormal coordination, abnormal gait, abnormal muscle tone, abnormal or restricted ROM, activity intolerance, impaired physical strength, lacks appropriate home exercise program, pain with function and poor body mechanics  Other impairment: Right knee pain pending surgery, recent CVA  Understanding of Dx/Px/POC: good   Prognosis: good    Goals  STGs to be achieved in 4 weeks:    1  Pt will report overall improved tolerance for sustained mobility/activity by at least 25-50% since [facility] with overall reduced pain levels and reduced fear avoidance  2  Pt will demonstrate improved AROM of bilateral HS by at least 5-10* in order to reduce pt difficulty with functional mobility and transfers  3  Pt will demonstrate improved proximal hip and core strength by at least 1/2-1 MMT in order to improve lumbo-pelvic stability to reduce pain and improve function  4  Pt to demonstrate tolerance to stand for as long as 15 minutes to increase ADL tolerance    LTGs to be achieved in 4-6 weeks:    1  Pt will be independent with HEP, demonstrating proper technique with exercises and understanding of self progression of program without need for cueing or assistance  2  Pt will report minimized pain levels with at least 50% reduction in pain since [facility]  3  Pt will demonstrate WFL AROM and strength of bilateral LEs  4  Pt will demonstrate normalized gait without deviations or need for assistive device or external support     5  Pt will report return to household chores without limitations  6  FOTO will reflect score at discharge that is greater than or equal to predicted level  Plan  Patient would benefit from: skilled physical therapy  Other planned modality interventions: Modalities prn for symptom management  Planned therapy interventions: manual therapy, neuromuscular re-education, therapeutic exercise and home exercise program  Frequency: 2x week  Duration in visits: 8  Duration in weeks: 4  Plan of Care beginning date: 3/17/2020  Plan of Care expiration date: 4/16/2020  Treatment plan discussed with: patient        Subjective Evaluation    History of Present Illness  Mechanism of injury: Patient known to clinic  Pt fell at work April 24,2018  Fell backwd onto L side and z'has not been right ever since"  Current sx LLBP, L hip pain, L upper thigh pain, L rot cuff tear w/repair Aug 2018  Pt attended PT 5/22-8/30/2019 for aquatic therapy and land therapy  She continued with PT until 1/10/2020  At this time she was discharged due to hospitalization due to CVA  Jan, 2020 - Presented to the [facility] aphasia and right sided weakness  Found to have L MCA territory infarct in the 2/2 a-fib  Admitted to ICU and eventual transition to [facility]  Began with dark stools and therefore AC was held  GI performed EGD and colonoscopy  No sigificant findings on EGD, but colonoscopy noted diverticular disease  Patient was recently discharged from acute rehab on 02/05/2020  Being seen by  s/p HOSEA  She does not feel that any of her current deficits are related to her recent stroke  Pain is focused on the left back, left buttocks, left hip, and left thigh  Pain ongoing across the lumbar spine      Functional deficits related to:  Walking distances - < 1 block  Standing for prolonged periods of time - 10-15 mins  Getting in and out of bed  Getting in and out of the car  Getting in and out of the shower/bathtub  Rising for low or soft surfaces (has lift recliner)  Sleeping maybe 3 hours or so, but gets up and then goes back to sleep  Difficulty with stairs and curbs  Limited lifting and carrying  Difficulty negotiation of sidewalks that are uneven  Quality of life: fair    Pain  Current pain ratin  At best pain ratin  At worst pain ratin  Location: Left hip and back as noted above  Quality: dull ache, tight and cramping  Relieving factors: heat, ice, medications and support    Social Support  Exterior steps/ramp assessed: Estimates approximatley 6 inches to step  1  Stairs in house: no   Lives in: apartment  Lives with: alone    Treatments  Previous treatment: physical therapy, medication, injection treatment and chiropractic  Current treatment: injection treatment, medication and physical therapy  Patient Goals  Patient goals for therapy: increased strength, independence with ADLs/IADLs, return to sport/leisure activities, increased motion, improved balance and decreased pain          Objective     Concurrent Complaints  Positive for night pain, disturbed sleep and history of trauma   Negative for bladder dysfunction, bowel dysfunction, saddle (S4) numbness and history of cancer    Postural Observations  Seated posture: poor  Standing posture: poor  Correction of posture: has no consistent effect    Additional Postural Observation Details  Forward flexed  Off weights with weight shift to the right in sitting and standing    Tenderness     Additional Tenderness Details  Patient noted with TTP left L/S PVMs, left piriformis, left ITB, left glute region, left SI joint  Negative TTP right L/S    Neurological Testing     Sensation     Lumbar   Left   Intact: light touch    Right   Intact: light touch    Additional Neurological Details  Reflexes intact    Active Range of Motion     Lumbar   Flexion: 48 degrees   Extension: 6 degrees   Left lateral flexion: 8 degrees       Right lateral flexion: 12 degrees   Left rotation:  Restriction level: maximal  Right rotation:  Restriction level: maximal    Additional Active Range of Motion Details  Very painful with all movements and MMT of LEs bilaterally  Pain noted through left leg/thigh  HS restrictions by 60 degrees on left, 40 degrees on right  Strength/Myotome Testing     Left Hip   Planes of Motion   Flexion: 3-  Extension: 3-  Abduction: 3    Right Hip   Planes of Motion   Flexion: 3  Extension: 3-  Abduction: 3    Left Knee   Flexion: 4  Extension: 4    Right Knee   Flexion: 4  Extension: 4    Left Ankle/Foot   Dorsiflexion: 4  Plantar flexion: 4    Right Ankle/Foot   Dorsiflexion: 4  Plantar flexion: 4    Tests     Lumbar     Left   Positive passive SLR  Additional Tests Details  Unable to tolerate FADIR/HAFSA    Functional Assessment      Squat    Trunk lean right and sitting toward right side                 PRECAUTIONS:  Falls, recent CVA - patient reports no deficits, pending right knee surgery  Re-eval Date: 4/16/2020    Date 3/17       Visit Count 1       FOTO See IE       Pain In See IE       Pain Out See IE           Manual                    Date Patient deferred TE today       HS stretch        Hip flexor/quad stretch        LTR        SKTC/DKTC        Piriformis stretch        AH        AH with hip abd/add        AH with Alt UE/LEs        Dead bugs         Bridges         Bridge with abd/add        Modified planks         Tband Anti Trunk rotation          Prone alt UEs/Les         Clamshells         90/90 hip abd SL           Modalities

## 2020-03-17 NOTE — PROGRESS NOTES
PT Evaluation     Today's date: 3/17/2020  Patient name: Burt Baires  : 1955  MRN: [id number]  Referring provider: Benito Juarez PA-C  Dx:   Encounter Diagnosis     ICD-10-CM    1  Left hip pain M25 552    2  Disorder of sacrum M53 3    3  Tendinopathy of gluteal region M67 454                   Assessment  Assessment details: Burt Baires is a 60 y o  female presenting to outpatient physical therapy with diagnosis of LBP  Patient's current impairments include pain, impaired soft tissue mobility, reduced range of motion, reduced strength, reduced postural awareness, asymmetry of gait, and reduced activity tolerance  Patient's present functional limitations include difficulty with ADLs with increased need for assistance, reliance on medication and/or modalities for pain relief, poor tolerance for functional mobility and activity, and difficulty completing household responsibilities  Patient to benefit from skilled outpatient physical therapy 2x/week for 6 weeks in order to reduce pain, maximize pain free range of motion, increase strength and stability, and improve functional mobility/functional activity in order to maximize return to prior level of function with reduced limitations  Thank you for your referral     Impairments: abnormal coordination, abnormal gait, abnormal muscle tone, abnormal or restricted ROM, activity intolerance, impaired physical strength, lacks appropriate home exercise program, pain with function and poor body mechanics  Other impairment: Right knee pain pending surgery, recent CVA  Understanding of Dx/Px/POC: good   Prognosis: good    Goals  STGs to be achieved in 4 weeks:    1  Pt will report overall improved tolerance for sustained mobility/activity by at least 25-50% since [facility] with overall reduced pain levels and reduced fear avoidance      2  Pt will demonstrate improved AROM of bilateral HS by at least 5-10* in order to reduce pt difficulty with functional mobility and transfers  3  Pt will demonstrate improved proximal hip and core strength by at least 1/2-1 MMT in order to improve lumbo-pelvic stability to reduce pain and improve function  4  Pt to demonstrate tolerance to stand for as long as 15 minutes to increase ADL tolerance    LTGs to be achieved in 4-6 weeks:    1  Pt will be independent with HEP, demonstrating proper technique with exercises and understanding of self progression of program without need for cueing or assistance  2  Pt will report minimized pain levels with at least 50% reduction in pain since [facility]  3  Pt will demonstrate WFL AROM and strength of bilateral LEs  4  Pt will demonstrate normalized gait without deviations or need for assistive device or external support  5  Pt will report return to household chores without limitations  6  FOTO will reflect score at discharge that is greater than or equal to predicted level  Plan  Patient would benefit from: skilled physical therapy  Other planned modality interventions: Modalities prn for symptom management  Planned therapy interventions: manual therapy, neuromuscular re-education, therapeutic exercise and home exercise program  Frequency: 2x week  Duration in visits: 8  Duration in weeks: 4  Plan of Care beginning date: 3/17/2020  Plan of Care expiration date: 4/16/2020  Treatment plan discussed with: patient        Subjective Evaluation    History of Present Illness  Mechanism of injury: Patient known to clinic  Pt fell at work April 24,2018  Fell backwd onto L side and z'has not been right ever since"  Current sx LLBP, L hip pain, L upper thigh pain, L rot cuff tear w/repair Aug 2018  Pt attended PT 5/22-8/30/2019 for aquatic therapy and land therapy  She continued with PT until 1/10/2020  At this time she was discharged due to hospitalization due to CVA  Jan, 2020 - Presented to the [facility] aphasia and right sided weakness    Found to have L MCA territory infarct in the  a-fib  Admitted to ICU and eventual transition to [facility]  Began with dark stools and therefore AC was held  GI performed EGD and colonoscopy  No sigificant findings on EGD, but colonoscopy noted diverticular disease  Patient was recently discharged from acute rehab on 2020  Being seen by  s/adri JC  She does not feel that any of her current deficits are related to her recent stroke  Pain is focused on the left back, left buttocks, left hip, and left thigh  Pain ongoing across the lumbar spine  Functional deficits related to:  Walking distances - < 1 block  Standing for prolonged periods of time - 10-15 mins  Getting in and out of bed  Getting in and out of the car  Getting in and out of the shower/bathtub  Rising for low or soft surfaces (has lift recliner)  Sleeping maybe 3 hours or so, but gets up and then goes back to sleep  Difficulty with stairs and curbs  Limited lifting and carrying  Difficulty negotiation of sidewalks that are uneven  Quality of life: fair    Pain  Current pain ratin  At best pain ratin  At worst pain ratin  Location: Left hip and back as noted above  Quality: dull ache, tight and cramping  Relieving factors: heat, ice, medications and support    Social Support  Exterior steps/ramp assessed: Estimates approximatley 6 inches to step  1  Stairs in house: no   Lives in: apartment  Lives with: alone    Treatments  Previous treatment: physical therapy, medication, injection treatment and chiropractic  Current treatment: injection treatment, medication and physical therapy  Patient Goals  Patient goals for therapy: increased strength, independence with ADLs/IADLs, return to sport/leisure activities, increased motion, improved balance and decreased pain          Objective     Concurrent Complaints  Positive for night pain, disturbed sleep and history of trauma   Negative for bladder dysfunction, bowel dysfunction, saddle (S4) numbness and history of cancer    Postural Observations  Seated posture: poor  Standing posture: poor  Correction of posture: has no consistent effect    Additional Postural Observation Details  Forward flexed  Off weights with weight shift to the right in sitting and standing    Tenderness     Additional Tenderness Details  Patient noted with TTP left L/S PVMs, left piriformis, left ITB, left glute region, left SI joint  Negative TTP right L/S    Neurological Testing     Sensation     Lumbar   Left   Intact: light touch    Right   Intact: light touch    Additional Neurological Details  Reflexes intact    Active Range of Motion     Lumbar   Flexion: 48 degrees   Extension: 6 degrees   Left lateral flexion: 8 degrees       Right lateral flexion: 12 degrees   Left rotation:  Restriction level: maximal  Right rotation:  Restriction level: maximal    Additional Active Range of Motion Details  Very painful with all movements and MMT of LEs bilaterally  Pain noted through left leg/thigh  HS restrictions by 60 degrees on left, 40 degrees on right  Strength/Myotome Testing     Left Hip   Planes of Motion   Flexion: 3-  Extension: 3-  Abduction: 3    Right Hip   Planes of Motion   Flexion: 3  Extension: 3-  Abduction: 3    Left Knee   Flexion: 4  Extension: 4    Right Knee   Flexion: 4  Extension: 4    Left Ankle/Foot   Dorsiflexion: 4  Plantar flexion: 4    Right Ankle/Foot   Dorsiflexion: 4  Plantar flexion: 4    Tests     Lumbar     Left   Positive passive SLR  Additional Tests Details  Unable to tolerate FADIR/HAFSA    Functional Assessment      Squat    Trunk lean right and sitting toward right side                 PRECAUTIONS:  Falls, recent CVA - patient reports no deficits, pending right knee surgery  Re-eval Date: 4/16/2020    Date 3/17       Visit Count 1       FOTO See IE       Pain In See IE       Pain Out See IE           Manual                    Date Patient deferred TE today       HS stretch        Hip flexor/quad stretch        LTR        SKTC/DKTC        Piriformis stretch        AH        AH with hip abd/add        AH with Alt UE/LEs        Dead bugs         Bridges         Bridge with abd/add        Modified planks         Tband Anti Trunk rotation          Prone alt UEs/Les         Clamshells         90/90 hip abd SL           Modalities

## 2020-03-17 NOTE — PROGRESS NOTES
Speech Language Pathology Cognitive-Linguistic Evaluation    Today's date: 3/17/2020  Patients name: Shante Clayton   : 1955  MRN: [id number]  Safety measures:  Patient reports she is safe at home  Referring provider: Noah Sewell DO    Subjective Comments:  Patient is VERY tired, she is initially irritable but is cooperative  Reason for Referral:Change in cognitive status, Decreased language skills and Decreased speech intelligibility   Prior Functional Status:Communication effective and appropriate in all situations     Additional history obtained via patient/caregiver and/or chart review:  Patient reports in 2018 she had a fall backward at work and has chronic back pain since that time, she is on pain medication at this time  She has a PT evaluation following today's speech evaluation  Patient reports she had a CVA 20 and a subsequent TIA 2020  She reports forgetfulness and word finding difficulty  She continues she also has difficulty writing fluently at this time  Patient reports that her sleep schedule has also been disturbed 2' working night shift for "decades "  Patient frequently goes to bed at 9pm, waking at 2am and unable to fall back asleep until 6am   Fatigue will likely be a factor today  Clinically Complex Situations:Previous therapy to address similar deficits    Hearing:Within Normal limits  Vision:WNL , macular degeneration in L eye, she did not bring her glasses today reporting "I can see fine "    Medication List:   No current outpatient prescriptions on file  No current facility-administered medications for this visit  Allergies: No Known Allergies  Primary Language: English  Preferred Language: English     Home Environment/ Lifestyle:  Patient lives at home alone with her cat, Jasen    Highest Level of Education:  GED  Current / Prior Services being received: Physical Therapy, Speech Therapy Acute hospital setting and Home and Home Care    Mental Status: Alert  Behavior Status:Cooperative  Communication Modalities: Verbal  Recent Speech/ Language therapy:Acute hospital setting  and Home  Rehabilitation Prognosis:Good rehab potential to reach the established goals      Cognitive Assessment  CONCUSSION COGNITIVE CHECKLIST:  *Patient indicated that she is experiencing the following symptoms:    · Memory: Remembering what people have told you    · Attention: Dividing your attention (i e , multi-tasking)    · Processing: None reported by patient  · Executive Functions: None reported by patient  · Communication: Word finding in conversation, Expressing thoughts and ideas fluently and Expressing thoughts and ideas into writing    · Visual: None reported by patient  · Emotional: None reported by patient  Increased Sensitivities to:  None reported by patient  The Repeatable Battery for the Assessment of Neuropsychological Status (RBANS) is a brief, individually-administered assessment which measures attention, language, visuospatial/constructional abilities, and immediate & delayed memory  The RBANS is intended for use with adolescents to adults, ages 15 to 80 years  The following results were obtained during the administration of the assessment  Form: D    Cognitive Domain/Subtest: Index Score: Percentile Rank: Classification:   IMMEDIATE MEMORY 78 7%ile Borderline        1  List Learning (25/40)        2  Story Memory (11/24)       VISUOSPATIAL/  CONSTRUCTIONAL 64 1%ile Extremely Low        3  Figure Copy (10/20)        4  Line Orientation (14/20)       LANGUAGE 92 30%ile Average        5  Picture Naming (9/10)        6  Semantic Fluency (16/40)       ATTENTION 94 34%ile Average        7  Digit Span (15/16)        8  Coding (17/89)       DELAYED MEMORY 95 37%ile Average        9  List Recall (4/10)        10  List Recognition (19/20)        11  Story Recall (5/12)        12   Figure Recall (12/20)       Sum of Index Scores:  423 Total Score:  80   Percentile: 9%ile   Classification: Low Average          Goals  Short Term Goals:  1  Patient will complete written expression screening for writing goals  2   Patient will name 10 items in an abstract category in one minute @ 100% acc i'ly  3   Patient will increase mental manipulation of 4 words in varying order (bwd, ABC, etc) @ 100% acc i'ly  4   Patient will read multisyllabic (3 syllable) words @ 100% acc i'ly  5   Patient will read short paragraphs out loud @ 100% acc i'ly  6   Patient will report increased speaking opportunities at home (phone calls, visits, car rides, talking to her cat, etc )  7  Patient will be shown 4 photos for 10 seconds and asked to recall them after a 1 minute delay @ 100% acc i'ly  Long Term Goals:  1  Patient will increase cognitive-communicative skills in order to improve overall independence and safety  Impressions/Recommendations  Patient presents with mild cognitive-communicative deficits in the areas of immediate memory, delayed memory, as well as spoken and written expressive language  Patient reporting in session today that she faces extreme fatigue as she worked night shift for many years and continues with difficulty sleeping 2' schedule habit and chronic back pain  She continues that following her stroke she has noticed changes with her "speech and I'm forgetful now" reporting word finding difficulty and inability to recall conversations  Patient's speech does slur with fatigue though this was not witnessed during her evaluation  In standardized testing patient demonstrated minimal word finding difficulty but a goal has been created per patient's report  Fatigue is a prominent barrier to recovery however patient was very tired for testing today and performed as listed above  Goal also created to target written expression abilities as patient reports change despite denying R handed weakness/change following CVA and TIA    Patient asked no less than 7x in session today if she would improve in the area being testing, patient had low insight into her performance ability and it is of note that she was without her glasses today  She lives alone with her cat and indicated that she has been doing well, cooking, cleaning and driving locally      Recommendations:  -Patient would benefit from outpatient skilled Speech Therapy services : Cognitive-Linguistic therapy    -Frequency: 1-2x weekly  -Duration: 4-6 weeks    -Intervention certification from: 3/99/4954    Visit #1

## 2020-03-19 ENCOUNTER — OFFICE VISIT (OUTPATIENT)
Dept: PHYSICAL THERAPY | Facility: CLINIC | Age: 65
End: 2020-03-19
Payer: OTHER MISCELLANEOUS

## 2020-03-19 ENCOUNTER — OFFICE VISIT (OUTPATIENT)
Dept: SPEECH THERAPY | Facility: CLINIC | Age: 65
End: 2020-03-19
Payer: COMMERCIAL

## 2020-03-19 DIAGNOSIS — I63.412 CEREBROVASCULAR ACCIDENT (CVA) DUE TO EMBOLISM OF LEFT MIDDLE CEREBRAL ARTERY (HCC): Primary | ICD-10-CM

## 2020-03-19 DIAGNOSIS — M25.552 LEFT HIP PAIN: Primary | ICD-10-CM

## 2020-03-19 PROCEDURE — 97110 THERAPEUTIC EXERCISES: CPT

## 2020-03-19 PROCEDURE — 92507 TX SP LANG VOICE COMM INDIV: CPT

## 2020-03-19 PROCEDURE — 97140 MANUAL THERAPY 1/> REGIONS: CPT

## 2020-03-19 NOTE — PROGRESS NOTES
Daily Note     Today's date: 3/19/2020  Patient name: Viet Black  : 1955  MRN: [id number]  Referring provider: Jyothi Martinez PA-C  Dx:   Encounter Diagnosis     ICD-10-CM    1  Left hip pain M25 552                   Subjective: Pt  states her main issue is her B LE's are "stiff and weak"  Also notes chronic issues with L Hip and LB regions  Objective: See treatment diary below      Assessment: Tolerated treatment Fair+ to Fairly Well overall with performance and tolerance to ther exer and manual stretch  Patient with decreased B LE tightness by end of treatment session, as per her feedback  Plan: Continue per plan of care  Progress treatment as tolerated              PRECAUTIONS:  Falls, recent CVA - patient reports no deficits, pending right knee surgery  Re-eval Date: 2020    Date 3/17 3 19 20      Visit Count 1 2      FOTO See IE       Pain In See IE 4-5/10   LB/ L hip      Pain Out See IE 3-4/10 L hip/LB    Less tightness B LE's          Manual  3  20      Ham stretch  **B 3x/30"      ITB  **B 3x/30"      Hip Flex  **B 3x/30"                  Date Patient deferred TE today 3  20      HS stretch  **manual as above        Hip flexor/quad stretch  **manual as above   Hip Flexor      LTR  **1x10/5"        SKTC/DKTC  B 1x10/5"        Piriformis stretch        AH  **1x10/5"        AH with hip abd/add  ADD w/ball squeeze  30x/5"      AH with Alt UE/LEs        Dead bugs           Bridges   NV        Bridge with abd/add        Modified planks         Tband Anti Trunk rotation          Prone alt UEs/Les         Clamshells           90/90 hip abd SL         NuStep  **L1 x 8 min        Seated   LAQ  March  TR/HR    **B 30x  **B 30x   **B 30x                          Modalities  3 19 20

## 2020-03-19 NOTE — PROGRESS NOTES
Speech-Language Pathology Treatment Note    Today's date: 3/19/2020  Patient name: Elvira Espinoza  : 1955  MRN: [id number]  Referring provider: Nataliya Brown DO  Dx:   Encounter Diagnosis     ICD-10-CM    1  Cerebrovascular accident (CVA) due to embolism of left middle cerebral artery [facility]) P74 [id number]      Medical History significant for:   Past Medical History:   Diagnosis Date    Hyperlipidemia     Hypertension     PAF (paroxysmal atrial fibrillation) (HCC)     PUD (peptic ulcer disease)     Stroke ([facility] [address] )     Vitamin D deficiency      Flowsheet:  Start Time: 1100  Stop Time: 1200  Total time in clinic (min): 60 minutes    Subjective:  Patient arrived 30 minutes early to her appt today  Patient is in brighter spirits reporting she "slept well and feels good "  She does not have her glasses again today and this became obvious during writing exercises where pt had to complete longer sentences, she can be seen squinting during task  Objective:  1  Patient will complete written expression screening for writing goals  3/19:  Goal met  Patient demonstrating adequate, legible print in session today with minor errors for clarity of words when written  Written expression will be targeted as a homework based goal     2   Patient will name 10 items in an abstract category in one minute @ 100% acc i'ly  3/19:  Cold 9, Things that Run 6, Soft 4, Sharp 6, Green 11,  7     3   Patient will increase mental manipulation of 4 words in varying order (bwd, ABC, etc) @ 100% acc i'ly  4   Patient will read multisyllabic (4-5 syllable) words @ 100% acc i'ly  3/19:  Goal increased based on performance today to 4-5 syllable words  Pt will be encouraged to read them aloud to herself  5   Patient will read short paragraphs out loud @ 100% acc i'ly  3/19:  Unable to address today 2' pt does not have glasses with her      6   Patient will report increased speaking opportunities at home (phone calls, visits, car rides, talking to her cat, etc )    7  Patient will be shown 4 photos for 10 seconds and asked to recall them after a 1 minute delay @ 100% acc i'ly  Assessment:  Patient performed well in therapy today, she was recorded as asking "will this get better?" on 5 occassions  Patient asked this question repetitively on evaluation day as well, education provided  Plan:  -Patient would benefit from outpatient skilled Speech Therapy services : Cognitive-Linguistic therapy    -Frequency: 1-2x weekly  -Duration: 4-6 weeks    -Intervention certification from: 6/03/9593    Visit # 2    Homework:  [address] [facility] , [phone]

## 2020-03-23 ENCOUNTER — OFFICE VISIT (OUTPATIENT)
Dept: SPEECH THERAPY | Facility: CLINIC | Age: 65
End: 2020-03-23
Payer: COMMERCIAL

## 2020-03-23 ENCOUNTER — OFFICE VISIT (OUTPATIENT)
Dept: PHYSICAL THERAPY | Facility: CLINIC | Age: 65
End: 2020-03-23
Payer: OTHER MISCELLANEOUS

## 2020-03-23 DIAGNOSIS — M25.552 LEFT HIP PAIN: Primary | ICD-10-CM

## 2020-03-23 DIAGNOSIS — I63.412 CEREBROVASCULAR ACCIDENT (CVA) DUE TO EMBOLISM OF LEFT MIDDLE CEREBRAL ARTERY (HCC): Primary | ICD-10-CM

## 2020-03-23 DIAGNOSIS — M67.959 TENDINOPATHY OF GLUTEAL REGION: ICD-10-CM

## 2020-03-23 DIAGNOSIS — M53.3 DISORDER OF SACRUM: ICD-10-CM

## 2020-03-23 PROCEDURE — 97140 MANUAL THERAPY 1/> REGIONS: CPT

## 2020-03-23 PROCEDURE — 92507 TX SP LANG VOICE COMM INDIV: CPT

## 2020-03-23 PROCEDURE — 97110 THERAPEUTIC EXERCISES: CPT

## 2020-03-23 NOTE — PROGRESS NOTES
Speech-Language Pathology Treatment Note    Today's date: 3/23/2020  Patient name: Martha Oh  : 1955  MRN: [id number]  Referring provider: Bandar John DO  Dx:   Encounter Diagnosis     ICD-10-CM    1  Cerebrovascular accident (CVA) due to embolism of left middle cerebral artery [facility]) [id number] [id number]      Medical History significant for:   Past Medical History:   Diagnosis Date    Hyperlipidemia     Hypertension     PAF (paroxysmal atrial fibrillation) (HCC)     PUD (peptic ulcer disease)     Stroke ([facility] [address] )     Vitamin D deficiency      Flowsheet:  Start Time: 1200  Stop Time: 1300  Total time in clinic (min): 60 minutes    Subjective:  Patient arrived on time to her appt today  Patient remembered to bring her glasses today  Objective:  1  Patient will complete written expression screening for writing goals  3/19:  Goal met  Patient demonstrating adequate, legible print in session today with minor errors for clarity of words when written  Written expression will be targeted as a homework based goal   3/23:  Pt completed all worksheets provided to her despite instruction not to, patient reporting "whats the point of doing it if you don't look at it after?"  Clinician did look at sheets for accuracy, patient then asking "who cares how much do I write anyway?"  Patient reports "I just wanted them over with so I just did them, they were easy but some words I never come up with "  Patient also became very upset that the word "gun" was on one of her worksheets, she reported "why would that be on there?"  Patient put "kill" next to the word even though the assignment was word completion  2   Patient will name 10 items in an abstract category in one minute @ 100% acc i'ly  3/19:  Cold 9, Things that Run 6, Soft 4, Sharp 6, Green 11,  7   3/23:  Hot 10, Fly 7, Ocean 12, Stefan related 10    3    Patient will increase mental manipulation of 4 words in varying order (bwd, ABC, etc) @ 100% acc i'ly   3/23:  3 bwd @ 100% acc i'ly, 4 bwd @ 80% acc i'ly, 4 ABC @ 100% acc i'ly  4   Patient will read multisyllabic (4-5 syllable) words @ 100% acc i'ly  3/19:  Goal increased based on performance today to 4-5 syllable words  Pt will be encouraged to read them aloud to herself  5   Patient will read short paragraphs out loud @ 100% acc i'ly  3/19:  Unable to address today 2' pt does not have glasses with her   3/23:  Patient did well reading today, assigned to read out loud at home  6   Patient will report increased speaking opportunities at home (phone calls, visits, car rides, talking to her cat, etc )    7  Patient will be shown 4 photos for 10 seconds and asked to recall them after a 1 minute delay @ 100% acc i'ly     (NEW) 8  Patient will complete OME as homework program to decrease anterior loss  3/23:  Packet reviewed and provided as homework  Assessment:  Patient performed well in therapy today, she was recorded as asking "will this get better?" on 6 occassions  Homework provided and goals added to ensure decreased anterior loss  Plan:  -Patient would benefit from outpatient skilled Speech Therapy services : Cognitive-Linguistic therapy    -Frequency: 1-2x weekly  -Duration: 4-6 weeks    -Intervention certification from: 4/66/0402    Visit # 3    Homework:  [address] [address] pges [phone], [phone]

## 2020-03-23 NOTE — PROGRESS NOTES
Daily Note     Today's date: 3/23/2020  Patient name: Natalya Del Rio  : 1955  MRN: [id number]  Referring provider: Vilma Seymour PA-C  Dx:   Encounter Diagnosis     ICD-10-CM    1  Left hip pain M25 552    2  Disorder of sacrum M53 3    3  Tendinopathy of gluteal region M67 959                   Subjective: Pt states she was scheduled for a TKR L on 3/30  prior to having stroke and surgery was cancelled  States her back and hip have been sore for 2 years since she fell onto her buttocks  Objective: See treatment diary below      Assessment: Tolerated treatment fair  Patient demonstrated fatigue post treatment and would benefit from continued PT  Demonstrates fair technique w/ex  Plan: Continue per plan of care        PRECAUTIONS:  Falls, recent CVA - patient reports no deficits, pending right knee surgery  Re-eval Date: 2020    Date 3/17 3 19 20 3/23/20     Visit Count 1 2 3     FOTO See IE       Pain In See IE 4-5/10   LB/ L hip 4-5/LB and L hip     Pain Out See IE 3-4/10 L hip/LB    Less tightness B LE's          Manual  3 19 20 3/23     Ham stretch  **B 3x/30" B 3x30"     ITB  **B 3x/30" B 3x30"     Hip Flex  **B 3x/30"                  Date Patient deferred TE today 3 19 20 3/23     HS stretch  **manual as above   man     Hip flexor/quad stretch  **manual as above   Hip Flexor man     LTR  **1x10/5"   10x5"     SKTC  B 1x10/5"   10x5" ea     Piriformis stretch        AH  **1x10/5"   15x5"     AH with hip abd/add  ADD w/ball squeeze  30x/5" Add w/ball  Squeeze  30x5"     AH with Alt UE/LEs        Dead bugs           Bridges   NV   10x5"     Bridge with abd/add        Modified planks         Tband Anti Trunk rotation          Prone alt UEs/Les         Clamshells           90/90 hip abd SL         NuStep  **L1 x 8 min   L1  10'     Seated   LAQ  March  TR/HR    **B 30x  **B 30x   **B 30x     B 30x  B 30x   B 30x     Standing      Mini squats   10                     Stand hip abd, HCs   10 ea       Modalities  3 19 20

## 2020-03-25 ENCOUNTER — APPOINTMENT (OUTPATIENT)
Dept: LAB | Facility: CLINIC | Age: 65
End: 2020-03-25
Payer: COMMERCIAL

## 2020-03-25 ENCOUNTER — TRANSCRIBE ORDERS (OUTPATIENT)
Dept: ADMINISTRATIVE | Facility: HOSPITAL | Age: 65
End: 2020-03-25

## 2020-03-25 DIAGNOSIS — M81.0 AGE-RELATED OSTEOPOROSIS WITHOUT CURRENT PATHOLOGICAL FRACTURE: ICD-10-CM

## 2020-03-25 DIAGNOSIS — N25.81 SECONDARY HYPERPARATHYROIDISM, RENAL (HCC): Primary | ICD-10-CM

## 2020-03-25 DIAGNOSIS — N25.81 SECONDARY HYPERPARATHYROIDISM, RENAL (HCC): ICD-10-CM

## 2020-03-25 LAB
25(OH)D3 SERPL-MCNC: 56.2 NG/ML (ref 30–100)
ALBUMIN SERPL BCP-MCNC: 2.9 G/DL (ref 3.5–5)
ANION GAP SERPL CALCULATED.3IONS-SCNC: 3 MMOL/L (ref 4–13)
BUN SERPL-MCNC: 15 MG/DL (ref 5–25)
CALCIUM SERPL-MCNC: 8.3 MG/DL (ref 8.3–10.1)
CHLORIDE SERPL-SCNC: 111 MMOL/L (ref 100–108)
CO2 SERPL-SCNC: 27 MMOL/L (ref 21–32)
CREAT SERPL-MCNC: 1.06 MG/DL (ref 0.6–1.3)
GFR SERPL CREATININE-BSD FRML MDRD: 56 ML/MIN/1.73SQ M
GLUCOSE P FAST SERPL-MCNC: 102 MG/DL (ref 65–99)
POTASSIUM SERPL-SCNC: 3.8 MMOL/L (ref 3.5–5.3)
PTH-INTACT SERPL-MCNC: 420.2 PG/ML (ref 18.4–80.1)
SODIUM SERPL-SCNC: 141 MMOL/L (ref 136–145)

## 2020-03-25 PROCEDURE — 80048 BASIC METABOLIC PNL TOTAL CA: CPT

## 2020-03-25 PROCEDURE — 82040 ASSAY OF SERUM ALBUMIN: CPT

## 2020-03-25 PROCEDURE — 82306 VITAMIN D 25 HYDROXY: CPT

## 2020-03-25 PROCEDURE — 36415 COLL VENOUS BLD VENIPUNCTURE: CPT

## 2020-03-25 PROCEDURE — 83970 ASSAY OF PARATHORMONE: CPT

## 2020-03-26 ENCOUNTER — OFFICE VISIT (OUTPATIENT)
Dept: PHYSICAL THERAPY | Facility: CLINIC | Age: 65
End: 2020-03-26
Payer: OTHER MISCELLANEOUS

## 2020-03-26 ENCOUNTER — OFFICE VISIT (OUTPATIENT)
Dept: SPEECH THERAPY | Facility: CLINIC | Age: 65
End: 2020-03-26
Payer: COMMERCIAL

## 2020-03-26 DIAGNOSIS — I63.412 CEREBROVASCULAR ACCIDENT (CVA) DUE TO EMBOLISM OF LEFT MIDDLE CEREBRAL ARTERY (HCC): Primary | ICD-10-CM

## 2020-03-26 DIAGNOSIS — M25.552 LEFT HIP PAIN: Primary | ICD-10-CM

## 2020-03-26 PROCEDURE — 97110 THERAPEUTIC EXERCISES: CPT

## 2020-03-26 PROCEDURE — 97140 MANUAL THERAPY 1/> REGIONS: CPT

## 2020-03-26 PROCEDURE — 92507 TX SP LANG VOICE COMM INDIV: CPT

## 2020-03-26 NOTE — PROGRESS NOTES
Daily Note     Today's date: 3/26/2020  Patient name: Tyrese Iverson  : 1955  MRN: [id number]  Referring provider: Brad Le DO  Dx:   Encounter Diagnosis     ICD-10-CM    1  Left hip pain M25 552                   Subjective:  Pt  states pain level today = "4"/10 @ L Hip/L quad regions  Objective: See treatment diary below      Assessment: Tolerated treatment Fair+ to Fairly Well with performance and tolerance to Ther Exer and Manual Therapy  Plan: Continue per plan of care  Progress treatment as tolerated              PRECAUTIONS:  Falls, recent CVA - patient reports no deficits, pending right knee surgery  Re-eval Date: 2020    Date 3/17 3 19 20 3/23/20 3 26 20    Visit Count 1 2 3 4    FOTO See IE       Pain In See IE 4-5/10   LB/ L hip 4-5/LB and L hip 4/10   L hip/quad    Pain Out See IE 3-4/10 L hip/LB    Less tightness B LE's  2-4/10 L hip/quad        Manual  3 19 20 3/23 3 26 20    Ham stretch  **B 3x/30" B 3x30" B 3x30"    ITB  **B 3x/30" B 3x30" B 3x30"    Hip Flex  **B 3x/30"      Pirif    **B 3x/30"        Date Patient deferred TE today 3 19 20 3/23 3 26 20    HS stretch  **manual as above   man manual as above    Hip flexor/quad stretch  **manual as above   Hip Flexor man manual as above    LTR  **1x10/5"   10x5" 15x5"    SKTC  B 1x10/5"   10x5" ea 10x5" ea    Piriformis stretch    **Manual, as above    AH  **1x10/5"   15x5" 15x5"    AH with hip abd/add  ADD w/ball squeeze  30x/5" Add w/ball  Squeeze  30x5" Add w/ball  Squeeze  40x5"    AH with Alt UE/LEs        Dead bugs           Bridges   NV   10x5" 10x5"    Bridge with abd/add        Modified planks         Tband Anti Trunk rotation          Prone alt UEs/Les         Clamshells       **B 1x15    90/90 hip abd SL         NuStep  **L1 x 8 min   L1  10' L1 x 10 min    Seated   LA  TR/HR    **B 30x  **B 30x   **B 30x     B 30x  B 30x   B 30x   B 35x  B 35x   B 35x    Standing march   20 30x    Mini squats   10 10x Stand hip abd, HCs   10 ea 10x ea      Modalities  3 19 20  3 26 20        **Declined MHP or CP applic

## 2020-03-26 NOTE — PROGRESS NOTES
Speech-Language Pathology Treatment Note    Today's date: 3/26/2020  Patient name: Papito Dunne  : 1955  MRN: [id number]  Referring provider: Cinthia Wagoner DO  Dx:   Encounter Diagnosis     ICD-10-CM    1  Cerebrovascular accident (CVA) due to embolism of left middle cerebral artery [facility]) B26 508      Medical History significant for:   Past Medical History:   Diagnosis Date    Hyperlipidemia     Hypertension     PAF (paroxysmal atrial fibrillation) (HCC)     PUD (peptic ulcer disease)     Stroke ([facility] [address] )     Vitamin D deficiency      Flowsheet:  Start Time: 1400  Stop Time: 1500  Total time in clinic (min): 60 minutes    Subjective:  Patient arrived on time to her appt today  Patient remembered to bring her glasses today  Patient rude to clinician upon arrival "I've been waiting a half an hour for you and this is ridiculous "  Patient was taken back 6 minutes past 2 pm     Objective:  1  Patient will complete written expression screening for writing goals  3/19:  Goal met  Patient demonstrating adequate, legible print in session today with minor errors for clarity of words when written  Written expression will be targeted as a homework based goal   3/23:  Pt completed all worksheets provided to her despite instruction not to, patient reporting "whats the point of doing it if you don't look at it after?"  Clinician did look at sheets for accuracy, patient then asking "who cares how much do I write anyway?"  Patient reports "I just wanted them over with so I just did them, they were easy but some words I never come up with "  Patient also became very upset that the word "gun" was on one of her worksheets, she reported "why would that be on there?"  Patient put "kill" next to the word even though the assignment was word completion  2   Patient will name 10 items in an abstract category in one minute @ 100% acc i'ly    3/19:  Cold 9, Things that Run 6, Soft 4, Sharp 6, Green 11,  7   3/23: Hot 10, Fly 7, Ocean 12, Medon related 10  3/26:  Flowers 6, Vegetables 14, Holidays 11, Girls names 16  Goal met  3   Patient will increase mental manipulation of 4 words in varying order (bwd, ABC, etc) @ 100% acc i'ly  3/23:  3 bwd @ 100% acc i'ly, 4 bwd @ 80% acc i'ly, 4 ABC @ 100% acc i'ly  3/26:  4 ABC 90% acc i'ly  Goal met  4   Patient will read multisyllabic (4-5 syllable) words @ 100% acc i'ly  3/19:  Goal increased based on performance today to 4-5 syllable words  Pt will be encouraged to read them aloud to herself  5   Patient will read short paragraphs out loud @ 100% acc i'ly  3/19:  Unable to address today 2' pt does not have glasses with her   3/23:  Patient did well reading today, assigned to read out loud at home  3/26:  Patient will re-attempt at home  6   Patient will report increased speaking opportunities at home (phone calls, visits, car rides, talking to her cat, etc )  3/26:  Pt reports she talks to her neighbors a few times a day, she has been reading to herself but not out loud as instructed  This was reitterated today patient verbalized understanding  7   Patient will be shown 4 photos for 10 seconds and asked to recall them after a 1 minute delay @ 100% acc i'ly  3/26:  Goal met @ 100% acc i'ly     (NEW) 8  Patient will complete OME as homework program to decrease anterior loss  3/23:  Packet reviewed and provided as homework  3/26:  Goal met  Assessment:  Patient performed well in therapy today, she will be placed on hold for two weeks in order to determine further need for services  Plan:  -Patient would benefit from outpatient skilled Speech Therapy services : Cognitive-Linguistic therapy    -Frequency: 1-2x weekly  -Duration: 4-6 weeks    -Intervention certification from: 2/83/7207    Visit # 4    Homework:  [address] [facility] [phone], [phone]

## 2020-03-31 ENCOUNTER — OFFICE VISIT (OUTPATIENT)
Dept: PHYSICAL THERAPY | Facility: CLINIC | Age: 65
End: 2020-03-31
Payer: OTHER MISCELLANEOUS

## 2020-03-31 ENCOUNTER — APPOINTMENT (OUTPATIENT)
Dept: SPEECH THERAPY | Facility: CLINIC | Age: 65
End: 2020-03-31
Payer: COMMERCIAL

## 2020-03-31 ENCOUNTER — APPOINTMENT (OUTPATIENT)
Dept: PHYSICAL THERAPY | Facility: CLINIC | Age: 65
End: 2020-03-31
Payer: OTHER MISCELLANEOUS

## 2020-03-31 DIAGNOSIS — M67.959 TENDINOPATHY OF GLUTEAL REGION: ICD-10-CM

## 2020-03-31 DIAGNOSIS — M53.3 DISORDER OF SACRUM: ICD-10-CM

## 2020-03-31 DIAGNOSIS — M25.552 LEFT HIP PAIN: Primary | ICD-10-CM

## 2020-03-31 PROCEDURE — 97110 THERAPEUTIC EXERCISES: CPT | Performed by: PHYSICAL MEDICINE & REHABILITATION

## 2020-03-31 NOTE — PROGRESS NOTES
Daily Note     Today's date: 3/31/2020  Patient name: Karis Lion  : 1955  MRN: [id number]  Referring provider: Gulshan Ramirez PA-C  Dx:   Encounter Diagnosis     ICD-10-CM    1  Left hip pain M25 552    2  Disorder of sacrum M53 3    3  Tendinopathy of gluteal region M67 709                   Subjective: Pt with no new sx/complaints  Reports cont'd chronic constant LBP with no change overall  Notes cont'd weakness R side since her CVA  L side is slowly improving  Objective: See treatment diary below       Assessment: Tolerated treatment well  Progresses slowly with exercises however is active t/o session  She demonstrates consistent weakness LLE > R; can perform SLRs on R independently, however requires mod A for SLR flexion on the L  Deferred many standing exercises with c/c of "fear" of causing knee pain  No complaints end of tx  LE strength deficits evident with mobility and transfers  Patient demonstrated fatigue post treatment and would benefit from continued PT      Plan: Continue per plan of care  Progress treatment as tolerated         PRECAUTIONS:  Falls, recent CVA - patient reports no deficits, pending right knee surgery  Re-eval Date: 2020    Date 3/17 3 19 20 3/23/20 3 26 20 3/31   Visit Count 1 2 3 4 5   FOTO See IE       Pain In See IE 4-5/10   LB/ L hip 4-5/LB and L hip 4/10   L hip/quad 4/10 LBP   Pain Out See IE 3-4/10 L hip/LB    Less tightness B LE's  2-4/10 L hip/quad 4/10 LBP        Manual  3 19 20 3/23 3 26 20 3/31   Ham stretch  **B 3x/30" B 3x30" B 3x30"    ITB  **B 3x/30" B 3x30" B 3x30"    Hip Flex  **B 3x/30"      Pirif    **B 3x/30"        Date Patient deferred TE today 3 19 20 3/23 3 26 20 3/31   HS stretch  **manual as above   man manual as above Self calf stretch  4x30" ea  Wedge    Hip flexor/quad stretch  **manual as above   Hip Flexor man manual as above    LTR  **1x10/5"   10x5" 15x5" 30x/10"   SKTC  B 1x10/5"   10x5" ea 10x5" ea 15x/10"ea    Piriformis stretch    **Manual, as above    AH  **1x10/5"   15x5" 15x5" 20x/5"    AH with hip abd/add  ADD w/ball squeeze  30x/5" Add w/ball  Squeeze  30x5" Add w/ball  Squeeze  40x5" Add w/ball  Squeeze  40x5"   AH with Alt UE/LEs        Dead bugs           Bridges   NV   10x5" 10x5" 3x10/5"   Bridge with abd/add        Modified planks         Tband Anti Trunk rotation          Prone alt UEs/Les         Clamshells       **B 1x15 HEP    90/90 hip abd SL         NuStep  **L1 x 8 min   L1  10' L1 x 10 min L1 x 12 min   Seated   LAQ  March  TR/HR    **B 30x  **B 30x   **B 30x     B 30x  B 30x   B 30x   B 35x  B 35x   B 35x B 35x  B 35x   B 35x   Standing march   20 30x deferred   Mini squats   10 10x deferred                   Stand hip abd, HCs   10 ea 10x ea Supine SLR flexion  3x10 R  3x10 L AAROM   Mod A      Modalities  3 19 20  3 26 20        **Declined MHP or CP applic deferred

## 2020-04-01 RX ORDER — AMIODARONE HYDROCHLORIDE 200 MG/1
TABLET ORAL
Qty: 30 TABLET | Refills: 0 | OUTPATIENT
Start: 2020-04-01

## 2020-04-02 ENCOUNTER — OFFICE VISIT (OUTPATIENT)
Dept: PHYSICAL THERAPY | Facility: CLINIC | Age: 65
End: 2020-04-02
Payer: OTHER MISCELLANEOUS

## 2020-04-02 ENCOUNTER — APPOINTMENT (OUTPATIENT)
Dept: PHYSICAL THERAPY | Facility: CLINIC | Age: 65
End: 2020-04-02
Payer: OTHER MISCELLANEOUS

## 2020-04-02 DIAGNOSIS — M67.959 TENDINOPATHY OF GLUTEAL REGION: ICD-10-CM

## 2020-04-02 DIAGNOSIS — M53.3 DISORDER OF SACRUM: ICD-10-CM

## 2020-04-02 DIAGNOSIS — M25.552 LEFT HIP PAIN: Primary | ICD-10-CM

## 2020-04-02 PROCEDURE — 97535 SELF CARE MNGMENT TRAINING: CPT | Performed by: PHYSICAL MEDICINE & REHABILITATION

## 2020-04-07 ENCOUNTER — APPOINTMENT (OUTPATIENT)
Dept: PHYSICAL THERAPY | Facility: CLINIC | Age: 65
End: 2020-04-07
Payer: OTHER MISCELLANEOUS

## 2020-04-09 ENCOUNTER — APPOINTMENT (OUTPATIENT)
Dept: PHYSICAL THERAPY | Facility: CLINIC | Age: 65
End: 2020-04-09
Payer: OTHER MISCELLANEOUS

## 2020-04-14 ENCOUNTER — APPOINTMENT (OUTPATIENT)
Dept: PHYSICAL THERAPY | Facility: CLINIC | Age: 65
End: 2020-04-14
Payer: OTHER MISCELLANEOUS

## 2020-04-15 ENCOUNTER — TELEMEDICINE (OUTPATIENT)
Dept: CARDIOLOGY CLINIC | Facility: CLINIC | Age: 65
End: 2020-04-15
Payer: COMMERCIAL

## 2020-04-15 VITALS — BODY MASS INDEX: 41.46 KG/M2 | WEIGHT: 234 LBS | HEIGHT: 63 IN

## 2020-04-15 DIAGNOSIS — E66.01 MORBID OBESITY WITH BMI OF 40.0-44.9, ADULT (HCC): ICD-10-CM

## 2020-04-15 DIAGNOSIS — I48.19 PERSISTENT ATRIAL FIBRILLATION (HCC): ICD-10-CM

## 2020-04-15 DIAGNOSIS — I48.91 ATRIAL FIBRILLATION, UNSPECIFIED TYPE (HCC): ICD-10-CM

## 2020-04-15 DIAGNOSIS — I63.89 CEREBROVASCULAR ACCIDENT (CVA) DUE TO OTHER MECHANISM (HCC): ICD-10-CM

## 2020-04-15 DIAGNOSIS — Z86.73 H/O ISCHEMIC LEFT MCA STROKE: ICD-10-CM

## 2020-04-15 DIAGNOSIS — I10 ESSENTIAL HYPERTENSION: ICD-10-CM

## 2020-04-15 DIAGNOSIS — Z79.01 LONG TERM (CURRENT) USE OF ANTICOAGULANTS: ICD-10-CM

## 2020-04-15 DIAGNOSIS — I42.0 DILATED CARDIOMYOPATHY (HCC): Primary | ICD-10-CM

## 2020-04-15 PROCEDURE — G2012 BRIEF CHECK IN BY MD/QHP: HCPCS | Performed by: INTERNAL MEDICINE

## 2020-04-16 ENCOUNTER — APPOINTMENT (OUTPATIENT)
Dept: PHYSICAL THERAPY | Facility: CLINIC | Age: 65
End: 2020-04-16
Payer: OTHER MISCELLANEOUS

## 2020-04-20 DIAGNOSIS — I48.91 ATRIAL FIBRILLATION, UNSPECIFIED TYPE (HCC): Primary | ICD-10-CM

## 2020-04-20 DIAGNOSIS — I42.0 DILATED CARDIOMYOPATHY (HCC): ICD-10-CM

## 2020-04-21 ENCOUNTER — APPOINTMENT (OUTPATIENT)
Dept: PHYSICAL THERAPY | Facility: CLINIC | Age: 65
End: 2020-04-21
Payer: OTHER MISCELLANEOUS

## 2020-04-23 ENCOUNTER — APPOINTMENT (OUTPATIENT)
Dept: PHYSICAL THERAPY | Facility: CLINIC | Age: 65
End: 2020-04-23
Payer: OTHER MISCELLANEOUS

## 2020-04-28 ENCOUNTER — APPOINTMENT (OUTPATIENT)
Dept: PHYSICAL THERAPY | Facility: CLINIC | Age: 65
End: 2020-04-28
Payer: OTHER MISCELLANEOUS

## 2020-04-30 ENCOUNTER — APPOINTMENT (OUTPATIENT)
Dept: PHYSICAL THERAPY | Facility: CLINIC | Age: 65
End: 2020-04-30
Payer: OTHER MISCELLANEOUS

## 2020-05-09 ENCOUNTER — OFFICE VISIT (OUTPATIENT)
Dept: URGENT CARE | Facility: CLINIC | Age: 65
End: 2020-05-09
Payer: COMMERCIAL

## 2020-05-09 VITALS
BODY MASS INDEX: 41.46 KG/M2 | RESPIRATION RATE: 18 BRPM | OXYGEN SATURATION: 98 % | TEMPERATURE: 98 F | WEIGHT: 234 LBS | DIASTOLIC BLOOD PRESSURE: 72 MMHG | HEIGHT: 63 IN | HEART RATE: 87 BPM | SYSTOLIC BLOOD PRESSURE: 140 MMHG
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
## 2020-05-09 DIAGNOSIS — R60.0 MILD PERIPHERAL EDEMA: ICD-10-CM

## 2020-05-09 DIAGNOSIS — S80.11XA CONTUSION OF RIGHT LEG, INITIAL ENCOUNTER: Primary | ICD-10-CM

## 2020-05-09 PROCEDURE — 99213 OFFICE O/P EST LOW 20 MIN: CPT | Performed by: NURSE PRACTITIONER

## 2020-05-09 PROCEDURE — G0382 LEV 3 HOSP TYPE B ED VISIT: HCPCS | Performed by: NURSE PRACTITIONER

## 2020-05-09 PROCEDURE — 99283 EMERGENCY DEPT VISIT LOW MDM: CPT | Performed by: NURSE PRACTITIONER

## 2020-05-27 DIAGNOSIS — I48.91 A-FIB (HCC): ICD-10-CM

## 2020-05-27 RX ORDER — AMIODARONE HYDROCHLORIDE 200 MG/1
200 TABLET ORAL
Qty: 90 TABLET | Refills: 2 | Status: SHIPPED | OUTPATIENT
Start: 2020-05-27 | End: 2021-03-01

## 2020-05-28 ENCOUNTER — EVALUATION (OUTPATIENT)
Dept: SPEECH THERAPY | Facility: CLINIC | Age: 65
End: 2020-05-28
Payer: COMMERCIAL

## 2020-05-28 ENCOUNTER — EVALUATION (OUTPATIENT)
Dept: PHYSICAL THERAPY | Facility: CLINIC | Age: 65
End: 2020-05-28
Payer: OTHER MISCELLANEOUS

## 2020-05-28 DIAGNOSIS — R41.841 COGNITIVE COMMUNICATION DEFICIT: ICD-10-CM

## 2020-05-28 DIAGNOSIS — I63.9 CEREBROVASCULAR ACCIDENT (CVA), UNSPECIFIED MECHANISM (HCC): ICD-10-CM

## 2020-05-28 DIAGNOSIS — M25.552 LEFT HIP PAIN: Primary | ICD-10-CM

## 2020-05-28 DIAGNOSIS — I63.412 CEREBRAL INFARCTION DUE TO EMBOLISM OF LEFT MIDDLE CEREBRAL ARTERY (HCC): Primary | ICD-10-CM

## 2020-05-28 PROCEDURE — 97163 PT EVAL HIGH COMPLEX 45 MIN: CPT

## 2020-05-28 PROCEDURE — 96125 COGNITIVE TEST BY HC PRO: CPT | Performed by: NURSE PRACTITIONER

## 2020-05-29 ENCOUNTER — TRANSCRIBE ORDERS (OUTPATIENT)
Dept: PHYSICAL THERAPY | Facility: CLINIC | Age: 65
End: 2020-05-29

## 2020-05-29 DIAGNOSIS — I63.9 CEREBROVASCULAR ACCIDENT (CVA), UNSPECIFIED MECHANISM (HCC): ICD-10-CM

## 2020-05-29 DIAGNOSIS — M25.552 LEFT HIP PAIN: Primary | ICD-10-CM

## 2020-05-31 ENCOUNTER — OFFICE VISIT (OUTPATIENT)
Dept: URGENT CARE | Facility: CLINIC | Age: 65
End: 2020-05-31
Payer: COMMERCIAL

## 2020-05-31 VITALS
TEMPERATURE: 98.8 F | DIASTOLIC BLOOD PRESSURE: 84 MMHG | OXYGEN SATURATION: 98 % | SYSTOLIC BLOOD PRESSURE: 169 MMHG | RESPIRATION RATE: 18 BRPM | HEART RATE: 76 BPM

## 2020-05-31 DIAGNOSIS — R22.9 LOCALIZED SKIN MASS, LUMP, OR SWELLING: Primary | ICD-10-CM

## 2020-05-31 PROCEDURE — G0382 LEV 3 HOSP TYPE B ED VISIT: HCPCS | Performed by: PHYSICIAN ASSISTANT

## 2020-05-31 PROCEDURE — 99213 OFFICE O/P EST LOW 20 MIN: CPT | Performed by: PHYSICIAN ASSISTANT

## 2020-05-31 PROCEDURE — 99283 EMERGENCY DEPT VISIT LOW MDM: CPT | Performed by: PHYSICIAN ASSISTANT

## 2020-06-01 ENCOUNTER — TRANSCRIBE ORDERS (OUTPATIENT)
Dept: PHYSICAL THERAPY | Facility: CLINIC | Age: 65
End: 2020-06-01

## 2020-06-01 DIAGNOSIS — I63.412 CEREBRAL INFARCTION DUE TO EMBOLISM OF LEFT MIDDLE CEREBRAL ARTERY (HCC): Primary | ICD-10-CM

## 2020-06-01 DIAGNOSIS — R41.841 COGNITIVE COMMUNICATION DEFICIT: ICD-10-CM

## 2020-06-02 ENCOUNTER — TELEPHONE (OUTPATIENT)
Dept: CARDIOLOGY CLINIC | Facility: CLINIC | Age: 65
End: 2020-06-02

## 2020-06-08 ENCOUNTER — HOSPITAL ENCOUNTER (OUTPATIENT)
Dept: NON INVASIVE DIAGNOSTICS | Facility: HOSPITAL | Age: 65
Discharge: HOME | End: 2020-06-08
Payer: COMMERCIAL

## 2020-06-08 DIAGNOSIS — I42.0 DILATED CARDIOMYOPATHY (HCC): ICD-10-CM

## 2020-06-08 DIAGNOSIS — I48.91 ATRIAL FIBRILLATION, UNSPECIFIED TYPE (HCC): ICD-10-CM

## 2020-06-08 PROCEDURE — 93321 DOPPLER ECHO F-UP/LMTD STD: CPT | Performed by: INTERNAL MEDICINE

## 2020-06-08 PROCEDURE — 93308 TTE F-UP OR LMTD: CPT

## 2020-06-08 PROCEDURE — 93308 TTE F-UP OR LMTD: CPT | Performed by: INTERNAL MEDICINE

## 2020-06-08 PROCEDURE — 93325 DOPPLER ECHO COLOR FLOW MAPG: CPT | Performed by: INTERNAL MEDICINE

## 2020-06-09 ENCOUNTER — OFFICE VISIT (OUTPATIENT)
Dept: SPEECH THERAPY | Facility: CLINIC | Age: 65
End: 2020-06-09
Payer: COMMERCIAL

## 2020-06-09 DIAGNOSIS — I63.412 CEREBRAL INFARCTION DUE TO EMBOLISM OF LEFT MIDDLE CEREBRAL ARTERY (HCC): Primary | ICD-10-CM

## 2020-06-09 PROCEDURE — 92507 TX SP LANG VOICE COMM INDIV: CPT

## 2020-06-11 ENCOUNTER — OFFICE VISIT (OUTPATIENT)
Dept: SPEECH THERAPY | Facility: CLINIC | Age: 65
End: 2020-06-11
Payer: COMMERCIAL

## 2020-06-11 ENCOUNTER — TELEPHONE (OUTPATIENT)
Dept: CARDIOLOGY CLINIC | Facility: CLINIC | Age: 65
End: 2020-06-11

## 2020-06-11 DIAGNOSIS — I63.412 CEREBRAL INFARCTION DUE TO EMBOLISM OF LEFT MIDDLE CEREBRAL ARTERY (HCC): Primary | ICD-10-CM

## 2020-06-11 DIAGNOSIS — I48.19 PERSISTENT ATRIAL FIBRILLATION (HCC): Primary | ICD-10-CM

## 2020-06-11 DIAGNOSIS — R41.841 COGNITIVE COMMUNICATION DEFICIT: ICD-10-CM

## 2020-06-11 PROCEDURE — 92507 TX SP LANG VOICE COMM INDIV: CPT | Performed by: NURSE PRACTITIONER

## 2020-06-15 ENCOUNTER — OFFICE VISIT (OUTPATIENT)
Dept: SPEECH THERAPY | Facility: CLINIC | Age: 65
End: 2020-06-15
Payer: COMMERCIAL

## 2020-06-15 DIAGNOSIS — I63.412 CEREBRAL INFARCTION DUE TO EMBOLISM OF LEFT MIDDLE CEREBRAL ARTERY (HCC): Primary | ICD-10-CM

## 2020-06-15 DIAGNOSIS — I48.19 PERSISTENT ATRIAL FIBRILLATION (HCC): Primary | ICD-10-CM

## 2020-06-15 DIAGNOSIS — R41.841 COGNITIVE COMMUNICATION DEFICIT: ICD-10-CM

## 2020-06-15 PROCEDURE — 92507 TX SP LANG VOICE COMM INDIV: CPT | Performed by: NURSE PRACTITIONER

## 2020-06-18 ENCOUNTER — OFFICE VISIT (OUTPATIENT)
Dept: SPEECH THERAPY | Facility: CLINIC | Age: 65
End: 2020-06-18
Payer: COMMERCIAL

## 2020-06-18 DIAGNOSIS — I63.412 CEREBRAL INFARCTION DUE TO EMBOLISM OF LEFT MIDDLE CEREBRAL ARTERY (HCC): Primary | ICD-10-CM

## 2020-06-18 DIAGNOSIS — R41.841 COGNITIVE COMMUNICATION DEFICIT: ICD-10-CM

## 2020-06-18 PROCEDURE — 92507 TX SP LANG VOICE COMM INDIV: CPT | Performed by: NURSE PRACTITIONER

## 2020-06-22 ENCOUNTER — OFFICE VISIT (OUTPATIENT)
Dept: SPEECH THERAPY | Facility: CLINIC | Age: 65
End: 2020-06-22
Payer: COMMERCIAL

## 2020-06-22 DIAGNOSIS — I63.412 CEREBRAL INFARCTION DUE TO EMBOLISM OF LEFT MIDDLE CEREBRAL ARTERY (HCC): Primary | ICD-10-CM

## 2020-06-22 DIAGNOSIS — R41.841 COGNITIVE COMMUNICATION DEFICIT: ICD-10-CM

## 2020-06-22 PROCEDURE — 92507 TX SP LANG VOICE COMM INDIV: CPT | Performed by: NURSE PRACTITIONER

## 2020-06-25 ENCOUNTER — OFFICE VISIT (OUTPATIENT)
Dept: SPEECH THERAPY | Facility: CLINIC | Age: 65
End: 2020-06-25
Payer: COMMERCIAL

## 2020-06-25 DIAGNOSIS — I63.412 CEREBRAL INFARCTION DUE TO EMBOLISM OF LEFT MIDDLE CEREBRAL ARTERY (HCC): Primary | ICD-10-CM

## 2020-06-25 DIAGNOSIS — R41.841 COGNITIVE COMMUNICATION DEFICIT: ICD-10-CM

## 2020-06-25 PROCEDURE — 92507 TX SP LANG VOICE COMM INDIV: CPT | Performed by: NURSE PRACTITIONER

## 2020-06-29 ENCOUNTER — EVALUATION (OUTPATIENT)
Dept: SPEECH THERAPY | Facility: CLINIC | Age: 65
End: 2020-06-29
Payer: COMMERCIAL

## 2020-06-29 DIAGNOSIS — I63.412 CEREBRAL INFARCTION DUE TO EMBOLISM OF LEFT MIDDLE CEREBRAL ARTERY (HCC): Primary | ICD-10-CM

## 2020-06-29 DIAGNOSIS — R41.841 COGNITIVE COMMUNICATION DEFICIT: ICD-10-CM

## 2020-06-29 PROCEDURE — 92507 TX SP LANG VOICE COMM INDIV: CPT | Performed by: NURSE PRACTITIONER

## 2020-06-30 ENCOUNTER — TRANSCRIBE ORDERS (OUTPATIENT)
Dept: PHYSICAL THERAPY | Facility: CLINIC | Age: 65
End: 2020-06-30

## 2020-06-30 DIAGNOSIS — R41.841 COGNITIVE COMMUNICATION DEFICIT: ICD-10-CM

## 2020-06-30 DIAGNOSIS — I63.412 CEREBRAL INFARCTION DUE TO EMBOLISM OF LEFT MIDDLE CEREBRAL ARTERY (HCC): Primary | ICD-10-CM

## 2020-07-01 ENCOUNTER — APPOINTMENT (OUTPATIENT)
Dept: SPEECH THERAPY | Facility: CLINIC | Age: 65
End: 2020-07-01
Payer: COMMERCIAL

## 2020-07-06 ENCOUNTER — APPOINTMENT (OUTPATIENT)
Dept: SPEECH THERAPY | Facility: CLINIC | Age: 65
End: 2020-07-06
Payer: COMMERCIAL

## 2020-07-07 ENCOUNTER — TELEPHONE (OUTPATIENT)
Dept: CARDIOLOGY CLINIC | Facility: CLINIC | Age: 65
End: 2020-07-07

## 2020-07-07 ENCOUNTER — APPOINTMENT (OUTPATIENT)
Dept: SPEECH THERAPY | Facility: CLINIC | Age: 65
End: 2020-07-07
Payer: COMMERCIAL

## 2020-07-07 DIAGNOSIS — I48.19 PERSISTENT ATRIAL FIBRILLATION (HCC): Primary | ICD-10-CM

## 2020-07-07 DIAGNOSIS — Z79.899 ON AMIODARONE THERAPY: Primary | ICD-10-CM

## 2020-07-07 NOTE — TELEPHONE ENCOUNTER
Patient called with a concern about continue black stool, not loose, just black  She had been on Eliquis 5mg bid from which she felt gave her diarrhea and then she had been decreased to 2 5mg bid but still feels that her stools are black and she is very concerned about this  She is scheduled to have a loop implanted 7/22/20 post CVA  She is on Amiodarone as well  She is going to have labs done tomorrow preop which does include a CBC  Her chart shows that she already takes an iron supplement  Should I have her call her PCP or wait to see results of CBC tomorrow?

## 2020-07-08 ENCOUNTER — APPOINTMENT (OUTPATIENT)
Dept: LAB | Facility: CLINIC | Age: 65
End: 2020-07-08
Payer: COMMERCIAL

## 2020-07-08 DIAGNOSIS — Z79.899 ON AMIODARONE THERAPY: ICD-10-CM

## 2020-07-08 DIAGNOSIS — I48.19 PERSISTENT ATRIAL FIBRILLATION (HCC): ICD-10-CM

## 2020-07-08 LAB
ALBUMIN SERPL BCP-MCNC: 3.1 G/DL (ref 3.5–5)
ALP SERPL-CCNC: 185 U/L (ref 46–116)
ALT SERPL W P-5'-P-CCNC: 33 U/L (ref 12–78)
ANION GAP SERPL CALCULATED.3IONS-SCNC: 5 MMOL/L (ref 4–13)
AST SERPL W P-5'-P-CCNC: 40 U/L (ref 5–45)
BASOPHILS # BLD AUTO: 0.05 THOUSANDS/ΜL (ref 0–0.1)
BASOPHILS NFR BLD AUTO: 1 % (ref 0–1)
BILIRUB DIRECT SERPL-MCNC: 0.17 MG/DL (ref 0–0.2)
BILIRUB SERPL-MCNC: 0.45 MG/DL (ref 0.2–1)
BUN SERPL-MCNC: 14 MG/DL (ref 5–25)
CALCIUM SERPL-MCNC: 7.4 MG/DL (ref 8.3–10.1)
CHLORIDE SERPL-SCNC: 107 MMOL/L (ref 100–108)
CO2 SERPL-SCNC: 25 MMOL/L (ref 21–32)
CREAT SERPL-MCNC: 1.01 MG/DL (ref 0.6–1.3)
EOSINOPHIL # BLD AUTO: 0.24 THOUSAND/ΜL (ref 0–0.61)
EOSINOPHIL NFR BLD AUTO: 3 % (ref 0–6)
ERYTHROCYTE [DISTWIDTH] IN BLOOD BY AUTOMATED COUNT: 17.6 % (ref 11.6–15.1)
GFR SERPL CREATININE-BSD FRML MDRD: 60 ML/MIN/1.73SQ M
GLUCOSE P FAST SERPL-MCNC: 97 MG/DL (ref 65–99)
HCT VFR BLD AUTO: 40.1 % (ref 34.8–46.1)
HGB BLD-MCNC: 12.5 G/DL (ref 11.5–15.4)
IMM GRANULOCYTES # BLD AUTO: 0.04 THOUSAND/UL (ref 0–0.2)
IMM GRANULOCYTES NFR BLD AUTO: 1 % (ref 0–2)
LYMPHOCYTES # BLD AUTO: 1.25 THOUSANDS/ΜL (ref 0.6–4.47)
LYMPHOCYTES NFR BLD AUTO: 17 % (ref 14–44)
MCH RBC QN AUTO: 30.4 PG (ref 26.8–34.3)
MCHC RBC AUTO-ENTMCNC: 31.2 G/DL (ref 31.4–37.4)
MCV RBC AUTO: 98 FL (ref 82–98)
MONOCYTES # BLD AUTO: 0.75 THOUSAND/ΜL (ref 0.17–1.22)
MONOCYTES NFR BLD AUTO: 10 % (ref 4–12)
NEUTROPHILS # BLD AUTO: 5.09 THOUSANDS/ΜL (ref 1.85–7.62)
NEUTS SEG NFR BLD AUTO: 68 % (ref 43–75)
NRBC BLD AUTO-RTO: 0 /100 WBCS
PLATELET # BLD AUTO: 337 THOUSANDS/UL (ref 149–390)
PMV BLD AUTO: 11 FL (ref 8.9–12.7)
POTASSIUM SERPL-SCNC: 4.2 MMOL/L (ref 3.5–5.3)
PROT SERPL-MCNC: 6.9 G/DL (ref 6.4–8.2)
PTH-INTACT SERPL-MCNC: 713 PG/ML (ref 18.4–80.1)
RBC # BLD AUTO: 4.11 MILLION/UL (ref 3.81–5.12)
SODIUM SERPL-SCNC: 137 MMOL/L (ref 136–145)
T4 FREE SERPL-MCNC: 0.82 NG/DL (ref 0.76–1.46)
TSH SERPL DL<=0.05 MIU/L-ACNC: 78.4 UIU/ML (ref 0.36–3.74)
WBC # BLD AUTO: 7.42 THOUSAND/UL (ref 4.31–10.16)

## 2020-07-08 PROCEDURE — 83970 ASSAY OF PARATHORMONE: CPT

## 2020-07-08 PROCEDURE — 80048 BASIC METABOLIC PNL TOTAL CA: CPT | Performed by: INTERNAL MEDICINE

## 2020-07-08 PROCEDURE — 36415 COLL VENOUS BLD VENIPUNCTURE: CPT | Performed by: INTERNAL MEDICINE

## 2020-07-08 PROCEDURE — 84439 ASSAY OF FREE THYROXINE: CPT | Performed by: INTERNAL MEDICINE

## 2020-07-08 PROCEDURE — 84443 ASSAY THYROID STIM HORMONE: CPT | Performed by: INTERNAL MEDICINE

## 2020-07-08 PROCEDURE — 85025 COMPLETE CBC W/AUTO DIFF WBC: CPT

## 2020-07-08 PROCEDURE — 80076 HEPATIC FUNCTION PANEL: CPT | Performed by: INTERNAL MEDICINE

## 2020-07-08 NOTE — TELEPHONE ENCOUNTER
Let's see what her labs tomorrow show  I see that her parathyroid hormone was grossly high a couple months ago  So I added on TSH, a repeat parathyroid, and LFTs since she is being maintained on amiodarone

## 2020-07-09 ENCOUNTER — OFFICE VISIT (OUTPATIENT)
Dept: SPEECH THERAPY | Facility: CLINIC | Age: 65
End: 2020-07-09
Payer: COMMERCIAL

## 2020-07-09 DIAGNOSIS — R41.841 COGNITIVE COMMUNICATION DEFICIT: ICD-10-CM

## 2020-07-09 DIAGNOSIS — I63.412 CEREBRAL INFARCTION DUE TO EMBOLISM OF LEFT MIDDLE CEREBRAL ARTERY (HCC): Primary | ICD-10-CM

## 2020-07-09 PROCEDURE — 92507 TX SP LANG VOICE COMM INDIV: CPT | Performed by: NURSE PRACTITIONER

## 2020-07-09 RX ORDER — CHOLECALCIFEROL (VITAMIN D3) 1250 MCG
50000 CAPSULE ORAL WEEKLY
COMMUNITY

## 2020-07-09 RX ORDER — FUROSEMIDE 40 MG/1
40 TABLET ORAL DAILY
COMMUNITY

## 2020-07-09 RX ORDER — POTASSIUM CHLORIDE 750 MG/1
10 TABLET, EXTENDED RELEASE ORAL DAILY
COMMUNITY

## 2020-07-09 NOTE — PROGRESS NOTES
Speech-Language Pathology Treatment Note    Today's date: 2020  Patient name: Baron Mo  : 1955  MRN: [id number]  Referring provider: Sonya Daly MD  Dx:   Encounter Diagnosis     ICD-10-CM    1  Cerebral infarction due to embolism of left middle cerebral artery (HCC) I63 412    2  Cognitive communication deficit R41 841      Medical History significant for:   Past Medical History:   Diagnosis Date    Hyperlipidemia     Hypertension     PAF (paroxysmal atrial fibrillation) (HCC)     PUD (peptic ulcer disease)     Stroke ([facility] [address]     Vitamin D deficiency      Flowsheet:  Start Time: 1100  Stop Time: 1200  Total time in clinic (min): 60 minutes    Subjective: Pt goes to orthopedic surgeon end of the month for knee and hip pain  Objective:  1  Patient will increase mental manipulation of 5 words in varying order (bwd, ABC, etc) @ 90% acc i'ly  5 words ABC 80% taty  2  Patient will complete Deductive reasoning puzzles with increased complexity and improved accuracy/independence over the next 3 weeks  3  Patient will utilize word finding strategies as previously mastered in structured activities ( gesturing, describing, sentence completion, phonemic cues) in open ended conversations   rolled 4 dice with pictures and formulated a story 100%acc without any word finding errors  Blurt game ( name items given a description auditorily) 80% taty  Assessment: Pt performed great in session!      Plan:  Recommendations:Cognitive-Linguistic therapy  Frequency:1x weekly  Duration:3 weeks   visit #9    Homework:

## 2020-07-10 NOTE — TELEPHONE ENCOUNTER
Spoke in depth with the patient today  This is a very complex patient as she is did suffer a massive stroke earlier this year  She did not recall are prior conversation concerning ablation and loop recorder implantation and is easily made emotional when talking about procedures and such  She also called our office earlier this week complaining of darker stools for which repeat blood work was drawn  Hemoglobin is stable (she reports that she may have worked herself up as she ate a lot of blueberries and that this changes the color of her stools   ) however parathyroid hormone was elevated at 720  When discussing this with the patient, she says that she does follow with an endocrinologist at [facility] and she does have a diagnosis of secondary hyperparathyroidism  I have asked her to continue to follow with her and if possible to let her know about the elevation 420 three months ago to 720 now  Just in talking with the patient over the last month and with her concerns surrounding her recent stroke and becoming tearful about many things, I did discuss with her that I do not feel that she should undergo an extensive procedure such as an ablation any time soon  A loop recorder implantation may give us more information to help guide us in her atrial fibrillation management but I am not sure that she is ready to have more than this procedure at this time  I will discuss with attending and the schedulers about having just a loop recorder placed and follow-up in the office afterwards  Will call Kirby Clay at [phone] on Monday after talking with the schedulers as this would be the brother that would be taking her to [facility] for any procedure whatsoever

## 2020-07-13 ENCOUNTER — OFFICE VISIT (OUTPATIENT)
Dept: SPEECH THERAPY | Facility: CLINIC | Age: 65
End: 2020-07-13
Payer: COMMERCIAL

## 2020-07-13 DIAGNOSIS — R41.841 COGNITIVE COMMUNICATION DEFICIT: ICD-10-CM

## 2020-07-13 DIAGNOSIS — I63.412 CEREBRAL INFARCTION DUE TO EMBOLISM OF LEFT MIDDLE CEREBRAL ARTERY (HCC): Primary | ICD-10-CM

## 2020-07-13 PROCEDURE — 92507 TX SP LANG VOICE COMM INDIV: CPT | Performed by: NURSE PRACTITIONER

## 2020-07-13 NOTE — PROGRESS NOTES
Speech-Language Pathology Treatment Note    Today's date: 2020  Patient name: Connor Singh  : 1955  MRN: [id number]  Referring provider: Kalee Adam MD  Dx:   Encounter Diagnosis     ICD-10-CM    1  Cerebral infarction due to embolism of left middle cerebral artery (HCC) I63 412    2  Cognitive communication deficit R41 841      Medical History significant for:   Past Medical History:   Diagnosis Date    Hyperlipidemia     Hypertension     PAF (paroxysmal atrial fibrillation) (HCC)     PUD (peptic ulcer disease)     Stroke ([facility] [address] )     Vitamin D deficiency      Flowsheet:  Start Time: 1100  Stop Time: 1200  Total time in clinic (min): 60 minutes    Subjective: Pt goes to orthopedic surgeon end of the month for knee and hip pain  Pt on the phone for the first 15 minutes of today's session with , asking clinician to wait until the phonecall was done  Upon entering therapy room, patient was crying regarding the phonecalls  She reported  crying about transportation difficulty with one of her upcoming appointments and a medical bill she was called about that she owe  Pt able to work through entire phone conversation without any word finding per report  Returned homework completed @ 100%acc  Objective:  1  Patient will increase mental manipulation of 5 words in varying order (bwd, ABC, etc) @ 90% acc i'ly  5 words ABC 80% taty  2  Patient will complete Deductive reasoning puzzles with increased complexity and improved accuracy/independence over the next 3 weeks   started one today 2x6 puzzle  Pt requested for goal to be discontinued as she "does not want to do these"  3  Patient will utilize word finding strategies as previously mastered in structured activities ( gesturing, describing, sentence completion, phonemic cues) in open ended conversations   rolled 4 dice with pictures and formulated a story 100%acc without any word finding errors   BlRezzie game ( name items given a description auditorily) 80% taty  7/13 LightPoleurt game( name items given a description auditorily) 75% taty, rapid naming 34 items in 1 min with 1 semantic paraphasia and And second trial 34 items in 1 minute with 1 error  Difference/similarity between objects 75% taty  Assessment: Pt performed great in session, worked through word finding activities nicely and reproting deductive reasoning puzzles are not important to her and removed from [address]      Plan:  Recommendations:Cognitive-Linguistic therapy  Frequency:1x weekly  Duration:3 weeks   visit #10    Homework:

## 2020-07-16 ENCOUNTER — APPOINTMENT (OUTPATIENT)
Dept: SPEECH THERAPY | Facility: CLINIC | Age: 65
End: 2020-07-16
Payer: COMMERCIAL

## 2020-07-17 DIAGNOSIS — Z11.59 SCREENING FOR VIRAL DISEASE: ICD-10-CM

## 2020-07-17 PROCEDURE — U0003 INFECTIOUS AGENT DETECTION BY NUCLEIC ACID (DNA OR RNA); SEVERE ACUTE RESPIRATORY SYNDROME CORONAVIRUS 2 (SARS-COV-2) (CORONAVIRUS DISEASE [COVID-19]), AMPLIFIED PROBE TECHNIQUE, MAKING USE OF HIGH THROUGHPUT TECHNOLOGIES AS DESCRIBED BY CMS-2020-01-R: HCPCS

## 2020-07-18 LAB
INPATIENT: NORMAL
SARS-COV-2 RNA SPEC QL NAA+PROBE: NOT DETECTED

## 2020-07-20 ENCOUNTER — OFFICE VISIT (OUTPATIENT)
Dept: SPEECH THERAPY | Facility: CLINIC | Age: 65
End: 2020-07-20
Payer: COMMERCIAL

## 2020-07-20 DIAGNOSIS — R41.841 COGNITIVE COMMUNICATION DEFICIT: ICD-10-CM

## 2020-07-20 DIAGNOSIS — I63.412 CEREBRAL INFARCTION DUE TO EMBOLISM OF LEFT MIDDLE CEREBRAL ARTERY (HCC): Primary | ICD-10-CM

## 2020-07-20 PROCEDURE — 92507 TX SP LANG VOICE COMM INDIV: CPT | Performed by: NURSE PRACTITIONER

## 2020-07-20 NOTE — PROGRESS NOTES
Speech and Language Therapy Discharge Report    Patient Name: Elder Garner   DVXOY'X Date: 07/20/20         Most Recent Assessment:  The Repeatable Battery for the Assessment of Neuropsychological Status (RBANS) is a brief, individually-administered assessment which measures attention, language, visuospatial/constructional abilities, and immediate & delayed memory  The RBANS is intended for use with adolescents to adults, ages 15 to 80 years  The following results were obtained during the administration of the assessment  Form: D    Cognitive Domain/Subtest: Index Score: Percentile Rank: Classification: IE: Status:   IMMEDIATE MEMORY 81 10%ile Low Average 78 IMPROVEMENT        1  List Learning (26/40)          2  Story Memory (11/24)           VISUOSPATIAL/  CONSTRUCTIONAL 89 23%ile Low Average 64 IMPROVEMENT        3  Figure Copy (17/20)          4  Line Orientation (16/20)           LANGUAGE 96 39%ile Average 92 NO CHANGE        5  Picture Naming (10/10) 1 self correction ( telescope self corrected to stethoscope)          6  Semantic Fluency (18/40)           ATTENTION 97 42%ile Average 94 NO CHANGE        7  Digit Span (15/16)          8  Coding (19/89)           DELAYED MEMORY 92 30%ile Average 95 NO CHANGE        9  List Recall (4/10)          10  List Recognition (20/20)          11  Story Recall (4/12)          12  Figure Recall (9/20)           Sum of Index Scores:  455  423 slight improvement   Total Score:  87      Percentile: 19%ile      Classification: Low Average          IE indicates the scores from the initial evaluation (3/17/20) Form: D compared with last re-evaluation completed on 5/28/20  Short Term Goals at the Time of Discharge:  1  Patient will increase mental manipulation of 5 words in varying order (bwd, ABC, etc) @ 90% acc i'ly goal partially met up to 80% taty       2  Patient will complete Deductive reasoning puzzles with increased complexity and improved accuracy/independence over the next 3 weeks  7/13 started one today 2x6 puzzle  Pt requested for goal to be discontinued as she "does not want to do these"  3  Patient will utilize word finding strategies as previously mastered in structured activities ( gesturing, describing, sentence completion, phonemic cues) in open ended conversations  goal met       Discharge Information: Patient has made great progress across all settings  She is pleased with her progress  She will transition to a home program at this time  SLP will follow up with patient via telephone in ~3-4 months to determine how carryover is going  Participation in Treatment (at discharge):   Cooperative    Patient is discharged to [address] [place] [address] name/phone number for follow up: [phone]

## 2020-07-23 ENCOUNTER — HOSPITAL ENCOUNTER (OUTPATIENT)
Dept: NON INVASIVE DIAGNOSTICS | Facility: HOSPITAL | Age: 65
Discharge: HOME | End: 2020-07-23
Attending: INTERNAL MEDICINE | Admitting: INTERNAL MEDICINE
Payer: COMMERCIAL

## 2020-07-23 VITALS
RESPIRATION RATE: 18 BRPM | HEART RATE: 61 BPM | SYSTOLIC BLOOD PRESSURE: 160 MMHG | OXYGEN SATURATION: 97 % | DIASTOLIC BLOOD PRESSURE: 70 MMHG

## 2020-07-23 DIAGNOSIS — I48.19 PERSISTENT ATRIAL FIBRILLATION (HCC): ICD-10-CM

## 2020-07-23 PROCEDURE — C1764 EVENT RECORDER, CARDIAC: HCPCS

## 2020-07-23 PROCEDURE — 33285 INSJ SUBQ CAR RHYTHM MNTR: CPT

## 2020-07-23 PROCEDURE — 33285 INSJ SUBQ CAR RHYTHM MNTR: CPT | Performed by: INTERNAL MEDICINE

## 2020-07-23 PROCEDURE — NC001 PR NO CHARGE: Performed by: PHYSICIAN ASSISTANT

## 2020-07-23 RX ORDER — LIDOCAINE HYDROCHLORIDE 10 MG/ML
INJECTION, SOLUTION EPIDURAL; INFILTRATION; INTRACAUDAL; PERINEURAL CODE/TRAUMA/SEDATION MEDICATION
Status: COMPLETED | OUTPATIENT
Start: 2020-07-23 | End: 2020-07-23

## 2020-07-23 RX ADMIN — LIDOCAINE HYDROCHLORIDE 15 ML: 10 INJECTION, SOLUTION EPIDURAL; INFILTRATION; INTRACAUDAL; PERINEURAL at 14:25

## 2020-07-23 NOTE — H&P
H&P Exam - Electrophysiology  Noemi Loja 60 y o  female MRN: [id number]  Unit/Bed#: -01 Encounter: [id number]    Assessment/Plan     Assessment:  1  Persistent atrial fibrillation  - anticoagulated with low dose Eliquis / [id number] score of 5 (sex, HTN, CHF, CVA)  - EF of 45-50% per echo 6/2020 (improved from 30%) / LA diameter of 3 93cm  - rate control: none  - antiarrhythmic therapy: amiodarone 200mg daily  2  History of MCA CVA  - suffered in 1/2020  - some speech deficits / now improving with therapy  3  History of dilated cardiomyopathy  - EF as low as 20% at one point  - cardiac cath from 3/2020 showed no obstructive CAD  4  Likely bicuspid aortic valve per most recent echo  5  Essential hypertension  6  Hyperlipidemia  - maintained on atorvastatin 40mg daily   7  History of GI bleed  - maintained on iron at home / hemoglobin stable at 12 currently   8  Hyperparathyroidism  - followed by Endocrinology at [facility]  - most recent parathyroid hormone count of 713  9  Obesity with BMI of 41 status post gastric bypass    Plan:  1  Patient presents today to undergo loop recorder implantation for further atrial fibrillation monitoring  She is currently maintained on amiodarone therapy and will see Dr Oracio Red in follow-up in a couple months to discuss her atrial fibrillation burden and management down the road  History of Present Illness   HPI:  Noemi Loja is a 60y o  year old female with persistent atrial fibrillation anticoagulated with low-dose Eliquis, history of MCA CVA, prior dilated cardiomyopathy, essential hypertension, hyperlipidemia, prior GI bleed, hyperparathyroidism, and obesity with BMI of 41 status post gastric bypass surgery  Patient presented to the hospital in January of this year with acute MCA CVA  She was also in a narrow complex tachycardia that was found to be atrial fibrillation and was most likely her source of stroke    She was placed on amiodarone and echo revealed an ejection fraction of 20 to 30%  She was continued on amiodarone as an outpatient and saw Dr Rashel Plata in consultation for both cardiac catheterization to rule out CAD and watchman device  Cardiac catheterization from March of this year showed no obstructive CAD and patient tolerated low-dose Eliquis well without any GI bleeding as she does have a history of this  Instead, it was felt that she would benefit from seeing electrophysiology for her arrhythmia  She was seen in tele visit by Dr Kris Gross in April of this year who recommended a repeat echo to further assess her ejection fraction  If this had improved, tentative plan had been for moving forward with ablation and loop recorder implantation  It was also discussed with the patient that she may benefit from a pacemaker given some bradycardia  Since then, echo had been performed which showed an improvement in her ejection fraction  Patient was called to discuss these results at which point she did not recall any of the plan but this was discussed again in detail and patient had been agreeable at that time to undergo ablation and loop recorder implantation  She called the office again with concerns of melena for which labs were drawn  Her hemoglobin was stable and patient reported afterwards that she may have eaten food that caused her stools to dark in  However, other labs were drawn which showed that her parathyroid hormone was extremely elevated  I called her discuss these results and she reported that she does follow with endocrinology at [facility] and I advised her to let her know  Again, patient had no idea about the procedure she had been set up for and was extremely tearful throughout the conversation  It was therefore decided that she should only undergo loop recorder implantation instead of an extensive procedure such as an ablation  She was agreeable to this and I did also discuss this with her brother on the phone  Again, she was extremely tearful today because of this procedure  EKG:     Review of Systems  ROS as noted above, otherwise 12 point review of systems was performed and is negative       Historical Information   Past Medical History:   Diagnosis Date    Hyperlipidemia     Hypertension     [facility])     PUD (peptic ulcer disease)     Stroke ([facility] [address] )     Vitamin D deficiency      Past Surgical History:   Procedure Laterality Date    ABDOMINOPLASTY      revision gastric bypass    BREAST SURGERY      reduction     SECTION      CHOLECYSTECTOMY      COLONOSCOPY      GASTRIC BYPASS      HERNIA REPAIR      NEUROMA EXCISION Right 2019    Procedure: EXCISION 2ND INTERSPACE NEUROMA;  Surgeon: Sheryle Bernard, DPM;  Location:  MAIN OR;  Service: Podiatry    ROTATOR CUFF REPAIR Left      Family History:   Family History   Problem Relation Age of Onset    Stroke Mother     Diabetes Mother     Heart disease Father     Diabetes Father     Hypertension Brother     Sudden death Brother     Hyperlipidemia Brother        Social History   Social History     Substance and Sexual Activity   Alcohol Use Never    Frequency: Never    Comment: none     Social History     Substance and Sexual Activity   Drug Use No    Comment: none     Social History     Tobacco Use   Smoking Status Never Smoker   Smokeless Tobacco Never Used       Meds/Allergies   all medications and allergies reviewed  Home Medications:   Medications Prior to Admission   Medication    apixaban (ELIQUIS) 2 5 mg    amiodarone 200 mg tablet    atorvastatin (LIPITOR) 40 mg tablet    carboxymethylcellulose 1 % ophthalmic solution    Cholecalciferol (VITAMIN D3) 1 25 MG (45745 UT) CAPS    dorzolamide-timolol (COSOPT) 22 3-6 8 MG/ML ophthalmic solution    ferrous sulfate 324 (65 Fe) mg    furosemide (LASIX) 40 mg tablet    lisinopril (ZESTRIL) 5 mg tablet    pantoprazole (PROTONIX) 40 mg tablet    potassium chloride (K-DUR,KLOR-CON) 10 mEq tablet    traMADol (ULTRAM) 50 mg tablet       Allergies   Allergen Reactions    Ibuprofen     Nsaids      Due to hx gastric bypass         Objective   Vitals: Blood pressure 160/70, pulse 61, resp  rate 18, SpO2 97 %, not currently breastfeeding  Orthostatic Blood Pressures      Most Recent Value   Blood Pressure  160/70 filed at 2020 1300   Patient Position - Orthostatic VS  Sitting filed at 2020 1300          No intake or output data in the 24 hours ending 20 1359    Invasive Devices     None                 Physical Exam   Constitutional: She appears well-developed and well-nourished  No distress  obese   HENT:   Head: Normocephalic and atraumatic  Eyes: Pupils are equal, round, and reactive to light  EOM are normal    Neck: Normal range of motion  No JVD present  Cardiovascular: Normal rate, regular rhythm and normal heart sounds  Exam reveals no friction rub  No murmur heard  Pulmonary/Chest: Effort normal and breath sounds normal    Neurological: She is alert  Skin: Skin is warm and dry  She is not diaphoretic  Psychiatric:   Extremely tearful   Nursing note and vitals reviewed  Lab Results: I have personally reviewed pertinent lab results  Invalid input(s): LABGLOM              Imaging: I have personally reviewed pertinent reports  Results for orders placed during the hospital encounter of 20   Echo complete with contrast if indicated    Narrative [address] [place] [address]    Transthoracic Echocardiogram  2D, M-mode, Doppler, and Color Doppler    Study date:  2020    Patient: Karen Giron  MR number: [id number]  Account number: [de-identified]  : 1955  Age: 60 years  Gender: Female  Status: Inpatient  [facility]   Height: 63 in  Weight: 272 4 lb  BP: 116/ 80 mmHg    Indications: Atrial fibrillation  CVA     Diagnoses: I48 0 - Atrial fibrillation, I63 9 - Cerebral infarction, unspecified    Sonographer:  Kandice Yung RDCS  Referring Physician:  LEW Dutta  Group:  [facility]  Interpreting Physician:  Bradley Blue MD    SUMMARY    LEFT VENTRICLE:  Systolic function was markedly reduced  Ejection fraction was estimated in the range of 20 % to 30 %  There is global dysfunction but the apical portion of the inferior wall is akinetic  There is significant tachycardia which may be transiently depressing the ejection fraction  HISTORY: Consistent with transient ischemic attack or stroke  PRIOR HISTORY: Risk factors: hypertension and morbid obesity  PROCEDURE: The study was performed in the [facility]  This was a routine study  The transthoracic approach was used  The study included complete 2D imaging, M-mode, complete spectral Doppler, and color Doppler  The  heart rate was 154 bpm, at the start of the study  Images were obtained from the parasternal, apical, subcostal, and suprasternal notch acoustic windows  Echocardiographic views were limited due to restricted patient mobility, poor  acoustic window availability, decreased penetration, and lung interference  This was a technically difficult study  LEFT VENTRICLE: Size was normal  Systolic function was markedly reduced  Ejection fraction was estimated in the range of 20 % to 30 %  There is global dysfunction but the apical portion of the inferior wall is akinetic  There is significant tachycardia which may be transiently depressing the ejection fraction  Wall thickness was normal  No evidence of apical thrombus  DOPPLER: Transmitral flow pattern: atrial fibrillation  The study was not technically  sufficient to allow evaluation of LV diastolic function      RIGHT VENTRICLE: The size was normal  Systolic function was normal  Wall thickness was normal     LEFT ATRIUM: Size was normal     RIGHT ATRIUM: Size was normal     MITRAL VALVE: Valve structure was normal  There was normal leaflet separation  DOPPLER: The transmitral velocity was within the normal range  There was no evidence for stenosis  There was no significant regurgitation  AORTIC VALVE: The valve was possibly bicuspid  Leaflets exhibited mild to moderate calcification and normal cuspal separation  DOPPLER: Transaortic velocity was within the normal range  There was no evidence for stenosis  There was no  significant regurgitation  TRICUSPID VALVE: The valve structure was normal  There was normal leaflet separation  DOPPLER: The transtricuspid velocity was within the normal range  There was no evidence for stenosis  There was trace regurgitation  PULMONIC VALVE: Leaflets exhibited normal thickness, no calcification, and normal cuspal separation  DOPPLER: The transpulmonic velocity was within the normal range  There was no significant regurgitation  PERICARDIUM: There was no pericardial effusion  The pericardium was normal in appearance  AORTA: The root exhibited normal size  SYSTEMIC VEINS: IVC: The inferior vena cava was normal in size  SYSTEM MEASUREMENT TABLES    2D  %FS: 12 42 %  AV Diam: 3 35 cm  Ao asc: 3 14 cm  EDV(Teich): 112 78 ml  EF(Teich): 26 72 %  ESV(Teich): 82 64 ml  IVSd: 1 28 cm  LA Area: 23 66 cm2  LA Diam: 3 86 cm  LVEDV MOD A4C: 88 01 ml  LVEF MOD A4C: 24 42 %  LVESV MOD A4C: 66 52 ml  LVIDd: 4 9 cm  LVIDs: 4 29 cm  LVLd A4C: 7 91 cm  LVLs A4C: 7 09 cm  LVOT Diam: 2 04 cm  LVPWd: 1 12 cm  RA Area: 10 28 cm2  RV Diam : 1 85 cm  SI(Teich): 13 64 ml/m2  SV MOD A4C: 21 49 ml  SV(Cube): 38 61 ml  SV(Teich): 30 14 ml    CW  AV Env  Ti: 184 84 ms  AV VTI: 26 2 cm  AV Vmax: 1 86 m/s  AV Vmean: 1 42 m/s  AV maxP 81 mmHg  AV meanP 6 mmHg  TR Vmax: 2 5 m/s  TR maxP 32 mmHg    MM  TAPSE: 1 99 cm    PW  ADORE (VTI): 1 96 cm2  ADORE Vmax: 1 66 cm2  LVOT Env  Ti: 266 17 ms  LVOT VTI: 15 72 cm  LVOT Vmax: 0 94 m/s  LVOT Vmean: 0 59 m/s  LVOT maxPG: 3 57 mmHg  LVOT meanP 8 mmHg    [facility]    Prepared and electronically signed by    Daniel Cartwright MD  Signed 2020 17:00:55         Code Status: Prior    ** Please Note: Dictation voice to text software may have been used in the creation of this document   **

## 2020-07-23 NOTE — DISCHARGE INSTRUCTIONS
Do not use lotions/powders/creams on incision  Remove outer bandage 72 hours after procedure - if present, leave underlying steri-strips in place, they will either fall off on their own or will be removed at 2 week follow up appointment  Please keep incision dry for the first first week - either keeping incision out of direct shower water or placing over the counter waterproof bandages over top when showering  Please call the office [phone] if you notice redness, swelling, bleeding, or drainage from incision or if you develop fevers  Cardiac Loop Recorder Insertion      WHAT YOU SHOULD KNOW:    A cardiac loop recorder is a device used to diagnose heart rhythm problems, such as a fast or irregular heartbeat  It is implanted in your left chest, just under the skin  The device records a pattern of your heart's rhythm, called an EKG  Your device records automatic EKGs, depending on how your caregiver programs it  You may also receive a handheld controller  You press a button on the controller when you have symptoms, such as dizziness, lightheadedness, or palpitations  The device will record an EKG at that moment  The recording can help your caregiver see if your symptoms may be caused by heart rhythm problems  Your caregiver will remove the device after it has collected enough data  You may need the device for up to 3 years  The procedure to remove the device is similar to the procedure used to implant it       AFTER YOU LEAVE:    Follow up with your cardiologist as directed: You will need to return in 1 to 2 weeks  Your cardiologist will check your incision  He may also program your device settings again  He will retrieve data from the device every 1 to 3 months with a monitor held over your skin  You may be able to transmit data from your device from home as well  You will do this by calling a number provided by your cardiologist, or as they have instructed you  Ask for information about this process  Write down your questions so you remember to ask them during your visits       Wound care: Keep loop recorder incision dry for one week  Do not use lotions/powders/creams on incision  Remove outer bandage 48 hours after procedure - leave underlying steri-strips in place, they will either fall off on their own or will be removed at 2 week follow up appointment  Please call the office if you notice redness, swelling, bleeding, or drainage from incision or if you develop fevers  After that first week, carefully wash your incision with soap and water  Keep the area clean and dry until it heals       Return to activity: If you received anesthesia, you will not be able to drive for 24 hours  Otherwise, most people can return to normal activities soon after the procedure  Your cardiologist may want to know if your work involves electrical current or high-voltage equipment  Ask about other electrical items that could interfere with your cardiac loop recorder       Contact your cardiologist if:   · You have a fever or chills  · Your wound is red, swollen, or draining pus  · You have questions or concerns about your condition or care  Seek care immediately or call 911 if:   · You feel weak, dizzy, or faint  · You lose consciousness  © 2014 [address] is for End User's use only and may not be sold, redistributed or otherwise used for commercial purposes  All illustrations and images included in CareNotes® are the copyrighted property of [employer]  or Ashkan Paez  The above information is an  only  It is not intended as medical advice for individual conditions or treatments  Talk to your doctor, nurse or pharmacist before following any medical regimen to see if it is safe and effective for you

## 2020-07-27 ENCOUNTER — CLINICAL SUPPORT (OUTPATIENT)
Dept: CARDIOLOGY CLINIC | Facility: CLINIC | Age: 65
End: 2020-07-27

## 2020-07-27 DIAGNOSIS — Z95.818 STATUS POST PLACEMENT OF IMPLANTABLE LOOP RECORDER: Primary | ICD-10-CM

## 2020-07-27 PROCEDURE — 99024 POSTOP FOLLOW-UP VISIT: CPT | Performed by: PHYSICIAN ASSISTANT

## 2020-07-27 RX ORDER — CEPHALEXIN 500 MG/1
500 CAPSULE ORAL EVERY 6 HOURS SCHEDULED
Qty: 40 CAPSULE | Refills: 0 | Status: SHIPPED | OUTPATIENT
Start: 2020-07-27 | End: 2020-08-06

## 2020-07-27 NOTE — PROGRESS NOTES
The patient is here today for a wound check, s/p loop implant on 7/23/20  She reports significant tenderness and swelling at the incision site which began this morning  The site is tender to palpation  Incision is intact with 3 sutures  Site appears mildly inflamed and erythematous  No drainage  She denies fever/chills  Will prescribe 10-day course of Keflex   She has an appt on 7/30 for suture removal

## 2020-07-30 ENCOUNTER — OFFICE VISIT (OUTPATIENT)
Dept: CARDIOLOGY CLINIC | Facility: CLINIC | Age: 65
End: 2020-07-30

## 2020-07-30 DIAGNOSIS — Z95.818 STATUS POST PLACEMENT OF IMPLANTABLE LOOP RECORDER: Primary | ICD-10-CM

## 2020-07-30 PROCEDURE — 99024 POSTOP FOLLOW-UP VISIT: CPT | Performed by: PHYSICIAN ASSISTANT

## 2020-07-30 NOTE — PROGRESS NOTES
The patient is here today for a wound check, s/p loop implant on 7/23/20  She was seen here on 7/27 and prescribed Keflex  The inflammation and erythema surrounding the incision has resolved and it is no longer tender to palpation  3 sutures removed today without issue  Advised her to finish the course of abx and call w/ any further concerns

## 2020-08-17 ENCOUNTER — IN-CLINIC DEVICE VISIT (OUTPATIENT)
Dept: CARDIOLOGY CLINIC | Facility: CLINIC | Age: 65
End: 2020-08-17
Payer: COMMERCIAL

## 2020-08-17 ENCOUNTER — OFFICE VISIT (OUTPATIENT)
Dept: CARDIOLOGY CLINIC | Facility: CLINIC | Age: 65
End: 2020-08-17
Payer: COMMERCIAL

## 2020-08-17 VITALS
DIASTOLIC BLOOD PRESSURE: 80 MMHG | BODY MASS INDEX: 41.11 KG/M2 | HEIGHT: 63 IN | SYSTOLIC BLOOD PRESSURE: 122 MMHG | HEART RATE: 57 BPM | WEIGHT: 232 LBS

## 2020-08-17 DIAGNOSIS — I35.1 NONRHEUMATIC AORTIC VALVE INSUFFICIENCY: ICD-10-CM

## 2020-08-17 DIAGNOSIS — Z95.818 STATUS POST PLACEMENT OF IMPLANTABLE LOOP RECORDER: ICD-10-CM

## 2020-08-17 DIAGNOSIS — Z01.810 PRE-OPERATIVE CARDIOVASCULAR EXAMINATION: ICD-10-CM

## 2020-08-17 DIAGNOSIS — Z86.73 PERSONAL HISTORY OF TRANSIENT ISCHEMIC ATTACK: Primary | ICD-10-CM

## 2020-08-17 DIAGNOSIS — I48.19 PERSISTENT ATRIAL FIBRILLATION (HCC): Primary | ICD-10-CM

## 2020-08-17 PROCEDURE — 99245 OFF/OP CONSLTJ NEW/EST HI 55: CPT | Performed by: PHYSICIAN ASSISTANT

## 2020-08-17 PROCEDURE — 93291 INTERROG DEV EVAL SCRMS IP: CPT | Performed by: INTERNAL MEDICINE

## 2020-08-17 PROCEDURE — 93000 ELECTROCARDIOGRAM COMPLETE: CPT | Performed by: PHYSICIAN ASSISTANT

## 2020-08-17 NOTE — ASSESSMENT & PLAN NOTE
Given recent cardiac cath showing no CAD, there is no need to obtain cardiac testing at this point  The patient is cleared for upcoming surgical procedure with intermediate risk for cardiovascular event in light of AI and known CM

## 2020-08-17 NOTE — PROGRESS NOTES
[facility]   Outpatient Note  Adam Moctezuma  1955  [id number]  [facility], [facility] CARDIOLOGY [address] [address]  [facility]    Adam Moctezuma is a 60 y o  female    Assessment and Plan:   Persistent atrial fibrillation  maintaining sinus rhythm on amiodarone  continues ELiquis for stroke risk reduction     Cardiomyopathy ([address] )  EF 45-50% on echo  Cath on 3-9-2020 shows no CAD    Essential hypertension  Controlled rate     CVA (cerebral vascular accident) ([address] )  Residual cognitive deficit   Mild L-arm weakness     Status post placement of implantable loop recorder  Interrogation today shows no events     Pre-operative cardiovascular examination  Given recent cardiac cath showing no CAD, there is no need to obtain cardiac testing at this point  The patient is cleared for upcoming surgical procedure with intermediate risk for cardiovascular event in light of AI and known CM  Nonrheumatic aortic valve insufficiency  Mild to at most moderate per exam and confirmed by echo   There may be a bileaflet valve by echo  Continued surveillance to follow         Additional Plan: Thank you for the consult    The patient is cleared for the surgery as scheduled  No Medication changes made or testing ordered today  Return visit will be in six months or earlier if there are problems  The patient is encouraged to call in the meantime if there are questions or concerns  Subjective: The patient is referred to the office for preoperative cardiac consultation regarding and up coming L-HARINI scheduled for 9-  She recently had a cardiac catheterization that showed no CAD  She has known CM with EF that is 45-50% on echo and a possible bileaflet aortic valve with mild to moderate AI  She has also recently had a CVA with residual cogitative deficits and left sided weakness   She has a implanted loop to determine if there is continue Afib  From a cardiac perspective, she is without complaints of chest pain or exertional dyspnea  She has no palpitations syncope or near syncope, and denies edema orthopnea or PND  She does not complain of TIA or CVA symptoms  Social History  Social History     Tobacco Use   Smoking Status Never Smoker   Smokeless Tobacco Never Used   ,   Social History     Substance and Sexual Activity   Alcohol Use Never    Frequency: Never    Comment: none   ,   Social History     Substance and Sexual Activity   Drug Use No    Comment: none     Family History   Problem Relation Age of Onset    Stroke Mother     Diabetes Mother     Heart disease Father     Diabetes Father     Hypertension Brother     Sudden death Brother     Hyperlipidemia Brother        Medical and Surgical History  Past Medical History:   Diagnosis Date    Bicuspid aortic valve     Dilated cardiomyopathy     Hyperlipidemia     Hypertension     [facility] fibrillation[facility])     PUD (peptic ulcer disease)     Stroke [facility] [facility]     Vitamin D deficiency      Past Surgical History:   Procedure Laterality Date    ABDOMINOPLASTY      revision gastric bypass    BREAST SURGERY      reduction    CARDIAC CATHETERIZATION  2020    Normal coronary arteries      CARDIAC LOOP RECORDER  2020    Medtronic Reveal LINQ      SECTION      CHOLECYSTECTOMY      COLONOSCOPY      GASTRIC BYPASS      HERNIA REPAIR      NEUROMA EXCISION Right 2019    Procedure: EXCISION 2ND INTERSPACE NEUROMA;  Surgeon: Hope Howell DPM;  Location: [address] [place] [address];  Service: Podiatry    ROTATOR CUFF REPAIR Left          Current Outpatient Medications:     amiodarone 200 mg tablet, Take 1 tablet (200 mg total) by mouth daily with breakfast, Disp: 90 tablet, Rfl: 2    apixaban (ELIQUIS) 2 5 mg, Take 1 tablet (2 5 mg total) by mouth 2 (two) times a day, Disp: 60 tablet, Rfl: 0    atorvastatin (LIPITOR) 40 mg tablet, Take 1 tablet (40 mg total) by mouth every evening, Disp: 30 tablet, Rfl: 0    carboxymethylcellulose 1 % ophthalmic solution, Apply 1 drop to eye 4 (four) times a day, Disp: , Rfl:     Cholecalciferol (VITAMIN D3) 1 25 MG (28023 UT) CAPS, Take 50,000 Units by mouth once a week, Disp: , Rfl:     dorzolamide-timolol (COSOPT) 22 3-6 8 MG/ML ophthalmic solution, Administer 1 drop to both eyes 2 (two) times a day, Disp: , Rfl:     ferrous sulfate 324 (65 Fe) mg, Take 1 tablet (324 mg total) by mouth daily before breakfast, Disp: 30 tablet, Rfl: 0    furosemide (LASIX) 40 mg tablet, Take 40 mg by mouth daily, Disp: , Rfl:     lisinopril (ZESTRIL) 5 mg tablet, Take 1 tablet (5 mg total) by mouth daily, Disp: 30 tablet, Rfl: 0    pantoprazole (PROTONIX) 40 mg tablet, Take 1 tablet (40 mg total) by mouth daily, Disp: 30 tablet, Rfl: 0    potassium chloride (K-DUR,KLOR-CON) 10 mEq tablet, Take 10 mEq by mouth daily, Disp: , Rfl:     traMADol (ULTRAM) 50 mg tablet, Take 50 mg by mouth every 6 (six) hours as needed for moderate pain, Disp: , Rfl:   Allergies   Allergen Reactions    Ibuprofen     Nsaids      Due to hx gastric bypass         Review of Systems   Constitution: Negative  HENT: Negative  Eyes: Negative  Cardiovascular: Negative  Negative for chest pain, claudication, cyanosis, dyspnea on exertion, irregular heartbeat, leg swelling, near-syncope, orthopnea, palpitations, paroxysmal nocturnal dyspnea and syncope  Respiratory: Negative  Negative for cough, hemoptysis, shortness of breath, sleep disturbances due to breathing, snoring, sputum production and wheezing  Endocrine: Negative  Hematologic/Lymphatic: Negative  Skin: Negative  Musculoskeletal: Negative  Gastrointestinal: Negative  Genitourinary: Negative  Neurological: Negative  Psychiatric/Behavioral: Negative  Allergic/Immunologic: Negative          Objective:   /80   Pulse 57   Ht 5' 3" (1 6 m)   Wt 105 kg (232 lb)   LMP  (LMP Unknown)   BMI 41 10 kg/m²   Physical Exam   Constitutional: She is oriented to person, place, and time  She appears well-developed and well-nourished  HENT:   Head: Normocephalic and atraumatic  Mouth/Throat: Oropharynx is clear and moist    Eyes: Conjunctivae and EOM are normal  No scleral icterus  Neck: Normal range of motion  Neck supple  No JVD present  No tracheal deviation present  Cardiovascular: Normal rate, regular rhythm, S1 normal, S2 normal and intact distal pulses  Exam reveals no gallop and no friction rub  Murmur heard  Diastolic murmur is present with a grade of 3/6 at the upper left sternal border  Loop in place mid-sternally    Pulmonary/Chest: Effort normal and breath sounds normal  No respiratory distress  She has no wheezes  She has no rales  She exhibits no tenderness  Bilateral mastectomy    Abdominal: Soft  Bowel sounds are normal  She exhibits no distension  There is no abdominal tenderness  Aorta not palpable    Musculoskeletal: Normal range of motion  General: No tenderness or edema  Neurological: She is alert and oriented to person, place, and time  Cognitive deficit   Easily cries   Left arm weakness   Skin: Skin is warm and dry  No rash noted  No erythema  No pallor  Psychiatric: She has a normal mood and affect  Her behavior is normal    Nursing note and vitals reviewed  Lab Review:   No results found for: CHOL  Lab Results   Component Value Date    HDL 44 01/18/2020     Lab Results   Component Value Date    LDLCALC 109 (H) 01/18/2020     Lab Results   Component Value Date    TRIG 93 01/18/2020     Results Reviewed     None        Results Reviewed     None        Results Reviewed     None          Recent Cardiovascular Testing:   Echo 6- LEFT VENTRICLE:  Systolic function was mildly reduced  Ejection fraction was estimated in the range of 45 % to 50 %   There were no regional wall motion abnormalities  There was mild concentric hypertrophy    AORTIC VALVE:The valve was likely bicuspid  There was very mild stenosis      Cath 3-9-2020 NO CAD    ECG Review:   Normal sinus rhythm   Non-specific ST changes

## 2020-08-17 NOTE — ASSESSMENT & PLAN NOTE
Mild to at most moderate per exam and confirmed by echo   There may be a bileaflet valve by echo  Continued surveillance to follow

## 2020-08-19 NOTE — PROGRESS NOTES
Device check in person loop recorder  A-fib on eliquis  No events  Normal battery function        Current Outpatient Medications:     amiodarone 200 mg tablet, Take 1 tablet (200 mg total) by mouth daily with breakfast, Disp: 90 tablet, Rfl: 2    apixaban (ELIQUIS) 2 5 mg, Take 1 tablet (2 5 mg total) by mouth 2 (two) times a day, Disp: 60 tablet, Rfl: 0    atorvastatin (LIPITOR) 40 mg tablet, Take 1 tablet (40 mg total) by mouth every evening, Disp: 30 tablet, Rfl: 0    carboxymethylcellulose 1 % ophthalmic solution, Apply 1 drop to eye 4 (four) times a day, Disp: , Rfl:     Cholecalciferol (VITAMIN D3) 1 25 MG (43128 UT) CAPS, Take 50,000 Units by mouth once a week, Disp: , Rfl:     dorzolamide-timolol (COSOPT) 22 3-6 8 MG/ML ophthalmic solution, Administer 1 drop to both eyes 2 (two) times a day, Disp: , Rfl:     ferrous sulfate 324 (65 Fe) mg, Take 1 tablet (324 mg total) by mouth daily before breakfast, Disp: 30 tablet, Rfl: 0    furosemide (LASIX) 40 mg tablet, Take 40 mg by mouth daily, Disp: , Rfl:     lisinopril (ZESTRIL) 5 mg tablet, Take 1 tablet (5 mg total) by mouth daily, Disp: 30 tablet, Rfl: 0    pantoprazole (PROTONIX) 40 mg tablet, Take 1 tablet (40 mg total) by mouth daily, Disp: 30 tablet, Rfl: 0    potassium chloride (K-DUR,KLOR-CON) 10 mEq tablet, Take 10 mEq by mouth daily, Disp: , Rfl:     traMADol (ULTRAM) 50 mg tablet, Take 50 mg by mouth every 6 (six) hours as needed for moderate pain, Disp: , Rfl:

## 2020-09-17 ENCOUNTER — REMOTE DEVICE CLINIC VISIT (OUTPATIENT)
Dept: CARDIOLOGY CLINIC | Facility: CLINIC | Age: 65
End: 2020-09-17
Payer: COMMERCIAL

## 2020-09-17 DIAGNOSIS — Z95.818 STATUS POST PLACEMENT OF IMPLANTABLE LOOP RECORDER: ICD-10-CM

## 2020-09-17 DIAGNOSIS — Z86.73 PERSONAL HISTORY OF TRANSIENT ISCHEMIC ATTACK: Primary | ICD-10-CM

## 2020-09-17 PROCEDURE — 93298 REM INTERROG DEV EVAL SCRMS: CPT | Performed by: INTERNAL MEDICINE

## 2020-09-17 PROCEDURE — G2066 INTER DEVC REMOTE 30D: HCPCS | Performed by: INTERNAL MEDICINE

## 2020-09-22 NOTE — PROGRESS NOTES
[facility] remote check loop  A-fib on eliquis  Normal battery function      Current Outpatient Medications:     amiodarone 200 mg tablet, Take 1 tablet (200 mg total) by mouth daily with breakfast, Disp: 90 tablet, Rfl: 2    apixaban (ELIQUIS) 2 5 mg, Take 1 tablet (2 5 mg total) by mouth 2 (two) times a day, Disp: 60 tablet, Rfl: 0    atorvastatin (LIPITOR) 40 mg tablet, Take 1 tablet (40 mg total) by mouth every evening, Disp: 30 tablet, Rfl: 0    carboxymethylcellulose 1 % ophthalmic solution, Apply 1 drop to eye 4 (four) times a day, Disp: , Rfl:     Cholecalciferol (VITAMIN D3) 1 25 MG (77651 UT) CAPS, Take 50,000 Units by mouth once a week, Disp: , Rfl:     dorzolamide-timolol (COSOPT) 22 3-6 8 MG/ML ophthalmic solution, Administer 1 drop to both eyes 2 (two) times a day, Disp: , Rfl:     ferrous sulfate 324 (65 Fe) mg, Take 1 tablet (324 mg total) by mouth daily before breakfast, Disp: 30 tablet, Rfl: 0    furosemide (LASIX) 40 mg tablet, Take 40 mg by mouth daily, Disp: , Rfl:     lisinopril (ZESTRIL) 5 mg tablet, Take 1 tablet (5 mg total) by mouth daily, Disp: 30 tablet, Rfl: 0    pantoprazole (PROTONIX) 40 mg tablet, Take 1 tablet (40 mg total) by mouth daily, Disp: 30 tablet, Rfl: 0    potassium chloride (K-DUR,KLOR-CON) 10 mEq tablet, Take 10 mEq by mouth daily, Disp: , Rfl:     traMADol (ULTRAM) 50 mg tablet, Take 50 mg by mouth every 6 (six) hours as needed for moderate pain, Disp: , Rfl:

## 2020-10-15 ENCOUNTER — EVALUATION (OUTPATIENT)
Dept: PHYSICAL THERAPY | Facility: CLINIC | Age: 65
End: 2020-10-15
Payer: MEDICARE

## 2020-10-15 DIAGNOSIS — Z96.642 HISTORY OF LEFT HIP REPLACEMENT: Primary | ICD-10-CM

## 2020-10-15 PROCEDURE — 97535 SELF CARE MNGMENT TRAINING: CPT | Performed by: PHYSICAL THERAPIST

## 2020-10-15 PROCEDURE — 97110 THERAPEUTIC EXERCISES: CPT | Performed by: PHYSICAL THERAPIST

## 2020-10-15 PROCEDURE — 97161 PT EVAL LOW COMPLEX 20 MIN: CPT | Performed by: PHYSICAL THERAPIST

## 2020-10-16 ENCOUNTER — TRANSCRIBE ORDERS (OUTPATIENT)
Dept: PHYSICAL THERAPY | Facility: CLINIC | Age: 65
End: 2020-10-16

## 2020-10-16 DIAGNOSIS — Z96.642 PRESENCE OF LEFT ARTIFICIAL HIP JOINT: Primary | ICD-10-CM

## 2020-10-19 ENCOUNTER — OFFICE VISIT (OUTPATIENT)
Dept: PHYSICAL THERAPY | Facility: CLINIC | Age: 65
End: 2020-10-19
Payer: MEDICARE

## 2020-10-19 DIAGNOSIS — Z96.642 HISTORY OF LEFT HIP REPLACEMENT: Primary | ICD-10-CM

## 2020-10-19 PROCEDURE — 97140 MANUAL THERAPY 1/> REGIONS: CPT | Performed by: PHYSICAL THERAPIST

## 2020-10-19 PROCEDURE — 97110 THERAPEUTIC EXERCISES: CPT | Performed by: PHYSICAL THERAPIST

## 2020-10-21 ENCOUNTER — OFFICE VISIT (OUTPATIENT)
Dept: PHYSICAL THERAPY | Facility: CLINIC | Age: 65
End: 2020-10-21
Payer: MEDICARE

## 2020-10-21 DIAGNOSIS — Z96.642 HISTORY OF LEFT HIP REPLACEMENT: Primary | ICD-10-CM

## 2020-10-21 PROCEDURE — 97140 MANUAL THERAPY 1/> REGIONS: CPT | Performed by: PHYSICAL THERAPIST

## 2020-10-21 PROCEDURE — 97110 THERAPEUTIC EXERCISES: CPT | Performed by: PHYSICAL THERAPIST

## 2020-10-26 ENCOUNTER — OFFICE VISIT (OUTPATIENT)
Dept: PHYSICAL THERAPY | Facility: CLINIC | Age: 65
End: 2020-10-26
Payer: MEDICARE

## 2020-10-26 DIAGNOSIS — Z96.642 HISTORY OF LEFT HIP REPLACEMENT: Primary | ICD-10-CM

## 2020-10-26 PROCEDURE — 97140 MANUAL THERAPY 1/> REGIONS: CPT

## 2020-10-26 PROCEDURE — 97110 THERAPEUTIC EXERCISES: CPT

## 2020-10-28 ENCOUNTER — OFFICE VISIT (OUTPATIENT)
Dept: PHYSICAL THERAPY | Facility: CLINIC | Age: 65
End: 2020-10-28
Payer: MEDICARE

## 2020-10-28 DIAGNOSIS — Z96.642 HISTORY OF LEFT HIP REPLACEMENT: Primary | ICD-10-CM

## 2020-10-28 PROCEDURE — 97140 MANUAL THERAPY 1/> REGIONS: CPT

## 2020-10-28 PROCEDURE — 97110 THERAPEUTIC EXERCISES: CPT

## 2020-11-02 ENCOUNTER — OFFICE VISIT (OUTPATIENT)
Dept: PHYSICAL THERAPY | Facility: CLINIC | Age: 65
End: 2020-11-02
Payer: MEDICARE

## 2020-11-02 DIAGNOSIS — Z96.642 HISTORY OF LEFT HIP REPLACEMENT: Primary | ICD-10-CM

## 2020-11-02 PROCEDURE — 97110 THERAPEUTIC EXERCISES: CPT

## 2020-11-02 PROCEDURE — 97140 MANUAL THERAPY 1/> REGIONS: CPT

## 2020-11-04 ENCOUNTER — OFFICE VISIT (OUTPATIENT)
Dept: PHYSICAL THERAPY | Facility: CLINIC | Age: 65
End: 2020-11-04
Payer: MEDICARE

## 2020-11-04 DIAGNOSIS — Z96.642 HISTORY OF LEFT HIP REPLACEMENT: Primary | ICD-10-CM

## 2020-11-04 PROCEDURE — 97110 THERAPEUTIC EXERCISES: CPT

## 2020-11-04 PROCEDURE — 97140 MANUAL THERAPY 1/> REGIONS: CPT

## 2020-11-06 ENCOUNTER — OFFICE VISIT (OUTPATIENT)
Dept: PHYSICAL THERAPY | Facility: CLINIC | Age: 65
End: 2020-11-06
Payer: MEDICARE

## 2020-11-06 DIAGNOSIS — Z96.642 HISTORY OF LEFT HIP REPLACEMENT: Primary | ICD-10-CM

## 2020-11-06 PROCEDURE — 97140 MANUAL THERAPY 1/> REGIONS: CPT

## 2020-11-06 PROCEDURE — 97110 THERAPEUTIC EXERCISES: CPT

## 2020-11-09 ENCOUNTER — OFFICE VISIT (OUTPATIENT)
Dept: PHYSICAL THERAPY | Facility: CLINIC | Age: 65
End: 2020-11-09
Payer: MEDICARE

## 2020-11-09 DIAGNOSIS — Z96.642 HISTORY OF LEFT HIP REPLACEMENT: Primary | ICD-10-CM

## 2020-11-09 PROCEDURE — 97110 THERAPEUTIC EXERCISES: CPT

## 2020-11-09 PROCEDURE — 97140 MANUAL THERAPY 1/> REGIONS: CPT

## 2020-11-11 ENCOUNTER — OFFICE VISIT (OUTPATIENT)
Dept: PHYSICAL THERAPY | Facility: CLINIC | Age: 65
End: 2020-11-11
Payer: MEDICARE

## 2020-11-11 DIAGNOSIS — Z96.642 HISTORY OF LEFT HIP REPLACEMENT: Primary | ICD-10-CM

## 2020-11-11 PROCEDURE — 97140 MANUAL THERAPY 1/> REGIONS: CPT

## 2020-11-11 PROCEDURE — 97110 THERAPEUTIC EXERCISES: CPT

## 2020-11-13 ENCOUNTER — EVALUATION (OUTPATIENT)
Dept: PHYSICAL THERAPY | Facility: CLINIC | Age: 65
End: 2020-11-13
Payer: MEDICARE

## 2020-11-13 DIAGNOSIS — Z96.642 HISTORY OF LEFT HIP REPLACEMENT: Primary | ICD-10-CM

## 2020-11-13 PROCEDURE — 97110 THERAPEUTIC EXERCISES: CPT

## 2020-11-16 ENCOUNTER — OFFICE VISIT (OUTPATIENT)
Dept: PHYSICAL THERAPY | Facility: CLINIC | Age: 65
End: 2020-11-16
Payer: MEDICARE

## 2020-11-16 DIAGNOSIS — Z96.642 HISTORY OF LEFT HIP REPLACEMENT: Primary | ICD-10-CM

## 2020-11-16 PROCEDURE — 97140 MANUAL THERAPY 1/> REGIONS: CPT

## 2020-11-16 PROCEDURE — 97110 THERAPEUTIC EXERCISES: CPT

## 2020-11-18 ENCOUNTER — OFFICE VISIT (OUTPATIENT)
Dept: PHYSICAL THERAPY | Facility: CLINIC | Age: 65
End: 2020-11-18
Payer: MEDICARE

## 2020-11-18 DIAGNOSIS — Z96.642 HISTORY OF LEFT HIP REPLACEMENT: Primary | ICD-10-CM

## 2020-11-18 PROCEDURE — 97140 MANUAL THERAPY 1/> REGIONS: CPT

## 2020-11-18 PROCEDURE — 97110 THERAPEUTIC EXERCISES: CPT

## 2020-11-20 ENCOUNTER — APPOINTMENT (OUTPATIENT)
Dept: PHYSICAL THERAPY | Facility: CLINIC | Age: 65
End: 2020-11-20
Payer: MEDICARE

## 2020-11-23 ENCOUNTER — OFFICE VISIT (OUTPATIENT)
Dept: PHYSICAL THERAPY | Facility: CLINIC | Age: 65
End: 2020-11-23
Payer: MEDICARE

## 2020-11-23 DIAGNOSIS — Z96.642 HISTORY OF LEFT HIP REPLACEMENT: Primary | ICD-10-CM

## 2020-11-23 PROCEDURE — 97140 MANUAL THERAPY 1/> REGIONS: CPT

## 2020-11-23 PROCEDURE — 97110 THERAPEUTIC EXERCISES: CPT

## 2020-11-25 ENCOUNTER — OFFICE VISIT (OUTPATIENT)
Dept: PHYSICAL THERAPY | Facility: CLINIC | Age: 65
End: 2020-11-25
Payer: MEDICARE

## 2020-11-25 DIAGNOSIS — Z96.642 HISTORY OF LEFT HIP REPLACEMENT: Primary | ICD-10-CM

## 2020-11-25 PROCEDURE — 97110 THERAPEUTIC EXERCISES: CPT

## 2020-11-25 PROCEDURE — 97140 MANUAL THERAPY 1/> REGIONS: CPT

## 2020-11-27 ENCOUNTER — OFFICE VISIT (OUTPATIENT)
Dept: PHYSICAL THERAPY | Facility: CLINIC | Age: 65
End: 2020-11-27
Payer: MEDICARE

## 2020-11-27 DIAGNOSIS — Z96.642 HISTORY OF LEFT HIP REPLACEMENT: Primary | ICD-10-CM

## 2020-11-27 PROCEDURE — 97140 MANUAL THERAPY 1/> REGIONS: CPT

## 2020-11-27 PROCEDURE — 97110 THERAPEUTIC EXERCISES: CPT

## 2020-12-02 ENCOUNTER — OFFICE VISIT (OUTPATIENT)
Dept: PHYSICAL THERAPY | Facility: CLINIC | Age: 65
End: 2020-12-02
Payer: MEDICARE

## 2020-12-02 DIAGNOSIS — Z96.642 HISTORY OF LEFT HIP REPLACEMENT: Primary | ICD-10-CM

## 2020-12-02 PROCEDURE — 97110 THERAPEUTIC EXERCISES: CPT

## 2020-12-04 ENCOUNTER — OFFICE VISIT (OUTPATIENT)
Dept: PHYSICAL THERAPY | Facility: CLINIC | Age: 65
End: 2020-12-04
Payer: MEDICARE

## 2020-12-04 DIAGNOSIS — Z96.642 HISTORY OF LEFT HIP REPLACEMENT: Primary | ICD-10-CM

## 2020-12-04 PROCEDURE — 97140 MANUAL THERAPY 1/> REGIONS: CPT

## 2020-12-04 PROCEDURE — 97110 THERAPEUTIC EXERCISES: CPT

## 2020-12-07 ENCOUNTER — OFFICE VISIT (OUTPATIENT)
Dept: PHYSICAL THERAPY | Facility: CLINIC | Age: 65
End: 2020-12-07
Payer: MEDICARE

## 2020-12-07 DIAGNOSIS — Z96.642 HISTORY OF LEFT HIP REPLACEMENT: Primary | ICD-10-CM

## 2020-12-07 PROCEDURE — 97110 THERAPEUTIC EXERCISES: CPT

## 2020-12-07 PROCEDURE — 97140 MANUAL THERAPY 1/> REGIONS: CPT

## 2020-12-09 ENCOUNTER — OFFICE VISIT (OUTPATIENT)
Dept: PHYSICAL THERAPY | Facility: CLINIC | Age: 65
End: 2020-12-09
Payer: MEDICARE

## 2020-12-09 DIAGNOSIS — Z96.642 HISTORY OF LEFT HIP REPLACEMENT: Primary | ICD-10-CM

## 2020-12-09 PROCEDURE — 97140 MANUAL THERAPY 1/> REGIONS: CPT

## 2020-12-09 PROCEDURE — 97110 THERAPEUTIC EXERCISES: CPT

## 2020-12-11 ENCOUNTER — OFFICE VISIT (OUTPATIENT)
Dept: PHYSICAL THERAPY | Facility: CLINIC | Age: 65
End: 2020-12-11
Payer: MEDICARE

## 2020-12-11 DIAGNOSIS — Z96.642 HISTORY OF LEFT HIP REPLACEMENT: Primary | ICD-10-CM

## 2020-12-11 PROCEDURE — 97140 MANUAL THERAPY 1/> REGIONS: CPT

## 2020-12-11 PROCEDURE — 97110 THERAPEUTIC EXERCISES: CPT

## 2020-12-13 ENCOUNTER — OFFICE VISIT (OUTPATIENT)
Dept: URGENT CARE | Facility: CLINIC | Age: 65
End: 2020-12-13
Payer: MEDICARE

## 2020-12-13 VITALS
HEIGHT: 63 IN | WEIGHT: 232 LBS | BODY MASS INDEX: 41.11 KG/M2 | RESPIRATION RATE: 18 BRPM | OXYGEN SATURATION: 97 % | TEMPERATURE: 97 F | HEART RATE: 86 BPM

## 2020-12-13 DIAGNOSIS — J06.9 ACUTE URI: Primary | ICD-10-CM

## 2020-12-13 DIAGNOSIS — J01.90 ACUTE SINUSITIS, RECURRENCE NOT SPECIFIED, UNSPECIFIED LOCATION: ICD-10-CM

## 2020-12-13 PROCEDURE — 99213 OFFICE O/P EST LOW 20 MIN: CPT | Performed by: PHYSICIAN ASSISTANT

## 2020-12-13 PROCEDURE — G0463 HOSPITAL OUTPT CLINIC VISIT: HCPCS | Performed by: PHYSICIAN ASSISTANT

## 2020-12-13 PROCEDURE — U0003 INFECTIOUS AGENT DETECTION BY NUCLEIC ACID (DNA OR RNA); SEVERE ACUTE RESPIRATORY SYNDROME CORONAVIRUS 2 (SARS-COV-2) (CORONAVIRUS DISEASE [COVID-19]), AMPLIFIED PROBE TECHNIQUE, MAKING USE OF HIGH THROUGHPUT TECHNOLOGIES AS DESCRIBED BY CMS-2020-01-R: HCPCS | Performed by: PHYSICIAN ASSISTANT

## 2020-12-13 RX ORDER — AMOXICILLIN 875 MG/1
875 TABLET, COATED ORAL 2 TIMES DAILY
Qty: 20 TABLET | Refills: 0 | Status: SHIPPED | OUTPATIENT
Start: 2020-12-13 | End: 2020-12-23

## 2020-12-14 ENCOUNTER — OFFICE VISIT (OUTPATIENT)
Dept: PHYSICAL THERAPY | Facility: CLINIC | Age: 65
End: 2020-12-14
Payer: MEDICARE

## 2020-12-14 DIAGNOSIS — Z96.642 HISTORY OF LEFT HIP REPLACEMENT: Primary | ICD-10-CM

## 2020-12-14 PROCEDURE — 97140 MANUAL THERAPY 1/> REGIONS: CPT

## 2020-12-14 PROCEDURE — 97110 THERAPEUTIC EXERCISES: CPT

## 2020-12-15 ENCOUNTER — TELEPHONE (OUTPATIENT)
Dept: URGENT CARE | Facility: CLINIC | Age: 65
End: 2020-12-15

## 2020-12-15 LAB — SARS-COV-2 RNA SPEC QL NAA+PROBE: DETECTED

## 2020-12-16 ENCOUNTER — APPOINTMENT (OUTPATIENT)
Dept: PHYSICAL THERAPY | Facility: CLINIC | Age: 65
End: 2020-12-16
Payer: MEDICARE

## 2020-12-17 ENCOUNTER — REMOTE DEVICE CLINIC VISIT (OUTPATIENT)
Dept: CARDIOLOGY CLINIC | Facility: CLINIC | Age: 65
End: 2020-12-17
Payer: MEDICARE

## 2020-12-17 DIAGNOSIS — Z95.818 STATUS POST PLACEMENT OF IMPLANTABLE LOOP RECORDER: ICD-10-CM

## 2020-12-17 DIAGNOSIS — Z86.73 PERSONAL HISTORY OF TRANSIENT ISCHEMIC ATTACK: Primary | ICD-10-CM

## 2020-12-17 PROCEDURE — G2066 INTER DEVC REMOTE 30D: HCPCS | Performed by: INTERNAL MEDICINE

## 2020-12-17 PROCEDURE — 93298 REM INTERROG DEV EVAL SCRMS: CPT | Performed by: INTERNAL MEDICINE

## 2020-12-19 ENCOUNTER — APPOINTMENT (OUTPATIENT)
Dept: RADIOLOGY | Facility: CLINIC | Age: 65
End: 2020-12-19
Payer: MEDICARE

## 2020-12-19 ENCOUNTER — OFFICE VISIT (OUTPATIENT)
Dept: URGENT CARE | Facility: CLINIC | Age: 65
End: 2020-12-19
Payer: MEDICARE

## 2020-12-19 VITALS
HEIGHT: 63 IN | HEART RATE: 67 BPM | BODY MASS INDEX: 41.11 KG/M2 | OXYGEN SATURATION: 99 % | WEIGHT: 232 LBS | TEMPERATURE: 97.4 F | RESPIRATION RATE: 20 BRPM

## 2020-12-19 DIAGNOSIS — R07.89 CHEST TIGHTNESS: ICD-10-CM

## 2020-12-19 DIAGNOSIS — M62.838 TRAPEZIUS MUSCLE SPASM: ICD-10-CM

## 2020-12-19 DIAGNOSIS — U07.1 LAB TEST POSITIVE FOR DETECTION OF COVID-19 VIRUS: Primary | ICD-10-CM

## 2020-12-19 DIAGNOSIS — U07.1 LAB TEST POSITIVE FOR DETECTION OF COVID-19 VIRUS: ICD-10-CM

## 2020-12-19 PROCEDURE — 71046 X-RAY EXAM CHEST 2 VIEWS: CPT

## 2020-12-19 PROCEDURE — 99213 OFFICE O/P EST LOW 20 MIN: CPT | Performed by: NURSE PRACTITIONER

## 2020-12-19 PROCEDURE — G0463 HOSPITAL OUTPT CLINIC VISIT: HCPCS | Performed by: NURSE PRACTITIONER

## 2020-12-19 RX ORDER — ALBUTEROL SULFATE 90 UG/1
2 AEROSOL, METERED RESPIRATORY (INHALATION) EVERY 4 HOURS PRN
Qty: 18 G | Refills: 1 | Status: SHIPPED | OUTPATIENT
Start: 2020-12-19 | End: 2021-02-06

## 2020-12-19 RX ORDER — METHOCARBAMOL 500 MG/1
500 TABLET, FILM COATED ORAL EVERY 6 HOURS PRN
Qty: 30 TABLET | Refills: 0 | Status: SHIPPED | OUTPATIENT
Start: 2020-12-19

## 2020-12-19 RX ORDER — METHOCARBAMOL 500 MG/1
500 TABLET, FILM COATED ORAL EVERY 6 HOURS PRN
COMMUNITY
Start: 2020-10-07 | End: 2020-12-19 | Stop reason: SDUPTHER

## 2020-12-19 RX ORDER — METRONIDAZOLE 500 MG/1
TABLET ORAL
COMMUNITY
Start: 2020-11-09

## 2020-12-19 RX ORDER — ERGOCALCIFEROL 1.25 MG/1
50000 CAPSULE ORAL WEEKLY
COMMUNITY
Start: 2020-11-26

## 2020-12-19 RX ORDER — HYDROCORTISONE 25 MG/G
CREAM TOPICAL
COMMUNITY
Start: 2020-10-21

## 2020-12-21 ENCOUNTER — APPOINTMENT (OUTPATIENT)
Dept: PHYSICAL THERAPY | Facility: CLINIC | Age: 65
End: 2020-12-21
Payer: MEDICARE

## 2020-12-23 ENCOUNTER — APPOINTMENT (OUTPATIENT)
Dept: PHYSICAL THERAPY | Facility: CLINIC | Age: 65
End: 2020-12-23
Payer: MEDICARE

## 2020-12-28 ENCOUNTER — APPOINTMENT (OUTPATIENT)
Dept: PHYSICAL THERAPY | Facility: CLINIC | Age: 65
End: 2020-12-28
Payer: MEDICARE

## 2020-12-30 ENCOUNTER — APPOINTMENT (OUTPATIENT)
Dept: PHYSICAL THERAPY | Facility: CLINIC | Age: 65
End: 2020-12-30
Payer: MEDICARE

## 2021-01-07 NOTE — PROGRESS NOTES
[facility] remote check loop recorder  A-fib on eliquis  Normal battery function        Current Outpatient Medications:     albuterol (Ventolin HFA) 90 mcg/act inhaler, Inhale 2 puffs every 4 (four) hours as needed for wheezing or shortness of breath, Disp: 18 g, Rfl: 1    amiodarone 200 mg tablet, Take 1 tablet (200 mg total) by mouth daily with breakfast, Disp: 90 tablet, Rfl: 2    apixaban (ELIQUIS) 2 5 mg, Take 1 tablet (2 5 mg total) by mouth 2 (two) times a day, Disp: 60 tablet, Rfl: 0    atorvastatin (LIPITOR) 40 mg tablet, Take 1 tablet (40 mg total) by mouth every evening, Disp: 30 tablet, Rfl: 0    carboxymethylcellulose 1 % ophthalmic solution, Apply 1 drop to eye 4 (four) times a day, Disp: , Rfl:     Cholecalciferol (VITAMIN D3) 1 25 MG (58884 UT) CAPS, Take 50,000 Units by mouth once a week, Disp: , Rfl:     dorzolamide-timolol (COSOPT) 22 3-6 8 MG/ML ophthalmic solution, Administer 1 drop to both eyes 2 (two) times a day, Disp: , Rfl:     ergocalciferol (VITAMIN D2) 50,000 units, Take 50,000 Units by mouth once a week, Disp: , Rfl:     ferrous sulfate 324 (65 Fe) mg, Take 1 tablet (324 mg total) by mouth daily before breakfast, Disp: 30 tablet, Rfl: 0    furosemide (LASIX) 40 mg tablet, Take 40 mg by mouth daily, Disp: , Rfl:     lisinopril (ZESTRIL) 5 mg tablet, Take 1 tablet (5 mg total) by mouth daily, Disp: 30 tablet, Rfl: 0    methocarbamol (ROBAXIN) 500 mg tablet, Take 1 tablet (500 mg total) by mouth every 6 (six) hours as needed for muscle spasms, Disp: 30 tablet, Rfl: 0    metroNIDAZOLE (FLAGYL) 500 mg tablet, take 1 tablet by mouth twice a day for 7 days, Disp: , Rfl:     pantoprazole (PROTONIX) 40 mg tablet, Take 1 tablet (40 mg total) by mouth daily, Disp: 30 tablet, Rfl: 0    potassium chloride (K-DUR,KLOR-CON) 10 mEq tablet, Take 10 mEq by mouth daily, Disp: , Rfl:     Procto-Med HC 2 5 % rectal cream, USE TWICE PER DAY AS NEEDED FOR HEMORRHOID SWELLING, Disp: , Rfl:    traMADol (ULTRAM) 50 mg tablet, Take 50 mg by mouth every 6 (six) hours as needed for moderate pain, Disp: , Rfl:

## 2021-02-04 DIAGNOSIS — R07.89 CHEST TIGHTNESS: ICD-10-CM

## 2021-02-04 DIAGNOSIS — U07.1 LAB TEST POSITIVE FOR DETECTION OF COVID-19 VIRUS: ICD-10-CM

## 2021-02-06 RX ORDER — ALBUTEROL SULFATE 90 UG/1
AEROSOL, METERED RESPIRATORY (INHALATION)
Qty: 18 G | Refills: 1 | Status: SHIPPED | OUTPATIENT
Start: 2021-02-06

## 2021-03-01 ENCOUNTER — CONSULT (OUTPATIENT)
Dept: CARDIOLOGY CLINIC | Facility: CLINIC | Age: 66
End: 2021-03-01
Payer: MEDICARE

## 2021-03-01 VITALS
HEIGHT: 63 IN | SYSTOLIC BLOOD PRESSURE: 126 MMHG | DIASTOLIC BLOOD PRESSURE: 80 MMHG | WEIGHT: 231.7 LBS | BODY MASS INDEX: 41.05 KG/M2

## 2021-03-01 DIAGNOSIS — I48.91 A-FIB (HCC): ICD-10-CM

## 2021-03-01 DIAGNOSIS — Z01.810 PREPROCEDURAL CARDIOVASCULAR EXAMINATION: Primary | ICD-10-CM

## 2021-03-01 DIAGNOSIS — Z01.810 PRE-OPERATIVE CARDIOVASCULAR EXAMINATION: ICD-10-CM

## 2021-03-01 DIAGNOSIS — I48.0 PAF (PAROXYSMAL ATRIAL FIBRILLATION) (HCC): ICD-10-CM

## 2021-03-01 PROCEDURE — 93000 ELECTROCARDIOGRAM COMPLETE: CPT | Performed by: INTERNAL MEDICINE

## 2021-03-01 PROCEDURE — 99214 OFFICE O/P EST MOD 30 MIN: CPT | Performed by: INTERNAL MEDICINE

## 2021-03-01 RX ORDER — AMIODARONE HYDROCHLORIDE 200 MG/1
TABLET ORAL
Qty: 90 TABLET | Refills: 2 | Status: SHIPPED | OUTPATIENT
Start: 2021-03-01 | End: 2021-03-01 | Stop reason: DRUGHIGH

## 2021-03-01 NOTE — ASSESSMENT & PLAN NOTE
I see no problem with proceeding with cataract surgery in the near term and possibly knee surgery as well with holding apixaban 2 days prior to these surgeries

## 2021-03-01 NOTE — ASSESSMENT & PLAN NOTE
No recent symptomatic spells  She still has a loop recorder as she had prior stroke  Will continue apixaban  For now stop amiodarone

## 2021-03-01 NOTE — PROGRESS NOTES
Patient ID: Charly Tello is a 63 y o  female  Plan:      Cardiomyopathy [facility])  Improving and near normal   Will reassess in 1 year  No current symptoms  PAF (paroxysmal atrial fibrillation) ([facility])  No recent symptomatic spells  She still has a loop recorder as she had prior stroke  Will continue apixaban  For now stop amiodarone  Pre-operative cardiovascular examination  I see no problem with proceeding with cataract surgery in the near term and possibly knee surgery as well with holding apixaban 2 days prior to these surgeries  Follow up Plan/Other summary comments:  One year echo, EKG, and follow-up visit  HPI:  Patient is seen today preoperatively for cataract surgery  No recent chest pain or chest pressure  No episodes of atrial fibrillation since her loop recorder was placed  To reiterate, she had cryptogenic stroke  Heart catheterization revealed the absence of CAD  Loop recorder has been placed  She has remained on amiodarone and Eliquis  Results for orders placed or performed in visit on 21   POCT ECG    Impression    NSR at 55/minute  Left axis  Otherwise WNL  Most recent or relevant cardiac/vascular testing:    Echo 06/10/2020:  EF 45-50% which represents improvement  Past Surgical History:   Procedure Laterality Date    ABDOMINOPLASTY      revision gastric bypass    BREAST SURGERY      reduction    CARDIAC CATHETERIZATION  2020    Normal coronary arteries      CARDIAC LOOP RECORDER  2020    Medtronic Reveal LINQ      SECTION      CHOLECYSTECTOMY      COLONOSCOPY      GASTRIC BYPASS      HERNIA REPAIR      NEUROMA EXCISION Right 2019    Procedure: EXCISION 2ND INTERSPACE NEUROMA;  Surgeon: Sim Meyer DPM;  Location: [address] [place] [address];  Service: Podiatry    ROTATOR CUFF REPAIR Left      CMP:   Lab Results   Component Value Date    K 4 2 2020     2020    CO2 25 2020    BUN 14 2020 CREATININE 1 01 07/08/2020    EGFR 60 07/08/2020       Lipid Profile:   Lab Results   Component Value Date    TRIG 93 01/18/2020    HDL 44 01/18/2020         Review of Systems   10  point ROS  was otherwise non pertinent or negative except as per HPI or as below  Gait:  Slow  Objective:     /80   Ht 5' 3" (1 6 m)   Wt 105 kg (231 lb 11 3 oz)   LMP  (LMP Unknown)   BMI 41 04 kg/m²     PHYSICAL EXAM:    General:  Normal appearance in no distress  Eyes:  Anicteric  Oral mucosa:  Moist   Neck:  No JVD  Carotid upstrokes are brisk without bruits  No masses  Chest:  Clear to auscultation and percussion  Cardiac:  No palpable PMI  Normal S1 and S2  No murmur gallop or rub  Abdomen:  Soft and nontender  No palpable organomegaly or aortic enlargement  Extremities:  No peripheral edema  Musculoskeletal:  Symmetric  Vascular:  Femoral pulses are brisk without bruits  Popliteal pulses are intact bilaterally  Pedal pulses are intact  Neuro:  Grossly symmetric  Psych:  Alert and oriented x3          Current Outpatient Medications:     albuterol (PROVENTIL HFA,VENTOLIN HFA) 90 mcg/act inhaler, inhale 2 puffs every 4 hours if needed for wheezing or shortness of breath, Disp: 18 g, Rfl: 1    apixaban (ELIQUIS) 2 5 mg, Take 1 tablet (2 5 mg total) by mouth 2 (two) times a day, Disp: 60 tablet, Rfl: 0    atorvastatin (LIPITOR) 40 mg tablet, Take 1 tablet (40 mg total) by mouth every evening, Disp: 30 tablet, Rfl: 0    carboxymethylcellulose 1 % ophthalmic solution, Apply 1 drop to eye 4 (four) times a day, Disp: , Rfl:     Cholecalciferol (VITAMIN D3) 1 25 MG (50466 UT) CAPS, Take 50,000 Units by mouth once a week, Disp: , Rfl:     dorzolamide-timolol (COSOPT) 22 3-6 8 MG/ML ophthalmic solution, Administer 1 drop to both eyes 2 (two) times a day, Disp: , Rfl:     ergocalciferol (VITAMIN D2) 50,000 units, Take 50,000 Units by mouth once a week, Disp: , Rfl:     ferrous sulfate 324 (65 Fe) mg, Take 1 tablet (324 mg total) by mouth daily before breakfast, Disp: 30 tablet, Rfl: 0    furosemide (LASIX) 40 mg tablet, Take 40 mg by mouth daily, Disp: , Rfl:     lisinopril (ZESTRIL) 5 mg tablet, Take 1 tablet (5 mg total) by mouth daily, Disp: 30 tablet, Rfl: 0    methocarbamol (ROBAXIN) 500 mg tablet, Take 1 tablet (500 mg total) by mouth every 6 (six) hours as needed for muscle spasms, Disp: 30 tablet, Rfl: 0    pantoprazole (PROTONIX) 40 mg tablet, Take 1 tablet (40 mg total) by mouth daily, Disp: 30 tablet, Rfl: 0    potassium chloride (K-DUR,KLOR-CON) 10 mEq tablet, Take 10 mEq by mouth daily, Disp: , Rfl:     Procto-Med HC 2 5 % rectal cream, USE TWICE PER DAY AS NEEDED FOR HEMORRHOID SWELLING, Disp: , Rfl:     traMADol (ULTRAM) 50 mg tablet, Take 50 mg by mouth every 6 (six) hours as needed for moderate pain, Disp: , Rfl:     metroNIDAZOLE (FLAGYL) 500 mg tablet, take 1 tablet by mouth twice a day for 7 days, Disp: , Rfl:   Allergies   Allergen Reactions    Ibuprofen     Nsaids      Due to hx gastric bypass       Past Medical History:   Diagnosis Date    Bicuspid aortic valve     Dilated cardiomyopathy     Hyperlipidemia     Hypertension     [facility]     PUD (peptic ulcer disease)     Stroke ([facility] [address] )     Vitamin D deficiency            Social History     Tobacco Use   Smoking Status Never Smoker   Smokeless Tobacco Never Used

## 2021-03-10 DIAGNOSIS — Z23 ENCOUNTER FOR IMMUNIZATION: ICD-10-CM

## 2021-03-15 ENCOUNTER — HOSPITAL ENCOUNTER (EMERGENCY)
Facility: HOSPITAL | Age: 66
Discharge: HOME | End: 2021-03-15
Attending: EMERGENCY MEDICINE
Payer: MEDICARE

## 2021-03-15 VITALS
BODY MASS INDEX: 40.93 KG/M2 | WEIGHT: 231 LBS | SYSTOLIC BLOOD PRESSURE: 155 MMHG | HEART RATE: 72 BPM | RESPIRATION RATE: 20 BRPM | HEIGHT: 63 IN | TEMPERATURE: 97.4 F | DIASTOLIC BLOOD PRESSURE: 94 MMHG | OXYGEN SATURATION: 97 %

## 2021-03-15 DIAGNOSIS — S80.819A LEG ABRASION: Primary | ICD-10-CM

## 2021-03-15 PROCEDURE — 99283 EMERGENCY DEPT VISIT LOW MDM: CPT

## 2021-03-15 PROCEDURE — 99284 EMERGENCY DEPT VISIT MOD MDM: CPT | Performed by: EMERGENCY MEDICINE

## 2021-03-18 ENCOUNTER — REMOTE DEVICE CLINIC VISIT (OUTPATIENT)
Dept: CARDIOLOGY CLINIC | Facility: CLINIC | Age: 66
End: 2021-03-18

## 2021-03-18 DIAGNOSIS — Z95.818 STATUS POST PLACEMENT OF IMPLANTABLE LOOP RECORDER: ICD-10-CM

## 2021-03-18 DIAGNOSIS — Z86.73 PERSONAL HISTORY OF TRANSIENT ISCHEMIC ATTACK: Primary | ICD-10-CM

## 2021-03-20 ENCOUNTER — OFFICE VISIT (OUTPATIENT)
Dept: URGENT CARE | Facility: CLINIC | Age: 66
End: 2021-03-20
Payer: MEDICARE

## 2021-03-20 VITALS
DIASTOLIC BLOOD PRESSURE: 63 MMHG | HEART RATE: 60 BPM | TEMPERATURE: 97.5 F | WEIGHT: 231 LBS | SYSTOLIC BLOOD PRESSURE: 128 MMHG | BODY MASS INDEX: 40.93 KG/M2 | HEIGHT: 63 IN | RESPIRATION RATE: 18 BRPM | OXYGEN SATURATION: 96 %

## 2021-03-20 DIAGNOSIS — S81.801A WOUND OF RIGHT LOWER EXTREMITY, INITIAL ENCOUNTER: Primary | ICD-10-CM

## 2021-03-20 PROCEDURE — 99213 OFFICE O/P EST LOW 20 MIN: CPT | Performed by: PHYSICIAN ASSISTANT

## 2021-03-20 PROCEDURE — G0463 HOSPITAL OUTPT CLINIC VISIT: HCPCS | Performed by: PHYSICIAN ASSISTANT

## 2021-03-20 NOTE — PROGRESS NOTES
[facility]        NAME: Frank Espinal is a 63 y o  female  : 1955    MRN: [id number]  DATE: 2021  TIME: 2:06 PM    Assessment and Plan   Wound of right lower extremity, initial encounter [S81 801A]  1  Wound of right lower extremity, initial encounter           Patient Instructions   Patient Instructions     Acute Wound Care   AMBULATORY CARE:   An acute wound  is an injury that causes a break in the skin  An acute wound can happen suddenly, last a short time, and may heal on its own  Common signs and symptoms of an acute wound:   · A cut, tear, or gash in your skin    · Bleeding, swelling, pain, or trouble moving the affected area    · Dirt or foreign objects inside the wound     · Milky, yellow, green, or brown pus in the wound     · Red, tender, or warm area around the pus    · Fever  Seek care immediately if:   · You have pus or a foul odor coming from the wound  · You have sudden trouble breathing or chest pain  · Blood soaks through your bandage  Contact your healthcare provider if:   · You have muscle, joint, or body aches, sweating, or a fever  · You have more swelling, redness, or bleeding in your wound  · Your skin is itchy, swollen, or you have a rash  · You have questions or concerns about your condition or care  Treatment for an acute wound  may include any of the following:  · Cleansing  is done with soap and water to wash away germs and decrease the risk of infection  Sterile water further cleans the wound  The cleaning is done under high pressure with a catheter tip and large syringe  A solution that kills germs may also be used  · Debridement  is done to clean and remove objects, dirt, or dead tissues from the open wound  Healthcare providers may also drain the wound to clean out pus  · Closure of the wound  is done with stitches, staples, skin adhesive, or other treatments  This may be done if the wound is wide or deep   Stitches may be needed if the wound is in an area that moves a lot, such as the hands, feet, and joints  Stitches may help to keep the wound from getting infected  They may also decrease the amount of scarring you have  Some wounds may heal better without stitches  Wound care:   · If your wound was closed with thin strips of medical tape, keep them clean and dry  The strips of medical tape will fall off on their own  Do not pull them off  · Keep the bandage clean and dry  Do not remove the bandage over your wound unless your healthcare provider says it is okay  · Wash your hands before and after you take care of your wound to prevent infection  · Clean the wound as directed  If you cannot reach the wound, have someone help you  · If you have packing, make sure all the gauze used to pack the wound is taken out and replaced as directed  Keep track of how many gauze dressings are placed inside the wound  Follow up with your healthcare provider as directed:  Write down your questions so you remember to ask them during your visits  © 2016 [employer] is for End User's use only and may not be sold, redistributed or otherwise used for commercial purposes  All illustrations and images included in CareNotes® are the copyrighted property of [employer]  or Ashkan Paez  The above information is an  only  It is not intended as medical advice for individual conditions or treatments  Talk to your doctor, nurse or pharmacist before following any medical regimen to see if it is safe and effective for you  Follow up with PCP in 3-5 days  Proceed to  ER if symptoms worsen  Chief Complaint     Chief Complaint   Patient presents with    Leg Pain     right leg         History of Present Illness       Patient presents with a wound on the right lower leg from 5 days ago  Patient is concerned that it is getting infected as there is some redness around it    She states that it is feeling better overall  She denies drainage, fevers, fatigue, muscle aches, body aches or streaking  Review of Systems   Review of Systems   Constitutional: Negative for fatigue and fever  Skin: Positive for rash and wound  Neurological: Negative for weakness  Psychiatric/Behavioral: Negative for confusion           Current Medications       Current Outpatient Medications:     albuterol (PROVENTIL HFA,VENTOLIN HFA) 90 mcg/act inhaler, inhale 2 puffs every 4 hours if needed for wheezing or shortness of breath, Disp: 18 g, Rfl: 1    apixaban (ELIQUIS) 2 5 mg, Take 1 tablet (2 5 mg total) by mouth 2 (two) times a day, Disp: 60 tablet, Rfl: 0    atorvastatin (LIPITOR) 40 mg tablet, Take 1 tablet (40 mg total) by mouth every evening, Disp: 30 tablet, Rfl: 0    carboxymethylcellulose 1 % ophthalmic solution, Apply 1 drop to eye 4 (four) times a day, Disp: , Rfl:     Cholecalciferol (VITAMIN D3) 1 25 MG (78771 UT) CAPS, Take 50,000 Units by mouth once a week, Disp: , Rfl:     dorzolamide-timolol (COSOPT) 22 3-6 8 MG/ML ophthalmic solution, Administer 1 drop to both eyes 2 (two) times a day, Disp: , Rfl:     ergocalciferol (VITAMIN D2) 50,000 units, Take 50,000 Units by mouth once a week, Disp: , Rfl:     ferrous sulfate 324 (65 Fe) mg, Take 1 tablet (324 mg total) by mouth daily before breakfast, Disp: 30 tablet, Rfl: 0    furosemide (LASIX) 40 mg tablet, Take 40 mg by mouth daily, Disp: , Rfl:     lisinopril (ZESTRIL) 5 mg tablet, Take 1 tablet (5 mg total) by mouth daily, Disp: 30 tablet, Rfl: 0    methocarbamol (ROBAXIN) 500 mg tablet, Take 1 tablet (500 mg total) by mouth every 6 (six) hours as needed for muscle spasms, Disp: 30 tablet, Rfl: 0    metroNIDAZOLE (FLAGYL) 500 mg tablet, take 1 tablet by mouth twice a day for 7 days, Disp: , Rfl:     pantoprazole (PROTONIX) 40 mg tablet, Take 1 tablet (40 mg total) by mouth daily, Disp: 30 tablet, Rfl: 0    potassium chloride (K-DUR,KLOR-CON) 10 mEq tablet, Take 10 mEq by mouth daily, Disp: , Rfl:     Procto-Med HC 2 5 % rectal cream, USE TWICE PER DAY AS NEEDED FOR HEMORRHOID SWELLING, Disp: , Rfl:     traMADol (ULTRAM) 50 mg tablet, Take 50 mg by mouth every 6 (six) hours as needed for moderate pain, Disp: , Rfl:     Current Allergies     Allergies as of 2021 - Reviewed 2021   Allergen Reaction Noted    Ibuprofen  2020    Nsaids  2019            The following portions of the patient's history were reviewed and updated as appropriate: allergies, current medications, past family history, past medical history, past social history, past surgical history and problem list      Past Medical History:   Diagnosis Date    Bicuspid aortic valve     Dilated cardiomyopathy     Hyperlipidemia     Hypertension     [facility]ox[facility] [address] [facility]     PUD (peptic ulcer disease)     Stroke ([facility] )     Vitamin D deficiency        Past Surgical History:   Procedure Laterality Date    ABDOMINOPLASTY      revision gastric bypass    BREAST SURGERY      reduction    CARDIAC CATHETERIZATION  2020    Normal coronary arteries   CARDIAC LOOP RECORDER  2020    [employer]      SECTION      CHOLECYSTECTOMY      COLONOSCOPY      GASTRIC BYPASS      HERNIA REPAIR      NEUROMA EXCISION Right 2019    Procedure: EXCISION 2ND INTERSPACE NEUROMA;  Surgeon: Vania Sheikh DPM;  Location: [address] [place] [address];  Service: Podiatry    ROTATOR CUFF REPAIR Left        Family History   Problem Relation Age of Onset    Stroke Mother     Diabetes Mother     Heart disease Father     Diabetes Father     Hypertension Brother     Sudden death Brother     Hyperlipidemia Brother          Medications have been verified          Objective   /63   Pulse 60   Temp 97 5 °F (36 4 °C)   Resp 18   Ht 5' 3" (1 6 m)   Wt 105 kg (231 lb)   LMP  (LMP Unknown)   SpO2 96%   BMI 40 92 kg/m² Physical Exam     Physical Exam  Constitutional:       Appearance: Normal appearance  Musculoskeletal: Normal range of motion  General: Tenderness (mild) present  No swelling  Skin:     Capillary Refill: Capillary refill takes less than 2 seconds  Findings: Bruising present  Comments: Medial side of right shin there is two ~1cm wounds with surrounding ecchymosis  There is mild erythema around the wound edges  No warmth, drainaged, surrounding erythema, or fluctuance noted  Neurological:      Mental Status: She is alert     Psychiatric:         Mood and Affect: Mood normal          Behavior: Behavior normal

## 2021-03-20 NOTE — PATIENT INSTRUCTIONS
Acute Wound Care   AMBULATORY CARE:   An acute wound  is an injury that causes a break in the skin  An acute wound can happen suddenly, last a short time, and may heal on its own  Common signs and symptoms of an acute wound:   · A cut, tear, or gash in your skin    · Bleeding, swelling, pain, or trouble moving the affected area    · Dirt or foreign objects inside the wound     · Milky, yellow, green, or brown pus in the wound     · Red, tender, or warm area around the pus    · Fever  Seek care immediately if:   · You have pus or a foul odor coming from the wound  · You have sudden trouble breathing or chest pain  · Blood soaks through your bandage  Contact your healthcare provider if:   · You have muscle, joint, or body aches, sweating, or a fever  · You have more swelling, redness, or bleeding in your wound  · Your skin is itchy, swollen, or you have a rash  · You have questions or concerns about your condition or care  Treatment for an acute wound  may include any of the following:  · Cleansing  is done with soap and water to wash away germs and decrease the risk of infection  Sterile water further cleans the wound  The cleaning is done under high pressure with a catheter tip and large syringe  A solution that kills germs may also be used  · Debridement  is done to clean and remove objects, dirt, or dead tissues from the open wound  Healthcare providers may also drain the wound to clean out pus  · Closure of the wound  is done with stitches, staples, skin adhesive, or other treatments  This may be done if the wound is wide or deep  Stitches may be needed if the wound is in an area that moves a lot, such as the hands, feet, and joints  Stitches may help to keep the wound from getting infected  They may also decrease the amount of scarring you have  Some wounds may heal better without stitches    Wound care:   · If your wound was closed with thin strips of medical tape, keep them clean and dry  The strips of medical tape will fall off on their own  Do not pull them off  · Keep the bandage clean and dry  Do not remove the bandage over your wound unless your healthcare provider says it is okay  · Wash your hands before and after you take care of your wound to prevent infection  · Clean the wound as directed  If you cannot reach the wound, have someone help you  · If you have packing, make sure all the gauze used to pack the wound is taken out and replaced as directed  Keep track of how many gauze dressings are placed inside the wound  Follow up with your healthcare provider as directed:  Write down your questions so you remember to ask them during your visits  © 2016 [address] [employer] is for End User's use only and may not be sold, redistributed or otherwise used for commercial purposes  All illustrations and images included in CareNotes® are the copyrighted property of [employer]  or Ashkan Paez  The above information is an  only  It is not intended as medical advice for individual conditions or treatments  Talk to your doctor, nurse or pharmacist before following any medical regimen to see if it is safe and effective for you

## 2021-04-08 NOTE — PROGRESS NOTES
[facility] remote check loop recorder  A-fib on eliquis  Normal battery function        Current Outpatient Medications:     albuterol (PROVENTIL HFA,VENTOLIN HFA) 90 mcg/act inhaler, inhale 2 puffs every 4 hours if needed for wheezing or shortness of breath, Disp: 18 g, Rfl: 1    apixaban (ELIQUIS) 2 5 mg, Take 1 tablet (2 5 mg total) by mouth 2 (two) times a day, Disp: 60 tablet, Rfl: 0    atorvastatin (LIPITOR) 40 mg tablet, Take 1 tablet (40 mg total) by mouth every evening, Disp: 30 tablet, Rfl: 0    carboxymethylcellulose 1 % ophthalmic solution, Apply 1 drop to eye 4 (four) times a day, Disp: , Rfl:     Cholecalciferol (VITAMIN D3) 1 25 MG (10752 UT) CAPS, Take 50,000 Units by mouth once a week, Disp: , Rfl:     dorzolamide-timolol (COSOPT) 22 3-6 8 MG/ML ophthalmic solution, Administer 1 drop to both eyes 2 (two) times a day, Disp: , Rfl:     ergocalciferol (VITAMIN D2) 50,000 units, Take 50,000 Units by mouth once a week, Disp: , Rfl:     ferrous sulfate 324 (65 Fe) mg, Take 1 tablet (324 mg total) by mouth daily before breakfast, Disp: 30 tablet, Rfl: 0    furosemide (LASIX) 40 mg tablet, Take 40 mg by mouth daily, Disp: , Rfl:     lisinopril (ZESTRIL) 5 mg tablet, Take 1 tablet (5 mg total) by mouth daily, Disp: 30 tablet, Rfl: 0    methocarbamol (ROBAXIN) 500 mg tablet, Take 1 tablet (500 mg total) by mouth every 6 (six) hours as needed for muscle spasms, Disp: 30 tablet, Rfl: 0    metroNIDAZOLE (FLAGYL) 500 mg tablet, take 1 tablet by mouth twice a day for 7 days, Disp: , Rfl:     pantoprazole (PROTONIX) 40 mg tablet, Take 1 tablet (40 mg total) by mouth daily, Disp: 30 tablet, Rfl: 0    potassium chloride (K-DUR,KLOR-CON) 10 mEq tablet, Take 10 mEq by mouth daily, Disp: , Rfl:     Procto-Med HC 2 5 % rectal cream, USE TWICE PER DAY AS NEEDED FOR HEMORRHOID SWELLING, Disp: , Rfl:     traMADol (ULTRAM) 50 mg tablet, Take 50 mg by mouth every 6 (six) hours as needed for moderate pain, Disp: , Rfl:

## 2021-04-20 ENCOUNTER — HOSPITAL ENCOUNTER (EMERGENCY)
Facility: HOSPITAL | Age: 66
Discharge: HOME | End: 2021-04-21
Attending: EMERGENCY MEDICINE | Admitting: EMERGENCY MEDICINE
Payer: MEDICARE

## 2021-04-20 ENCOUNTER — APPOINTMENT (EMERGENCY)
Dept: RADIOLOGY | Facility: HOSPITAL | Age: 66
End: 2021-04-20
Payer: MEDICARE

## 2021-04-20 DIAGNOSIS — M25.561 CHRONIC PAIN OF RIGHT KNEE: Primary | ICD-10-CM

## 2021-04-20 DIAGNOSIS — G89.29 CHRONIC PAIN OF RIGHT KNEE: Primary | ICD-10-CM

## 2021-04-20 PROCEDURE — 99284 EMERGENCY DEPT VISIT MOD MDM: CPT

## 2021-04-20 PROCEDURE — 73590 X-RAY EXAM OF LOWER LEG: CPT

## 2021-04-20 RX ORDER — ACETAMINOPHEN 325 MG/1
650 TABLET ORAL ONCE
Status: COMPLETED | OUTPATIENT
Start: 2021-04-20 | End: 2021-04-20

## 2021-04-20 RX ORDER — ACETAMINOPHEN 325 MG/1
650 TABLET ORAL EVERY 6 HOURS PRN
Qty: 30 TABLET | Refills: 0 | Status: SHIPPED | OUTPATIENT
Start: 2021-04-20

## 2021-04-20 RX ADMIN — ACETAMINOPHEN 650 MG: 325 TABLET ORAL at 22:22

## 2021-04-21 VITALS
BODY MASS INDEX: 41.79 KG/M2 | HEART RATE: 80 BPM | OXYGEN SATURATION: 98 % | SYSTOLIC BLOOD PRESSURE: 136 MMHG | TEMPERATURE: 98.6 F | DIASTOLIC BLOOD PRESSURE: 72 MMHG | RESPIRATION RATE: 16 BRPM | WEIGHT: 235.89 LBS

## 2021-04-21 PROCEDURE — 99284 EMERGENCY DEPT VISIT MOD MDM: CPT | Performed by: EMERGENCY MEDICINE

## 2021-04-21 NOTE — ED PROVIDER NOTES
History  Chief Complaint   Patient presents with    Knee Pain     right knee pain     This is a 60-year-old female complains of acute on chronic right knee pain  Patient states she has had pain in this knee for years  She is scheduled for knee replacement in approximately 1 month through orthopedics  She states that there has been no new trauma to the area, no no fevers or chills  She states that now feels more like a crunching sensation in hurts more  She explains is more on the lower aspect of the knee closer to the inferior tibial plateau  Patient notes pain is severe  She has not contacted her orthopedist about it  She has a cane but not crutches  Prior to Admission Medications   Prescriptions Last Dose Informant Patient Reported? Taking?    Cholecalciferol (VITAMIN D3) 1 25 MG (31606 UT) CAPS   Yes Yes   Sig: Take 50,000 Units by mouth once a week   Procto-Med HC 2 5 % rectal cream   Yes No   Sig: USE TWICE PER DAY AS NEEDED FOR HEMORRHOID SWELLING   albuterol (PROVENTIL HFA,VENTOLIN HFA) 90 mcg/act inhaler   No No   Sig: inhale 2 puffs every 4 hours if needed for wheezing or shortness of breath   apixaban (ELIQUIS) 2 5 mg  Self No Yes   Sig: Take 1 tablet (2 5 mg total) by mouth 2 (two) times a day   atorvastatin (LIPITOR) 40 mg tablet  Self No Yes   Sig: Take 1 tablet (40 mg total) by mouth every evening   carboxymethylcellulose 1 % ophthalmic solution   Yes Yes   Sig: Apply 1 drop to eye 4 (four) times a day   dorzolamide-timolol (COSOPT) 22 3-6 8 MG/ML ophthalmic solution  Self Yes Yes   Sig: Administer 1 drop to both eyes 2 (two) times a day   ergocalciferol (VITAMIN D2) 50,000 units   Yes Yes   Sig: Take 50,000 Units by mouth once a week   ferrous sulfate 324 (65 Fe) mg  Self No Yes   Sig: Take 1 tablet (324 mg total) by mouth daily before breakfast   furosemide (LASIX) 40 mg tablet   Yes Yes   Sig: Take 40 mg by mouth daily   lisinopril (ZESTRIL) 5 mg tablet  Self No Yes   Sig: Take 1 tablet (5 mg total) by mouth daily   methocarbamol (ROBAXIN) 500 mg tablet   No No   Sig: Take 1 tablet (500 mg total) by mouth every 6 (six) hours as needed for muscle spasms   metroNIDAZOLE (FLAGYL) 500 mg tablet   Yes No   Sig: take 1 tablet by mouth twice a day for 7 days   pantoprazole (PROTONIX) 40 mg tablet  Self No Yes   Sig: Take 1 tablet (40 mg total) by mouth daily   potassium chloride (K-DUR,KLOR-CON) 10 mEq tablet   Yes Yes   Sig: Take 10 mEq by mouth daily   traMADol (ULTRAM) 50 mg tablet  Self Yes No   Sig: Take 50 mg by mouth every 6 (six) hours as needed for moderate pain      Facility-Administered Medications: None       Past Medical History:   Diagnosis Date    Bicuspid aortic valve     Dilated cardiomyopathy     Hyperlipidemia     Hypertension     [facility]     PUD (peptic ulcer disease)     Stroke ([facility] [address] )     Vitamin D deficiency        Past Surgical History:   Procedure Laterality Date    ABDOMINOPLASTY      revision gastric bypass    BREAST SURGERY      reduction    CARDIAC CATHETERIZATION  2020    Normal coronary arteries   CARDIAC LOOP RECORDER  2020    [employer]      SECTION      CHOLECYSTECTOMY      COLONOSCOPY      GASTRIC BYPASS      HERNIA REPAIR      NEUROMA EXCISION Right 2019    Procedure: EXCISION 2ND INTERSPACE NEUROMA;  Surgeon: Inder Blas DPM;  Location: [address] [place] [address] OR;  Service: Podiatry    ROTATOR CUFF REPAIR Left        Family History   Problem Relation Age of Onset    Stroke Mother     Diabetes Mother     Heart disease Father     Diabetes Father     Hypertension Brother     Sudden death Brother     Hyperlipidemia Brother      I have reviewed and agree with the history as documented      E-Cigarette/Vaping     E-Cigarette/Vaping Substances     Social History     Tobacco Use    Smoking status: Never Smoker    Smokeless tobacco: Never Used   Substance Use Topics    Alcohol use: Never Frequency: Never     Comment: none    Drug use: No     Comment: none       Review of Systems   Constitutional: Negative for activity change, chills, fatigue and fever  HENT: Negative for congestion  Eyes: Negative for visual disturbance  Respiratory: Negative for cough, chest tightness and shortness of breath  Cardiovascular: Negative for chest pain  Gastrointestinal: Negative for abdominal pain, diarrhea and vomiting  Genitourinary: Negative for dysuria  Skin: Negative for rash  Neurological: Negative for dizziness, weakness and numbness  Physical Exam  Physical Exam  Constitutional:       General: She is not in acute distress  Appearance: She is well-developed  She is not ill-appearing, toxic-appearing or diaphoretic  HENT:      Head: Normocephalic and atraumatic  Eyes:      Conjunctiva/sclera: Conjunctivae normal       Pupils: Pupils are equal, round, and reactive to light  Neck:      Musculoskeletal: Normal range of motion and neck supple  Cardiovascular:      Rate and Rhythm: Normal rate and regular rhythm  Heart sounds: Normal heart sounds  Pulmonary:      Effort: Pulmonary effort is normal  No respiratory distress  Breath sounds: Normal breath sounds  Abdominal:      General: Bowel sounds are normal       Palpations: Abdomen is soft  Musculoskeletal: Normal range of motion  General: Tenderness (Mild over the superior tibia) present  No deformity or signs of injury  Comments: No pain to palpation of the hip or ankle  Full range of motion in those areas  Skin:     General: Skin is warm and dry  Capillary Refill: Capillary refill takes less than 2 seconds  Neurological:      Mental Status: She is alert and oriented to person, place, and time  Psychiatric:      Comments: Infantile behavior  Patient complains about blankets, bed, clothing, and television           Vital Signs  ED Triage Vitals   Temperature Pulse Respirations Blood Pressure SpO2   04/20/21 2133 04/20/21 2133 04/20/21 2133 04/20/21 2133 04/20/21 2133   98 6 °F (37 °C) (!) 53 16 126/74 97 %      Temp Source Heart Rate Source Patient Position - Orthostatic VS BP Location FiO2 (%)   04/20/21 2134 04/20/21 2134 04/20/21 2134 04/20/21 2134 --   Temporal Monitor Sitting Right arm       Pain Score       04/20/21 2134       4           Vitals:    04/20/21 2133 04/20/21 2134 04/21/21 0045   BP: 126/74 135/76 136/72   Pulse: (!) 53 85 80   Patient Position - Orthostatic VS:  Sitting          Visual Acuity      ED Medications  Medications   acetaminophen (TYLENOL) tablet 650 mg (650 mg Oral Given 4/20/21 2222)       Diagnostic Studies  Results Reviewed     None                 XR tibia fibula 2 views RIGHT   ED Interpretation by Mary Andrews MD (04/20 2201)   naf      Final Result by Brandon Ugalde DO (04/21 8972)      No acute osseous abnormality  Extensive edema in the region of Hoffa's fat pad  Given the history of knee pain, recommend dedicated views of the knee  If there is concern for internal derangement, MRI would be better for complete evaluation  The study was marked in [facility] for immediate notification  Workstation performed: [id number]                    Procedures  Procedures         ED Course                             SBIRT 20yo+      Most Recent Value   SBIRT (24 yo +)   In order to provide better care to our patients, we are screening all of our patients for alcohol and drug use  Would it be okay to ask you these screening questions? Yes Filed at: 04/20/2021 2140   Initial Alcohol Screen: US AUDIT-C    1  How often do you have a drink containing alcohol?  0 Filed at: 04/20/2021 2140   2  How many drinks containing alcohol do you have on a typical day you are drinking? 0 Filed at: 04/20/2021 2140   3a  Male UNDER 65: How often do you have five or more drinks on one occasion? 0 Filed at: 04/20/2021 2140   3b  FEMALE Any Age, or MALE 65+:  How often do you have 4 or more drinks on one occassion? 0 Filed at: 04/20/2021 2140   Audit-C Score  0 Filed at: 04/20/2021 2140   REMY: How many times in the past year have you    Used an illegal drug or used a prescription medication for non-medical reasons? Never Filed at: 04/20/2021 2140                    [facility]  Number of Diagnoses or Management Options  Chronic pain of right knee: new and requires workup  Diagnosis management comments: This is a 29-year-old female with acute on chronic pain of the right knee  Patient already has close orthopedic follow-up and is awaiting surgery  X-rays demonstrate no acute findings on my preliminary read  Patient given a walker and Tylenol  Discussed warning signs and symptoms with the patient as well as when to return to the emergency department versus follow up with [facility]  Patient states understanding and agreement with the plan  This note was completed using dictation software  Amount and/or Complexity of Data Reviewed  Tests in the radiology section of CPT®: ordered and reviewed        Disposition  Final diagnoses:   Chronic pain of right knee     Time reflects when diagnosis was documented in both MDM as applicable and the Disposition within this note     Time User Action Codes Description Comment    4/20/2021  9:47 PM Carey Estrada Add [G72 208,  G89 29] Chronic pain of right knee       ED Disposition     ED Disposition Condition Date/Time Comment    Discharge Stable Tue Apr 20, 2021  9:47 PM Ilah Barthel discharge to home/self care              Follow-up Information     Follow up With Specialties Details Why Mustapha Bundy 13, DO Family Medicine In 1 day  [facility] [address]  [phone]            Discharge Medication List as of 4/20/2021  9:47 PM      CONTINUE these medications which have NOT CHANGED    Details   apixaban (ELIQUIS) 2 5 mg Take 1 tablet (2 5 mg total) by mouth 2 (two) times a day, Starting Sun 2/9/2020, Normal      atorvastatin (LIPITOR) 40 mg tablet Take 1 tablet (40 mg total) by mouth every evening, Starting Wed 2/5/2020, Normal      carboxymethylcellulose 1 % ophthalmic solution Apply 1 drop to eye 4 (four) times a day, Starting Fri 5/22/2020, Until Sat 5/22/2021, Historical Med      Cholecalciferol (VITAMIN D3) 1 25 MG (46303 UT) CAPS Take 50,000 Units by mouth once a week, Historical Med      dorzolamide-timolol (COSOPT) 22 3-6 8 MG/ML ophthalmic solution Administer 1 drop to both eyes 2 (two) times a day, Historical Med      ergocalciferol (VITAMIN D2) 50,000 units Take 50,000 Units by mouth once a week, Starting Thu 11/26/2020, Historical Med      ferrous sulfate 324 (65 Fe) mg Take 1 tablet (324 mg total) by mouth daily before breakfast, Starting Sun 2/9/2020, Normal      furosemide (LASIX) 40 mg tablet Take 40 mg by mouth daily, Historical Med      lisinopril (ZESTRIL) 5 mg tablet Take 1 tablet (5 mg total) by mouth daily, Starting Sun 2/9/2020, Normal      pantoprazole (PROTONIX) 40 mg tablet Take 1 tablet (40 mg total) by mouth daily, Starting Sun 2/9/2020, Normal      potassium chloride (K-DUR,KLOR-CON) 10 mEq tablet Take 10 mEq by mouth daily, Historical Med      albuterol (PROVENTIL HFA,VENTOLIN HFA) 90 mcg/act inhaler inhale 2 puffs every 4 hours if needed for wheezing or shortness of breath, Normal      methocarbamol (ROBAXIN) 500 mg tablet Take 1 tablet (500 mg total) by mouth every 6 (six) hours as needed for muscle spasms, Starting Sat 12/19/2020, Normal      metroNIDAZOLE (FLAGYL) 500 mg tablet take 1 tablet by mouth twice a day for 7 days, Historical Med      Procto-Med HC 2 5 % rectal cream USE TWICE PER DAY AS NEEDED FOR HEMORRHOID SWELLING, Historical Med      traMADol (ULTRAM) 50 mg tablet Take 50 mg by mouth every 6 (six) hours as needed for moderate pain, Historical Med           No discharge procedures on file      PDMP Review     None          ED Provider  Electronically Signed by           Erich Cintron MD  04/22/21 4431

## 2021-04-28 DIAGNOSIS — I48.19 PERSISTENT ATRIAL FIBRILLATION (HCC): ICD-10-CM

## 2021-04-28 NOTE — TELEPHONE ENCOUNTER
A physician from [address] [place] [address] called in regards to Isaías Oppenheim  They were going over her meds and labs and wanted to know if their is a reason she is on Eliquis 2 5 mg twice daily and not 5 mg twice daily   She said she would change it to 5 mg twice daily but wanted to check with Cardio first     [phone]

## 2021-04-29 NOTE — TELEPHONE ENCOUNTER
Dr Amado Haque from [address] [facility] called again today  She needs a direct okay for patient to be changed to Eliquis 5 mg bid from Eliquis 2 5 mg bid, which she has been on for quite some time  They do not know why she is not taking the 5 mg bid  She is currently in [id number] [facility] for rehab

## 2021-06-18 ENCOUNTER — REMOTE DEVICE CLINIC VISIT (OUTPATIENT)
Dept: CARDIOLOGY CLINIC | Facility: CLINIC | Age: 66
End: 2021-06-18
Payer: MEDICARE

## 2021-06-18 DIAGNOSIS — Z86.73 PERSONAL HISTORY OF TRANSIENT ISCHEMIC ATTACK: Primary | ICD-10-CM

## 2021-06-18 DIAGNOSIS — Z95.818 STATUS POST PLACEMENT OF IMPLANTABLE LOOP RECORDER: ICD-10-CM

## 2021-06-18 PROCEDURE — 93298 REM INTERROG DEV EVAL SCRMS: CPT | Performed by: INTERNAL MEDICINE

## 2021-07-16 NOTE — PROGRESS NOTES
[facility]  No events  Normal battery function        Current Outpatient Medications:     acetaminophen (TYLENOL) 325 mg tablet, Take 2 tablets (650 mg total) by mouth every 6 (six) hours as needed for mild pain, Disp: 30 tablet, Rfl: 0    albuterol (PROVENTIL HFA,VENTOLIN HFA) 90 mcg/act inhaler, inhale 2 puffs every 4 hours if needed for wheezing or shortness of breath, Disp: 18 g, Rfl: 1    apixaban (ELIQUIS) 5 mg, Take 1 tablet (5 mg total) by mouth 2 (two) times a day, Disp: 60 tablet, Rfl: 5    atorvastatin (LIPITOR) 40 mg tablet, Take 1 tablet (40 mg total) by mouth every evening, Disp: 30 tablet, Rfl: 0    carboxymethylcellulose 1 % ophthalmic solution, Apply 1 drop to eye 4 (four) times a day, Disp: , Rfl:     Cholecalciferol (VITAMIN D3) 1 25 MG (97955 UT) CAPS, Take 50,000 Units by mouth once a week, Disp: , Rfl:     dorzolamide-timolol (COSOPT) 22 3-6 8 MG/ML ophthalmic solution, Administer 1 drop to both eyes 2 (two) times a day, Disp: , Rfl:     ergocalciferol (VITAMIN D2) 50,000 units, Take 50,000 Units by mouth once a week, Disp: , Rfl:     ferrous sulfate 324 (65 Fe) mg, Take 1 tablet (324 mg total) by mouth daily before breakfast, Disp: 30 tablet, Rfl: 0    furosemide (LASIX) 40 mg tablet, Take 40 mg by mouth daily, Disp: , Rfl:     lisinopril (ZESTRIL) 5 mg tablet, Take 1 tablet (5 mg total) by mouth daily, Disp: 30 tablet, Rfl: 0    methocarbamol (ROBAXIN) 500 mg tablet, Take 1 tablet (500 mg total) by mouth every 6 (six) hours as needed for muscle spasms, Disp: 30 tablet, Rfl: 0    metroNIDAZOLE (FLAGYL) 500 mg tablet, take 1 tablet by mouth twice a day for 7 days, Disp: , Rfl:     pantoprazole (PROTONIX) 40 mg tablet, Take 1 tablet (40 mg total) by mouth daily, Disp: 30 tablet, Rfl: 0    potassium chloride (K-DUR,KLOR-CON) 10 mEq tablet, Take 10 mEq by mouth daily, Disp: , Rfl:     Procto-Med HC 2 5 % rectal cream, USE TWICE PER DAY AS NEEDED FOR HEMORRHOID SWELLING, Disp: , Rfl:     traMADol (ULTRAM) 50 mg tablet, Take 50 mg by mouth every 6 (six) hours as needed for moderate pain, Disp: , Rfl:

## 2021-07-21 ENCOUNTER — OFFICE VISIT (OUTPATIENT)
Dept: UROLOGY | Facility: CLINIC | Age: 66
End: 2021-07-21
Payer: MEDICARE

## 2021-07-21 VITALS
HEART RATE: 64 BPM | SYSTOLIC BLOOD PRESSURE: 130 MMHG | WEIGHT: 219 LBS | BODY MASS INDEX: 38.8 KG/M2 | DIASTOLIC BLOOD PRESSURE: 80 MMHG | HEIGHT: 63 IN

## 2021-07-21 DIAGNOSIS — I42.9 CARDIOMYOPATHY, UNSPECIFIED TYPE (HCC): ICD-10-CM

## 2021-07-21 DIAGNOSIS — N36.2 URETHRAL CARUNCLE: Primary | ICD-10-CM

## 2021-07-21 PROCEDURE — 99204 OFFICE O/P NEW MOD 45 MIN: CPT | Performed by: UROLOGY

## 2021-07-21 RX ORDER — ESTRADIOL 0.1 MG/G
0.5 CREAM VAGINAL AS NEEDED
Qty: 15 G | Refills: 1 | Status: SHIPPED | OUTPATIENT
Start: 2021-07-21

## 2021-07-21 NOTE — PROGRESS NOTES
Assessment/Plan:    Urethral caruncle  This is the source of the blood she sees when she wipes herself and is quite sensitive on physical exam and no doubt sore at other times  These lesions are benign and do not require treatment unless they become excessively painful or bleed too much  Estrace vaginal cream in minimal topical doses is helpful also  Diagnoses and all orders for this visit:    Urethral caruncle  -     estradiol (ESTRACE) 0 1 mg/g vaginal cream; Insert 0 5 g into the vagina as needed (Pain in urinary tract)    Cardiomyopathy, unspecified type (HCC)          Subjective:      Patient ID: Beatriz Conway is a 63 y o  female  HPI  As a result of a recent stroke, the patient is emotionally very labile and cries with little provocation  Urethral caruncle: On April 23, 2021 pt noted some pain with urination and some blood from her vagina  Pt has been seen by GYN already  Pt reports malodorous urine twice in the last year  No urine sample today  The following portions of the patient's history were reviewed and updated as appropriate: allergies, current medications, past family history, past medical history, past social history, past surgical history and problem list     Review of Systems   Constitutional: Negative for activity change and fatigue  Respiratory: Negative for shortness of breath and wheezing  Cardiovascular: Negative for chest pain  Hypertension  Cardiac loop recorder  Gastrointestinal: Negative for abdominal pain  Genitourinary: Negative for difficulty urinating, dysuria, frequency, hematuria and urgency  Musculoskeletal: Negative for back pain and gait problem  History of hip replacement  History of right knee tendon rupture  Skin: Negative  Allergic/Immunologic: Negative  Neurological: Negative  Stroke in Jan 2020  As result, the patient is emotionally very labile and cries very easily     Psychiatric/Behavioral: Negative  Objective:      /80   Pulse 64   Ht 5' 3" (1 6 m)   Wt 99 3 kg (219 lb)   LMP  (LMP Unknown)   BMI 38 79 kg/m²          Physical Exam  Constitutional:       Appearance: She is well-developed  HENT:      Head: Normocephalic and atraumatic  Pulmonary:      Effort: Pulmonary effort is normal    Genitourinary:     Comments: There is a prominent urethral discharge which is the source of the patient's urethral sensitivity  Vaginal exam not performed  The patient was recently seen by her gynecologist   Musculoskeletal:         General: Normal range of motion  Cervical back: Normal range of motion and neck supple  Neurological:      Mental Status: She is alert and oriented to person, place, and time  Deep Tendon Reflexes: Reflexes are normal and symmetric  Psychiatric:         Behavior: Behavior normal          Thought Content:  Thought content normal          Judgment: Judgment normal

## 2021-07-21 NOTE — ASSESSMENT & PLAN NOTE
This is the source of the blood she sees when she wipes herself and is quite sensitive on physical exam and no doubt sore at other times  These lesions are benign and do not require treatment unless they become excessively painful or bleed too much  Estrace vaginal cream in minimal topical doses is helpful also

## 2021-09-20 ENCOUNTER — REMOTE DEVICE CLINIC VISIT (OUTPATIENT)
Dept: CARDIOLOGY CLINIC | Facility: CLINIC | Age: 66
End: 2021-09-20
Payer: COMMERCIAL

## 2021-09-20 DIAGNOSIS — Z86.73 PERSONAL HISTORY OF TRANSIENT ISCHEMIC ATTACK: Primary | ICD-10-CM

## 2021-09-20 DIAGNOSIS — Z95.818 STATUS POST PLACEMENT OF IMPLANTABLE LOOP RECORDER: ICD-10-CM

## 2021-09-20 PROCEDURE — 93298 REM INTERROG DEV EVAL SCRMS: CPT | Performed by: INTERNAL MEDICINE

## 2021-12-12 ENCOUNTER — HOSPITAL ENCOUNTER (EMERGENCY)
Facility: HOSPITAL | Age: 66
Discharge: HOME | End: 2021-12-13
Attending: EMERGENCY MEDICINE | Admitting: EMERGENCY MEDICINE
Payer: COMMERCIAL

## 2021-12-12 ENCOUNTER — APPOINTMENT (EMERGENCY)
Dept: RADIOLOGY | Facility: HOSPITAL | Age: 66
End: 2021-12-12
Payer: COMMERCIAL

## 2021-12-12 VITALS
HEART RATE: 65 BPM | RESPIRATION RATE: 18 BRPM | OXYGEN SATURATION: 98 % | TEMPERATURE: 97.8 F | SYSTOLIC BLOOD PRESSURE: 170 MMHG | DIASTOLIC BLOOD PRESSURE: 84 MMHG

## 2021-12-12 DIAGNOSIS — R00.2 PALPITATIONS: Primary | ICD-10-CM

## 2021-12-12 LAB
2HR DELTA HS TROPONIN: 2 NG/L
ALBUMIN SERPL BCP-MCNC: 3.9 G/DL (ref 3.5–5)
ALP SERPL-CCNC: 222 U/L (ref 34–104)
ALT SERPL W P-5'-P-CCNC: 25 U/L (ref 7–52)
ANION GAP SERPL CALCULATED.3IONS-SCNC: 10 MMOL/L (ref 4–13)
AST SERPL W P-5'-P-CCNC: 43 U/L (ref 13–39)
BASOPHILS # BLD AUTO: 0.06 THOUSANDS/ΜL (ref 0–0.1)
BASOPHILS NFR BLD AUTO: 1 % (ref 0–1)
BILIRUB SERPL-MCNC: 0.36 MG/DL (ref 0.2–1)
BUN SERPL-MCNC: 20 MG/DL (ref 5–25)
CALCIUM SERPL-MCNC: 8.6 MG/DL (ref 8.4–10.2)
CARDIAC TROPONIN I PNL SERPL HS: 5 NG/L
CARDIAC TROPONIN I PNL SERPL HS: 7 NG/L
CHLORIDE SERPL-SCNC: 104 MMOL/L (ref 96–108)
CO2 SERPL-SCNC: 25 MMOL/L (ref 21–32)
CREAT SERPL-MCNC: 0.97 MG/DL (ref 0.6–1.3)
EOSINOPHIL # BLD AUTO: 0.46 THOUSAND/ΜL (ref 0–0.61)
EOSINOPHIL NFR BLD AUTO: 6 % (ref 0–6)
ERYTHROCYTE [DISTWIDTH] IN BLOOD BY AUTOMATED COUNT: 14.8 % (ref 11.6–15.1)
GFR SERPL CREATININE-BSD FRML MDRD: 62 ML/MIN/1.73SQ M
GLUCOSE SERPL-MCNC: 94 MG/DL (ref 65–140)
HCT VFR BLD AUTO: 41.2 % (ref 34.8–46.1)
HGB BLD-MCNC: 12.9 G/DL (ref 11.5–15.4)
IMM GRANULOCYTES # BLD AUTO: 0.02 THOUSAND/UL (ref 0–0.2)
IMM GRANULOCYTES NFR BLD AUTO: 0 % (ref 0–2)
LYMPHOCYTES # BLD AUTO: 1.59 THOUSANDS/ΜL (ref 0.6–4.47)
LYMPHOCYTES NFR BLD AUTO: 22 % (ref 14–44)
MCH RBC QN AUTO: 30.4 PG (ref 26.8–34.3)
MCHC RBC AUTO-ENTMCNC: 31.3 G/DL (ref 31.4–37.4)
MCV RBC AUTO: 97 FL (ref 82–98)
MONOCYTES # BLD AUTO: 0.85 THOUSAND/ΜL (ref 0.17–1.22)
MONOCYTES NFR BLD AUTO: 12 % (ref 4–12)
NEUTROPHILS # BLD AUTO: 4.27 THOUSANDS/ΜL (ref 1.85–7.62)
NEUTS SEG NFR BLD AUTO: 59 % (ref 43–75)
NRBC BLD AUTO-RTO: 0 /100 WBCS
PLATELET # BLD AUTO: 277 THOUSANDS/UL (ref 149–390)
PMV BLD AUTO: 10.2 FL (ref 8.9–12.7)
POTASSIUM SERPL-SCNC: 3.7 MMOL/L (ref 3.5–5.3)
PROT SERPL-MCNC: 7.1 G/DL (ref 6.4–8.4)
RBC # BLD AUTO: 4.25 MILLION/UL (ref 3.81–5.12)
SODIUM SERPL-SCNC: 139 MMOL/L (ref 135–147)
WBC # BLD AUTO: 7.25 THOUSAND/UL (ref 4.31–10.16)

## 2021-12-12 PROCEDURE — 85025 COMPLETE CBC W/AUTO DIFF WBC: CPT | Performed by: EMERGENCY MEDICINE

## 2021-12-12 PROCEDURE — 71045 X-RAY EXAM CHEST 1 VIEW: CPT

## 2021-12-12 PROCEDURE — 36415 COLL VENOUS BLD VENIPUNCTURE: CPT | Performed by: EMERGENCY MEDICINE

## 2021-12-12 PROCEDURE — 99285 EMERGENCY DEPT VISIT HI MDM: CPT

## 2021-12-12 PROCEDURE — 84484 ASSAY OF TROPONIN QUANT: CPT | Performed by: EMERGENCY MEDICINE

## 2021-12-12 PROCEDURE — 80053 COMPREHEN METABOLIC PANEL: CPT | Performed by: EMERGENCY MEDICINE

## 2021-12-12 PROCEDURE — 93005 ELECTROCARDIOGRAM TRACING: CPT

## 2021-12-12 PROCEDURE — 99285 EMERGENCY DEPT VISIT HI MDM: CPT | Performed by: EMERGENCY MEDICINE

## 2021-12-13 LAB
ATRIAL RATE: 80 BPM
P AXIS: 38 DEGREES
PR INTERVAL: 194 MS
QRS AXIS: -22 DEGREES
QRSD INTERVAL: 116 MS
QT INTERVAL: 436 MS
QTC INTERVAL: 502 MS
T WAVE AXIS: 25 DEGREES
VENTRICULAR RATE: 80 BPM

## 2021-12-13 PROCEDURE — 93010 ELECTROCARDIOGRAM REPORT: CPT | Performed by: INTERNAL MEDICINE

## 2021-12-14 ENCOUNTER — OFFICE VISIT (OUTPATIENT)
Dept: CARDIOLOGY CLINIC | Facility: CLINIC | Age: 66
End: 2021-12-14
Payer: COMMERCIAL

## 2021-12-14 VITALS
WEIGHT: 209 LBS | HEIGHT: 63 IN | HEART RATE: 76 BPM | SYSTOLIC BLOOD PRESSURE: 118 MMHG | BODY MASS INDEX: 37.03 KG/M2 | DIASTOLIC BLOOD PRESSURE: 70 MMHG

## 2021-12-14 DIAGNOSIS — I42.0 DILATED CARDIOMYOPATHY (HCC): ICD-10-CM

## 2021-12-14 DIAGNOSIS — I48.0 PAROXYSMAL ATRIAL FIBRILLATION (HCC): ICD-10-CM

## 2021-12-14 DIAGNOSIS — R00.2 PALPITATIONS: ICD-10-CM

## 2021-12-14 DIAGNOSIS — I48.0 PAF (PAROXYSMAL ATRIAL FIBRILLATION) (HCC): Primary | ICD-10-CM

## 2021-12-14 PROCEDURE — 99214 OFFICE O/P EST MOD 30 MIN: CPT | Performed by: INTERNAL MEDICINE

## 2021-12-21 ENCOUNTER — REMOTE DEVICE CLINIC VISIT (OUTPATIENT)
Dept: CARDIOLOGY CLINIC | Facility: CLINIC | Age: 66
End: 2021-12-21
Payer: COMMERCIAL

## 2021-12-21 DIAGNOSIS — Z86.73 PERSONAL HISTORY OF TRANSIENT ISCHEMIC ATTACK: Primary | ICD-10-CM

## 2021-12-21 DIAGNOSIS — Z95.818 STATUS POST PLACEMENT OF IMPLANTABLE LOOP RECORDER: ICD-10-CM

## 2021-12-21 PROCEDURE — 93298 REM INTERROG DEV EVAL SCRMS: CPT | Performed by: INTERNAL MEDICINE

## 2022-01-04 ENCOUNTER — HOSPITAL ENCOUNTER (OUTPATIENT)
Dept: NON INVASIVE DIAGNOSTICS | Facility: CLINIC | Age: 67
Discharge: HOME | End: 2022-01-04
Payer: COMMERCIAL

## 2022-01-04 VITALS
DIASTOLIC BLOOD PRESSURE: 68 MMHG | WEIGHT: 209 LBS | HEIGHT: 63 IN | SYSTOLIC BLOOD PRESSURE: 132 MMHG | BODY MASS INDEX: 37.03 KG/M2 | HEART RATE: 60 BPM

## 2022-01-04 DIAGNOSIS — I42.0 DILATED CARDIOMYOPATHY (HCC): ICD-10-CM

## 2022-01-04 LAB
AORTIC ROOT: 2.8 CM
AORTIC VALVE MEAN VELOCITY: 20.1 M/S
AV AREA BY CONTINUOUS VTI: 0.8 CM2
AV AREA PEAK VELOCITY: 0.9 CM2
AV LVOT MEAN GRADIENT: 1 MMHG
AV LVOT PEAK GRADIENT: 3 MMHG
AV MEAN GRADIENT: 19 MMHG
AV PEAK GRADIENT: 37 MMHG
AV VALVE AREA: 0.81 CM2
AV VELOCITY RATIO: 0.26
DOP CALC AO PEAK VEL: 3 M/S
DOP CALC AO VTI: 82.1 CM
DOP CALC LVOT AREA: 3.46 CM2
DOP CALC LVOT DIAMETER: 2.1 CM
DOP CALC LVOT PEAK VEL VTI: 19.32 CM
DOP CALC LVOT PEAK VEL: 0.79 M/S
DOP CALC LVOT STROKE INDEX: 33.5 ML/M2
DOP CALC LVOT STROKE VOLUME: 66.88 CM3
E WAVE DECELERATION TIME: 217 MS
FRACTIONAL SHORTENING: 23 % (ref 28–44)
INTERVENTRICULAR SEPTUM IN DIASTOLE (PARASTERNAL SHORT AXIS VIEW): 1 CM
LAAS-AP2: 23.8 CM2
LAAS-AP4: 18.5 CM2
LEFT INTERNAL DIMENSION IN SYSTOLE: 4 CM (ref 2.1–4)
LEFT VENTRICULAR INTERNAL DIMENSION IN DIASTOLE: 5.2 CM (ref 5.35–7.97)
LEFT VENTRICULAR POSTERIOR WALL IN END DIASTOLE: 0.8 CM
LEFT VENTRICULAR STROKE VOLUME: 62 ML
MV E'TISSUE VEL-LAT: 9 CM/S
MV E'TISSUE VEL-SEP: 11 CM/S
MV PEAK A VEL: 0.69 M/S
MV PEAK E VEL: 59 CM/S
PULMONARY REGURGITATION LATE DIASTOLIC VELOCITY: 0.01 M/S
RIGHT VENTRICLE ID DIMENSION: 2.4 CM
SL CV PED ECHO LEFT VENTRICLE DIASTOLIC VOLUME (MOD BIPLANE) 2D: 132 ML
SL CV PED ECHO LEFT VENTRICLE SYSTOLIC VOLUME (MOD BIPLANE) 2D: 70 ML
TRICUSPID VALVE PEAK E WAVE VELOCITY: 0.1 M/S
TRICUSPID VALVE PEAK REGURGITATION VELOCITY: 2.12 M/S
TRICUSPID VALVE S': 58 CM/S
TV PEAK GRADIENT: 18 MMHG
Z-SCORE OF LEFT VENTRICULAR DIMENSION IN END SYSTOLE: -2.28

## 2022-01-04 PROCEDURE — 93306 TTE W/DOPPLER COMPLETE: CPT | Performed by: INTERNAL MEDICINE

## 2022-01-04 PROCEDURE — 93306 TTE W/DOPPLER COMPLETE: CPT

## 2022-01-05 NOTE — RESULT ENCOUNTER NOTE
Heart strong  Valve blocked but not severely  Will review again next year  Please put in for an echo prior to return next year

## 2022-01-06 NOTE — ED PROVIDER NOTES
History  Chief Complaint   Patient presents with    Leg Injury     superficial laceration right leg       53-year-old female presents the emergency department complaining of a laceration to the left knee  She states this injury occurred while she was picking up a bucket cat litter  She states that the bucket slipped out of her fingers causing her to strike her knee causing a minor skin tear and abrasion to the left knee  She is anxious and tearful  She appears very emotionally labile  She reports being emotionally labile ever since having a stroke  "A while ago "  The abrasion is bandaged with a Band-Aid by the patient prior to arrival   Bleeding controlled  Allergies reviewed          Prior to Admission Medications   Prescriptions Last Dose Informant Patient Reported? Taking?    Cholecalciferol (VITAMIN D3) 1 25 MG (44088 UT) CAPS   Yes No   Sig: Take 50,000 Units by mouth once a week   Procto-Med HC 2 5 % rectal cream   Yes No   Sig: USE TWICE PER DAY AS NEEDED FOR HEMORRHOID SWELLING   albuterol (PROVENTIL HFA,VENTOLIN HFA) 90 mcg/act inhaler   No No   Sig: inhale 2 puffs every 4 hours if needed for wheezing or shortness of breath   apixaban (ELIQUIS) 2 5 mg  Self No No   Sig: Take 1 tablet (2 5 mg total) by mouth 2 (two) times a day   atorvastatin (LIPITOR) 40 mg tablet  Self No No   Sig: Take 1 tablet (40 mg total) by mouth every evening   carboxymethylcellulose 1 % ophthalmic solution   Yes No   Sig: Apply 1 drop to eye 4 (four) times a day   dorzolamide-timolol (COSOPT) 22 3-6 8 MG/ML ophthalmic solution  Self Yes No   Sig: Administer 1 drop to both eyes 2 (two) times a day   ergocalciferol (VITAMIN D2) 50,000 units   Yes No   Sig: Take 50,000 Units by mouth once a week   ferrous sulfate 324 (65 Fe) mg  Self No No   Sig: Take 1 tablet (324 mg total) by mouth daily before breakfast   furosemide (LASIX) 40 mg tablet   Yes No   Sig: Take 40 mg by mouth daily   lisinopril (ZESTRIL) 5 mg tablet  Self No No   Sig: Take 1 tablet (5 mg total) by mouth daily   methocarbamol (ROBAXIN) 500 mg tablet   No No   Sig: Take 1 tablet (500 mg total) by mouth every 6 (six) hours as needed for muscle spasms   metroNIDAZOLE (FLAGYL) 500 mg tablet   Yes No   Sig: take 1 tablet by mouth twice a day for 7 days   pantoprazole (PROTONIX) 40 mg tablet  Self No No   Sig: Take 1 tablet (40 mg total) by mouth daily   potassium chloride (K-DUR,KLOR-CON) 10 mEq tablet   Yes No   Sig: Take 10 mEq by mouth daily   traMADol (ULTRAM) 50 mg tablet  Self Yes No   Sig: Take 50 mg by mouth every 6 (six) hours as needed for moderate pain      Facility-Administered Medications: None       Past Medical History:   Diagnosis Date    Bicuspid aortic valve     Dilated cardiomyopathy     Hyperlipidemia     Hypertension     [facility]     PUD (peptic ulcer disease)     Stroke ([facility] [address] )     Vitamin D deficiency        Past Surgical History:   Procedure Laterality Date    ABDOMINOPLASTY      revision gastric bypass    BREAST SURGERY      reduction    CARDIAC CATHETERIZATION  2020    Normal coronary arteries   CARDIAC LOOP RECORDER  2020    [employer]      SECTION      CHOLECYSTECTOMY      COLONOSCOPY      GASTRIC BYPASS      HERNIA REPAIR      NEUROMA EXCISION Right 2019    Procedure: EXCISION 2ND INTERSPACE NEUROMA;  Surgeon: Lubna Mae DPM;  Location: [address] [place] [address] OR;  Service: Podiatry    ROTATOR CUFF REPAIR Left        Family History   Problem Relation Age of Onset    Stroke Mother     Diabetes Mother     Heart disease Father     Diabetes Father     Hypertension Brother     Sudden death Brother     Hyperlipidemia Brother      I have reviewed and agree with the history as documented      E-Cigarette/Vaping     E-Cigarette/Vaping Substances     Social History     Tobacco Use    Smoking status: Never Smoker    Smokeless tobacco: Never Used   Substance Use Topics    Alcohol use: Never     Frequency: Never     Comment: none    Drug use: No     Comment: none       Review of Systems   Constitutional: Negative for chills and fever  HENT: Negative for congestion, dental problem and facial swelling  Eyes: Negative for visual disturbance  Respiratory: Negative for shortness of breath  Cardiovascular: Negative for chest pain  Gastrointestinal: Negative for abdominal pain, nausea and vomiting  Musculoskeletal: Negative for arthralgias and neck pain  Skin: Positive for wound  Neurological: Negative for dizziness, weakness, light-headedness, numbness and headaches  All other systems reviewed and are negative  Physical Exam  Physical Exam  Vitals signs and nursing note reviewed  Constitutional:       General: She is not in acute distress  Appearance: She is well-developed  She is not diaphoretic  HENT:      Head: Normocephalic and atraumatic  Right Ear: External ear normal       Left Ear: External ear normal       Nose: Nose normal    Eyes:      Conjunctiva/sclera: Conjunctivae normal       Pupils: Pupils are equal, round, and reactive to light  Neck:      Musculoskeletal: Normal range of motion  Cardiovascular:      Rate and Rhythm: Normal rate and regular rhythm  Heart sounds: Normal heart sounds  No murmur  No friction rub  No gallop  Pulmonary:      Effort: Pulmonary effort is normal  No respiratory distress  Breath sounds: Normal breath sounds  No stridor  No wheezing or rales  Abdominal:      General: Bowel sounds are normal  There is no distension  Palpations: Abdomen is soft  Tenderness: There is no abdominal tenderness  There is no guarding  Musculoskeletal: Normal range of motion  General: No tenderness  Skin:     General: Skin is warm  Capillary Refill: Capillary refill takes less than 2 seconds  Comments:  Superficial abrasion to the left knee    Bleeding controlled prior to arrival Neurological:      Mental Status: She is alert and oriented to person, place, and time  Vital Signs  ED Triage Vitals [03/15/21 1353]   Temperature Pulse Respirations Blood Pressure SpO2   (!) 97 4 °F (36 3 °C) 72 20 155/94 97 %      Temp src Heart Rate Source Patient Position - Orthostatic VS BP Location FiO2 (%)   -- -- Sitting Left arm --      Pain Score       5           Vitals:    03/15/21 1353   BP: 155/94   Pulse: 72   Patient Position - Orthostatic VS: Sitting         Visual Acuity      ED Medications  Medications - No data to display    Diagnostic Studies  Results Reviewed     None                 No orders to display              Procedures  Procedures         ED Course                                           MDM  Number of Diagnoses or Management Options  Leg abrasion:   Diagnosis management comments:   Abrasion just with Vaseline gauze 4x4s and wrapped with an Ace wrap  Patient educated regarding wound care  Patient educated regarding their diagnosis and given return and follow-up instructions  Patient was advised to returned to the ED with worsening symptoms or concerns  Patient is understanding of and in agreement with the treatment plan  There are no questions at the time of discharge  Risk of Complications, Morbidity, and/or Mortality  Presenting problems: low  Diagnostic procedures: low  Management options: low    Patient Progress  Patient progress: stable      Disposition  Final diagnoses:   Leg abrasion     Time reflects when diagnosis was documented in both MDM as applicable and the Disposition within this note     Time User Action Codes Description Comment    3/15/2021  2:05 PM Sameer Anderson Leg abrasion       ED Disposition     ED Disposition Condition Date/Time Comment    Discharge Stable Mon Mar 15, 2021  2:05 PM Martha Oh discharge to home/self care              Follow-up Information     Follow up With Specialties Details Why [address] [facility] [place], [address] [place]  [phone]            Discharge Medication List as of 3/15/2021  2:06 PM      CONTINUE these medications which have NOT CHANGED    Details   albuterol (PROVENTIL HFA,VENTOLIN HFA) 90 mcg/act inhaler inhale 2 puffs every 4 hours if needed for wheezing or shortness of breath, Normal      apixaban (ELIQUIS) 2 5 mg Take 1 tablet (2 5 mg total) by mouth 2 (two) times a day, Starting Sun 2/9/2020, Normal      atorvastatin (LIPITOR) 40 mg tablet Take 1 tablet (40 mg total) by mouth every evening, Starting Wed 2/5/2020, Normal      carboxymethylcellulose 1 % ophthalmic solution Apply 1 drop to eye 4 (four) times a day, Starting Fri 5/22/2020, Until Sat 5/22/2021, Historical Med      Cholecalciferol (VITAMIN D3) 1 25 MG (51733 UT) CAPS Take 50,000 Units by mouth once a week, Historical Med      dorzolamide-timolol (COSOPT) 22 3-6 8 MG/ML ophthalmic solution Administer 1 drop to both eyes 2 (two) times a day, Historical Med      ergocalciferol (VITAMIN D2) 50,000 units Take 50,000 Units by mouth once a week, Starting Thu 11/26/2020, Historical Med      ferrous sulfate 324 (65 Fe) mg Take 1 tablet (324 mg total) by mouth daily before breakfast, Starting Sun 2/9/2020, Normal      furosemide (LASIX) 40 mg tablet Take 40 mg by mouth daily, Historical Med      lisinopril (ZESTRIL) 5 mg tablet Take 1 tablet (5 mg total) by mouth daily, Starting Sun 2/9/2020, Normal      methocarbamol (ROBAXIN) 500 mg tablet Take 1 tablet (500 mg total) by mouth every 6 (six) hours as needed for muscle spasms, Starting Sat 12/19/2020, Normal      metroNIDAZOLE (FLAGYL) 500 mg tablet take 1 tablet by mouth twice a day for 7 days, Historical Med      pantoprazole (PROTONIX) 40 mg tablet Take 1 tablet (40 mg total) by mouth daily, Starting Sun 2/9/2020, Normal      potassium chloride (K-DUR,KLOR-CON) 10 mEq tablet Take 10 mEq by mouth daily, Historical Med      Procto-Med HC 2 5 % rectal cream USE TWICE PER DAY AS NEEDED FOR HEMORRHOID SWELLING, Historical Med      traMADol (ULTRAM) 50 mg tablet Take 50 mg by mouth every 6 (six) hours as needed for moderate pain, Historical Med           No discharge procedures on file      PDMP Review     None          ED Provider  Electronically Signed by           Jens Velazquez PA-C  03/15/21 [facility] ,   03/16/21 7154 No

## 2022-01-18 NOTE — PROGRESS NOTES
[employer] remote check loop  No events  Normal battery function        Current Outpatient Medications:     acetaminophen (TYLENOL) 325 mg tablet, Take 2 tablets (650 mg total) by mouth every 6 (six) hours as needed for mild pain, Disp: 30 tablet, Rfl: 0    albuterol (PROVENTIL HFA,VENTOLIN HFA) 90 mcg/act inhaler, inhale 2 puffs every 4 hours if needed for wheezing or shortness of breath (Patient not taking: Reported on 7/21/2021), Disp: 18 g, Rfl: 1    apixaban (ELIQUIS) 5 mg, Take 1 tablet (5 mg total) by mouth 2 (two) times a day, Disp: 60 tablet, Rfl: 5    atorvastatin (LIPITOR) 40 mg tablet, Take 1 tablet (40 mg total) by mouth every evening, Disp: 30 tablet, Rfl: 0    carboxymethylcellulose 1 % ophthalmic solution, Apply 1 drop to eye 4 (four) times a day (Patient not taking: Reported on 7/21/2021), Disp: , Rfl:     Cholecalciferol (VITAMIN D3) 1 25 MG (79744 UT) CAPS, Take 50,000 Units by mouth once a week, Disp: , Rfl:     dorzolamide-timolol (COSOPT) 22 3-6 8 MG/ML ophthalmic solution, Administer 1 drop to both eyes 2 (two) times a day, Disp: , Rfl:     ergocalciferol (VITAMIN D2) 50,000 units, Take 50,000 Units by mouth once a week (Patient not taking: Reported on 7/21/2021), Disp: , Rfl:     estradiol (ESTRACE) 0 1 mg/g vaginal cream, Insert 0 5 g into the vagina as needed (Pain in urinary tract), Disp: 15 g, Rfl: 1    ferrous sulfate 324 (65 Fe) mg, Take 1 tablet (324 mg total) by mouth daily before breakfast, Disp: 30 tablet, Rfl: 0    furosemide (LASIX) 40 mg tablet, Take 40 mg by mouth daily, Disp: , Rfl:     lisinopril (ZESTRIL) 5 mg tablet, Take 1 tablet (5 mg total) by mouth daily (Patient not taking: Reported on 7/21/2021), Disp: 30 tablet, Rfl: 0    methocarbamol (ROBAXIN) 500 mg tablet, Take 1 tablet (500 mg total) by mouth every 6 (six) hours as needed for muscle spasms (Patient not taking: Reported on 7/21/2021), Disp: 30 tablet, Rfl: 0    metroNIDAZOLE (FLAGYL) 500 mg tablet, take 1 tablet by mouth twice a day for 7 days (Patient not taking: Reported on 7/21/2021), Disp: , Rfl:     pantoprazole (PROTONIX) 40 mg tablet, Take 1 tablet (40 mg total) by mouth daily, Disp: 30 tablet, Rfl: 0    potassium chloride (K-DUR,KLOR-CON) 10 mEq tablet, Take 10 mEq by mouth daily, Disp: , Rfl:     Procto-Med HC 2 5 % rectal cream, USE TWICE PER DAY AS NEEDED FOR HEMORRHOID SWELLING (Patient not taking: Reported on 7/21/2021), Disp: , Rfl:     traMADol (ULTRAM) 50 mg tablet, Take 50 mg by mouth every 6 (six) hours as needed for moderate pain (Patient not taking: Reported on 12/14/2021 ), Disp: , Rfl:

## 2022-02-01 ENCOUNTER — TELEPHONE (OUTPATIENT)
Dept: UROLOGY | Facility: MEDICAL CENTER | Age: 67
End: 2022-02-01

## 2022-02-01 NOTE — TELEPHONE ENCOUNTER
Patient seen by Dr Johnnie Duran at [facility]    Patient called asking for the name of the medication she was using  Information given  Patient was thankful

## 2022-03-21 ENCOUNTER — REMOTE DEVICE CLINIC VISIT (OUTPATIENT)
Dept: CARDIOLOGY CLINIC | Facility: CLINIC | Age: 67
End: 2022-03-21
Payer: COMMERCIAL

## 2022-03-21 DIAGNOSIS — Z86.73 PERSONAL HISTORY OF TRANSIENT ISCHEMIC ATTACK: Primary | ICD-10-CM

## 2022-03-21 DIAGNOSIS — Z95.818 STATUS POST PLACEMENT OF IMPLANTABLE LOOP RECORDER: ICD-10-CM

## 2022-03-21 PROCEDURE — 93298 REM INTERROG DEV EVAL SCRMS: CPT | Performed by: INTERNAL MEDICINE

## 2022-05-12 NOTE — PROGRESS NOTES
[facility]  2 [facility] events  Longest 10 minutes  Normal battery function          Current Outpatient Medications:     acetaminophen (TYLENOL) 325 mg tablet, Take 2 tablets (650 mg total) by mouth every 6 (six) hours as needed for mild pain, Disp: 30 tablet, Rfl: 0    albuterol (PROVENTIL HFA,VENTOLIN HFA) 90 mcg/act inhaler, inhale 2 puffs every 4 hours if needed for wheezing or shortness of breath (Patient not taking: Reported on 7/21/2021), Disp: 18 g, Rfl: 1    apixaban (ELIQUIS) 5 mg, Take 1 tablet (5 mg total) by mouth 2 (two) times a day, Disp: 60 tablet, Rfl: 5    atorvastatin (LIPITOR) 40 mg tablet, Take 1 tablet (40 mg total) by mouth every evening, Disp: 30 tablet, Rfl: 0    carboxymethylcellulose 1 % ophthalmic solution, Apply 1 drop to eye 4 (four) times a day (Patient not taking: Reported on 7/21/2021), Disp: , Rfl:     Cholecalciferol (VITAMIN D3) 1 25 MG (65221 UT) CAPS, Take 50,000 Units by mouth once a week, Disp: , Rfl:     dorzolamide-timolol (COSOPT) 22 3-6 8 MG/ML ophthalmic solution, Administer 1 drop to both eyes 2 (two) times a day, Disp: , Rfl:     ergocalciferol (VITAMIN D2) 50,000 units, Take 50,000 Units by mouth once a week (Patient not taking: Reported on 7/21/2021), Disp: , Rfl:     estradiol (ESTRACE) 0 1 mg/g vaginal cream, Insert 0 5 g into the vagina as needed (Pain in urinary tract), Disp: 15 g, Rfl: 1    ferrous sulfate 324 (65 Fe) mg, Take 1 tablet (324 mg total) by mouth daily before breakfast, Disp: 30 tablet, Rfl: 0    furosemide (LASIX) 40 mg tablet, Take 40 mg by mouth daily, Disp: , Rfl:     lisinopril (ZESTRIL) 5 mg tablet, Take 1 tablet (5 mg total) by mouth daily (Patient not taking: Reported on 7/21/2021), Disp: 30 tablet, Rfl: 0    methocarbamol (ROBAXIN) 500 mg tablet, Take 1 tablet (500 mg total) by mouth every 6 (six) hours as needed for muscle spasms (Patient not taking: Reported on 7/21/2021), Disp: 30 tablet, Rfl: 0   metroNIDAZOLE (FLAGYL) 500 mg tablet, take 1 tablet by mouth twice a day for 7 days (Patient not taking: Reported on 7/21/2021), Disp: , Rfl:     pantoprazole (PROTONIX) 40 mg tablet, Take 1 tablet (40 mg total) by mouth daily, Disp: 30 tablet, Rfl: 0    potassium chloride (K-DUR,KLOR-CON) 10 mEq tablet, Take 10 mEq by mouth daily, Disp: , Rfl:     Procto-Med HC 2 5 % rectal cream, USE TWICE PER DAY AS NEEDED FOR HEMORRHOID SWELLING (Patient not taking: Reported on 7/21/2021), Disp: , Rfl:     traMADol (ULTRAM) 50 mg tablet, Take 50 mg by mouth every 6 (six) hours as needed for moderate pain (Patient not taking: Reported on 12/14/2021 ), Disp: , Rfl:

## 2022-06-01 ENCOUNTER — TELEPHONE (OUTPATIENT)
Dept: CARDIOLOGY CLINIC | Facility: CLINIC | Age: 67
End: 2022-06-01

## 2022-06-01 NOTE — TELEPHONE ENCOUNTER
Maxwell Archibald called from [address] [place] [address] [address] [facility] just to let our office know that she will not be sending orders for an electronic stimulator as part of Amrita's treatment  Maxwell Archibald is pushing for it but there are some contraindications with that and her loop recorder

## 2022-06-29 ENCOUNTER — REMOTE DEVICE CLINIC VISIT (OUTPATIENT)
Dept: CARDIOLOGY CLINIC | Facility: CLINIC | Age: 67
End: 2022-06-29
Payer: COMMERCIAL

## 2022-06-29 DIAGNOSIS — Z95.818 PRESENCE OF OTHER CARDIAC IMPLANTS AND GRAFTS: Primary | ICD-10-CM

## 2022-06-29 PROCEDURE — 93298 REM INTERROG DEV EVAL SCRMS: CPT | Performed by: INTERNAL MEDICINE

## 2022-06-29 PROCEDURE — G2066 INTER DEVC REMOTE 30D: HCPCS | Performed by: INTERNAL MEDICINE

## 2022-06-29 NOTE — PROGRESS NOTES
MDT LOOP1/ ACTIVE SYSTEM IS MRI CONDITIONAL   [facility] TRANSMISSION: LOOP RECORDER  PRESENTING RHYTHM NSR @ 84 BPM  BATTERY STATUS "OK " 1 TACHY EPISODE W/ EGRAM SHOWING OVERSENSING  2 ELIZABETH EPISODES W/ EGRAMS SHOWING UNDERSENSED PVCs  2 DEVICE CLASSIFIED AF EPISODES, AVAILABLE STRIPS DEMONSTRATE NO ATRIAL FIBRILLATION  INSTEAD EGM'S SHOW SR W/ PACs AND PVCs [ BIGEMINAL AND COUPLETS AT TIMES]  AF BURDEN IS 0%  AF BURDEN MAYBE OVERESTIMATED DUE TO INAPPROPRIATE CLASSIFICATION OF AF  SOME STRIPS MAY NOT BE INTERPRETABLE OR AVAILABLE, CANNOT DEFINITIVELY RULE OUT ATRIAL FIBRILLATION  HX OF PAF  PT TAKES ELIQUIS  NO PATIENT ACTIVATED EPISODES  NORMAL DEVICE FUNCTION   DL

## 2022-07-01 NOTE — PROGRESS NOTES
PT Evaluation     Today's date: 2022  Patient name: Devora Delaney  : 1955  MRN: [id number]  Referring provider: Herber Dumont DO  Dx:   Encounter Diagnosis     ICD-10-CM    1  Right knee pain, unspecified chronicity  M25 561    2  Gait abnormality  R26 9                   Assessment  Assessment details: Devora Delaney is a 61 y o  female with a history of bicuspid oartic valve, dilated CM, hyperlipidemia, HTN, PAF, peptic ulcer disease, Hx stroke, vitamin D deficiency, Hx PE, glaucoma, and BMI>30 that presents for a moderate complexity physical therapy initial evaluation  The patient demonstrates signs and symptoms consistent with R knee pain; gait abnormality  During the examination the patient demonstrated decreased R LE strength, decreased proprioception/balance, gait dysfunction, and R knee pain  The patient's impairments are causing the following functional limitations: difficulty with prolonged standing, prolonged walking, difficulty walking on unlevel surfaces, difficulty squatting/kneeling, difficulty stair-climbing, and difficulty transferring from low surfaces  The patient's clinical presentation is evolving due to a number of participation restrictions, significant medial history, and functional limitation (FOTO 46% function)  The patient will benefit from skilled PT services to address impairments, work towards goals, and restore PLOF  Impairments: abnormal gait, activity intolerance, impaired balance, impaired physical strength, lacks appropriate home exercise program and pain with function  Functional limitations: difficulty with prolonged standing, prolonged walking, difficulty walking on unlevel surfaces, difficulty squatting/kneeling, difficulty stair-climbing, and difficulty transferring from low surfaces    Symptom irritability: moderateBarriers to therapy: Medical Hx  Understanding of Dx/Px/POC: fair   Prognosis: fair  Prognosis details: Medical Hx    Goals  STG: Achieve in 4-6 weeks  1  Patient's R knee pain at worst less than 1/10 to allow for proper gait  2   RLE MMT improve to > 4/5 all motions tested to improve ADL/recreational activities  3  30 second sit to stand score improve by 5 stands ( 12 stands) to indicate improved transfers  LTG:  Achieve in 6-12 weeks  1  Patient's knee FOTO score improve to > 65% to indicate a return to normal functioning  2   Patient achieve personal goal of walking normally without SPC and getting stronger at her R knee  3  Patient to achieve independence with home exercise plan  Plan  Plan details: RE-ASSESS 1X/MONTH  Patient would benefit from: skilled physical therapy  Planned modality interventions: cryotherapy, thermotherapy: hydrocollator packs and electrical stimulation/Russian stimulation  Other planned modality interventions: IASTM  Planned therapy interventions: joint mobilization, manual therapy, massage, neuromuscular re-education, patient education, postural training, self care, strengthening, stretching, therapeutic activities, therapeutic exercise, home exercise program, balance/weight bearing training and gait training  Frequency: 1-3X/WK  Duration in weeks: 12  Plan of Care beginning date: 7/5/2022  Plan of Care expiration date: 10/1/2022  Treatment plan discussed with: PTA and patient        Subjective Evaluation    History of Present Illness  Date of surgery: 3/14/2022  Mechanism of injury: surgery  Mechanism of injury: Mamadou Magallon is a 61 y o  female that presents to outpatient physical therapy with complaints of R knee weakness  The patient reports multiple surgeries at her R knee (3) over the past year, the first surgery for a quad tendon tear April 22, 2021; Then in May 2021 for infection, and finally R TKA 3/14/2022  The patient notes her strength has not returned since having the 3 surgeries    The patient notes stiffness and difficulty with prolonged standing > 15 mins; difficulty lifting her leg onto/off of the bed  The patient has difficulty with prolonged walking, ambulating up/down the steps, squatting, and kneeling  The patient's main goal for physical therapy is to walk normally without a limp and without a SPC  Recurrent probem    Pain  Current pain ratin  At worst pain rating: 3  Location: R KNEE midline distal incision  Quality: dull ache  Aggravating factors: standing, walking, stair climbing and lifting  Progression: no change    Social Support  Steps to enter house: no  Stairs in house: no   Lives in: apartment  Lives with: alone    Employment status: not working  Hand dominance: right    Treatments  Current treatment: physical therapy  Patient Goals  Patient goals for therapy: decreased pain, increased motion, increased strength, improved balance, independence with ADLs/IADLs and return to sport/leisure activities  Patient goal: walk without limp without SPC; get stronger        Objective     Palpation     Additional Palpation Details  NO TTP    Tenderness     Right Knee   Tenderness in the inferior patella and tibial tubercle  Additional Tenderness Details  Patient is tender at her distal knee incision    Neurological Testing     Sensation     Knee   Left Knee   Intact: light touch    Right Knee   Intact: light touch   Hyposensation: light touch    Comments   Right light touch: R lateral knee joint line       Active Range of Motion   Left Knee   Flexion: 126 degrees   Extension: 0 degrees     Right Knee   Flexion: 125 degrees   Extension: 0 degrees     Patellar Static Positioning   Left Knee: WFL  Right Knee: [facility]    Strength/Myotome Testing     Left Hip   Planes of Motion   Flexion: 4  Extension: 4-  Abduction: 4  Adduction: 4    Right Hip   Planes of Motion   Flexion: 3+  Extension: 3+  Abduction: 3+  Adduction: 3+    Left Knee   Flexion: 4+  Extension: 4+  Quadriceps contraction: fair    Right Knee   Flexion: 3+  Extension: 2+  Quadriceps contraction: poor    Tests Additional Tests Details  FOTO: 46% ( predicted 60%)  Gait impairments: Patient ambulates with SPC WBAT B/L LE  The patient demonstrates slow gait speed, unequal step lengths, L lateral trunk lean, increased medial/lateral sway, dips her L shoulder, and decreased heel-toe rollover R LE     30SSTS: 7 stands (12 stands is norm) L lateral trunk lean, used B/L UE  Balance: Stagger feet stepping without UE: maintains 8 seconds then LOB               Re-Evaluation:8/2  PRECAUTIONS:CARDIAC /LOOP RECORDER  Knee Specialty Daily Treatment Diary   Access Code [email or web address]       Manual Therapy 7/5                               Patellar mobs attempted but p! R medial knee/patella                   Ther Ex 7/5       Nu Step S=  *      Biodex Bike S=        Heel slides flex/abd x15 ea                                       SLR all planes        SAQ  *? LAQ        HR/TR  *              ALT Standing hamstring curls in mirror  *              Mini Squat  *              TKE  *      Total gym squats (deep)        Neuro Re-ed        CSMi weight shift/ squat  *      QS x15 roll       Gluteal set x15       Bridges x15       Fitter board        Eccentric Leg press        Clam Shells  *      Tandem EO  *      Foam feet together EC  *      GAIT Training        sidestepping  *      Heel-toe amb        Mirror walking  * w/ SPC      Ther Act        Amb up/down steps  * 2 HR      Chair squats x7       Crate carry        Step ups  *          Modalities        CP  KNEE                                The patient was given a new home exercise plan with written handout, pictures, and verbal instruction  The patient accepts and understands the new home activities

## 2022-07-05 ENCOUNTER — EVALUATION (OUTPATIENT)
Dept: PHYSICAL THERAPY | Facility: CLINIC | Age: 67
End: 2022-07-05
Payer: COMMERCIAL

## 2022-07-05 DIAGNOSIS — M25.561 RIGHT KNEE PAIN, UNSPECIFIED CHRONICITY: Primary | ICD-10-CM

## 2022-07-05 DIAGNOSIS — R26.9 GAIT ABNORMALITY: ICD-10-CM

## 2022-07-05 PROCEDURE — 97162 PT EVAL MOD COMPLEX 30 MIN: CPT | Performed by: PHYSICAL THERAPIST

## 2022-07-05 PROCEDURE — 97112 NEUROMUSCULAR REEDUCATION: CPT | Performed by: PHYSICAL THERAPIST

## 2022-07-05 NOTE — LETTER
2022    [place], [address]  [address] [place] [address]    Patient: Jaye Dumas   YOB: 1955   Date of Visit: 2022     Encounter Diagnosis     ICD-10-CM    1  Right knee pain, unspecified chronicity  M25 561    2  Gait abnormality  R26 9        Dear Dr Elbert Hansen: Thank you for your recent referral of Jaye Dumas  Please review the attached evaluation summary from Amrita's recent visit  Please verify that you agree with the plan of care by signing the attached order  If you have any questions or concerns, please do not hesitate to call  I sincerely appreciate the opportunity to share in the care of one of your patients and hope to have another opportunity to work with you in the near future  Sincerely,    Jose Trotter, PT      Referring Provider:      I certify that I have read the below Plan of Care and certify the need for these services furnished under this plan of treatment while under my care  [place] DO  [address] [address] [address]  [address]  [address] [address]  Via Fax: [phone]          PT Evaluation     Today's date: 2022  Patient name: Jaye Dumas  : 1955  MRN: [id number]  Referring provider: Shaji Goodrich DO  Dx:   Encounter Diagnosis     ICD-10-CM    1  Right knee pain, unspecified chronicity  M25 561    2  Gait abnormality  R26 9                   Assessment  Assessment details: Jaye Dumas is a 61 y o  female with a history of bicuspid oartic valve, dilated CM, hyperlipidemia, HTN, PAF, peptic ulcer disease, Hx stroke, vitamin D deficiency, Hx PE, glaucoma, and BMI>30 that presents for a moderate complexity physical therapy initial evaluation  The patient demonstrates signs and symptoms consistent with R knee pain; gait abnormality    During the examination the patient demonstrated decreased R LE strength, decreased proprioception/balance, gait dysfunction, and R knee pain   The patient's impairments are causing the following functional limitations: difficulty with prolonged standing, prolonged walking, difficulty walking on unlevel surfaces, difficulty squatting/kneeling, difficulty stair-climbing, and difficulty transferring from low surfaces  The patient's clinical presentation is evolving due to a number of participation restrictions, significant medial history, and functional limitation (FOTO 46% function)  The patient will benefit from skilled PT services to address impairments, work towards goals, and restore PLOF  Impairments: abnormal gait, activity intolerance, impaired balance, impaired physical strength, lacks appropriate home exercise program and pain with function  Functional limitations: difficulty with prolonged standing, prolonged walking, difficulty walking on unlevel surfaces, difficulty squatting/kneeling, difficulty stair-climbing, and difficulty transferring from low surfaces  Symptom irritability: moderateBarriers to therapy: Medical Hx  Understanding of Dx/Px/POC: fair   Prognosis: fair  Prognosis details: Medical Hx    Goals  STG: Achieve in 4-6 weeks  1  Patient's R knee pain at worst less than 1/10 to allow for proper gait  2   RLE MMT improve to > 4/5 all motions tested to improve ADL/recreational activities  3  30 second sit to stand score improve by 5 stands ( 12 stands) to indicate improved transfers  LTG:  Achieve in 6-12 weeks  1  Patient's knee FOTO score improve to > 65% to indicate a return to normal functioning  2   Patient achieve personal goal of walking normally without SPC and getting stronger at her R knee  3  Patient to achieve independence with home exercise plan        Plan  Plan details: RE-ASSESS 1X/MONTH  Patient would benefit from: skilled physical therapy  Planned modality interventions: cryotherapy, thermotherapy: hydrocollator packs and electrical stimulation/Russian stimulation  Other planned modality interventions: IAS  Planned therapy interventions: joint mobilization, manual therapy, massage, neuromuscular re-education, patient education, postural training, self care, strengthening, stretching, therapeutic activities, therapeutic exercise, home exercise program, balance/weight bearing training and gait training  Frequency: 1-3X/WK  Duration in weeks: 12  Plan of Care beginning date: 2022  Plan of Care expiration date: 10/1/2022  Treatment plan discussed with: PTA and patient        Subjective Evaluation    History of Present Illness  Date of surgery: 3/14/2022  Mechanism of injury: surgery  Mechanism of injury: Mamadou Magallon is a 61 y o  female that presents to outpatient physical therapy with complaints of R knee weakness  The patient reports multiple surgeries at her R knee (3) over the past year, the first surgery for a quad tendon tear 2021; Then in May 2021 for infection, and finally R TKA 3/14/2022  The patient notes her strength has not returned since having the 3 surgeries  The patient notes stiffness and difficulty with prolonged standing > 15 mins; difficulty lifting her leg onto/off of the bed  The patient has difficulty with prolonged walking, ambulating up/down the steps, squatting, and kneeling  The patient's main goal for physical therapy is to walk normally without a limp and without a SPC               Recurrent probem    Pain  Current pain ratin  At worst pain rating: 3  Location: R KNEE midline distal incision  Quality: dull ache  Aggravating factors: standing, walking, stair climbing and lifting  Progression: no change    Social Support  Steps to enter house: no  Stairs in house: no   Lives in: apartment  Lives with: alone    Employment status: not working  Hand dominance: right    Treatments  Current treatment: physical therapy  Patient Goals  Patient goals for therapy: decreased pain, increased motion, increased strength, improved balance, independence with ADLs/IADLs and return to sport/leisure activities  Patient goal: walk without limp without SPC; get stronger        Objective     Palpation     Additional Palpation Details  NO TTP    Tenderness     Right Knee   Tenderness in the inferior patella and tibial tubercle  Additional Tenderness Details  Patient is tender at her distal knee incision    Neurological Testing     Sensation     Knee   Left Knee   Intact: light touch    Right Knee   Intact: light touch   Hyposensation: light touch    Comments   Right light touch: R lateral knee joint line  Active Range of Motion   Left Knee   Flexion: 126 degrees   Extension: 0 degrees     Right Knee   Flexion: 125 degrees   Extension: 0 degrees     Patellar Static Positioning   Left Knee: WFL  Right Knee: [facility]    Strength/Myotome Testing     Left Hip   Planes of Motion   Flexion: 4  Extension: 4-  Abduction: 4  Adduction: 4    Right Hip   Planes of Motion   Flexion: 3+  Extension: 3+  Abduction: 3+  Adduction: 3+    Left Knee   Flexion: 4+  Extension: 4+  Quadriceps contraction: fair    Right Knee   Flexion: 3+  Extension: 2+  Quadriceps contraction: poor    Tests     Additional Tests Details  FOTO: 46% ( predicted 60%)  Gait impairments: Patient ambulates with SPC WBAT B/L LE    The patient demonstrates slow gait speed, unequal step lengths, L lateral trunk lean, increased medial/lateral sway, dips her L shoulder, and decreased heel-toe rollover R LE     30SSTS: 7 stands (12 stands is norm) L lateral trunk lean, used B/L UE  Balance: Stagger feet stepping without UE: maintains 8 seconds then LOB               Re-Evaluation:8/2  PRECAUTIONS:CARDIAC /LOOP RECORDER  Knee Specialty Daily Treatment Diary   Access Code [email or web address]       Manual Therapy 7/5                               Patellar mobs attempted but p! R medial knee/patella                   Ther Ex 7/5       Nu Step S=  *      Biodex Bike S=        Heel slides flex/abd x15 ea SLR all planes        SAQ  *? LAQ        HR/TR  *              ALT Standing hamstring curls in mirror  *              Mini Squat  *              TKE  *      Total gym squats (deep)        Neuro Re-ed        CSMi weight shift/ squat  *      QS x15 roll       Gluteal set x15       Bridges x15       Fitter board        Eccentric Leg press        Clam Shells  *      Tandem EO  *      Foam feet together EC  *      GAIT Training        sidestepping  *      Heel-toe amb        Mirror walking  * w/ SPC      Ther Act        Amb up/down steps  * 2 HR      Chair squats x7       Crate carry        Step ups  *          Modalities        CP  KNEE                                The patient was given a new home exercise plan with written handout, pictures, and verbal instruction  The patient accepts and understands the new home activities

## 2022-07-07 ENCOUNTER — APPOINTMENT (OUTPATIENT)
Dept: PHYSICAL THERAPY | Facility: CLINIC | Age: 67
End: 2022-07-07
Payer: COMMERCIAL

## 2022-07-08 ENCOUNTER — OFFICE VISIT (OUTPATIENT)
Dept: PHYSICAL THERAPY | Facility: CLINIC | Age: 67
End: 2022-07-08
Payer: COMMERCIAL

## 2022-07-08 DIAGNOSIS — M25.561 RIGHT KNEE PAIN, UNSPECIFIED CHRONICITY: Primary | ICD-10-CM

## 2022-07-08 DIAGNOSIS — R26.9 GAIT ABNORMALITY: ICD-10-CM

## 2022-07-08 PROCEDURE — 97110 THERAPEUTIC EXERCISES: CPT | Performed by: PHYSICAL THERAPIST

## 2022-07-08 PROCEDURE — 97530 THERAPEUTIC ACTIVITIES: CPT | Performed by: PHYSICAL THERAPIST

## 2022-07-08 PROCEDURE — 97112 NEUROMUSCULAR REEDUCATION: CPT | Performed by: PHYSICAL THERAPIST

## 2022-07-08 NOTE — PROGRESS NOTES
Daily Note     Today's date: 2022  Patient name: Jaye Dumas  : 1955  MRN: [id number]  Referring provider: Shaji Goodrich DO  Dx:   Encounter Diagnosis     ICD-10-CM    1  Right knee pain, unspecified chronicity  M25 561    2  Gait abnormality  R26 9                   Subjective: The patient reports she is interested in using gym equipment to strengthen her R LE  The patient denies feeling any pain at her R knee this morning      Objective: See treatment diary below      Assessment: The patient tolerated all activities fair today  The patient needed verbal and manual cues for proper posture and technique to perform the exercises properly  We focused on eccentric strengthening for her R quadriceps muscle as she has little control/activatio near TKE  There were no complaints of increased pain or problems after the session today  The patient will benefit from continued skilled physical therapy to progress towards achieving patient centered goals  Plan: Continue per plan of care  Progress treatment as tolerated         Re-Evaluation:  PRECAUTIONS:CARDIAC /LOOP RECORDER  Knee Specialty Daily Treatment Diary   Access Code [email or web address]       Manual Therapy                               Patellar mobs attempted but p! R medial knee/patella                   Ther Ex       Nu Step S=11  *L3 x 7 mins      Biodex Bike S=        Heel slides flex/abd x15 ea                                       SLR all planes  Flex AAROM x10      SAQ  *x20              HR/TR  *x10              ALT Standing hamstring curls in mirror  *x10              Mini Squat  *Mirror              TKE  *      Total gym squats (deep)        Neuro Re-ed        CSMi weight shift/ squat  *XY x10 shift fwd/back  XY squat x10 ( 8 mins total)      QS x15 roll       Gluteal set x15 HEP      Bridges x15 x15      Eccentric LAQ  AAROM x10              Fitter board        Eccentric Leg press S=8  15# x15 R LE      Supine Clam Shells  *GRN x15      Tandem EO  *2x:30 B/L      Foam feet together EC  *NV      GAIT Training        sidestepping  *// bar 10ft x 6      Heel-toe amb        Mirror walking  * w/ SPC      Ther Act        Amb up/down steps  * 2 HR 4 steps x 3 trials      Chair squats x7 *High mat      Crate carry        Step ups  *          Modalities        CP  KNEE

## 2022-07-11 ENCOUNTER — OFFICE VISIT (OUTPATIENT)
Dept: PHYSICAL THERAPY | Facility: CLINIC | Age: 67
End: 2022-07-11
Payer: COMMERCIAL

## 2022-07-11 DIAGNOSIS — M25.561 RIGHT KNEE PAIN, UNSPECIFIED CHRONICITY: Primary | ICD-10-CM

## 2022-07-11 DIAGNOSIS — R26.9 GAIT ABNORMALITY: ICD-10-CM

## 2022-07-11 PROCEDURE — 97112 NEUROMUSCULAR REEDUCATION: CPT | Performed by: PHYSICAL THERAPIST

## 2022-07-11 PROCEDURE — 97110 THERAPEUTIC EXERCISES: CPT | Performed by: PHYSICAL THERAPIST

## 2022-07-11 NOTE — PROGRESS NOTES
Daily Note     Today's date: 2022  Patient name: Sarah Partida  : 1955  MRN: [id number]  Referring provider: Brooke Garcia DO  Dx:   Encounter Diagnosis     ICD-10-CM    1  Right knee pain, unspecified chronicity  M25 561    2  Gait abnormality  R26 9                   Subjective: The patient denies feeling any pain R knee - just weakness  Objective: See treatment diary below      Assessment: The patient continues to demonstrate severe R TKE weakness which we performed eccentric strengthening activities  The patient tolerated the treatment fair and needed visual feedback via mirror to improve her posture with closed chained strengthening  The patient will benefit from continued PT to achieve her goals of therapy  Plan: Continue per plan of care  Progress treatment as tolerated  Focus on eccentric strengthening  Re-Evaluation:  PRECAUTIONS:CARDIAC /LOOP RECORDER  Knee Specialty Daily Treatment Diary   Access Code [email or web address]       Manual Therapy                              Patellar mobs attempted but p! R medial knee/patella                   Ther Ex      Nu Step S=11  *L3 x 7 mins L6 x 9 mins     Biodex Bike S=        Heel slides flex/abd x15 ea                                       SLR all planes  Flex AAROM x10      SAQ  *x20              HR/TR  *x10 x15             ALT Standing hamstring curls in mirror  *x10 x15             Mini Squat  *Mirror VC lean to R x15             TKE  * RED x15 shown ball on wall for HEP     Total gym squats (deep)        Neuro Re-ed        CSMi weight shift/ squat  *XY x10 shift fwd/back  XY squat x10 ( 8 mins total)      QS x15 roll       Gluteal set x15 HEP      Bridges x15 x15      Eccentric LAQ  AAROM x10 AAROM x10             Fitter board        Eccentric Leg press S=8  15# x15 R LE 15# x15 single  22  5# x15 eccentric     Supine Clam Shells  *GRN x15      Tandem EO  *2x:30 B/L reviewed     Foam feet together EO/EC *NV 2x:30 ea     GAIT Training        sidestepping  *// bar 10ft x 6      Heel-toe amb        Mirror walking  * w/ SPC      Ther Act        Amb up/down steps  * 2 HR 4 steps x 3 trials NV     Chair squats x7 *High mat Slow on way down hands on R knee NV     Crate carry        Step ups  * 4" x10 fwd/ lateral // bars         Modalities        CP  KNEE

## 2022-07-13 ENCOUNTER — OFFICE VISIT (OUTPATIENT)
Dept: PHYSICAL THERAPY | Facility: CLINIC | Age: 67
End: 2022-07-13
Payer: COMMERCIAL

## 2022-07-13 DIAGNOSIS — M25.561 RIGHT KNEE PAIN, UNSPECIFIED CHRONICITY: Primary | ICD-10-CM

## 2022-07-13 DIAGNOSIS — R26.9 GAIT ABNORMALITY: ICD-10-CM

## 2022-07-13 PROCEDURE — 97110 THERAPEUTIC EXERCISES: CPT

## 2022-07-13 PROCEDURE — 97112 NEUROMUSCULAR REEDUCATION: CPT

## 2022-07-13 PROCEDURE — 97530 THERAPEUTIC ACTIVITIES: CPT

## 2022-07-13 NOTE — PROGRESS NOTES
Daily Note     Today's date: 2022  Patient name: Jayshree Pierson  : 1955  MRN: [id number]  Referring provider: Jessica Del Rosario DO  Dx:   Encounter Diagnosis     ICD-10-CM    1  Right knee pain, unspecified chronicity  M25 561    2  Gait abnormality  R26 9        Start Time: 1030          Subjective: Patient states she has no pain  She just wants her right knee stronger  Objective: See treatment diary below    Patient was happy so she was crying during session  She states that happens since her stroke  Assessment: Tolerated treatment well  Patient would benefit from continued PT for stretching and strengthening  Patient worked on weight shifting at hip level and not use her shoulders as much  Also worked on not locking knee to perform strengthening exercises  Great encouragement needed to perform some of her exercises  She is fearful of falling and it hinders her with some of the exercises  Patient is happy with her progress in therapy  Plan: Continue per plan of care  Progress treatment as tolerated         Re-Evaluation:  PRECAUTIONS:CARDIAC /LOOP RECORDER  Knee Specialty Daily Treatment Diary   Access Code [email or web address]       Manual Therapy                             Patellar mobs attempted but p! R medial knee/patella                   Ther Ex     Nu Step S=11  *L3 x 7 mins L6 x 9 mins L6 x10 min    Biodex Bike S=        Heel slides flex/abd x15 ea                                       SLR all planes  Flex AAROM x10      SAQ  *x20              HR/TR  *x10 x15 x20             ALT Standing hamstring curls in mirror  *x10 x15 2x10            Mini Squat  *Mirror VC lean to R x15 Mirror x20            TKE  * RED x15 shown ball on wall for HEP Red x20 w/ towel    Total gym squats (deep)        Neuro Re-ed        CSMi weight shift/ squat  *XY x10 shift fwd/back  XY squat x10 ( 8 mins total)      QS x15 roll       Gluteal set x15 HEP      Bridges x15 x15 Eccentric LAQ  AAROM x10 AAROM x10 AAROM x10            Fitter board        Eccentric Leg press S=8  15# x15 R LE 15# x15 single  22  5# x15 eccentric 15# x15 single  22  5# x15 eccentric    Supine Clam Shells  *GRN x15      Tandem EO  *2x:30 B/L reviewed     Foam feet together EO/EC  *NV 2x:30 ea EO 0H/EC 1H :30x2 ea    GAIT Training        sidestepping  *// bar 10ft x 6      Heel-toe amb        Mirror walking  * w/ SPC      Ther Act        Amb up/down steps  * 2 HR 4 steps x 3 trials NV 2 HR 4 steps x 4 trials VC+ Jaylyn-CG up/ close S down    Chair squats x7 *High mat Slow on way down hands on R knee NV Slow on way down hands on R knee off NuStep x10    Crate carry        Step ups  * 4" x10 fwd/ lateral // bars 4" x10 fwd/ lateral // bars        Modalities        CP  KNEE

## 2022-07-14 ENCOUNTER — OFFICE VISIT (OUTPATIENT)
Dept: PHYSICAL THERAPY | Facility: CLINIC | Age: 67
End: 2022-07-14
Payer: COMMERCIAL

## 2022-07-14 DIAGNOSIS — R26.9 GAIT ABNORMALITY: ICD-10-CM

## 2022-07-14 DIAGNOSIS — M25.561 RIGHT KNEE PAIN, UNSPECIFIED CHRONICITY: Primary | ICD-10-CM

## 2022-07-14 PROCEDURE — 97112 NEUROMUSCULAR REEDUCATION: CPT

## 2022-07-14 PROCEDURE — 97530 THERAPEUTIC ACTIVITIES: CPT

## 2022-07-14 PROCEDURE — 97110 THERAPEUTIC EXERCISES: CPT

## 2022-07-14 NOTE — PROGRESS NOTES
Daily Note     Today's date: 2022  Patient name: Noemi Loja  : 1955  MRN: [id number]  Referring provider: Wendy Jones DO  Dx:   Encounter Diagnosis     ICD-10-CM    1  Right knee pain, unspecified chronicity  M25 561    2  Gait abnormality  R26 9        Start Time: 1030  Stop Time: 1110  Total time in clinic (min): 40 minutes    Subjective: Felt good after last visit  R knee strength is not back yet  Objective: See treatment diary below    Assessment: Tolerated treatment well  Responded well to CSMI balance activities  Initially performed mini squat with most WB through L LE- able to adjust with visual cues to increase R LE WB%  Improved static balance FT EO/EC today, less sway, no LOB  Improved STS technique with L LE on block to increase R LE WB%  Improved STS time  Patient demonstrated fatigue post treatment and would benefit from continued PT  Plan: Continue per plan of care        Re-Evaluation:  PRECAUTIONS:CARDIAC /LOOP RECORDER  Knee Specialty Daily Treatment Diary   Access Code [email or web address]       Manual Therapy                            Patellar mobs attempted but p! R medial knee/patella                   Ther Ex    Nu Step S=11  *L3 x 7 mins L6 x 9 mins L6 x10 min L6 x10 min   Biodex Bike S=        Heel slides flex/abd x15 ea                                       SLR all planes  Flex AAROM x10      SAQ  *x20              HR/TR  *x10 x15 x20  CSMI x20            ALT Standing hamstring curls in mirror  *x10 x15 2x10            Mini Squat  *Mirror VC lean to R x15 Mirror x20 CSMI x20            TKE  * RED x15 shown ball on wall for HEP Red x20 w/ towel Red x25 w/ towel   Total gym squats (deep)        Neuro Re-ed        CSMi weight shift/ squat  *XY x10 shift fwd/back  XY squat x10 ( 8 mins total)   CSMI:   -FT EO: 1'  -FT EC: 45"   QS x15 roll       Gluteal set x15 HEP      Bridges x15 x15      Eccentric LAQ  AAROM x10 AAROM x10 AAROM x10    CSMI Balance     L1   Fitter board        Eccentric Leg press S=8  15# x15 R LE 15# x15 single  22  5# x15 eccentric 15# x15 single  22  5# x15 eccentric 22 5# 2x15 eccentric  15# 2x15 single   Supine Clam Shells  *GRN x15      Tandem EO  *2x:30 B/L reviewed     Foam feet together EO/EC  *NV 2x:30 ea EO 0H/EC 1H :30x2 ea    GAIT Training        sidestepping  *// bar 10ft x 6      Heel-toe amb        Mirror walking  * w/ SPC      Ther Act        Amb up/down steps  * 2 HR 4 steps x 3 trials NV 2 HR 4 steps x 4 trials VC+ Jaylyn-CG up/ close S down    Chair squats x7 *High mat Slow on way down hands on R knee NV Slow on way down hands on R knee off NuStep x10 Chair:   2x5 w/ 2" box under R LE  5x STS=18"   Crate carry        Step ups  * 4" x10 fwd/ lateral // bars 4" x10 fwd/ lateral // bars 6"x10 fwd L LE 1st       Modalities        CP  KNEE

## 2022-07-15 ENCOUNTER — APPOINTMENT (OUTPATIENT)
Dept: PHYSICAL THERAPY | Facility: CLINIC | Age: 67
End: 2022-07-15
Payer: COMMERCIAL

## 2022-07-18 ENCOUNTER — OFFICE VISIT (OUTPATIENT)
Dept: PHYSICAL THERAPY | Facility: CLINIC | Age: 67
End: 2022-07-18
Payer: COMMERCIAL

## 2022-07-18 DIAGNOSIS — M25.561 RIGHT KNEE PAIN, UNSPECIFIED CHRONICITY: Primary | ICD-10-CM

## 2022-07-18 DIAGNOSIS — R26.9 GAIT ABNORMALITY: ICD-10-CM

## 2022-07-18 PROCEDURE — 97112 NEUROMUSCULAR REEDUCATION: CPT

## 2022-07-18 PROCEDURE — 97110 THERAPEUTIC EXERCISES: CPT

## 2022-07-18 PROCEDURE — 97530 THERAPEUTIC ACTIVITIES: CPT

## 2022-07-18 NOTE — PROGRESS NOTES
Daily Note     Today's date: 2022  Patient name: Patsy Naqvi  : 1955  MRN: [id number]  Referring provider: Mk Arellano DO  Dx:   Encounter Diagnosis     ICD-10-CM    1  Right knee pain, unspecified chronicity  M25 561    2  Gait abnormality  R26 9        Start Time: 0855  Stop Time: 0950  Total time in clinic (min): 55 minutes    Subjective: Felt good after last visit, thinks knee is getting stronger  Feels better sitting up/down from chair  Objective: See treatment diary below    Assessment: Tolerated treatment well  Improved static balance w/EO/EC  Visual cues on CSMI for R LE WB during eccentric squat- tends to perform squat with WB through L LE  Patient demonstrates decreased tightness in posterior chain- able to complete standing 3 way hip without tightness limiting her  Improved performance with CSMI balance, able to progress to Level 4 with improved ease  Improved R LE WB during STS, decreased L lateral lean  Difficulty with LAQ to get full extension  Patient demonstrated fatigue post treatment and would benefit from continued PT  Plan: Continue per plan of care        Re-Evaluation:  PRECAUTIONS:CARDIAC /LOOP RECORDER  Knee Specialty Daily Treatment Diary   Access Code [email or web address]       Manual Therapy                            Patellar mobs                    Ther Ex    Nu Step S=11 L6 x10 min  L6 x 9 mins L6 x10 min L6 x10 min   Biodex Bike S=        Heel slides flex/abd        3 way hip CSMI x10 ea                               SLR all planes        SAQ                HR/TR CSMI x20   x15 x20  CSMI x20            ALT Standing hamstring curls in mirror   x15 2x10            Mini Squat CSMI x20   VC lean to R x15 Mirror x20 CSMI x20            TKE Red x25 w/ towel  RED x15 shown ball on wall for HEP Red x20 w/ towel Red x25 w/ towel   Total gym squats (deep)        Neuro Re-ed        CSMi weight shift/ squat CSMI:   -FT EO: 1'  -FT EC: 45"    CSMI:   -FT EO: 1'  -FT EC: 45"   QS        Gluteal set        Bridges        Eccentric LAQ AROM x15  AAROM x10 AAROM x10    CSMI Balance L1-4    L1   Fitter board        Eccentric Leg press S=8 22 5# x15 eccentric w/box L LE  22  5# x15 single  15# x15 single  22  5# x15 eccentric 15# x15 single  22  5# x15 eccentric 22 5# 2x15 eccentric  15# 2x15 single   Supine Clam Shells        Tandem EO   reviewed     Foam feet together EO/EC   2x:30 ea EO 0H/EC 1H :30x2 ea    GAIT Training        sidestepping        Heel-toe amb        Mirror walking        Ther Act        Amb up/down steps   NV 2 HR 4 steps x 4 trials VC+ Jaylyn-CG up/ close S down    Chair squats Chair:   3x5 w/ 2" box under L LE    Slow on way down hands on R knee NV Slow on way down hands on R knee off NuStep x10 Chair:   2x5 w/ 2" box under L LE  5x STS=18"   Crate carry        Step ups 6"x10 fwd L LE 1st; + lateral  4" x10 fwd/ lateral // bars 4" x10 fwd/ lateral // bars 6"x10 fwd L LE 1st       Modalities        CP  KNEE

## 2022-07-20 ENCOUNTER — OFFICE VISIT (OUTPATIENT)
Dept: PHYSICAL THERAPY | Facility: CLINIC | Age: 67
End: 2022-07-20
Payer: COMMERCIAL

## 2022-07-20 DIAGNOSIS — R26.9 GAIT ABNORMALITY: Primary | ICD-10-CM

## 2022-07-20 DIAGNOSIS — M25.561 RIGHT KNEE PAIN, UNSPECIFIED CHRONICITY: ICD-10-CM

## 2022-07-20 PROCEDURE — 97530 THERAPEUTIC ACTIVITIES: CPT

## 2022-07-20 PROCEDURE — 97110 THERAPEUTIC EXERCISES: CPT

## 2022-07-20 PROCEDURE — 97112 NEUROMUSCULAR REEDUCATION: CPT

## 2022-07-20 NOTE — PROGRESS NOTES
Daily Note     Today's date: 2022  Patient name: Mamadou Magallon  : 1955  MRN: [id number]  Referring provider: Timo Mitchell DO  Dx:   Encounter Diagnosis     ICD-10-CM    1  Gait abnormality  R26 9    2  Right knee pain, unspecified chronicity  M25 561        Start Time: 0900  Stop Time: 0945  Total time in clinic (min): 45 minutes    Subjective: Biggest complaint is R knee weakness and knee wanting to go forward  Objective: See treatment diary below    Assessment: Tolerated treatment well, active warm up on nustep with cues for increasing SPM  Improved ability to increase WB thorugh R LE w/CSMI squats  Increased comfort with sustained WB through R LE today  Able to perform step ups with R LE leading today demonstrating improved functional strength of R LE  Patient demonstrated fatigue post treatment and would benefit from continued PT  Plan: Continue per plan of care        Re-Evaluation:  PRECAUTIONS:CARDIAC /LOOP RECORDER  Knee Specialty Daily Treatment Diary   Access Code [email or web address]       Manual Therapy                            Patellar mobs                    Ther Ex    Nu Step S=11 L6 x10 min L6x 10 min  L6 x10 min L6 x10 min   Biodex Bike S=        Heel slides flex/abd        3 way hip CSMI x10 ea CSMI x10 ea                              SLR all planes        SAQ                HR/TR CSMI x20  CSMI x20   x20  CSMI x20            ALT Standing hamstring curls in mirror    2x10            Mini Squat CSMI x20  CSMI 2x15  Mirror x20 CSMI x20    Step Lunges  8" x10 alt //      TKE Red x25 w/ towel Blue x25 w/towel  Red x20 w/ towel Red x25 w/ towel   Total gym squats (deep)        Neuro Re-ed        CSMi weight shift/ squat CSMI:   -FT EO: 1'  -FT EC: 45" CSMI:   -FT EO: 1'  -FT EC: 45"  -SLS wt shift w/UE support   CSMI:   -FT EO: 1'  -FT EC: 45"   QS        Gluteal set        Bridges        Eccentric LAQ AROM x15   AAROM x10    CSMI Balance L1-4 L3-L5   L1   Fitter board        Eccentric Leg press S=8 22 5# x15 eccentric w/box L LE  22  5# x15 single   15# x15 single  22  5# x15 eccentric 22 5# 2x15 eccentric  15# 2x15 single   Supine Clam Shells        Tandem EO        Foam feet together EO/EC    EO 0H/EC 1H :30x2 ea    GAIT Training        sidestepping        Heel-toe amb        Mirror walking        Ther Act        Amb up/down steps    2 HR 4 steps x 4 trials VC+ Jaylyn-CG up/ close S down    Chair squats Chair:   3x5 w/ 2" box under L LE   Chair:   3x5 w/ 2" box under L LE  Slow on way down hands on R knee off NuStep x10 Chair:   2x5 w/ 2" box under L LE  5x STS=18"   Crate carry        Step ups 6"x10 fwd L LE 1st; + lateral 4" x10 fwd R LE 1st + lateral  4" x10 fwd/ lateral // bars 6"x10 fwd L LE 1st       Modalities        CP  KNEE

## 2022-07-22 ENCOUNTER — OFFICE VISIT (OUTPATIENT)
Dept: PHYSICAL THERAPY | Facility: CLINIC | Age: 67
End: 2022-07-22
Payer: COMMERCIAL

## 2022-07-22 DIAGNOSIS — R26.9 GAIT ABNORMALITY: Primary | ICD-10-CM

## 2022-07-22 DIAGNOSIS — M25.561 RIGHT KNEE PAIN, UNSPECIFIED CHRONICITY: ICD-10-CM

## 2022-07-22 PROCEDURE — 97530 THERAPEUTIC ACTIVITIES: CPT

## 2022-07-22 PROCEDURE — 97112 NEUROMUSCULAR REEDUCATION: CPT

## 2022-07-22 PROCEDURE — 97110 THERAPEUTIC EXERCISES: CPT

## 2022-07-22 NOTE — PROGRESS NOTES
Daily Note     Today's date: 2022  Patient name: Noa Devine  : 1955  MRN: [id number]  Referring provider: Domenic Godfrey DO  Dx:   Encounter Diagnosis     ICD-10-CM    1  Gait abnormality  R26 9    2  Right knee pain, unspecified chronicity  M25 561        Start Time: 915  Stop Time: 1000  Total time in clinic (min): 45 minutes    Subjective: Felt good after last visit  Objective: See treatment diary below    Assessment: Tolerated treatment well, active warm up on nustep  Patient able to increase weighted resistance with leg press today- improved eccentric control with cues  Improved ease with forward/lateral step ups with R LE  Patient is making good progress toward objective/functional goals  Patient demonstrated fatigue post treatment and would benefit from continued PT    Plan: Continue per plan of care        Re-Evaluation:  PRECAUTIONS:CARDIAC /LOOP RECORDER  Knee Specialty Daily Treatment Diary   Access Code [email or web address]       Manual Therapy                            Patellar mobs                    Ther Ex    Nu Step S=11 L6 x10 min L6x 10 min L6x 10 min  L6 x10 min   Biodex Bike S=        Heel slides flex/abd        3 way hip CSMI x10 ea CSMI x10 ea CSMI x20 ea                             SLR all planes        SAQ                HR/TR CSMI x20  CSMI x20  CSMI x20   CSMI x20            ALT Standing hamstring curls in mirror                Mini Squat CSMI x20  CSMI 2x15 CSMI 2x15  CSMI x20    Step Lunges  8" x10 alt // 8" R LE x15     TKE Red x25 w/ towel Blue x25 w/towel Blue x25 w/towel  Red x25 w/ towel   Total gym squats (deep)        Neuro Re-ed        CSMi weight shift/ squat CSMI:   -FT EO: 1'  -FT EC: 45" CSMI:   -FT EO: 1'  -FT EC: 45"  -SLS wt shift w/UE support CSMI:   -FT EO: 1'  -FT EC: 45"  -SLS wt shift w/UE support  CSMI:   -FT EO: 1'  -FT EC: 45"   QS        Gluteal set        Bridges        Eccentric LAQ AROM x15       CSMI Balance L1-4 L3-L5   L1   Fitter board        Eccentric Leg press S=8 22 5# x15 eccentric w/box L LE  22  5# x15 single  35# x15 eccentric     22 5#x15 single  22 5# 2x15 eccentric  15# 2x15 single   Supine Clam Shells        Tandem EO        Foam feet together EO/EC        GAIT Training        sidestepping   // 10' x5ea      Heel-toe amb        Mirror walking        Ther Act        Amb up/down steps        Chair squats Chair:   3x5 w/ 2" box under L LE   Chair:   3x5 w/ 2" box under L LE Chair:   3x5 w/ 2" box under L LE  Chair:   2x5 w/ 2" box under L LE  5x STS=18"   Crate carry        Step ups 6"x10 fwd L LE 1st; + lateral 4" x10 fwd R LE 1st + lateral 4" x10 fwd R LE 1st + lateral  6"x10 fwd L LE 1st       Modalities        CP  KNEE

## 2022-07-25 ENCOUNTER — OFFICE VISIT (OUTPATIENT)
Dept: PHYSICAL THERAPY | Facility: CLINIC | Age: 67
End: 2022-07-25
Payer: COMMERCIAL

## 2022-07-25 DIAGNOSIS — M25.561 RIGHT KNEE PAIN, UNSPECIFIED CHRONICITY: ICD-10-CM

## 2022-07-25 DIAGNOSIS — R26.9 GAIT ABNORMALITY: Primary | ICD-10-CM

## 2022-07-25 PROCEDURE — 97112 NEUROMUSCULAR REEDUCATION: CPT

## 2022-07-25 PROCEDURE — 97530 THERAPEUTIC ACTIVITIES: CPT

## 2022-07-25 PROCEDURE — 97110 THERAPEUTIC EXERCISES: CPT

## 2022-07-25 NOTE — PROGRESS NOTES
Daily Note     Today's date: 2022  Patient name: Codi Moya  : 1955  MRN: [id number]  Referring provider: Lux Rosario DO  Dx:   Encounter Diagnosis     ICD-10-CM    1  Gait abnormality  R26 9    2  Right knee pain, unspecified chronicity  M25 561        Start Time: 0857          Subjective: Patient states she has no pain, but the right leg "just does not want to wake up"  She states she is always swollen in the right leg  Objective: See treatment diary below      Assessment: Tolerated treatment well  Patient would benefit from continued PT for stretching and strengthening  Patient was able to perform her exercises with little difficulty and no increase of pain  She felt ok when she left department  Plan: Continue per plan of care  Progress treatment as tolerated         Re-Evaluation:  PRECAUTIONS:CARDIAC /LOOP RECORDER  Knee Specialty Daily Treatment Diary   Access Code [email or web address]       Manual Therapy                             Patellar mobs                    Ther Ex     Nu Step S=11 L6 x10 min L6x 10 min L6x 10 min L6 x10 min    Biodex Bike S=        Heel slides flex/abd        3 way hip CSMI x10 ea CSMI x10 ea CSMI x20 ea CSMI x15 ea                            SLR all planes        SAQ                HR/TR CSMI x20  CSMI x20  CSMI x20  x20 ea            ALT Standing hamstring curls in mirror                Mini Squat CSMI x20  CSMI 2x15 CSMI 2x15 CSMI 2x15    Step Lunges  8" x10 alt // 8" R LE x15 8" R LE x15    TKE Red x25 w/ towel Blue x25 w/towel Blue x25 w/towel Blue x25 w/towel    Total gym squats (deep)        Neuro Re-ed        CSMi weight shift/ squat CSMI:   -FT EO: 1'  -FT EC: 45" CSMI:   -FT EO: 1'  -FT EC: 45"  -SLS wt shift w/UE support CSMI:   -FT EO: 1'  -FT EC: 45"  -SLS wt shift w/UE support CSMI:   -FT EO: 1'  -FT EC: 45"  -SLS wt shift w/UE support 2x:15    QS        Gluteal set        Bridges        Eccentric LAQ AROM x15       CSMI Balance L1-4 L3-L5      Fitter board        Eccentric Leg press S=8 22 5# x15 eccentric w/box L LE  22  5# x15 single  35# x15 eccentric     22 5#x15 single 35# x20 eccentric     22  5#x20 single    Supine Clam Shells        Tandem EO        Foam feet together EO/EC        GAIT Training        sidestepping   // 10' x5ea  // bars 10 ft x6 ea    Heel-toe amb        Mirror walking        Ther Act        Amb up/down steps        Chair squats Chair:   3x5 w/ 2" box under L LE   Chair:   3x5 w/ 2" box under L LE Chair:   3x5 w/ 2" box under L LE Chair:   x10 w/ 2" box under L LE    Crate carry        Step ups 6"x10 fwd L LE 1st; + lateral 4" x10 fwd R LE 1st + lateral 4" x10 fwd R LE 1st + lateral 4" x10 fwd R LE 1st + lateral        Modalities        CP  KNEE

## 2022-07-27 ENCOUNTER — OFFICE VISIT (OUTPATIENT)
Dept: PHYSICAL THERAPY | Facility: CLINIC | Age: 67
End: 2022-07-27
Payer: COMMERCIAL

## 2022-07-27 DIAGNOSIS — M25.561 RIGHT KNEE PAIN, UNSPECIFIED CHRONICITY: ICD-10-CM

## 2022-07-27 DIAGNOSIS — R26.9 GAIT ABNORMALITY: Primary | ICD-10-CM

## 2022-07-27 PROCEDURE — 97530 THERAPEUTIC ACTIVITIES: CPT

## 2022-07-27 PROCEDURE — 97110 THERAPEUTIC EXERCISES: CPT

## 2022-07-27 PROCEDURE — 97112 NEUROMUSCULAR REEDUCATION: CPT

## 2022-07-27 NOTE — PROGRESS NOTES
Daily Note     Today's date: 2022  Patient name: Noa Devine  : 1955  MRN: [id number]  Referring provider: Domenic Godfrey DO  Dx:   Encounter Diagnosis     ICD-10-CM    1  Gait abnormality  R26 9    2  Right knee pain, unspecified chronicity  M25 561        Start Time: 901          Subjective: Patient has no pain, but she still feels weak  She noticed she is is not as wobbly with her eyes closed  Objective: See treatment diary below      Assessment: Tolerated treatment well  Patient would benefit from continued PT for balance and strengthening  Patient was able to increase some of her exercises  She continues to feel like the right knee is not getting stronger even though signs are showing it is  Patient felt ok when she left department  Plan: Continue per plan of care  Progress note during next visit        Re-Evaluation:  PRECAUTIONS:CARDIAC /LOOP RECORDER  Knee Specialty Daily Treatment Diary   Access Code [email or web address]       Manual Therapy                            Patellar mobs                    Ther Ex    Nu Step S=11 L6 x10 min L6x 10 min L6x 10 min L6 x10 min L6 x10 min   Biodex Bike S=        Heel slides flex/abd        3 way hip CSMI x10 ea CSMI x10 ea CSMI x20 ea CSMI x15 ea CSMI x15 ea                           SLR all planes        SAQ                HR/TR CSMI x20  CSMI x20  CSMI x20  x20 ea x20ea           ALT Standing hamstring curls in mirror                Mini Squat CSMI x20  CSMI 2x15 CSMI 2x15 CSMI 2x15 CSMI 2x15   Step Lunges  8" x10 alt // 8" R LE x15 8" R LE x15 8" R LE x15   TKE Red x25 w/ towel Blue x25 w/towel Blue x25 w/towel Blue x25 w/towel [place] w/towel   Total gym squats (deep)        Neuro Re-ed        CSMi weight shift/ squat CSMI:   -FT EO: 1'  -FT EC: 45" CSMI:   -FT EO: 1'  -FT EC: 45"  -SLS wt shift w/UE support CSMI:   -FT EO: 1'  -FT EC: 45"  -SLS wt shift w/UE support CSMI:   -FT EO: 1'  -FT EC: 45"  -SLS wt shift w/UE support 2x:15 CSMI:   -FT EO: 1'  -FT EC: 1'  -SLS wt shift w/UE support 2x:15   QS        Gluteal set        Bridges        Eccentric LAQ AROM x15       CSMI Balance L1-4 L3-L5      Fitter board        Eccentric Leg press S=8 22 5# x15 eccentric w/box L LE  22  5# x15 single  35# x15 eccentric     22 5#x15 single 35# x20 eccentric     22  5#x20 single 35# x25 eccentric     22 5#x25 single   Supine Clam Shells        Tandem EO        Foam feet together EO/EC        GAIT Training        sidestepping   // 10' x5ea  // bars 10 ft x6 ea // bars 10 ft x6 ea   Heel-toe amb        Mirror walking        Ther Act        Amb up/down steps        Chair squats Chair:   3x5 w/ 2" box under L LE   Chair:   3x5 w/ 2" box under L LE Chair:   3x5 w/ 2" box under L LE Chair:   x10 w/ 2" box under L LE Chair:   3x5 w/ 2" box under L LE   Crate carry        Step ups 6"x10 fwd L LE 1st; + lateral 4" x10 fwd R LE 1st + lateral 4" x10 fwd R LE 1st + lateral 4" x10 fwd R LE 1st + lateral 4" x10 fwd R LE 1st + lateral       Modalities        CP  KNEE

## 2022-07-28 NOTE — PROGRESS NOTES
PT Re-Evaluation     Today's date: 2022  Patient name: Jazmine Green  : 1955  MRN: [id number]  Referring provider: Liz Chin DO  Dx:   Encounter Diagnosis     ICD-10-CM    1  Gait abnormality  R26 9    2  Right knee pain, unspecified chronicity  M25 561                   Assessment  Assessment details: Jazmine Green is a 61 y o  female with a history of bicuspid oartic valve, dilated CM, hyperlipidemia, HTN, PAF, peptic ulcer disease, Hx stroke, vitamin D deficiency, Hx PE, glaucoma, and BMI>30 that presents for a physical therapy RE evaluation  The patient has attended 10 sessions of PT  The patient demonstrates signs and symptoms consistent with R knee pain; gait abnormality  During the examination the patient demonstrated slightly improved R LE strength, improved but decreased proprioception/balance, improved gait, and decreased R knee pain  The patient has made functional gains since starting therapy  The patient is now able to stand for 30 minutes prior to needing to sit and rest   The patient is able to walk in her apartment without any assistive device  The patient continues to have difficulty with walking on unlevel surfaces, ambulate up/down steps, and squat/kneel  The patient will benefit from continued skilled PT to address impairments, work towards goals, and restore patient PLOF  Impairments: abnormal gait, activity intolerance, impaired balance, impaired physical strength, lacks appropriate home exercise program and pain with function  Functional limitations: difficulty with prolonged standing, prolonged walking, difficulty walking on unlevel surfaces, difficulty squatting/kneeling, difficulty stair-climbing, and difficulty transferring from low surfaces  Symptom irritability: lowBarriers to therapy: Medical Hx  Understanding of Dx/Px/POC: fair   Prognosis: fair  Prognosis details: Medical Hx    Goals  STG: Achieve in 4-6 weeks  1    Patient's R knee pain at worst less than 1/10 to allow for proper gait  PROGRESSING 7/29  2  RLE MMT improve to > 4/5 all motions tested to improve ADL/recreational activities  PROGRESSING 7/29  3  30 second sit to stand score improve by 5 stands ( 12 stands) to indicate improved transfers  PROGRESSING 7/29    LTG:  Achieve in 6-12 weeks  1  Patient's knee FOTO score improve to > 65% to indicate a return to normal functioning  PROGRESSING 7/29  2  Patient achieve personal goal of walking normally without SPC and getting stronger at her R knee  PROGRESSING 7/29  3  Patient to achieve independence with home exercise plan  PROGRESSING 7/29      Plan  Plan details: RE-ASSESS 1X/MONTH  Patient would benefit from: skilled physical therapy  Planned modality interventions: cryotherapy, thermotherapy: hydrocollator packs and electrical stimulation/Russian stimulation  Other planned modality interventions: IASTM  Planned therapy interventions: joint mobilization, manual therapy, massage, neuromuscular re-education, patient education, postural training, self care, strengthening, stretching, therapeutic activities, therapeutic exercise, home exercise program, balance/weight bearing training and gait training  Frequency: 1-3X/WK  Duration in weeks: 12  Plan of Care beginning date: 7/5/2022  Plan of Care expiration date: 10/1/2022  Treatment plan discussed with: PTA and patient        Subjective Evaluation    History of Present Illness  Date of surgery: 3/14/2022  Mechanism of injury: surgery  Mechanism of injury: SUBJECTIVE: 7/29/2022: The patient reports some improvement at the R knee since starting therapy  The patient is now able to walk in her apartment without any assistive device  The patient is able to stand for 30 minutes prior to needing to sit  The patient continues to have difficulty with walking on unlevel surfaces  The patient continues to have difficulty with ambulating up/down the steps, squatting, and kneeling   The patient will benefit from continued PT focused on achieving patient specific goals  INJURY HISTORY: Kenny Beltran is a 61 y o  female that presents to outpatient physical therapy with complaints of R knee weakness  The patient reports multiple surgeries at her R knee (3) over the past year, the first surgery for a quad tendon tear 2021; Then in May 2021 for infection, and finally R TKA 3/14/2022  The patient notes her strength has not returned since having the 3 surgeries  The patient notes stiffness and difficulty with prolonged standing > 15 mins; difficulty lifting her leg onto/off of the bed  The patient has difficulty with prolonged walking, ambulating up/down the steps, squatting, and kneeling  The patient's main goal for physical therapy is to walk normally without a limp and without a SPC  Recurrent probem    Pain  Current pain ratin  At worst pain ratin  Aggravating factors: walking, stair climbing and lifting  Progression: resolved    Social Support  Steps to enter house: no  Stairs in house: no   Lives in: apartment  Lives with: alone    Employment status: not working  Hand dominance: right    Treatments  Current treatment: physical therapy  Patient Goals  Patient goals for therapy: decreased pain, increased motion, increased strength, improved balance, independence with ADLs/IADLs and return to sport/leisure activities  Patient goal: walk without limp without SPC; get stronger ( progressing)        Objective     Palpation     Additional Palpation Details  NO TTP    Tenderness     Right Knee   Tenderness in the inferior patella and tibial tubercle  Additional Tenderness Details  Patient is tender at her distal knee incision    Neurological Testing     Sensation     Knee   Left Knee   Intact: light touch    Right Knee   Intact: light touch   Hyposensation: light touch    Comments   Right light touch: R lateral knee joint line       Active Range of Motion   Left Knee Flexion: 126 degrees   Extension: 0 degrees     Right Knee   Flexion: 125 degrees   Extension: 0 degrees     Patellar Static Positioning   Left Knee: WFL  Right Knee: [facility]    Strength/Myotome Testing     Left Hip   Planes of Motion   Flexion: 4  Extension: 4-  Abduction: 4  Adduction: 4    Right Hip   Planes of Motion   Flexion: 4-  Extension: 4-  Abduction: 3+  Adduction: 3+    Left Knee   Flexion: 4+  Extension: 4+  Quadriceps contraction: fair    Right Knee   Flexion: 4-  Extension: 2+  Quadriceps contraction: poor    Additional Strength Details  Improved    Tests     Additional Tests Details  FOTO: 46% ( predicted 60%)    Gait impairments: Patient ambulates with SPC WBAT B/L LE    The patient demonstrates slow gait speed, unequal step lengths, L lateral trunk lean, increased medial/lateral sway, dips her L shoulder, and decreased heel-toe rollover R LE     30SSTS: 9 stands ( improved 2 stands) (12 stands is norm)less L lateral lean, used B/L UE  Balance: Stagger feet stepping without UE: maintains 8 seconds then LOB               Re-Evaluation:8/26 ( check visit number)  PRECAUTIONS:CARDIAC /LOOP RECORDER  Knee Specialty Daily Treatment Diary   Access Code [email or web address]       Manual Therapy 7/29 7/20 7/22 7/25 7/27                           Patellar mobs                    Ther Ex 7/29 7/20 7/22 7/25 7/27   Nu Step S=11 L6 x 12 mins L6x 10 min L6x 10 min L6 x10 min L6 x10 min   Biodex Bike S=        Heel slides flex/abd        3 way hip NV CSMI x10 ea CSMI x20 ea CSMI x15 ea CSMI x15 ea                           SLR all planes        SAQ                HR/TR x20 CSMI x20  CSMI x20  x20 ea x20ea           ALT Standing hamstring curls in mirror                Mini Squat x10 CSMi CSMI 2x15 CSMI 2x15 CSMI 2x15 CSMI 2x15   Step Lunges 8" x15 R LE 8" x10 alt // 8" R LE x15 8" R LE x15 8" R LE x15   TKE Blue x25 w/ towel Blue x25 w/towel Blue x25 w/towel [place] w/towel [place] w/towel   Total gym squats (deep) Neuro Re-ed        CSMi weight shift/ squat CSMI:   -FT EO: 1'  -FT EC: 1'  -SLS wt shift w/UE support 2x:15 CSMI:   -FT EO: 1'  -FT EC: 45"  -SLS wt shift w/UE support CSMI:   -FT EO: 1'  -FT EC: 45"  -SLS wt shift w/UE support CSMI:   -FT EO: 1'  -FT EC: 45"  -SLS wt shift w/UE support 2x:15 CSMI:   -FT EO: 1'  -FT EC: 1'  -SLS wt shift w/UE support 2x:15   QS        Gluteal set        Bridges        Eccentric LAQ *       CSMI Balance  L3-L5      Fitter board        Eccentric Leg press S=8 35# x25 eccentric  SINGLE 35# x10    35# x15 eccentric     22 5#x15 single 35# x20 eccentric     22  5#x20 single 35# x25 eccentric     22 5#x25 single   Supine Clam Shells        Tandem EO        Foam feet together EO/EC *       GAIT Training        sidestepping //bar 10ft x 6  // 10' x5ea  // bars 10 ft x6 ea // bars 10 ft x6 ea   Heel-toe amb * NV       Mirror walking        Ther Act        Amb up/down steps        Chair squats x10 no step   2x5 w/ 2" step Chair:   3x5 w/ 2" box under L LE Chair:   3x5 w/ 2" box under L LE Chair:   x10 w/ 2" box under L LE Chair:   3x5 w/ 2" box under L LE   Crate carry        Step ups 4" x10 fwd R LE first + lateral 4" x10 fwd R LE 1st + lateral 4" x10 fwd R LE 1st + lateral 4" x10 fwd R LE 1st + lateral 4" x10 fwd R LE 1st + lateral       Modalities        CP  KNEE                                  The patient was given a new home exercise plan with written handout, pictures, and verbal instruction  The patient accepts and understands the new home activities

## 2022-07-29 ENCOUNTER — EVALUATION (OUTPATIENT)
Dept: PHYSICAL THERAPY | Facility: CLINIC | Age: 67
End: 2022-07-29
Payer: COMMERCIAL

## 2022-07-29 DIAGNOSIS — R26.9 GAIT ABNORMALITY: Primary | ICD-10-CM

## 2022-07-29 DIAGNOSIS — M25.561 RIGHT KNEE PAIN, UNSPECIFIED CHRONICITY: ICD-10-CM

## 2022-07-29 PROCEDURE — 97112 NEUROMUSCULAR REEDUCATION: CPT | Performed by: PHYSICAL THERAPIST

## 2022-07-29 PROCEDURE — 97530 THERAPEUTIC ACTIVITIES: CPT | Performed by: PHYSICAL THERAPIST

## 2022-07-29 PROCEDURE — 97110 THERAPEUTIC EXERCISES: CPT | Performed by: PHYSICAL THERAPIST

## 2022-08-01 ENCOUNTER — OFFICE VISIT (OUTPATIENT)
Dept: PHYSICAL THERAPY | Facility: CLINIC | Age: 67
End: 2022-08-01
Payer: COMMERCIAL

## 2022-08-01 DIAGNOSIS — R26.9 GAIT ABNORMALITY: Primary | ICD-10-CM

## 2022-08-01 DIAGNOSIS — M25.561 RIGHT KNEE PAIN, UNSPECIFIED CHRONICITY: ICD-10-CM

## 2022-08-01 PROCEDURE — 97110 THERAPEUTIC EXERCISES: CPT | Performed by: PHYSICAL THERAPIST

## 2022-08-01 NOTE — PROGRESS NOTES
Daily Note     Today's date: 2022  Patient name: Dilia Summers  : 1955  MRN: [id number]  Referring provider: Ross Quezada DO  Dx:   Encounter Diagnosis     ICD-10-CM    1  Gait abnormality  R26 9    2  Right knee pain, unspecified chronicity  M25 561                   Subjective: Pt reports that she was in the ED this morning for a headaches and isn't sure how much she will be able to do today  Notes she is seeing her PCP tomorrow for her headaches  She wants to try and participate in PT this visit  Objective: See treatment diary below      Assessment: Pt was unable to get through program this visit as her headache persisted  Will re-integrate program NV pending if patient is able to  Plan: Continue per plan of care  Progress treatment as tolerated         Re-Evaluation: ( check visit number)  PRECAUTIONS:CARDIAC /LOOP RECORDER  Knee Specialty Daily Treatment Diary   Access Code [email or web address]       Manual Therapy                            Patellar mobs                    Ther Ex    Nu Step S=11 L6 x 12 mins L4 x8 5 min  L6 x10 min L6 x10 min   Biodex Bike S=        Heel slides flex/abd        3 way hip NV   CSMI x15 ea CSMI x15 ea                           SLR all planes        SAQ                HR/TR x20   x20 ea x20ea           ALT Standing hamstring curls in mirror                Mini Squat x10 CSMi   CSMI 2x15 CSMI 2x15   Step Lunges 8" x15 R LE   8" R LE x15 8" R LE x15   TKE Blue x25 w/ towel   Blue x25 w/towel Blue x25 w/towel   Total gym squats (deep)        Neuro Re-ed        CSMi weight shift/ squat CSMI:   -FT EO: 1'  -FT EC: 1'  -SLS wt shift w/UE support 2x:15   CSMI:   -FT EO: 1'  -FT EC: 45"  -SLS wt shift w/UE support 2x:15 CSMI:   -FT EO: 1'  -FT EC: 1'  -SLS wt shift w/UE support 2x:15   QS        Gluteal set        Bridges        Eccentric LAQ *       CSMI Balance        Fitter board        Eccentric Leg press S=8 35# x25 eccentric  SINGLE 35# x10     35# x20 eccentric     22  5#x20 single 35# x25 eccentric     22 5#x25 single   Supine Clam Shells        Tandem EO        Foam feet together EO/EC *       GAIT Training        sidestepping //bar 10ft x 6   // bars 10 ft x6 ea // bars 10 ft x6 ea   Heel-toe amb * NV       Mirror walking        Ther Act        Amb up/down steps        Chair squats x10 no step   2x5 w/ 2" step   Chair:   x10 w/ 2" box under L LE Chair:   3x5 w/ 2" box under L LE   Crate carry        Step ups 4" x10 fwd R LE first + lateral   4" x10 fwd R LE 1st + lateral 4" x10 fwd R LE 1st + lateral       Modalities        CP  KNEE

## 2022-08-03 ENCOUNTER — OFFICE VISIT (OUTPATIENT)
Dept: PHYSICAL THERAPY | Facility: CLINIC | Age: 67
End: 2022-08-03
Payer: COMMERCIAL

## 2022-08-03 DIAGNOSIS — M25.561 RIGHT KNEE PAIN, UNSPECIFIED CHRONICITY: ICD-10-CM

## 2022-08-03 DIAGNOSIS — R26.9 GAIT ABNORMALITY: Primary | ICD-10-CM

## 2022-08-03 PROCEDURE — 97530 THERAPEUTIC ACTIVITIES: CPT | Performed by: PHYSICAL THERAPIST

## 2022-08-03 PROCEDURE — 97112 NEUROMUSCULAR REEDUCATION: CPT | Performed by: PHYSICAL THERAPIST

## 2022-08-03 PROCEDURE — 97110 THERAPEUTIC EXERCISES: CPT | Performed by: PHYSICAL THERAPIST

## 2022-08-03 NOTE — PROGRESS NOTES
Daily Note     Today's date: 8/3/2022  Patient name: Asif Arnold  : 1955  MRN: [id number]  Referring provider: Rhoda Hayden DO  Dx:   Encounter Diagnosis     ICD-10-CM    1  Gait abnormality  R26 9    2  Right knee pain, unspecified chronicity  M25 561                   Subjective: Patient states HA is much improved since last session  Saw PCP who Rx prednisone taper which significantly reduced symptoms  States no pain in knee, only weakness  Continues to have difficulty with end range extension  Objective: See treatment diary below      Assessment: Tolerated treatment well  R knee hyperextension noted with CKC activities likely due to compensation of weakness in quad  Patient was educated on avoidance on knee locking and attempting to utilize quad mm throughout the day for continued improvement in strength and general stability  Patient would benefit from continued PT      Plan: Continue per plan of care  Progress treatment as tolerated         Re-Evaluation: ( check visit number)  PRECAUTIONS:CARDIAC /LOOP RECORDER  Knee Specialty Daily Treatment Diary   Access Code [email or web address]       Manual Therapy 7/29 8/1 8/3 7/25 7/27                           Patellar mobs                    Ther Ex 7/29 8/1 8/3 7/25 7/27   Nu Step S=11 L6 x 12 mins L4 x8 5 min 10 min L6 x10 min L6 x10 min   Biodex Bike S=        Heel slides flex/abd        3 way hip NV   CSMI x15 ea CSMI x15 ea                           SLR all planes        SAQ                HR/TR x20  20x x20 ea x20ea           ALT Standing hamstring curls in mirror                Mini Squat x10 CSMi  CSMI 2x15 CSMI 2x15 CSMI 2x15   Step Lunges 8" x15 R LE  8" x15 R LE 8" R LE x15 8" R LE x15   TKE Blue x25 w/ towel  Blue x25 w/ towel Blue x25 w/towel Blue x25 w/towel   Total gym squats (deep)        Neuro Re-ed        CSMi weight shift/ squat CSMI:   -FT EO: 1'  -FT EC: 1'  -SLS wt shift w/UE support 2x:15  CSMI:   -FT EO: 1'  -FT EC: 1'  -SLS wt shift w/UE support 2x:15 CSMI:   -FT EO: 1'  -FT EC: 45"  -SLS wt shift w/UE support 2x:15 CSMI:   -FT EO: 1'  -FT EC: 1'  -SLS wt shift w/UE support 2x:15   QS        Gluteal set        Bridges        Eccentric LAQ *       CSMI Balance        Fitter board        Eccentric Leg press S=8 35# x25 eccentric  SINGLE 35# x10    S=7  35# x25 eccentric  SINGLE 15# x10 35# x20 eccentric     22  5#x20 single 35# x25 eccentric     22 5#x25 single   Supine Clam Shells        Tandem EO        Foam feet together EO/EC *       GAIT Training        sidestepping //bar 10ft x 6  //bar 10ft x 6 // bars 10 ft x6 ea // bars 10 ft x6 ea   Heel-toe amb * NV       Mirror walking        Ther Act        Amb up/down steps        Chair squats x10 no step   2x5 w/ 2" step  x10 no step   2x5 w/ 2" step Chair:   x10 w/ 2" box under L LE Chair:   3x5 w/ 2" box under L LE   Crate carry        Step ups 4" x10 fwd R LE first + lateral  4" x10 fwd R LE first + lateral 4" x10 fwd R LE 1st + lateral 4" x10 fwd R LE 1st + lateral       Modalities        CP  KNEE

## 2022-08-05 ENCOUNTER — OFFICE VISIT (OUTPATIENT)
Dept: PHYSICAL THERAPY | Facility: CLINIC | Age: 67
End: 2022-08-05
Payer: COMMERCIAL

## 2022-08-05 DIAGNOSIS — M25.561 RIGHT KNEE PAIN, UNSPECIFIED CHRONICITY: ICD-10-CM

## 2022-08-05 DIAGNOSIS — R26.9 GAIT ABNORMALITY: Primary | ICD-10-CM

## 2022-08-05 PROCEDURE — 97110 THERAPEUTIC EXERCISES: CPT | Performed by: PHYSICAL THERAPIST

## 2022-08-05 PROCEDURE — 97530 THERAPEUTIC ACTIVITIES: CPT | Performed by: PHYSICAL THERAPIST

## 2022-08-05 PROCEDURE — 97112 NEUROMUSCULAR REEDUCATION: CPT | Performed by: PHYSICAL THERAPIST

## 2022-08-05 NOTE — PROGRESS NOTES
Daily Note     Today's date: 2022  Patient name: Asif Arnold  : 1955  MRN: [id number]  Referring provider: Rhoda Hayden DO  Dx:   Encounter Diagnosis     ICD-10-CM    1  Gait abnormality  R26 9    2  Right knee pain, unspecified chronicity  M25 561        Start Time: 1050  Stop Time: 1135  Total time in clinic (min): 45 minutes    Subjective: No new complaints  Objective: See treatment diary below      Assessment: Tolerated treatment well  Noted weakness of right quad eccentric strength with collapse onto LLE during descent on stairs  Patient was educated on utilizing right quad control  Patient would benefit from continued PT      Plan: Continue per plan of care  Progress treatment as tolerated         Re-Evaluation: ( check visit number)  PRECAUTIONS:CARDIAC /LOOP RECORDER  Knee Specialty Daily Treatment Diary   Access Code [email or web address]       Manual Therapy 7/29 8/1 8/3 8/5 7/27                           Patellar mobs                    Ther Ex 7/29 8/1 8/3 8/5 7/27   Nu Step S=11 L6 x 12 mins L4 x8 5 min 10 min L6 10 min L6 x10 min   Biodex Bike S=        Heel slides flex/abd        3 way hip NV    CSMI x15 ea                           SLR all planes        SAQ                HR/TR x20  20x 20x ea x20ea           ALT Standing hamstring curls in mirror                Mini Squat x10 CSMi  CSMI 2x15 CSMi (WB)2x15  CSMI 2x15   Step Lunges 8" x15 R LE  8" x15 R LE 8" x15 R LE 8" R LE x15   TKE Blue x25 w/ towel  Blue x25 w/ towel Blue x25 w/ towel Blue x25 w/towel   Total gym squats (deep)        Neuro Re-ed        CSMi weight shift/ squat CSMI:   -FT EO: 1'  -FT EC: 1'  -SLS wt shift w/UE support 2x:15  CSMI:   -FT EO: 1'  -FT EC: 1'  -SLS wt shift w/UE support 2x:15 CSMI:   -FT EO: 1' (xy)  -FT EC: 1' (xy)  - WS 2 min CSMI:   -FT EO: 1'  -FT EC: 1'  -SLS wt shift w/UE support 2x:15   QS        Gluteal set        Bridges        Eccentric [address] [place] board Eccentric Leg press S=8 35# x25 eccentric  SINGLE 35# x10    S=7  35# x25 eccentric  SINGLE 15# x10 S=7  35# x25 eccentric  SINGLE 15# x10 35# x25 eccentric     22 5#x25 single   Supine Clam Shells        Tandem EO        Foam feet together EO/EC *       GAIT Training        sidestepping //bar 10ft x 6  //bar 10ft x 6 //bar 10ft x 6 // bars 10 ft x6 ea   Heel-toe amb * NV       Mirror walking        Ther Act        Amb up/down steps        Chair squats x10 no step   2x5 w/ 2" step  x10 no step   2x5 w/ 2" step x10 no step   2x5 w/ 2" step Chair:   3x5 w/ 2" box under L LE   Crate carry        Step ups 4" x10 fwd R LE first + lateral  4" x10 fwd R LE first + lateral 4" x10 fwd R LE first + lateral 4" x10 fwd R LE 1st + lateral       Modalities        CP  KNEE

## 2022-08-08 ENCOUNTER — OFFICE VISIT (OUTPATIENT)
Dept: PHYSICAL THERAPY | Facility: CLINIC | Age: 67
End: 2022-08-08
Payer: COMMERCIAL

## 2022-08-08 DIAGNOSIS — R26.9 GAIT ABNORMALITY: Primary | ICD-10-CM

## 2022-08-08 DIAGNOSIS — M25.561 RIGHT KNEE PAIN, UNSPECIFIED CHRONICITY: ICD-10-CM

## 2022-08-08 PROCEDURE — 97112 NEUROMUSCULAR REEDUCATION: CPT | Performed by: PHYSICAL THERAPIST

## 2022-08-08 PROCEDURE — 97110 THERAPEUTIC EXERCISES: CPT | Performed by: PHYSICAL THERAPIST

## 2022-08-08 PROCEDURE — 97530 THERAPEUTIC ACTIVITIES: CPT | Performed by: PHYSICAL THERAPIST

## 2022-08-08 NOTE — PROGRESS NOTES
Daily Note     Today's date: 2022  Patient name: Cari Horta  : 1955  MRN: [id number]  Referring provider: Channing Stewart DO  Dx:   Encounter Diagnosis     ICD-10-CM    1  Gait abnormality  R26 9    2  Right knee pain, unspecified chronicity  M25 561                   Subjective: The patient denies feeling any pain today      Objective: See treatment diary below      Assessment: The patient tolerated all activities well today  The patient needed verbal and manual cues for proper posture and technique to perform the exercises properly  There were no complaints of increased pain or problems after the session today  The patient will benefit from continued skilled physical therapy to progress towards achieving patient centered goals  Plan: Continue per plan of care  Progress treatment as tolerated         Re-Evaluation: ( check visit number)  PRECAUTIONS:CARDIAC /LOOP RECORDER  Knee Specialty Daily Treatment Diary   Access Code [email or web address]       Manual Therapy  8/1 8/3 8/5 8/8                           Patellar mobs                    Ther Ex  8/1 8/3 8/5 8/8   Nu Step S=11  L4 x8 5 min 10 min L6 10 min L6 x10 min   Biodex Bike S=        Heel slides flex/abd        3 way hip                                SLR all planes        SAQ                HR/TR   20x 20x ea x20ea           ALT Standing hamstring curls in mirror                Mini Squat   CSMI 2x15 CSMi (WB)2x15  CSMI 2x15   Step Lunges   8" x15 R LE 8" x15 R LE 8" R LE x15   TKE   Blue x25 w/ towel Blue x25 w/ towel Blue x25 w/towel   Total gym squats (deep)        Neuro Re-ed        CSMi weight shift/ squat   CSMI:   -FT EO: 1'  -FT EC: 1'  -SLS wt shift w/UE support 2x:15 CSMI:   -FT EO: 1' (xy)  -FT EC: 1' (xy)  - WS 2 min CSMI:   -FT EO: 1'  -FT EC: 1'   wt shift no UE support 2x:30   QS        Gluteal set        Bridges        Eccentric LAQ        CSMI Balance        Fitter board        Eccentric Leg press S=8   S=7  35# x25 eccentric  SINGLE 15# x10 S=7  35# x25 eccentric  SINGLE 15# x10 35# x25 eccentric     22 5#x25 single   Supine Clam Shells        Tandem EO        Foam feet together EO/EC        GAIT Training        sidestepping   //bar 10ft x 6 //bar 10ft x 6 // bars 10 ft x6 ea   Heel-toe amb        Mirror walking        Ther Act        Amb up/down steps        Chair squats   x10 no step   2x5 w/ 2" step x10 no step   2x5 w/ 2" step x10 no step  2x5 w/ 2" step   Crate carry        Step ups   4" x10 fwd R LE first + lateral 4" x10 fwd R LE first + lateral 4" x10 R LE fwd  + lateral       Modalities        CP  KNEE

## 2022-08-10 ENCOUNTER — OFFICE VISIT (OUTPATIENT)
Dept: PHYSICAL THERAPY | Facility: CLINIC | Age: 67
End: 2022-08-10
Payer: COMMERCIAL

## 2022-08-10 DIAGNOSIS — M25.561 RIGHT KNEE PAIN, UNSPECIFIED CHRONICITY: ICD-10-CM

## 2022-08-10 DIAGNOSIS — R26.9 GAIT ABNORMALITY: Primary | ICD-10-CM

## 2022-08-10 PROCEDURE — 97112 NEUROMUSCULAR REEDUCATION: CPT

## 2022-08-10 PROCEDURE — 97110 THERAPEUTIC EXERCISES: CPT

## 2022-08-10 PROCEDURE — 97530 THERAPEUTIC ACTIVITIES: CPT

## 2022-08-10 NOTE — PROGRESS NOTES
Daily Note     Today's date: 8/10/2022  Patient name: Baron Mo  : 1955  MRN: [id number]  Referring provider: Leonid Rod DO  Dx:   Encounter Diagnosis     ICD-10-CM    1  Gait abnormality  R26 9    2  Right knee pain, unspecified chronicity  M25 561                   Subjective: Pt arrives with no new reports  Objective: See treatment diary below      Assessment: Tolerated treatment well  Patient demonstrated fatigue post treatment, exhibited good technique with therapeutic exercises and would benefit from continued PT  Continued with program performed previously as pt responding well to current treatment  Cueing throughout session to ensure proper form and eccentric control with step ups  Plan: Continue per plan of care  Progress treatment as tolerated         Re-Evaluation: ( check visit number)  PRECAUTIONS:CARDIAC /LOOP RECORDER  Knee Specialty Daily Treatment Diary   Access Code [email or web address]       Manual Therapy  8/1 8/3 8/5 8/8 8/10                              Patellar mobs                      Ther Ex  8/1 8/3 8/5 8/8 810   Nu Step S=11  L4 x8 5 min 10 min L6 10 min L6 x10 min L6 x10'   Biodex Bike S=         Heel slides flex/abd         3 way hip                                    SLR all planes         SAQ                  HR/TR   20x 20x ea x20ea x20 ea            ALT Standing hamstring curls in mirror                  Mini Squat   CSMI 2x15 CSMi (WB)2x15  CSMI 2x15 CSMI 2x15   Step Lunges   8" x15 R LE 8" x15 R LE 8" R LE x15 8" RLE x15   TKE   Blue x25 w/ towel Blue x25 w/ towel Blue x25 w/towel Blue x25 w/ towel   Total gym squats (deep)         Neuro Re-ed         CSMi weight shift/ squat   CSMI:   -FT EO: 1'  -FT EC: 1'  -SLS wt shift w/UE support 2x:15 CSMI:   -FT EO: 1' (xy)  -FT EC: 1' (xy)  - WS 2 min CSMI:   -FT EO: 1'  -FT EC: 1'   wt shift no UE support 2x:30 CSMI:  FT EO:  FT EC:  Wt shift no UE support 2x 30"   QS         Gluteal set         [place] Eccentric LAQ         CSMI Balance         Fitter board         Eccentric Leg press S=8   S=7  35# x25 eccentric  SINGLE 15# x10 S=7  35# x25 eccentric  SINGLE 15# x10 35# x25 eccentric     22 5#x25 single 35# x25 eccentric    22 5# x25 single   Supine Clam Shells         Tandem EO         Foam feet together EO/EC         GAIT Training         sidestepping   //bar 10ft x 6 //bar 10ft x 6 // bars 10 ft x6 ea // bars 10ft x6 laps   Heel-toe amb         Mirror walking         Ther Act         Amb up/down steps         Chair squats   x10 no step   2x5 w/ 2" step x10 no step   2x5 w/ 2" step x10 no step  2x5 w/ 2" step x10 no step  2x5 w/ 2" step   Crate carry         Step ups   4" x10 fwd R LE first + lateral 4" x10 fwd R LE first + lateral 4" x10 R LE fwd  + lateral 4" x10 RLE fwd and lat       Modalities         CP  KNEE

## 2022-08-12 ENCOUNTER — OFFICE VISIT (OUTPATIENT)
Dept: PHYSICAL THERAPY | Facility: CLINIC | Age: 67
End: 2022-08-12
Payer: COMMERCIAL

## 2022-08-12 DIAGNOSIS — M25.561 RIGHT KNEE PAIN, UNSPECIFIED CHRONICITY: ICD-10-CM

## 2022-08-12 DIAGNOSIS — R26.9 GAIT ABNORMALITY: Primary | ICD-10-CM

## 2022-08-12 PROCEDURE — 97112 NEUROMUSCULAR REEDUCATION: CPT | Performed by: PHYSICAL THERAPIST

## 2022-08-12 PROCEDURE — 97110 THERAPEUTIC EXERCISES: CPT | Performed by: PHYSICAL THERAPIST

## 2022-08-12 PROCEDURE — 97530 THERAPEUTIC ACTIVITIES: CPT | Performed by: PHYSICAL THERAPIST

## 2022-08-12 NOTE — PROGRESS NOTES
Daily Note     Today's date: 2022  Patient name: Juan Ruby  : 1955  MRN: [id number]  Referring provider: Nelson Moore DO  Dx:   Encounter Diagnosis     ICD-10-CM    1  Gait abnormality  R26 9    2  Right knee pain, unspecified chronicity  M25 561                   Subjective: The patient denies feeling any pain at the right knee today  The patient reports she is able to  the kitchen to cook for 30-45 minutes prior to needing to sit - her standing tolerance was previously 15 minutes prior to needing to sit  Objective: See treatment diary below      Assessment: The patient continues to have difficulty with terminal knee extension strength at her R knee  The patient was given verbal cues to improve her standing posture and to minimize a left lateral shift  The patient needed verbal cues to slow her repetitions  The patient denies feeling any pain increase at the end of the treatment  Plan: Continue per plan of care  Progress treatment as tolerated         Re-Evaluation: ( check visit number)  PRECAUTIONS:CARDIAC /LOOP RECORDER  Knee Specialty Daily Treatment Diary   Access Code [email or web address]       Manual Therapy 8/12 8/1 8/3 8/5 8/8 8/10                              Patellar mobs                      Ther Ex 8/12 8/1 8/3 8/5 8/8 8/10   Nu Step S=11 L6 x 10 min L4 x8 5 min 10 min L6 10 min L6 x10 min L6 x10'   Biodex Bike S=         Heel slides flex/abd         3 way hip                                    SLR all planes         SAQ                  HR/TR x20 ea  20x 20x ea x20ea x20 ea            ALT Standing hamstring curls in mirror                  Mini Squat CSMi 2x15  CSMI 2x15 CSMi (WB)2x15  CSMI 2x15 CSMI 2x15   Step Lunges 8" R LE x20  8" x15 R LE 8" x15 R LE 8" R LE x15 8" RLE x15   TKE Blue x30 w/ towel  Blue x25 w/ towel Blue x25 w/ towel Blue x25 w/towel Blue x25 w/ towel   Total gym squats (deep)         Neuro Re-ed         CSMi weight shift/ squat CSMI:  FT EO: 1'  FT EC:1'  Wt shift no UE support 2x 30"  CSMI:   -FT EO: 1'  -FT EC: 1'  -SLS wt shift w/UE support 2x:15 CSMI:   -FT EO: 1' (xy)  -FT EC: 1' (xy)  - WS 2 min CSMI:   -FT EO: 1'  -FT EC: 1'   wt shift no UE support 2x:30 CSMI:  FT EO:  FT EC:  Wt shift no UE support 2x 30"   QS         Gluteal set         Bridges         Eccentric LAQ * NV        CSMI Balance         Fitter board         Eccentric Leg press S=7 35# x25 eccentric    22 5# x25 single  S=7  35# x25 eccentric  SINGLE 15# x10 S=7  35# x25 eccentric  SINGLE 15# x10 35# x25 eccentric     22 5#x25 single 35# x25 eccentric    22 5# x25 single   Supine Clam Shells         Tandem EO         Foam feet together EO/EC         GAIT Training         sidestepping 10ft x 6 // bar  //bar 10ft x 6 //bar 10ft x 6 // bars 10 ft x6 ea // bars 10ft x6 laps   Heel-toe amb         Mirror walking         Ther Act         Amb up/down steps         Chair squats x10 no step  2x5 /  x10 no step   2x5 w/ 2" step x10 no step   2x5 w/ 2" step x10 no step  2x5 w/ 2" step x10 no step  2x5 w/ 2" step   Crate carry         Step ups 4" x18 fwd  x12  4" x10 fwd R LE first + lateral 4" x10 fwd R LE first + lateral 4" x10 R LE fwd  + lateral 4" x10 RLE fwd and lat       Modalities         CP  KNEE

## 2022-08-15 ENCOUNTER — OFFICE VISIT (OUTPATIENT)
Dept: PHYSICAL THERAPY | Facility: CLINIC | Age: 67
End: 2022-08-15
Payer: COMMERCIAL

## 2022-08-15 DIAGNOSIS — M25.561 RIGHT KNEE PAIN, UNSPECIFIED CHRONICITY: ICD-10-CM

## 2022-08-15 DIAGNOSIS — R26.9 GAIT ABNORMALITY: Primary | ICD-10-CM

## 2022-08-15 PROCEDURE — 97110 THERAPEUTIC EXERCISES: CPT | Performed by: PHYSICAL THERAPIST

## 2022-08-15 PROCEDURE — 97530 THERAPEUTIC ACTIVITIES: CPT | Performed by: PHYSICAL THERAPIST

## 2022-08-15 PROCEDURE — 97112 NEUROMUSCULAR REEDUCATION: CPT | Performed by: PHYSICAL THERAPIST

## 2022-08-15 NOTE — PROGRESS NOTES
Daily Note     Today's date: 8/15/2022  Patient name: Noemi Loja  : 1955  MRN: [id number]  Referring provider: Wendy Jones DO  Dx:   Encounter Diagnosis     ICD-10-CM    1  Gait abnormality  R26 9    2  Right knee pain, unspecified chronicity  M25 561        Start Time: 0900  Stop Time: 1000  Total time in clinic (min): 60 minutes    Subjective: No new complaints  States increased ease with prolonged standing, no longer has any c/o pain  Objective: See treatment diary below      Assessment: Tolerated treatment well  Lacking TKE against gravity, may benefit from SAQ exercises in home program  Continues to lack eccentric quad strength causing R knee to lock for increased support, will buckle when fatigued  Patient would benefit from continued PT      Plan: Continue per plan of care  Progress treatment as tolerated         Re-Evaluation: ( check visit number)  PRECAUTIONS:CARDIAC /LOOP RECORDER  Knee Specialty Daily Treatment Diary   Access Code [email or web address]       Manual Therapy 8/12 8/15 8/3 8/5 8/8 8/10                              Patellar mobs                      Ther Ex 8/12 8/15 8/3 8/5 8/8 8/10   Nu Step S=11 L6 x 10 min L6 x 10 min 10 min L6 10 min L6 x10 min L6 x10'   Biodex Bike S=         Heel slides flex/abd         3 way hip                                    SLR all planes         SAQ                  HR/TR x20 ea 2x10 20x 20x ea x20ea x20 ea            ALT Standing hamstring curls in mirror                  Mini Squat CSMi 2x15 CSMi 2x15 CSMI 2x15 CSMi (WB)2x15  CSMI 2x15 CSMI 2x15   Step Lunges 8" R LE x20 20x alt 8" x15 R LE 8" x15 R LE 8" R LE x15 8" RLE x15   TKE Blue x30 w/ towel  Blue x25 w/ towel Blue x25 w/ towel Blue x25 w/towel Blue x25 w/ towel   Total gym squats (deep)         Neuro Re-ed         CSMi weight shift/ squat CSMI:  FT EO: 1'  FT EC:1'  Wt shift no UE support 2x 30" CSMI:  FT EO: 1'  FT EC:1'  Wt shift no UE support 2x 30" CSMI:   -FT EO: 1'  -FT EC: 1'  -SLS wt shift w/UE support 2x:15 CSMI:   -FT EO: 1' (xy)  -FT EC: 1' (xy)  - WS 2 min CSMI:   -FT EO: 1'  -FT EC: 1'   wt shift no UE support 2x:30 CSMI:  FT EO:  FT EC:  Wt shift no UE support 2x 30"   SAQ  *nv       Gluteal set         Bridges         Eccentric LAQ * NV *pl 1 2x10       CSMI Balance         Fitter board         Eccentric Leg press S=7 35# x25 eccentric    22 5# x25 single 55# DL 3x10     35# DL up, SL down 2x10 S=7  35# x25 eccentric  SINGLE 15# x10 S=7  35# x25 eccentric  SINGLE 15# x10 35# x25 eccentric     22 5#x25 single 35# x25 eccentric    22 5# x25 single   Supine Clam Shells         Tandem EO         Foam feet together EO/EC         GAIT Training         sidestepping 10ft x 6 // bar 10ft x 6 // bar //bar 10ft x 6 //bar 10ft x 6 // bars 10 ft x6 ea // bars 10ft x6 laps   Heel-toe amb         Mirror walking         Ther Act         Amb up/down steps         Chair squats x10 no step  2x5 / 2x10 staggered feet x10 no step   2x5 w/ 2" step x10 no step   2x5 w/ 2" step x10 no step  2x5 w/ 2" step x10 no step  2x5 w/ 2" step   Crate carry         Step down  *2" nv       Step ups 4" x18 fwd  x12 6" fwd 15x  4" lat 15x 4" x10 fwd R LE first + lateral 4" x10 fwd R LE first + lateral 4" x10 R LE fwd  + lateral 4" x10 RLE fwd and lat       Modalities         CP  KNEE

## 2022-08-17 ENCOUNTER — OFFICE VISIT (OUTPATIENT)
Dept: PHYSICAL THERAPY | Facility: CLINIC | Age: 67
End: 2022-08-17
Payer: COMMERCIAL

## 2022-08-17 DIAGNOSIS — M25.561 RIGHT KNEE PAIN, UNSPECIFIED CHRONICITY: ICD-10-CM

## 2022-08-17 DIAGNOSIS — R26.9 GAIT ABNORMALITY: Primary | ICD-10-CM

## 2022-08-17 PROCEDURE — 97530 THERAPEUTIC ACTIVITIES: CPT | Performed by: PHYSICAL THERAPIST

## 2022-08-17 PROCEDURE — 97112 NEUROMUSCULAR REEDUCATION: CPT | Performed by: PHYSICAL THERAPIST

## 2022-08-17 PROCEDURE — 97110 THERAPEUTIC EXERCISES: CPT | Performed by: PHYSICAL THERAPIST

## 2022-08-17 NOTE — PROGRESS NOTES
Daily Note     Today's date: 2022  Patient name: Michael Hollis  : 1955  MRN: [id number]  Referring provider: Lenard Meigs, DO  Dx:   Encounter Diagnosis     ICD-10-CM    1  Gait abnormality  R26 9    2  Right knee pain, unspecified chronicity  M25 561                   Subjective: The patient reports hearing her R knee make noise when she walks, " it jingles a little"  The patient denies feeling any pain presently  The patient notes point tenderness at her R medial patella area  Objective: See treatment diary below      Assessment: Treatment was focused on strengthening the patient's R terminal knee extension/quadriceps during the treatment today  The patient tolerated the treatment well with no complaints of pain increase or problems during/after the session  The patient continues to lack full terminal knee extension AROM due to muscle weakness  The patient continues to lag with SLR  The patient was given manual assist for eccentric strengthening of the R quads  The patient will benefit from continued PT to achieve her goals of therapy  Plan: Continue per plan of care  Progress treatment as tolerated         Re-Evaluation: ( check visit number)  PRECAUTIONS:CARDIAC /LOOP RECORDER  Knee Specialty Daily Treatment Diary   Access Code [email or web address]       Manual Therapy 8/12 8/15 8/17 8 8/10                              Patellar mobs                      Ther Ex 8/12 8/15 8/17  8/8 8/10   Nu Step S=11 L6 x 10 min L6 x 10 min L7 10 min  L6 x10 min L6 x10'   Biodex Bike S=         Heel slides flex/abd         3 way hip                                    SLR all planes   Flex AAROM x10      SAQ                  HR/TR x20 ea 2x10 20x  x20ea x20 ea            ALT Standing hamstring curls in mirror                  Mini Squat CSMi 2x15 CSMi 2x15 CSMI 2x15  CSMI 2x15 CSMI 2x15   Step Lunges 8" R LE x20 20x alt 8" x15 R LE  8" R LE x15 8" RLE x15   TKE Blue x30 w/ towel  Blue x25 w/ Orders per Dr. Cande Dee to be done prior to surgical intervention. towel  Blue x25 w/towel Blue x25 w/ towel   Total gym squats (deep)         Neuro Re-ed         CSMi weight shift/ squat CSMI:  FT EO: 1'  FT EC:1'  Wt shift no UE support 2x 30" CSMI:  FT EO: 1'  FT EC:1'  Wt shift no UE support 2x 30" CSMI:   -FT EO: 1'  -FT EC: 1'  WS no UE x1'  -SLS  w/UE support 2x:30  CSMI:   -FT EO: 1'  -FT EC: 1'   wt shift no UE support 2x:30 CSMI:  FT EO:  FT EC:  Wt shift no UE support 2x 30"   SAQ  *nv       Gluteal set         Bridges         Eccentric LAQ * NV *pl 1 2x10 Manual assist to end range x10      CSMI Balance         Fitter board         Eccentric Leg press S=7 35# x25 eccentric    22 5# x25 single 55# DL 3x10     35# DL up, SL down 2x10 S=7  55# x25 eccentric  SINGLE 35# 2x10  35# x25 eccentric     22 5#x25 single 35# x25 eccentric    22 5# x25 single   Supine Clam Shells         Tandem EO         Foam feet together EO/EC         GAIT Training         sidestepping 10ft x 6 // bar 10ft x 6 // bar //bar [id number] x 6  // bars 10 ft x6 ea // bars 10ft x6 laps   Heel-toe amb         Mirror walking         Ther Act         Amb up/down steps         Chair squats x10 no step  2x5 / 2x10 staggered feet x10 no step   2x5 w/ 2" step  x10 no step  2x5 w/ 2" step x10 no step  2x5 w/ 2" step   Crate carry         Step down  *2" nv 2" x10 w/ VC      Step ups 4" x18 fwd  x12 6" fwd 15x  4" lat 15x 6" x15 fwd R LE first + lateral 4"  4" x10 R LE fwd  + lateral 4" x10 RLE fwd and lat       Modalities         CP  KNEE

## 2022-08-19 ENCOUNTER — OFFICE VISIT (OUTPATIENT)
Dept: PHYSICAL THERAPY | Facility: CLINIC | Age: 67
End: 2022-08-19
Payer: COMMERCIAL

## 2022-08-19 DIAGNOSIS — M25.561 RIGHT KNEE PAIN, UNSPECIFIED CHRONICITY: ICD-10-CM

## 2022-08-19 DIAGNOSIS — R26.9 GAIT ABNORMALITY: Primary | ICD-10-CM

## 2022-08-19 PROCEDURE — 97110 THERAPEUTIC EXERCISES: CPT | Performed by: PHYSICAL THERAPIST

## 2022-08-19 PROCEDURE — 97112 NEUROMUSCULAR REEDUCATION: CPT | Performed by: PHYSICAL THERAPIST

## 2022-08-19 PROCEDURE — 97530 THERAPEUTIC ACTIVITIES: CPT | Performed by: PHYSICAL THERAPIST

## 2022-08-19 NOTE — PROGRESS NOTES
Daily Note     Today's date: 2022  Patient name: Codi Moya  : 1955  MRN: [id number]  Referring provider: Lux Rosario DO  Dx:   Encounter Diagnosis     ICD-10-CM    1  Gait abnormality  R26 9    2  Right knee pain, unspecified chronicity  M25 561                   Subjective: The patient is upset and crying at the start of the session due to being frustrated about her R knee not working the way she wants it to  Objective: See treatment diary below      Assessment: The patient was given encouragement to focus on the positive aspects of her progress  The patient is hyper focused on her lack of R terminal knee strength  The patient tolerated the treatment well with no complaints of pain increase  The patient will be re-assessed next visit to determine the appropriate course of care  Plan: Continue per plan of care  Progress treatment as tolerated  RE-assess next visit       Re-Evaluation: ( check visit number)  PRECAUTIONS:CARDIAC /LOOP RECORDER  Knee Specialty Daily Treatment Diary   Access Code [email or web address]       Manual Therapy 8/12 8/15 8/17 8/19  8/10                              Patellar mobs                      Ther Ex 8/12 8/15 8/17 8/19  8/10   Nu Step S=11 L6 x 10 min L6 x 10 min L7 10 min L7 x 10 min  L6 x10'   Biodex Bike S=         Heel slides flex/abd         3 way hip                                    SLR all planes   Flex AAROM x10 Flex AAROM x10     SAQ                  HR/TR x20 ea 2x10 20x 20x  x20 ea            ALT Standing hamstring curls in mirror                  Mini Squat CSMi 2x15 CSMi 2x15 CSMI 2x15 Np  CSMI 2x15   Step Lunges 8" R LE x20 20x alt 8" x15 R LE 8" x15  8" RLE x15   TKE Blue x30 w/ towel  Blue x25 w/ towel Blue x25 w/towel  Blue x25 w/ towel   Total gym squats (deep)         Neuro Re-ed         CSMi weight shift/ squat CSMI:  FT EO: 1'  FT EC:1'  Wt shift no UE support 2x 30" CSMI:  FT EO: 1'  FT EC:1'  Wt shift no UE support 2x 30" CSMI: -FT EO: 1'  -FT EC: 1'  WS no UE x1'  -SLS  w/UE support 2x:30 NP  CSMI:  FT EO:  FT EC:  Wt shift no UE support 2x 30"   SAQ  *nv       Gluteal set         Bridges         Eccentric LAQ * NV *pl 1 2x10 Manual assist to end range x10 x10 AAROM clin assist     CSMI Balance         Fitter board         Eccentric Leg press S=7 35# x25 eccentric    22 5# x25 single 55# DL 3x10     35# DL up, SL down 2x10 S=7  55# x25 eccentric  SINGLE 35# 2x10 S=7  55# x25 eccentric  SINGLE 35# 2x10  35# x25 eccentric    22 5# x25 single   Supine Clam Shells         Tandem EO         Foam feet together EO/EC         GAIT Training         sidestepping 10ft x 6 // bar 10ft x 6 // bar //bar [id number] x 6 RTB 10ft x8   // bars 10ft x6 laps   Heel-toe amb         Mirror walking         Ther Act         Amb up/down steps         Chair squats x10 no step  2x5 / 2x10 staggered feet x10 no step   2x5 w/ 2" step x10 no step   2x5 w/ 2" step  x10 no step  2x5 w/ 2" step   Crate carry         Step down  *2" nv 2" x10 w/ VC 2" VC x10     Step ups 4" x18 fwd  x12 6" fwd 15x  4" lat 15x 6" x15 fwd R LE first + lateral 4" 6" x15 fwd R LE first + lateral 4"  4" x10 RLE fwd and lat       Modalities         CP  KNEE

## 2022-08-21 NOTE — PROGRESS NOTES
PT Re-Evaluation  and PT Discharge    Today's date: 2022  Patient name: Noemi Loja  : 1955  MRN: [id number]  Referring provider: Wendy Jones DO  Dx:   Encounter Diagnosis     ICD-10-CM    1  Gait abnormality  R26 9    2  Right knee pain, unspecified chronicity  M25 561                   Assessment  Assessment details: Noemi Loja is a 61 y o  female with a history of bicuspid oartic valve, dilated CM, hyperlipidemia, HTN, PAF, peptic ulcer disease, Hx stroke, vitamin D deficiency, Hx PE, glaucoma, and BMI>30 that presents for a physical therapy RE evaluation  The patient has attended 19 sessions of PT  The patient demonstrates signs and symptoms consistent with R knee pain; gait abnormality  During the examination the patient demonstrated slightly improved R LE strength, improved but decreased proprioception/balance, improved gait, and decreased R knee pain  The patient has made functional gains since starting therapy  The patient is now able to stand for 30 minutes prior to needing to sit and rest   The patient is able to walk in her apartment without any assistive device  The patient is able to walk longer distances with use of her [facility] The patient continues to have difficulty with walking on unlevel surfaces, ambulation up/down steps, heavy lifting/carrying, and squatting/kneeling  The patient feels she is at a plateau regarding her function and strength progression at her R terminal knee extension  The patient is interested in trying aqua therapy to strengthen the R leg in the pool    The patient feels independent with her home program   The patient will be discharged from formal PT to an independent home program   The patient will F/U with her doctor in 1-2 weeks where she will discuss transitioning to an aqua therapy program       Impairments: abnormal gait, impaired balance and impaired physical strength  Functional limitations: difficulty with prolonged standing, prolonged walking, difficulty walking on unlevel surfaces, difficulty squatting/kneeling, difficulty stair-climbing, and difficulty transferring from low surfaces  Symptom irritability: lowBarriers to therapy: Medical Hx  Understanding of Dx/Px/POC: fair   Prognosis: fair  Prognosis details: Medical Hx    Goals  STG: Achieve in 4-6 weeks  1  Patient's R knee pain at worst less than 1/10 to allow for proper gait  MET 8/22  2  RLE MMT improve to > 4/5 all motions tested to improve ADL/recreational activities  NOT MET 8/22  3  30 second sit to stand score improve by 5 stands ( 12 stands) to indicate improved transfers  NOT MET    LTG:  Achieve in 6-12 weeks  1  Patient's knee FOTO score improve to > 65% to indicate a return to normal functioning  NOT MET 8/22  2  Patient achieve personal goal of walking normally without SPC and getting stronger at her R knee  NOT MET  3  Patient to achieve independence with home exercise plan  MET 8/22      Plan  Plan details: The patient feels she is at a plateau regarding her function and strength progression at her R terminal knee extension  The patient is interested in trying aqua therapy to strengthen the R leg in the pool  The patient feels independent with her home program   The patient will be discharged from formal PT to an independent home program   The patient will F/U with her doctor in 1-2 weeks where she will discuss transitioning to an aqua therapy program   Patient would benefit from: skilled physical therapy (AQUA THERAPY)  Treatment plan discussed with: PTA and patient        Subjective Evaluation    History of Present Illness  Date of surgery: 3/14/2022  Mechanism of injury: surgery  Mechanism of injury: SUBJECTIVE: 8/22/2022: The patient reports some improvement  since last therapy assessment  The patient is able to walk longer distances in the community with use of her SPC  The patient is able to perform light lifting/carrying ( 10 pounds or less)    The patient continues to have difficulty with squatting, descending the stairs, heavy lifting/carrying  The patient feels she is at a plateau regarding her function and strength progression at her R terminal knee extension  The patient is interested in trying aqua therapy to strengthen the R leg in the pool  The patient feels independent with her home program   The patient will be discharged from formal PT to an independent home program   The patient will F/U with her doctor in 1-2 weeks where she will discuss transitioning to an aqua therapy program           SUBJECTIVE: 2022: The patient reports some improvement at the R knee since starting therapy  The patient is now able to walk in her apartment without any assistive device  The patient is able to stand for 30 minutes prior to needing to sit  The patient continues to have difficulty with walking on unlevel surfaces  The patient continues to have difficulty with ambulating up/down the steps, squatting, and kneeling  The patient will benefit from continued PT focused on achieving patient specific goals  INJURY HISTORY: Kenny Beltran is a 61 y o  female that presents to outpatient physical therapy with complaints of R knee weakness  The patient reports multiple surgeries at her R knee (3) over the past year, the first surgery for a quad tendon tear 2021; Then in May 2021 for infection, and finally R TKA 3/14/2022  The patient notes her strength has not returned since having the 3 surgeries  The patient notes stiffness and difficulty with prolonged standing > 15 mins; difficulty lifting her leg onto/off of the bed  The patient has difficulty with prolonged walking, ambulating up/down the steps, squatting, and kneeling  The patient's main goal for physical therapy is to walk normally without a limp and without a SPC               Recurrent probem    Pain  Current pain ratin  At worst pain ratin  Aggravating factors: stair climbing, lifting and walking (difficulty with  - not pain)  Progression: resolved (pain)    Social Support  Steps to enter house: no  Stairs in house: no   Lives in: apartment  Lives with: alone    Employment status: not working  Hand dominance: right    Treatments  Previous treatment: physical therapy  Patient Goals  Patient goal: walk without limp without SPC; get stronger ( partially met)        Objective     Palpation     Additional Palpation Details  NO TTP    Tenderness     Right Knee   Tenderness in the inferior patella and tibial tubercle  Additional Tenderness Details  Patient is tender at her distal knee incision    Neurological Testing     Sensation     Knee   Left Knee   Intact: light touch    Right Knee   Intact: light touch   Hyposensation: light touch    Comments   Right light touch: R lateral knee joint line  Active Range of Motion   Left Knee   Flexion: 126 degrees   Extension: 0 degrees     Right Knee   Flexion: 125 degrees   Extension: 0 degrees     Patellar Static Positioning   Left Knee: WFL  Right Knee: [facility]    Strength/Myotome Testing     Left Hip   Planes of Motion   Flexion: 4  Extension: 4-  Abduction: 4  Adduction: 4    Right Hip   Planes of Motion   Flexion: 4-  Extension: 4-  Abduction: 4-  Adduction: 4-    Left Knee   Flexion: 4+  Extension: 4+  Quadriceps contraction: fair    Right Knee   Flexion: 4  Extension: 3-  Quadriceps contraction: poor (improved but limited)    Additional Strength Details  Improved ABD/ADD  Limited R quads    Tests     Additional Tests Details  FOTO: 61% ( predicted 60%)    Gait impairments: Patient ambulates with SPC WBAT B/L LE    The patient demonstrates slow gait speed, unequal step lengths, L lateral trunk lean, increased medial/lateral sway, dips her L shoulder, and decreased heel-toe rollover R LE     30SSTS: 9 stands ( improved 2 stands total - NO CHANGE SINCE LAST ASSESSMENT) (12 stands is norm)less L lateral lean, USED UE, SIGNIFICANT FWD LEAN    Balance: Stagger feet stepping without UE: maintains 8 seconds then LOB               Re-Evaluation:8/26  PRECAUTIONS:CARDIAC /LOOP RECORDER  Knee Specialty Daily Treatment Diary   Access Code [email or web address]       Manual Therapy 8/12 8/15 8/17 8/19 8/22 8/10                              Patellar mobs                      Ther Ex 8/12 8/15 8/17 8/19 8/22 8/10   Nu Step S=11 L6 x 10 min L6 x 10 min L7 10 min L7 x 10 min L7 x 11 mins L6 x10'   Biodex Bike S=         Heel slides flex/abd         3 way hip                                    SLR all planes   Flex AAROM x10 Flex AAROM x10 Flex AAROM x10    SAQ                  HR/TR x20 ea 2x10 20x 20x reviewed x20 ea            ALT Standing hamstring curls in mirror                  Mini Squat CSMi 2x15 CSMi 2x15 CSMI 2x15 Np  CSMI 2x15   Step Lunges 8" R LE x20 20x alt 8" x15 R LE 8" x15  8" RLE x15   TKE Blue x30 w/ towel  Blue x25 w/ towel Blue x25 w/towel reviewed Blue x25 w/ towel   Total gym squats (deep)         Neuro Re-ed         CSMi weight shift/ squat CSMI:  FT EO: 1'  FT EC:1'  Wt shift no UE support 2x 30" CSMI:  FT EO: 1'  FT EC:1'  Wt shift no UE support 2x 30" CSMI:   -FT EO: 1'  -FT EC: 1'  WS no UE x1'  -SLS  w/UE support 2x:30 NP CSMI:   -FT EO: 1'  -FT EC: 1'  WS no UE x1'  -SLS  w/UE support 2x:30 CSMI:  FT EO:  FT EC:  Wt shift no UE support 2x 30"   SAQ  *nv       Gluteal set         Bridges         Eccentric LAQ * NV *pl 1 2x10 Manual assist to end range x10 x10 AAROM clin assist x10 AAROM clin assist x10    CSMI Balance         Fitter board         Eccentric Leg press S=7 35# x25 eccentric    22 5# x25 single 55# DL 3x10     35# DL up, SL down 2x10 S=7  55# x25 eccentric  SINGLE 35# 2x10 S=7  55# x25 eccentric  SINGLE 35# 2x10 S= 7 Double  62 5# x25  Eccentric  42 5# x25   35# x25 eccentric    22 5# x25 single   Supine Clam Shells         Tandem EO         Foam feet together EO/EC         GAIT Training         sidestepping 10ft x 6 // bar 10ft x 6 // bar Ashley Joshua [id number] x 6 RTB 10ft x8   // bars 10ft x6 laps   Heel-toe amb         Mirror walking         Ther Act         Amb up/down steps         Chair squats x10 no step  2x5 / 2x10 staggered feet x10 no step   2x5 w/ 2" step x10 no step   2x5 w/ 2" step x9 x10 no step  2x5 w/ 2" step   Crate carry         Step down  *2" nv 2" x10 w/ VC 2" VC x10     Step ups 4" x18 fwd  x12 6" fwd 15x  4" lat 15x 6" x15 fwd R LE first + lateral 4" 6" x15 fwd R LE first + lateral 4"  4" x10 RLE fwd and lat       Modalities         CP  KNEE

## 2022-08-22 ENCOUNTER — EVALUATION (OUTPATIENT)
Dept: PHYSICAL THERAPY | Facility: CLINIC | Age: 67
End: 2022-08-22
Payer: COMMERCIAL

## 2022-08-22 DIAGNOSIS — M25.561 RIGHT KNEE PAIN, UNSPECIFIED CHRONICITY: ICD-10-CM

## 2022-08-22 DIAGNOSIS — R26.9 GAIT ABNORMALITY: Primary | ICD-10-CM

## 2022-08-22 PROCEDURE — 97112 NEUROMUSCULAR REEDUCATION: CPT | Performed by: PHYSICAL THERAPIST

## 2022-08-22 PROCEDURE — 97110 THERAPEUTIC EXERCISES: CPT | Performed by: PHYSICAL THERAPIST

## 2022-08-24 ENCOUNTER — APPOINTMENT (OUTPATIENT)
Dept: PHYSICAL THERAPY | Facility: CLINIC | Age: 67
End: 2022-08-24
Payer: COMMERCIAL

## 2022-08-26 ENCOUNTER — APPOINTMENT (OUTPATIENT)
Dept: PHYSICAL THERAPY | Facility: CLINIC | Age: 67
End: 2022-08-26
Payer: COMMERCIAL

## 2022-09-07 ENCOUNTER — EVALUATION (OUTPATIENT)
Dept: PHYSICAL THERAPY | Age: 67
End: 2022-09-07
Payer: COMMERCIAL

## 2022-09-07 DIAGNOSIS — M25.561 RIGHT KNEE PAIN, UNSPECIFIED CHRONICITY: ICD-10-CM

## 2022-09-07 DIAGNOSIS — R26.9 GAIT ABNORMALITY: Primary | ICD-10-CM

## 2022-09-07 PROCEDURE — 97113 AQUATIC THERAPY/EXERCISES: CPT | Performed by: PHYSICAL THERAPIST

## 2022-09-07 PROCEDURE — 97162 PT EVAL MOD COMPLEX 30 MIN: CPT | Performed by: PHYSICAL THERAPIST

## 2022-09-07 NOTE — LETTER
2022    Jeremiah More [address] [address] [address]  [facility] [address]    Patient: Yahir Arriaza   YOB: 1955   Date of Visit: 2022     Encounter Diagnosis     ICD-10-CM    1  Gait abnormality  R26 9    2  Right knee pain, unspecified chronicity  M25 561        Dear Dr Ronni June: Thank you for your recent referral of Yahir Arriaza  Please review the attached evaluation summary from Amrita's recent visit  Please verify that you agree with the plan of care by signing the attached order  If you have any questions or concerns, please do not hesitate to call  I sincerely appreciate the opportunity to share in the care of one of your patients and hope to have another opportunity to work with you in the near future  Sincerely,    Juan Carlos Winslow, PT      Referring Provider:      I certify that I have read the below Plan of Care and certify the need for these services furnished under this plan of treatment while under my care  Jeremiah More DO  [address] [address] [address]  [address]  [address] [address]  Via Fax: [phone]          PT Evaluation     Today's date: 2022  Patient name: Yahir Arriaza  : 1955  MRN: [id number]  Referring provider: Merline Ping, DO  Dx:   Encounter Diagnosis     ICD-10-CM    1  Gait abnormality  R26 9    2  Right knee pain, unspecified chronicity  M25 561        Start Time: 1600  Stop Time: 1700  Total time in clinic (min): 60 minutes    Assessment  Assessment details: Yahir Arriaza is a 61 y o  female who presents with pain, decreased strength, decreased ROM, joint effusion, ambulatory dysfunction and balance dysfunction  Due to these impairments, Patient has difficulty performing a/iadls  Patient's clinical presentation is consistent with their referring diagnosis of right knee pain   Patient would benefit from skilled physical therapy to address their aforementioned impairments, improve their level of function and to improve their overall quality of life  Impairments: abnormal gait, abnormal muscle tone, abnormal or restricted ROM, abnormal movement, activity intolerance, impaired balance, impaired physical strength, lacks appropriate home exercise program, pain with function, weight-bearing intolerance, poor posture  and poor body mechanics  Understanding of Dx/Px/POC: good   Prognosis: fair    Goals  ST-3 WEEKS  1  Decrease pain by 2 points on VAS at its worst   2   Increase ROM by > 5 deg in all deficients planes  3   Increase LE by 1/2 MMT grade in all deficient planes  LT-6 WEEKS  1  Patient to be independent with a/iadls especially steps and eliminate quad lag  2  Increase functional activities for leisure and home activities to previous LOF    3  Independent with HEP and/or fitness program     Plan  Patient would benefit from: skilled physical therapy  Planned modality interventions: cryotherapy, electrical stimulation/Russian stimulation, thermotherapy: hydrocollator packs and unattended electrical stimulation  Planned therapy interventions: activity modification, behavior modification, body mechanics training, aquatic therapy, flexibility, functional ROM exercises, home exercise program, IADL retraining, joint mobilization, manual therapy, neuromuscular re-education, patient education, postural training, strengthening, stretching, therapeutic activities and therapeutic exercise  Frequency: 2x week (2-3x week)  Duration in weeks: 6  Plan of Care beginning date: 2022  Plan of Care expiration date: 2022  Treatment plan discussed with: patient        Subjective Evaluation    History of Present Illness  Date of onset: 3/4/2010  Date of surgery: 3/14/2022  Mechanism of injury: Right TKR had PT x 2 weeks and had home PT x 4 weeks and then OPPT, currently complains of right knee weakness also had tendon tear to right knee pre TKR and is now unable to perform SLR          Not a recurrent problem   Quality of life: good    Pain  Current pain ratin  At best pain ratin  At worst pain ratin  Quality: discomfort  Relieving factors: rest  Aggravating factors: standing, walking and stair climbing  Progression: improved    Social Support  Steps to enter house: yes  Stairs in house: yes   Lives in: apartment  Lives with: alone      Diagnostic Tests  X-ray: abnormal  MRI studies: abnormal        Objective     Tenderness     Right Knee   Tenderness in the inferior fat pad, inferior patella, patellar tendon and pes anserinus  Active Range of Motion   Left Knee   Flexion: 130 degrees   Extension: 0 degrees     Right Knee   Flexion: 130 degrees   Extension: 0 degrees   Extensor la degrees     Strength/Myotome Testing     Right Knee   Flexion: 4  Prone flexion: 4  Extension: 3-  Quadriceps contraction: fair    Swelling     Left Knee Girth Measurement (cm)   Joint line: 18 5 cm    Right Knee Girth Measurement (cm)   Joint line: 19 cm    Ambulation     Observational Gait   Gait: antalgic   Decreased walking speed and stride length         Flowsheet Rows    Flowsheet Row Most Recent Value   PT/OT G-Codes    Current Score 47   Projected Score 60        Precautions:     Daily Treatment Diary     Manual                                                                                   Exercise Diary              Water walking 5            Postural training             Gait training             Home exercise pgm/patient education             Wall: t/h raises 1            Hip abd/add 2            Marching 1            squats 1            Knee flex/ext             Step-ups (fwd/bkwd/ss)             SLS (eyes open/closed)             SLS w UE mvmt  AROM/ball toss             Weight shifting             UE Noodle work x 4              UE AROM             Resistive UE work (paddles, bells, TB)             Core work on noodle (sitting/stdg)             Sit on noodle with movement Seated on pool bench w proper posture             Ankle df/pf 1            marching 1            Hip Ab/add 1            Knee flex/ext 1            Deep water mvmt 5            Deep water tx/stretching 5            Specific self - stretches wall/steps 5                Modalities  9/7            whirlpool 5

## 2022-09-07 NOTE — PROGRESS NOTES
PT Evaluation     Today's date: 2022  Patient name: Alfredo Ruiz  : 1955  MRN: [id number]  Referring provider: Ebony Aguayo DO  Dx:   Encounter Diagnosis     ICD-10-CM    1  Gait abnormality  R26 9    2  Right knee pain, unspecified chronicity  M25 561        Start Time: 1600  Stop Time: 1700  Total time in clinic (min): 60 minutes    Assessment  Assessment details: Alfredo Ruiz is a 61 y o  female who presents with pain, decreased strength, decreased ROM, joint effusion, ambulatory dysfunction and balance dysfunction  Due to these impairments, Patient has difficulty performing a/iadls  Patient's clinical presentation is consistent with their referring diagnosis of right knee pain  Patient would benefit from skilled physical therapy to address their aforementioned impairments, improve their level of function and to improve their overall quality of life  Impairments: abnormal gait, abnormal muscle tone, abnormal or restricted ROM, abnormal movement, activity intolerance, impaired balance, impaired physical strength, lacks appropriate home exercise program, pain with function, weight-bearing intolerance, poor posture  and poor body mechanics  Understanding of Dx/Px/POC: good   Prognosis: fair    Goals  ST-3 WEEKS  1  Decrease pain by 2 points on VAS at its worst   2   Increase ROM by > 5 deg in all deficients planes  3   Increase LE by 1/2 MMT grade in all deficient planes  LT-6 WEEKS  1  Patient to be independent with a/iadls especially steps and eliminate quad lag  2  Increase functional activities for leisure and home activities to previous LOF    3  Independent with HEP and/or fitness program     Plan  Patient would benefit from: skilled physical therapy  Planned modality interventions: cryotherapy, electrical stimulation/Russian stimulation, thermotherapy: hydrocollator packs and unattended electrical stimulation  Planned therapy interventions: activity modification, behavior modification, body mechanics training, aquatic therapy, flexibility, functional ROM exercises, home exercise program, IADL retraining, joint mobilization, manual therapy, neuromuscular re-education, patient education, postural training, strengthening, stretching, therapeutic activities and therapeutic exercise  Frequency: 2x week (2-3x week)  Duration in weeks: 6  Plan of Care beginning date: 2022  Plan of Care expiration date: 2022  Treatment plan discussed with: patient        Subjective Evaluation    History of Present Illness  Date of onset: 3/4/2010  Date of surgery: 3/14/2022  Mechanism of injury: Right TKR had PT x 2 weeks and had home PT x 4 weeks and then OPPT, currently complains of right knee weakness also had tendon tear to right knee pre TKR and is now unable to perform SLR          Not a recurrent problem   Quality of life: good    Pain  Current pain ratin  At best pain ratin  At worst pain ratin  Quality: discomfort  Relieving factors: rest  Aggravating factors: standing, walking and stair climbing  Progression: improved    Social Support  Steps to enter house: yes  Stairs in house: yes   Lives in: apartment  Lives with: alone      Diagnostic Tests  X-ray: abnormal  MRI studies: abnormal        Objective     Tenderness     Right Knee   Tenderness in the inferior fat pad, inferior patella, patellar tendon and pes anserinus  Active Range of Motion   Left Knee   Flexion: 130 degrees   Extension: 0 degrees     Right Knee   Flexion: 130 degrees   Extension: 0 degrees   Extensor la degrees     Strength/Myotome Testing     Right Knee   Flexion: 4  Prone flexion: 4  Extension: 3-  Quadriceps contraction: fair    Swelling     Left Knee Girth Measurement (cm)   Joint line: 18 5 cm    Right Knee Girth Measurement (cm)   Joint line: 19 cm    Ambulation     Observational Gait   Gait: antalgic   Decreased walking speed and stride length         Betty Most Recent Value   PT/OT G-Codes    Current Score 47   Projected Score 60        Precautions:     Daily Treatment Diary     Manual                                                                                   Exercise Diary  9/7            Water walking 5            Postural training             Gait training             Home exercise pgm/patient education             Wall: t/h raises 1            Hip abd/add 2            Marching 1            squats 1            Knee flex/ext             Step-ups (fwd/bkwd/ss)             SLS (eyes open/closed)             SLS w UE mvmt  AROM/ball toss             Weight shifting             UE Noodle work x 4              UE AROM             Resistive UE work (paddles, bells, TB)             Core work on noodle (sitting/stdg)             Sit on noodle with movement             Seated on pool bench w proper posture             Ankle df/pf 1            marching 1            Hip Ab/add 1            Knee flex/ext 1            Deep water mvmt 5            Deep water tx/stretching 5            Specific self - stretches wall/steps 5                Modalities  9/7            whirlpool 5

## 2022-09-12 ENCOUNTER — OFFICE VISIT (OUTPATIENT)
Dept: PHYSICAL THERAPY | Age: 67
End: 2022-09-12
Payer: COMMERCIAL

## 2022-09-12 DIAGNOSIS — M25.561 RIGHT KNEE PAIN, UNSPECIFIED CHRONICITY: ICD-10-CM

## 2022-09-12 DIAGNOSIS — R26.9 GAIT ABNORMALITY: Primary | ICD-10-CM

## 2022-09-12 PROCEDURE — 97113 AQUATIC THERAPY/EXERCISES: CPT

## 2022-09-12 NOTE — PROGRESS NOTES
Daily Note     Today's date: 2022  Patient name: Jez Clark  : 1955  MRN: [id number]  Referring provider: Moe Harman DO  Dx:   Encounter Diagnosis     ICD-10-CM    1  Gait abnormality  R26 9    2  Right knee pain, unspecified chronicity  M25 561        Start Time: 0800  Stop Time: 0900  Total time in clinic (min): 60 minutes    Subjective: Patient reports 2/10 R thigh ans knee pain today  Notes she did ok with her initial visit in the pool       Objective: See treatment diary below      Assessment: Tolerated treatment fairly well, no c/o pain with TE, cues for ex technique and carry over, weakness in the R LE, challenges with controlling R quad with TE ml step ups  Patient demonstrated fatigue post treatment and would benefit from continued PT      Plan: Continue per plan of care          Precautions: OA,  Left Shoulder RTC repair,    Daily Treatment Diary     Manual                                                                                   Exercise Diary             Water walking 5 12           Postural training             Gait training             Home exercise pgm/patient education             Wall: t/h raises 1 1           Hip abd/add 2 2            1           squats 1 1           Knee flex/ext  1           Step-ups (fwd/bkwd/ss)  2           SLS (eyes open/closed)             SLS w UE mvmt  AROM/ball toss             Weight shifting             UE Noodle work x 4              UE AROM             Resistive UE work (paddles, bells, TB)             Core work on noodle (sitting/stdg)             Sit on noodle with movement             Seated on pool bench w proper posture  2           Ankle df/pf 1 1            1           Hip Ab/add 1 1           Knee flex/ext 1 1           Deep water mvmt 5 6            Deep water tx/stretching 5 5           Specific self - stretches wall/steps 5 2               Modalities             whirlpool 5 10

## 2022-09-15 ENCOUNTER — OFFICE VISIT (OUTPATIENT)
Dept: PHYSICAL THERAPY | Age: 67
End: 2022-09-15
Payer: COMMERCIAL

## 2022-09-15 DIAGNOSIS — M25.561 RIGHT KNEE PAIN, UNSPECIFIED CHRONICITY: ICD-10-CM

## 2022-09-15 DIAGNOSIS — R26.9 GAIT ABNORMALITY: Primary | ICD-10-CM

## 2022-09-15 PROCEDURE — 97113 AQUATIC THERAPY/EXERCISES: CPT

## 2022-09-15 NOTE — PROGRESS NOTES
Daily Note     Today's date: 9/15/2022  Patient name: Nisha Gaona  : 1955  MRN: [id number]  Referring provider: Diana Sanchez DO  Dx:   Encounter Diagnosis     ICD-10-CM    1  Gait abnormality  R26 9    2  Right knee pain, unspecified chronicity  M25 561        Start Time: 855  Stop Time: 955  Total time in clinic (min): 60 minutes    Subjective: pt notes she likes the water  She is upset that she is discharged at this time from aquatic PT  She states she is doing her HEP daily  She would like to come to our fitness once a week  Objective: See treatment diary below      Assessment: Tolerated treatment fair  Patient had 3 sessions of aquatic PT after 21 visits of land PT where she had plateaued  Pt was shown an ex program for aquatic ex so pt could join our stepping down program to continue w/ aquatic ex's on her own  Pt has a HEP from her land PT to do at home also, encouraged pt to continue w/ her HEP to maintain her progress and the importance of continuing an ex program   Due to pt plateauing w/ her land PT and having 24 visits of PT pt to be discharged at this time         Plan: discharge pt at this time to HEP/aquatic stepping down program        Exercise Diary  9/7 9/12 9/15          Water walking 5 12 12          Postural training             Gait training             Home exercise pgm/patient education             Wall: t/h raises 1 1 1          Hip abd/add 2 2 2          Marching 1 1 1          squats 1 1 1          Knee flex/ext  1 1          Step-ups (fwd/bkwd/ss)  2 4          Hip Ff/ext   2          SLS w UE mvmt  AROM/ball toss             Weight shifting             UE Noodle work x 4              UE AROM             DW hip Abd/add   1          DW SKTC   1          DW DKTC   1          Seated on pool bench w proper posture  2           Ankle df/pf 1 1 1          marching 1 1 1          Hip Ab/add 1 1 1          Knee flex/ext 1 1 2          Deep water bike 5 6  6          Deep water tx/stretching 5 5 6          Specific self - stretches wall/steps 5 2 2              Modalities  9/7 9/12 9/15          whirlpool 5 10 10

## 2022-09-28 ENCOUNTER — REMOTE DEVICE CLINIC VISIT (OUTPATIENT)
Dept: CARDIOLOGY CLINIC | Facility: CLINIC | Age: 67
End: 2022-09-28
Payer: COMMERCIAL

## 2022-09-28 DIAGNOSIS — Z95.818 PRESENCE OF OTHER CARDIAC IMPLANTS AND GRAFTS: Primary | ICD-10-CM

## 2022-09-28 PROCEDURE — G2066 INTER DEVC REMOTE 30D: HCPCS | Performed by: INTERNAL MEDICINE

## 2022-09-28 PROCEDURE — 93298 REM INTERROG DEV EVAL SCRMS: CPT | Performed by: INTERNAL MEDICINE

## 2022-09-28 NOTE — PROGRESS NOTES
MDT LOOP1/ ACTIVE SYSTEM IS MRI CONDITIONAL   CARELINK TRANSMISSION: LOOP RECORDER  PRESENTING RHYTHM NSR W/ PVCs @ 84 BPM  BATTERY STATUS "OK " NO PATIENT OR DEVICE ACTIVATED EPISODES  NORMAL DEVICE FUNCTION   DL

## 2022-10-23 ENCOUNTER — APPOINTMENT (OUTPATIENT)
Dept: RADIOLOGY | Facility: CLINIC | Age: 67
End: 2022-10-23
Payer: COMMERCIAL

## 2022-10-23 DIAGNOSIS — S99.922A FOOT TRAUMA, LEFT, INITIAL ENCOUNTER: ICD-10-CM

## 2022-10-23 PROCEDURE — 73630 X-RAY EXAM OF FOOT: CPT

## 2022-12-05 NOTE — PROGRESS NOTES
PT Evaluation     Today's date: 2022  Patient name: Patsy Naqvi  : 1955  MRN: [id number]  Referring provider: Tilman Hamman, DO  Dx:   Encounter Diagnosis     ICD-10-CM    1  Cerebrovascular accident (CVA) due to embolism of left middle cerebral artery (HCC)  I63 412       2  Weakness  R53 1                      Assessment  Assessment details: Patsy Naqvi is a 74 y o  female with a history of bicuspid aortic valve, dilated CM, hyperlipidemia, HTN, PAF, peptic ulcer disease, Hx stroke, vitamin D deficiency, Hx PE, glaucoma, and BMI>30 that presents for a moderate complexity physical therapy initial evaluation  The patient demonstrates signs and symptoms consistent with CVA embolism of L MCA; muscle weakness  During the examination the patient demonstrated decreased R LE strength, activity intolerance, gait dysfunction, and R LE pain  The patient's impairments are causing the following functional limitations: difficulty with prolonged standing, prolonged walking, difficulty walking on unlevel surfaces, difficulty lifting the R leg into bed, difficulty squatting/kneeling, difficulty stair-climbing, and difficulty transferring from low surfaces  The patient's clinical presentation is evolving due to a number of participation restrictions, significant medial history, and functional limitation (FOTO 43% function)  The patient will benefit from skilled PT services to address impairments, work towards goals, and restore PLOF  Impairments: abnormal gait, abnormal or restricted ROM, activity intolerance, impaired balance, impaired physical strength, lacks appropriate home exercise program, pain with function and poor posture   Functional limitations: difficulty with prolonged standing, prolonged walking, difficulty walking on unlevel surfaces, difficulty lifting the R leg into bed, difficulty squatting/kneeling, difficulty stair-climbing, and difficulty transferring from low surfaces    Symptom irritability: lowBarriers to therapy: Chronicity of condition  Understanding of Dx/Px/POC: fair   Prognosis: fair  Prognosis details: Chronicity of condition    Goals  STG: Achieve in 4-6 weeks  1  Patient's R LE pain at worst less than 2/10 to allow for sleeping comfortably  2   RLE MMT improve to > 4/5 all motions tested to improve ADL/recreational activities  3   Timed up and go score improve to less than 14 seconds to indicate improved balance/decreased falls risk  LTG:  Achieve in 6-12 weeks  1  Patient's FOTO score improve to > 60% to indicate a return to normal functioning  2   Patient achieve personal goal of improved R LE / R Quads strength for all functional activities  3  Patient to achieve independence with home exercise plan  4  30 second sit to stand score improve by 6 stands ( total 12) to indicate improved transfers / normalization of R quad strength  Plan  Plan details: RE-ASSESS 1X/MONTH  Patient would benefit from: skilled physical therapy  Planned modality interventions: cryotherapy, thermotherapy: hydrocollator packs and electrical stimulation/Russian stimulation  Other planned modality interventions: NMES w/ QS R LE  Planned therapy interventions: joint mobilization, manual therapy, massage, neuromuscular re-education, patient education, postural training, self care, strengthening, stretching, therapeutic activities, therapeutic exercise, home exercise program, abdominal trunk stabilization, balance, balance/weight bearing training and gait training  Frequency: 1-3x/wk  Duration in weeks: 12  Plan of Care beginning date: 12/6/2022  Plan of Care expiration date: 3/7/2023  Treatment plan discussed with: PTA and patient        Subjective Evaluation    History of Present Illness  Date of surgery: 3/14/2022  Mechanism of injury: Perez Aceves is a 74 y o  female that presents to outpatient physical theracpy with complaints of R leg weakness and difficulty functioning normally    The patient reports difficulty with transfers and funtion without relying on her L LE and UE for support  The patient reports having a Hx of multiple surgeries at her R knee (3) in the past 2 years, the first surgery for a quad tendon tear 2021; Then in May 2021 for infection, and finally R TKA 3/14/2022  The patient notes persistent R quadriceps weakness since  The patient attempted land based therapy and aqua therapy to help restore the strength  The patient had a recent F/U with her doctor who prescribed the patient PT to attempt NMES to the R quadriceps muscle  This treatment was not done due to the patient having an implanted cardiac device but she has been cleared to receive by the cardiologist office  The patient can receive any ESTIM treatment below the waist   The patient's main goal for physical therapy is to have her R thigh " wake up" so she can function normally               Recurrent probem    Pain  Current pain ratin  At best pain ratin  At worst pain ratin  Location: R anterior /medial tibia  Quality: discomfort  Relieving factors: change in position  Progression: no change    Social Support  Steps to enter house: no  Stairs in house: no   Lives in: community-based residential facility  Lives with: alone    Employment status: not working  Hand dominance: right    Treatments  Previous treatment: physical therapy  Current treatment: physical therapy  Patient Goals  Patient goals for therapy: decreased pain, improved balance, increased motion, increased strength, independence with ADLs/IADLs and return to sport/leisure activities  Patient goal: "wake up" her R LE        Objective     Tenderness     Additional Tenderness Details  (+)R anterior /medial Tibia TTP    Lumbar Screen  Lumbar range of motion within normal limits with the following exceptions:Max limit EXT    Neurological Testing     Sensation     Hip   Left Hip   Intact: light touch    Right Hip   Intact: light touch  Hyposensation: light touch    Comments   Right light touch: R anterior patella  Active Range of Motion   Left Hip   Flexion: 115 degrees   Extension: 0 degrees   Abduction: 40 degrees   External rotation (90/90): 10 degrees   Internal rotation (90/90): 44 degrees     Right Hip   Flexion: 110 degrees   Extension: 0 degrees   Abduction: 28 degrees   External rotation (90/90): 20 degrees   Internal rotation (90/90): 37 degrees     Strength/Myotome Testing     Left Hip   Planes of Motion   Flexion: 3+  Extension: 3+  Abduction: 4  Adduction: 4  External rotation: 3  Internal rotation: 4    Right Hip   Planes of Motion   Flexion: 3-  Extension: 3  Abduction: 3+  Adduction: 4  External rotation: 3+  Internal rotation: 4-    Additional Strength Details  Quads: L: 4/5  R: 3-/5 ( lacking TKE 20 degrees)  Hams: L: 4/5 R: 4-/5    Tests     Additional Tests Details  TU seconds w/ SPC    30 SSTS: 6 stands; L lateral trunk lean, excessive fwd trunk lean; USED B/L UE    Gait impairments: Patient ambulates with SPC WBAT B/L LE  The patient demonstrates slow gait speed, L lateral trunk lean, increased medial/lateral sway, Abducted R LE, unequal step lengths, and decreased heel-toe rollover R LE  Needs SPC in the community due to fear of falling      FOTO:  43% ( predicted 48%)  Tandem stance : (+) LOB attempting w/ R foot back             Precautions: Cardiac Loop recorder  - CLEARED FOR NMES / ESTIM BELOW WAIST  Re-evaluation:1/3      Hip Specialty Daily Treatment Diary     Manual                 Hamstring stretch        Prone Quad Stretch                    Therapeutic Exercise        Recumbent Bike S=        Nu step S=  *                              Heel Slide flex/abd  *      Supine ball squeeze                Bridges  *      SLR AAROM flex  *                                      Standing lumbar extensions        Total Gym Squat        TB hip IR, ER                NEURO RE-ED        Sidestepping  * Hurdles OBO        Hurdles weave        Heel-toe ambulation        Mirror amb  // bar      NMES w/ QS *x 10 mins 10 sec on 30 sec off       Foam step over  *      Quad set Reviewed prior to Rx x5       Core brace  *      Glute set  *      SLB        TKE        CSMi "+" squat, balance  *      Eccentric leg press S=7  Single leg press  *      Tandem balance        Foam balance        Fitter balance        Therapeutic Activity        Sit to stand transfers        Squats        ALT Lunges // bar  *      Step Ups fwd/side  *      Up/down steps            Modalities        CP knee/hip

## 2022-12-06 ENCOUNTER — EVALUATION (OUTPATIENT)
Dept: PHYSICAL THERAPY | Facility: CLINIC | Age: 67
End: 2022-12-06

## 2022-12-06 DIAGNOSIS — R53.1 WEAKNESS: ICD-10-CM

## 2022-12-06 DIAGNOSIS — I63.412 CEREBROVASCULAR ACCIDENT (CVA) DUE TO EMBOLISM OF LEFT MIDDLE CEREBRAL ARTERY (HCC): Primary | ICD-10-CM

## 2022-12-06 NOTE — LETTER
2022    Alex Nur DO  [address]  [address]    Patient: Phi Sampson   YOB: 1955   Date of Visit: 2022     Encounter Diagnosis     ICD-10-CM    1  Cerebrovascular accident (CVA) due to embolism of left middle cerebral artery (HCC)  I63 412       2  Weakness  R53 1           Dear Dr Светлана Mccray: Thank you for your recent referral of Phi Sampson  Please review the attached evaluation summary from Amrita's recent visit  Please verify that you agree with the plan of care by signing the attached order  If you have any questions or concerns, please do not hesitate to call  I sincerely appreciate the opportunity to share in the care of one of your patients and hope to have another opportunity to work with you in the near future  Sincerely,    Alger Bosworth, PT      Referring Provider:      I certify that I have read the below Plan of Care and certify the need for these services furnished under this plan of treatment while under my care  Alex Nur DO  [address] [address]  Via Fax: [phone]          PT Evaluation     Today's date: 2022  Patient name: Phi Sampson  : 1955  MRN: [id number]  Referring provider: Leonarda Ferrer DO  Dx:   Encounter Diagnosis     ICD-10-CM    1  Cerebrovascular accident (CVA) due to embolism of left middle cerebral artery (HCC)  I63 412       2  Weakness  R53 1                      Assessment  Assessment details: Phi Sampson is a 74 y o  female with a history of bicuspid aortic valve, dilated CM, hyperlipidemia, HTN, PAF, peptic ulcer disease, Hx stroke, vitamin D deficiency, Hx PE, glaucoma, and BMI>30 that presents for a moderate complexity physical therapy initial evaluation  The patient demonstrates signs and symptoms consistent with CVA embolism of L MCA; muscle weakness    During the examination the patient demonstrated decreased R LE strength, activity intolerance, gait dysfunction, and R LE pain  The patient's impairments are causing the following functional limitations: difficulty with prolonged standing, prolonged walking, difficulty walking on unlevel surfaces, difficulty lifting the R leg into bed, difficulty squatting/kneeling, difficulty stair-climbing, and difficulty transferring from low surfaces  The patient's clinical presentation is evolving due to a number of participation restrictions, significant medial history, and functional limitation (FOTO 43% function)  The patient will benefit from skilled PT services to address impairments, work towards goals, and restore PLOF  Impairments: abnormal gait, abnormal or restricted ROM, activity intolerance, impaired balance, impaired physical strength, lacks appropriate home exercise program, pain with function and poor posture   Functional limitations: difficulty with prolonged standing, prolonged walking, difficulty walking on unlevel surfaces, difficulty lifting the R leg into bed, difficulty squatting/kneeling, difficulty stair-climbing, and difficulty transferring from low surfaces  Symptom irritability: lowBarriers to therapy: Chronicity of condition  Understanding of Dx/Px/POC: fair   Prognosis: fair  Prognosis details: Chronicity of condition    Goals  STG: Achieve in 4-6 weeks  1  Patient's R LE pain at worst less than 2/10 to allow for sleeping comfortably  2   RLE MMT improve to > 4/5 all motions tested to improve ADL/recreational activities  3   Timed up and go score improve to less than 14 seconds to indicate improved balance/decreased falls risk  LTG:  Achieve in 6-12 weeks  1  Patient's FOTO score improve to > 60% to indicate a return to normal functioning  2   Patient achieve personal goal of improved R LE / R Quads strength for all functional activities  3  Patient to achieve independence with home exercise plan    4  30 second sit to stand score improve by 6 stands ( total 12) to indicate improved transfers / normalization of R quad strength  Plan  Plan details: RE-ASSESS 1X/MONTH  Patient would benefit from: skilled physical therapy  Planned modality interventions: cryotherapy, thermotherapy: hydrocollator packs and electrical stimulation/Russian stimulation  Other planned modality interventions: NMES w/ QS R LE  Planned therapy interventions: joint mobilization, manual therapy, massage, neuromuscular re-education, patient education, postural training, self care, strengthening, stretching, therapeutic activities, therapeutic exercise, home exercise program, abdominal trunk stabilization, balance, balance/weight bearing training and gait training  Frequency: 1-3x/wk  Duration in weeks: 12  Plan of Care beginning date: 12/6/2022  Plan of Care expiration date: 3/7/2023  Treatment plan discussed with: PTA and patient        Subjective Evaluation    History of Present Illness  Date of surgery: 3/14/2022  Mechanism of injury: Kenny Beltran is a 74 y o  female that presents to outpatient physical theracpy with complaints of R leg weakness and difficulty functioning normally  The patient reports difficulty with transfers and funtion without relying on her L LE and UE for support  The patient reports having a Hx of multiple surgeries at her R knee (3) in the past 2 years, the first surgery for a quad tendon tear April 22, 2021; Then in May 2021 for infection, and finally R TKA 3/14/2022  The patient notes persistent R quadriceps weakness since  The patient attempted land based therapy and aqua therapy to help restore the strength  The patient had a recent F/U with her doctor who prescribed the patient PT to attempt NMES to the R quadriceps muscle  This treatment was not done due to the patient having an implanted cardiac device but she has been cleared to receive by the cardiologist office    The patient can receive any ESTIM treatment below the waist   The patient's main goal for physical therapy is to have her R thigh " wake up" so she can function normally  Recurrent probem    Pain  Current pain ratin  At best pain ratin  At worst pain ratin  Location: R anterior /medial tibia  Quality: discomfort  Relieving factors: change in position  Progression: no change    Social Support  Steps to enter house: no  Stairs in house: no   Lives in: community-based residential facility  Lives with: alone    Employment status: not working  Hand dominance: right    Treatments  Previous treatment: physical therapy  Current treatment: physical therapy  Patient Goals  Patient goals for therapy: decreased pain, improved balance, increased motion, increased strength, independence with ADLs/IADLs and return to sport/leisure activities  Patient goal: "wake up" her R LE        Objective     Tenderness     Additional Tenderness Details  (+)R anterior /medial Tibia TTP    Lumbar Screen  Lumbar range of motion within normal limits with the following exceptions:Max limit EXT    Neurological Testing     Sensation     Hip   Left Hip   Intact: light touch    Right Hip   Intact: light touch  Hyposensation: light touch    Comments   Right light touch: R anterior patella       Active Range of Motion   Left Hip   Flexion: 115 degrees   Extension: 0 degrees   Abduction: 40 degrees   External rotation (90/90): 10 degrees   Internal rotation (90/90): 44 degrees     Right Hip   Flexion: 110 degrees   Extension: 0 degrees   Abduction: 28 degrees   External rotation (90/90): 20 degrees   Internal rotation (90/90): 37 degrees     Strength/Myotome Testing     Left Hip   Planes of Motion   Flexion: 3+  Extension: 3+  Abduction: 4  Adduction: 4  External rotation: 3  Internal rotation: 4    Right Hip   Planes of Motion   Flexion: 3-  Extension: 3  Abduction: 3+  Adduction: 4  External rotation: 3+  Internal rotation: 4-    Additional Strength Details  Quads: L: 4/5  R: 3-/5 ( lacking TKE 20 degrees)  Hams: L: 4/5 R: 4-/5    Tests     Additional Tests Details  TU seconds w/ SPC    30 SSTS: 6 stands; L lateral trunk lean, excessive fwd trunk lean; USED B/L UE    Gait impairments: Patient ambulates with SPC WBAT B/L LE  The patient demonstrates slow gait speed, L lateral trunk lean, increased medial/lateral sway, Abducted R LE, unequal step lengths, and decreased heel-toe rollover R LE  Needs SPC in the community due to fear of falling      FOTO:  43% ( predicted 48%)  Tandem stance : (+) LOB attempting w/ R foot back            Precautions: Cardiac Loop recorder  - CLEARED FOR NMES / ESTIM BELOW WAIST  Re-evaluation:1/3      Hip Specialty Daily Treatment Diary     Manual                 Hamstring stretch        Prone Quad Stretch                    Therapeutic Exercise        Recumbent Bike S=        Nu step S=  *                              Heel Slide flex/abd  *      Supine ball squeeze                Bridges  *      SLR AAROM flex  *                                      Standing lumbar extensions        Total Gym Squat        TB hip IR, ER                NEURO RE-ED        Sidestepping  *      Hurdles OBO        Hurdles weave        Heel-toe ambulation        Mirror amb  // bar      NMES w/ QS *x 10 mins 10 sec on 30 sec off       Foam step over  *      Quad set Reviewed prior to Rx x5       Core brace  *      Glute set  *      SLB        TKE        CSMi "+" squat, balance  *      Eccentric leg press S=7  Single leg press  *      Tandem balance        Foam balance        Fitter balance        Therapeutic Activity        Sit to stand transfers        Squats        ALT Lunges // bar  *      Step Ups fwd/side  *      Up/down steps            Modalities        CP knee/hip

## 2022-12-08 ENCOUNTER — OFFICE VISIT (OUTPATIENT)
Dept: PHYSICAL THERAPY | Facility: CLINIC | Age: 67
End: 2022-12-08

## 2022-12-08 DIAGNOSIS — R53.1 WEAKNESS: ICD-10-CM

## 2022-12-08 DIAGNOSIS — I63.412 CEREBROVASCULAR ACCIDENT (CVA) DUE TO EMBOLISM OF LEFT MIDDLE CEREBRAL ARTERY (HCC): Primary | ICD-10-CM

## 2022-12-08 NOTE — PROGRESS NOTES
Daily Note     Today's date: 2022  Patient name: Perez Aceves  : 1955  MRN: [id number]  Referring provider: Shani Garcia DO  Dx:   Encounter Diagnosis     ICD-10-CM    1  Cerebrovascular accident (CVA) due to embolism of left middle cerebral artery (HCC)  I63 412       2  Weakness  R53 1           Start Time: 1315          Subjective: Patient states she has no pain  She just wishes her right leg "would wake up"  Patient talkative and questions answered  Objective: See treatment diary below      Assessment: Tolerated treatment fair+  Patient would benefit from continued PT for stretching and strengthening  Patient was able to add exercises to her program with little difficulty and no pain  She is very centered on right leg having more control with exercises  She felt ok, but is still upset that she can not control her right leg better  Plan: Continue per plan of care  Progress treatment as tolerated         Precautions: Cardiac Loop recorder  - CLEARED FOR NMES / ESTIM BELOW WAIST  Re-evaluation:1/3      Hip Specialty Daily Treatment Diary     Manual                Hamstring stretch        Prone Quad Stretch                    Therapeutic Exercise       Recumbent Bike S=        Nu step S=10  *L4 x5 min                              Heel Slide flex/abd  *x10ea      Supine ball squeeze                Bridges  x10      SLR AAROM flex  *w/ strap x10                                      Standing lumbar extensions        Total Gym Squat        TB hip IR, ER                NEURO RE-ED        Sidestepping  *10 ft x4 // bars      Hurdles OBO        Hurdles weave        Heel-toe ambulation        Mirror amb   // bar     NMES w/ QS *x 10 mins 10 sec on 30 sec off x 10 mins 10 sec on 30 sec off      Foam step over  *x10 B/L      Quad set Reviewed prior to Rx x5       Core brace  *:05x10      Glute set  *:05x10      SLB        TKE        CSMi "+" squat, balance  *ws= x10  Sq=x10  B= L1-2      Eccentric leg press S=7  Single leg press  *55# x15      Tandem balance        Foam balance        Fitter balance        Therapeutic Activity        Sit to stand transfers        Squats        ALT Lunges // bar  *x10      Step Ups fwd/side  *fwd 2" x10      Up/down steps            Modalities        CP knee/hip

## 2022-12-09 ENCOUNTER — TELEPHONE (OUTPATIENT)
Dept: PHYSICAL THERAPY | Facility: CLINIC | Age: 67
End: 2022-12-09

## 2022-12-12 ENCOUNTER — OFFICE VISIT (OUTPATIENT)
Dept: PHYSICAL THERAPY | Facility: CLINIC | Age: 67
End: 2022-12-12

## 2022-12-12 DIAGNOSIS — I63.412 CEREBROVASCULAR ACCIDENT (CVA) DUE TO EMBOLISM OF LEFT MIDDLE CEREBRAL ARTERY (HCC): Primary | ICD-10-CM

## 2022-12-12 DIAGNOSIS — R53.1 WEAKNESS: ICD-10-CM

## 2022-12-12 NOTE — PROGRESS NOTES
Daily Note     Today's date: 2022  Patient name: Nisha Gaona  : 1955  MRN: [id number]  Referring provider: Janes Elmore DO  Dx:   Encounter Diagnosis     ICD-10-CM    1  Cerebrovascular accident (CVA) due to embolism of left middle cerebral artery (HCC)  I63 412       2  Weakness  R53 1           Start Time: 1400          Subjective: Patient states she has no pain, just her normal numbness  She still feels she does not have the proper use of her right leg  Objective: See treatment diary below      Assessment: Tolerated treatment fair+  Patient would benefit from continued PT for strengthening  Patient was able to add walking with mirror during session  She felt ok by the end of the session  Plan: Continue per plan of care  Progress treatment as tolerated         Precautions: Cardiac Loop recorder  - CLEARED FOR NMES / ESTIM BELOW WAIST  Re-evaluation:1/3      Hip Specialty Daily Treatment Diary     Manual               Hamstring stretch        Prone Quad Stretch                    Therapeutic Exercise      Recumbent Bike S=        Nu step S=10  *L4 x5 min L4 x5 min                             Heel Slide flex/abd  *x10ea x10ea     Supine ball squeeze                Bridges  x10 x10     SLR AAROM flex  *w/ strap x10 W/strap x10                                     Standing lumbar extensions        Total Gym Squat        TB hip IR, ER                NEURO RE-ED        Sidestepping  *10 ft x4 // bars 10ft x4 // bars     Hurdles OBO        Hurdles weave        Heel-toe ambulation        Mirror amb   // bar 10ft x6     NMES w/ QS *x 10 mins 10 sec on 30 sec off x 10 mins 10 sec on 30 sec off x 10 mins 10 sec on 30 sec off     Foam step over  *x10 B/L x10 B/L     Quad set Reviewed prior to Rx x5       Core brace  *:05x10 :05x10     Glute set  *:05x10 :05x10     SLB        TKE        CSMi "+" squat, balance  *ws= x10  Sq=x10  B= L1-2 ws= x10  Sq=x10  B= L1-3 Eccentric leg press S=7  Single leg press  *55# x15 55# x20     Tandem balance        Foam balance        Fitter balance        Therapeutic Activity        Sit to stand transfers        Squats        ALT Lunges // bar  *x10 x10     Step Ups fwd/side  *fwd 2" x10 Fwd 2" x10     Up/down steps            Modalities        CP knee/hip

## 2022-12-15 ENCOUNTER — APPOINTMENT (OUTPATIENT)
Dept: PHYSICAL THERAPY | Facility: CLINIC | Age: 67
End: 2022-12-15

## 2022-12-15 DIAGNOSIS — R53.1 WEAKNESS: ICD-10-CM

## 2022-12-15 DIAGNOSIS — I63.412 CEREBROVASCULAR ACCIDENT (CVA) DUE TO EMBOLISM OF LEFT MIDDLE CEREBRAL ARTERY (HCC): Primary | ICD-10-CM

## 2022-12-15 NOTE — PROGRESS NOTES
Daily Note     Today's date: 12/15/2022  Patient name: Solomon Santamaria  : 1955  MRN: [id number]  Referring provider: Emiliana Zapien DO  Dx:   Encounter Diagnosis     ICD-10-CM    1  Cerebrovascular accident (CVA) due to embolism of left middle cerebral artery (HCC)  I63 412       2  Weakness  R53 1                      Subjective: ***      Objective: See treatment diary below      Assessment: Tolerated treatment {Tolerated treatment :[id number]}   Patient {assessment:[id number]}      Plan: {PLAN:9329227107}     Precautions: Cardiac Loop recorder  - CLEARED FOR NMES / ESTIM BELOW WAIST  Re-evaluation:1/3      Hip Specialty Daily Treatment Diary     Manual               Hamstring stretch        Prone Quad Stretch                    Therapeutic Exercise      Recumbent Bike S=        Nu step S=10  *L4 x5 min L4 x5 min                             Heel Slide flex/abd  *x10ea x10ea     Supine ball squeeze                Bridges  x10 x10     SLR AAROM flex  *w/ strap x10 W/strap x10                                     Standing lumbar extensions        Total Gym Squat        TB hip IR, ER                NEURO RE-ED        Sidestepping  *10 ft x4 // bars 10ft x4 // bars     Hurdles OBO        Hurdles weave        Heel-toe ambulation        Mirror amb   // bar 10ft x6     NMES w/ QS *x 10 mins 10 sec on 30 sec off x 10 mins 10 sec on 30 sec off x 10 mins 10 sec on 30 sec off     Foam step over  *x10 B/L x10 B/L     Quad set Reviewed prior to Rx x5       Core brace  *:05x10 :05x10     Glute set  *:05x10 :05x10     SLB        TKE        CSMi "+" squat, balance  *ws= x10  Sq=x10  B= L1-2 ws= x10  Sq=x10  B= L1-3     Eccentric leg press S=7  Single leg press  *55# x15 55# x20     Tandem balance        Foam balance        Fitter balance        Therapeutic Activity        Sit to stand transfers        Squats        ALT Lunges // bar  *x10 x10     Step Ups fwd/side  *fwd 2" x10 Fwd 2" x10 Up/down steps            Modalities        CP knee/hip

## 2022-12-19 ENCOUNTER — OFFICE VISIT (OUTPATIENT)
Dept: PHYSICAL THERAPY | Facility: CLINIC | Age: 67
End: 2022-12-19

## 2022-12-19 DIAGNOSIS — I63.412 CEREBROVASCULAR ACCIDENT (CVA) DUE TO EMBOLISM OF LEFT MIDDLE CEREBRAL ARTERY (HCC): Primary | ICD-10-CM

## 2022-12-19 DIAGNOSIS — R53.1 WEAKNESS: ICD-10-CM

## 2022-12-19 NOTE — PROGRESS NOTES
Daily Note     Today's date: 2022  Patient name: Katina Zavala  : 1955  MRN: [id number]  Referring provider: Burt Mauricio DO  Dx:   Encounter Diagnosis     ICD-10-CM    1  Cerebrovascular accident (CVA) due to embolism of left middle cerebral artery (HCC)  I63 412       2  Weakness  R53 1           Start Time: 0949          Subjective: Patient states the leg "still did not wake up"  She reported no pain in the right leg  Objective: See treatment diary below      Assessment: Tolerated treatment well  Patient would benefit from continued PT for stretching and strengthening  Patient was able to increase some of her exercises  She felt she had a little more control of her locking and unlocking of the knee during today's session  She felt ok when she left department  Plan: Continue per plan of care  Progress treatment as tolerated         Precautions: Cardiac Loop recorder  - CLEARED FOR NMES / ESTIM BELOW WAIST  Re-evaluation:1/3      Hip Specialty Daily Treatment Diary     Manual              Hamstring stretch        Prone Quad Stretch                    Therapeutic Exercise     Recumbent Bike S=        Nu step S=10  *L4 x5 min L4 x5 min L4 x5 min                            Heel Slide flex/abd  *x10ea x10ea x10 ea    Supine ball squeeze                Bridges  x10 x10 x10    SLR AAROM flex  *w/ strap x10 W/strap x10 W/ strap x10                                    Standing lumbar extensions        Total Gym Squat        TB hip IR, ER                NEURO RE-ED        Sidestepping  *10 ft x4 // bars 10ft x4 // bars 10ft x4 // bars    Hurdles OBO        Hurdles weave        Heel-toe ambulation        Mirror amb   // bar 10ft x6 // bars 10 ft x6    NMES w/ QS *x 10 mins 10 sec on 30 sec off x 10 mins 10 sec on 30 sec off x 10 mins 10 sec on 30 sec off x 10 mins 10 sec on 30 sec off    Foam step over  *x10 B/L x10 B/L x10 B/L    Quad set Reviewed prior to Rx x5       Core brace  *:05x10 :05x10 Stand :05 x10    Glute set  *:05x10 :05x10 Stand :05 x10    SLB        TKE        CSMi "+" squat, balance  *ws= x10  Sq=x10  B= L1-2 ws= x10  Sq=x10  B= L1-3 ws= x10  Sq=x10  B= L1-3    Eccentric leg press S=7  Single leg press  *55# x15 55# x20 55# x25    Tandem balance        Foam balance        Fitter balance        Therapeutic Activity        Sit to stand transfers        Squats        ALT Lunges // bar  *x10 x10 x10    Step Ups fwd/side  *fwd 2" x10 Fwd 2" x10 Fwd/side 2" x10 ?4"   Up/down steps            Modalities        CP knee/hip

## 2022-12-20 ENCOUNTER — OFFICE VISIT (OUTPATIENT)
Dept: CARDIOLOGY CLINIC | Facility: CLINIC | Age: 67
End: 2022-12-20

## 2022-12-20 VITALS
HEIGHT: 63 IN | WEIGHT: 221 LBS | SYSTOLIC BLOOD PRESSURE: 170 MMHG | HEART RATE: 59 BPM | BODY MASS INDEX: 39.16 KG/M2 | DIASTOLIC BLOOD PRESSURE: 92 MMHG

## 2022-12-20 DIAGNOSIS — I48.0 PAROXYSMAL ATRIAL FIBRILLATION (HCC): Primary | ICD-10-CM

## 2022-12-20 DIAGNOSIS — I42.9 CARDIOMYOPATHY, UNSPECIFIED TYPE (HCC): ICD-10-CM

## 2022-12-20 DIAGNOSIS — I35.0 AORTIC VALVE STENOSIS, NONRHEUMATIC: ICD-10-CM

## 2022-12-20 DIAGNOSIS — I10 ESSENTIAL HYPERTENSION: ICD-10-CM

## 2022-12-20 RX ORDER — LISINOPRIL 10 MG/1
5 TABLET ORAL DAILY
Qty: 90 TABLET | Refills: 5 | Status: SHIPPED | OUTPATIENT
Start: 2022-12-20 | End: 2022-12-22 | Stop reason: SDUPTHER

## 2022-12-20 NOTE — PROGRESS NOTES
Patient ID: Ashanti Alejandra is a 74 y o  female  Plan: Aortic valve stenosis, nonrheumatic  Will reassess by echo in the fall  Paroxysmal atrial fibrillation (HCC)  In NSR today  Continue apixaban for stroke prevention  Essential hypertension  Inadequately controlled  We will re add lisinopril  Follow up Plan/Other summary comments:  Return in about 10 months (around 2023)  Echo to be checked just prior  Patient will be seeing her primary care physician in the next month or so and therefore  Lisinopril can be adjusted  HPI: Patient is seen in follow-up today regarding the above  Since last years visit she in general has felt well  Blood pressure is high today and multiple readings but she states it has been better at home  No chest pain or chest pressure  No falls  She is living in the [address] [place] [address] Road  Ambulation is difficult and she had a hard time standing up today  To reiterate, in 2020, she had cryptogenic stroke  Heart catheterization revealed the absence of CAD  Loop recorder was placed  Results for orders placed or performed in visit on 22   POCT ECG    Impression    NSR  Possible LVH  Otherwise normal          Most recent or relevant cardiac/vascular testing:    TTE 2020: EF 30%  TTE 2022: Mild LVH  Normal LVEF  Moderate aortic stenosis with 3 m/s flow across the valve  Coronary angiography 2020: Normal     Past Surgical History:   Procedure Laterality Date   • ABDOMINOPLASTY      revision gastric bypass   • BREAST SURGERY      reduction   • CARDIAC CATHETERIZATION  2020    Normal coronary arteries     • CARDIAC LOOP RECORDER  2020    [employer] Reveal LINQ    • CATARACT EXTRACTION     •  SECTION     • CHOLECYSTECTOMY     • COLONOSCOPY     • GASTRIC BYPASS     • HERNIA REPAIR     • HIP ARTHROSCOPY W/ LABRAL REPAIR Left 2020   • KNEE SURGERY Right    • NEUROMA EXCISION Right 2019 Procedure: EXCISION 2ND INTERSPACE NEUROMA;  Surgeon: Celestia Boas, DPM;  Location: [address] [place] [address];  Service: Podiatry   • ROTATOR CUFF REPAIR Left        Lipid Profile:   Lab Results   Component Value Date    TRIG 93 01/18/2020    HDL 44 01/18/2020         Review of Systems   10  point ROS  was otherwise non pertinent or negative except as per HPI or as below  Gait: Difficult        Objective:     /92   Pulse 59   Ht 5' 3" (1 6 m)   Wt 100 kg (221 lb)   LMP  (LMP Unknown)   BMI 39 15 kg/m²     PHYSICAL EXAM:    General:  Normal appearance in no distress  Eyes:  Anicteric  Oral mucosa:  Moist   Neck:  No JVD  Carotid upstrokes are brisk without bruits  No masses  Chest:  Clear to auscultation  Cardiac:  No palpable PMI  Normal S1 and S2   Grade 3 systolic murmur loudest at the base radiating towards the neck  No gallop or rub  Abdomen:  Soft and nontender  No palpable organomegaly or aortic enlargement  Extremities:  No peripheral edema  Musculoskeletal:  Symmetric  Vascular:  Femoral pulses are brisk without bruits  Popliteal pulses are intact bilaterally  Pedal pulses are intact  Neuro:  Grossly symmetric  Psych:  Alert and oriented x3          Current Outpatient Medications:   •  acetaminophen (TYLENOL) 325 mg tablet, Take 2 tablets (650 mg total) by mouth every 6 (six) hours as needed for mild pain, Disp: 30 tablet, Rfl: 0  •  apixaban (ELIQUIS) 5 mg, Take 1 tablet (5 mg total) by mouth 2 (two) times a day, Disp: 60 tablet, Rfl: 5  •  atorvastatin (LIPITOR) 40 mg tablet, Take 1 tablet (40 mg total) by mouth every evening, Disp: 30 tablet, Rfl: 0  •  Cholecalciferol (VITAMIN D3) 1 25 MG (25230 UT) CAPS, Take 50,000 Units by mouth once a week, Disp: , Rfl:   •  dorzolamide-timolol (COSOPT) 22 3-6 8 MG/ML ophthalmic solution, Administer 1 drop to both eyes 2 (two) times a day, Disp: , Rfl:   •  estradiol (ESTRACE) 0 1 mg/g vaginal cream, Insert 0 5 g into the vagina as needed (Pain in urinary tract), Disp: 15 g, Rfl: 1  •  ferrous sulfate 324 (65 Fe) mg, Take 1 tablet (324 mg total) by mouth daily before breakfast, Disp: 30 tablet, Rfl: 0  •  furosemide (LASIX) 40 mg tablet, Take 40 mg by mouth daily, Disp: , Rfl:   •  lisinopril (ZESTRIL) 10 mg tablet, Take 0 5 tablets (5 mg total) by mouth daily, Disp: 90 tablet, Rfl: 5  •  pantoprazole (PROTONIX) 40 mg tablet, Take 1 tablet (40 mg total) by mouth daily, Disp: 30 tablet, Rfl: 0  •  potassium chloride (K-DUR,KLOR-CON) 10 mEq tablet, Take 10 mEq by mouth daily, Disp: , Rfl:   •  albuterol (PROVENTIL HFA,VENTOLIN HFA) 90 mcg/act inhaler, inhale 2 puffs every 4 hours if needed for wheezing or shortness of breath (Patient not taking: No sig reported), Disp: 18 g, Rfl: 1  •  carboxymethylcellulose 1 % ophthalmic solution, Apply 1 drop to eye 4 (four) times a day (Patient not taking: Reported on 7/21/2021), Disp: , Rfl:   •  ergocalciferol (VITAMIN D2) 50,000 units, Take 50,000 Units by mouth once a week (Patient not taking: Reported on 7/21/2021), Disp: , Rfl:   •  methocarbamol (ROBAXIN) 500 mg tablet, Take 1 tablet (500 mg total) by mouth every 6 (six) hours as needed for muscle spasms (Patient not taking: Reported on 7/21/2021), Disp: 30 tablet, Rfl: 0  •  metroNIDAZOLE (FLAGYL) 500 mg tablet, take 1 tablet by mouth twice a day for 7 days (Patient not taking: No sig reported), Disp: , Rfl:   •  Procto-Med HC 2 5 % rectal cream, USE TWICE PER DAY AS NEEDED FOR HEMORRHOID SWELLING (Patient not taking: No sig reported), Disp: , Rfl:   •  traMADol (ULTRAM) 50 mg tablet, Take 50 mg by mouth every 6 (six) hours as needed for moderate pain (Patient not taking: Reported on 12/14/2021), Disp: , Rfl:   Allergies   Allergen Reactions   • Ibuprofen    • Nsaids      Due to hx gastric bypass       Past Medical History:   Diagnosis Date   • Bicuspid aortic valve    • Dilated cardiomyopathy    • Hyperlipidemia    • Hypertension    • PAF (paroxysmal atrial fibrillation) ([facility] )    • PUD (peptic ulcer disease)    • Stroke [facility])    • Vitamin D deficiency            Social History     Tobacco Use   Smoking Status Never   Smokeless Tobacco Never

## 2022-12-22 ENCOUNTER — APPOINTMENT (OUTPATIENT)
Dept: PHYSICAL THERAPY | Facility: CLINIC | Age: 67
End: 2022-12-22

## 2022-12-22 DIAGNOSIS — I42.9 CARDIOMYOPATHY, UNSPECIFIED TYPE (HCC): ICD-10-CM

## 2022-12-22 RX ORDER — LISINOPRIL 5 MG/1
5 TABLET ORAL DAILY
Qty: 90 TABLET | Refills: 3 | Status: SHIPPED | OUTPATIENT
Start: 2022-12-22

## 2022-12-28 ENCOUNTER — REMOTE DEVICE CLINIC VISIT (OUTPATIENT)
Dept: CARDIOLOGY CLINIC | Facility: CLINIC | Age: 67
End: 2022-12-28

## 2022-12-28 ENCOUNTER — APPOINTMENT (OUTPATIENT)
Dept: PHYSICAL THERAPY | Facility: CLINIC | Age: 67
End: 2022-12-28

## 2022-12-28 DIAGNOSIS — Z95.818 PRESENCE OF OTHER CARDIAC IMPLANTS AND GRAFTS: Primary | ICD-10-CM

## 2022-12-28 NOTE — PROGRESS NOTES
MDT LOOP1/ ACTIVE SYSTEM IS MRI CONDITIONAL   CARELINK TRANSMISSION: LOOP RECORDER  PRESENTING RHYTHM NSR @ 91 BPM  BATTERY STATUS "OK " NO PATIENT OR DEVICE ACTIVATED EPISODES  HOWEVER THERE IS AN EGRAM SHOWING SR W/ PACs AND PVCs  NORMAL DEVICE FUNCTION   DL

## 2022-12-29 ENCOUNTER — OFFICE VISIT (OUTPATIENT)
Dept: PHYSICAL THERAPY | Facility: CLINIC | Age: 67
End: 2022-12-29

## 2022-12-29 DIAGNOSIS — I63.412 CEREBROVASCULAR ACCIDENT (CVA) DUE TO EMBOLISM OF LEFT MIDDLE CEREBRAL ARTERY (HCC): Primary | ICD-10-CM

## 2022-12-29 DIAGNOSIS — R53.1 WEAKNESS: ICD-10-CM

## 2022-12-29 NOTE — PROGRESS NOTES
Daily Note     Today's date: 2022  Patient name: Shilpa Marie  : 1955  MRN: [id number]  Referring provider: Stewart Campbell DO  Dx:   Encounter Diagnosis     ICD-10-CM    1  Cerebrovascular accident (CVA) due to embolism of left middle cerebral artery (HCC)  I63 412       2  Weakness  R53 1           Start Time: 1116          Subjective: Patient states she has no pain  She just can't get her leg to work  Objective: See treatment diary below      Assessment: Tolerated treatment well  Patient would benefit from continued PT for stretching and strengthening  Patient completed exercises with little difficulty  She still feels like she does not have control of her knee all the time  She surprises herself at times when she is able to control the contraction and not hyper extend the right knee  Patient felt ok by the end of the session  Plan: Continue per plan of care  Progress treatment as tolerated         Precautions: Cardiac Loop recorder  - CLEARED FOR NMES / ESTIM BELOW WAIST  Re-evaluation:1/3      Hip Specialty Daily Treatment Diary     Manual             Hamstring stretch        Prone Quad Stretch                    Therapeutic Exercise    Recumbent Bike S=        Nu step S=10  *L4 x5 min L4 x5 min L4 x5 min L4 x5 min                           Heel Slide flex/abd  *x10ea x10ea x10 ea x10 ea   Supine ball squeeze                Bridges  x10 x10 x10 x10   SLR AAROM flex  *w/ strap x10 W/strap x10 W/ strap x10 W/ strap x10                                   Standing lumbar extensions        Total Gym Squat        TB hip IR, ER                NEURO RE-ED        Sidestepping  *10 ft x4 // bars 10ft x4 // bars 10ft x4 // bars 10ft x4 // bars   Hurdles OBO        Hurdles weave        Heel-toe ambulation        Mirror amb   // bar 10ft x6 // bars 10 ft x6 // bars 10ft x6   NMES w/ QS *x 10 mins 10 sec on 30 sec off x 10 mins 10 sec on 30 sec off x 10 mins 10 sec on 30 sec off x 10 mins 10 sec on 30 sec off    Foam step over  *x10 B/L x10 B/L x10 B/L x10 B/L   Quad set Reviewed prior to Rx x5       Core brace  *:05x10 :05x10 Stand :05 x10 Stand :05x10   Glute set  *:05x10 :05x10 Stand :05 x10 Stand :05x10   SLB        TKE        CSMi "+" squat, balance  *ws= x10  Sq=x10  B= L1-2 ws= x10  Sq=x10  B= L1-3 ws= x10  Sq=x10  B= L1-3 ws= x10  Sq=x10  B= L1-4   Eccentric leg press S=7  Single leg press  *55# x15 55# x20 55# x25 55# x30   Tandem balance        Foam balance        Fitter balance        Therapeutic Activity        Sit to stand transfers        Squats        ALT Lunges // bar  *x10 x10 x10 x10   Step Ups fwd/side  *fwd 2" x10 Fwd 2" x10 Fwd/side 2" x10 4" fwd/side x10   Up/down steps            Modalities        CP knee/hip

## 2022-12-30 ENCOUNTER — OFFICE VISIT (OUTPATIENT)
Dept: PHYSICAL THERAPY | Facility: CLINIC | Age: 67
End: 2022-12-30

## 2022-12-30 DIAGNOSIS — R53.1 WEAKNESS: ICD-10-CM

## 2022-12-30 DIAGNOSIS — I63.412 CEREBROVASCULAR ACCIDENT (CVA) DUE TO EMBOLISM OF LEFT MIDDLE CEREBRAL ARTERY (HCC): Primary | ICD-10-CM

## 2022-12-30 NOTE — PROGRESS NOTES
Daily Note     Today's date: 2022  Patient name: Kimber Nunez  : 1955  MRN: [id number]  Referring provider: Shant Trinh DO  Dx:   Encounter Diagnosis     ICD-10-CM    1  Cerebrovascular accident (CVA) due to embolism of left middle cerebral artery (HCC)  I63 412       2  Weakness  R53 1                      Subjective: The patient denies feeling pain today  The patient notes persistent weakness at her R quads  Objective: See treatment diary below      Assessment: The patient tolerated all activities well today  The patient needed verbal and manual cues for proper posture and technique to perform the exercises properly  There were no complaints of increased pain or problems after the session today  The patient will benefit from continued skilled physical therapy to progress towards achieving patient centered goals  Plan: Continue per plan of care  Progress treatment as tolerated         Precautions: Cardiac Loop recorder  - CLEARED FOR NMES / ESTIM BELOW WAIST  Re-evaluation:1/3      Hip Specialty Daily Treatment Diary     Manual             Hamstring stretch        Prone Quad Stretch                    Therapeutic Exercise    Recumbent Bike S=        Nu step S=10 L6 x5 mins *L4 x5 min L4 x5 min L4 x5 min L4 x5 min                           Heel Slide flex/abd x10 ea *x10ea x10ea x10 ea x10 ea   Supine ball squeeze                Bridges x15 x10 x10 x10 x10   SLR AAROM flex W/ strap x10 *w/ strap x10 W/strap x10 W/ strap x10 W/ strap x10                                   Standing lumbar extensions        Total Gym Squat        TB hip IR, ER                NEURO RE-ED        Sidestepping 10ft x 6 // bar *10 ft x4 // bars 10ft x4 // bars 10ft x4 // bars 10ft x4 // bars   Hurdles OBO        Hurdles weave        Heel-toe ambulation        Mirror amb //10ft x 6  // bar 10ft x6 // bars 10 ft x6 // bars 10ft x6   NMES w/ QS x10 mins 10 sec on 30 sec off x 10 mins 10 sec on 30 sec off x 10 mins 10 sec on 30 sec off x 10 mins 10 sec on 30 sec off    Foam step over x10 B/L *x10 B/L x10 B/L x10 B/L x10 B/L   Quad set        Core brace x15 stand *:05x10 :05x10 Stand :05 x10 Stand :05x10   Glute set x15 stand *:05x10 :05x10 Stand :05 x10 Stand :05x10   SLB        TKE GRN w/ towel x15       CSMi "+" squat, balance WS: x10 ea  SQ+ x10   B=L1-4 *ws= x10  Sq=x10  B= L1-2 ws= x10  Sq=x10  B= L1-3 ws= x10  Sq=x10  B= L1-3 ws= x10  Sq=x10  B= L1-4   Eccentric leg press S=7  Single leg press 55# x30 *55# x15 55# x20 55# x25 55# x30   Tandem balance        Foam balance        Fitter balance        Therapeutic Activity        Sit to stand transfers        Squats        ALT Lunges // bar x10 B/L *x10 x10 x10 x10   Step Ups fwd/side 4" x10 fwd/side *fwd 2" x10 Fwd 2" x10 Fwd/side 2" x10 4" fwd/side x10   Up/down steps            Modalities        CP knee/hip

## 2023-01-02 NOTE — PROGRESS NOTES
PT Re-Evaluation     Today's date: 1/3/2023  Patient name: Jailene Taylor  : 1955  MRN: [id number]  Referring provider: Noah Lind DO  Dx:   Encounter Diagnosis     ICD-10-CM    1  Cerebrovascular accident (CVA) due to embolism of left middle cerebral artery (HCC)  I63 412       2  Weakness  R53 1                      Assessment  Assessment details: Jailene Taylor is a 74 y o  female with a history of bicuspid aortic valve, dilated CM, hyperlipidemia, HTN, PAF, peptic ulcer disease, Hx stroke, vitamin D deficiency, Hx PE, glaucoma, and BMI>30 that presents for a physical therapy RE evaluation  The patient has attended 7 sessions of physical therapy  The patient demonstrates signs and symptoms consistent with CVA embolism of L MCA; muscle weakness  During the examination the patient demonstrated improved but impaired R LE strength ( R quads/hip flexors), slightly improved activity intolerance, persistent gait dysfunction, and slightly decreased R LE pain  The patient has made functional gains since starting therapy  The patient is now able to walk short distances with less difficulty without any AD  The patient is lifting her R LE into bed with less difficulty  The patient continues to have difficulty with squatting, transfers, and walking on unlevel surfaces without AD  The patient will benefit from continued skilled PT to address impairments, work towards goals, and restore patient PLOF  Impairments: abnormal gait, abnormal or restricted ROM, activity intolerance, impaired balance, impaired physical strength, lacks appropriate home exercise program, pain with function and poor posture   Functional limitations: difficulty with prolonged standing, prolonged walking, difficulty walking on unlevel surfaces, difficulty squatting/kneeling, difficulty stair-climbing, and difficulty transferring from low surfaces    Symptom irritability: lowBarriers to therapy: Chronicity of condition  Understanding of Dx/Px/POC: fair   Prognosis: fair  Prognosis details: Chronicity of condition    Goals  STG: Achieve in 4-6 weeks  1  Patient's R LE pain at worst less than 2/10 to allow for sleeping comfortably  PROGRESSING 1/3  2  RLE MMT improve to > 4/5 all motions tested to improve ADL/recreational activities  PROGRESSING 1/3  3  Timed up and go score improve to less than 14 seconds to indicate improved balance/decreased falls risk  MET 1/3  LTG:  Achieve in 6-12 weeks  1  Patient's FOTO score improve to > 60% to indicate a return to normal functioning  PROGRESSING 1/3  2  Patient achieve personal goal of improved R LE / R Quads strength for all functional activities  PROGRESSING 1/3  3  Patient to achieve independence with home exercise plan  PROGRESSING 1/3  4  30 second sit to stand score improve by 6 stands ( total 12) to indicate improved transfers / normalization of R quad strength  PROGRESSING 1/3      Plan  Plan details: RE-ASSESS 1X/MONTH  Patient would benefit from: skilled physical therapy  Planned modality interventions: cryotherapy, thermotherapy: hydrocollator packs and electrical stimulation/Russian stimulation  Other planned modality interventions: NMES w/ QS R LE  Planned therapy interventions: joint mobilization, manual therapy, massage, neuromuscular re-education, patient education, postural training, self care, strengthening, stretching, therapeutic activities, therapeutic exercise, home exercise program, abdominal trunk stabilization, balance, balance/weight bearing training and gait training  Frequency: 1-3x/wk  Duration in weeks: 12  Plan of Care beginning date: 12/6/2022  Plan of Care expiration date: 3/7/2023  Treatment plan discussed with: PTA and patient        Subjective Evaluation    History of Present Illness  Date of surgery: 3/14/2022  Mechanism of injury: SUBJECTIVE: 1/3/23: The patient reports an improvement in activity tolerance since starting therapy    The patient is now able to walk short distances with less difficulty without any AD  The patient continues to have difficulty with squatting, transfers, and walking on unlevel surfaces without AD  The patient will benefit from continued PT focused on achieving patient specific goals  INJURY HISTORY: Brannon Mclean is a 74 y o  female that presents to outpatient physical theracpy with complaints of R leg weakness and difficulty functioning normally  The patient reports difficulty with transfers and funtion without relying on her L LE and UE for support  The patient reports having a Hx of multiple surgeries at her R knee (3) in the past 2 years, the first surgery for a quad tendon tear 2021; Then in May 2021 for infection, and finally R TKA 3/14/2022  The patient notes persistent R quadriceps weakness since  The patient attempted land based therapy and aqua therapy to help restore the strength  The patient had a recent F/U with her doctor who prescribed the patient PT to attempt NMES to the R quadriceps muscle  This treatment was not done due to the patient having an implanted cardiac device but she has been cleared to receive by the cardiologist office  The patient can receive any ESTIM treatment below the waist   The patient's main goal for physical therapy is to have her R thigh " wake up" so she can function normally               Recurrent probem    Pain  Current pain ratin  At best pain ratin  At worst pain ratin  Location: R anterior /medial tibia  Quality: discomfort  Relieving factors: change in position  Progression: improved    Social Support  Steps to enter house: no  Stairs in house: no   Lives in: community-based residential facility  Lives with: alone    Employment status: not working  Hand dominance: right    Treatments  Previous treatment: physical therapy  Current treatment: physical therapy  Patient Goals  Patient goals for therapy: decreased pain, improved balance, increased motion, increased strength, independence with ADLs/IADLs and return to sport/leisure activities  Patient goal: "wake up" her R LE ( progressing)        Objective     Tenderness     Additional Tenderness Details  (+)R anterior /medial Tibia TTP    Lumbar Screen  Lumbar range of motion within normal limits with the following exceptions:Max limit EXT    Neurological Testing     Sensation     Hip   Left Hip   Intact: light touch    Right Hip   Intact: light touch  Hyposensation: light touch    Comments   Right light touch: R anterior patella  Active Range of Motion   Left Hip   Flexion: 115 degrees   Extension: 0 degrees   Abduction: 40 degrees   External rotation (90/90): 10 degrees   Internal rotation (90/90): 44 degrees     Right Hip   Flexion: 115 degrees   Extension: 0 degrees   Abduction: 34 degrees   External rotation (90/90): 20 degrees   Internal rotation (90/90): 37 degrees     Additional Active Range of Motion Details  IMPROVED R hip    Strength/Myotome Testing     Left Hip   Planes of Motion   Flexion: 4-  Extension: 4-  Abduction: 4  Adduction: 4  External rotation: 3+  Internal rotation: 4    Right Hip   Planes of Motion   Flexion: 3-  Extension: 3  Abduction: 4-  Adduction: 4  External rotation: 4-  Internal rotation: 4-    Additional Strength Details  Quads: L: 4/5  R: 3-/5 ( lacking TKE 20 degrees)  Hams: L: 4/5 R: 4/5  IMPROVED    Tests     Additional Tests Details  TU seconds w/ SPC ( improved 7 seconds)    30 SSTS: 9 stands; L lateral trunk lean, excessive fwd trunk lean; USED B/L UE ( improved 3 stands)    Gait impairments: Patient ambulates with SPC WBAT B/L LE  The patient demonstrates improved gait speed, L lateral trunk lean, increased medial/lateral sway, Abducted R LE, unequal step lengths, and improved but decreased heel-toe rollover R LE  Needs SPC in the community due to fear of falling      FOTO:  59%% ( predicted 48%)  Tandem stance : (+) LOB after 2 seconds w/ R foot back (improved 2 seconds)             Precautions: Cardiac Loop recorder  - CLEARED FOR NMES / ESTIM BELOW WAIST  Re-evaluation:1/31      Hip Specialty Daily Treatment Diary     Manual  12/30 1/3 12/12 12/19 12/29           Hamstring stretch        Prone Quad Stretch                    Therapeutic Exercise 12/30 1/3 12/12 12/19 12/29   Recumbent Bike S=        Nu step S=10 L6 x5 mins L6 x 7 mins L4 x5 min L4 x5 min L4 x5 min                           Heel Slide flex/abd x10 ea x15 ea x10ea x10 ea x10 ea   Supine ball squeeze                Bridges x15 2x10 x10 x10 x10   SLR AAROM flex W/ strap x10 W/ strap x10 W/strap x10 W/ strap x10 W/ strap x10                                   Standing lumbar extensions        Total Gym Squat        TB hip IR, ER                NEURO RE-ED        Sidestepping 10ft x 6 // bar 10ft x 6 10ft x4 // bars 10ft x4 // bars 10ft x4 // bars   Hurdles OBO        Hurdles weave        Heel-toe ambulation        Mirror amb //10ft x 6 10ft x 6 // bar 10ft x6 // bars 10 ft x6 // bars 10ft x6   NMES w/ QS x10 mins 10 sec on 30 sec off x10 mins 10 sec on 30 sec off x 10 mins 10 sec on 30 sec off x 10 mins 10 sec on 30 sec off x 10 mins 10 sec on 30 sec off   Foam step over x10 B/L  x10 B/L x10 B/L x10 B/L   Quad set        Core brace x15 stand x15 :05x10 Stand :05 x10 Stand :05x10   Glute set x15 stand x15 :05x10 Stand :05 x10 Stand :05x10   SLB        TKE GRN w/ towel x15 GRN x20 w/ towel      CSMi "+" squat, balance WS: x10 ea  SQ+ x10   B=L1-4 WS: x10 ea  SQ+ x10   B=L1-4 ws= x10  Sq=x10  B= L1-3 ws= x10  Sq=x10  B= L1-3 ws= x10  Sq=x10  B= L1-4   Eccentric leg press S=7  Single leg press 55# x30 55# x30 55# x20 55# x25 55# x30   Tandem balance        Foam balance        Fitter balance        Therapeutic Activity        Sit to stand transfers        Squats        ALT Lunges // bar x10 B/L  x10 x10 x10   Step Ups fwd/side 4" x10 fwd/side  Fwd 2" x10 Fwd/side 2" x10 4" fwd/side x10   Up/down steps            Modalities        CP knee/hip

## 2023-01-03 ENCOUNTER — EVALUATION (OUTPATIENT)
Dept: PHYSICAL THERAPY | Facility: CLINIC | Age: 68
End: 2023-01-03

## 2023-01-03 DIAGNOSIS — R53.1 WEAKNESS: ICD-10-CM

## 2023-01-03 DIAGNOSIS — I63.412 CEREBROVASCULAR ACCIDENT (CVA) DUE TO EMBOLISM OF LEFT MIDDLE CEREBRAL ARTERY (HCC): Primary | ICD-10-CM

## 2023-01-05 ENCOUNTER — OFFICE VISIT (OUTPATIENT)
Dept: PHYSICAL THERAPY | Facility: CLINIC | Age: 68
End: 2023-01-05

## 2023-01-05 DIAGNOSIS — R53.1 WEAKNESS: ICD-10-CM

## 2023-01-05 DIAGNOSIS — I63.412 CEREBROVASCULAR ACCIDENT (CVA) DUE TO EMBOLISM OF LEFT MIDDLE CEREBRAL ARTERY (HCC): Primary | ICD-10-CM

## 2023-01-05 NOTE — PROGRESS NOTES
Daily Note     Today's date: 2023  Patient name: Codi Moya  : 1955  MRN: [id number]  Referring provider: Cynthia Livingston DO  Dx:   Encounter Diagnosis     ICD-10-CM    1  Cerebrovascular accident (CVA) due to embolism of left middle cerebral artery (HCC)  I63 412       2  Weakness  R53 1                      Subjective: The patient reports feeling some R anterior knee pain with pressure via touch  The patient denies feeling pain when walking  The patient's main complaint is        Objective: See treatment diary below      Assessment: The patient started more proprioception activities today with mild complaints of difficulty due to R quads/hip flexor weakness and poor control of TKE  The patient did not complain of pain or problems during/after the treatment today  The patient was reminded to minimize using her UE to compensate for the R LE weakness  The patient will benefit from continued PT focused on improving R quads/hip flexor strength and balance  Plan: Continue per plan of care  Progress treatment as tolerated         Precautions: Cardiac Loop recorder  - CLEARED FOR NMES / ESTIM BELOW WAIST  Re-evaluation:      Hip Specialty Daily Treatment Diary     Manual  12/30 1/3 1/5 12/19 12/29           Hamstring stretch        Prone Quad Stretch                    Therapeutic Exercise 12/30 1/3 1/5 12/19 12/29   Recumbent Bike S=        Nu step for endurance/ quad strength S=10 L6 x5 mins L6 x 7 mins L6 x 7 mins L4 x5 min L4 x5 min                           Heel Slide flex/abd x10 ea x15 ea Flex x20 x10 ea x10 ea   Supine ball squeeze                Bridges x15 2x10 x20 x10 x10   SLR AAROM flex W/ strap x10 W/ strap x10  W/ strap x10 W/ strap x10                   NEURO RE-ED        Sidestepping 10ft x 6 // bar 10ft x 6 10ftx6 10ft x4 // bars 10ft x4 // bars   Hurdles OBO   *NO SPC CGA     Hurdles weave   *NO SPC CGA     Heel-toe ambulation   *NO SPC w/ CGA     Mirror amb //10ft x 6 10ft x 6 10ft x6 // bars 10 ft x6 // bars 10ft x6   NMES w/ QS x10 mins 10 sec on 30 sec off x10 mins 10 sec on 30 sec off x10 mins 10 sec on 30 sec off x 10 mins 10 sec on 30 sec off x 10 mins 10 sec on 30 sec off   Foam step over x10 B/L  x10 B/L x10 B/L x10 B/L   Seated T-ball press against wall single leg(R)   GRN x15     Core brace x15 stand x15  Stand :05 x10 Stand :05x10   Glute set x15 stand x15  Stand :05 x10 Stand :05x10   SLB        TKE GRN w/ towel x15 GRN x20 w/ towel GRN x20 w/towel     CSMi "+" squat, balance WS: x10 ea  SQ+ x10   B=L1-4 WS: x10 ea  SQ+ x10   B=L1-4 WS: x10 ea  SQ+ x10   B=L5-7 ws= x10  Sq=x10  B= L1-3 ws= x10  Sq=x10  B= L1-4   Eccentric leg press S=7  Single leg press 55# x30 55# x30 60# x30 55# x25 55# x30           Foam Tandem        Fitter balance        Therapeutic Activity        Sit to stand transfers        Squats        ALT Lunges // bar x10 B/L ALT x10 ALT x30 x10 x10   Step Ups fwd/side 4" x10 fwd/side 4" x10 fwd/side 4" x10 fwd/side Fwd/side 2" x10 4" fwd/side x10   Up/down steps            Modalities        CP knee/hip Additional Anesthesia Volume In Cc (Will Not Render If 0): 0

## 2023-01-09 ENCOUNTER — APPOINTMENT (OUTPATIENT)
Dept: PHYSICAL THERAPY | Facility: CLINIC | Age: 68
End: 2023-01-09

## 2023-01-09 ENCOUNTER — OFFICE VISIT (OUTPATIENT)
Dept: PHYSICAL THERAPY | Facility: CLINIC | Age: 68
End: 2023-01-09

## 2023-01-09 DIAGNOSIS — R53.1 WEAKNESS: ICD-10-CM

## 2023-01-09 DIAGNOSIS — I63.412 CEREBROVASCULAR ACCIDENT (CVA) DUE TO EMBOLISM OF LEFT MIDDLE CEREBRAL ARTERY (HCC): Primary | ICD-10-CM

## 2023-01-09 NOTE — PROGRESS NOTES
Daily Note     Today's date: 2023  Patient name: Darvin Carballo  : 1955  MRN: [id number]  Referring provider: Bert Duenas DO  Dx:   Encounter Diagnosis     ICD-10-CM    1  Cerebrovascular accident (CVA) due to embolism of left middle cerebral artery (HCC)  I63 412       2  Weakness  R53 1           Start Time: 1107          Subjective: Patient states she has no pain, but the knee still did not wake up  She states she has been trying to work on her balance at home  Objective: See treatment diary below      Assessment: Tolerated treatment well  Patient would benefit from continued PT for stretching and strengthening  Patient was able to perform her exercises with little difficulty and no pain  She has great fear of falling with some of her balance activities  She is always afraid that her right knee will give out  She surprises herself when the leg is able to moved without assistance in SLR position  She felt ok when she left department  Plan: Continue per plan of care  Progress treatment as tolerated         Precautions: Cardiac Loop recorder  - CLEARED FOR NMES / ESTIM BELOW WAIST  Re-evaluation:      Hip Specialty Daily Treatment Diary     Manual  12/30 1/3 1/5 1/9            Hamstring stretch        Prone Quad Stretch                    Therapeutic Exercise 12/30 1/3 1/5 1/9    Recumbent Bike S=        Nu step for endurance/ quad strength S=10 L6 x5 mins L6 x 7 mins L6 x 7 mins L6 x7 min                            Heel Slide flex/abd x10 ea x15 ea Flex x20 Flex x20    Supine ball squeeze                Bridges x15 2x10 x20 x20    SLR AAROM flex W/ strap x10 W/ strap x10                      NEURO RE-ED        Sidestepping 10ft x 6 // bar 10ft x 6 10ftx6 10 ft x6    Hurdles OBO   *NO SPC CGA 1H 25ft x2 fear    Hurdles weave   *NO SPC CGA CG x1 25ft x2    Heel-toe ambulation   *NO SPC w/ CGA 10ft x6    Mirror amb //10ft x 6 10ft x 6 10ft x6 10ft x6    NMES w/ QS x10 mins 10 sec on 30 sec off x10 mins 10 sec on 30 sec off x10 mins 10 sec on 30 sec off x10 mins 10 sec on 30 sec off    Foam step over x10 B/L  x10 B/L x10 B/L    Seated T-ball press against wall single leg(R)   GRN x15 grn x15    Core brace x15 stand x15      Glute set x15 stand x15      SLB        TKE GRN w/ towel x15 GRN x20 w/ towel GRN x20 w/towel GRN x20 w/towel    CSMi "+" squat, balance WS: x10 ea  SQ+ x10   B=L1-4 WS: x10 ea  SQ+ x10   B=L1-4 WS: x10 ea  SQ+ x10   B=L5-7 WS: x10 ea  SQ+ x10   B=L5-7    Eccentric leg press S=7  Single leg press 55# x30 55# x30 60# x30 60# x30            Foam Tandem        Fitter balance        Therapeutic Activity        Sit to stand transfers        Squats        ALT Lunges // bar x10 B/L ALT x10 ALT x30 ALT x30    Step Ups fwd/side 4" x10 fwd/side 4" x10 fwd/side 4" x10 fwd/side 4" x10 fwd/side    Up/down steps            Modalities        CP knee/hip

## 2023-01-10 ENCOUNTER — APPOINTMENT (OUTPATIENT)
Dept: PHYSICAL THERAPY | Facility: CLINIC | Age: 68
End: 2023-01-10

## 2023-01-12 ENCOUNTER — APPOINTMENT (OUTPATIENT)
Dept: PHYSICAL THERAPY | Facility: CLINIC | Age: 68
End: 2023-01-12

## 2023-01-13 ENCOUNTER — OFFICE VISIT (OUTPATIENT)
Dept: PHYSICAL THERAPY | Facility: CLINIC | Age: 68
End: 2023-01-13

## 2023-01-13 DIAGNOSIS — I63.412 CEREBROVASCULAR ACCIDENT (CVA) DUE TO EMBOLISM OF LEFT MIDDLE CEREBRAL ARTERY (HCC): Primary | ICD-10-CM

## 2023-01-13 DIAGNOSIS — R53.1 WEAKNESS: ICD-10-CM

## 2023-01-13 NOTE — PROGRESS NOTES
Daily Note     Today's date: 2023  Patient name: Devora Delaney  : 1955  MRN: [id number]  Referring provider: Deshawn Mello DO  Dx:   Encounter Diagnosis     ICD-10-CM    1  Cerebrovascular accident (CVA) due to embolism of left middle cerebral artery (HCC)  I63 412       2  Weakness  R53 1           Start Time: 1009          Subjective: Patient states she has been seeing little changes  She is now able to march in her appartment without her cane  Objective: See treatment diary below    Patient educated on hip shifting without shoulder movements  Patient instructed to perform activity 5 min a day at [facility] for hip strengthening and posture training during gait  Assessment: Tolerated treatment well  Patient would benefit from continued PT for balance and strengthening  Patient performed her exercises with little difficulty and no pain  She seemed to understand all education given during session  Patient felt ok when she left department  Plan: Continue per plan of care  Progress treatment as tolerated         Precautions: Cardiac Loop recorder  - CLEARED FOR NMES / ESTIM BELOW WAIST  Re-evaluation:      Hip Specialty Daily Treatment Diary     Manual  12/30 1/3 1/5 1/9 1/13           Hamstring stretch        Prone Quad Stretch                    Therapeutic Exercise 12/30 1/3 1/5 1/9 1/13   Recumbent Bike S=        Nu step for endurance/ quad strength S=10 L6 x5 mins L6 x 7 mins L6 x 7 mins L6 x7 min L6 x8 min                           Heel Slide flex/abd x10 ea x15 ea Flex x20 Flex x20    Supine ball squeeze                Bridges x15 2x10 x20 x20    SLR AAROM flex W/ strap x10 W/ strap x10                      NEURO RE-ED        Sidestepping 10ft x 6 // bar 10ft x 6 10ftx6 10 ft x6 10ft x6   Hurdles OBO   *NO SPC CGA 1H 25ft x2 fear 1H 25ft x4   Hurdles weave   *NO SPC CGA CG x1 25ft x2 25ft x4   Heel-toe ambulation   *NO SPC w/ CGA 10ft x6 10ft x6   Mirror amb //10ft x 6 10ft x 6 10ft x6 10ft x6 10 ft x6   NMES w/ QS x10 mins 10 sec on 30 sec off x10 mins 10 sec on 30 sec off x10 mins 10 sec on 30 sec off x10 mins 10 sec on 30 sec off x10 mins 10 sec on 30 sec off   Foam step over x10 B/L  x10 B/L x10 B/L x10 B/L   Seated T-ball press against wall single leg(R)   GRN x15 grn x15 Green x20   Core brace x15 stand x15      Glute set x15 stand x15      SLB        TKE GRN w/ towel x15 GRN x20 w/ towel GRN x20 w/towel GRN x20 w/towel grn x20 w/towel   CSMi "+" squat, balance WS: x10 ea  SQ+ x10   B=L1-4 WS: x10 ea  SQ+ x10   B=L1-4 WS: x10 ea  SQ+ x10   B=L5-7 WS: x10 ea  SQ+ x10   B=L5-7 WS: x10 ea  SQ+ x10   B=L5-7   Eccentric leg press S=7  Single leg press 55# x30 55# x30 60# x30 60# x30 60# x30           Foam Tandem        Fitter balance        Therapeutic Activity        Sit to stand transfers        Squats        ALT Lunges // bar x10 B/L ALT x10 ALT x30 ALT x30 Alt x30   Step Ups fwd/side 4" x10 fwd/side 4" x10 fwd/side 4" x10 fwd/side 4" x10 fwd/side 4" x10 fwd/side   Up/down steps            Modalities        CP knee/hip

## 2023-01-17 ENCOUNTER — OFFICE VISIT (OUTPATIENT)
Dept: PHYSICAL THERAPY | Facility: CLINIC | Age: 68
End: 2023-01-17

## 2023-01-17 DIAGNOSIS — R53.1 WEAKNESS: ICD-10-CM

## 2023-01-17 DIAGNOSIS — I63.412 CEREBROVASCULAR ACCIDENT (CVA) DUE TO EMBOLISM OF LEFT MIDDLE CEREBRAL ARTERY (HCC): Primary | ICD-10-CM

## 2023-01-17 NOTE — PROGRESS NOTES
Daily Note     Today's date: 2023  Patient name: Lynette Darling  : 1955  MRN: [id number]  Referring provider: Brionna Garcia DO  Dx:   Encounter Diagnosis     ICD-10-CM    1  Cerebrovascular accident (CVA) due to embolism of left middle cerebral artery (HCC)  I63 412       2  Weakness  R53 1                      Subjective: " The last woman to see me told me to wiggle my hips when I walk and I have been doing that " The patient denies feeling any pain  Objective: See treatment diary below      Assessment: The patient tolerated the treatment well today  The patient was given VC for posture and form correction during the completion of her exercises  The patient denies feeling pain but continues to complain of R quadriceps/hip flexor weakness which continues to limit her function  The patient displayed improved R foot clearance when walking without a SPC today  The denies any issues of skin integrity problems at the end of the treatment today  The patient will benefit from continued PT to achieve her goals of therapy  Plan: Continue per plan of care  Progress treatment as tolerated         Precautions: Cardiac Loop recorder  - CLEARED FOR NMES / ESTIM BELOW WAIST  Re-evaluation:      Hip Specialty Daily Treatment Diary     Manual  1/17 1/3 1/5 1/9 1/13           Hamstring stretch        Prone Quad Stretch                    Therapeutic Exercise 1/17 1/3 1/5 1/9 1/13   Recumbent Bike S=        Nu step for endurance/ quad strength S=10 L6 x 8 mins L6 x 7 mins L6 x 7 mins L6 x7 min L6 x8 min                           Heel Slide flex/abd  x15 ea Flex x20 Flex x20    Supine ball squeeze                Bridges  2x10 x20 x20    SLR AAROM flex  W/ strap x10                      NEURO RE-ED        Sidestepping 10ft x 6 10ft x 6 10ftx6 10 ft x6 10ft x6   Hurdles OBO 1H over 25ft x 6  *NO SPC CGA 1H 25ft x2 fear 1H 25ft x4   Hurdles weave 25ft x 4 Supervision  *NO SPC CGA CG x1 25ft x2 25ft x4 Heel-toe ambulation 10x6  *NO SPC w/ CGA 10ft x6 10ft x6   Mirror amb 10ft x 5 10ft x 6 10ft x6 10ft x6 10 ft x6   NMES w/ QS x10 mins 10 sec on 30 sec off x10 mins 10 sec on 30 sec off x10 mins 10 sec on 30 sec off x10 mins 10 sec on 30 sec off x10 mins 10 sec on 30 sec off   Foam step over x10 B/L  x10 B/L x10 B/L x10 B/L   Seated T-ball press against wall single leg(R) GRN x25 controlled lock/unlock slow  GRN x15 grn x15 Green x20   Core brace  x15      Glute set  x15      SLB        TKE GRN w/ towel x20 GRN x20 w/ towel GRN x20 w/towel GRN x20 w/towel grn x20 w/towel   CSMi "+" squat, balance WS: x10 ea( 0H)  SQ+ x10 ( 1H)  B=L8 (0H) WS: x10 ea  SQ+ x10   B=L1-4 WS: x10 ea  SQ+ x10   B=L5-7 WS: x10 ea  SQ+ x10   B=L5-7 WS: x10 ea  SQ+ x10   B=L5-7   Eccentric leg press S=7  Single leg press 62 5# x30 slow   55# x30 60# x30 60# x30 60# x30           Foam Tandem        Fitter balance        Therapeutic Activity        Sit to stand transfers        Squats        ALT Lunges // bar ALT wider step x20 ALT x10 ALT x30 ALT x30 Alt x30   Step Ups fwd/side 4" x10 fwd / side 4" x10 fwd/side 4" x10 fwd/side 4" x10 fwd/side 4" x10 fwd/side   Up/down steps            Modalities        CP knee/hip

## 2023-01-19 ENCOUNTER — OFFICE VISIT (OUTPATIENT)
Dept: PHYSICAL THERAPY | Facility: CLINIC | Age: 68
End: 2023-01-19

## 2023-01-19 DIAGNOSIS — I63.412 CEREBROVASCULAR ACCIDENT (CVA) DUE TO EMBOLISM OF LEFT MIDDLE CEREBRAL ARTERY (HCC): Primary | ICD-10-CM

## 2023-01-19 DIAGNOSIS — R53.1 WEAKNESS: ICD-10-CM

## 2023-01-19 NOTE — PROGRESS NOTES
Daily Note     Today's date: 2023  Patient name: Lester Elaine  : 1955  MRN: [id number]  Referring provider: Sonny Mcintosh DO  Dx:   Encounter Diagnosis     ICD-10-CM    1  Cerebrovascular accident (CVA) due to embolism of left middle cerebral artery (HCC)  I63 412       2  Weakness  R53 1           Start Time: 1031          Subjective: Patient states she feels the same  She has noticed she has not had to help lift the leg with her hands to get into the car or to lift the right leg onto the bed at home  Objective: See treatment diary below      Assessment: Tolerated treatment well  Patient would benefit from continued PT for stretching and strengthening  Patient was able to perform her exercises with little difficulty  Improved balance was observed during step overs  Less assistance was needed during step overs  Patient felt ok when she left department  Plan: Continue per plan of care  Progress treatment as tolerated         Precautions: Cardiac Loop recorder  - CLEARED FOR NMES / ESTIM BELOW WAIST  Re-evaluation:      Hip Specialty Daily Treatment Diary     Manual             Hamstring stretch        Prone Quad Stretch                    Therapeutic Exercise    Recumbent Bike S=        Nu step for endurance/ quad strength S=10 L6 x 8 mins L6 x8 min  L6 x7 min L6 x8 min                           Heel Slide flex/abd    Flex x20    Supine ball squeeze                Bridges    x20    SLR AAROM flex                        NEURO RE-ED        Sidestepping 10ft x 6 10 ft x6  10 ft x6 10ft x6   Hurdles OBO 1H over 25ft x 6 1H over 25ft x 6  1H 25ft x2 fear 1H 25ft x4   Hurdles weave 25ft x 4 Supervision 25ft x 4 Supervision  CG x1 25ft x2 25ft x4   Heel-toe ambulation 10x6 10ft x6  10ft x6 10ft x6   Mirror amb 10ft x 5 10ft x5  10ft x6 10 ft x6   NMES w/ QS x10 mins 10 sec on 30 sec off x10 mins 10 sec on 30 sec off  x10 mins 10 sec on 30 sec off x10 mins 10 sec on 30 sec off   Foam step over x10 B/L x15 B/L  x10 B/L x10 B/L   Seated T-ball press against wall single leg(R) GRN x25 controlled lock/unlock slow GRN x25 controlled lock/unlock slow  grn x15 Green x20   Core brace        Glute set        SLB        TKE GRN w/ towel x20 GRN w/ towel x20  GRN x20 w/towel grn x20 w/towel   CSMi "+" squat, balance WS: x10 ea( 0H)  SQ+ x10 ( 1H)  B=L8 (0H) WS: x10 ea( 0H)  SQ+ x10 ( 1H)  B=L8 (0H)  WS: x10 ea  SQ+ x10   B=L5-7 WS: x10 ea  SQ+ x10   B=L5-7   Eccentric leg press S=7  Single leg press 62 5# x30 slow   62 5# x30 slow  60# x30 60# x30           Foam Tandem        Fitter balance        Therapeutic Activity        Sit to stand transfers        Squats        ALT Lunges // bar ALT wider step x20 ALT wider step x20  ALT x30 Alt x30   Step Ups fwd/side 4" x10 fwd / side 4" x10 fwd / side  4" x10 fwd/side 4" x10 fwd/side   Up/down steps            Modalities        CP knee/hip

## 2023-01-24 ENCOUNTER — APPOINTMENT (OUTPATIENT)
Dept: PHYSICAL THERAPY | Facility: CLINIC | Age: 68
End: 2023-01-24

## 2023-01-26 ENCOUNTER — OFFICE VISIT (OUTPATIENT)
Dept: PHYSICAL THERAPY | Facility: CLINIC | Age: 68
End: 2023-01-26

## 2023-01-26 DIAGNOSIS — I63.412 CEREBROVASCULAR ACCIDENT (CVA) DUE TO EMBOLISM OF LEFT MIDDLE CEREBRAL ARTERY (HCC): Primary | ICD-10-CM

## 2023-01-26 DIAGNOSIS — R53.1 WEAKNESS: ICD-10-CM

## 2023-01-26 NOTE — PROGRESS NOTES
Daily Note     Today's date: 2023  Patient name: Gerber Sommer  : 1955  MRN: [id number]  Referring provider: Tere Huntley DO  Dx:   Encounter Diagnosis     ICD-10-CM    1  Cerebrovascular accident (CVA) due to embolism of left middle cerebral artery (HCC)  I63 412       2  Weakness  R53 1                      Subjective: The patient reports she is able to walk better at home - less R hip abduction with walking  The patient denies any pain today  Objective: See treatment diary below      Assessment: The patient was given VC to slow down during the completion of her exercises  The patient needed some encouragement to remove her UE support during the balance / strengthening activities  The patient notes improvement with her ambulation at home but is still limited with steps/curbs, and unlevel surface walking  The patient will be re-assessed next session to determine the need for continued PT services  Plan: Continue per plan of care  Progress note during next visit  Progress treatment as tolerated         Precautions: Cardiac Loop recorder  - CLEARED FOR NMES / ESTIM BELOW WAIST  Re-evaluation:      Hip Specialty Daily Treatment Diary     Manual             Hamstring stretch        Prone Quad Stretch                    Therapeutic Exercise    Recumbent Bike S=        Nu step for endurance/ quad strength S=10 L6 x 8 mins L6 x8 min L6 x 8 mins L6 x7 min L6 x8 min                           Heel Slide flex/abd    Flex x20    Supine ball squeeze                Bridges    x20    SLR AAROM flex                        NEURO RE-ED        Sidestepping 10ft x 6 10 ft x6 10ft x 6 10 ft x6 10ft x6   Hurdles OBO 1H over 25ft x 6 1H over 25ft x 6 1 H OVER 25ft x 6 1H 25ft x2 fear 1H 25ft x4   Hurdles weave 25ft x 4 Supervision 25ft x 4 Supervision 25ft x 4 CG x1 25ft x2 25ft x4   Heel-toe ambulation 10x6 10ft x6 10ft x 6 10ft x6 10ft x6   Mirror amb 10ft x 5 10ft x5 10ft x 5 10ft x6 10 ft x6   NMES w/ QS x10 mins 10 sec on 30 sec off x10 mins 10 sec on 30 sec off x10 mins : 30sec on/10sec off x10 mins 10 sec on 30 sec off x10 mins 10 sec on 30 sec off   Foam step over x10 B/L x15 B/L x15 B/L x10 B/L x10 B/L   Seated T-ball press against wall single leg(R) GRN x25 controlled lock/unlock slow GRN x25 controlled lock/unlock slow GRN x30 control lock/unlock grn x15 Green x20   Core brace        Glute set        SLB        TKE GRN w/ towel x20 GRN w/ towel x20 GRN x20 w/ towel GRN x20 w/towel grn x20 w/towel   CSMi "+" squat, balance WS: x10 ea( 0H)  SQ+ x10 ( 1H)  B=L8 (0H) WS: x10 ea( 0H)  SQ+ x10 ( 1H)  B=L8 (0H) WS: x10 ea( 0H)  SQ+ x10 ( 1H)  B=L8 (0H) WS: x10 ea  SQ+ x10   B=L5-7 WS: x10 ea  SQ+ x10   B=L5-7   Eccentric leg press S=7  Single leg press 62 5# x30 slow   62 5# x30 slow 62 5# x30 60# x30 60# x30           Foam Tandem        Fitter balance        Therapeutic Activity        Sit to stand transfers        Squats        ALT Lunges // bar ALT wider step x20 ALT wider step x20 ALT wide step x20 ALT x30 Alt x30   Step Ups fwd/side 4" x10 fwd / side 4" x10 fwd / side 6" x10 FWD  4" x10 4" x10 fwd/side 4" x10 fwd/side   Up/down steps            Modalities        CP knee/hip

## 2023-01-29 NOTE — PROGRESS NOTES
PT Re-Evaluation  and PT Discharge    Today's date: 2023  Patient name: Ford Guajardo  : 1955  MRN: [id number]  Referring provider: Jayshree De Leon DO  Dx:   Encounter Diagnosis     ICD-10-CM    1  Cerebrovascular accident (CVA) due to embolism of left middle cerebral artery (HCC)  I63 [id number]       2  Weakness  R53 1                      Assessment  Assessment details: Ford Guajardo is a 74 y o  female with a history of bicuspid aortic valve, dilated CM, hyperlipidemia, HTN, PAF, peptic ulcer disease, Hx stroke, vitamin D deficiency, Hx PE, glaucoma, and BMI>30 that presents for a physical therapy RE evaluation/ DISCHARGE  The patient has attended 14 sessions of physical therapy  The patient demonstrates signs and symptoms consistent with CVA embolism of L MCA; muscle weakness  During the examination the patient demonstrated impaired R LE strength ( R quads/hip flexors), slightly improved activity intolerance, persistent gait dysfunction, and decreased R LE pain  The patient has made functional gains since last therapy assessment  The patient is now able to walk at home without any AD  The patient notes improved foot clearance and improved gait  The patient continues to have difficulty with squatting, transfers, and walking on unlevel surfaces without AD  The patient feels her R LE strength is still impaired and not significantly better despite utilizing NMES to restore quad/hip flexor strength  The patient would like to discharge formal PT to an independent [facility]  Symptom irritability: lowBarriers to therapy: Chronicity of condition  Understanding of Dx/Px/POC: fair   Prognosis: fair  Prognosis details: Chronicity of condition    Goals  STG: Achieve in 4-6 weeks  1  Patient's R LE pain at worst less than 2/10 to allow for sleeping comfortably  MET   2  RLE MMT improve to > 4/5 all motions tested to improve ADL/recreational activities  NOT MET   3    Timed up and go score improve to less than 14 seconds to indicate improved balance/decreased falls risk  MET 1/3  LTG:  Achieve in 6-12 weeks  1  Patient's FOTO score improve to > 60% to indicate a return to normal functioning  MET 1/31  2  Patient achieve personal goal of improved R LE / R Quads strength for all functional activities  NOT MET 1/31  3  Patient to achieve independence with home exercise plan  MET 1/31  4  30 second sit to stand score improve by 6 stands ( total 12) to indicate improved transfers / normalization of R quad strength  NOT MET1/31      Plan  Plan details: D/C PT TO AN INDEPENDENT [facility]  Treatment plan discussed with: PTA and patient        Subjective Evaluation    History of Present Illness  Date of surgery: 3/14/2022  Mechanism of injury: SUBJECTIVE: 1/31/23: The patient reports some improvement since last therapy assessment  The patient is now able to walk with improved foot clearance  The patient no longer feels pain at the R LE  The patient continues to have difficulty with squatting, transfers from a chair without arm rests  The patient feels the strength has not changed significantly at her R LE despite utilizing NMES  The patient will be discharged from formal PT to an independent [facility]  SUBJECTIVE: 1/3/23: The patient reports an improvement in activity tolerance since starting therapy  The patient is now able to walk short distances with less difficulty without any AD  The patient continues to have difficulty with squatting, transfers, and walking on unlevel surfaces without AD  The patient will benefit from continued PT focused on achieving patient specific goals  INJURY HISTORY: Marisa Isabel is a 74 y o  female that presents to outpatient physical theracpy with complaints of R leg weakness and difficulty functioning normally  The patient reports difficulty with transfers and funtion without relying on her L LE and UE for support    The patient reports having a Hx of multiple surgeries at her R knee (3) in the past 2 years, the first surgery for a quad tendon tear 2021; Then in May 2021 for infection, and finally R TKA 3/14/2022  The patient notes persistent R quadriceps weakness since  The patient attempted land based therapy and aqua therapy to help restore the strength  The patient had a recent F/U with her doctor who prescribed the patient PT to attempt NMES to the R quadriceps muscle  This treatment was not done due to the patient having an implanted cardiac device but she has been cleared to receive by the cardiologist office  The patient can receive any ESTIM treatment below the waist   The patient's main goal for physical therapy is to have her R thigh " wake up" so she can function normally  Recurrent probem    Pain  Current pain ratin  At best pain ratin  At worst pain ratin  Progression: resolved    Social Support  Steps to enter house: no  Stairs in house: no   Lives in: community-based residential facility  Lives with: alone    Employment status: not working  Hand dominance: right    Treatments  Previous treatment: physical therapy  Patient Goals  Patient goal: "wake up" her R LE ( not MET)        Objective     Tenderness     Additional Tenderness Details  (+)R anterior /medial Tibia TTP    Lumbar Screen  Lumbar range of motion within normal limits with the following exceptions:Max limit EXT    Neurological Testing     Sensation     Hip   Left Hip   Intact: light touch    Right Hip   Intact: light touch  Hyposensation: light touch    Comments   Right light touch: R anterior patella       Active Range of Motion   Left Hip   Flexion: 115 degrees   Extension: 0 degrees   Abduction: 40 degrees   External rotation (90/90): 10 degrees   Internal rotation (90/90): 44 degrees     Right Hip   Flexion: 115 degrees   Extension: 0 degrees   Abduction: 36 degrees   External rotation (90/90): 20 degrees   Internal rotation (90/90): 37 degrees     Additional Active Range of Motion Details  IMPROVED R hip    Strength/Myotome Testing     Left Hip   Planes of Motion   Flexion: 4-  Extension: 4-  Abduction: 4  Adduction: 4  External rotation: 3+  Internal rotation: 4    Right Hip   Planes of Motion   Flexion: 3-  Extension: 3+  Abduction: 4  Adduction: 4  External rotation: 4-  Internal rotation: 4    Left Knee   Flexion: 5  Extension: 5    Right Knee   Flexion: 4-  Extension: 3-    Additional Strength Details  Quads: L: 4/5  R: 3-/5 ( lacking TKE 20 degrees)  Hams: L: 4/5 R: 4/5  IMPROVED    Tests     Additional Tests Details  TU seconds WITHOUT SPC ( improved 7 seconds) - no time change but less support    30 SSTS: 10 stands; L lateral trunk lean, excessive fwd trunk lean; USED B/L UE ( improved total 4 stands)    Gait impairments: Patient ambulates with or without SPC WBAT B/L LE  The patient demonstrates improved gait speed, L lateral trunk lean, increased medial/lateral sway, Abducted R LE, more equal step lengths, and improved heel-toe rollover R LE and foot clearance R LE  Needs SPC in the community due to fear of falling      FOTO:  60% ( predicted 48%) ( improved)  Tandem stance : (+) LOB after 2 seconds w/ R foot back (improved 2 seconds)             Precautions: Cardiac Loop recorder  - CLEARED FOR NMES / ESTIM BELOW WAIST  Re-evaluation:3/ ( POC 3/7)        Hip Specialty Daily Treatment Diary     Manual             Hamstring stretch        Prone Quad Stretch                    Therapeutic Exercise    Recumbent Bike S=        Nu step for endurance/ quad strength S=10 L6 x 8 mins L6 x8 min L6 x 8 mins L 6 X 9 MINS L6 x8 min                           Heel Slide flex/abd    reviewed    Supine ball squeeze                Bridges    reviewed    SLR AAROM flex                        NEURO RE-ED        Sidestepping 10ft x 6 10 ft x6 10ft x 6  10ft x6   Hurdles OBO 1H over 25ft x 6 1H over 25ft x 6 1 H OVER 25ft x 6  1H 25ft x4   Hurdles weave 25ft x 4 Supervision 25ft x 4 Supervision 25ft x 4  25ft x4   Heel-toe ambulation 10x6 10ft x6 10ft x 6  10ft x6   Mirror amb 10ft x 5 10ft x5 10ft x 5  10 ft x6   NMES w/ QS x10 mins 10 sec on 30 sec off x10 mins 10 sec on 30 sec off x10 mins : 30sec on/10sec off  x10 mins 10 sec on 30 sec off   Foam step over x10 B/L x15 B/L x15 B/L  x10 B/L   Seated T-ball press against wall single leg(R) GRN x25 controlled lock/unlock slow GRN x25 controlled lock/unlock slow GRN x30 control lock/unlock  Green x20   Core brace        Glute set        SLB        TKE GRN w/ towel x20 GRN w/ towel x20 GRN x20 w/ towel  grn x20 w/towel   CSMi "+" squat, balance WS: x10 ea( 0H)  SQ+ x10 ( 1H)  B=L8 (0H) WS: x10 ea( 0H)  SQ+ x10 ( 1H)  B=L8 (0H) WS: x10 ea( 0H)  SQ+ x10 ( 1H)  B=L8 (0H)  WS: x10 ea  SQ+ x10   B=L5-7   Eccentric leg press S=7  Single leg press 62 5# x30 slow   62 5# x30 slow 62 5# x30  60# x30           Foam Tandem        Fitter balance        Therapeutic Activity        Sit to stand transfers        Squats    x11    ALT Lunges // bar ALT wider step x20 ALT wider step x20 ALT wide step x20  Alt x30   Step Ups fwd/side 4" x10 fwd / side 4" x10 fwd / side 6" x10 FWD  4" x10  4" x10 fwd/side   TUG    X 3 rounds AVG : 13 seconds no AD        Modalities        CP knee/hip

## 2023-01-31 ENCOUNTER — EVALUATION (OUTPATIENT)
Dept: PHYSICAL THERAPY | Facility: CLINIC | Age: 68
End: 2023-01-31

## 2023-01-31 DIAGNOSIS — R53.1 WEAKNESS: ICD-10-CM

## 2023-01-31 DIAGNOSIS — I63.412 CEREBROVASCULAR ACCIDENT (CVA) DUE TO EMBOLISM OF LEFT MIDDLE CEREBRAL ARTERY (HCC): Primary | ICD-10-CM

## 2023-03-07 ENCOUNTER — REMOTE DEVICE CLINIC VISIT (OUTPATIENT)
Dept: CARDIOLOGY CLINIC | Facility: CLINIC | Age: 68
End: 2023-03-07

## 2023-03-07 DIAGNOSIS — Z95.818 PRESENCE OF OTHER CARDIAC IMPLANTS AND GRAFTS: Primary | ICD-10-CM

## 2023-03-07 NOTE — PROGRESS NOTES
MDT LOOP1/ ACTIVE SYSTEM IS MRI CONDITIONAL   CARELINK TRANSMISSION: LOOP RECORDER  PRESENTING RHYTHM NSR @ 80 BPM  BATTERY STATUS "OK " 1 DEVICE CLASSIFIED AF EPISODES, AVAILABLE STRIPS DEMONSTRATE NO ATRIAL FIBRILLATION  INSTEAD EGM'S SHOW ST W/ PACs AND PVCs  AF BURDEN IS 0%  AF BURDEN MAYBE OVERESTIMATED DUE TO INAPPROPRIATE CLASSIFICATION OF AF  SOME STRIPS MAY NOT BE INTERPRETABLE OR AVAILABLE, CANNOT DEFINITIVELY RULE OUT ATRIAL FIBRILLATION  HX OF PAF  PT TAKES ELIQUIS  NO PATIENT ACTIVATED EPISODES  NORMAL DEVICE FUNCTION   DL

## 2023-09-15 ENCOUNTER — EVALUATION (OUTPATIENT)
Dept: PHYSICAL THERAPY | Age: 68
End: 2023-09-15
Payer: COMMERCIAL

## 2023-09-15 DIAGNOSIS — R29.898 LEG WEAKNESS, BILATERAL: Primary | ICD-10-CM

## 2023-09-15 PROCEDURE — 97113 AQUATIC THERAPY/EXERCISES: CPT | Performed by: PHYSICAL THERAPIST

## 2023-09-15 PROCEDURE — 97162 PT EVAL MOD COMPLEX 30 MIN: CPT | Performed by: PHYSICAL THERAPIST

## 2023-09-15 NOTE — LETTER
September 15, 2023    [facility] [id number]    Patient: Angeli Lares   YOB: 1955   Date of Visit: 9/15/2023     Encounter Diagnosis     ICD-10-CM    1. Leg weakness, bilateral  R29.898           Dear Dr. Howell Hands: Thank you for your recent referral of Anglei Lares. Please review the attached evaluation summary from Amrita's recent visit. Please verify that you agree with the plan of care by signing the attached order. If you have any questions or concerns, please do not hesitate to call. I sincerely appreciate the opportunity to share in the care of one of your patients and hope to have another opportunity to work with you in the near future. Sincerely,    Herminio Friedman, PT      Referring Provider:      I certify that I have read the below Plan of Care and certify the need for these services furnished under this plan of treatment while under my care. LEW Adams  [address] [place] [id number]  Via Fax: [phone]          PT Evaluation     Today's date: 2023  Patient name: Angeli Lares  : 1955  MRN: [id number]  Referring provider: LEW Adams  Dx: No diagnosis found.                Assessment/Plan    Subjective    Objective   Mechanical Assessment    Cervical      Thoracic    Seated right rotation:   EPIC#[id number]^NOTE][    Lumbar               Precautions: Precautions:  Daily Treatment Diary     Date           Water Walk (FW, BW, side) x10’            LE work at wall               Hip FF/ext swing              Toe/heel              Hip ABD/ADD              March             Knee flexion & extension             Squats           UE noodle x3             Push/pull              Rotate              Row forward & back              OH side bend            UE/Core (AROM, paddles, MB)              ABD/ADD              Horizontal Pec Fly              Alt. arm swing (shld flex/ext)              Push pull              Single paddle rotation           SLS (EO, EC, ball toss)            Aqua Step (FW, lat, eccentric)            Core work:              MF press (red, blue, blk)             Kickboard press (blue, red)              Row/ext w/ tubing (red, blue, blk)           Seated on bench             ankle DF/PF              March              ABD/ADD              Knee flexion/extension            DW TX - hang in the deep water              DW ABD/ADD              DW Bicycle              DW Scissor hip flexion/extension            Stretches              HS/calf              Wall stretches             Other:            Hot Tub - 10 minutes only

## 2023-09-15 NOTE — PROGRESS NOTES
PT Evaluation     Today's date: 9/15/2023  Patient name: Nii Gautam  : 1955  MRN: [id number]  Referring provider: LEW Ruiz  Dx:   Encounter Diagnosis     ICD-10-CM    1. Leg weakness, bilateral  R29.898           Start Time: 0800  Stop Time: 6528  Total time in clinic (min): 65 minutes    Assessment  Assessment details: Nii Gautam is a 74 y.o. female who presents with pain, decreased strength, decreased ROM, and ambulatory dysfunction. Due to these impairments, Patient has difficulty performing a/iadls and recreational activities. Patient's clinical presentation is consistent with their referring diagnosis of leg weakness. Patient aggravated right knee 2 weeks ago with hyperextension and hurt left shoulder lifting leaving her with ecchymosis in upper arm and tenderness. PT evaluation today and initiated aquatic session with good tolerance. Slow short strides, worse in the water but did improve by end of session with cues. Patient did not want to return next week due to many other appts. Patient would benefit from skilled physical therapy to address their aforementioned impairments, improve their level of function and to improve their overall quality of life. Impairments: abnormal gait, abnormal or restricted ROM, activity intolerance, impaired physical strength, lacks appropriate home exercise program, pain with function, safety issue, poor posture  and poor body mechanics    Symptom irritability: moderateUnderstanding of Dx/Px/POC: good   Prognosis: fair    Goals  ST-4 WEEKS  1. Decrease LB pain < 6/10 on VAS at its worst.  2.  Increase ROM by > 5 deg in all deficients planes. 3.  Increase core and BLE strength by 1/2 MMT grade. 4. Increase tolerance to activity and pool therapy > 45 min. LT-6 WEEKS  1. Patient to be independent with a/iadls. 2. Increase functional activities for leisure and home activities to previous LOF.   3. Independent with HEP and/or fitness program.  4. Improve safe ambulation on level surfaces for community distances with least AD. Plan  Patient would benefit from: skilled physical therapy  Planned modality interventions: cryotherapy, thermotherapy: hydrocollator packs and unattended electrical stimulation  Planned therapy interventions: activity modification, behavior modification, body mechanics training, aquatic therapy, flexibility, functional ROM exercises, home exercise program, IADL retraining, joint mobilization, manual therapy, neuromuscular re-education, patient education, postural training, strengthening, stretching, therapeutic activities, therapeutic exercise and abdominal trunk stabilization  Frequency: 2-3x week. Duration in weeks: 12  Plan of Care beginning date: 9/15/2023  Plan of Care expiration date: 12/15/2023  Treatment plan discussed with: patient      Subjective Evaluation    History of Present Illness  Mechanism of injury: Patient reports she has been feeling weaker in BLE's and asked to return to PT. She states 2 weeks ago she was walking up a ramp and her right knee hyperextended and increased pain, she is better now. She also c/o lifting rollator and hurt left shoulder. She states it is also better now, but has large ecchymotic area  medial and anterior upper left arm and small area lateral deltoid with tenderness. She would like to strengthen her arm also with PT.            Recurrent probem    Patient Goals  Patient goals for therapy: decreased pain, improved balance, increased motion, increased strength and return to sport/leisure activities  Patient goal: walk safer, no falls  Pain  Current pain ratin  At best pain ratin  At worst pain ratin  Location: LB  Quality: discomfort  Relieving factors: rest  Aggravating factors: standing, walking and stair climbing  Progression: improved    Social Support  Steps to enter house: no  Stairs in house: no   Lives in: apartment  Lives with: alone    Employment status: not working    Diagnostic Tests    FCE comments: Going for MRI of L/s on MondayTreatments  Previous treatment: physical therapy      Objective     Static Posture     Head  Forward. Shoulders  Rounded. Thoracic Spine  Hyperkyphosis. Knee   Knee (Right): Recurvatum.      Comments  BP: 107/65 R lower arm  HR 49   Increased BMI, general deconditioning, increased soft tissue in UE's and LE's      Observations     Additional Observation Details  R knee had 3 surgical procedures anterior/pt    Neurological Testing     Sensation     Knee   Left Knee   Intact: Light touch    Right Knee   Intact: light touch     Additional Neurological Details  Patient c/o RLE "dead" and tingling in distal LE's at times    Active Range of Motion   Left Knee   Flexion: 130 degrees   Extension: 0 degrees     Right Knee   Flexion: 130 degrees   Extensor la degrees     Passive Range of Motion     Right Knee   Extension: 0 degrees     Additional Passive Range of Motion Details  8° hyperextension right knee passively    Strength/Myotome Testing     Left Hip   Planes of Motion   Flexion: 3+  Extension: 3+  Abduction: 3+  Adduction: 4    Right Hip   Planes of Motion   Flexion: 3+  Extension: 3+  Abduction: 3+  Adduction: 4    Left Knee   Flexion: 4  Extension: 4+    Right Knee   Flexion: 4  Extension: 3-    Swelling     Left Knee Girth Measurement (cm)   Joint line: 19 (inches) cm    Right Knee Girth Measurement (cm)   Joint line: 19 (inches) cm    Ambulation   Weight-Bearing Status   Weight-Bearing Status (Right): weight-bearing as tolerated    Assistive device used: single point cane    Ambulation: Level Surfaces   Ambulation with assistive device: contact guard assist  Ambulation without assistive device: independent    Ambulation: Stairs   Pattern: non-reciprocal  Railings: two rails  Pattern: non-reciprocal  Railings: two rails    Observational Gait   Gait: antalgic   Decreased walking speed, stride length and right stance time.     Quality of Movement During Gait   Trunk  Forward lean. Knee    Knee (Right): Positive recurvatum. Functional Assessment     Single Leg Stance   Left: 1 seconds  Right: 0 seconds    Comments  TU seconds with no AD  5X STS: 22 seconds with UE's               Precautions: HTN, stroke , R TKR 3/2022, L THR, L RTC repair, Tendon tear with repair R knee prior to TKR/pt leaving her with TKE lag.   Daily Treatment Diary     Date 9/15          Water Walk (FW, BW, side) x10’  5 w/ noodle          LE work at wall               Hip FF/ext swing  2            Toe/heel  1            Hip ABD/ADD  2                        Knee flexion & extension 1            Squats 1          UE noodle x3             Push/pull              Rotate              Row forward & back              OH side bend            UE/Core (AROM, paddles, MB)              ABD/ADD              Horizontal Pec Fly              Alt. arm swing (shld flex/ext)              Push pull              Single paddle rotation           SLS (EO, EC, ball toss)            Aqua Step (FW, lat, eccentric)            Core work:              MF press (red, blue, blk)             Kickboard press (blue, red)              Row/ext w/ tubing (red, blue, blk)           Seated on bench             ankle DF/PF  1                        ABD/ADD  1            Knee flexion/extension  1          DW TX - hang in the deep water              DW ABD/ADD              DW Bicycle              DW Scissor hip flexion/extension            Stretches              HS/calf              Wall stretches             Other:            Hot Tub - 10 minutes only  5

## 2023-09-15 NOTE — LETTER
September 15, 2023    [address] [place] [address]    Patient: Paresh Gomez   YOB: 1955   Date of Visit: 9/15/2023     Encounter Diagnosis     ICD-10-CM    1. Leg weakness, bilateral  R29.898           Dear Dr. Kary Buchanan: Thank you for your recent referral of Paresh Gomez. Please review the attached evaluation summary from Amrita's recent visit. Please verify that you agree with the plan of care by signing the attached order. If you have any questions or concerns, please do not hesitate to call. I sincerely appreciate the opportunity to share in the care of one of your patients and hope to have another opportunity to work with you in the near future. Sincerely,    Elham Milligan, PT      Referring Provider:      I certify that I have read the below Plan of Care and certify the need for these services furnished under this plan of treatment while under my care. LEW Coburn  [address] [place] [id number]  Via Fax: [phone]          PT Evaluation     Today's date: 9/15/2023  Patient name: Paresh Gomez  : 1955  MRN: [id number]  Referring provider: LEW Coburn  Dx:   Encounter Diagnosis     ICD-10-CM    1. Leg weakness, bilateral  R29.898           Start Time: 0800  Stop Time: 3  Total time in clinic (min): 65 minutes    Assessment  Assessment details: Paresh Gomez is a 74 y.o. female who presents with pain, decreased strength, decreased ROM, and ambulatory dysfunction. Due to these impairments, Patient has difficulty performing a/iadls and recreational activities. Patient's clinical presentation is consistent with their referring diagnosis of leg weakness. Patient aggravated right knee 2 weeks ago with hyperextension and hurt left shoulder lifting leaving her with ecchymosis in upper arm and tenderness. PT evaluation today and initiated aquatic session with good tolerance. Slow short strides, worse in the water but did improve by end of session with cues. Patient did not want to return next week due to many other appts. Patient would benefit from skilled physical therapy to address their aforementioned impairments, improve their level of function and to improve their overall quality of life. Impairments: abnormal gait, abnormal or restricted ROM, activity intolerance, impaired physical strength, lacks appropriate home exercise program, pain with function, safety issue, poor posture  and poor body mechanics    Symptom irritability: moderateUnderstanding of Dx/Px/POC: good   Prognosis: fair    Goals  ST-4 WEEKS  1. Decrease LB pain < 6/10 on VAS at its worst.  2.  Increase ROM by > 5 deg in all deficients planes. 3.  Increase core and BLE strength by 1/2 MMT grade. 4. Increase tolerance to activity and pool therapy > 45 min. LT-6 WEEKS  1. Patient to be independent with a/iadls. 2. Increase functional activities for leisure and home activities to previous LOF. 3. Independent with HEP and/or fitness program.  4. Improve safe ambulation on level surfaces for community distances with least AD. Plan  Patient would benefit from: skilled physical therapy  Planned modality interventions: cryotherapy, thermotherapy: hydrocollator packs and unattended electrical stimulation  Planned therapy interventions: activity modification, behavior modification, body mechanics training, aquatic therapy, flexibility, functional ROM exercises, home exercise program, IADL retraining, joint mobilization, manual therapy, neuromuscular re-education, patient education, postural training, strengthening, stretching, therapeutic activities, therapeutic exercise and abdominal trunk stabilization  Frequency: 2-3x week.   Duration in weeks: 12  Plan of Care beginning date: 9/15/2023  Plan of Care expiration date: 12/15/2023  Treatment plan discussed with: patient      Subjective Evaluation    History of Present Illness  Mechanism of injury: Patient reports she has been feeling weaker in BLE's and asked to return to PT. She states 2 weeks ago she was walking up a ramp and her right knee hyperextended and increased pain, she is better now. She also c/o lifting rollator and hurt left shoulder. She states it is also better now, but has large ecchymotic area  medial and anterior upper left arm and small area lateral deltoid with tenderness. She would like to strengthen her arm also with PT. Recurrent probem    Patient Goals  Patient goals for therapy: decreased pain, improved balance, increased motion, increased strength and return to sport/leisure activities  Patient goal: walk safer, no falls  Pain  Current pain ratin  At best pain ratin  At worst pain ratin  Location: LB  Quality: discomfort  Relieving factors: rest  Aggravating factors: standing, walking and stair climbing  Progression: improved    Social Support  Steps to enter house: no  Stairs in house: no   Lives in: apartment  Lives with: alone    Employment status: not working    Diagnostic Tests    FCE comments: Going for MRI of L/s on MondayTreatments  Previous treatment: physical therapy      Objective     Static Posture     Head  Forward. Shoulders  Rounded. Thoracic Spine  Hyperkyphosis. Knee   Knee (Right): Recurvatum.      Comments  BP: 107/65 R lower arm  HR 49   Increased BMI, general deconditioning, increased soft tissue in UE's and LE's      Observations     Additional Observation Details  R knee had 3 surgical procedures anterior/pt    Neurological Testing     Sensation     Knee   Left Knee   Intact: Light touch    Right Knee   Intact: light touch     Additional Neurological Details  Patient c/o RLE "dead" and tingling in distal LE's at times    Active Range of Motion   Left Knee   Flexion: 130 degrees   Extension: 0 degrees     Right Knee   Flexion: 130 degrees   Extensor la degrees Passive Range of Motion     Right Knee   Extension: 0 degrees     Additional Passive Range of Motion Details  8° hyperextension right knee passively    Strength/Myotome Testing     Left Hip   Planes of Motion   Flexion: 3+  Extension: 3+  Abduction: 3+  Adduction: 4    Right Hip   Planes of Motion   Flexion: 3+  Extension: 3+  Abduction: 3+  Adduction: 4    Left Knee   Flexion: 4  Extension: 4+    Right Knee   Flexion: 4  Extension: 3-    Swelling     Left Knee Girth Measurement (cm)   Joint line: 19 (inches) cm    Right Knee Girth Measurement (cm)   Joint line: 19 (inches) cm    Ambulation   Weight-Bearing Status   Weight-Bearing Status (Right): weight-bearing as tolerated    Assistive device used: single point cane    Ambulation: Level Surfaces   Ambulation with assistive device: contact guard assist  Ambulation without assistive device: independent    Ambulation: Stairs   Pattern: non-reciprocal  Railings: two rails  Pattern: non-reciprocal  Railings: two rails    Observational Gait   Gait: antalgic   Decreased walking speed, stride length and right stance time. Quality of Movement During Gait   Trunk  Forward lean. Knee    Knee (Right): Positive recurvatum. Functional Assessment     Single Leg Stance   Left: 1 seconds  Right: 0 seconds    Comments  TU seconds with no AD  5X STS: 22 seconds with UE's               Precautions: HTN, stroke , R TKR 3/2022, L THR, L RTC repair, Tendon tear with repair R knee prior to TKR/pt leaving her with TKE lag.   Daily Treatment Diary     Date 9/15          Water Walk (FW, BW, side) x10’  5 w/ noodle          LE work at wall               Hip FF/ext swing  2            Toe/heel  1            Hip ABD/ADD  2                        Knee flexion & extension 1            Squats 1          UE noodle x3             Push/pull              Rotate              Row forward & back              OH side bend            UE/Core (AROM, paddles, MB) ABD/ADD              Horizontal Pec Fly              Alt. arm swing (shld flex/ext)              Push pull              Single paddle rotation           SLS (EO, EC, ball toss)            Aqua Step (FW, lat, eccentric)            Core work:              MF press (red, blue, blk)             Kickboard press (blue, red)              Row/ext w/ tubing (red, blue, blk)           Seated on bench             ankle DF/PF  1 March 1            ABD/ADD  1            Knee flexion/extension  1          DW TX - hang in the deep water              DW ABD/ADD              DW Bicycle              DW Scissor hip flexion/extension            Stretches              HS/calf              Wall stretches             Other:            Hot Tub - 10 minutes only  5

## 2023-09-15 NOTE — LETTER
September 15, 2023    [facility] [address]    Patient: Heath Zapata   YOB: 1955   Date of Visit: 9/15/2023     Encounter Diagnosis     ICD-10-CM    1. Leg weakness, bilateral  R29.898           Dear Dr. Yi Cleveland: Thank you for your recent referral of Heath Zapata. Please review the attached evaluation summary from Amrita's recent visit. Please verify that you agree with the plan of care by signing the attached order. If you have any questions or concerns, please do not hesitate to call. I sincerely appreciate the opportunity to share in the care of one of your patients and hope to have another opportunity to work with you in the near future. Sincerely,    Alaina Jennings, PT      Referring Provider:      I certify that I have read the below Plan of Care and certify the need for these services furnished under this plan of treatment while under my care. LEW Ruiz  [address] [place] [id number]  Via Fax: [phone]          PT Evaluation     Today's date: 9/15/2023  Patient name: Heath Zapata  : 1955  MRN: [id number]  Referring provider: LEW Ruiz  Dx:   Encounter Diagnosis     ICD-10-CM    1. Leg weakness, bilateral  R29.898           Start Time: 0800  Stop Time: 3461  Total time in clinic (min): 65 minutes    Assessment  Assessment details: Heath Zapata is a 74 y.o. female who presents with pain, decreased strength, decreased ROM, and ambulatory dysfunction. Due to these impairments, Patient has difficulty performing a/iadls and recreational activities. Patient's clinical presentation is consistent with their referring diagnosis of leg weakness. Patient aggravated right knee 2 weeks ago with hyperextension and hurt left shoulder lifting leaving her with ecchymosis in upper arm and tenderness. PT evaluation today and initiated aquatic session with good tolerance. Slow short strides, worse in the water but did improve by end of session with cues. Patient did not want to return next week due to many other appts. Patient would benefit from skilled physical therapy to address their aforementioned impairments, improve their level of function and to improve their overall quality of life. Impairments: abnormal gait, abnormal or restricted ROM, activity intolerance, impaired physical strength, lacks appropriate home exercise program, pain with function, safety issue, poor posture  and poor body mechanics    Symptom irritability: moderateUnderstanding of Dx/Px/POC: good   Prognosis: fair    Goals  ST-4 WEEKS  1. Decrease LB pain < 6/10 on VAS at its worst.  2.  Increase ROM by > 5 deg in all deficients planes. 3.  Increase core and BLE strength by 1/2 MMT grade. 4. Increase tolerance to activity and pool therapy > 45 min. LT-6 WEEKS  1. Patient to be independent with a/iadls. 2. Increase functional activities for leisure and home activities to previous LOF. 3. Independent with HEP and/or fitness program.  4. Improve safe ambulation on level surfaces for community distances with least AD. Plan  Patient would benefit from: skilled physical therapy  Planned modality interventions: cryotherapy, thermotherapy: hydrocollator packs and unattended electrical stimulation  Planned therapy interventions: activity modification, behavior modification, body mechanics training, aquatic therapy, flexibility, functional ROM exercises, home exercise program, IADL retraining, joint mobilization, manual therapy, neuromuscular re-education, patient education, postural training, strengthening, stretching, therapeutic activities, therapeutic exercise and abdominal trunk stabilization  Frequency: 2-3x week.   Duration in weeks: 12  Plan of Care beginning date: 9/15/2023  Plan of Care expiration date: 12/15/2023  Treatment plan discussed with: patient      Subjective Evaluation    History of Present Illness  Mechanism of injury: Patient reports she has been feeling weaker in BLE's and asked to return to PT. She states 2 weeks ago she was walking up a ramp and her right knee hyperextended and increased pain, she is better now. She also c/o lifting rollator and hurt left shoulder. She states it is also better now, but has large ecchymotic area  medial and anterior upper left arm and small area lateral deltoid with tenderness. She would like to strengthen her arm also with PT. Recurrent probem    Patient Goals  Patient goals for therapy: decreased pain, improved balance, increased motion, increased strength and return to sport/leisure activities  Patient goal: walk safer, no falls  Pain  Current pain ratin  At best pain ratin  At worst pain ratin  Location: LB  Quality: discomfort  Relieving factors: rest  Aggravating factors: standing, walking and stair climbing  Progression: improved    Social Support  Steps to enter house: no  Stairs in house: no   Lives in: apartment  Lives with: alone    Employment status: not working    Diagnostic Tests    FCE comments: Going for MRI of L/s on MondayTreatments  Previous treatment: physical therapy      Objective     Static Posture     Head  Forward. Shoulders  Rounded. Thoracic Spine  Hyperkyphosis. Knee   Knee (Right): Recurvatum.      Comments  BP: 107/65 R lower arm  HR 49   Increased BMI, general deconditioning, increased soft tissue in UE's and LE's      Observations     Additional Observation Details  R knee had 3 surgical procedures anterior/pt    Neurological Testing     Sensation     Knee   Left Knee   Intact: Light touch    Right Knee   Intact: light touch     Additional Neurological Details  Patient c/o RLE "dead" and tingling in distal LE's at times    Active Range of Motion   Left Knee   Flexion: 130 degrees   Extension: 0 degrees     Right Knee   Flexion: 130 degrees   Extensor la degrees Passive Range of Motion     Right Knee   Extension: 0 degrees     Additional Passive Range of Motion Details  8° hyperextension right knee passively    Strength/Myotome Testing     Left Hip   Planes of Motion   Flexion: 3+  Extension: 3+  Abduction: 3+  Adduction: 4    Right Hip   Planes of Motion   Flexion: 3+  Extension: 3+  Abduction: 3+  Adduction: 4    Left Knee   Flexion: 4  Extension: 4+    Right Knee   Flexion: 4  Extension: 3-    Swelling     Left Knee Girth Measurement (cm)   Joint line: 19 (inches) cm    Right Knee Girth Measurement (cm)   Joint line: 19 (inches) cm    Ambulation   Weight-Bearing Status   Weight-Bearing Status (Right): weight-bearing as tolerated    Assistive device used: single point cane    Ambulation: Level Surfaces   Ambulation with assistive device: contact guard assist  Ambulation without assistive device: independent    Ambulation: Stairs   Pattern: non-reciprocal  Railings: two rails  Pattern: non-reciprocal  Railings: two rails    Observational Gait   Gait: antalgic   Decreased walking speed, stride length and right stance time. Quality of Movement During Gait   Trunk  Forward lean. Knee    Knee (Right): Positive recurvatum. Functional Assessment     Single Leg Stance   Left: 1 seconds  Right: 0 seconds    Comments  TU seconds with no AD  5X STS: 22 seconds with UE's               Precautions: HTN, stroke , R TKR 3/2022, L THR, L RTC repair, Tendon tear with repair R knee prior to TKR/pt leaving her with TKE lag.   Daily Treatment Diary     Date 9/15          Water Walk (FW, BW, side) x10’  5 w/ noodle          LE work at wall               Hip FF/ext swing  2            Toe/heel  1            Hip ABD/ADD  2                        Knee flexion & extension 1            Squats 1          UE noodle x3             Push/pull              Rotate              Row forward & back              OH side bend            UE/Core (AROM, paddles, MB) ABD/ADD              Horizontal Pec Fly              Alt. arm swing (shld flex/ext)              Push pull              Single paddle rotation           SLS (EO, EC, ball toss)            Aqua Step (FW, lat, eccentric)            Core work:              MF press (red, blue, blk)             Kickboard press (blue, red)              Row/ext w/ tubing (red, blue, blk)           Seated on bench             ankle DF/PF  1 March 1            ABD/ADD  1            Knee flexion/extension  1          DW TX - hang in the deep water              DW ABD/ADD              DW Bicycle              DW Scissor hip flexion/extension            Stretches              HS/calf              Wall stretches             Other:            Hot Tub - 10 minutes only  5

## 2023-09-22 ENCOUNTER — TELEPHONE (OUTPATIENT)
Dept: CARDIOLOGY CLINIC | Facility: CLINIC | Age: 68
End: 2023-09-22

## 2023-09-22 NOTE — TELEPHONE ENCOUNTER
Called patient as she was due to see Dr Cristi Arevalo and for an echo. She told me that she is no longer following with [facility] and is now following with [facility].

## 2023-09-25 ENCOUNTER — APPOINTMENT (OUTPATIENT)
Dept: PHYSICAL THERAPY | Age: 68
End: 2023-09-25
Payer: COMMERCIAL

## 2023-09-25 DIAGNOSIS — R29.898 LEG WEAKNESS, BILATERAL: Primary | ICD-10-CM

## 2023-09-25 NOTE — PROGRESS NOTES
Daily Note     Today's date: 2023  Patient name: Rodolfo Peña  : 1955  MRN: [id number]  Referring provider: LEW Wan  Dx:   Encounter Diagnosis     ICD-10-CM    1. Leg weakness, bilateral  R29.898                      Subjective: ***      Objective: See treatment diary below      Assessment: Tolerated treatment {Tolerated treatment :4027855783}. Patient {assessment:9293420171}      Plan: {PLAN:1362722386}     Precautions: HTN, stroke , R TKR 3/2022, L THR, L RTC repair, Tendon tear with repair R knee prior to TKR/pt leaving her with TKE lag.   Daily Treatment Diary     Date 9/15          Water Walk (FW, BW, side) x10’  5 w/ noodle          LE work at wall               Hip FF/ext swing  2            Toe/heel  1            Hip ABD/ADD              Knee flexion & extension 1            Squats 1          UE noodle x3             Push/pull              Rotate              Row forward & back              OH side bend            UE/Core (AROM, paddles, MB)              ABD/ADD              Horizontal Pec Fly              Alt. arm swing (shld flex/ext)              Push pull              Single paddle rotation           SLS (EO, EC, ball toss)            Aqua Step (FW, lat, eccentric)            Core work:              MF press (red, blue, blk)             Kickboard press (blue, red)              Row/ext w/ tubing (red, blue, blk)           Seated on bench             ankle DF/PF              ABD/ADD  1            Knee flexion/extension  1          DW TX - hang in the deep water              DW ABD/ADD              DW Bicycle              DW Scissor hip flexion/extension            Stretches              HS/calf              Wall stretches             Other:            Hot Tub - 10 minutes only  5

## 2023-09-28 ENCOUNTER — OFFICE VISIT (OUTPATIENT)
Dept: PHYSICAL THERAPY | Age: 68
End: 2023-09-28
Payer: COMMERCIAL

## 2023-09-28 DIAGNOSIS — R29.898 LEG WEAKNESS, BILATERAL: Primary | ICD-10-CM

## 2023-09-28 PROCEDURE — 97113 AQUATIC THERAPY/EXERCISES: CPT

## 2023-09-28 NOTE — PROGRESS NOTES
Daily Note     Today's date: 2023  Patient name: Lizzette Flores  : 1955  MRN: [id number]  Referring provider: LEW Agosto  Dx:   Encounter Diagnosis     ICD-10-CM    1. Leg weakness, bilateral  R29.898           Start Time: 0800  Stop Time: 0900  Total time in clinic (min): 60 minutes    Subjective: pt notes she felt good after initial session. Objective: See treatment diary below  Pt seen 1:1 for 30 minutes and group therapy for remainder    Assessment: Tolerated treatment fair. Added step ups today but R LE is difficult for pt. Patient would benefit from continued PT      Plan: Continue per plan of care. POC expires: 12/15  POC expires Unit limit Auth Expiration date PT/OT + Visit Limit?   12/15 na na 24                           Visit/Unit Tracking  AUTH Status:  Date 9/15 9/28             [employer] mc Used 1 2              Remaining   22              FOTO                            Precautions: HTN, stroke , R TKR 3/2022, L THR, L RTC repair, Tendon tear with repair R knee prior to TKR/pt leaving her with TKE lag.   Daily Treatment Diary     Date 9/15 9/28         Water Walk (FW, BW, side) x10’  5 w/ noodle 10 w/ N         LE work at wall               Hip FF/ext swing  2 2           Toe/heel  1 1           Hip ABD/ADD  2 2            1           Knee flexion & extension 1 1           Squats 1 1         UE noodle x3             Push/pull              Rotate              Row forward & back              OH side bend            UE/Core (AROM, paddles, MB)              ABD/ADD              Horizontal Pec Fly              Alt. arm swing (shld flex/ext)              Push pull              Single paddle rotation           SLS (EO, EC, ball toss)            Aqua Step (FW, lat, eccentric)   2         Core work:              MF press (red, blue, blk)             Kickboard press (blue, red)              Row/ext w/ tubing (red, blue, blk)           Seated on bench             ankle DF/PF  1 1 March  1 1           ABD/ADD  1 1           Knee flexion/extension  1 1         DW TX - hang in the deep water   6           DW ABD/ADD   2           DW Bicycle   5           DW Scissor hip flexion/extension   2         Stretches              HS/calf   2           Wall stretches             Other:            Hot Tub - 10 minutes only  5 10

## 2023-10-02 ENCOUNTER — OFFICE VISIT (OUTPATIENT)
Dept: PHYSICAL THERAPY | Age: 68
End: 2023-10-02
Payer: COMMERCIAL

## 2023-10-02 DIAGNOSIS — R29.898 LEG WEAKNESS, BILATERAL: Primary | ICD-10-CM

## 2023-10-02 PROCEDURE — 97113 AQUATIC THERAPY/EXERCISES: CPT

## 2023-10-02 PROCEDURE — 97150 GROUP THERAPEUTIC PROCEDURES: CPT

## 2023-10-02 NOTE — PROGRESS NOTES
Daily Note     Today's date: 10/2/2023  Patient name: Heath Zapata  : 1955  MRN: [id number]  Referring provider: LEW Ruiz  Dx:   Encounter Diagnosis     ICD-10-CM    1. Leg weakness, bilateral  R29.898           Start Time: 0800  Stop Time: 0900  Total time in clinic (min): 60 minutes    Subjective: pt notes no sig soreness after sessions. " I feel good."      Objective: See treatment diary below  Pt seen 1:1 for 30 minutes and group therapy for remainder    Assessment: Tolerated treatment fairly. Pt was able to do R LE step ups easier today. Slow gains w/ B LE strength. Patient would benefit from continued PT      Plan: Continue per plan of care. Precautions: HTN, stroke , R TKR 3/2022, L THR, L RTC repair, Tendon tear with repair R knee prior to TKR/pt leaving her with TKE lag.   Daily Treatment Diary POC expires: 12/15  POC expires Unit limit Auth Expiration date PT/OT + Visit Limit?   12/15 na na 24                           Visit/Unit Tracking  AUTH Status:  Date 9/15 9/28 10/2            [employer] mc Used 1 2 3             Remaining  23 22 21             FOTO                       Date 9/15 9/28 10/2        Water Walk (FW, BW, side) x10’  5 w/ noodle 10 w/ N 10 w/ N        LE work at wall               Hip FF/ext swing  2 2 2          Toe/heel  1 1 1          Hip ABD/ADD  2 2 2          March 1 1 1          Knee flexion & extension 1 1 1          Squats 1 1 1        UE noodle x3             Push/pull              Rotate              Row forward & back              OH side bend            UE/Core (AROM, paddles, MB)              ABD/ADD              Horizontal Pec Fly              Alt. arm swing (shld flex/ext)              Push pull              Single paddle rotation           SLS (EO, EC, ball toss)            Aqua Step (FW, lat, eccentric)   2 2        Core work:              MF press (red, blue, blk)             Kickboard press (blue, red)              Row/ext w/ tubing (red, blue, blk)           Seated on bench             ankle DF/PF  1 1 1          March  1 1 1          ABD/ADD  1 1 1          Knee flexion/extension  1 1 1        DW TX - hang in the deep water   6 6          DW ABD/ADD   2 2          DW Bicycle   5 5,3          DW Scissor hip flexion/extension   2 2        Stretches              HS/calf   2 2          Wall stretches             Other:            Hot Tub - 10 minutes only  5 10 10

## 2023-10-05 ENCOUNTER — OFFICE VISIT (OUTPATIENT)
Dept: PHYSICAL THERAPY | Age: 68
End: 2023-10-05
Payer: COMMERCIAL

## 2023-10-05 DIAGNOSIS — R29.898 LEG WEAKNESS, BILATERAL: Primary | ICD-10-CM

## 2023-10-05 PROCEDURE — 97113 AQUATIC THERAPY/EXERCISES: CPT

## 2023-10-05 NOTE — PROGRESS NOTES
Daily Note     Today's date: 10/5/2023  Patient name: Sherell Primrose  : 1955  MRN: [id number]  Referring provider: LEW Mckenzie  Dx:   Encounter Diagnosis     ICD-10-CM    1. Leg weakness, bilateral  R29.898           Start Time: 0800  Stop Time: 0900  Total time in clinic (min): 60 minutes    Subjective: pt notes step ups are getting easier for R LE in the water. Pt states she would like to start up land PT after next week pool sessions. Pt lives in [facility] and it's closer to her and she can still strengthen her legs w/ land PT. Objective: See treatment diary below      Assessment: Tolerated treatment fairly. Pt progressing w/ ex's. Slow gains w/ R LE strength. Pt is able to do R step ups easier in the water now. Will talk to PT regarding her transition to land based PT in [facility]. Patient would benefit from continued PT      Plan: Continue per plan of care. Precautions: HTN, stroke , R TKR 3/2022, L THR, L RTC repair, Tendon tear with repair R knee prior to TKR/pt leaving her with TKE lag.   Daily Treatment Diary POC expires: 12/15  POC expires Unit limit Auth Expiration date PT/OT + Visit Limit?   12/15 na na 24                           Visit/Unit Tracking  AUTH Status:  Date 9/15 9/28 10/2 10/5           [employer]  Used 1 2 3 4           [employer] used 8 visits prior uth needed at 24 Remaining  13 14 13 12            FOTO                       Date 9/15 9/28 10/2 10/5       Water Walk (FW, BW, side) x10’  5 w/ noodle 10 w/ N 10 w/ N 10 w/ N       LE work at wall               Hip FF/ext swing  2 2 2 2         Toe/heel  1 1 1 1         Hip ABD/ADD  2 2 2 2         March 1 1 1 1         Knee flexion & extension 1 1 1 1         Squats 1 1 1 1       UE noodle x3             Push/pull              Rotate              Row forward & back              OH side bend            UE/Core (AROM, paddles, MB)              ABD/ADD              Horizontal Pec Fly              Alt. arm swing (shld flex/ext)              Push pull              Single paddle rotation           STS    1       Aqua Step (FW, lat, eccentric)   2 2 4       Core work:              MF press (red, blue, blk)             Kickboard press (blue, red)              Row/ext w/ tubing (red, blue, blk)           Seated on bench             ankle DF/PF  1 1 1 1         March  1 1 1 1         ABD/ADD  1 1 1 1         Knee flexion/extension  1 1 1 2       DW TX - hang in the deep water   6 6 6         DW ABD/ADD   2 2 2         DW Bicycle   5 5,3 6,3         DW Scissor hip flexion/extension   2 2 2       Stretches              HS/calf   2 2 2         Wall stretches             Other:            Hot Tub - 10 minutes only  5 10 10 10

## 2023-10-11 ENCOUNTER — OFFICE VISIT (OUTPATIENT)
Dept: PHYSICAL THERAPY | Age: 68
End: 2023-10-11
Payer: COMMERCIAL

## 2023-10-11 DIAGNOSIS — R29.898 LEG WEAKNESS, BILATERAL: Primary | ICD-10-CM

## 2023-10-11 PROCEDURE — 97150 GROUP THERAPEUTIC PROCEDURES: CPT

## 2023-10-11 PROCEDURE — 97113 AQUATIC THERAPY/EXERCISES: CPT

## 2023-10-11 NOTE — PROGRESS NOTES
Daily Note     Today's date: 10/11/2023  Patient name: Paula Lezama  : 1955  MRN: [id number]  Referring provider: LEW Ashton  Dx:   Encounter Diagnosis     ICD-10-CM    1. Leg weakness, bilateral  R29.898           Start Time: 0800  Stop Time: 0900  Total time in clinic (min): 60 minutes    Subjective: pt notes lateral step ups for R LE are very challenging. Objective: See treatment diary below  Pt seen 1:1 for 30 minutes and group therapy for remainder    Assessment: Tolerated treatment fairly well. Pt showing increased LE strength w/ ex's. Pt to progress to land PT next week. Patient would benefit from continued PT      Plan: Continue per plan of care. Precautions: HTN, stroke , R TKR 3/2022, L THR, L RTC repair, Tendon tear with repair R knee prior to TKR/pt leaving her with TKE lag.   Daily Treatment Diary POC expires: 12/15  POC expires Unit limit Auth Expiration date PT/OT + Visit Limit?   12/15 na na 24                           Visit/Unit Tracking  AUTH Status:  Date 9/15 9/28 10/2 10/5 10/11          [employer] mc Used 1 2 3 4 5          [employer] used 8 visits prior uth needed at 24 Remaining  13 14 13 12 11           FOTO                       Date 9/15 9/28 10/2 10/5 10/11      Water Walk (FW, BW, side) x10’  5 w/ noodle 10 w/ N 10 w/ N 10 w/ N 10w/N      LE work at wall               Hip FF/ext swing  2 2 2 2 2        Toe/heel  1 1 1 1 1        Hip ABD/ADD  2 2 2 2 2        March 1 1 1 1 1        Knee flexion & extension 1 1 1 1 1        Squats 1 1 1 1 1      UE noodle x3             Push/pull              Rotate              Row forward & back              OH side bend            UE/Core (AROM, paddles, MB)              ABD/ADD              Horizontal Pec Fly              Alt. arm swing (shld flex/ext)              Push pull              Single paddle rotation           STS    1 1      Aqua Step (FW, lat, eccentric)   2 2 4 4      Core work:              [employer] press (red, blue, blk)             Kickboard press (blue, red)              Row/ext w/ tubing (red, blue, blk)           Seated on bench             ankle DF/PF  1 1 1 1 1        March  1 1 1 1 1        ABD/ADD  1 1 1 1 1        Knee flexion/extension  1 1 1 2 2      DW TX - hang in the deep water   6 6 6 6        DW ABD/ADD   2 2 2 2        DW Bicycle   5 5,3 6,3 6,3        DW Scissor hip flexion/extension   2 2 2 2      Stretches              HS/calf   2 2 2 2        Wall stretches             Other:            Hot Tub - 10 minutes only  5 10 10 10 10

## 2023-10-13 ENCOUNTER — OFFICE VISIT (OUTPATIENT)
Dept: PHYSICAL THERAPY | Age: 68
End: 2023-10-13
Payer: COMMERCIAL

## 2023-10-13 DIAGNOSIS — R29.898 LEG WEAKNESS, BILATERAL: Primary | ICD-10-CM

## 2023-10-13 PROCEDURE — 97113 AQUATIC THERAPY/EXERCISES: CPT

## 2023-10-13 NOTE — PROGRESS NOTES
Daily Note     Today's date: 10/13/2023  Patient name: Jasen Owusu  : 1955  MRN: [id number]  Referring provider: LEW Yeung  Dx:   Encounter Diagnosis     ICD-10-CM    1. Leg weakness, bilateral  R29.898           Start Time: 0800  Stop Time: 0900  Total time in clinic (min): 60 minutes    Subjective: pt notes is she ready to be challenged out of the water. She starts land PT next week in [facility]. Objective: See treatment diary below  Pt seen 1:1 for 30 minutes and group therapy for remainder    Assessment: Tolerated treatment fairly well. Pt still struggles w/ R LE w/ stairs and gait. She had 6 aquatic pool therapy visits. The decreased weight on her joints while in the water has helped pt improve her strength and stability in LE's. Step ups in the water are easier for pt now. She still uses a noodle for some assistance during water walking. Patient would benefit from continued PT and will be transitioned to land PT now. Plan: Continue per plan of care. Precautions: HTN, stroke , R TKR 3/2022, L THR, L RTC repair, Tendon tear with repair R knee prior to TKR/pt leaving her with TKE lag.   Daily Treatment Diary POC expires: 12/15  POC expires Unit limit Auth Expiration date PT/OT + Visit Limit?   12/15 na na 24                           Visit/Unit Tracking  AUTH Status:  Date 9/15 9/28 10/2 10/5 10/11 10/13         [employer] mc Used 1 2 3 4 5 6         [employer] secondary used 8 visits prior  auth needed at 24 Remaining  13 14 13 12 11 10          FOTO                       Date 9/15 9/28 10/2 10/5 10/11 10/13     Water Walk (FW, BW, side) x10’  5 w/ noodle 10 w/ N 10 w/ N 10 w/ N 10w/N 10w/n     LE work at wall               Hip FF/ext swing  2 2 2 2 2 2       Toe/heel  1 1 1 1 1 1       Hip ABD/ADD  2 2 2 2 2 2       March 1 1 1 1 1 1       Knee flexion & extension 1 1 1 1 1 1       Squats 1 1 1 1 1 1     UE noodle x3             Push/pull              Rotate Row forward & back              OH side bend            UE/Core (AROM, paddles, MB)              ABD/ADD              Horizontal Pec Fly              Alt. arm swing (shld flex/ext)              Push pull              Single paddle rotation           STS    1 1 2     Aqua Step (FW, lat, eccentric)   2 2 4 4 4     Core work:              MF press (red, blue, blk)             Kickboard press (blue, red)              Row/ext w/ tubing (red, blue, blk)           Seated on bench             ankle DF/PF  1 1 1 1 1 1       March  1 1 1 1 1 1       ABD/ADD  1 1 1 1 1 1       Knee flexion/extension  1 1 1 2 2 2     DW TX - hang in the deep water   6 6 6 6 5       DW ABD/ADD   2 2 2 2 2       DW Bicycle   5 5,3 6,3 6,3 7       DW Scissor hip flexion/extension   2 2 2 2 2     Stretches              HS/calf   2 2 2 2 2       Wall stretches             Other:            Hot Tub - 10 minutes only  5 10 10 10 10 10

## 2023-10-13 NOTE — PROGRESS NOTES
Patient was referred for aquatic therapy and had a few visits but she wants to be challenged on land now and will transfer to a facility closer to home. D/c PT at this time. Chronic condition and partial goals met.

## 2023-10-18 ENCOUNTER — EVALUATION (OUTPATIENT)
Dept: PHYSICAL THERAPY | Facility: CLINIC | Age: 68
End: 2023-10-18
Payer: COMMERCIAL

## 2023-10-18 ENCOUNTER — TELEPHONE (OUTPATIENT)
Dept: PHYSICAL THERAPY | Facility: CLINIC | Age: 68
End: 2023-10-18

## 2023-10-18 DIAGNOSIS — R29.898 LEG WEAKNESS, BILATERAL: Primary | ICD-10-CM

## 2023-10-18 DIAGNOSIS — R26.2 DIFFICULTY WALKING: ICD-10-CM

## 2023-10-18 PROCEDURE — 97163 PT EVAL HIGH COMPLEX 45 MIN: CPT | Performed by: PHYSICAL THERAPIST

## 2023-10-18 NOTE — LETTER
2023    Jeison Velazquez DO  [address]    Patient: Bhavin Dennison   YOB: 1955   Date of Visit: 10/18/2023     Encounter Diagnosis     ICD-10-CM    1. Leg weakness, bilateral  R29.898       2. Difficulty walking  R26.2           Dear Dr. Nubia Peoples: Thank you for your recent referral of Bhavin Dennison. Please review the attached evaluation summary from Amrita's recent visit. Please verify that you agree with the plan of care by signing the attached order. If you have any questions or concerns, please do not hesitate to call. I sincerely appreciate the opportunity to share in the care of one of your patients and hope to have another opportunity to work with you in the near future. Sincerely,    Trudy Valladares, PT      Referring Provider:      I certify that I have read the below Plan of Care and certify the need for these services furnished under this plan of treatment while under my care. Jeison Velazquez DO  [address] [facility] [address]  Via Fax: [phone]          PT Evaluation     Today's date: 10/18/2023  Patient name: Bhavin Dennison  : 1955  MRN: [id number]  Referring provider: Jeison Velazquez DO  Dx:   Encounter Diagnosis     ICD-10-CM    1. Leg weakness, bilateral  R29.898       2. Difficulty walking  R26.2           Start Time: 0900  Stop Time: 0945  Total time in clinic (min): 45 minutes    Assessment  Assessment details: Bhavin Dennison was seen for an initial PT evaluation today. Patient is a 79 y.o. female with diagnosis of LE weakness and LBP and past medical history significant for bicuspid aortic valve, dilated cardiomyopathy, hyperlipidemia, HTN, stroke, a fib, TIA, aortic valve stenosis, thrombocytosis, glaucoma, prolonged QT, PE, L arm weakness, L HARINI, cardiac cath, abdominoplasty, breast surgery, cholecystectomy, gastric bypass, hernia repai, R TKA, L rotator cuff repair.  High complexity evaluation  due to number of participation restrictions, functional outcome measure of 59% limitation, and unstable clinical presentation. Findings today show limitation in bilateral LE strength (L weaker than R), limited lumbar range of motion, decreased balance and proprioception impacting gait and functional stability. Skilled PT indicated to treat at this time to address balance, strength, stability, and proprioception to decrease risk of falls. Impairments: abnormal coordination, abnormal gait, abnormal movement, activity intolerance, impaired balance, impaired physical strength, lacks appropriate home exercise program and safety issue  Functional limitations: walking, stairs, walking on uneven surfaces, prolonged standing, transfers  Goals  STG (6 weeks)  1. Patient will have improved 30 second sit to stand for increased overall mobility. 2. Patient will have no reported falls for 2 consecutive weeks. 3. Increase patient's bilateral single leg balance to 3 seconds for increased stability on stairs. LTG (12 weeks)  1. Patient's LE strength will be equal bilaterally for ability to ambulate and return to functional activities at OF. 2. Gosia Guard Balance will improve by 75% for decreased fall risk. 3. Patient will be independent with home exercise program for continued maintenance post PT discharge. Plan  Plan details: Progress note in 4 weeks.   Patient would benefit from: skilled physical therapy  Planned therapy interventions: manual therapy, self care, therapeutic activities, therapeutic exercise, home exercise program and gait training  Frequency: 2x week  Duration in weeks: 12  Plan of Care beginning date: 10/18/2023  Plan of Care expiration date: 1/18/2024  Treatment plan discussed with: PTA and patient        Subjective Evaluation    History of Present Illness  Date of onset: 9/18/2023  Mechanism of injury: Vern Garcia is a 79 y.o. female who presents to outpatient Physical Therapy today with complaints of weakness and instability with low back pain. Had previously been going to aqua therapy for about 1 month after c/o LE instability with episode of R knee giving way (hyperextension) when ascending ramp. States she has chronic pain in low back which impacts her walking and moving around, but does feel a little better since injection last week for low back. C/o difficulty: walking on uneven surfaces, lifting, pushing/pulling, stairs, chair transfers, prolonged standing. Patient Goals  Patient goals for therapy: increased strength  Patient goal: increase ease with transfers  Pain  Current pain rating: 3  At best pain rating: 3  At worst pain ratin  Location: low back    Social Support  Steps to enter house: no  Stairs in house: no   Lives in: apartment  Lives with: alone    Employment status: not working        Objective     Neurological Testing     Sensation     Lumbar   Left   Intact: light touch    Right   Diminished: light touch    Comments   Right light touch: proximal lateral low leg    Active Range of Motion     Lumbar   Left lateral flexion:  Restriction level: moderate  Right lateral flexion:  Restriction level: maximal  Left rotation:  Restriction level: moderate  Right rotation:  Restriction level: moderate    Strength/Myotome Testing     Left Hip   Planes of Motion   Flexion: 4-  Abduction: 4  Adduction: 5    Right Hip   Planes of Motion   Flexion: 3-  Abduction: 4-  Adduction: 5    Left Knee   Flexion: 5  Extension: 5    Right Knee   Flexion: 5  Extension: 2-    Left Ankle/Foot   Dorsiflexion: 5    Right Ankle/Foot   Dorsiflexion: 5    Tests     Additional Tests Details  HA BALANCE ASSESSMENT   39/56 (54-56=WNL, 46-53=mild impairments, </=45= high fall risk, </50=892% fall risk)  1. Sitting to standing.   (4) Able to stand without using hands and stabilize independently  10/18(3) Able to stand independently using hands  (2) Able to stand using hands after several tries  (1) Needs minimal aid to stand or to stabilize  (0) Needs mod/max assist to stand    2. Standing unsupported  10/18(4) Able to stand safely 2 min  (3) Able to stand safely 2 min with supervision  (2) Able to stand 30 sec unsupported  (1) Needs several tries to stand 30 sec unsupported  (0) Unable to stand 30 sec unassisted    3. Sitting with back unsupported, but feet supported on floor. 10/18(4) Able to sit safely and securely 2 min  (3) Able to sit 2 min with supervision  (2) Able to sit 30 sec  (1) Able to sit 10 sec  (0) Unable to sit without support    4. Standing to sitting  (4) Sits safely with minimal use of hands  (3) Controls decent by using hands  10/18(2) Uses back of legs against chair to control descent  (1) Sits independently but has uncontrolled descent  (0) Needs assistance to sit    5. Transfer: Pivot transfer between two chairs (one with armrests, one without)  (4) Able to transfer safely with minor use of hands  10/18(3) Able to transfer safely, definite need of hands  (2) Able to transfer with verbal cueing and/or supervision  (1) Needs one person assist  (0) Needs two person assist    6. Standing unsupported with eyes closed  (4) Able to stand 10 sec safely   10/18(3) Able to stand 10 sec with supervision  (2) Able to stand 3 sec   (1) Unable to keep eyes closed for 3 sec, but continues to stand  (0) Needs help to keep from falling    7. Standing unsupported with feet together  (4) Able to place feet together independently and stand 1 min safely   10/18(3) Able to place feet together independently and stand 1 min with supervision  (2) Able to place feet together independently, but unable to hold for 30 sec  (1) Needs help to attain position, but able to stand 15 seconds  (0) Needs help to attain position, but unable to stand for 15 seconds    8.  Reaching forward with outstretched arms while standing   (4) Can reach forward confidently 10 inches  10/18(3) Can reach forward safely 5 inches  (2) Can reach forward safely 2 inches  (1) Reaches forward but needs supervision  (0) Loses balance while trying, needs assistance    9.  object from the floor from a standing position  (4) Able to  shoe safely and easily   10/18(3) Able to  shoe, but needs supervision  (2) Unable to  shoe, but reaches 1-2 inches from shoe and keeps balance independently  (1) Unable to  shoe and needs supervision while trying  (0) Unable to try/needs assistance to keep from losing balance/falling    10. Turning to look over left and right shoulders while standing  (4) Looks over both sides and weight shifts well   10/18(3) Looks over one side only, the other side shows less weight shift  (2) Must turn sideways, but maintains balance  (1) Needs supervision while looking over each shoulder  (0) Needs assistance to keep from losing balance or falling    11. Turning 360 degrees  10/18(4) Able to turn 360 degrees safely, 4 sec or less  (3) Able to turn 360 degrees safely to one side only, 4 sec or less  (2) Able to turn 360 degrees safely but slowly  (1) Needs close supervision or verbal cueing  (0) Needs assistance while turning    12. Place alternate foot on step while standing unsupported  (4) Able to stand independently and safely complete 8 steps in 20 sec  (3) Able to stand independently and complete 8 steps in >20 sec  (2) Able to complete 4 steps with supervision   10/18(1) Able to complete >2 steps with min assist  (0) Needs assistance to keep from falling/unable to try    13 Standing unsupported: one foot in front of the other  (4) Able to place foot tandem independently and hold for 30 sec  (3) Able to place one foot ahead of the other independently and hold 30 sec  10/18(2) Able to take a small step independently and hold 30 sec  (1) Needs assistance to step but can hold for 15 sec  (0) Loses balance while stepping or standing    14.  Standing on one leg  (4) Able to lift leg independently and hold 10 sec  (3) Able to lift leg independently and hold 5-10 sec  (2) Able to lift leg independently and hold = or > 3 sec  10/18(1) Tries to lift leg, but unable to hold for 3 sec, remains standing independently  (0) Unable to try or needs assistance to prevent fall. TUG Test= 16 sec no AD (>14sec= fall risk)  5x sit to stand= 17 sec      General Comments:      Lumbar Comments  Gait: SPC, forward trunk lean, slowed velocity, locking of RLE into hyperext at HS.      FOTO: 41% function, 52% predicted function                  Precautions: fall risk  Access code:   Progress note: 11/18  POC: 1/18    Manuals 10/18                                       Neuro Re-Ed        SLB        Tandem        romberg        airex        fitter        hurdles        Stair balance        Step taps        Walk with head nod        Walk with head turn        Tandem walk        Side step        Backwards walk        Ther Ex        Nustep        Standing hip abd        Standing hip ext        Standing HR/TR                                                                                                        Ther Activity        Step ups        Sit to stand        Cone weave        Cone pickup        Sit to stand with step tap                Gait Training                        Modalities

## 2023-10-18 NOTE — PROGRESS NOTES
PT Evaluation     Today's date: 10/18/2023  Patient name: Paresh Gomez  : 1955  MRN: [id number]  Referring provider: Chemo Arora DO  Dx:   Encounter Diagnosis     ICD-10-CM    1. Leg weakness, bilateral  R29.898       2. Difficulty walking  R26.2           Start Time: 0900  Stop Time: 0945  Total time in clinic (min): 45 minutes    Assessment  Assessment details: Paresh Gomez was seen for an initial PT evaluation today. Patient is a 79 y.o. female with diagnosis of LE weakness and LBP and past medical history significant for bicuspid aortic valve, dilated cardiomyopathy, hyperlipidemia, HTN, stroke, a fib, TIA, aortic valve stenosis, thrombocytosis, glaucoma, prolonged QT, PE, L arm weakness, L HARINI, cardiac cath, abdominoplasty, breast surgery, cholecystectomy, gastric bypass, hernia repai, R TKA, L rotator cuff repair. High complexity evaluation  due to number of participation restrictions, functional outcome measure of 59% limitation, and unstable clinical presentation. Findings today show limitation in bilateral LE strength (L weaker than R), limited lumbar range of motion, decreased balance and proprioception impacting gait and functional stability. Skilled PT indicated to treat at this time to address balance, strength, stability, and proprioception to decrease risk of falls. Impairments: abnormal coordination, abnormal gait, abnormal movement, activity intolerance, impaired balance, impaired physical strength, lacks appropriate home exercise program and safety issue  Functional limitations: walking, stairs, walking on uneven surfaces, prolonged standing, transfers  Goals  STG (6 weeks)  1. Patient will have improved 30 second sit to stand for increased overall mobility. 2. Patient will have no reported falls for 2 consecutive weeks. 3. Increase patient's bilateral single leg balance to 3 seconds for increased stability on stairs. LTG (12 weeks)  1.  Patient's LE strength will be equal bilaterally for ability to ambulate and return to functional activities at [facility]. 2. Cal Elliott Balance will improve by 75% for decreased fall risk. 3. Patient will be independent with home exercise program for continued maintenance post PT discharge. Plan  Plan details: Progress note in 4 weeks. Patient would benefit from: skilled physical therapy  Planned therapy interventions: manual therapy, self care, therapeutic activities, therapeutic exercise, home exercise program and gait training  Frequency: 2x week  Duration in weeks: 12  Plan of Care beginning date: 10/18/2023  Plan of Care expiration date: 2024  Treatment plan discussed with: PTA and patient        Subjective Evaluation    History of Present Illness  Date of onset: 2023  Mechanism of injury: Angeli Lares is a 79 y.o. female who presents to outpatient Physical Therapy today with complaints of weakness and instability with low back pain. Had previously been going to [facility] for about 1 month after c/o LE instability with episode of R knee giving way (hyperextension) when ascending ramp. States she has chronic pain in low back which impacts her walking and moving around, but does feel a little better since injection last week for low back. C/o difficulty: walking on uneven surfaces, lifting, pushing/pulling, stairs, chair transfers, prolonged standing.        Patient Goals  Patient goals for therapy: increased strength  Patient goal: increase ease with transfers  Pain  Current pain rating: 3  At best pain rating: 3  At worst pain ratin  Location: low back    Social Support  Steps to enter house: no  Stairs in house: no   Lives in: apartment  Lives with: alone    Employment status: not working        Objective     Neurological Testing     Sensation     Lumbar   Left   Intact: light touch    Right   Diminished: light touch    Comments   Right light touch: proximal lateral low leg    Active Range of Motion     Lumbar   Left lateral flexion:  Restriction level: moderate  Right lateral flexion:  Restriction level: maximal  Left rotation:  Restriction level: moderate  Right rotation:  Restriction level: moderate    Strength/Myotome Testing     Left Hip   Planes of Motion   Flexion: 4-  Abduction: 4  Adduction: 5    Right Hip   Planes of Motion   Flexion: 3-  Abduction: 4-  Adduction: 5    Left Knee   Flexion: 5  Extension: 5    Right Knee   Flexion: 5  Extension: 2-    Left Ankle/Foot   Dorsiflexion: 5    Right Ankle/Foot   Dorsiflexion: 5    Tests     Additional Tests Details  HA BALANCE ASSESSMENT   39/56 (54-56=WNL, 46-53=mild impairments, </=45= high fall risk, </53=553% fall risk)  1. Sitting to standing. (4) Able to stand without using hands and stabilize independently  10/18(3) Able to stand independently using hands  (2) Able to stand using hands after several tries  (1) Needs minimal aid to stand or to stabilize  (0) Needs mod/max assist to stand    2. Standing unsupported  10/18(4) Able to stand safely 2 min  (3) Able to stand safely 2 min with supervision  (2) Able to stand 30 sec unsupported  (1) Needs several tries to stand 30 sec unsupported  (0) Unable to stand 30 sec unassisted    3. Sitting with back unsupported, but feet supported on floor. 10/18(4) Able to sit safely and securely 2 min  (3) Able to sit 2 min with supervision  (2) Able to sit 30 sec  (1) Able to sit 10 sec  (0) Unable to sit without support    4. Standing to sitting  (4) Sits safely with minimal use of hands  (3) Controls decent by using hands  10/18(2) Uses back of legs against chair to control descent  (1) Sits independently but has uncontrolled descent  (0) Needs assistance to sit    5.  Transfer: Pivot transfer between two chairs (one with armrests, one without)  (4) Able to transfer safely with minor use of hands  10/18(3) Able to transfer safely, definite need of hands  (2) Able to transfer with verbal cueing and/or supervision  (1) Needs one person assist  (0) Needs two person assist    6. Standing unsupported with eyes closed  (4) Able to stand 10 sec safely   10/18(3) Able to stand 10 sec with supervision  (2) Able to stand 3 sec   (1) Unable to keep eyes closed for 3 sec, but continues to stand  (0) Needs help to keep from falling    7. Standing unsupported with feet together  (4) Able to place feet together independently and stand 1 min safely   10/18(3) Able to place feet together independently and stand 1 min with supervision  (2) Able to place feet together independently, but unable to hold for 30 sec  (1) Needs help to attain position, but able to stand 15 seconds  (0) Needs help to attain position, but unable to stand for 15 seconds    8. Reaching forward with outstretched arms while standing   (4) Can reach forward confidently 10 inches  10/18(3) Can reach forward safely 5 inches  (2) Can reach forward safely 2 inches  (1) Reaches forward but needs supervision  (0) Loses balance while trying, needs assistance    9.  object from the floor from a standing position  (4) Able to  shoe safely and easily   10/18(3) Able to  shoe, but needs supervision  (2) Unable to  shoe, but reaches 1-2 inches from shoe and keeps balance independently  (1) Unable to  shoe and needs supervision while trying  (0) Unable to try/needs assistance to keep from losing balance/falling    10. Turning to look over left and right shoulders while standing  (4) Looks over both sides and weight shifts well   10/18(3) Looks over one side only, the other side shows less weight shift  (2) Must turn sideways, but maintains balance  (1) Needs supervision while looking over each shoulder  (0) Needs assistance to keep from losing balance or falling    11.  Turning 360 degrees  10/18(4) Able to turn 360 degrees safely, 4 sec or less  (3) Able to turn 360 degrees safely to one side only, 4 sec or less  (2) Able to turn 360 degrees safely but slowly  (1) Needs close supervision or verbal cueing  (0) Needs assistance while turning    12. Place alternate foot on step while standing unsupported  (4) Able to stand independently and safely complete 8 steps in 20 sec  (3) Able to stand independently and complete 8 steps in >20 sec  (2) Able to complete 4 steps with supervision   10/18(1) Able to complete >2 steps with min assist  (0) Needs assistance to keep from falling/unable to try    13 Standing unsupported: one foot in front of the other  (4) Able to place foot tandem independently and hold for 30 sec  (3) Able to place one foot ahead of the other independently and hold 30 sec  10/18(2) Able to take a small step independently and hold 30 sec  (1) Needs assistance to step but can hold for 15 sec  (0) Loses balance while stepping or standing    14. Standing on one leg  (4) Able to lift leg independently and hold 10 sec  (3) Able to lift leg independently and hold 5-10 sec  (2) Able to lift leg independently and hold = or > 3 sec  10/18(1) Tries to lift leg, but unable to hold for 3 sec, remains standing independently  (0) Unable to try or needs assistance to prevent fall. TUG Test= 16 sec no AD (>14sec= fall risk)  5x sit to stand= 17 sec      General Comments:      Lumbar Comments  Gait: SPC, forward trunk lean, slowed velocity, locking of RLE into hyperext at HS.      FOTO: 41% function, 52% predicted function                  Precautions: fall risk  Access code:   Progress note: 11/18  POC: 1/18    Manuals 10/18                                       Neuro Re-Ed        SLB        Tandem        romberg        airex        fitter        hurdles        Stair balance        Step taps        Walk with head nod        Walk with head turn        Tandem walk        Side step        Backwards walk        Ther Ex        Nustep        Standing hip abd        Standing hip ext        Standing HR/TR Ther Activity        Step ups        Sit to stand        Cone weave        Cone pickup        Sit to stand with step tap                Gait Training                        Modalities

## 2023-10-20 ENCOUNTER — OFFICE VISIT (OUTPATIENT)
Dept: PHYSICAL THERAPY | Facility: CLINIC | Age: 68
End: 2023-10-20
Payer: COMMERCIAL

## 2023-10-20 DIAGNOSIS — R26.2 DIFFICULTY WALKING: ICD-10-CM

## 2023-10-20 DIAGNOSIS — R29.898 LEG WEAKNESS, BILATERAL: Primary | ICD-10-CM

## 2023-10-20 PROCEDURE — 97112 NEUROMUSCULAR REEDUCATION: CPT

## 2023-10-20 PROCEDURE — 97110 THERAPEUTIC EXERCISES: CPT
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
## 2023-10-24 ENCOUNTER — OFFICE VISIT (OUTPATIENT)
Dept: PHYSICAL THERAPY | Facility: CLINIC | Age: 68
End: 2023-10-24
Payer: COMMERCIAL

## 2023-10-24 DIAGNOSIS — R26.2 DIFFICULTY WALKING: ICD-10-CM

## 2023-10-24 DIAGNOSIS — R29.898 LEG WEAKNESS, BILATERAL: Primary | ICD-10-CM

## 2023-10-24 PROCEDURE — 97530 THERAPEUTIC ACTIVITIES: CPT | Performed by: PHYSICAL THERAPIST

## 2023-10-24 PROCEDURE — 97112 NEUROMUSCULAR REEDUCATION: CPT | Performed by: PHYSICAL THERAPIST

## 2023-10-24 PROCEDURE — 97110 THERAPEUTIC EXERCISES: CPT | Performed by: PHYSICAL THERAPIST

## 2023-10-24 NOTE — PROGRESS NOTES
Daily Note     Today's date: 10/24/2023  Patient name: Dave Persaud  : 1955  MRN: [id number]  Referring provider: Desiree Troy DO  Dx:   Encounter Diagnosis     ICD-10-CM    1. Leg weakness, bilateral  R29.898       2. Difficulty walking  R26.2           Start Time: 1300  Stop Time: 1340  Total time in clinic (min): 40 minutes    Subjective: Patient states she is feeling alright today. Objective: See treatment diary below      Assessment: Tolerated treatment well. Patient improved with decreased R knee locking during TKE activities, does continue to have weakness of right quad causing difficulty with functional mobility tasks (sit to stand transfers, stairs, walking). Patient would benefit from continued PT      Plan: Continue per plan of care. Progress treatment as tolerated. Precautions: fall risk  Access code:   Progress note:   POC:     Manuals 10/18 10/20 10/24                                     Neuro Re-Ed        SLB        Tandem   :30x2 bilat     romberg        airex   Rhomberg EO :30x2     fitter        hurdles        Stair balance  6" :30x2 B/L 6" :30x2 B/L     Step taps  6" x10 1H 6" 10x 1 H     Walk with head nod  10ft x4 10ft x4     Walk with head turn  10ft x4 10ft x4     Tandem walk  10 ft x4 1H 10 ft x4 1H     Side step  10ft x4 10ft x4     Backwards walk  10ft x4      Ther Ex        Nustep s=8  L1 x5 L1 6 min     Standing hip abd  x15 15x bilat     Standing hip ext  x15 15x bilat     Standing HR/TR  x15 15x ea             Seated LAQ   10x2                                                                                     Ther Activity        Step ups   4" 15x bilat     Sit to stand   10x with 2 UE assist     Cone weave        Cone pickup        Sit to stand with step tap                Gait Training                        Modalities                          Access Code: [email or web address]  URL: [email or web address]  Date: 10/20/2023  Prepared by: Danilo Norwood Cristal    Exercises  - Standing Hip Extension with Counter Support  - 2 x daily - 7 x weekly - 1 sets - 10 reps  - Standing Hip Abduction with Counter Support  - 2 x daily - 7 x weekly - 1 sets - 10 reps  - Standing Heel Raise with Support  - 2 x daily - 7 x weekly - 1 sets - 10 reps  - Alternating Step Taps with Counter Support  - 2 x daily - 7 x weekly - 1 sets - 10 reps  - Standing Toe Raises at Chair  - 2 x daily - 7 x weekly - 1 sets - 10 reps

## 2023-10-27 ENCOUNTER — OFFICE VISIT (OUTPATIENT)
Dept: PHYSICAL THERAPY | Facility: CLINIC | Age: 68
End: 2023-10-27
Payer: COMMERCIAL

## 2023-10-27 DIAGNOSIS — R26.2 DIFFICULTY WALKING: ICD-10-CM

## 2023-10-27 DIAGNOSIS — R29.898 LEG WEAKNESS, BILATERAL: Primary | ICD-10-CM

## 2023-10-27 PROCEDURE — 97112 NEUROMUSCULAR REEDUCATION: CPT | Performed by: PHYSICAL THERAPIST

## 2023-10-27 PROCEDURE — 97530 THERAPEUTIC ACTIVITIES: CPT | Performed by: PHYSICAL THERAPIST

## 2023-10-27 PROCEDURE — 97110 THERAPEUTIC EXERCISES: CPT | Performed by: PHYSICAL THERAPIST

## 2023-10-27 NOTE — PROGRESS NOTES
Daily Note     Today's date: 10/27/2023  Patient name: June Narayan  : 1955  MRN: [id number]  Referring provider: Arlene Tariq DO  Dx:   Encounter Diagnosis     ICD-10-CM    1. Leg weakness, bilateral  R29.898       2. Difficulty walking  R26.2           Start Time: 9624  Stop Time: 1035  Total time in clinic (min): 40 minutes    Subjective: States she is feeling pretty good today. No complaints of soreness after last session. Objective: See treatment diary below      Assessment: Tolerated treatment well. Performed exercises with good mechanics today. Noted most weakness of right quad from 0-20 degrees flexion, exercises should attempt to strength at end range to avoid knee locking. Patient would benefit from continued PT      Plan: Continue per plan of care. Progress treatment as tolerated.        Precautions: fall risk  Access code:   Progress note:   POC:     Manuals 10/18 10/20 10/24 10/27                                    Neuro Re-Ed        Seated QS    :05x15    SLB        Tandem   :30x2 bilat :30x2 bilat    romberg        airex   Rhomberg EO :30x2 Rhomberg EO :30x2    fitter        [employer]        Stair balance  6" :30x2 B/L 6" :30x2 B/L 6" :30x2 B/L    Step taps  6" x10 1H 6" 10x 1 H 6" 10x 1 H    Walk with head nod  10ft x4 10ft x4 10ft x4    Walk with head turn  10ft x4 10ft x4 10ft x4    Tandem walk  10 ft x4 1H 10 ft x4 1H 10 ft x4 1H    Side step  10ft x4 10ft x4 10ft x4    Backwards walk  10ft x4  10ft x4    Ther Ex        Nustep s=8  L1 x5 L1 6 min L1 6 min    Standing hip abd  x15 15x bilat 20x bilat    Standing hip ext  x15 15x bilat 20x bilat    Standing HR/TR  x15 15x ea 20x ea            Seated LAQ   10x2 2x10    TKE ball v wall    :05x10                                                                            Ther Activity        Step ups   4" 15x bilat 6" 15x R    Sit to stand   10x with 2 UE assist 10x with 2 UE assist L leg fwd    Cone weave        Cone pickup Sit to stand with step tap                Gait Training                        Modalities                          Access Code: [email or web address]  URL: [email or web address]  Date: 10/20/2023  Prepared by: Gemma Bee    Exercises  - Standing Hip Extension with Counter Support  - 2 x daily - 7 x weekly - 1 sets - 10 reps  - Standing Hip Abduction with Counter Support  - 2 x daily - 7 x weekly - 1 sets - 10 reps  - Standing Heel Raise with Support  - 2 x daily - 7 x weekly - 1 sets - 10 reps  - Alternating Step Taps with Counter Support  - 2 x daily - 7 x weekly - 1 sets - 10 reps  - Standing Toe Raises at Chair  - 2 x daily - 7 x weekly - 1 sets - 10 reps

## 2023-10-31 ENCOUNTER — OFFICE VISIT (OUTPATIENT)
Dept: PHYSICAL THERAPY | Facility: CLINIC | Age: 68
End: 2023-10-31
Payer: COMMERCIAL

## 2023-10-31 DIAGNOSIS — R29.898 LEG WEAKNESS, BILATERAL: Primary | ICD-10-CM

## 2023-10-31 DIAGNOSIS — R26.2 DIFFICULTY WALKING: ICD-10-CM

## 2023-10-31 PROCEDURE — 97110 THERAPEUTIC EXERCISES: CPT | Performed by: PHYSICAL THERAPIST

## 2023-10-31 PROCEDURE — 97530 THERAPEUTIC ACTIVITIES: CPT | Performed by: PHYSICAL THERAPIST

## 2023-10-31 PROCEDURE — 97112 NEUROMUSCULAR REEDUCATION: CPT | Performed by: PHYSICAL THERAPIST

## 2023-10-31 NOTE — PROGRESS NOTES
Daily Note     Today's date: 10/31/2023  Patient name: Lizzette Flores  : 1955  MRN: [id number]  Referring provider: Lenora Chavez DO  Dx:   Encounter Diagnosis     ICD-10-CM    1. Leg weakness, bilateral  R29.898       2. Difficulty walking  R26.2           Start Time: 1100  Stop Time: 1145  Total time in clinic (min): 45 minutes    Subjective: No new complaints. Would like to do the leg press today to increase LE strength. Objective: See treatment diary below      Assessment: Tolerated treatment well. Minimal verbal cueing required for proper execution of exercises today. Continues to display weakness of R>L lower extremity with decreased balance impacting gait and functional mobility Patient would benefit from continued PT      Plan: Continue per plan of care. Progress treatment as tolerated.        Precautions: fall risk  Access code:   Progress note:   POC:     Manuals 10/18 10/20 10/24 10/27 10/31                                   Neuro Re-Ed        Seated QS    :05x15 :05x15   SLB        Tandem   :30x2 bilat :30x2 bilat :30x2 bilat   romberg        airex   Rhomberg EO :30x2 Rhomberg EO :30x2 Rhomberg EO :30x2   fitter        hurdles        Stair balance  6" :30x2 B/L 6" :30x2 B/L 6" :30x2 B/L 6" :30x2 B/L   Step taps  6" x10 1H 6" 10x 1 H 6" 10x 1 H 6" 10x 1 H   Walk with head nod  10ft x4 10ft x4 10ft x4 10ft x4   Walk with head turn  10ft x4 10ft x4 10ft x4 10ft x4   Tandem walk  10 ft x4 1H 10 ft x4 1H 10 ft x4 1H 10ft x4   Side step  10ft x4 10ft x4 10ft x4 10ft x4   Backwards walk  10ft x4  10ft x4 10ft x4   Ther Ex        Nustep s=8  L1 x5 L1 6 min L1 6 min L1 10 min   Standing hip abd  x15 15x bilat 20x bilat 20x bilat   Standing hip ext  x15 15x bilat 20x bilat 20x bilat   Standing HR/TR  x15 15x ea 20x ea 20x ea           Seated LAQ   10x2 2x10 2x10   TKE ball v wall    :05x10 :05x10           Leg press squat     S=6 55# 3x10 Ther Activity        Step ups   4" 15x bilat 6" 15x R 6" bilat fwd, lat 15x   Sit to stand   10x with 2 UE assist 10x with 2 UE assist L leg fwd 10x with 2 UE assist L leg fwd   Cone weave        Cone pickup        Sit to stand with step tap                Gait Training                        Modalities                          Access Code: [email or web address]  URL: [email or web address]  Date: 10/20/2023  Prepared by: Pako Bee    Exercises  - Standing Hip Extension with Counter Support  - 2 x daily - 7 x weekly - 1 sets - 10 reps  - Standing Hip Abduction with Counter Support  - 2 x daily - 7 x weekly - 1 sets - 10 reps  - Standing Heel Raise with Support  - 2 x daily - 7 x weekly - 1 sets - 10 reps  - Alternating Step Taps with Counter Support  - 2 x daily - 7 x weekly - 1 sets - 10 reps  - Standing Toe Raises at Chair  - 2 x daily - 7 x weekly - 1 sets - 10 reps

## 2024-08-29 ENCOUNTER — TELEPHONE (OUTPATIENT)
Age: 69
End: 2024-08-29

## 2024-08-29 DIAGNOSIS — M81.0 OSTEOPOROSIS, UNSPECIFIED OSTEOPOROSIS TYPE, UNSPECIFIED PATHOLOGICAL FRACTURE PRESENCE: Primary | ICD-10-CM

## 2024-08-29 NOTE — TELEPHONE ENCOUNTER
Spoke with [facility] and they confirmed the pt got Prolia in March 2024. She is due September 2024. I faxed the PA (good until 12-31-24) and the lab orders for CMP and Vitamin D. Therapy plan was sent to be signed and faxed as well. They confirmed that they will call to schedule the patient.

## 2024-08-29 NOTE — TELEPHONE ENCOUNTER
Caller: Cassandra Zamora    Relationship: Self    Best call back number: [phone]    Requested Prescriptions: PROLIA INJECTION    Pharmacy where request should be sent: [facility] MEDICAL OFFICE [address] [facility] - [phone] [facility] [phone] FX [[id number]]     Last office visit with prescribing clinician: Visit date not found   Last telemedicine visit with prescribing clinician: Visit date not found   Next office visit with prescribing clinician: 11/5/2024     Additional details provided by patient: PATIENT NEEDS INJECTION NEXT MONTH    Does the patient have less than a 3 day supply:  [] Yes  [x] No    Would you like a call back once the refill request has been completed: [x] Yes [] No    If the office needs to give you a call back, can they leave a voicemail: [x] Yes [] No

## 2024-10-01 NOTE — PROGRESS NOTES
01/28/20 2034   Charting Type   Charting Type Shift assessment   Neurological   Level of Consciousness Alert/awake   Orientation Level Oriented X4   Cognition Follows commands; Other (Comment)  (demanding , OCD)   Speech Delayed responses; Expressive aphasia   Swallow Able to swallow solids and liquids without difficulty   R Hand  Strong   L Hand  Strong   RUE Muscle Strength 5- Normal strength   LUE Muscle Strength 5- Normal strength   RLE Muscle Strength 5- Normal strength   LLE Muscle Strength 5- Normal strength   Neuro Symptoms Agitation;Irritable   Relieved by Other (Comment)  (time out)   Delirium Assessment- CAM    Acute Onset and Fluctuating Course (1) No   Inattention (2) No   Disorganized Thinking (3) No   Rate Patient's Level of Consciousness (4) Alert (Normal), No   Delirium Present No   Welcome Coma Scale   Eye Opening 4   Best Verbal Response 5   Best Motor Response 6   Carie Coma Scale Score 15   HEENT   Vision - Corrective Lenses (sent home) Glasses   Teeth Missing teeth   Respiratory   Respiratory (WDL) WDL   Bilateral Breath Sounds Clear   Cardiac   Cardiac Regularity Regular   Pain Assessment   Pain Assessment No/denies pain   Pain Score No Pain   Peripheral Vascular   Edema Generalized   Generalized Edema Non-pitting   Integumentary   Skin Color Appropriate for ethnicity   Skin Condition/Temp Warm;Dry   Allan Scale   Sensory Perceptions 4   Moisture 3   Activity 3   Mobility 3   Nutrition 3   Friction and Shear 3   Allan Scale Score 19   Musculoskeletal   Level of Assistance Standby assist, set-up cues, supervision of patient - no hands on   Assistive Device Cane   RUE Full movement   LUE Full movement   RLE Full movement   LLE Full movement   Gastrointestinal   Abdomen Inspection Obese   Stool Assessment   Bowel Incontinence No   Urine Assessment   Urinary Incontinence No   Psychosocial   Patient Behaviors/Mood Irritable; Angry   Ability to Express Feelings Able to express   Ability to Express Needs Able to express   Ability to Express Thoughts Able to express   Ability to Understand Others Usually understands ESRD on HD M-W-F via Right IJ permcath. see HPI preop dx of ESRD dx of ESRD

## 2024-10-14 PROBLEM — M05.79 RHEUMATOID ARTHRITIS WITH RHEUMATOID FACTOR OF MULTIPLE SITES WITHOUT ORGAN OR SYSTEMS INVOLVEMENT: Status: ACTIVE | Noted: 2024-10-14

## 2024-11-05 ENCOUNTER — TELEPHONE (OUTPATIENT)
Age: 69
End: 2024-11-05

## 2024-11-05 ENCOUNTER — OFFICE VISIT (OUTPATIENT)
Age: 69
End: 2024-11-05
Payer: MEDICARE

## 2024-11-05 VITALS
DIASTOLIC BLOOD PRESSURE: 72 MMHG | BODY MASS INDEX: 27.82 KG/M2 | HEIGHT: 63 IN | WEIGHT: 157 LBS | HEART RATE: 71 BPM | TEMPERATURE: 97.7 F | SYSTOLIC BLOOD PRESSURE: 118 MMHG

## 2024-11-05 DIAGNOSIS — D84.821 IMMUNOSUPPRESSION DUE TO DRUG THERAPY: ICD-10-CM

## 2024-11-05 DIAGNOSIS — M05.79 RHEUMATOID ARTHRITIS WITH RHEUMATOID FACTOR OF MULTIPLE SITES WITHOUT ORGAN OR SYSTEMS INVOLVEMENT: Primary | ICD-10-CM

## 2024-11-05 DIAGNOSIS — Z79.899 HIGH RISK MEDICATION USE: ICD-10-CM

## 2024-11-05 DIAGNOSIS — M81.0 OSTEOPOROSIS, UNSPECIFIED OSTEOPOROSIS TYPE, UNSPECIFIED PATHOLOGICAL FRACTURE PRESENCE: ICD-10-CM

## 2024-11-05 DIAGNOSIS — Z79.899 IMMUNOSUPPRESSION DUE TO DRUG THERAPY: ICD-10-CM

## 2024-11-05 PROCEDURE — 1160F RVW MEDS BY RX/DR IN RCRD: CPT | Performed by: INTERNAL MEDICINE

## 2024-11-05 PROCEDURE — 99215 OFFICE O/P EST HI 40 MIN: CPT | Performed by: INTERNAL MEDICINE

## 2024-11-05 PROCEDURE — G2211 COMPLEX E/M VISIT ADD ON: HCPCS | Performed by: INTERNAL MEDICINE

## 2024-11-05 PROCEDURE — 1159F MED LIST DOCD IN RCRD: CPT | Performed by: INTERNAL MEDICINE

## 2024-11-05 RX ORDER — HYDROXYCHLOROQUINE SULFATE 200 MG/1
200 TABLET, FILM COATED ORAL DAILY
Qty: 90 TABLET | Refills: 1 | Status: SHIPPED | OUTPATIENT
Start: 2024-11-05

## 2024-11-05 RX ORDER — METHOTREXATE 2.5 MG/1
15 TABLET ORAL WEEKLY
Qty: 72 TABLET | Refills: 0 | Status: SHIPPED | OUTPATIENT
Start: 2024-11-05

## 2024-11-05 RX ORDER — OMEPRAZOLE 40 MG/1
40 CAPSULE, DELAYED RELEASE ORAL DAILY
COMMUNITY

## 2024-11-05 RX ORDER — FOLIC ACID 1 MG/1
1 TABLET ORAL DAILY
Qty: 90 TABLET | Refills: 3 | Status: SHIPPED | OUTPATIENT
Start: 2024-11-05

## 2024-11-05 RX ORDER — CYANOCOBALAMIN 1000 UG/ML
INJECTION, SOLUTION INTRAMUSCULAR; SUBCUTANEOUS
COMMUNITY
Start: 2024-10-07

## 2024-11-05 NOTE — PROGRESS NOTES
Office Follow Up      Date: 11/05/2024   Patient Name: Cassandra Zamora  MRN: [id number]  YOB: 1955    Referring Physician: Jennifer Freeman PA     Chief Complaint:   Chief Complaint   Patient presents with    Rheumatoid Arthritis    Osteoporosis       History of Present Illness: Cassandra Zamora is a 69 y.o. female who is here today for follow up on rheumatoid arthritis and osteoporosis    She remains on methotrexate and Plaquenil.  She's been doing great overall.  No swollen or tender peripheral joints today.   No serious infection.  No side effect.  Eye exams okay on Plaquenil.     She has stable osteoporosis on bone density scan performed 9/20/22.   Alendronate was discontinued 9/22 & Prolia was started 11/8/22.  She has tolerated Prolia well.  No side effects.     July 1. 2021 she jumped off a ladder to avoid being hit in the head by a piece of wood and shattered her left heel.   She was treated by Dr. Coats at [place] and had an ORIF.      Subjective       Review of Systems: Review of Systems   Constitutional:  Negative for chills, fatigue, fever and unexpected weight loss.   HENT:  Negative for mouth sores, sinus pressure and sore throat.    Eyes:  Negative for pain and redness.   Respiratory:  Negative for cough and shortness of breath.    Cardiovascular:  Negative for chest pain.   Gastrointestinal:  Negative for abdominal pain, blood in stool, diarrhea, nausea, vomiting and GERD.   Endocrine: Negative for polydipsia and polyuria.   Genitourinary:  Negative for dysuria, genital sores and hematuria.   Musculoskeletal:  Negative for arthralgias, back pain, joint swelling, myalgias, neck pain and neck stiffness.   Skin:  Positive for bruise. Negative for rash.   Allergic/Immunologic: Negative for immunocompromised state.   Neurological:  Negative for seizures, weakness, numbness and memory problem.   Hematological:  Negative for adenopathy. Does not  "bruise/bleed easily.   Psychiatric/Behavioral:  Negative for depressed mood. The patient is not nervous/anxious.         Medications:   Current Outpatient Medications:     cyanocobalamin 1000 MCG/ML injection, INJECT ONE ML INTO THE MUSCLE every MONTH, Disp: , Rfl:     denosumab (Prolia) 60 MG/ML solution prefilled syringe syringe, Inject 1 mL under the skin into the appropriate area as directed Every 6 (Six) Months., Disp: , Rfl:     folic acid (FOLVITE) 1 MG tablet, Take 1 tablet by mouth Daily., Disp: 90 tablet, Rfl: 3    hydroxychloroquine (PLAQUENIL) 200 MG tablet, Take 1 tablet by mouth Daily. For rheumatoid arthritis, Disp: 90 tablet, Rfl: 1    ibuprofen (ADVIL,MOTRIN) 800 MG tablet, Take 1 tablet by mouth 3 (Three) Times a Day As Needed (with food)., Disp: , Rfl:     methotrexate 2.5 MG tablet, Take 6 tablets by mouth 1 (One) Time Per Week., Disp: 72 tablet, Rfl: 0    omeprazole (priLOSEC) 40 MG capsule, Take 1 capsule by mouth Daily., Disp: , Rfl:     Allergies: No Known Allergies    I have reviewed and updated the patient's chief complaint, history of present illness, review of systems, past medical history, surgical history, family history, social history, medications and allergy list as appropriate.     Objective        Vital Signs:   Vitals:    11/05/24 1046   BP: 118/72   BP Location: Left arm   Patient Position: Sitting   Cuff Size: Adult   Pulse: 71   Temp: 97.7 °F (36.5 °C)   Weight: 71.2 kg (157 lb)   Height: 160 cm (63\")   PainSc:   1   PainLoc: Generalized     Body mass index is 27.81 kg/m².      Physical Exam:  Physical Exam   MUSCULOSKELETAL:   No peripheral synovitis    Complete joint exam was performed including the MCPs, PIPs, DIPs of the hands, wrists, elbows, shoulders, hips, knees and ankles.  No soft tissue swelling or tenderness is present except as above.    General: The patient is well-developed and well nourished. Cooperative, alert and oriented. Affect is normal. Hydration appears " "normal.   HEENT: Normocephalic and atraumatic. Lids and conjunctiva are normal. Pupils are equal and sclera are clear. Oropharynx is clear   NECK neck is supple without adenopathy, masses or thyromegaly.   CARDIOVASCULAR: Regular rate and rhythm. No murmurs, rubs or gallops   LUNGS: Effort is normal. Lungs are clear bilateral   ABDOMEN: Not examined  EXTREMITIES: Peripheral pulses are intact. No clubbing.   SKIN: No rashes. No subcutaneous nodules. No digital ulcers. No sclerodactyly.   NEUROLOGIC: Gait is normal. Strength testing is normal.  No focal neurologic deficits    Results Review:   Labs:   No results found for: \"GLUCOSE\", \"BUN\", \"CREATININE\", \"EGFRRESULT\", \"EGFR\", \"BCR\", \"K\", \"CO2\", \"CALCIUM\", \"PROTENTOTREF\", \"ALBUMIN\", \"BILITOT\", \"AST\", \"ALT\"  No results found for: \"WBC\", \"HGB\", \"HCT\", \"MCV\", \"PLT\"  No results found for: \"SEDRATE\"  No results found for: \"CRP\"  No results found for: \"QUANTIFERO\", \"QUANTITB1\", \"QUANTITB2\", \"QUANTIFERN\", \"QUANTIFERM\", \"QUANTITBGLDP\"  No results found for: \"RF\"  No results found for: \"HEPBSAG\", \"HEPAIGM\", \"HEPBIGMCORE\", \"HEPCVIRUSABY\"      Procedures    Assessment / Plan      Assessment & Plan  Rheumatoid arthritis with rheumatoid factor of multiple sites without organ or systems involvement  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      In clinical remission.  sjc 0 tjc 0 good prognosis  Markedly improved on methotrexate and Plaquenil.  No side effect.  No serious infection.    Labs reviewed and remain stable.  Continue labs every 12 weeks for toxicity monitoring.  Standing order provided  Eye exam yearly for Plaquenil toxicity monitoring remains stable  Refills provided.  Follow-up in 6 months.      Orders:    Comprehensive Metabolic Panel; Standing    CBC Auto Differential; Standing    C-reactive Protein; Standing    Sedimentation Rate; Standing    High risk medication use  Methotrexate, Plaquenil    Well tolerated and " effective  Risks include but are not limited to severe liver damage that can be fatal, the possible need for liver biopsy, bone marrow suppression that can lead to dangerously low blood counts, GI side effects including mouth sores and diarrhea, fatigue, and rare risk of severe pulmonary complications. There should be no alcohol consumed with MTX. MTX can cause severe fetal abnormalities whether the mother or father is taking the medication and thus must be avoided if pregnancy is a possibility.  All medication is to be taken one day a week only. The need for q 12 week labs and the need for folic acid supplementation were discussed.    Yearly Plaquenil eye exams for toxicity monitoring have been okay  We discussed possible side effects of hydroxychloroquine including headache, nausea, diarrhea, rare retinal pigmentation, rare neuromuscular toxicity.  Recommend eye exams every 6 to 12 months to monitor for retinal toxicity.    Orders:    Comprehensive Metabolic Panel; Standing    CBC Auto Differential; Standing    C-reactive Protein; Standing    Sedimentation Rate; Standing    Immunosuppression due to drug therapy    Orders:    Comprehensive Metabolic Panel; Standing    CBC Auto Differential; Standing    C-reactive Protein; Standing    Sedimentation Rate; Standing    Osteoporosis, unspecified osteoporosis type, unspecified pathological fracture presence  DEXA ACL 9/20/22-stable osteoporosis  Prior Alendronate start 11/28/2017-9/22-stopped after 5 years  **Current:  Prolia start 11/8/22 [facility]    DEXA performed 9/20/22 showing stable osteoporosis. Repeat DEXA every 2 yrs.   we will try to update bone density at return visit once scanner up and running [facility]  Prolia started 11/2022.  Continue Prolia every 6 months at [facility].    Continue calcium vitamin D over-the-counter and weightbearing exercise.   Risk benefits of Prolia discussed in detail including but not limited to osteonecrosis jaw,  atypical femoral fracture, bone death, kidney failure, hypocalcemia.  Patient has no reported jaw or tooth pain.           TIME SPENT: I spent 41 minutes caring for the patient on this date of service.  This time includes time spent by me in the following activities: Preparing for the visit, obtaining records, reviewing/ordering tests and independently reviewing results, performing a medically appropriate history/exam, counseling and educating the patient/family/caregiver, ordering medications, tests, or procedures, and documenting information in the medical record.    Follow Up:   Return in about 6 months (around 5/5/2025).        Temo Benitez MD  [facility] [place] [facility]

## 2024-11-05 NOTE — ASSESSMENT & PLAN NOTE
DEXA ACL 9/20/22-stable osteoporosis  Prior Alendronate start 11/28/2017-9/22-stopped after 5 years  **Current:  Prolia start 11/8/22 [facility]    DEXA performed 9/20/22 showing stable osteoporosis. Repeat DEXA every 2 yrs.   we will try to update bone density at return visit once scanner up and running [facility]  Prolia started 11/2022.  Continue Prolia every 6 months at [facility].    Continue calcium vitamin D over-the-counter and weightbearing exercise.   Risk benefits of Prolia discussed in detail including but not limited to osteonecrosis jaw, atypical femoral fracture, bone death, kidney failure, hypocalcemia.  Patient has no reported jaw or tooth pain.

## 2024-11-05 NOTE — ASSESSMENT & PLAN NOTE
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      In clinical remission.  sjc 0 tjc 0 good prognosis  Markedly improved on methotrexate and Plaquenil.  No side effect.  No serious infection.    Labs reviewed and remain stable.  Continue labs every 12 weeks for toxicity monitoring.  Standing order provided  Eye exam yearly for Plaquenil toxicity monitoring remains stable  Refills provided.  Follow-up in 6 months.      Orders:    Comprehensive Metabolic Panel; Standing    CBC Auto Differential; Standing    C-reactive Protein; Standing    Sedimentation Rate; Standing

## 2024-11-05 NOTE — TELEPHONE ENCOUNTER
Pt gets prolia at [facility].  Last injection she thinks was Aug 2024.  Schedule next prolia for 6 months after last injection at [facility]

## 2024-11-05 NOTE — ASSESSMENT & PLAN NOTE
Orders:    Comprehensive Metabolic Panel; Standing    CBC Auto Differential; Standing    C-reactive Protein; Standing    Sedimentation Rate; Standing

## 2024-11-05 NOTE — TELEPHONE ENCOUNTER
Spoke with Jessica at [facility] who stated the patient's order was good and they just needed a new PA. [facility] automatically approved her Prolia for 2025 and I faxed this approval to them. They stated they will get her scheduled when due in March.

## 2024-11-05 NOTE — ASSESSMENT & PLAN NOTE
Methotrexate, Plaquenil    Well tolerated and effective  Risks include but are not limited to severe liver damage that can be fatal, the possible need for liver biopsy, bone marrow suppression that can lead to dangerously low blood counts, GI side effects including mouth sores and diarrhea, fatigue, and rare risk of severe pulmonary complications. There should be no alcohol consumed with MTX. MTX can cause severe fetal abnormalities whether the mother or father is taking the medication and thus must be avoided if pregnancy is a possibility.  All medication is to be taken one day a week only. The need for q 12 week labs and the need for folic acid supplementation were discussed.    Yearly Plaquenil eye exams for toxicity monitoring have been okay  We discussed possible side effects of hydroxychloroquine including headache, nausea, diarrhea, rare retinal pigmentation, rare neuromuscular toxicity.  Recommend eye exams every 6 to 12 months to monitor for retinal toxicity.    Orders:    Comprehensive Metabolic Panel; Standing    CBC Auto Differential; Standing    C-reactive Protein; Standing    Sedimentation Rate; Standing

## 2024-11-25 ENCOUNTER — TELEPHONE (OUTPATIENT)
Age: 69
End: 2024-11-25
Payer: MEDICARE

## 2024-11-25 RX ORDER — HYDROXYCHLOROQUINE SULFATE 200 MG/1
200 TABLET, FILM COATED ORAL 2 TIMES DAILY
Qty: 180 TABLET | Refills: 1 | Status: SHIPPED | OUTPATIENT
Start: 2024-11-25

## 2024-11-25 NOTE — TELEPHONE ENCOUNTER
Caller: Cassandra Zamora    Relationship to patient: Self    Best call back number: [phone]     Patient is needing: PT RX OF PLAQUENIL PLACED INCORRECTLY. WAS PLACED FOR 1 TABLET A DAY, PT CURRENTLY ON 2 TABLETS PER DAY. PLEASE UPDATE RX, NOTIFY PT WHEN RESUBMITTED.

## 2025-02-18 NOTE — PROGRESS NOTES
Alert, pleasant and cooperative this a m  Needs verbal cues at times for safety while ambulating with RW  Expressive aphasia persists but able to make needs met  Pain free  Tolerating therapy  Medications and side effects , stroke ed and safety reinforced  Continue same plan of care  darkened lights/plan of care explained/repositioned/side rails up plan of care explained/repositioned/side rails up plan of care explained/side rails down

## 2025-03-05 ENCOUNTER — TELEPHONE (OUTPATIENT)
Age: 70
End: 2025-03-05
Payer: MEDICARE

## 2025-03-05 RX ORDER — METHOTREXATE 2.5 MG/1
15 TABLET ORAL WEEKLY
Qty: 72 TABLET | Refills: 0 | Status: CANCELLED | OUTPATIENT
Start: 2025-03-05

## 2025-03-05 NOTE — TELEPHONE ENCOUNTER
Caller: Cassandra Zamora    Relationship: Self    Best call back number: [phone]    Requested Prescriptions:   Requested Prescriptions     Pending Prescriptions Disp Refills    methotrexate 2.5 MG tablet 72 tablet 0     Sig: Take 6 tablets by mouth 1 (One) Time Per Week.    hydroxychloroquine (PLAQUENIL) 200 MG tablet 180 tablet 1     Sig: Take 1 tablet by mouth 2 (Two) Times a Day. For rheumatoid arthritis    folic acid (FOLVITE) 1 MG tablet 90 tablet 3     Sig: Take 1 tablet by mouth Daily.    omeprazole (priLOSEC) 40 MG capsule       Sig: Take 1 capsule by mouth Daily.        Pharmacy where request should be sent: [facility] - [place], [place] - [address]  [phone] [facility] [phone] [facility]     Last office visit with prescribing clinician: 11/5/2024     Next office visit with prescribing clinician: 5/5/2025     Additional details provided by patient: PATIENT STATES SHE WILL BE OUT OF HER MEDICATION AFTER TODAY, PLEASE ADVISE. PATIENT ALSO STATES SHE IS DUE FOR [employer] THIS MONTH & IS REQUESTING ORDER BE FAXED TO [facility] IN [place].    Does the patient have less than a 3 day supply:  [x] Yes  [] No    Would you like a call back once the refill request has been completed: [x] Yes [] No    If the office needs to give you a call back, can they leave a voicemail: [x] Yes [] No

## 2025-03-06 DIAGNOSIS — Z79.899 IMMUNOSUPPRESSION DUE TO DRUG THERAPY: ICD-10-CM

## 2025-03-06 DIAGNOSIS — M05.79 RHEUMATOID ARTHRITIS WITH RHEUMATOID FACTOR OF MULTIPLE SITES WITHOUT ORGAN OR SYSTEMS INVOLVEMENT: Primary | ICD-10-CM

## 2025-03-06 DIAGNOSIS — E55.9 VITAMIN D DEFICIENCY: ICD-10-CM

## 2025-03-06 DIAGNOSIS — D84.821 IMMUNOSUPPRESSION DUE TO DRUG THERAPY: ICD-10-CM

## 2025-03-06 DIAGNOSIS — M81.0 AGE RELATED OSTEOPOROSIS, UNSPECIFIED PATHOLOGICAL FRACTURE PRESENCE: ICD-10-CM

## 2025-03-06 DIAGNOSIS — Z79.899 HIGH RISK MEDICATION USE: ICD-10-CM

## 2025-03-06 RX ORDER — HYDROXYCHLOROQUINE SULFATE 200 MG/1
200 TABLET, FILM COATED ORAL 2 TIMES DAILY
Qty: 180 TABLET | Refills: 1 | Status: SHIPPED | OUTPATIENT
Start: 2025-03-06

## 2025-03-06 RX ORDER — OMEPRAZOLE 40 MG/1
40 CAPSULE, DELAYED RELEASE ORAL DAILY
Qty: 90 CAPSULE | Refills: 1 | Status: SHIPPED | OUTPATIENT
Start: 2025-03-06

## 2025-03-06 RX ORDER — FOLIC ACID 1 MG/1
1 TABLET ORAL DAILY
Qty: 90 TABLET | Refills: 3 | Status: SHIPPED | OUTPATIENT
Start: 2025-03-06

## 2025-03-17 RX ORDER — METHOTREXATE 2.5 MG/1
TABLET ORAL
Qty: 72 TABLET | Refills: 0 | Status: SHIPPED | OUTPATIENT
Start: 2025-03-17

## 2025-05-05 ENCOUNTER — OFFICE VISIT (OUTPATIENT)
Age: 70
End: 2025-05-05
Payer: MEDICARE

## 2025-05-05 VITALS
HEIGHT: 63 IN | WEIGHT: 159.5 LBS | SYSTOLIC BLOOD PRESSURE: 134 MMHG | TEMPERATURE: 97.5 F | DIASTOLIC BLOOD PRESSURE: 74 MMHG | BODY MASS INDEX: 28.26 KG/M2 | HEART RATE: 72 BPM

## 2025-05-05 DIAGNOSIS — Z79.899 IMMUNOSUPPRESSION DUE TO DRUG THERAPY: ICD-10-CM

## 2025-05-05 DIAGNOSIS — D84.821 IMMUNOSUPPRESSION DUE TO DRUG THERAPY: ICD-10-CM

## 2025-05-05 DIAGNOSIS — E55.9 VITAMIN D DEFICIENCY: ICD-10-CM

## 2025-05-05 DIAGNOSIS — Z79.899 HIGH RISK MEDICATION USE: ICD-10-CM

## 2025-05-05 DIAGNOSIS — M05.79 RHEUMATOID ARTHRITIS WITH RHEUMATOID FACTOR OF MULTIPLE SITES WITHOUT ORGAN OR SYSTEMS INVOLVEMENT: Primary | ICD-10-CM

## 2025-05-05 PROCEDURE — 1160F RVW MEDS BY RX/DR IN RCRD: CPT | Performed by: INTERNAL MEDICINE

## 2025-05-05 PROCEDURE — 1159F MED LIST DOCD IN RCRD: CPT | Performed by: INTERNAL MEDICINE

## 2025-05-05 PROCEDURE — 99214 OFFICE O/P EST MOD 30 MIN: CPT | Performed by: INTERNAL MEDICINE

## 2025-05-05 PROCEDURE — G2211 COMPLEX E/M VISIT ADD ON: HCPCS | Performed by: INTERNAL MEDICINE

## 2025-05-05 RX ORDER — METHOTREXATE 2.5 MG/1
15 TABLET ORAL WEEKLY
Qty: 72 TABLET | Refills: 0 | Status: SHIPPED | OUTPATIENT
Start: 2025-05-05

## 2025-05-05 RX ORDER — HYDROXYCHLOROQUINE SULFATE 200 MG/1
200 TABLET, FILM COATED ORAL DAILY
Qty: 90 TABLET | Refills: 1 | Status: SHIPPED | OUTPATIENT
Start: 2025-05-05

## 2025-05-05 NOTE — PROGRESS NOTES
Office Follow Up      Date: 05/05/2025   Patient Name: Cassandra Zamora  MRN: [id number]  YOB: 1955    Referring Physician: No ref. provider found     Chief Complaint:   Chief Complaint   Patient presents with    Rheumatoid Arthritis       History of Present Illness: Cassandra Zamora is a 69 y.o. female who is here today for follow up on rheumatoid arthritis and osteoporosis     She remains on methotrexate and Plaquenil.  She's been doing great overall.  No swollen or tender peripheral joints today.   No serious infection.  No side effect.  Eye exams okay on Plaquenil.      She has stable osteoporosis on bone density scan performed 9/20/22.   Alendronate was discontinued 9/22 & Prolia was started 11/8/22.  She has tolerated Prolia well.  No side effects.      July 1. 2021 she jumped off a ladder to avoid being hit in the head by a piece of wood and shattered her left heel.   She was treated by Dr. Coats at [facility] and had an ORIF.      History of Present Illness        Subjective       Review of Systems: Review of Systems     Past Medical History:   Past Medical History:   Diagnosis Date    History of arm fracture     and toe    Migraine headache     Osteoporosis     Seropositive rheumatoid arthritis        Past Surgical History:   Past Surgical History:   Procedure Laterality Date    CATARACT EXTRACTION Bilateral 2020    TUBAL ABDOMINAL LIGATION      WRIST SURGERY Right        Family History:   Family History   Problem Relation Age of Onset    Leukemia Mother     Arthritis Mother     Hypertension Mother        Social History:   Social History     Socioeconomic History    Marital status:    Tobacco Use    Smoking status: Never     Passive exposure: Past    Smokeless tobacco: Never   Vaping Use    Vaping status: Never Used   Substance and Sexual Activity    Alcohol use: Never    Drug use: Never    Sexual activity: Defer       Medications:   Current Outpatient  "Medications:     cyanocobalamin 1000 MCG/ML injection, INJECT ONE ML INTO THE MUSCLE every MONTH, Disp: , Rfl:     denosumab (Prolia) 60 MG/ML solution prefilled syringe syringe, Inject 1 mL under the skin into the appropriate area as directed Every 6 (Six) Months., Disp: , Rfl:     folic acid (FOLVITE) 1 MG tablet, Take 1 tablet by mouth Daily., Disp: 90 tablet, Rfl: 3    hydroxychloroquine (PLAQUENIL) 200 MG tablet, Take 1 tablet by mouth Daily. For rheumatoid arthritis, Disp: 90 tablet, Rfl: 1    ibuprofen (ADVIL,MOTRIN) 800 MG tablet, Take 1 tablet by mouth 3 (Three) Times a Day As Needed (with food)., Disp: , Rfl:     methotrexate 2.5 MG tablet, Take 6 tablets by mouth 1 (One) Time Per Week., Disp: 72 tablet, Rfl: 0    omeprazole (priLOSEC) 40 MG capsule, Take 1 capsule by mouth Daily., Disp: 90 capsule, Rfl: 1    Allergies: No Known Allergies        Objective        Vital Signs:   Vitals:    05/05/25 1058   BP: 134/74   BP Location: Left arm   Patient Position: Sitting   Cuff Size: Adult   Pulse: 72   Temp: 97.5 °F (36.4 °C)   Weight: 72.3 kg (159 lb 8 oz)   Height: 160 cm (63\")   PainSc: 1      Body mass index is 28.25 kg/m².      Physical Exam:  Physical Exam   MUSCULOSKELETAL:   No peripheral synovitis.  No tender joints    Complete joint exam was performed including the MCPs, PIPs, DIPs of the hands, wrists, elbows, shoulders, hips, knees and ankles.  No soft tissue swelling or tenderness is present except as above.    General: The patient is well-developed and well nourished. Cooperative, alert and oriented. Affect is normal. Hydration appears normal.   HEENT: Normocephalic and atraumatic. Lids and conjunctiva are normal. Pupils are equal and sclera are clear. Oropharynx is clear   NECK neck is supple without adenopathy, masses or thyromegaly.   CARDIOVASCULAR: Regular rate and rhythm. No murmurs, rubs or gallops   LUNGS: Effort is normal. Lungs are clear bilateral   ABDOMEN: Not examined  EXTREMITIES: " "Peripheral pulses are intact. No clubbing.   SKIN: No rashes. No subcutaneous nodules. No digital ulcers. No sclerodactyly.   NEUROLOGIC: Gait is normal. Strength testing is normal.  No focal neurologic deficits    Results Review:   Labs:   No results found for: \"GLUCOSE\", \"BUN\", \"CREATININE\", \"EGFRRESULT\", \"EGFR\", \"BCR\", \"K\", \"CO2\", \"CALCIUM\", \"PROTENTOTREF\", \"ALBUMIN\", \"BILITOT\", \"AST\", \"ALT\"  No results found for: \"WBC\", \"HGB\", \"HCT\", \"MCV\", \"PLT\"  No results found for: \"SEDRATE\"  No results found for: \"CRP\"  No results found for: \"QUANTIFERO\", \"QUANTITB1\", \"QUANTITB2\", \"QUANTIFERN\", \"QUANTIFERM\", \"QUANTITBGLDP\"  No results found for: \"RF\"  No results found for: \"HEPBSAG\", \"HEPAIGM\", \"HEPBIGMCORE\", \"HEPCVIRUSABY\"      Procedures    Assessment / Plan      Rheumatoid arthritis with rheumatoid factor of multiple sites   Instructor - Retired; 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     In clinical remission.  sjc 0 tjc 0 good prognosis  Markedly improved on methotrexate and Plaquenil.  No side effect.  No serious infection.    Labs reviewed 3/3/2025 [facility] and remain stable.  Continue labs every 12 weeks CBC CMP sed rate CRP vitamin D for toxicity monitoring.  Standing order provided  Eye exam yearly for Plaquenil toxicity monitoring remains stable  -Continue methotrexate 6 tablets once weekly  -Lower hydroxychloroquine to 200 mg once daily to get her within the 5 mg/kg dose  Refills provided.  Follow-up in 6 months.        High risk medication use  Immunosuppression due to drug therapy  Methotrexate, Plaquenil     Well tolerated and effective  Risks include but are not limited to severe liver damage that can be fatal, the possible need for liver biopsy, bone marrow suppression that can lead to dangerously low blood counts, GI side effects including mouth sores and diarrhea, fatigue, and rare risk of severe pulmonary complications. There should be no alcohol consumed " with MTX. MTX can cause severe fetal abnormalities whether the mother or father is taking the medication and thus must be avoided if pregnancy is a possibility.  All medication is to be taken one day a week only. The need for q 12 week labs and the need for folic acid supplementation were discussed.     Yearly Plaquenil eye exams for toxicity monitoring have been okay  We discussed possible side effects of hydroxychloroquine including headache, nausea, diarrhea, rare retinal pigmentation, rare neuromuscular toxicity.  Recommend eye exams every 6 to 12 months to monitor for retinal toxicity.       Osteoporosis  DEXA ACL 9/20/22-stable osteoporosis  Prior Alendronate start 11/28/2017-9/22-stopped after 5 years  **Current:  Prolia start 11/8/22 [facility]     DEXA performed 9/20/22 showing stable osteoporosis. Repeat DEXA every 2-3 yrs.   we will try to update bone density at return visit once scanner up and running [facility]  -Continue Prolia every 6 months at [facility] last done March 2025.    Continue calcium vitamin D over-the-counter and weightbearing exercise.   Risk benefits of Prolia discussed in detail including but not limited to osteonecrosis jaw, atypical femoral fracture, bone death, kidney failure, hypocalcemia.  Patient has no reported jaw or tooth pain.            1. Rheumatoid arthritis with rheumatoid factor of multiple sites without organ or systems involvement    2. Vitamin D deficiency    3. High risk medication use    4. Immunosuppression due to drug therapy        Assessment & Plan        Orders Placed This Encounter   Procedures    C-reactive Protein    CBC Auto Differential    Comprehensive Metabolic Panel    Sedimentation Rate    Vitamin D,25-Hydroxy     New Medications Ordered This Visit   Medications    hydroxychloroquine (PLAQUENIL) 200 MG tablet     Sig: Take 1 tablet by mouth Daily. For rheumatoid arthritis     Dispense:  90 tablet     Refill:  1    methotrexate 2.5 MG  tablet     Sig: Take 6 tablets by mouth 1 (One) Time Per Week.     Dispense:  72 tablet     Refill:  0       Follow Up:   Return in about 4 months (around 9/5/2025).      Discussed plan of care in detail with the patient today.  Patient verbalized understanding and agrees.    I confirm accuracy of unchanged data/findings which have been carried forward from previous visit.  I have updated appropriately those that have changed.        Temo Benitez MD  [facility]

## 2025-06-24 ENCOUNTER — TELEPHONE (OUTPATIENT)
Age: 70
End: 2025-06-24
Payer: MEDICARE

## 2025-06-24 NOTE — TELEPHONE ENCOUNTER
[facility] called because patient is there to have labs done but orders do not have provider signature. Orders from 5/5/25 with signature faxed to [phone].

## 2025-06-26 ENCOUNTER — RESULTS FOLLOW-UP (OUTPATIENT)
Age: 70
End: 2025-06-26
Payer: MEDICARE

## (undated) DEVICE — SYRINGE 10ML LL

## (undated) DEVICE — UNDERGLOVE PROTEXIS  BLUE SZ 7

## (undated) DEVICE — PENCIL ELECTROSURG E-Z CLEAN -0035H

## (undated) DEVICE — NEEDLE BLUNT 18 G X 1 1/2IN

## (undated) DEVICE — INTENDED FOR TISSUE SEPARATION, AND OTHER PROCEDURES THAT REQUIRE A SHARP SURGICAL BLADE TO PUNCTURE OR CUT.: Brand: BARD-PARKER SAFETY BLADES SIZE 15, STERILE

## (undated) DEVICE — NEEDLE 25G X 1 1/2

## (undated) DEVICE — STERILE POLYISOPRENE POWDER-FREE SURGICAL GLOVES WITH EMOLLIENT COATING: Brand: PROTEXIS

## (undated) DEVICE — INTENDED FOR TISSUE SEPARATION, AND OTHER PROCEDURES THAT REQUIRE A SHARP SURGICAL BLADE TO PUNCTURE OR CUT.: Brand: BARD-PARKER ® SAFETYLOCK CARBON RIB-BACK BLADES

## (undated) DEVICE — STERILE POLYISOPRENE POWDER-FREE SURGICAL GLOVES: Brand: PROTEXIS

## (undated) DEVICE — 4-PORT MANIFOLD: Brand: NEPTUNE 2

## (undated) DEVICE — SUT ETHILON 4-0 PS-2 18 IN 1667H

## (undated) DEVICE — OCCLUSIVE GAUZE STRIP,3% BISMUTH TRIBROMOPHENATE IN PETROLATUM BLEND: Brand: XEROFORM

## (undated) DEVICE — PAD GROUNDING ADULT

## (undated) DEVICE — CUFF TOURNIQUET DISP SZ18

## (undated) DEVICE — TUBING SUCTION 5MM X 12 FT

## (undated) DEVICE — STANDARD SURGICAL GOWN, L: Brand: CONVERTORS

## (undated) DEVICE — STRETCH BANDAGE: Brand: CURITY

## (undated) DEVICE — 3M™ COBAN™ NL STERILE NON-LATEX SELF-ADHERENT WRAP, 2084S, 4 IN X 5 YD (10 CM X 4,5 M), 18 ROLLS/CASE: Brand: 3M™ COBAN™

## (undated) DEVICE — CHLORAPREP HI-LITE 26ML ORANGE

## (undated) DEVICE — SUT VICRYL 4-0 PS-2 18 IN J496G

## (undated) DEVICE — COBAN 4 IN STERILE

## (undated) DEVICE — SYRINGE 10ML LL CONTROL TOP